# Patient Record
Sex: FEMALE | Race: BLACK OR AFRICAN AMERICAN | NOT HISPANIC OR LATINO | Employment: FULL TIME | URBAN - METROPOLITAN AREA
[De-identification: names, ages, dates, MRNs, and addresses within clinical notes are randomized per-mention and may not be internally consistent; named-entity substitution may affect disease eponyms.]

---

## 2017-01-09 ENCOUNTER — PATIENT MESSAGE (OUTPATIENT)
Dept: SPINE | Facility: CLINIC | Age: 57
End: 2017-01-09

## 2017-01-12 ENCOUNTER — OFFICE VISIT (OUTPATIENT)
Dept: FAMILY MEDICINE | Facility: CLINIC | Age: 57
End: 2017-01-12
Payer: COMMERCIAL

## 2017-01-12 VITALS
HEART RATE: 80 BPM | TEMPERATURE: 98 F | OXYGEN SATURATION: 98 % | WEIGHT: 207.88 LBS | RESPIRATION RATE: 16 BRPM | SYSTOLIC BLOOD PRESSURE: 120 MMHG | BODY MASS INDEX: 35.49 KG/M2 | HEIGHT: 64 IN | DIASTOLIC BLOOD PRESSURE: 82 MMHG

## 2017-01-12 DIAGNOSIS — N39.0 URINARY TRACT INFECTION WITH HEMATURIA, SITE UNSPECIFIED: Primary | ICD-10-CM

## 2017-01-12 DIAGNOSIS — E66.9 OBESITY (BMI 35.0-39.9 WITHOUT COMORBIDITY): ICD-10-CM

## 2017-01-12 DIAGNOSIS — R31.9 URINARY TRACT INFECTION WITH HEMATURIA, SITE UNSPECIFIED: Primary | ICD-10-CM

## 2017-01-12 DIAGNOSIS — Z12.31 ENCOUNTER FOR SCREENING MAMMOGRAM FOR BREAST CANCER: ICD-10-CM

## 2017-01-12 DIAGNOSIS — I48.0 PAROXYSMAL ATRIAL FIBRILLATION: Primary | ICD-10-CM

## 2017-01-12 LAB
BILIRUB SERPL-MCNC: ABNORMAL MG/DL
BLOOD URINE, POC: ABNORMAL
COLOR, POC UA: ABNORMAL
GLUCOSE UR QL STRIP: NORMAL
KETONES UR QL STRIP: ABNORMAL
LEUKOCYTE ESTERASE URINE, POC: ABNORMAL
NITRITE, POC UA: ABNORMAL
PH, POC UA: 5
PROTEIN, POC: ABNORMAL
SPECIFIC GRAVITY, POC UA: 1.02
UROBILINOGEN, POC UA: NORMAL

## 2017-01-12 PROCEDURE — 87077 CULTURE AEROBIC IDENTIFY: CPT | Mod: 59

## 2017-01-12 PROCEDURE — 87186 SC STD MICRODIL/AGAR DIL: CPT

## 2017-01-12 PROCEDURE — 1159F MED LIST DOCD IN RCRD: CPT | Mod: S$GLB,,, | Performed by: FAMILY MEDICINE

## 2017-01-12 PROCEDURE — 87088 URINE BACTERIA CULTURE: CPT

## 2017-01-12 PROCEDURE — 99213 OFFICE O/P EST LOW 20 MIN: CPT | Mod: 25,S$GLB,, | Performed by: FAMILY MEDICINE

## 2017-01-12 PROCEDURE — 87086 URINE CULTURE/COLONY COUNT: CPT

## 2017-01-12 PROCEDURE — 81001 URINALYSIS AUTO W/SCOPE: CPT | Mod: S$GLB,,, | Performed by: FAMILY MEDICINE

## 2017-01-12 PROCEDURE — 99999 PR PBB SHADOW E&M-EST. PATIENT-LVL IV: CPT | Mod: PBBFAC,,, | Performed by: FAMILY MEDICINE

## 2017-01-12 RX ORDER — CLINDAMYCIN HYDROCHLORIDE 300 MG/1
CAPSULE ORAL
Refills: 0 | COMMUNITY
Start: 2016-12-07 | End: 2017-01-12

## 2017-01-12 RX ORDER — IBUPROFEN 800 MG/1
TABLET ORAL
Refills: 0 | COMMUNITY
Start: 2016-12-07 | End: 2017-06-06 | Stop reason: SDUPTHER

## 2017-01-12 RX ORDER — PROPAFENONE HYDROCHLORIDE 225 MG/1
225 CAPSULE, EXTENDED RELEASE ORAL EVERY 12 HOURS
Qty: 60 CAPSULE | Refills: 11 | Status: SHIPPED | OUTPATIENT
Start: 2017-01-12 | End: 2017-02-14 | Stop reason: SDUPTHER

## 2017-01-12 RX ORDER — HYDROCODONE BITARTRATE AND ACETAMINOPHEN 7.5; 325 MG/1; MG/1
TABLET ORAL
Refills: 0 | COMMUNITY
Start: 2016-12-07 | End: 2017-01-12

## 2017-01-12 RX ORDER — NITROFURANTOIN 25; 75 MG/1; MG/1
100 CAPSULE ORAL 2 TIMES DAILY
Qty: 10 CAPSULE | Refills: 0 | Status: SHIPPED | OUTPATIENT
Start: 2017-01-12 | End: 2017-01-17

## 2017-01-12 NOTE — PROGRESS NOTES
Subjective:       Patient ID: Francoise Dykes is a 56 y.o. female.    Chief Complaint: Urinary Tract Infection (urgency and irritation for past 3 days ; otc Azo cranberry )    HPI    Onset 3 days ago with pelvic pressure a/w urgency, hesitancy, dysuria that did not improve with azo or cranberry.     Last uti was several years ago  Current Outpatient Prescriptions on File Prior to Visit   Medication Sig Dispense Refill    apixaban 5 mg Tab Take 1 tablet (5 mg total) by mouth 2 (two) times daily. 60 tablet 3    azelastine-fluticasone 137-50 mcg/spray Spry nassal spray 1 spray by Each Nare route 2 (two) times daily. 23 g 0    cetirizine (ZYRTEC) 10 MG tablet Take 10 mg by mouth once daily.      diltiaZEM (DILACOR XR) 120 MG CDCR Take 1 capsule (120 mg total) by mouth once daily. 30 capsule 11    hydrocortisone 0.5 % cream Apply topically daily as needed.      pantoprazole (PROTONIX) 40 MG tablet Take 1 tablet (40 mg total) by mouth once daily. 30 tablet 0    propafenone (RYTHMOL SR) 225 MG Cp12 Take 1 capsule (225 mg total) by mouth every 12 (twelve) hours. 60 capsule 1     No current facility-administered medications on file prior to visit.        Review of Systems   Constitutional: Negative for chills and fever.   Gastrointestinal: Negative for abdominal pain, nausea and vomiting.       Objective:      Physical Exam   Constitutional: She appears well-developed. No distress.   HENT:   Head: Normocephalic and atraumatic.   Eyes: Conjunctivae are normal. No scleral icterus.   Pulmonary/Chest: Effort normal.   Neurological: She is alert.   Skin: She is not diaphoretic.   Psychiatric: She has a normal mood and affect. Her behavior is normal.   Vitals reviewed.      Assessment:       1. Urinary tract infection with hematuria, site unspecified    2. Encounter for screening mammogram for breast cancer    3. Obesity (BMI 35.0-39.9 without comorbidity)        Plan:       Franocise was seen today for urinary tract  infection.    Diagnoses and all orders for this visit:    Urinary tract infection with hematuria, site unspecified  -     nitrofurantoin, macrocrystal-monohydrate, (MACROBID) 100 MG capsule; Take 1 capsule (100 mg total) by mouth 2 (two) times daily.  -     Urine culture  -     POCT urinalysis, dipstick or tablet reag  Will treat and cx as above. Classic uti sxs and findings on dip.     Encounter for screening mammogram for breast cancer  -     Mammo Digital Screening Bilat with CAD; Future    Obesity (BMI 35.0-39.9 without comorbidity)  The following items were discussed during today's visit:     - The overall concept of calorie intake and energy expenditure.     - Recommended to reduce total daily calorie consumption in order to support a healthier weight. This is best achieved by making better choices rather than calorie counting    - Common pitfalls such as consuming too many sugar added drinks as well as having a   diet high in carbohydrates.     - Support groups such as Weight Watchers, and smartphone applications such as My Fitness Pal as tools that may help them maintain a healthy lifestyle.     The opportunity to ask questions was given and all questions were answered.          Return if symptoms worsen or fail to improve.        Pt verbalized understanding and agreed with our plan.

## 2017-01-12 NOTE — MR AVS SNAPSHOT
[facility] [address]  [place] [address]  Phone: [phone]  Fax: [phone]                  Francoise Dykes   2017 10:20 AM   Office Visit    Description:  Female : 1960   Provider:  Bakari Winchester MD   Department:  [facility]           Reason for Visit     Urinary Tract Infection           Diagnoses this Visit        Comments    Urinary tract infection with hematuria, site unspecified    -  Primary     Encounter for screening mammogram for breast cancer         Flu vaccine need                To Do List           Future Appointments        Provider Department Dept Phone    2017 8:00 AM EKG, APPT [facility] - [phone]    2017 8:40 AM Lee Meyers MD [facility] Arrhythmia [phone]    [facility] [phone]      Goals (5 Years of Data)              Today    12/15/16    11/15/16    Weight < 200 lb (90.719 kg)   94.3 kg (207 lb 14.3 oz)  95.7 kg (211 lb)  95.1 kg (209 lb 10.5 oz)      Follow-Up and Disposition     Return if symptoms worsen or fail to improve.       These Medications        Disp Refills Start End    nitrofurantoin, macrocrystal-monohydrate, (MACROBID) 100 MG capsule 10 capsule 0 2017    Take 1 capsule (100 mg total) by mouth 2 (two) times daily. - Oral    Pharmacy: [id number]  [address]  #: [phone]         [facility]     [facility] -  Assistance  Registered nurses in the [facility] provide clinical advisement, health education, appointment booking, and other advisory services.  Call for this free service at [phone].             Medications           Message regarding Medications     Verify the changes and/or additions to your medication regime listed below are the same as discussed with your clinician today.  If any of these changes or  "additions are incorrect, please notify your healthcare provider.        START taking these NEW medications        Refills    nitrofurantoin, macrocrystal-monohydrate, (MACROBID) 100 MG capsule 0    Sig: Take 1 capsule (100 mg total) by mouth 2 (two) times daily.    Class: Normal    Route: Oral      STOP taking these medications     clindamycin (CLEOCIN) 300 MG capsule TK ONE C PO Q 8 H TAT    hydrocodone-acetaminophen 7.5-325mg (NORCO) 7.5-325 mg per tablet TK 1 T PO Q 4-6 H PRF PAIN           Verify that the below list of medications is an accurate representation of the medications you are currently taking.  If none reported, the list may be blank. If incorrect, please contact your healthcare provider. Carry this list with you in case of emergency.           Current Medications     apixaban 5 mg Tab Take 1 tablet (5 mg total) by mouth 2 (two) times daily.    azelastine-fluticasone 137-50 mcg/spray Spry nassal spray 1 spray by Each Nare route 2 (two) times daily.    cetirizine (ZYRTEC) 10 MG tablet Take 10 mg by mouth once daily.    diltiaZEM (DILACOR XR) 120 MG CDCR Take 1 capsule (120 mg total) by mouth once daily.    hydrocortisone 0.5 % cream Apply topically daily as needed.    ibuprofen (ADVIL,MOTRIN) 800 MG tablet TK 1 T PO Q 8 H PRF PAIN    nitrofurantoin, macrocrystal-monohydrate, (MACROBID) 100 MG capsule Take 1 capsule (100 mg total) by mouth 2 (two) times daily.    pantoprazole (PROTONIX) 40 MG tablet Take 1 tablet (40 mg total) by mouth once daily.    propafenone (RYTHMOL SR) 225 MG Cp12 Take 1 capsule (225 mg total) by mouth every 12 (twelve) hours.           Clinical Reference Information           Vital Signs - Last Recorded  Most recent update: 1/12/2017 10:43 AM by Caty Cheung MA    BP Pulse Temp Resp Ht Wt    120/82 (BP Location: Left arm, Patient Position: Sitting, BP Method: Manual) 80 97.9 °F (36.6 °C) (Oral) 16 5' 4" (1.626 m) 94.3 kg (207 lb 14.3 oz)    SpO2 BMI             98% " 35.68 kg/m2         Blood Pressure          Most Recent Value    BP  120/82      Allergies as of 1/12/2017     Adhesive Tape-silicones    Codeine      Immunizations Administered on Date of Encounter - 1/12/2017     Name Date Dose VIS Date Route    influenza - Quadrivalent - PF (ADULT)  Incomplete 0.5 mL 8/7/2015 Intramuscular      Orders Placed During Today's Visit      Normal Orders This Visit    Influenza - Quadrivalent (3 years & older) (PF)     Urine culture     Future Labs/Procedures Expected by Expires    Mammo Digital Screening Bilat with CAD  1/12/2017 3/14/2018

## 2017-01-14 LAB
BACTERIA UR CULT: NORMAL
BACTERIA UR CULT: NORMAL

## 2017-01-25 DIAGNOSIS — I48.0 PAF (PAROXYSMAL ATRIAL FIBRILLATION): Primary | ICD-10-CM

## 2017-01-27 ENCOUNTER — TELEPHONE (OUTPATIENT)
Dept: INTERNAL MEDICINE | Facility: CLINIC | Age: 57
End: 2017-01-27

## 2017-01-27 ENCOUNTER — TELEPHONE (OUTPATIENT)
Dept: ELECTROPHYSIOLOGY | Facility: CLINIC | Age: 57
End: 2017-01-27

## 2017-01-27 NOTE — TELEPHONE ENCOUNTER
Received letter from [employer] documents that has been faxed to legal department at [phone] and sent to HIM to scan in pt's chart    Please refer to media tab to review document

## 2017-01-30 ENCOUNTER — HOSPITAL ENCOUNTER (OUTPATIENT)
Dept: CARDIOLOGY | Facility: CLINIC | Age: 57
Discharge: HOME | End: 2017-01-30
Payer: COMMERCIAL

## 2017-01-30 ENCOUNTER — OFFICE VISIT (OUTPATIENT)
Dept: ELECTROPHYSIOLOGY | Facility: CLINIC | Age: 57
End: 2017-01-30
Payer: COMMERCIAL

## 2017-01-30 VITALS
BODY MASS INDEX: 35.31 KG/M2 | HEART RATE: 74 BPM | WEIGHT: 206.81 LBS | HEIGHT: 64 IN | DIASTOLIC BLOOD PRESSURE: 84 MMHG | SYSTOLIC BLOOD PRESSURE: 126 MMHG

## 2017-01-30 DIAGNOSIS — I48.0 PAF (PAROXYSMAL ATRIAL FIBRILLATION): ICD-10-CM

## 2017-01-30 DIAGNOSIS — I48.4 ATYPICAL ATRIAL FLUTTER: ICD-10-CM

## 2017-01-30 DIAGNOSIS — I48.0 PAROXYSMAL ATRIAL FIBRILLATION: Primary | ICD-10-CM

## 2017-01-30 DIAGNOSIS — Z98.890 S/P ABLATION OF ATRIAL FIBRILLATION: ICD-10-CM

## 2017-01-30 DIAGNOSIS — Z86.79 S/P ABLATION OF ATRIAL FIBRILLATION: ICD-10-CM

## 2017-01-30 DIAGNOSIS — I47.19 ATRIAL TACHYCARDIA: ICD-10-CM

## 2017-01-30 DIAGNOSIS — Z79.899 LONG TERM CURRENT USE OF ANTIARRHYTHMIC DRUG: ICD-10-CM

## 2017-01-30 PROCEDURE — 1159F MED LIST DOCD IN RCRD: CPT | Mod: S$GLB,,, | Performed by: INTERNAL MEDICINE

## 2017-01-30 PROCEDURE — 99999 PR PBB SHADOW E&M-EST. PATIENT-LVL III: CPT | Mod: PBBFAC,,, | Performed by: INTERNAL MEDICINE

## 2017-01-30 PROCEDURE — 93000 ELECTROCARDIOGRAM COMPLETE: CPT | Mod: S$GLB,,, | Performed by: INTERNAL MEDICINE

## 2017-01-30 PROCEDURE — 99213 OFFICE O/P EST LOW 20 MIN: CPT | Mod: S$GLB,,, | Performed by: INTERNAL MEDICINE

## 2017-01-30 NOTE — MR AVS SNAPSHOT
[facility]  [address]  [facility] [address]  Phone: [phone]  Fax: [phone]                  Francoise Dykes   2017 8:40 AM   Office Visit    Description:  Female : 1960   Provider:  Lee Meyers MD   Department:  [facility]           Reason for Visit     F/U RFA           Diagnoses this Visit        Comments    Paroxysmal atrial fibrillation    -  Primary     Atypical atrial flutter         Atrial tachycardia         Long term current use of antiarrhythmic drug         S/P ablation of atrial fibrillation                To Do List           Future Appointments        Provider Department Dept Phone    2017 8:40 AM MD [facility] - Arrhythmia [phone]    2017 9:30 AM [facility] [phone]    2017 7:30 AM EKG, APPT [facility] - [phone]    2017 8:00 AM Lee Meyers MD [facility] - Arrhythmia [phone]      Goals (5 Years of Data)              Today    1/12/17    12/15/16    Weight < 200 lb (90.719 kg)   93.8 kg (206 lb 12.7 oz)  94.3 kg (207 lb 14.3 oz)  95.7 kg (211 lb)      Follow-Up and Disposition     Return in about 3 months (around 2017).    Follow-up and Disposition History      [facility]     [facility] -  Assistance  Registered nurses in the [facility] provide clinical advisement, health education, appointment booking, and other advisory services.  Call for this free service at [phone].             Medications           Message regarding Medications     Verify the changes and/or additions to your medication regime listed below are the same as discussed with your clinician today.  If any of these changes or additions are incorrect, please notify your healthcare provider.             Verify that the below list of medications is an accurate representation of the medications you are currently taking.  If none  "reported, the list may be blank. If incorrect, please contact your healthcare provider. Carry this list with you in case of emergency.           Current Medications     apixaban 5 mg Tab Take 1 tablet (5 mg total) by mouth 2 (two) times daily.    azelastine-fluticasone 137-50 mcg/spray Spry nassal spray 1 spray by Each Nare route 2 (two) times daily.    cetirizine (ZYRTEC) 10 MG tablet Take 10 mg by mouth once daily.    diltiaZEM (DILACOR XR) 120 MG CDCR Take 1 capsule (120 mg total) by mouth once daily.    hydrocortisone 0.5 % cream Apply topically daily as needed.    ibuprofen (ADVIL,MOTRIN) 800 MG tablet TK 1 T PO Q 8 H PRF PAIN    pantoprazole (PROTONIX) 40 MG tablet Take 1 tablet (40 mg total) by mouth once daily.    propafenone (RYTHMOL SR) 225 MG Cp12 Take 1 capsule (225 mg total) by mouth every 12 (twelve) hours.           Clinical Reference Information           Vital Signs - Last Recorded  Most recent update: 1/30/2017  7:59 AM by Grecia Saravia MA    BP Pulse Ht Wt BMI    126/84 74 5' 4" (1.626 m) 93.8 kg (206 lb 12.7 oz) 35.5 kg/m2      Blood Pressure          Most Recent Value    BP  126/84      Allergies as of 1/30/2017     Adhesive Tape-silicones      Immunizations Administered on Date of Encounter - 1/30/2017     None      "
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
## 2017-02-14 DIAGNOSIS — I48.0 PAROXYSMAL ATRIAL FIBRILLATION: ICD-10-CM

## 2017-02-14 RX ORDER — PROPAFENONE HYDROCHLORIDE 225 MG/1
225 CAPSULE, EXTENDED RELEASE ORAL EVERY 12 HOURS
Qty: 60 CAPSULE | Refills: 11 | Status: SHIPPED | OUTPATIENT
Start: 2017-02-14 | End: 2017-06-19 | Stop reason: SDUPTHER

## 2017-02-23 ENCOUNTER — HOSPITAL ENCOUNTER (OUTPATIENT)
Dept: RADIOLOGY | Facility: HOSPITAL | Age: 57
Discharge: HOME | End: 2017-02-23
Attending: FAMILY MEDICINE
Payer: COMMERCIAL

## 2017-02-23 DIAGNOSIS — Z12.31 ENCOUNTER FOR SCREENING MAMMOGRAM FOR BREAST CANCER: ICD-10-CM

## 2017-02-23 PROCEDURE — 77067 SCR MAMMO BI INCL CAD: CPT | Mod: TC

## 2017-02-23 PROCEDURE — 77067 SCR MAMMO BI INCL CAD: CPT | Mod: 26,,, | Performed by: RADIOLOGY

## 2017-03-10 ENCOUNTER — OFFICE VISIT (OUTPATIENT)
Dept: SPINE | Facility: CLINIC | Age: 57
End: 2017-03-10
Payer: COMMERCIAL

## 2017-03-10 VITALS
HEIGHT: 64 IN | SYSTOLIC BLOOD PRESSURE: 134 MMHG | WEIGHT: 205 LBS | DIASTOLIC BLOOD PRESSURE: 60 MMHG | BODY MASS INDEX: 35 KG/M2 | HEART RATE: 76 BPM

## 2017-03-10 DIAGNOSIS — M48.061 SPINAL STENOSIS OF LUMBAR REGION WITHOUT NEUROGENIC CLAUDICATION: ICD-10-CM

## 2017-03-10 DIAGNOSIS — M47.819 SPONDYLOSIS WITHOUT MYELOPATHY: Primary | ICD-10-CM

## 2017-03-10 DIAGNOSIS — M54.42 BILATERAL LOW BACK PAIN WITH LEFT-SIDED SCIATICA, UNSPECIFIED CHRONICITY: ICD-10-CM

## 2017-03-10 DIAGNOSIS — M54.17 THORACIC AND LUMBOSACRAL NEURITIS: ICD-10-CM

## 2017-03-10 DIAGNOSIS — M43.10 ACQUIRED SPONDYLOLISTHESIS: ICD-10-CM

## 2017-03-10 DIAGNOSIS — M51.37 DDD (DEGENERATIVE DISC DISEASE), LUMBOSACRAL: ICD-10-CM

## 2017-03-10 DIAGNOSIS — M53.3 SACROILIAC JOINT PAIN: ICD-10-CM

## 2017-03-10 DIAGNOSIS — M51.37 DEGENERATION OF LUMBAR OR LUMBOSACRAL INTERVERTEBRAL DISC: ICD-10-CM

## 2017-03-10 DIAGNOSIS — M54.14 THORACIC AND LUMBOSACRAL NEURITIS: ICD-10-CM

## 2017-03-10 PROCEDURE — 99214 OFFICE O/P EST MOD 30 MIN: CPT | Mod: S$GLB,,, | Performed by: PHYSICIAN ASSISTANT

## 2017-03-10 PROCEDURE — 99999 PR PBB SHADOW E&M-EST. PATIENT-LVL III: CPT | Mod: PBBFAC,,, | Performed by: PHYSICIAN ASSISTANT

## 2017-03-10 RX ORDER — METHYLPREDNISOLONE 4 MG/1
TABLET ORAL
Qty: 1 PACKAGE | Refills: 0 | Status: ON HOLD | OUTPATIENT
Start: 2017-03-10 | End: 2017-03-20 | Stop reason: HOSPADM

## 2017-03-10 NOTE — MR AVS SNAPSHOT
[facility]  [address]  [address]  [facility] [address]  Phone: [phone]  Fax: [phone]                  Francoise Dykes   3/10/2017 7:00 AM   Appointment    Description:  Female : 1960   Provider:  Delfina Morales PA-C   Department:  [facility]                To Do List           Future Appointments        Provider Department Dept Phone    2017 7:30 AM EKG, APPT [facility] - [phone]    2017 8:00 AM MD [facility] [address] - Arrhythmia [phone]    2017 7:30 AM Delfina Morales PA-C [facility] [phone]      Goals (5 Years of Data)              1/30/17    1/12/17    12/15/16    Weight < 200 lb (90.719 kg)   93.8 kg (206 lb 12.7 oz)  94.3 kg (207 lb 14.3 oz)  95.7 kg (211 lb)      [facility]     [facility] On Call Nurse [facility] -  Assistance  Registered nurses in the [facility] On Call Center provide clinical advisement, health education, appointment booking, and other advisory services.  Call for this free service at [phone].             Medications           Message regarding Medications     Verify the changes and/or additions to your medication regime listed below are the same as discussed with your clinician today.  If any of these changes or additions are incorrect, please notify your healthcare provider.             Verify that the below list of medications is an accurate representation of the medications you are currently taking.  If none reported, the list may be blank. If incorrect, please contact your healthcare provider. Carry this list with you in case of emergency.           Current Medications     apixaban 5 mg Tab Take 1 tablet (5 mg total) by mouth 2 (two) times daily.    azelastine-fluticasone 137-50 mcg/spray Spry nassal spray 1 spray by Each Nare route 2 (two) times daily.    cetirizine (ZYRTEC) 10 MG tablet Take 10 mg by mouth once daily.    diltiaZEM (DILACOR XR) 120 MG CDCR Take 1  capsule (120 mg total) by mouth once daily.    hydrocortisone 0.5 % cream Apply topically daily as needed.    ibuprofen (ADVIL,MOTRIN) 800 MG tablet TK 1 T PO Q 8 H PRF PAIN    methylPREDNISolone (MEDROL DOSEPACK) 4 mg tablet use as directed x 6 days    pantoprazole (PROTONIX) 40 MG tablet Take 1 tablet (40 mg total) by mouth once daily.    propafenone (RYTHMOL SR) 225 MG Cp12 Take 1 capsule (225 mg total) by mouth every 12 (twelve) hours.           Clinical Reference Information           Allergies as of 3/10/2017     Adhesive Tape-silicones      Immunizations Administered on Date of Encounter - 3/10/2017     None      Language Assistance Services     ATTENTION: Language assistance services are available, free of charge. Please call [phone].      ATENCIÓN: Si akua feliz, tiene a pearson disposición servicios gratuitos de asistencia lingüística. Llame al [phone].     CONCEPCION Ý: N?u b?n nói Ti?ng Vi?t, có các d?ch v? h? tr? ngôn ng? mi?n phí dành cho b?n. G?i s? [phone].         [facility] complies with applicable Federal civil rights laws and does not discriminate on the basis of race, color, national origin, age, disability, or sex.
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
## 2017-03-17 ENCOUNTER — HOSPITAL ENCOUNTER (INPATIENT)
Facility: HOSPITAL | Age: 57
LOS: 1 days | Discharge: HOME | DRG: 310 | End: 2017-03-20
Attending: EMERGENCY MEDICINE | Admitting: HOSPITALIST
Payer: COMMERCIAL

## 2017-03-17 DIAGNOSIS — I48.3 TYPICAL ATRIAL FLUTTER: ICD-10-CM

## 2017-03-17 DIAGNOSIS — I48.91 ATRIAL FIBRILLATION: ICD-10-CM

## 2017-03-17 DIAGNOSIS — I48.0 PAROXYSMAL ATRIAL FIBRILLATION: ICD-10-CM

## 2017-03-17 DIAGNOSIS — I48.92 ATRIAL FLUTTER, UNSPECIFIED TYPE: Primary | ICD-10-CM

## 2017-03-17 DIAGNOSIS — I48.92 ATRIAL FLUTTER, PAROXYSMAL: ICD-10-CM

## 2017-03-17 DIAGNOSIS — I48.92 ATRIAL FLUTTER: ICD-10-CM

## 2017-03-17 LAB
ALBUMIN SERPL BCP-MCNC: 4 G/DL
ALP SERPL-CCNC: 96 U/L
ALT SERPL W/O P-5'-P-CCNC: 14 U/L
ANION GAP SERPL CALC-SCNC: 9 MMOL/L
AST SERPL-CCNC: 12 U/L
BASOPHILS # BLD AUTO: 0.01 K/UL
BASOPHILS NFR BLD: 0.1 %
BILIRUB SERPL-MCNC: 0.4 MG/DL
BNP SERPL-MCNC: 173 PG/ML
BUN SERPL-MCNC: 18 MG/DL
CALCIUM SERPL-MCNC: 9.8 MG/DL
CHLORIDE SERPL-SCNC: 106 MMOL/L
CO2 SERPL-SCNC: 25 MMOL/L
CREAT SERPL-MCNC: 0.8 MG/DL
DIFFERENTIAL METHOD: ABNORMAL
EOSINOPHIL # BLD AUTO: 0 K/UL
EOSINOPHIL NFR BLD: 0.1 %
ERYTHROCYTE [DISTWIDTH] IN BLOOD BY AUTOMATED COUNT: 13.2 %
EST. GFR  (AFRICAN AMERICAN): >60 ML/MIN/1.73 M^2
EST. GFR  (NON AFRICAN AMERICAN): >60 ML/MIN/1.73 M^2
GLUCOSE SERPL-MCNC: 112 MG/DL
HCT VFR BLD AUTO: 39.4 %
HGB BLD-MCNC: 14.3 G/DL
INR PPP: 1
LYMPHOCYTES # BLD AUTO: 1.7 K/UL
LYMPHOCYTES NFR BLD: 17.9 %
MCH RBC QN AUTO: 28.2 PG
MCHC RBC AUTO-ENTMCNC: 36.3 %
MCV RBC AUTO: 78 FL
MONOCYTES # BLD AUTO: 0.8 K/UL
MONOCYTES NFR BLD: 8.4 %
NEUTROPHILS # BLD AUTO: 6.8 K/UL
NEUTROPHILS NFR BLD: 72.6 %
PLATELET # BLD AUTO: 250 K/UL
PMV BLD AUTO: 10.3 FL
POTASSIUM SERPL-SCNC: 3.7 MMOL/L
PROT SERPL-MCNC: 7.9 G/DL
PROTHROMBIN TIME: 10.9 SEC
RBC # BLD AUTO: 5.07 M/UL
SODIUM SERPL-SCNC: 140 MMOL/L
TROPONIN I SERPL DL<=0.01 NG/ML-MCNC: <0.006 NG/ML
WBC # BLD AUTO: 9.38 K/UL

## 2017-03-17 PROCEDURE — 96375 TX/PRO/DX INJ NEW DRUG ADDON: CPT

## 2017-03-17 PROCEDURE — 93010 ELECTROCARDIOGRAM REPORT: CPT | Mod: ,,, | Performed by: INTERNAL MEDICINE

## 2017-03-17 PROCEDURE — 99291 CRITICAL CARE FIRST HOUR: CPT | Mod: 25

## 2017-03-17 PROCEDURE — 85610 PROTHROMBIN TIME: CPT

## 2017-03-17 PROCEDURE — 96368 THER/DIAG CONCURRENT INF: CPT

## 2017-03-17 PROCEDURE — 25000003 PHARM REV CODE 250: Performed by: STUDENT IN AN ORGANIZED HEALTH CARE EDUCATION/TRAINING PROGRAM

## 2017-03-17 PROCEDURE — 83880 ASSAY OF NATRIURETIC PEPTIDE: CPT

## 2017-03-17 PROCEDURE — 93005 ELECTROCARDIOGRAM TRACING: CPT

## 2017-03-17 PROCEDURE — 80053 COMPREHEN METABOLIC PANEL: CPT

## 2017-03-17 PROCEDURE — 85025 COMPLETE CBC W/AUTO DIFF WBC: CPT

## 2017-03-17 PROCEDURE — 99291 CRITICAL CARE FIRST HOUR: CPT | Mod: ,,, | Performed by: EMERGENCY MEDICINE

## 2017-03-17 PROCEDURE — 96366 THER/PROPH/DIAG IV INF ADDON: CPT

## 2017-03-17 PROCEDURE — 85730 THROMBOPLASTIN TIME PARTIAL: CPT

## 2017-03-17 PROCEDURE — 96365 THER/PROPH/DIAG IV INF INIT: CPT

## 2017-03-17 PROCEDURE — 84484 ASSAY OF TROPONIN QUANT: CPT

## 2017-03-17 PROCEDURE — 96361 HYDRATE IV INFUSION ADD-ON: CPT

## 2017-03-17 RX ORDER — ASPIRIN 325 MG
325 TABLET ORAL
Status: COMPLETED | OUTPATIENT
Start: 2017-03-17 | End: 2017-03-17

## 2017-03-17 RX ORDER — DILTIAZEM HYDROCHLORIDE 5 MG/ML
10 INJECTION INTRAVENOUS
Status: COMPLETED | OUTPATIENT
Start: 2017-03-17 | End: 2017-03-17

## 2017-03-17 RX ADMIN — SODIUM CHLORIDE 1000 ML: 0.9 INJECTION, SOLUTION INTRAVENOUS at 11:03

## 2017-03-17 RX ADMIN — ASPIRIN 325 MG ORAL TABLET 325 MG: 325 PILL ORAL at 11:03

## 2017-03-17 RX ADMIN — DILTIAZEM HYDROCHLORIDE 10 MG: 5 INJECTION INTRAVENOUS at 11:03

## 2017-03-17 NOTE — IP AVS SNAPSHOT
[facility]  [address]  [facility] [address]  Phone: [phone]           Patient Discharge Instructions     Our goal is to set you up for success. This packet includes information on your condition, medications, and your home care. It will help you to care for yourself so you don't get sicker and need to go back to the hospital.     Please ask your nurse if you have any questions.        There are many details to remember when preparing to leave the hospital. Here is what you will need to do:    1. Take your medicine. If you are prescribed medications, review your Medication List in the following pages. You may have new medications to  at the pharmacy and others that you'll need to stop taking. Review the instructions for how and when to take your medications. Talk with your doctor or nurses if you are unsure of what to do.     2. Go to your follow-up appointments. Specific follow-up information is listed in the following pages. Your may be contacted by a transition nurse or clinical provider about future appointments. Be sure we have all of the phone numbers to reach you, if needed. Please contact your provider's office if you are unable to make an appointment.     3. Watch for warning signs. Your doctor or nurse will give you detailed warning signs to watch for and when to call for assistance. These instructions may also include educational information about your condition. If you experience any of warning signs to your health, call your doctor.               [facility]  Unless otherwise directed by your provider, please contact [facility], our nurse care line that is available for 24/7 assistance.     [phone] (toll-free)    Registered nurses in the [facility] provide clinical advisement, health education, appointment booking, and other advisory services.                    ** Verify the list of medication(s) below is accurate and up  to date. Carry this with you in case of emergency. If your medications have changed, please notify your healthcare provider.             Medication List      CHANGE how you take these medications        Additional Info    Begin Date AM Noon PM Bedtime    diltiaZEM 120 MG Cdcr   Commonly known as:  DILACOR XR   Quantity:  30 capsule   Refills:  2   Dose:  240 mg   What changed:  how much to take    Instructions:  Take 2 capsules (240 mg total) by mouth once daily.     Start 3/21                            CONTINUE taking these medications        Additional Info    Begin Date AM Noon PM Bedtime    apixaban 5 mg Tab   Quantity:  60 tablet   Refills:  3   Dose:  5 mg    Last time this was given:  5 mg on 3/20/2017  8:19 AM   Instructions:  Take 1 tablet (5 mg total) by mouth 2 (two) times daily.     Start 3/20                             azelastine-fluticasone 137-50 mcg/spray DeRidder nassal spray   Quantity:  23 g   Refills:  0   Dose:  1 spray    Instructions:  1 spray by Each Nare route 2 (two) times daily.     Start 3/20                             cetirizine 10 MG tablet   Commonly known as:  ZYRTEC   Refills:  0   Dose:  10 mg    Instructions:  Take 10 mg by mouth once daily.     Start 3/21                          hydrocortisone 0.5 % cream   Refills:  0    Instructions:  Apply topically daily as needed.                            ibuprofen 800 MG tablet   Commonly known as:  ADVIL,MOTRIN   Refills:  0    Instructions:  TK 1 T PO Q 8 H PRF PAIN                            propafenone 225 MG Cp12   Commonly known as:  RYTHMOL SR   Quantity:  60 capsule   Refills:  11   Dose:  225 mg    Instructions:  Take 1 capsule (225 mg total) by mouth every 12 (twelve) hours.     Start 3/20 (0900)                               STOP taking these medications     methylPREDNISolone 4 mg tablet   Commonly known as:  MEDROL DOSEPACK       pantoprazole 40 MG tablet   Commonly known as:  PROTONIX            Where to Get Your Medications       [id number] [address] [address] [address]  [address] [address]     Phone:  [phone]     diltiaZEM 120 MG Cdcr                  Please bring to all follow up appointments:    1. A copy of your discharge instructions.  2. All medicines you are currently taking in their original bottles.  3. Identification and insurance card.    Please arrive 15 minutes ahead of scheduled appointment time.    Please call 24 hours in advance if you must reschedule your appointment and/or time.        Your Scheduled Appointments     Apr 17, 2017  7:30 AM CDT   EKG with EKG, APPT   [facility] - EKG ([facility] )    [address]  [place] [address] [phone]            Apr 17, 2017  8:00 AM CDT   Established Patient Visit with MD Benton Ortiz - Arrhythmia ([facility] )    [address]  [place] [address] [phone]            May 12, 2017  7:30 AM CDT   Back & Spine Established Patient with Delfina Morales PA-C   [facility]    [address]  [facility] [id number] [phone]                Discharge Instructions     Future Orders    Activity as tolerated     Call MD for:  persistent dizziness, light-headedness, or visual disturbances     Diet general     Questions:    Total calories:      Fat restriction, if any:      Protein restriction, if any:      Na restriction, if any:      Fluid restriction:      Additional restrictions:          Primary Diagnosis     Your primary diagnosis was:  Atrial Flutter      Admission Information     Date & Time Provider Department CSN    3/17/2017 10:44 PM Tari Calix MD [facility] [id number]      Care Providers     Provider Role Specialty Primary office phone    Tari Calix MD Attending Provider Hospitalist [phone]    Todd Nance MD Team Attending  Hospitalist [phone]    Tari Calix MD  "Team Attending  Hospitalist [phone]    Ambrocio Carey MD Surgeon  Electrophysiology [phone]      Your Vitals Were     BP Pulse Temp Resp Height Weight    116/71 (BP Location: Right arm, Patient Position: Lying, BP Method: Automatic) 81 98.2 °F (36.8 °C) (Oral) 16 5' 4" (1.626 m) 95.7 kg (211 lb)    Last Period SpO2 BMI          (LMP Unknown) 98% 36.22 kg/m2        Recent Lab Values     No lab values to display.      Allergies as of 3/20/2017        Reactions    Adhesive Tape-silicones Itching    Manifested in 12/2015 following AF ablation.      Advance Directives     An advance directive is a document which, in the event you are no longer able to make decisions for yourself, tells your healthcare team what kind of treatment you do or do not want to receive, or who you would like to make those decisions for you.  If you do not currently have an advance directive, [facility] encourages you to create one.  For more information call:  [phone]), [phone] ([phone]),  or log on to [email or web address].        Language Assistance Services     ATTENTION: Language assistance services are available, free of charge. Please call [phone].      ATENCIÓN: Si habla español, tiene a pearson disposición servicios gratuitos de asistencia lingüística. Llame al [phone].     CHÚ Ý: N?u b?n nói Ti?ng Vi?t, có các d?ch v? h? tr? ngôn ng? mi?n phí dành cho b?n. G?i s? [phone].        [employer] [facility] complies with applicable Federal civil rights laws and does not discriminate on the basis of race, color, national origin, age, disability, or sex.        "

## 2017-03-18 PROBLEM — I48.92 ATRIAL FLUTTER: Status: ACTIVE | Noted: 2017-03-18

## 2017-03-18 LAB
ALBUMIN SERPL BCP-MCNC: 3.5 G/DL
ALP SERPL-CCNC: 80 U/L
ALT SERPL W/O P-5'-P-CCNC: 13 U/L
ANION GAP SERPL CALC-SCNC: 7 MMOL/L
APTT BLDCRRT: 26.5 SEC
AST SERPL-CCNC: 10 U/L
BASOPHILS # BLD AUTO: 0.01 K/UL
BASOPHILS NFR BLD: 0.1 %
BILIRUB SERPL-MCNC: 0.5 MG/DL
BUN SERPL-MCNC: 14 MG/DL
CALCIUM SERPL-MCNC: 8.9 MG/DL
CHLORIDE SERPL-SCNC: 108 MMOL/L
CO2 SERPL-SCNC: 26 MMOL/L
CREAT SERPL-MCNC: 0.7 MG/DL
DIFFERENTIAL METHOD: ABNORMAL
EOSINOPHIL # BLD AUTO: 0 K/UL
EOSINOPHIL NFR BLD: 0.6 %
ERYTHROCYTE [DISTWIDTH] IN BLOOD BY AUTOMATED COUNT: 13.5 %
EST. GFR  (AFRICAN AMERICAN): >60 ML/MIN/1.73 M^2
EST. GFR  (NON AFRICAN AMERICAN): >60 ML/MIN/1.73 M^2
GLUCOSE SERPL-MCNC: 98 MG/DL
HCT VFR BLD AUTO: 37.2 %
HGB BLD-MCNC: 12.8 G/DL
LYMPHOCYTES # BLD AUTO: 2 K/UL
LYMPHOCYTES NFR BLD: 29.9 %
MAGNESIUM SERPL-MCNC: 1.9 MG/DL
MCH RBC QN AUTO: 27.8 PG
MCHC RBC AUTO-ENTMCNC: 34.4 %
MCV RBC AUTO: 81 FL
MONOCYTES # BLD AUTO: 0.5 K/UL
MONOCYTES NFR BLD: 6.9 %
NEUTROPHILS # BLD AUTO: 4.2 K/UL
NEUTROPHILS NFR BLD: 61.2 %
PLATELET # BLD AUTO: 200 K/UL
PMV BLD AUTO: 10.6 FL
POTASSIUM SERPL-SCNC: 3.4 MMOL/L
PROT SERPL-MCNC: 6.8 G/DL
RBC # BLD AUTO: 4.6 M/UL
SODIUM SERPL-SCNC: 141 MMOL/L
WBC # BLD AUTO: 6.79 K/UL

## 2017-03-18 PROCEDURE — G0378 HOSPITAL OBSERVATION PER HR: HCPCS

## 2017-03-18 PROCEDURE — 25000003 PHARM REV CODE 250: Performed by: HOSPITALIST

## 2017-03-18 PROCEDURE — 85025 COMPLETE CBC W/AUTO DIFF WBC: CPT

## 2017-03-18 PROCEDURE — 80053 COMPREHEN METABOLIC PANEL: CPT

## 2017-03-18 PROCEDURE — 25000003 PHARM REV CODE 250: Performed by: STUDENT IN AN ORGANIZED HEALTH CARE EDUCATION/TRAINING PROGRAM

## 2017-03-18 PROCEDURE — 25000003 PHARM REV CODE 250: Performed by: EMERGENCY MEDICINE

## 2017-03-18 PROCEDURE — 99220 PR INITIAL OBSERVATION CARE,LEVL III: CPT | Mod: ,,, | Performed by: INTERNAL MEDICINE

## 2017-03-18 PROCEDURE — 83735 ASSAY OF MAGNESIUM: CPT

## 2017-03-18 PROCEDURE — 25000003 PHARM REV CODE 250: Performed by: INTERNAL MEDICINE

## 2017-03-18 PROCEDURE — 99214 OFFICE O/P EST MOD 30 MIN: CPT | Mod: ,,, | Performed by: INTERNAL MEDICINE

## 2017-03-18 RX ORDER — SIMETHICONE 80 MG
1 TABLET,CHEWABLE ORAL 3 TIMES DAILY PRN
Status: DISCONTINUED | OUTPATIENT
Start: 2017-03-18 | End: 2017-03-20 | Stop reason: HOSPADM

## 2017-03-18 RX ORDER — ONDANSETRON 8 MG/1
8 TABLET, ORALLY DISINTEGRATING ORAL EVERY 8 HOURS PRN
Status: DISCONTINUED | OUTPATIENT
Start: 2017-03-18 | End: 2017-03-20 | Stop reason: HOSPADM

## 2017-03-18 RX ORDER — DILTIAZEM HCL 1 MG/ML
5 INJECTION, SOLUTION INTRAVENOUS CONTINUOUS
Status: DISCONTINUED | OUTPATIENT
Start: 2017-03-18 | End: 2017-03-18

## 2017-03-18 RX ORDER — RAMELTEON 8 MG/1
8 TABLET ORAL NIGHTLY PRN
Status: DISCONTINUED | OUTPATIENT
Start: 2017-03-18 | End: 2017-03-20 | Stop reason: HOSPADM

## 2017-03-18 RX ORDER — ACETAMINOPHEN 325 MG/1
650 TABLET ORAL EVERY 4 HOURS PRN
Status: DISCONTINUED | OUTPATIENT
Start: 2017-03-18 | End: 2017-03-20 | Stop reason: HOSPADM

## 2017-03-18 RX ORDER — DILTIAZEM HYDROCHLORIDE 180 MG/1
180 CAPSULE, COATED, EXTENDED RELEASE ORAL DAILY
Status: DISCONTINUED | OUTPATIENT
Start: 2017-03-18 | End: 2017-03-19

## 2017-03-18 RX ORDER — DILTIAZEM HYDROCHLORIDE 5 MG/ML
10 INJECTION INTRAVENOUS
Status: COMPLETED | OUTPATIENT
Start: 2017-03-18 | End: 2017-03-18

## 2017-03-18 RX ORDER — ASPIRIN 325 MG
325 TABLET ORAL DAILY
Status: DISCONTINUED | OUTPATIENT
Start: 2017-03-18 | End: 2017-03-18

## 2017-03-18 RX ORDER — IBUPROFEN 400 MG/1
800 TABLET ORAL 3 TIMES DAILY PRN
Status: DISCONTINUED | OUTPATIENT
Start: 2017-03-18 | End: 2017-03-20 | Stop reason: HOSPADM

## 2017-03-18 RX ORDER — POTASSIUM CHLORIDE 20 MEQ/1
60 TABLET, EXTENDED RELEASE ORAL ONCE
Status: COMPLETED | OUTPATIENT
Start: 2017-03-18 | End: 2017-03-18

## 2017-03-18 RX ORDER — DILTIAZEM HCL 1 MG/ML
5 INJECTION, SOLUTION INTRAVENOUS CONTINUOUS
Status: ACTIVE | OUTPATIENT
Start: 2017-03-18 | End: 2017-03-18

## 2017-03-18 RX ORDER — SODIUM CHLORIDE 0.9 % (FLUSH) 0.9 %
3 SYRINGE (ML) INJECTION EVERY 8 HOURS
Status: DISCONTINUED | OUTPATIENT
Start: 2017-03-18 | End: 2017-03-20 | Stop reason: HOSPADM

## 2017-03-18 RX ORDER — DILTIAZEM HYDROCHLORIDE 120 MG/1
120 CAPSULE, COATED, EXTENDED RELEASE ORAL DAILY
Status: DISCONTINUED | OUTPATIENT
Start: 2017-03-18 | End: 2017-03-18

## 2017-03-18 RX ADMIN — Medication 5 MG/HR: at 05:03

## 2017-03-18 RX ADMIN — PROPAFENONE HYDROCHLORIDE 225 MG: 150 TABLET, FILM COATED ORAL at 10:03

## 2017-03-18 RX ADMIN — PROPAFENONE HYDROCHLORIDE 225 MG: 150 TABLET, FILM COATED ORAL at 09:03

## 2017-03-18 RX ADMIN — APIXABAN 5 MG: 5 TABLET, FILM COATED ORAL at 08:03

## 2017-03-18 RX ADMIN — Medication 3 ML: at 06:03

## 2017-03-18 RX ADMIN — SIMETHICONE CHEW TAB 80 MG 80 MG: 80 TABLET ORAL at 05:03

## 2017-03-18 RX ADMIN — APIXABAN 5 MG: 5 TABLET, FILM COATED ORAL at 10:03

## 2017-03-18 RX ADMIN — Medication 3 ML: at 02:03

## 2017-03-18 RX ADMIN — POTASSIUM CHLORIDE 60 MEQ: 1500 TABLET, EXTENDED RELEASE ORAL at 10:03

## 2017-03-18 RX ADMIN — DILTIAZEM HYDROCHLORIDE 10 MG: 5 INJECTION INTRAVENOUS at 12:03

## 2017-03-18 RX ADMIN — DILTIAZEM HYDROCHLORIDE 180 MG: 180 CAPSULE, COATED, EXTENDED RELEASE ORAL at 12:03

## 2017-03-18 RX ADMIN — Medication 5 MG/HR: at 02:03

## 2017-03-18 NOTE — ED NOTES
Dr Heath was called to clarify Cardizem drip order and Observation Admit Status. Order clarication received for Cardizem drip to be non-titrating at 5mg/hr and for pt to be an OBs admit

## 2017-03-18 NOTE — CONSULTS
Electrophysiology Consult Note    3/18/2017    SUBJECTIVE  Francoise Dykes is a 56 y.o. female. EP is consulted for Atrial flutter    The patient has a history significant for Atrial fibrillation since , S/p PVI in 2015, SAMMIE/DCCV in  and . On 12/15/2016, Ms. Dykes underwent repeat PVI. The right pulmonary veins were re-isolated, with isolated firing noted from the veins post-isolation. Additionally, a mid-sal AT was targeted and successfully ablated. She was discharged on Rythmol  mg bid/ Elliquis. Plan was to wean Rythmol to once a day and eventually stop it in next few months. She decreased it to once a day 2 weeks ago. Patient reports short bursts of palpitations for 2 weeks intermittently and not feeling well.    OBJECTIVE  Current Facility-Administered Medications   Medication Dose Route Frequency Provider Last Rate Last Dose    acetaminophen tablet 650 mg  650 mg Oral Q4H PRN Carlos Heath MD        apixaban tablet 5 mg  5 mg Oral BID Carlos Heath MD        diltiaZEM 125 mg in D5W 125 mL infusion  5 mg/hr Intravenous Continuous Paul Keita III, MD 5 mL/hr at 17 0515 5 mg/hr at 17 0515    ibuprofen tablet 800 mg  800 mg Oral TID PRN Carlos Heath MD        ondansetron disintegrating tablet 8 mg  8 mg Oral Q8H PRN Carlos Heath MD        potassium chloride SA CR tablet 60 mEq  60 mEq Oral Once Tari Calix MD        propafenone tablet 225 mg  225 mg Oral BID Carlos Heath MD        ramelteon tablet 8 mg  8 mg Oral Nightly PRN Carlos Heath MD        sodium chloride 0.9% flush 3 mL  3 mL Intravenous Q8H Carlos Heath MD           Past Medical History:   Diagnosis Date    Allergy     seasonal    Atrial fibrillation        Past Surgical History:   Procedure Laterality Date     SECTION      heart ablation      HYSTERECTOMY      [facility]     Review of Systems   Constitution: Negative for decreased appetite,  malaise/fatigue, weight gain and weight loss.   HENT: Negative for sore throat.   Eyes: Negative for blurred vision.   Cardiovascular: positive for palpitations intermittently  Negative for dyspnea on exertion, leg swelling, near-syncope, orthopnea and paroxysmal nocturnal dyspnea.   Skin: Negative for rash.   Musculoskeletal: Negative for arthritis.   Gastrointestinal: Negative for abdominal pain.   Neurological: Negative for focal weakness.   Psychiatric/Behavioral: Negative for altered mental status.     Review of patient's allergies indicates:   Allergen Reactions    Adhesive tape-silicones Itching     Manifested in 12/2015 following AF ablation.       Family History   Problem Relation Age of Onset    Hypertension Mother     Diabetes Mother     Glaucoma Mother     Asbestos Father     Obesity Father     Eczema Son     Hypertension Son     Heart disease Maternal Grandmother      chf    Diabetes Maternal Grandfather     Cancer Paternal Grandmother      lung, 2/2 second hand smoke    Glaucoma Paternal Grandfather     Blindness Paternal Grandfather     Melanoma Neg Hx     Psoriasis Neg Hx     Lupus Neg Hx     Acne Neg Hx     Breast cancer Neg Hx     Colon cancer Neg Hx     Ovarian cancer Neg Hx        Social History     Social History    Marital status:      Spouse name: N/A    Number of children: N/A    Years of education: N/A     Occupational History    Teacher Pablo Dominique      Social History Main Topics    Smoking status: Never Smoker    Smokeless tobacco: Never Used    Alcohol use Yes      Comment: rarely, 1 drink/week    Drug use: No    Sexual activity: Not Currently     Partners: Male     Other Topics Concern    Are You Pregnant Or Think You May Be? No    Breast-Feeding No     Social History Narrative    Recently moved back to [facility] to help take care of mom.       Vitals: /87 (BP Location: Right leg, BP Method: Automatic)  Pulse (!) 119  Temp 98.2 °F (36.8 °C)   "Resp 19  Ht 5' 4" (1.626 m)  Wt 95.7 kg (211 lb)  SpO2 98%  Breastfeeding? No  BMI 36.22 kg/m2    Physical exam:   General: resting comfortably  HEENT: perrl, eomi  Neck: no jvd  Heart: nl s1/2, no m/h/t, tachy, reg rhythm  Lungs: clear to auscultation bilaterally  Abdomen: s/nt/nd, no organomegaly, no fluid wave  Ext: no edema Pulses: 2+ pulses bilateral  Skin: no tears, wounds, bleeding, color changes  Neuro: sensations/motor intact b/l     Labs  CMP:  Recent Labs  Lab 03/17/17 2307 03/18/17  0427    141   K 3.7 3.4*   CO2 25 26    108   BUN 18 14   CREATININE 0.8 0.7   * 98   CALCIUM 9.8 8.9   ALT 14 13   AST 12 10   ALKPHOS 96 80   BILITOT 0.4 0.5   PROT 7.9 6.8   ALBUMIN 4.0 3.5       CBC:  Recent Labs  Lab 03/17/17 2307 03/18/17 0427   WBC 9.38 6.79   HGB 14.3 12.8   HCT 39.4 37.2    200   MCV 78* 81*     Coags:  Recent Labs  Lab 03/17/17 2307   APTT 26.5   INR 1.0     Cardiac markers:  Recent Labs  Lab 03/17/17 2307   TROPONINI <0.006        Pertinent Tests    SAMMIE 11/7/16   CONCLUSIONS     1 - Normal biventricular systolic function.     2 - Dynamic left atrial appendage with no evidence of intracardiac thrombus.   TTE 8/7/16  CONCLUSIONS     1 - Normal left ventricular systolic function (EF 60-65%).     2 - Normal left ventricular diastolic function.     3 - Normal right ventricular systolic function .     EKG  Atrial flutter with variable block.   ?  ASSESSMENT AND PLAN  56 y.o. female with  History of Afib and Flutter with redo PVI in the past presents with palpitations and is in flutter. She has been weaning off rythmol to once a day. She is not sure about taking elliquis every day in the morning but certainly takes it in the night every night. Will plan for SAMMIE/DCCV on Monday morning. NPO past Sunday MN. She will follow up with Dr INES Meyers as outpatient to discuss about further ablation procedures. In the mean time transition IV cardizem to oral and add beta blockers if " needed as tolerated from a blood pressure perspective.     Discussed with Cardiology Attending: Dr Carey.    Cameron Bolaños MD  [phone]

## 2017-03-18 NOTE — PLAN OF CARE
Problem: Patient Care Overview  Goal: Plan of Care Review  Outcome: Ongoing (interventions implemented as appropriate)  No fall related injury, denies c/o pain.m cardizem maintained at 5mg/h via pump,cardizem 24h capsule added. Tolerating well. Tele a-flutter, he 119-98, bp 110/125/70's. NPO after MN 3/20/17 FOR SAMMIE. Plan of care discussed with patient.

## 2017-03-18 NOTE — ED NOTES
Pt is resting comfortably in stretcher with eyes closed. Respirations are full, even, and non labored. NAD noted.   Call bell is in reach, side rails are up x 2, and bed is in lowest, locked, position. Will continue to monitor.

## 2017-03-18 NOTE — ED NOTES
Pt is resting comfortably in stretcher with eyes open. Pt is AAOx3.  Respirations are full, even, and non labored. NAD noted. Pt denies pain at this time. No needs verbalized at this time. Patient updated on plan of care and verbalizes understanding. Call bell is in reach, side rails are up x 2, and bed is in lowest, locked, position. Will continue to monitor.

## 2017-03-18 NOTE — H&P
History & Physical  Cardiology      SUBJECTIVE:     History of Present Illness:  Patient is a 56 y.o. female presents with 1 day of palpitations and 'not feeling well.'  She notes that, since her most recent ablation, she has had short bursts of palpitations lasting < 30seconds 1-2 times/week.  The plan was for her to wean off of her rhytmol this month and so she went from BID dosing to once daily a couple of weeks ago.  Last week she noted an episode of palpitations that lasted a bit longer than normal and took an extra dose.  She also took two doses yesterday given her symptoms.  She did not have success in resolving her symptoms and so presented to the ED found to be in rapid a flutter.  Has been on medrol dosepack for back pain.    PMH sig for pAF s/p PVI in 12/15, s/p rachel dccv for atypical aflutter as well (, ), recent pulmonary vein isolation, atrial tachycardia ablation, ablation of two mechanistically distinct tachycardia 2016.      Review of patient's allergies indicates:   Allergen Reactions    Adhesive tape-silicones Itching     Manifested in 2015 following AF ablation.       Past Medical History:   Diagnosis Date    Allergy     seasonal    Atrial fibrillation      Past Surgical History:   Procedure Laterality Date     SECTION      heart ablation      HYSTERECTOMY      [facility]     Family History   Problem Relation Age of Onset    Hypertension Mother     Diabetes Mother     Glaucoma Mother     Asbestos Father     Obesity Father     Eczema Son     Hypertension Son     Heart disease Maternal Grandmother      chf    Diabetes Maternal Grandfather     Cancer Paternal Grandmother      lung, 2/2 second hand smoke    Glaucoma Paternal Grandfather     Blindness Paternal Grandfather     Melanoma Neg Hx     Psoriasis Neg Hx     Lupus Neg Hx     Acne Neg Hx     Breast cancer Neg Hx     Colon cancer Neg Hx     Ovarian cancer Neg Hx      Social History   Substance Use  Topics    Smoking status: Never Smoker    Smokeless tobacco: Never Used    Alcohol use Yes      Comment: rarely, 1 drink/week        Review of Systems:  GENERAL: WNL  HENT: WNL  NEURO: WNL  CARDIAC: per hpi above  PULMONARY: WNL  GI: WNL  : WNL  MSK: WNL  INTEGUMENTARY: WNL  HEMATOLOGIC: WNL    OBJECTIVE:     Vital Signs (Most Recent)  Temp: 98.5 °F (36.9 °C) (03/17/17 2233)  Pulse: (!) 114 (03/18/17 0031)  Resp: 19 (03/18/17 0031)  BP: (!) 147/91 (03/18/17 0031)  SpO2: 96 % (03/18/17 0031)    Vital Signs Range (Last 24H):  Temp:  [98.5 °F (36.9 °C)]   Pulse:  [114-122]   Resp:  [15-38]   BP: (133-173)/(91-94)   SpO2:  [96 %-98 %]     Physical Exam:  General: resting comfortably  HEENT: perrl, eomi  Neck: no jvd  Heart: nl s1/2, no m/h/t, tachy, reg rhythm  Lungs: clear to auscultation bilaterally  Abdomen: s/nt/nd, no organomegaly, no fluid wave  Ext: no edema, good cap refill, moves all  Pulses: 2+ pulses b/l ue  Skin: no tears, wounds, bleeding, color changes  Neuro: sensations/motor intact b/l    Laboratory:    Recent Labs  Lab 03/17/17  2307   WBC 9.38   RBC 5.07   HGB 14.3   HCT 39.4      MCV 78*   MCH 28.2   MCHC 36.3*           Ref Range & Units 1d ago   3mo ago      Sodium 136 - 145 mmol/L 140 140    Potassium 3.5 - 5.1 mmol/L 3.7 3.8    Chloride 95 - 110 mmol/L 106 109    CO2 23 - 29 mmol/L 25 25    Glucose 70 - 110 mg/dL 112 (H) 88    BUN, Bld 6 - 20 mg/dL 18 13    Creatinine 0.5 - 1.4 mg/dL 0.8 0.8    Calcium 8.7 - 10.5 mg/dL 9.8 9.2    Total Protein 6.0 - 8.4 g/dL 7.9     Albumin 3.5 - 5.2 g/dL 4.0     Total Bilirubin 0.1 - 1.0 mg/dL 0.4    Comments: For infants and newborns, interpretation of results should be based   on gestational age, weight and in agreement with clinical   observations.   Premature Infant recommended reference ranges:   Up to 24 hours.............<8.0 mg/dL   Up to 48 hours............<12.0 mg/dL   3-5 days..................<15.0 mg/dL   6-29 days.................<15.0  mg/dL       Alkaline Phosphatase 55 - 135 U/L 96     AST 10 - 40 U/L 12     ALT 10 - 44 U/L 14     Anion Gap 8 - 16 mmol/L 9 6 (L)    eGFR if African American >60 mL/min/1.73 m^2 >60.0 >60    eGFR if non African American >60 mL/min/1.73 m^2 >60.0 >60CM         Recent Labs  Lab 03/17/17  2307   TROPONINI <0.006       Diagnostic Results:  Ecg: typical atrial flutter  cxr NAPD    ASSESSMENT/PLAN:   56 female with PMH sig for afib/flutter presents with recurrent typical atrial flutter.     Patient Active Problem List    Diagnosis Date Noted    Atrial flutter 03/18/2017    S/P ablation of atrial fibrillation 01/30/2017    Obesity (BMI 35.0-39.9 without comorbidity) 01/12/2017    Atrial tachycardia 12/17/2016    Atrial flutter, paroxysmal 11/07/2016    Atypical atrial flutter 08/19/2016    Palpitations 07/14/2015    Long term current use of antiarrhythmic drug 07/14/2015    Lumbago 01/09/2015    Acquired spondylolisthesis 01/09/2015    Degeneration of lumbar or lumbosacral intervertebral disc 01/09/2015    Sacroiliac joint pain 01/09/2015    Paroxysmal atrial fibrillation 01/09/2014         Plan:   1) Recurrent typical atrial flutter  dilt ivp in ed and then dilt drip at 5  Continue eliquis bid  Continue rhythmol for now  Consult EP, possible ablation again in AM if feasible     2) franklin Heath MD  [phone]

## 2017-03-18 NOTE — NURSING
Received to unit via stretcher accompanied by RN. Demario. Tele a/flutter, hr 119, Cardizem to lac at 5mg/h via pump. Oriented to environment, verbalize understqnding. Bed low and locked call steiner in reach. Dr. Carey at bedside

## 2017-03-19 ENCOUNTER — ANESTHESIA EVENT (OUTPATIENT)
Dept: MEDSURG UNIT | Facility: HOSPITAL | Age: 57
DRG: 310 | End: 2017-03-19
Payer: COMMERCIAL

## 2017-03-19 PROBLEM — E87.6 HYPOKALEMIA: Status: ACTIVE | Noted: 2017-03-19

## 2017-03-19 LAB
ALBUMIN SERPL BCP-MCNC: 3.1 G/DL
ALP SERPL-CCNC: 79 U/L
ALT SERPL W/O P-5'-P-CCNC: 11 U/L
ANION GAP SERPL CALC-SCNC: 9 MMOL/L
AST SERPL-CCNC: 10 U/L
BASOPHILS # BLD AUTO: 0.04 K/UL
BASOPHILS NFR BLD: 0.7 %
BILIRUB SERPL-MCNC: 0.4 MG/DL
BUN SERPL-MCNC: 17 MG/DL
CALCIUM SERPL-MCNC: 8.7 MG/DL
CHLORIDE SERPL-SCNC: 109 MMOL/L
CO2 SERPL-SCNC: 23 MMOL/L
CREAT SERPL-MCNC: 0.8 MG/DL
DIFFERENTIAL METHOD: ABNORMAL
EOSINOPHIL # BLD AUTO: 0.2 K/UL
EOSINOPHIL NFR BLD: 3 %
ERYTHROCYTE [DISTWIDTH] IN BLOOD BY AUTOMATED COUNT: 13.4 %
EST. GFR  (AFRICAN AMERICAN): >60 ML/MIN/1.73 M^2
EST. GFR  (NON AFRICAN AMERICAN): >60 ML/MIN/1.73 M^2
GLUCOSE SERPL-MCNC: 85 MG/DL
HCT VFR BLD AUTO: 38.5 %
HGB BLD-MCNC: 13.2 G/DL
LYMPHOCYTES # BLD AUTO: 2 K/UL
LYMPHOCYTES NFR BLD: 35.8 %
MCH RBC QN AUTO: 27.9 PG
MCHC RBC AUTO-ENTMCNC: 34.3 %
MCV RBC AUTO: 81 FL
MONOCYTES # BLD AUTO: 0.4 K/UL
MONOCYTES NFR BLD: 6.7 %
NEUTROPHILS # BLD AUTO: 3 K/UL
NEUTROPHILS NFR BLD: 51.7 %
PLATELET # BLD AUTO: 218 K/UL
PMV BLD AUTO: 10.8 FL
POTASSIUM SERPL-SCNC: 3.8 MMOL/L
PROT SERPL-MCNC: 6.4 G/DL
RBC # BLD AUTO: 4.73 M/UL
SODIUM SERPL-SCNC: 141 MMOL/L
WBC # BLD AUTO: 5.7 K/UL

## 2017-03-19 PROCEDURE — 25000003 PHARM REV CODE 250: Performed by: INTERNAL MEDICINE

## 2017-03-19 PROCEDURE — 99232 SBSQ HOSP IP/OBS MODERATE 35: CPT | Mod: ,,, | Performed by: HOSPITALIST

## 2017-03-19 PROCEDURE — 80053 COMPREHEN METABOLIC PANEL: CPT

## 2017-03-19 PROCEDURE — 25000003 PHARM REV CODE 250: Performed by: HOSPITALIST

## 2017-03-19 PROCEDURE — G0378 HOSPITAL OBSERVATION PER HR: HCPCS

## 2017-03-19 PROCEDURE — 93005 ELECTROCARDIOGRAM TRACING: CPT

## 2017-03-19 PROCEDURE — 36415 COLL VENOUS BLD VENIPUNCTURE: CPT

## 2017-03-19 PROCEDURE — 85025 COMPLETE CBC W/AUTO DIFF WBC: CPT

## 2017-03-19 PROCEDURE — 93010 ELECTROCARDIOGRAM REPORT: CPT | Mod: ,,, | Performed by: INTERNAL MEDICINE

## 2017-03-19 RX ORDER — POLYETHYLENE GLYCOL 3350 17 G/17G
17 POWDER, FOR SOLUTION ORAL DAILY PRN
Status: DISCONTINUED | OUTPATIENT
Start: 2017-03-19 | End: 2017-03-20 | Stop reason: HOSPADM

## 2017-03-19 RX ORDER — POTASSIUM CHLORIDE 20 MEQ/1
20 TABLET, EXTENDED RELEASE ORAL ONCE
Status: COMPLETED | OUTPATIENT
Start: 2017-03-19 | End: 2017-03-19

## 2017-03-19 RX ORDER — DILTIAZEM HYDROCHLORIDE 120 MG/1
240 CAPSULE, COATED, EXTENDED RELEASE ORAL DAILY
Status: DISCONTINUED | OUTPATIENT
Start: 2017-03-19 | End: 2017-03-20 | Stop reason: HOSPADM

## 2017-03-19 RX ADMIN — Medication 3 ML: at 02:03

## 2017-03-19 RX ADMIN — APIXABAN 5 MG: 5 TABLET, FILM COATED ORAL at 08:03

## 2017-03-19 RX ADMIN — PROPAFENONE HYDROCHLORIDE 225 MG: 150 TABLET, FILM COATED ORAL at 09:03

## 2017-03-19 RX ADMIN — PROPAFENONE HYDROCHLORIDE 225 MG: 150 TABLET, FILM COATED ORAL at 08:03

## 2017-03-19 RX ADMIN — APIXABAN 5 MG: 5 TABLET, FILM COATED ORAL at 09:03

## 2017-03-19 RX ADMIN — POLYETHYLENE GLYCOL 3350 17 G: 17 POWDER, FOR SOLUTION ORAL at 12:03

## 2017-03-19 RX ADMIN — POTASSIUM CHLORIDE 20 MEQ: 1500 TABLET, EXTENDED RELEASE ORAL at 08:03

## 2017-03-19 RX ADMIN — Medication 3 ML: at 09:03

## 2017-03-19 RX ADMIN — NIFEDIPINE 240 MG: 10 CAPSULE ORAL at 08:03

## 2017-03-19 NOTE — PLAN OF CARE
Problem: Patient Care Overview  Goal: Plan of Care Review  Outcome: Ongoing (interventions implemented as appropriate)  Patient remains free of falls or injury. Patient denies pain. IV cardizem transitioned to PO. EP consulted for a-flutter. Plan for SAMMIE/DCCV on Monday. Patient to be NPO after midnight on 3/19/17 for procedure. Plan of care reviewed with patient.

## 2017-03-19 NOTE — PROGRESS NOTES
[facility]  Cardiology  Progress Note    Patient Name: Francoise Dykes  MRN: [id number]  Admission Date: 3/17/2017  Hospital Length of Stay: 0 days  Code Status: Full Code   Attending Physician: Tari Calix MD   Primary Care Physician: Sabine Kay MD  Expected Discharge Date: 3/21/2017  Principal Problem:Atrial flutter    Subjective:     HPI: The patient has a history significant for Atrial fibrillation since 2012, S/p PVI in 12/2015, SAMMIE/DCCV in 8/16 and 11/16. On 12/15/2016, Ms. Dykes underwent repeat PVI. The right pulmonary veins were re-isolated, with isolated firing noted from the veins post-isolation. Additionally, a mid-sal AT was targeted and successfully ablated. She was discharged on Rythmol  mg bid/ Elliquis. Plan was to wean Rythmol to once a day and eventually stop it in next few months. She decreased it to once a day 2 weeks ago. Patient reports short bursts of palpitations for 2 weeks intermittently and not feeling well    Interval History: Patient resumed on Propafenone and continue don cardizem. Rate is between 110-120/mt AFL. She is scheduled for SAMMIE/DCCV in AM    ROS   Constitution: Negative for decreased appetite, malaise/fatigue, weight gain and weight loss.   HENT: Negative for sore throat.   Eyes: Negative for blurred vision.   Cardiovascular: positive for palpitations intermittently  Negative for dyspnea on exertion, leg swelling, near-syncope, orthopnea and paroxysmal nocturnal dyspnea.   Skin: Negative for rash.   Musculoskeletal: Negative for arthritis.   Gastrointestinal: Negative for abdominal pain.   Neurological: Negative for focal weakness.   Psychiatric/Behavioral: Negative for altered mental status.  Objective:     Vital Signs (Most Recent):  Temp: 98.1 °F (36.7 °C) (03/19/17 0815)  Pulse: (!) 115 (03/19/17 0815)  Resp: 18 (03/19/17 0815)  BP: 112/74 (03/19/17 0815)  SpO2: 98 % (03/19/17 0815) Vital Signs (24h Range):  Temp:  [97.6 °F (36.4  °C)-98.7 °F (37.1 °C)] 98.1 °F (36.7 °C)  Pulse:  [] 115  Resp:  [16-19] 18  SpO2:  [97 %-99 %] 98 %  BP: (104-131)/(56-87) 112/74     Weight: 95.7 kg (211 lb)  Body mass index is 36.22 kg/(m^2).    SpO2: 98 %  O2 Device (Oxygen Therapy): room air      Intake/Output Summary (Last 24 hours) at 03/19/17 0939  Last data filed at 03/19/17 0600   Gross per 24 hour   Intake             1445 ml   Output              680 ml   Net              765 ml       Lines/Drains/Airways     Peripheral Intravenous Line                 Peripheral IV - Single Lumen 11/07/16 0143 Left Antecubital 132 days         Peripheral IV - Single Lumen 11/07/16 1210 Left Forearm 131 days         Peripheral IV - Single Lumen 03/17/17 2307 Left Antecubital 1 day                Physical Exam  General: resting comfortably  HEENT: perrl, eomi  Neck: no jvd  Heart: nl s1/2, no m/h/t, tachy, reg rhythm  Lungs: clear to auscultation bilaterally  Abdomen: s/nt/nd, no organomegaly, no fluid wave  Ext: no edema Pulses: 2+ pulses bilateral  Skin: no tears, wounds, bleeding, color changes      Significant Labs:   CMP   Recent Labs  Lab 03/17/17 2307 03/18/17 0427 03/19/17  0529    141 141   K 3.7 3.4* 3.8    108 109   CO2 25 26 23   * 98 85   BUN 18 14 17   CREATININE 0.8 0.7 0.8   CALCIUM 9.8 8.9 8.7   PROT 7.9 6.8 6.4   ALBUMIN 4.0 3.5 3.1*   BILITOT 0.4 0.5 0.4   ALKPHOS 96 80 79   AST 12 10 10   ALT 14 13 11   ANIONGAP 9 7* 9   ESTGFRAFRICA >60.0 >60.0 >60.0   EGFRNONAA >60.0 >60.0 >60.0    and CBC   Recent Labs  Lab 03/17/17 2307 03/18/17 0427 03/19/17  0529   WBC 9.38 6.79 5.70   HGB 14.3 12.8 13.2   HCT 39.4 37.2 38.5    200 218         Assessment and Plan:       Active Diagnoses:    Diagnosis Date Noted POA    PRINCIPAL PROBLEM:  Atrial flutter [I48.92] 03/18/2017 Yes      Problems Resolved During this Admission:    Diagnosis Date Noted Date Resolved POA       56 y.o. female with History of Afib and Flutter with redo  PVI in the past presents with palpitations and is in atypical flutter. She was off rythmol as advised previously and had recurrence of symptoms.. She is not sure about taking elliquis every day in the morning but certainly takes it in the night every night.   Now feels better , back on Rythmol and continuing  Cardizem/apixaban .Will plan for SAMMIE/DCCV on Monday morning. NPO past Sunday MN.   Attending addendum to follow.    Cameron Bolaños MD  Cardiology  [facility]

## 2017-03-19 NOTE — ANESTHESIA PREPROCEDURE EVALUATION
Pre-operative evaluation for CARDIOVERSION (N/A)    ID:   Francoise Dykes is a 56 y.o. female with A-Fib on Diltiazem Rythmol, and Apixaban s/p Ablation, Obesity      Case Discussion: Patient present with recurrent A-Fib s/p ablation. Plan for procedure above.     LDA:  - 20g PIV L AC  - 20g PIV L Hand    Previous Airway:  Present Prior to Hospital Arrival?: No; Placement Date: 12/15/16; Placement Time: 740; Method of Intubation: Direct laryngoscopy; Inserted by: Anesthesia MD; Airway Device: Endotracheal Tube; Mask Ventilation: Easy; Intubated: Postinduction; Blade: Brian #2; Airway Device Size: 7.0; Style: Cuffed; Cuff Inflation: Minimal occlusive pressure; Inflation Amount: 10; Placement Verified By: Auscultation, Capnometry; Grade: Grade I; Complicating Factors: None; Intubation Findings: Positive EtCO2, Bilateral breath sounds, Atraumatic/Condition of teeth unchanged;  Depth of Insertion: 22; Securment: Lips; Complications: None;    Past Surgical History:   Procedure Laterality Date     SECTION      heart ablation      HYSTERECTOMY      [facility]         Vital Signs Range (Last 24H):  Temp:  [36.4 °C (97.6 °F)-37.1 °C (98.7 °F)]   Pulse:  []   Resp:  [16-18]   BP: (104-126)/(56-81)   SpO2:  [97 %-99 %]       CBC:     Recent Labs  Lab 17  0529   WBC 9.38 6.79 5.70   RBC 5.07 4.60 4.73   HGB 14.3 12.8 13.2   HCT 39.4 37.2 38.5    200 218   MCV 78* 81* 81*   MCH 28.2 27.8 27.9   MCHC 36.3* 34.4 34.3       CMP:   Recent Labs  Lab 17  0529    141 141   K 3.7 3.4* 3.8    108 109   CO2 25 26 23   BUN 18 14 17   CREATININE 0.8 0.7 0.8   * 98 85   MG  --  1.9  --    CALCIUM 9.8 8.9 8.7   ALBUMIN 4.0 3.5 3.1*   PROT 7.9 6.8 6.4   ALKPHOS 96 80 79   ALT 14 13 11   AST 12 10 10   BILITOT 0.4 0.5 0.4       INR:    Recent Labs  Lab 17  2307   INR 1.0   APTT 26.5         Diagnostic Studies:      EKG  3/19/17:  Sinus tachycardia with 1st degree A-V block  Nonspecific ST and T wave abnormality  Abnormal ECG    2D Echo 11/2016:  CONCLUSIONS     1 - Normal biventricular systolic function.     2 - Dynamic left atrial appendage with no evidence of intracardiac thrombus.     [facility] Anesthesia Evaluation    I have reviewed the Patient Summary Reports.     I have reviewed the Medications.     Review of Systems  Anesthesia Hx:  No problems with previous Anesthesia  History of prior surgery of interest to airway management or planning:  Denies Personal Hx of Anesthesia complications.   Cardiovascular:   Denies Hypertension.  Denies MI.  Denies CAD.   Dysrhythmias     Pulmonary:  Pulmonary Normal    Renal/:  Renal/ Normal     Hepatic/GI:  Hepatic/GI Normal    Musculoskeletal:   Arthritis         Physical Exam  General:  Obesity    Airway/Jaw/Neck:  Airway Findings: Mouth Opening: Normal Tongue: Normal  General Airway Assessment: Adult  Mallampati: II  TM Distance: Normal, at least 6 cm  Jaw/Neck Findings:  Neck ROM: Normal ROM      Dental:  Dental Findings: In tact   Chest/Lungs:  Chest/Lungs Findings: Clear to auscultation     Heart/Vascular:  Heart Findings: Rate: Normal  Heart murmur: negative       Mental Status:  Mental Status Findings:  Cooperative, Alert and Oriented         Anesthesia Plan  Type of Anesthesia, risks & benefits discussed:  Anesthesia Type:  MAC, general  Patient's Preference:   Intra-op Monitoring Plan: standard ASA monitors  Intra-op Monitoring Plan Comments:   Post Op Pain Control Plan:   Post Op Pain Control Plan Comments:   Induction:   IV  Beta Blocker:  Patient is not currently on a Beta-Blocker (No further documentation required).       Informed Consent: Patient understands risks and agrees with Anesthesia plan.  Questions answered. Anesthesia consent signed with patient.  ASA Score: 2     Day of Surgery Review of History & Physical:    H&P update referred to the provider.         Ready For  Surgery From Anesthesia Perspective.

## 2017-03-19 NOTE — PLAN OF CARE
Problem: Patient Care Overview  Goal: Plan of Care Review  Outcome: Ongoing (interventions implemented as appropriate)  Tele aflutter, [id number], room air pulse ox 98-99%, vo fall related injury,denies c/o pain. NPO after MN for DCCV, then discharge later. Plan of care discussed with pt.

## 2017-03-19 NOTE — PROGRESS NOTES
Progress Note  [facility] Medicine    Primary Team: [facility]  Admit Date: 3/17/2017   Length of Stay:  LOS: 0 days   SUBJECTIVE:   Reason for Admission:  Atrial flutter    HPI:  Patient is a 56 y.o. female presents with 1 day of palpitations and 'not feeling well.' She notes that, since her most recent ablation, she has had short bursts of palpitations lasting < 30seconds 1-2 times/week. The plan was for her to wean off of her rhytmol this month and so she went from BID dosing to once daily a couple of weeks ago. Last week she noted an episode of palpitations that lasted a bit longer than normal and took an extra dose. She also took two doses yesterday given her symptoms. She did not have success in resolving her symptoms and so presented to the ED found to be in rapid a flutter. Has been on medrol dosepack for back pain.     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    Pt was started on Diltiazem gtt and admitted to [facility] for further evaluation.    Interval history:    No acute events overnight.  Pt feels much better today, less stressed.  She denies chest pain, SOB, palpitations.    Review of Systems:  Constitutional: no fever or chills  Respiratory: no cough or shortness of breath  Cardiovascular: no chest pain or palpitations  Gastrointestinal: no nausea or vomiting, no abdominal pain or change in bowel habits  Musculoskeletal: no arthralgias or myalgias     OBJECTIVE:     Temp:  [97.6 °F (36.4 °C)-98.7 °F (37.1 °C)]   Pulse:  []   Resp:  [16-18]   BP: (104-126)/(56-81)   SpO2:  [98 %-99 %]  Body mass index is 36.22 kg/(m^2).  Intake/Outake:  This Shift:       Net I/O past 24h:     Intake/Output Summary (Last 24 hours) at 03/19/17 1358  Last data filed at 03/19/17 0600   Gross per 24 hour   Intake              515 ml   Output               200 ml   Net              315 ml             Physical Exam:  Gen- well-developed, well-nourished, NAD  CVS- S1 and S2 present, regular rhythm, tachycardic  Resp- CTA b/l, no work of breathing  Abd- BS+, soft, NT, ND  Ext- no clubbing, cyanosis, or edema    Laboratory:  CBC/Anemia Labs: Coags:      Recent Labs  Lab 03/17/17 2307 03/18/17 0427 03/19/17  0529   WBC 9.38 6.79 5.70   HGB 14.3 12.8 13.2   HCT 39.4 37.2 38.5    200 218   MCV 78* 81* 81*   RDW 13.2 13.5 13.4      Recent Labs  Lab 03/17/17 2307   INR 1.0   APTT 26.5        Chemistries:     Recent Labs  Lab 03/17/17 2307 03/18/17 0427 03/19/17  0529    141 141   K 3.7 3.4* 3.8    108 109   CO2 25 26 23   BUN 18 14 17   CREATININE 0.8 0.7 0.8   CALCIUM 9.8 8.9 8.7   PROT 7.9 6.8 6.4   BILITOT 0.4 0.5 0.4   ALKPHOS 96 80 79   ALT 14 13 11   AST 12 10 10   MG  --  1.9  --           Cardiac Enzymes: Ejection Fractions:    Recent Labs      03/17/17 2307   TROPONINI  <0.006    EF   Date Value Ref Range Status   08/08/2016 60 55 - 65    08/07/2016 65 55 - 65         Medications:  Scheduled Meds:   apixaban  5 mg Oral BID    diltiaZEM  240 mg Oral Daily    propafenone  225 mg Oral BID    sodium chloride 0.9%  3 mL Intravenous Q8H                             Continuous Infusions:   PRN Meds:.acetaminophen, ibuprofen, ondansetron, polyethylene glycol, ramelteon, simethicone     ASSESSMENT/PLAN:     Typical A.flutter with RVR- recurrent  -Transitioned from Diltiazem gtt to PO yesterday; increase dose to 240mg today for better HR control  -Continue Propafenone  -Continue Apixaban  -NPO past MN for DCCV in AM; appreciate EP recs    Hypokalemia  -Repleted    DVT ppx- Apixaban  CODE Status- FULL    Dispo- home tomorrow after DCCV if medically stable    Tari Calix MD  Hospital Medicine Staff

## 2017-03-20 ENCOUNTER — ANESTHESIA (OUTPATIENT)
Dept: MEDSURG UNIT | Facility: HOSPITAL | Age: 57
DRG: 310 | End: 2017-03-20
Payer: COMMERCIAL

## 2017-03-20 ENCOUNTER — SURGERY (OUTPATIENT)
Age: 57
End: 2017-03-20

## 2017-03-20 VITALS
TEMPERATURE: 98 F | SYSTOLIC BLOOD PRESSURE: 116 MMHG | OXYGEN SATURATION: 98 % | HEART RATE: 81 BPM | RESPIRATION RATE: 16 BRPM | DIASTOLIC BLOOD PRESSURE: 71 MMHG | HEIGHT: 64 IN | BODY MASS INDEX: 36.02 KG/M2 | WEIGHT: 211 LBS

## 2017-03-20 LAB
ALBUMIN SERPL BCP-MCNC: 2.9 G/DL
ALP SERPL-CCNC: 78 U/L
ALT SERPL W/O P-5'-P-CCNC: 9 U/L
ANION GAP SERPL CALC-SCNC: 8 MMOL/L
ANISOCYTOSIS BLD QL SMEAR: SLIGHT
AORTIC ATHEROMA: YES
AST SERPL-CCNC: 10 U/L
BASOPHILS # BLD AUTO: 0.07 K/UL
BASOPHILS # BLD AUTO: 0.07 K/UL
BASOPHILS NFR BLD: 0 %
BASOPHILS NFR BLD: 1.1 %
BASOPHILS NFR BLD: 1.1 %
BILIRUB SERPL-MCNC: 0.4 MG/DL
BUN SERPL-MCNC: 14 MG/DL
CALCIUM SERPL-MCNC: 8.4 MG/DL
CHLORIDE SERPL-SCNC: 108 MMOL/L
CO2 SERPL-SCNC: 24 MMOL/L
CREAT SERPL-MCNC: 0.7 MG/DL
DIFFERENTIAL METHOD: ABNORMAL
EOSINOPHIL # BLD AUTO: 0.3 K/UL
EOSINOPHIL # BLD AUTO: 0.3 K/UL
EOSINOPHIL NFR BLD: 2 %
EOSINOPHIL NFR BLD: 4.5 %
EOSINOPHIL NFR BLD: 4.5 %
ERYTHROCYTE [DISTWIDTH] IN BLOOD BY AUTOMATED COUNT: 13.1 %
ERYTHROCYTE [DISTWIDTH] IN BLOOD BY AUTOMATED COUNT: 13.2 %
ERYTHROCYTE [DISTWIDTH] IN BLOOD BY AUTOMATED COUNT: 13.2 %
EST. GFR  (AFRICAN AMERICAN): >60 ML/MIN/1.73 M^2
EST. GFR  (NON AFRICAN AMERICAN): >60 ML/MIN/1.73 M^2
GLUCOSE SERPL-MCNC: 95 MG/DL
HCT VFR BLD AUTO: 35.1 %
HCT VFR BLD AUTO: 36.1 %
HCT VFR BLD AUTO: 36.1 %
HGB BLD-MCNC: 12.2 G/DL
HGB BLD-MCNC: 12.5 G/DL
HGB BLD-MCNC: 12.5 G/DL
HYPOCHROMIA BLD QL SMEAR: ABNORMAL
LYMPHOCYTES # BLD AUTO: 1.3 K/UL
LYMPHOCYTES # BLD AUTO: 1.3 K/UL
LYMPHOCYTES NFR BLD: 20.9 %
LYMPHOCYTES NFR BLD: 20.9 %
LYMPHOCYTES NFR BLD: 23 %
MCH RBC QN AUTO: 27.5 PG
MCH RBC QN AUTO: 27.5 PG
MCH RBC QN AUTO: 27.7 PG
MCHC RBC AUTO-ENTMCNC: 34.6 %
MCHC RBC AUTO-ENTMCNC: 34.6 %
MCHC RBC AUTO-ENTMCNC: 34.8 %
MCV RBC AUTO: 79 FL
MCV RBC AUTO: 79 FL
MCV RBC AUTO: 80 FL
MITRAL VALVE MOBILITY: NORMAL
MITRAL VALVE REGURGITATION: ABNORMAL
MONOCYTES # BLD AUTO: 0.6 K/UL
MONOCYTES # BLD AUTO: 0.6 K/UL
MONOCYTES NFR BLD: 6 %
MONOCYTES NFR BLD: 8.7 %
MONOCYTES NFR BLD: 8.7 %
MYELOCYTES NFR BLD MANUAL: 1 %
NEUTROPHILS # BLD AUTO: 4.1 K/UL
NEUTROPHILS # BLD AUTO: 4.1 K/UL
NEUTROPHILS NFR BLD: 64.8 %
NEUTROPHILS NFR BLD: 64.8 %
NEUTROPHILS NFR BLD: 68 %
OVALOCYTES BLD QL SMEAR: ABNORMAL
PLATELET # BLD AUTO: 196 K/UL
PLATELET # BLD AUTO: 212 K/UL
PLATELET # BLD AUTO: 212 K/UL
PMV BLD AUTO: 10.3 FL
PMV BLD AUTO: 10.5 FL
PMV BLD AUTO: 10.5 FL
POIKILOCYTOSIS BLD QL SMEAR: SLIGHT
POLYCHROMASIA BLD QL SMEAR: ABNORMAL
POTASSIUM SERPL-SCNC: 3.9 MMOL/L
PROT SERPL-MCNC: 5.9 G/DL
RBC # BLD AUTO: 4.41 M/UL
RBC # BLD AUTO: 4.55 M/UL
RBC # BLD AUTO: 4.55 M/UL
RETIRED EF AND QEF - SEE NOTES: 48 (ref 55–65)
SODIUM SERPL-SCNC: 140 MMOL/L
TRICUSPID VALVE REGURGITATION: ABNORMAL
WBC # BLD AUTO: 5.93 K/UL
WBC # BLD AUTO: 6.42 K/UL
WBC # BLD AUTO: 6.42 K/UL

## 2017-03-20 PROCEDURE — 85025 COMPLETE CBC W/AUTO DIFF WBC: CPT

## 2017-03-20 PROCEDURE — 25000003 PHARM REV CODE 250: Performed by: NURSE ANESTHETIST, CERTIFIED REGISTERED

## 2017-03-20 PROCEDURE — D9220A PRA ANESTHESIA: Mod: ANES,,, | Performed by: ANESTHESIOLOGY

## 2017-03-20 PROCEDURE — B246ZZ4 ULTRASONOGRAPHY OF RIGHT AND LEFT HEART, TRANSESOPHAGEAL: ICD-10-PCS | Performed by: INTERNAL MEDICINE

## 2017-03-20 PROCEDURE — 93005 ELECTROCARDIOGRAM TRACING: CPT

## 2017-03-20 PROCEDURE — 93325 DOPPLER ECHO COLOR FLOW MAPG: CPT | Mod: 26,,, | Performed by: INTERNAL MEDICINE

## 2017-03-20 PROCEDURE — 25000003 PHARM REV CODE 250: Performed by: HOSPITALIST

## 2017-03-20 PROCEDURE — 93325 DOPPLER ECHO COLOR FLOW MAPG: CPT

## 2017-03-20 PROCEDURE — 80053 COMPREHEN METABOLIC PANEL: CPT

## 2017-03-20 PROCEDURE — 93010 ELECTROCARDIOGRAM REPORT: CPT | Mod: 76,,, | Performed by: INTERNAL MEDICINE

## 2017-03-20 PROCEDURE — 20600001 HC STEP DOWN PRIVATE ROOM

## 2017-03-20 PROCEDURE — 93312 ECHO TRANSESOPHAGEAL: CPT | Mod: 26,,, | Performed by: INTERNAL MEDICINE

## 2017-03-20 PROCEDURE — 93320 DOPPLER ECHO COMPLETE: CPT | Mod: 26,,, | Performed by: INTERNAL MEDICINE

## 2017-03-20 PROCEDURE — 36415 COLL VENOUS BLD VENIPUNCTURE: CPT

## 2017-03-20 PROCEDURE — 85007 BL SMEAR W/DIFF WBC COUNT: CPT

## 2017-03-20 PROCEDURE — 25000003 PHARM REV CODE 250: Performed by: INTERNAL MEDICINE

## 2017-03-20 PROCEDURE — 37000009 HC ANESTHESIA EA ADD 15 MINS: Performed by: INTERNAL MEDICINE

## 2017-03-20 PROCEDURE — 99238 HOSP IP/OBS DSCHRG MGMT 30/<: CPT | Mod: ,,, | Performed by: HOSPITALIST

## 2017-03-20 PROCEDURE — 93010 ELECTROCARDIOGRAM REPORT: CPT | Mod: 77,,, | Performed by: INTERNAL MEDICINE

## 2017-03-20 PROCEDURE — 37000008 HC ANESTHESIA 1ST 15 MINUTES: Performed by: INTERNAL MEDICINE

## 2017-03-20 PROCEDURE — 5A2204Z RESTORATION OF CARDIAC RHYTHM, SINGLE: ICD-10-PCS | Performed by: INTERNAL MEDICINE

## 2017-03-20 PROCEDURE — 63600175 PHARM REV CODE 636 W HCPCS: Performed by: NURSE ANESTHETIST, CERTIFIED REGISTERED

## 2017-03-20 PROCEDURE — D9220A PRA ANESTHESIA: Mod: CRNA,,, | Performed by: NURSE ANESTHETIST, CERTIFIED REGISTERED

## 2017-03-20 PROCEDURE — 85027 COMPLETE CBC AUTOMATED: CPT

## 2017-03-20 PROCEDURE — 93010 ELECTROCARDIOGRAM REPORT: CPT | Mod: ,,, | Performed by: INTERNAL MEDICINE

## 2017-03-20 PROCEDURE — 25000003 PHARM REV CODE 250

## 2017-03-20 RX ORDER — SODIUM CHLORIDE 9 MG/ML
INJECTION, SOLUTION INTRAVENOUS CONTINUOUS PRN
Status: DISCONTINUED | OUTPATIENT
Start: 2017-03-20 | End: 2017-03-20

## 2017-03-20 RX ORDER — LIDOCAINE HCL/PF 100 MG/5ML
SYRINGE (ML) INTRAVENOUS
Status: DISCONTINUED | OUTPATIENT
Start: 2017-03-20 | End: 2017-03-20

## 2017-03-20 RX ORDER — PROPOFOL 10 MG/ML
VIAL (ML) INTRAVENOUS CONTINUOUS PRN
Status: DISCONTINUED | OUTPATIENT
Start: 2017-03-20 | End: 2017-03-20

## 2017-03-20 RX ORDER — DILTIAZEM HYDROCHLORIDE 120 MG/1
240 CAPSULE, EXTENDED RELEASE ORAL DAILY
Qty: 30 CAPSULE | Refills: 2 | Status: SHIPPED | OUTPATIENT
Start: 2017-03-20 | End: 2017-06-19 | Stop reason: SDUPTHER

## 2017-03-20 RX ORDER — POTASSIUM CHLORIDE 20 MEQ/1
20 TABLET, EXTENDED RELEASE ORAL ONCE
Status: COMPLETED | OUTPATIENT
Start: 2017-03-20 | End: 2017-03-20

## 2017-03-20 RX ORDER — PROPOFOL 10 MG/ML
VIAL (ML) INTRAVENOUS
Status: DISCONTINUED | OUTPATIENT
Start: 2017-03-20 | End: 2017-03-20

## 2017-03-20 RX ADMIN — POTASSIUM CHLORIDE 20 MEQ: 1500 TABLET, EXTENDED RELEASE ORAL at 08:03

## 2017-03-20 RX ADMIN — LIDOCAINE HYDROCHLORIDE 100 MG: 20 INJECTION, SOLUTION INTRAVENOUS at 09:03

## 2017-03-20 RX ADMIN — SODIUM CHLORIDE: 0.9 INJECTION, SOLUTION INTRAVENOUS at 09:03

## 2017-03-20 RX ADMIN — NIFEDIPINE 240 MG: 10 CAPSULE ORAL at 08:03

## 2017-03-20 RX ADMIN — PROPAFENONE HYDROCHLORIDE 225 MG: 150 TABLET, FILM COATED ORAL at 08:03

## 2017-03-20 RX ADMIN — APIXABAN 5 MG: 5 TABLET, FILM COATED ORAL at 08:03

## 2017-03-20 RX ADMIN — TOPICAL ANESTHETIC 1 EACH: 200 SPRAY DENTAL; PERIODONTAL at 09:03

## 2017-03-20 RX ADMIN — Medication 3 ML: at 06:03

## 2017-03-20 RX ADMIN — PROPOFOL 20 MG: 10 INJECTION, EMULSION INTRAVENOUS at 09:03

## 2017-03-20 RX ADMIN — PROPOFOL 150 MCG/KG/MIN: 10 INJECTION, EMULSION INTRAVENOUS at 09:03

## 2017-03-20 NOTE — PROGRESS NOTES
TRANSESOPHAGEAL ECHOCARDIOGRAPHY   PRE-PROCEDURE NOTE    03/20/2017    HPI:     Francoise Dykes is a 56 y.o.     Ms Dykes is a 55 yo woman with medical history of Atrial fibrillation since 2012, S/p PVI in 12/2015, SAMMIE/DCCV in 8/16 and 11/16 and repeat PVI on 12/15/2016 who is admitted with a-fib with RVR. Team plans to do SAMMIE/DCCV.    Dysphagia or odynophagia:  No  Liver Disease, esophageal disease, or known varices:  No  Upper GI Bleeding: No  Snoring:    Sleep Apnea:    Prior neck surgery or radiation:  No  Able to move neck in all directions:  Yes  History of anesthetic difficulties:  No  Family history of anesthetic difficulties:  No  Last oral intake:  > 8 hours    Mallampati Class:  3  ASA Score:  3      Meds:     Scheduled Meds:   apixaban  5 mg Oral BID    diltiaZEM  240 mg Oral Daily    potassium chloride  20 mEq Oral Once    propafenone  225 mg Oral BID    sodium chloride 0.9%  3 mL Intravenous Q8H     PRN Meds:acetaminophen, ibuprofen, ondansetron, polyethylene glycol, ramelteon, simethicone  Continuous Infusions:     Physical Exam:     Vitals:  Temp:  [97.7 °F (36.5 °C)-98.1 °F (36.7 °C)]   Pulse:  []   Resp:  [16-18]   BP: (109-118)/(65-75)   SpO2:  [96 %-98 %]        Constitutional:  NAD, conversant  HEENT:   Sclera anicteric, Uvula midline, EOMI, OP clear  Neck:   No JVD, moves to all direction without any limitations  CV:   Irregular, no murmurs / rubs / gallops, normal S1/S2  Pulm:   CTAB, no wheezes, rales, or ronchi  GI:   Abdomen soft, NTND, +BS  Extremities:  No LE edema, warm with palpable pulses  Neuro:   AAOX3, no focal motor deficits      Labs:     Recent Results (from the past 336 hour(s))   CBC auto differential    Collection Time: 03/20/17  4:52 AM   Result Value Ref Range    WBC 5.93 3.90 - 12.70 K/uL    Hemoglobin 12.2 12.0 - 16.0 g/dL    Hematocrit 35.1 (L) 37.0 - 48.5 %    Platelets 196 150 - 350 K/uL   CBC auto differential    Collection Time: 03/19/17  5:29 AM    Result Value Ref Range    WBC 5.70 3.90 - 12.70 K/uL    Hemoglobin 13.2 12.0 - 16.0 g/dL    Hematocrit 38.5 37.0 - 48.5 %    Platelets 218 150 - 350 K/uL   CBC auto differential    Collection Time: 03/18/17  4:27 AM   Result Value Ref Range    WBC 6.79 3.90 - 12.70 K/uL    Hemoglobin 12.8 12.0 - 16.0 g/dL    Hematocrit 37.2 37.0 - 48.5 %    Platelets 200 150 - 350 K/uL       No results found for this or any previous visit (from the past 336 hour(s)).    Estimated Creatinine Clearance: 103.1 mL/min (based on Cr of 0.7).    Imaging:  SAMMIE 11/2016  CONCLUSIONS     1 - Normal biventricular systolic function.     2 - Dynamic left atrial appendage with no evidence of intracardiac thrombus.     EKG:   Date: 03/19/2017  Atrial fibrillation      Assessment & Plan:     Ms Dykes is a 55 yo woman with medical history of Atrial fibrillation since 2012, S/p PVI in 12/2015, SAMMIE/DCCV in 8/16 and 11/16 and repeat PVI on 12/15/2016 who is admitted with a-fib with RVR. Team plans to do SAMMIE/DCCV.     PLAN:  1. SAMMIE for evaluation of LA/NAPOLEON.    -The risks, benefits & alternatives of the procedure were explained to the patient.    -The risks of transesophageal echo include but are not limited to:  Dental trauma, esophageal trauma/perforation, bleeding, laryngospasm/brochospasm, aspiration, sore throat/hoarseness, & dislodgement of the endotracheal tube/nasogastric tube (where applicable).    -The risks of moderate sedation include hypotension, respiratory depression, arrhythmias, bronchospasm, & death.    -Informed consent was obtained & the patient is agreeable to proceed with the procedure.    I will discuss with the attending physician. Attending addendum is to follow.    Signed:  Marcial Pastor MD  Cardiology Fellow, PGY-V  Pager: [phone]  3/20/2017 7:57 AM    Attending Addendum:

## 2017-03-20 NOTE — NURSING TRANSFER
Nursing Transfer Note      3/20/2017     Transfer To: [id number]    Transfer via stretcher    Transfer with cardiac monitoring    Transported by Transport    Medicines sent: none    Chart send with patient: Yes    Notified: cousin    Patient reassessed at: 03/20/2017, 1155 (date, time)    Upon arrival to floor: cardiac monitor applied, patient oriented to room, call bell in reach and bed in lowest position

## 2017-03-20 NOTE — PROGRESS NOTES
Post-SAMMIE procedural note.     Patient tolerated well the procedure. There were no complications. There was no NAPOLEON clot visualized. She remained on atrial flutter during the entire procedure until withdraw of the SAMMIE probe when she self-converted to normal sinus rhythm.     Marcial Pastor MD  Cardiology Fellow, PGY V  Pager: [phone]  3/20/2017 11:48 AM

## 2017-03-20 NOTE — PLAN OF CARE
Problem: Patient Care Overview  Goal: Plan of Care Review  Outcome: Ongoing (interventions implemented as appropriate)  Pt free if falls/injuries/trauma. Pt is Aflutter on Telemetry. Other VSS. Denies pain. No discomforts or complaints noted. NPO after midnight. Plan for Ablation on Monday, then discharged later.. Reviewed plan of care with pt. Pt verbalizes understanding.

## 2017-03-20 NOTE — PLAN OF CARE
03/20/17 1349   Discharge Assessment   Assessment Type Discharge Planning Assessment   Confirmed/corrected address and phone number on facesheet? Yes   Assessment information obtained from? Caregiver   Expected Length of Stay (days) 4   Communicated expected length of stay with patient/caregiver yes   Prior to hospitilization cognitive status: Alert/Oriented   Prior to hospitalization functional status: Independent   Current cognitive status: Alert/Oriented   Current Functional Status: Independent   Arrived From home or self-care   Lives With alone   Able to Return to Prior Arrangements yes   Is patient able to care for self after discharge? Yes   How many people do you have in your home that can help with your care after discharge? 0   Patient's perception of discharge disposition home or selfcare   Readmission Within The Last 30 Days no previous admission in last 30 days   Patient currently being followed by outpatient case management? No   Patient currently receives home health services? No   Does the patient currently use HME? No   Patient currently receives private duty nursing? No   Patient currently receives any other outside agency services? No   Equipment Currently Used at Home none   Do you have any problems affording any of your prescribed medications? No   Is the patient taking medications as prescribed? yes   Do you have any financial concerns preventing you from receiving the healthcare you need? No   Does the patient have transportation to healthcare appointments? Yes   Transportation Available car;family or friend will provide   On Dialysis? No   Does the patient receive services at the Coumadin Clinic? No   Are there any open cases? No   Discharge Plan A Home   Patient/Family In Agreement With Plan yes   Liza. Lives alone and is independent in her ADLs. Plan is to DC home. No DC needs identified.

## 2017-03-20 NOTE — PROGRESS NOTES
Progress Note  [facility] Medicine    Primary Team: [facility]  Admit Date: 3/17/2017   Length of Stay:  LOS: 0 days   SUBJECTIVE:   Reason for Admission:  Atrial flutter    HPI:  Patient is a 56 y.o. female presents with 1 day of palpitations and 'not feeling well.' She notes that, since her most recent ablation, she has had short bursts of palpitations lasting < 30seconds 1-2 times/week. The plan was for her to wean off of her rhytmol this month and so she went from BID dosing to once daily a couple of weeks ago. Last week she noted an episode of palpitations that lasted a bit longer than normal and took an extra dose. She also took two doses yesterday given her symptoms. She did not have success in resolving her symptoms and so presented to the ED found to be in rapid a flutter. Has been on medrol dosepack for back pain.     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    Pt was started on Diltiazem gtt and [place] [facility] [place].    Interval history:    No acute events overnight.  Pt feels well today, understands plan for DCCV.  She denies chest pain, SOB, palpitations.  HR 90-110s.    Review of Systems:  Constitutional: no fever or chills  Respiratory: no cough or shortness of breath  Cardiovascular: no chest pain or palpitations  Gastrointestinal: no nausea or vomiting, no abdominal pain or change in bowel habits  Musculoskeletal: no arthralgias or myalgias     OBJECTIVE:     Temp:  [97.2 °F (36.2 °C)-98.5 °F (36.9 °C)]   Pulse:  []   Resp:  [15-18]   BP: ()/(54-75)   SpO2:  [95 %-98 %]  Body mass index is 36.22 kg/(m^2).  Intake/Outake:  This Shift:  I/O this shift:  In: 200 [I.V.:200]  Out: 575 [Urine:575]    Net I/O past 24h:     Intake/Output Summary (Last 24 hours) at 03/20/17 1048  Last data filed at  03/20/17 1013   Gross per 24 hour   Intake              605 ml   Output             1500 ml   Net             -895 ml             Physical Exam:  Gen- well-developed, well-nourished, NAD  CVS- S1 and S2 present, regular rhythm, tachycardic  Resp- CTA b/l, no work of breathing  Abd- BS+, soft, NT, ND  Ext- no clubbing, cyanosis, or edema    Laboratory:  CBC/Anemia Labs: Coags:      Recent Labs  Lab 03/19/17  0529 03/20/17  0452 03/20/17  0811   WBC 5.70 5.93 6.42  6.42   HGB 13.2 12.2 12.5  12.5   HCT 38.5 35.1* 36.1*  36.1*    196 212  212   MCV 81* 80* 79*  79*   RDW 13.4 13.1 13.2  13.2      Recent Labs  Lab 03/17/17  2307   INR 1.0   APTT 26.5        Chemistries:     Recent Labs  Lab 03/18/17  0427 03/19/17  0529 03/20/17  0452    141 140   K 3.4* 3.8 3.9    109 108   CO2 26 23 24   BUN 14 17 14   CREATININE 0.7 0.8 0.7   CALCIUM 8.9 8.7 8.4*   PROT 6.8 6.4 5.9*   BILITOT 0.5 0.4 0.4   ALKPHOS 80 79 78   ALT 13 11 9*   AST 10 10 10   MG 1.9  --   --           Cardiac Enzymes: Ejection Fractions:    Recent Labs      03/17/17   2307   TROPONINI  <0.006    EF   Date Value Ref Range Status   03/20/2017 48 55 - 65    08/08/2016 60 55 - 65         Medications:  Scheduled Meds:   apixaban  5 mg Oral BID    diltiaZEM  240 mg Oral Daily    propafenone  225 mg Oral BID    sodium chloride 0.9%  3 mL Intravenous Q8H                             Continuous Infusions:   PRN Meds:.acetaminophen, ibuprofen, ondansetron, polyethylene glycol, ramelteon, simethicone     ASSESSMENT/PLAN:     Typical A.flutter with RVR- recurrent  -Transitioned from Diltiazem gtt to PO yesterday; currently at 240mg   -Continue Propafenone  -Continue Apixaban  -NPO past MN for DCCV today; appreciate EP recs    Hypokalemia  -Repleted    DVT ppx- Apixaban  CODE Status- FULL    Dispo- likely d/c home today after DCCV; f/u with Dr. Meyers in 6 weeks to consider RFA    Tari Calix MD  Hospital Medicine Staff

## 2017-03-20 NOTE — ANESTHESIA RELEASE NOTE
"Anesthesia Release from PACU Note    Patient: Francoise Dykes    Procedure(s) Performed: Procedure(s) (LRB):  CARDIOVERSION (N/A)    Anesthesia type: general    Post pain: Adequate analgesia    Post assessment: no apparent anesthetic complications and tolerated procedure well    Last Vitals:   Visit Vitals    BP (!) 94/55    Pulse 74    Temp 36.4 °C (97.6 °F) (Oral)    Resp 15    Ht 5' 4" (1.626 m)    Wt 95.7 kg (211 lb)    LMP  (LMP Unknown)    SpO2 98%    Breastfeeding No    BMI 36.22 kg/m2       Post vital signs: stable    Level of consciousness: awake and alert     Nausea/Vomiting: no nausea/no vomiting    Complications: none    Airway Patency: patent    Respiratory: unassisted, spontaneous ventilation    Cardiovascular: stable and blood pressure at baseline    Hydration: euvolemic  "

## 2017-03-20 NOTE — ANESTHESIA POSTPROCEDURE EVALUATION
"Anesthesia Post Evaluation    Patient: Francoise Dykes    Procedure(s) Performed: Procedure(s) (LRB):  CARDIOVERSION (N/A)    Final Anesthesia Type: general  Patient location during evaluation: PACU  Patient participation: Yes- Able to Participate  Level of consciousness: awake and alert and oriented  Post-procedure vital signs: reviewed and stable  Pain management: adequate  Airway patency: patent  PONV status at discharge: No PONV  Anesthetic complications: no      Cardiovascular status: blood pressure returned to baseline and hemodynamically stable  Respiratory status: unassisted and spontaneous ventilation  Hydration status: euvolemic  Follow-up not needed.        Visit Vitals    BP (!) 94/55    Pulse 74    Temp 36.4 °C (97.6 °F) (Oral)    Resp 15    Ht 5' 4" (1.626 m)    Wt 95.7 kg (211 lb)    LMP  (LMP Unknown)    SpO2 98%    Breastfeeding No    BMI 36.22 kg/m2       Pain/Theo Score: Pain Assessment Performed: Yes (3/20/2017 10:37 AM)  Presence of Pain: denies (3/20/2017 10:37 AM)  Theo Score: 10 (3/20/2017 10:37 AM)      "

## 2017-03-20 NOTE — NURSING TRANSFER
Nursing Transfer Note      3/20/2017     Transfer To: Echo    Transfer via stretcher    Transfer with cardiac monitoring    Transported by [facility]    [facility] sent: None    Chart send with patient: Yes

## 2017-03-20 NOTE — PLAN OF CARE
Problem: Patient Care Overview  Goal: Individualization & Mutuality  Outcome: Ongoing (interventions implemented as appropriate)  See epic    Comments:   See epic

## 2017-03-20 NOTE — TRANSFER OF CARE
"Anesthesia Transfer of Care Note    Patient: Francoise Dykes    Procedure(s) Performed: Procedure(s) (LRB):  CARDIOVERSION (N/A)    Patient location: Cath Lab    Anesthesia Type: general    Transport from OR: Transported from OR on 6-10 L/min O2 by face mask with adequate spontaneous ventilation    Post pain: adequate analgesia    Post assessment: no apparent anesthetic complications and tolerated procedure well    Post vital signs: stable    Level of consciousness: sedated and awake    Nausea/Vomiting: no nausea/vomiting    Complications: none          Last vitals:   Visit Vitals    /67 (BP Location: Right arm, Patient Position: Lying, BP Method: Automatic)    Pulse 104    Temp 36.9 °C (98.5 °F) (Oral)    Resp 16    Ht 5' 4" (1.626 m)    Wt 95.7 kg (211 lb)    LMP  (LMP Unknown)    SpO2 97%    Breastfeeding No    BMI 36.22 kg/m2     "

## 2017-03-20 NOTE — NURSING TRANSFER
Nursing Transfer Note      3/20/2017     Transfer To: 349a    Transfer via stretcher    Transfer with cardiac monitoring per tele tech    Transported by pct    Medicines sent: no    Chart send with patient: Yes    Notified: cousin in room    Patient reassessed at: 3/20/17@1037hrs

## 2017-03-20 NOTE — PROGRESS NOTES
[facility]  Cardiology  Progress Note    Patient Name: Francoise Dykes  MRN: [id number]  Admission Date: 3/17/2017  Hospital Length of Stay: 0 days  Code Status: Full Code   Attending Physician: Tari Calix MD   Primary Care Physician: Sabine Kay MD  Expected Discharge Date: 3/20/2017  Principal Problem:Atrial flutter    Subjective:     HPI: The patient has a history significant for Atrial fibrillation since 2012, S/p PVI in 12/2015, SAMMIE/DCCV in 8/16 and 11/16. On 12/15/2016, Ms. Dykes underwent repeat PVI. The right pulmonary veins were re-isolated, with isolated firing noted from the veins post-isolation. Additionally, a mid-sal AT was targeted and successfully ablated. She was discharged on Rythmol  mg bid/ Elliquis. Plan was to wean Rythmol to once a day and eventually stop it in next few months. She decreased it to once a day 2 weeks ago. Patient reports short bursts of palpitations for 2 weeks intermittently and not feeling well    Interval History: Patient resumed on Propafenone and continued on cardizem. Rate is between 110-120/mt AFL. She is scheduled for SAMMIE/DCCV this morning.    ROS   Constitution: Negative for decreased appetite, malaise/fatigue, weight gain and weight loss.   HENT: Negative for sore throat.   Eyes: Negative for blurred vision.   Cardiovascular: positive for palpitations intermittently  Negative for dyspnea on exertion, leg swelling, near-syncope, orthopnea and paroxysmal nocturnal dyspnea.   Skin: Negative for rash.   Musculoskeletal: Negative for arthritis.   Gastrointestinal: Negative for abdominal pain.   Neurological: Negative for focal weakness.   Psychiatric/Behavioral: Negative for altered mental status.  Objective:     Vital Signs (Most Recent):  Temp: 97.7 °F (36.5 °C) (03/20/17 0422)  Pulse: 106 (03/20/17 0659)  Resp: 18 (03/20/17 0422)  BP: 114/67 (03/20/17 0422)  SpO2: 96 % (03/20/17 0422) Vital Signs (24h Range):  Temp:  [97.7 °F  (36.5 °C)-98.1 °F (36.7 °C)] 97.7 °F (36.5 °C)  Pulse:  [] 106  Resp:  [16-18] 18  SpO2:  [96 %-98 %] 96 %  BP: (109-118)/(65-75) 114/67     Weight: 95.7 kg (211 lb)  Body mass index is 36.22 kg/(m^2).    SpO2: 96 %  O2 Device (Oxygen Therapy): room air      Intake/Output Summary (Last 24 hours) at 03/20/17 0814  Last data filed at 03/20/17 0600   Gross per 24 hour   Intake              645 ml   Output             1125 ml   Net             -480 ml       Lines/Drains/Airways     Peripheral Intravenous Line                 Peripheral IV - Single Lumen 11/07/16 0143 Left Antecubital 133 days         Peripheral IV - Single Lumen 11/07/16 1210 Left Forearm 132 days         Peripheral IV - Single Lumen 03/17/17 2307 Left Antecubital 2 days                Physical Exam  General: resting comfortably  HEENT: perrl, eomi  Neck: no jvd  Heart: nl s1/2, no m/h/t, tachy, reg rhythm  Lungs: clear to auscultation bilaterally  Abdomen: s/nt/nd, no organomegaly, no fluid wave  Ext: no edema Pulses: 2+ pulses bilateral  Skin: no tears, wounds, bleeding, color changes      Significant Labs:   CMP     Recent Labs  Lab 03/19/17  0529 03/20/17  0452    140   K 3.8 3.9    108   CO2 23 24   GLU 85 95   BUN 17 14   CREATININE 0.8 0.7   CALCIUM 8.7 8.4*   PROT 6.4 5.9*   ALBUMIN 3.1* 2.9*   BILITOT 0.4 0.4   ALKPHOS 79 78   AST 10 10   ALT 11 9*   ANIONGAP 9 8   ESTGFRAFRICA >60.0 >60.0   EGFRNONAA >60.0 >60.0    and CBC     Recent Labs  Lab 03/19/17  0529 03/20/17  0452   WBC 5.70 5.93   HGB 13.2 12.2   HCT 38.5 35.1*    196         Assessment and Plan:       Active Diagnoses:    Diagnosis Date Noted POA    PRINCIPAL PROBLEM:  Atrial flutter [I48.92] 03/18/2017 Yes    Hypokalemia [E87.6] 03/19/2017 Yes      Problems Resolved During this Admission:    Diagnosis Date Noted Date Resolved POA       56 y.o. female with History of Afib and Flutter with redo PVI in the past presents with palpitations and is in atypical  flutter. She was off rythmol as advised previously and had recurrence of symptoms.. She is not sure about taking elliquis every day in the morning but certainly takes it in the night every night.   Now feels better , back on Rythmol and continuing  Cardizem/apixaban . Scheduled for SAMMIE/DCCV this morning.   Follow up in 6 weeks with Dr Meyers- consider RFA     Attending addendum to follow.    Cameron Bolaños MD  Cardiology  [facility]

## 2017-03-21 ENCOUNTER — PATIENT OUTREACH (OUTPATIENT)
Dept: ADMINISTRATIVE | Facility: CLINIC | Age: 57
End: 2017-03-21
Payer: COMMERCIAL

## 2017-03-21 NOTE — PATIENT INSTRUCTIONS
Atrial Flutter    Atrial flutter means that your heart is beating very fast. It is caused by a problem in the electrical pathways of the heart. It can be a sign of heart disease or other health problems that affect your heart.  Palpitations are the most common symptom of atrial flutter. This is the feeling that your heart is fluttering or beating fast or hard. When your heart beats too fast, it doesnt pump blood very well. This can cause other symptoms, including:  · Anxiety  · Fatigue  · Shortness of breath  · Chest pain  · Dizziness  · Fainting  If this is the first time youve had atrial flutter, and you dont have heart or lung disease, it may never happen again. But in most cases, atrial flutter comes and goes. It can last from a few hours to a couple of days. Sometimes the atrial flutter doesnt ever go away. This is chronic atrial flutter.  Atrial flutter may be caused by heart disease. It may also be caused by other conditions that affect the heart:  · Coronary artery disease (arteriosclerosis)  · High blood pressure  · Disease of the heart valves  · Enlarged heart  Atrial flutter can occur without heart disease. This may be because of:  · Overactive thyroid (hyperthyroid)  · Chronic lung disease (COPD, emphysema, or bronchitis)  · Alcohol use  · Drugs or medicines that stimulate the heart. These include cocaine, amphetamines, diet pills, some decongestant cold medicines, caffeine, and nicotine.  · Infection  Treating or removing these causes will make it more likely that treatment for atrial flutter will work. It will also make it less likely that atrial flutter will come back.  Atrial flutter can happen along with another abnormal rhythm called atrial fibrillation. The risk for stroke is higher with these conditions. Getting treatment can reduce your risk.  Home care  Follow these guidelines when caring for yourself at home:  · Go back to your usual activities as soon as you feel back to normal.  · If  you smoke, stop smoking. Talk with your healthcare provider or call a local stop-smoking program for help.  · Dont take drugs or medicines that stimulate your heart. These include cocaine, amphetamines, diet pills, some decongestant cold medicines, caffeine, and nicotine.  · Your provider may have prescribed medicine to stop atrial fibrillation from coming back. Take this medicine exactly as directed. Some medicines must be taken every day to work as they should.  · Your provider may also have prescribed warfarin to lower your risk for stroke. Have your blood tested regularly as advised by your healthcare provider. This will help make sure the dose is right for you.  Follow-up care  Follow up with your healthcare provider, or as advised.  When should I call my healthcare provider?  Call your healthcare provider right away if any of these occur:  · Shortness of breath gets worse  · Swelling in either leg  · Unexpected weight gain  · Chest pain or pressure  · Near fainting or fainting  · You feel like your heart is fluttering, or beating fast or hard  · Fever of 100.4°F (38°C) or higher, or as directed by your healthcare provider  · Cough with dark-colored or bloody mucus  Also call your provider right away if you have signs of stroke:  · Weakness or numbness of an arm or leg or one side of the face  · Difficulty speaking or seeing  · Extreme drowsiness, confusion, dizziness, or fainting   Date Last Reviewed: 5/1/2016  © 7059-8489 [employer]. [address] [place] [address]. All rights reserved. This information is not intended as a substitute for professional medical care. Always follow your healthcare professional's instructions.

## 2017-03-21 NOTE — DISCHARGE SUMMARY
DISCHARGE SUMMARY  Hospital Medicine    Team: [facility]    Patient Name: Francoise Dykes  YOB: 1960    Admit Date: 3/17/2017    Discharge Date: 3/20/2017    Discharge Attending Physician: Tari Calix MD     Principal Diagnoses:  Active Hospital Problems    Diagnosis  POA    *Atrial flutter [I48.92]  Yes    Hypokalemia [E87.6]  Yes      Resolved Hospital Problems    Diagnosis Date Resolved POA   No resolved problems to display.       Discharged Condition: stable    HOSPITAL COURSE:      Initial Presentation:    Patient is a 56 y.o. female presents with 1 day of palpitations and 'not feeling well.' She notes that, since her most recent ablation, she has had short bursts of palpitations lasting < 30seconds 1-2 times/week. The plan was for her to wean off of her rhytmol this month and so she went from BID dosing to once daily a couple of weeks ago. Last week she noted an episode of palpitations that lasted a bit longer than normal and took an extra dose. She also took two doses yesterday given her symptoms. She did not have success in resolving her symptoms and so presented to the ED found to be in rapid a flutter. Has been on medrol dosepack for back pain.      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     Pt was started on Diltiazem gtt and admitted to [facility] for further evaluation.    Course of Principle Problem for Admission:    EP was consulted on admission, and recommended transition of Diltiazem gtt to Diltiazem PO, while awaiting RACHEL/DCCV.  Pt underwent RACHEL 3/20 which showed no NAPOLEON clot; she remained in atrial flutter during the entire procedure until withdraw of the RACHEL probe when pt self-converted to NSR.  She was discharged in stable condition of increased dose of Diltiazem, Propafenone, and  Apixaban.      Consults: Cardiology    Last CBC/BMP:    CBC/Anemia Labs: Coags:      Recent Labs  Lab 03/19/17  0529 03/20/17  0452 03/20/17  0811   WBC 5.70 5.93 6.42  6.42   HGB 13.2 12.2 12.5  12.5   HCT 38.5 35.1* 36.1*  36.1*    196 212  212   MCV 81* 80* 79*  79*   RDW 13.4 13.1 13.2  13.2      Recent Labs  Lab 03/17/17  2307   INR 1.0   APTT 26.5        Chemistries:     Recent Labs  Lab 03/18/17  0427 03/19/17  0529 03/20/17  0452    141 140   K 3.4* 3.8 3.9    109 108   CO2 26 23 24   BUN 14 17 14   CREATININE 0.7 0.8 0.7   CALCIUM 8.9 8.7 8.4*   PROT 6.8 6.4 5.9*   BILITOT 0.5 0.4 0.4   ALKPHOS 80 79 78   ALT 13 11 9*   AST 10 10 10   MG 1.9  --   --         Significant Diagnostic Studies: as above    Special Treatments/Procedures:   Procedure(s) (LRB):  CARDIOVERSION (N/A)     Disposition: Home or Self Care      Future Scheduled Appointments:  Future Appointments  Date Time Provider Department Center   4/17/2017 7:30 AM EKG, APPT [facility] EKG [facility]   4/17/2017 8:00 AM Lee Meyers MD [facility] ARRHYTH [facility]   5/12/2017 7:30 AM Delfina Morales PA-C [facility]     Discharge Medication List:       Francoise Dykes   Home Medication Instructions KARRI:[id number]    Printed on:03/21/17 3189   Medication Information                      apixaban 5 mg Tab  Take 1 tablet (5 mg total) by mouth 2 (two) times daily.             azelastine-fluticasone 137-50 mcg/spray Spry nassal spray  1 spray by Each Nare route 2 (two) times daily.             cetirizine (ZYRTEC) 10 MG tablet  Take 10 mg by mouth once daily.             diltiaZEM (DILACOR XR) 120 MG CDCR  Take 2 capsules (240 mg total) by mouth once daily.             hydrocortisone 0.5 % cream  Apply topically daily as needed.             ibuprofen (ADVIL,MOTRIN) 800 MG tablet  TK 1 T PO Q 8 H PRF PAIN             propafenone (RYTHMOL SR) 225 MG Cp12  Take 1 capsule (225 mg total) by mouth every 12 (twelve) hours.                  Patient Instructions:    Discharge Procedure Orders  Diet general     Activity as tolerated     Call MD for:  persistent dizziness, light-headedness, or visual disturbances         At the time of discharge patient was told to take all medications as prescribed, to keep all followup appointments, and to call their primary care physician or return to the emergency room if they have any worsening or concerning symptoms.    Signing Physician:  Tari Calix MD

## 2017-04-12 ENCOUNTER — HOSPITAL ENCOUNTER (OUTPATIENT)
Dept: CARDIOLOGY | Facility: CLINIC | Age: 57
Discharge: HOME | End: 2017-04-12
Payer: COMMERCIAL

## 2017-04-12 ENCOUNTER — CLINICAL SUPPORT (OUTPATIENT)
Dept: ELECTROPHYSIOLOGY | Facility: CLINIC | Age: 57
End: 2017-04-12
Payer: COMMERCIAL

## 2017-04-12 ENCOUNTER — OFFICE VISIT (OUTPATIENT)
Dept: ELECTROPHYSIOLOGY | Facility: CLINIC | Age: 57
End: 2017-04-12
Payer: COMMERCIAL

## 2017-04-12 VITALS
SYSTOLIC BLOOD PRESSURE: 124 MMHG | BODY MASS INDEX: 34.89 KG/M2 | DIASTOLIC BLOOD PRESSURE: 74 MMHG | WEIGHT: 204.38 LBS | HEIGHT: 64 IN | HEART RATE: 84 BPM

## 2017-04-12 DIAGNOSIS — I47.19 ATRIAL TACHYCARDIA: ICD-10-CM

## 2017-04-12 DIAGNOSIS — I48.0 PAROXYSMAL ATRIAL FIBRILLATION: ICD-10-CM

## 2017-04-12 DIAGNOSIS — Z79.899 LONG TERM CURRENT USE OF ANTIARRHYTHMIC DRUG: ICD-10-CM

## 2017-04-12 DIAGNOSIS — I48.0 PAF (PAROXYSMAL ATRIAL FIBRILLATION): ICD-10-CM

## 2017-04-12 DIAGNOSIS — I48.4 ATYPICAL ATRIAL FLUTTER: ICD-10-CM

## 2017-04-12 DIAGNOSIS — I48.0 PAROXYSMAL ATRIAL FIBRILLATION: Primary | ICD-10-CM

## 2017-04-12 DIAGNOSIS — E66.01 SEVERE OBESITY (BMI 35.0-35.9 WITH COMORBIDITY): ICD-10-CM

## 2017-04-12 DIAGNOSIS — Z86.79 S/P ABLATION OF ATRIAL FIBRILLATION: ICD-10-CM

## 2017-04-12 DIAGNOSIS — R00.2 PALPITATIONS: ICD-10-CM

## 2017-04-12 DIAGNOSIS — Z98.890 S/P ABLATION OF ATRIAL FIBRILLATION: ICD-10-CM

## 2017-04-12 DIAGNOSIS — Z79.01 CURRENT USE OF LONG TERM ANTICOAGULATION: ICD-10-CM

## 2017-04-12 PROCEDURE — 1160F RVW MEDS BY RX/DR IN RCRD: CPT | Mod: S$GLB,,, | Performed by: NURSE PRACTITIONER

## 2017-04-12 PROCEDURE — 99214 OFFICE O/P EST MOD 30 MIN: CPT | Mod: S$GLB,,, | Performed by: NURSE PRACTITIONER

## 2017-04-12 PROCEDURE — 99999 PR PBB SHADOW E&M-EST. PATIENT-LVL III: CPT | Mod: PBBFAC,,, | Performed by: NURSE PRACTITIONER

## 2017-04-12 PROCEDURE — 93268 ECG RECORD/REVIEW: CPT | Mod: S$GLB,,, | Performed by: INTERNAL MEDICINE

## 2017-04-12 PROCEDURE — 93000 ELECTROCARDIOGRAM COMPLETE: CPT | Mod: S$GLB,,, | Performed by: INTERNAL MEDICINE

## 2017-04-12 NOTE — MR AVS SNAPSHOT
[facility]  [address]  [facility] [address]  Phone: [phone]  Fax: [phone]                  Francoise Dykes   2017 2:30 PM   Office Visit    Description:  Female : 1960   Provider:  TABITHA Alvarez   Department:  [facility]           Reason for Visit     Atrial Flutter     Dizziness     Fatigue           Diagnoses this Visit        Comments    Paroxysmal atrial fibrillation    -  Primary     Atypical atrial flutter         Atrial tachycardia                To Do List           Future Appointments        Provider Department Dept Phone    2017 7:30 AM Delfina Morales PA-C [facility] [phone]      Goals (5 Years of Data)              Today    3/17/17    3/10/17    Weight < 200 lb (90.719 kg)   92.7 kg (204 lb 5.9 oz)  95.7 kg (211 lb)  93 kg (205 lb)      Follow-Up and Disposition     Return in about 6 weeks (around 2017).      [facility]     [facility] Nurse Care Line -  Assistance  Unless otherwise directed by your provider, please contact [facility], our nurse care line that is available for  assistance.     Registered nurses in the [facility] On Call Center provide: appointment scheduling, clinical advisement, health education, and other advisory services.  Call: [phone] (toll free)               Medications           Message regarding Medications     Verify the changes and/or additions to your medication regime listed below are the same as discussed with your clinician today.  If any of these changes or additions are incorrect, please notify your healthcare provider.             Verify that the below list of medications is an accurate representation of the medications you are currently taking.  If none reported, the list may be blank. If incorrect, please contact your healthcare provider. Carry this list with you in case of emergency.           Current Medications     apixaban 5 mg Tab  "Take 1 tablet (5 mg total) by mouth 2 (two) times daily.    azelastine-fluticasone 137-50 mcg/spray Spry nassal spray 1 spray by Each Nare route 2 (two) times daily.    cetirizine (ZYRTEC) 10 MG tablet Take 10 mg by mouth once daily.    diltiaZEM (DILACOR XR) 120 MG CDCR Take 2 capsules (240 mg total) by mouth once daily.    hydrocortisone 0.5 % cream Apply topically daily as needed.    ibuprofen (ADVIL,MOTRIN) 800 MG tablet TK 1 T PO Q 8 H PRF PAIN    propafenone (RYTHMOL SR) 225 MG Cp12 Take 1 capsule (225 mg total) by mouth every 12 (twelve) hours.           Clinical Reference Information           Your Vitals Were     BP Pulse Height Weight Last Period BMI    124/74 84 5' 4" (1.626 m) 92.7 kg (204 lb 5.9 oz) (LMP Unknown) 35.08 kg/m2      Blood Pressure          Most Recent Value    BP  124/74      Allergies as of 4/12/2017     Adhesive Tape-silicones      Immunizations Administered on Date of Encounter - 4/12/2017     None      Orders Placed During Today's Visit     Future Labs/Procedures Expected by Expires    Cardiac event monitor (30 day)  As directed 4/12/2018      Language Assistance Services     ATTENTION: Language assistance services are available, free of charge. Please call [phone].      ATENCIÓN: Si akua feliz, tiene a pearson disposición servicios gratuitos de asistencia lingüística. Llame al [phone].     CHÚ Ý: N?u b?n nói Ti?ng Vi?t, có các d?ch v? h? tr? ngôn ng? mi?n phí dành cho b?n. G?i s? [phone].         [facility] complies with applicable Federal civil rights laws and does not discriminate on the basis of race, color, national origin, age, disability, or sex.        "
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
## 2017-05-05 ENCOUNTER — HOSPITAL ENCOUNTER (OUTPATIENT)
Dept: RADIOLOGY | Facility: HOSPITAL | Age: 57
Discharge: HOME | End: 2017-05-05
Attending: FAMILY MEDICINE
Payer: COMMERCIAL

## 2017-05-05 ENCOUNTER — OFFICE VISIT (OUTPATIENT)
Dept: FAMILY MEDICINE | Facility: CLINIC | Age: 57
End: 2017-05-05
Payer: COMMERCIAL

## 2017-05-05 VITALS
RESPIRATION RATE: 16 BRPM | BODY MASS INDEX: 35.71 KG/M2 | HEART RATE: 86 BPM | DIASTOLIC BLOOD PRESSURE: 80 MMHG | WEIGHT: 209.19 LBS | TEMPERATURE: 98 F | SYSTOLIC BLOOD PRESSURE: 126 MMHG | HEIGHT: 64 IN | OXYGEN SATURATION: 97 %

## 2017-05-05 DIAGNOSIS — R60.0 PEDAL EDEMA: Primary | ICD-10-CM

## 2017-05-05 DIAGNOSIS — L20.84 INTRINSIC ECZEMA: ICD-10-CM

## 2017-05-05 DIAGNOSIS — R60.0 PEDAL EDEMA: ICD-10-CM

## 2017-05-05 PROCEDURE — 99999 PR PBB SHADOW E&M-EST. PATIENT-LVL III: CPT | Mod: 25,PBBFAC,, | Performed by: FAMILY MEDICINE

## 2017-05-05 PROCEDURE — 93971 EXTREMITY STUDY: CPT | Mod: TC

## 2017-05-05 PROCEDURE — 93971 EXTREMITY STUDY: CPT | Mod: 26,,, | Performed by: RADIOLOGY

## 2017-05-05 PROCEDURE — 99214 OFFICE O/P EST MOD 30 MIN: CPT | Mod: S$GLB,,, | Performed by: FAMILY MEDICINE

## 2017-05-05 PROCEDURE — 1160F RVW MEDS BY RX/DR IN RCRD: CPT | Mod: S$GLB,,, | Performed by: FAMILY MEDICINE

## 2017-05-05 RX ORDER — TRIAMCINOLONE ACETONIDE 1 MG/G
CREAM TOPICAL 2 TIMES DAILY
Qty: 45 G | Refills: 1 | Status: ON HOLD | OUTPATIENT
Start: 2017-05-05 | End: 2017-12-27 | Stop reason: HOSPADM

## 2017-05-05 NOTE — PROGRESS NOTES
Subjective:       Patient ID: Francoise Dykes is a 56 y.o. female.    Chief Complaint: Eczema (lower right leg - flare up 3 weeks )    HPI    RLE edema x 1 week that occurred soon after she finished a long car trip back from [place]. Pt has not had swelling like this before. She has developed an itchy red rash sduha her RLE as well that is not relieved with emollients. No Cp or SOB. No LLE swelling.       Current Outpatient Prescriptions on File Prior to Visit   Medication Sig Dispense Refill    apixaban 5 mg Tab Take 1 tablet (5 mg total) by mouth 2 (two) times daily. 60 tablet 3    azelastine-fluticasone 137-50 mcg/spray Spry nassal spray 1 spray by Each Nare route 2 (two) times daily. 23 g 0    cetirizine (ZYRTEC) 10 MG tablet Take 10 mg by mouth once daily.      diltiaZEM (DILACOR XR) 120 MG CDCR Take 2 capsules (240 mg total) by mouth once daily. 30 capsule 2    hydrocortisone 0.5 % cream Apply topically daily as needed.      ibuprofen (ADVIL,MOTRIN) 800 MG tablet TK 1 T PO Q 8 H PRF PAIN  0    propafenone (RYTHMOL SR) 225 MG Cp12 Take 1 capsule (225 mg total) by mouth every 12 (twelve) hours. 60 capsule 11     No current facility-administered medications on file prior to visit.        Past Medical History:   Diagnosis Date    Allergy     seasonal    Atrial fibrillation        Family History   Problem Relation Age of Onset    Hypertension Mother     Diabetes Mother     Glaucoma Mother     Asbestos Father     Obesity Father     Eczema Son     Hypertension Son     Heart disease Maternal Grandmother      chf    Diabetes Maternal Grandfather     Cancer Paternal Grandmother      lung, 2/2 second hand smoke    Glaucoma Paternal Grandfather     Blindness Paternal Grandfather     Melanoma Neg Hx     Psoriasis Neg Hx     Lupus Neg Hx     Acne Neg Hx     Breast cancer Neg Hx     Colon cancer Neg Hx     Ovarian cancer Neg Hx         reports that she has never smoked. She has never used  "smokeless tobacco. She reports that she drinks alcohol. She reports that she does not use illicit drugs.    Review of Systems   Respiratory: Negative for cough and shortness of breath.    Cardiovascular: Negative for chest pain and palpitations.   Gastrointestinal: Negative for vomiting.       Objective:     Vitals:    05/05/17 1614   BP: 126/80   Pulse: 86   Resp: 16   Temp: 97.8 °F (36.6 °C)        Physical Exam   Constitutional: She appears well-developed. No distress.   HENT:   Head: Normocephalic and atraumatic.   Eyes: Conjunctivae are normal. No scleral icterus.   Pulmonary/Chest: Effort normal.   Musculoskeletal: She exhibits edema (+2 pitting edema on R and trace on left. ). She exhibits no tenderness or deformity.   Neg homans.     R and L extremity circ at the calf 16"   Neurological: She is alert.   Skin: She is not diaphoretic.   RLE with non confluent red flat rash with areas of darker brown hyperpigmentation over leg only. Worse anteriorly.    Psychiatric: She has a normal mood and affect. Her behavior is normal.   Vitals reviewed.      Assessment:       1. Pedal edema    2. Intrinsic eczema        Plan:       Francoise was seen today for eczema.    Diagnoses and all orders for this visit:    Pedal edema  -     US Lower Extremity Veins Right; Future  Pt is on anticoagulation, but with a recent, long road trip and unilateral swelling, will obtain US for possible DVT today. If normal, liekly due to CVI, and pt needs to wear:    Knee high moderate strength compression stockings recommended.     Intrinsic eczema - venous stasis  -     triamcinolone acetonide 0.1% (KENALOG) 0.1 % cream; Apply topically 2 (two) times daily.  For pruritis mainly as this negative impacting her. Chief treatment is compression stockings as above.           Return if symptoms worsen or fail to improve.      Pt verbalized understanding and agreed with our plan.   "

## 2017-05-05 NOTE — MR AVS SNAPSHOT
[facility]  [address]  [place] [address]  Phone: [phone]  Fax: [phone]                  Francoise Dykes   2017 4:00 PM   Office Visit    Description:  Female : 1960   Provider:  Bakari Winchester MD   Department:  [facility]           Reason for Visit     Eczema           Diagnoses this Visit        Comments    Pedal edema    -  Primary     Intrinsic eczema                To Do List           Future Appointments        Provider Department Dept Phone    2017 5:30 PM [facility] USVAS1 [facility] [phone]    2017 7:30 AM Delfina Morales PA-C [facility] [phone]      Goals (5 Years of Data)              Today    4/12/17    3/17/17    Weight < 200 lb (90.719 kg)   94.9 kg (209 lb 3.5 oz)  92.7 kg (204 lb 5.9 oz)  95.7 kg (211 lb)       These Medications        Disp Refills Start End    triamcinolone acetonide 0.1% (KENALOG) 0.1 % cream 45 g 1 2017 5/15/2017    Apply topically 2 (two) times daily. - Topical (Top)    Pharmacy: [phone] - [place] [address] [address] Ph #: [phone]         [facility]     [facility] Nurse Care Line -  Assistance  Unless otherwise directed by your provider, please contact [facility], our nurse care line that is available for  assistance.     Registered nurses in the [facility] provide: appointment scheduling, clinical advisement, health education, and other advisory services.  Call: [phone] (toll free)               Medications           Message regarding Medications     Verify the changes and/or additions to your medication regime listed below are the same as discussed with your clinician today.  If any of these changes or additions are incorrect, please notify your healthcare provider.        START taking these NEW medications        Refills    triamcinolone acetonide 0.1% (KENALOG) 0.1 % cream 1     "Sig: Apply topically 2 (two) times daily.    Class: Normal    Route: Topical (Top)           Verify that the below list of medications is an accurate representation of the medications you are currently taking.  If none reported, the list may be blank. If incorrect, please contact your healthcare provider. Carry this list with you in case of emergency.           Current Medications     apixaban 5 mg Tab Take 1 tablet (5 mg total) by mouth 2 (two) times daily.    azelastine-fluticasone 137-50 mcg/spray Spry nassal spray 1 spray by Each Nare route 2 (two) times daily.    cetirizine (ZYRTEC) 10 MG tablet Take 10 mg by mouth once daily.    diltiaZEM (DILACOR XR) 120 MG CDCR Take 2 capsules (240 mg total) by mouth once daily.    hydrocortisone 0.5 % cream Apply topically daily as needed.    ibuprofen (ADVIL,MOTRIN) 800 MG tablet TK 1 T PO Q 8 H PRF PAIN    propafenone (RYTHMOL SR) 225 MG Cp12 Take 1 capsule (225 mg total) by mouth every 12 (twelve) hours.    triamcinolone acetonide 0.1% (KENALOG) 0.1 % cream Apply topically 2 (two) times daily.           Clinical Reference Information           Your Vitals Were     BP Pulse Temp Resp Height Weight    126/80 (BP Location: Right arm, Patient Position: Sitting, BP Method: Manual) 86 97.8 °F (36.6 °C) (Oral) 16 5' 4" (1.626 m) 94.9 kg (209 lb 3.5 oz)    Last Period SpO2 BMI          (LMP Unknown) 97% 35.91 kg/m2        Blood Pressure          Most Recent Value    BP  126/80      Allergies as of 5/5/2017     Adhesive Tape-silicones      Immunizations Administered on Date of Encounter - 5/5/2017     None      Orders Placed During Today's Visit     Future Labs/Procedures Expected by Expires    US Lower Extremity Veins Right  5/5/2017 5/5/2018      Language Assistance Services     ATTENTION: Language assistance services are available, free of charge. Please call [phone].      ATENCIÓN: Si habla jonathanañol, tiene a pearson disposición servicios gratuitos de asistencia lingüística. " Savita powers [phone].     CONCEPCION Ý: N?u b?n nói Ti?ng Vi?t, có các d?ch v? h? tr? ngôn ng? mi?n phí dành cho b?n. G?i s? [phone].         [facility] complies with applicable Federal civil rights laws and does not discriminate on the basis of race, color, national origin, age, disability, or sex.
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
## 2017-06-06 ENCOUNTER — OFFICE VISIT (OUTPATIENT)
Dept: SPINE | Facility: CLINIC | Age: 57
End: 2017-06-06
Payer: COMMERCIAL

## 2017-06-06 VITALS
BODY MASS INDEX: 35.68 KG/M2 | SYSTOLIC BLOOD PRESSURE: 143 MMHG | HEART RATE: 84 BPM | HEIGHT: 64 IN | DIASTOLIC BLOOD PRESSURE: 67 MMHG | WEIGHT: 209 LBS

## 2017-06-06 DIAGNOSIS — M54.42 BILATERAL LOW BACK PAIN WITH LEFT-SIDED SCIATICA, UNSPECIFIED CHRONICITY: ICD-10-CM

## 2017-06-06 DIAGNOSIS — M47.819 SPONDYLOSIS WITHOUT MYELOPATHY: Primary | ICD-10-CM

## 2017-06-06 DIAGNOSIS — M43.10 ACQUIRED SPONDYLOLISTHESIS: ICD-10-CM

## 2017-06-06 DIAGNOSIS — M53.3 SACROILIAC JOINT PAIN: ICD-10-CM

## 2017-06-06 DIAGNOSIS — M54.17 THORACIC AND LUMBOSACRAL NEURITIS: ICD-10-CM

## 2017-06-06 DIAGNOSIS — M51.37 DEGENERATION OF LUMBAR OR LUMBOSACRAL INTERVERTEBRAL DISC: ICD-10-CM
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
## 2017-06-06 DIAGNOSIS — M48.061 SPINAL STENOSIS OF LUMBAR REGION WITHOUT NEUROGENIC CLAUDICATION: ICD-10-CM

## 2017-06-06 DIAGNOSIS — M54.14 THORACIC AND LUMBOSACRAL NEURITIS: ICD-10-CM

## 2017-06-06 DIAGNOSIS — M51.37 DDD (DEGENERATIVE DISC DISEASE), LUMBOSACRAL: ICD-10-CM

## 2017-06-06 PROCEDURE — 99214 OFFICE O/P EST MOD 30 MIN: CPT | Mod: S$GLB,,, | Performed by: PHYSICIAN ASSISTANT

## 2017-06-06 PROCEDURE — 3008F BODY MASS INDEX DOCD: CPT | Mod: S$GLB,,, | Performed by: PHYSICIAN ASSISTANT

## 2017-06-06 PROCEDURE — 99999 PR PBB SHADOW E&M-EST. PATIENT-LVL III: CPT | Mod: PBBFAC,,, | Performed by: PHYSICIAN ASSISTANT

## 2017-06-06 RX ORDER — IBUPROFEN 800 MG/1
800 TABLET ORAL
Qty: 270 TABLET | Refills: 0 | Status: SHIPPED | OUTPATIENT
Start: 2017-06-06 | End: 2019-03-25

## 2017-06-16 ENCOUNTER — TELEPHONE (OUTPATIENT)
Dept: SPINE | Facility: CLINIC | Age: 57
End: 2017-06-16

## 2017-06-16 NOTE — TELEPHONE ENCOUNTER
----- Message from Delfina Morales PA-C sent at 6/16/2017  7:32 AM CDT -----  Please let her know I reviewed everything with the surgeon (Dr. Jones). He said that if she fails conservative treatment that she likely is a surgical candidate for a possible fusion at L5-S1. If she wants to discuss this further, then please make her a f/u with Karen.     Thanks.

## 2017-06-19 ENCOUNTER — HOSPITAL ENCOUNTER (OUTPATIENT)
Dept: CARDIOLOGY | Facility: CLINIC | Age: 57
Discharge: HOME | End: 2017-06-19
Payer: COMMERCIAL

## 2017-06-19 ENCOUNTER — OFFICE VISIT (OUTPATIENT)
Dept: ELECTROPHYSIOLOGY | Facility: CLINIC | Age: 57
End: 2017-06-19
Payer: COMMERCIAL

## 2017-06-19 VITALS
BODY MASS INDEX: 35.53 KG/M2 | HEART RATE: 68 BPM | WEIGHT: 208.13 LBS | DIASTOLIC BLOOD PRESSURE: 84 MMHG | HEIGHT: 64 IN | SYSTOLIC BLOOD PRESSURE: 116 MMHG

## 2017-06-19 DIAGNOSIS — Z98.890 S/P ABLATION OF ATRIAL FIBRILLATION: Primary | ICD-10-CM

## 2017-06-19 DIAGNOSIS — I47.19 ATRIAL TACHYCARDIA: ICD-10-CM

## 2017-06-19 DIAGNOSIS — I48.0 PAF (PAROXYSMAL ATRIAL FIBRILLATION): ICD-10-CM

## 2017-06-19 DIAGNOSIS — Z79.899 LONG TERM CURRENT USE OF ANTIARRHYTHMIC DRUG: ICD-10-CM

## 2017-06-19 DIAGNOSIS — I48.0 PAROXYSMAL ATRIAL FIBRILLATION: ICD-10-CM

## 2017-06-19 DIAGNOSIS — Z86.79 S/P ABLATION OF ATRIAL FIBRILLATION: Primary | ICD-10-CM

## 2017-06-19 DIAGNOSIS — R00.2 PALPITATIONS: ICD-10-CM

## 2017-06-19 DIAGNOSIS — Z79.01 CURRENT USE OF LONG TERM ANTICOAGULATION: ICD-10-CM

## 2017-06-19 PROCEDURE — 93000 ELECTROCARDIOGRAM COMPLETE: CPT | Mod: S$GLB,,, | Performed by: INTERNAL MEDICINE

## 2017-06-19 PROCEDURE — 99214 OFFICE O/P EST MOD 30 MIN: CPT | Mod: S$GLB,,, | Performed by: INTERNAL MEDICINE

## 2017-06-19 PROCEDURE — 99999 PR PBB SHADOW E&M-EST. PATIENT-LVL III: CPT | Mod: PBBFAC,,, | Performed by: INTERNAL MEDICINE

## 2017-06-19 RX ORDER — DILTIAZEM HYDROCHLORIDE 120 MG/1
120 CAPSULE, EXTENDED RELEASE ORAL DAILY
Qty: 30 CAPSULE | Refills: 2 | Status: SHIPPED | OUTPATIENT
Start: 2017-06-19 | End: 2017-06-19 | Stop reason: SDUPTHER

## 2017-06-19 RX ORDER — PROPAFENONE HYDROCHLORIDE 225 MG/1
225 CAPSULE, EXTENDED RELEASE ORAL EVERY 12 HOURS
Qty: 60 CAPSULE | Refills: 11 | Status: ON HOLD | OUTPATIENT
Start: 2017-06-19 | End: 2018-01-26

## 2017-06-19 RX ORDER — DILTIAZEM HYDROCHLORIDE 120 MG/1
120 CAPSULE, EXTENDED RELEASE ORAL DAILY
Qty: 30 CAPSULE | Refills: 2 | Status: SHIPPED | OUTPATIENT
Start: 2017-06-19 | End: 2017-10-02 | Stop reason: SDUPTHER
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
## 2017-06-26 DIAGNOSIS — J32.9 BACTERIAL SINUSITIS: ICD-10-CM

## 2017-06-26 DIAGNOSIS — B96.89 BACTERIAL SINUSITIS: ICD-10-CM

## 2017-06-26 RX ORDER — AZELASTINE HYDROCHLORIDE AND FLUTICASONE PROPIONATE 137; 50 UG/1; UG/1
SPRAY, METERED NASAL
Qty: 23 G | Refills: 0 | Status: SHIPPED | OUTPATIENT
Start: 2017-06-26 | End: 2017-10-30 | Stop reason: SDUPTHER

## 2017-06-27 ENCOUNTER — TELEPHONE (OUTPATIENT)
Dept: ELECTROPHYSIOLOGY | Facility: CLINIC | Age: 57
End: 2017-06-27

## 2017-06-27 NOTE — TELEPHONE ENCOUNTER
Called Pt to inform that she needs to stop Eliquis 48 hours prior to procedure. Pt voiced understanding.

## 2017-06-27 NOTE — TELEPHONE ENCOUNTER
----- Message from Lee Meyers MD sent at 6/27/2017  3:29 PM CDT -----  48 hours  ----- Message -----  From: Nanette Ann RN  Sent: 6/27/2017   2:38 PM  To: Lee Meyers MD    How long does Pt have to be off Eliquis prior to injection??  ----- Message -----  From: Tracy Pace MA  Sent: 6/27/2017  12:53 PM  To: Nanette Ann RN    Please see below  Thanks!  ----- Message -----  From: Patricia Kilpatrick  Sent: 6/27/2017  12:52 PM  To: Mira Howard Staff    Pt. Need clearance  To hold Eliquis to schedule Left SI joint injection w/ Dr. Jang, please advise.

## 2017-06-28 ENCOUNTER — OFFICE VISIT (OUTPATIENT)
Dept: OBSTETRICS AND GYNECOLOGY | Facility: CLINIC | Age: 57
End: 2017-06-28
Payer: COMMERCIAL

## 2017-06-28 VITALS
WEIGHT: 205 LBS | BODY MASS INDEX: 35 KG/M2 | SYSTOLIC BLOOD PRESSURE: 120 MMHG | HEIGHT: 64 IN | DIASTOLIC BLOOD PRESSURE: 72 MMHG

## 2017-06-28 DIAGNOSIS — Z01.419 ROUTINE GYNECOLOGICAL EXAMINATION: Primary | ICD-10-CM

## 2017-06-28 PROCEDURE — 99999 PR PBB SHADOW E&M-EST. PATIENT-LVL II: CPT | Mod: PBBFAC,,, | Performed by: OBSTETRICS & GYNECOLOGY

## 2017-06-28 PROCEDURE — 99396 PREV VISIT EST AGE 40-64: CPT | Mod: S$GLB,,, | Performed by: OBSTETRICS & GYNECOLOGY

## 2017-06-28 NOTE — PROGRESS NOTES
PT HERE FOR ANNUAL.  NO PROBLEMS.    ROS:  GENERAL: No fever, chills, fatigability or weight loss.  VULVAR: No pain, no lesions and no itching.  VAGINAL: No relaxation, no itching, no discharge, no abnormal bleeding and no lesions.  ABDOMEN: No abdominal pain. Denies nausea. Denies vomiting. No diarrhea. No constipation  BREAST: Denies pain. No lumps. No discharge.  URINARY: No incontinence, no nocturia, no frequency and no dysuria.  CARDIOVASCULAR: No chest pain. No shortness of breath. No leg cramps.  NEUROLOGICAL: No headaches. No vision changes.  The remainder of the review of systems was negative.    PE:   General Appearance: obese Well developed. Well nourished. In no acute distress.  Urethral Meatus: Normal size. Normal location. No lesions. No prolapse.  Vulva: Atrophic. Lesions: No.  Urethra: No masses. No tenderness. No prolapse. No scarring.  Bladder: No masses. No tenderness.  Vagina: Mucosa NI: yes Discharge: no Atrophic: yes Rectocele: no Cystocele: no Vaginal cuff intact: yes  Cervix: Absent.  Uterus: Absent.  Adnexa: Masses: No Tenderness: No       CDS Nodularity: No   Abdomen: obese  No masses. No tenderness.  Breasts: No bilateral masses. No bilateral discharge. No bilateral tenderness. No bilateral fibrocystic changes.  Neck: No thyroid enlargement. No thyroid masses.  Skin: Rashes: No    PROCEDURES:    DIAGNOSIS:  1. Routine gynecological examination        PLAN:     MEDICATIONS & ORDERS:       Patient was counseled today on the new ACS guidelines for cervical cytology screening as well as the current recommendations for breast cancer screening. She was counseled to follow up with her PCP for other routine health maintenance. Counseling session lasted approximately 10 minutes, and all her questions were answered.         FOLLOW-UP: With me in 12 month

## 2017-06-29 ENCOUNTER — LAB VISIT (OUTPATIENT)
Dept: LAB | Facility: HOSPITAL | Age: 57
End: 2017-06-29
Attending: FAMILY MEDICINE
Payer: COMMERCIAL

## 2017-06-29 ENCOUNTER — OFFICE VISIT (OUTPATIENT)
Dept: FAMILY MEDICINE | Facility: CLINIC | Age: 57
End: 2017-06-29
Payer: COMMERCIAL

## 2017-06-29 VITALS
DIASTOLIC BLOOD PRESSURE: 78 MMHG | RESPIRATION RATE: 16 BRPM | HEIGHT: 64 IN | HEART RATE: 70 BPM | WEIGHT: 204 LBS | TEMPERATURE: 98 F | BODY MASS INDEX: 34.83 KG/M2 | SYSTOLIC BLOOD PRESSURE: 116 MMHG | OXYGEN SATURATION: 97 %

## 2017-06-29 DIAGNOSIS — E04.1 THYROID NODULE: ICD-10-CM

## 2017-06-29 DIAGNOSIS — Z00.00 ANNUAL PHYSICAL EXAM: ICD-10-CM

## 2017-06-29 DIAGNOSIS — E66.01 SEVERE OBESITY (BMI 35.0-35.9 WITH COMORBIDITY): ICD-10-CM

## 2017-06-29 DIAGNOSIS — Z00.00 ANNUAL PHYSICAL EXAM: Primary | ICD-10-CM

## 2017-06-29 PROBLEM — I48.92 ATRIAL FLUTTER: Status: RESOLVED | Noted: 2017-03-18 | Resolved: 2017-06-29

## 2017-06-29 LAB
ESTIMATED AVG GLUCOSE: 82 MG/DL
HBA1C MFR BLD HPLC: 4.5 %

## 2017-06-29 PROCEDURE — 36415 COLL VENOUS BLD VENIPUNCTURE: CPT | Mod: PO

## 2017-06-29 PROCEDURE — 99999 PR PBB SHADOW E&M-EST. PATIENT-LVL III: CPT | Mod: PBBFAC,,, | Performed by: FAMILY MEDICINE

## 2017-06-29 PROCEDURE — 99396 PREV VISIT EST AGE 40-64: CPT | Mod: S$GLB,,, | Performed by: FAMILY MEDICINE

## 2017-06-29 PROCEDURE — 83036 HEMOGLOBIN GLYCOSYLATED A1C: CPT

## 2017-06-29 NOTE — PROGRESS NOTES
Subjective:       Patient ID: Francoise Dykes is a 56 y.o. female.    Chief Complaint: Annual Exam (wellness exam with labs) and Immunizations (Tdap)    HPI    Annual Physical    Diet: pt has to increase her fruit and veggie intake    Exercise: she has also been exercising with her friends.     Immunizations required: tetanus    Cancer screenings required: none    Other screenings required:none    Acute complaints today:    There was a thyroid nodule noted on an MRI one year ago. She was told that this needs to be watched.     Current Outpatient Prescriptions on File Prior to Visit   Medication Sig Dispense Refill    apixaban 5 mg Tab Take 1 tablet (5 mg total) by mouth 2 (two) times daily. 60 tablet 11    diltiaZEM (DILACOR XR) 120 MG CDCR Take 1 capsule (120 mg total) by mouth once daily. 30 capsule 2    ibuprofen (ADVIL,MOTRIN) 800 MG tablet Take 1 tablet (800 mg total) by mouth 3 (three) times daily with meals. 270 tablet 0    cetirizine (ZYRTEC) 10 MG tablet Take 10 mg by mouth once daily.      DYMISTA 137-50 mcg/spray [place] nassal spray USE ONE SPRAY(S) IN EACH NOSTRIL TWICE DAILY 23 g 0    hydrocortisone 0.5 % cream Apply topically daily as needed.      propafenone (RYTHMOL SR) 225 MG Cp12 Take 1 capsule (225 mg total) by mouth every 12 (twelve) hours. 60 capsule 11    triamcinolone acetonide 0.1% (KENALOG) 0.1 % cream Apply topically 2 (two) times daily. 45 g 1     No current facility-administered medications on file prior to visit.        Past Medical History:   Diagnosis Date    Allergy     seasonal    Atrial fibrillation        Family History   Problem Relation Age of Onset    Hypertension Mother     Diabetes Mother     Glaucoma Mother     Asbestos Father     Obesity Father     Eczema Son     Hypertension Son     Heart disease Maternal Grandmother      chf    Diabetes Maternal Grandfather     Cancer Paternal Grandmother      lung, 2/2 second hand smoke    Glaucoma Paternal  Grandfather     Blindness Paternal Grandfather     Melanoma Neg Hx     Psoriasis Neg Hx     Lupus Neg Hx     Acne Neg Hx     Breast cancer Neg Hx     Colon cancer Neg Hx     Ovarian cancer Neg Hx         reports that she has never smoked. She has never used smokeless tobacco. She reports that she drinks alcohol. She reports that she does not use drugs.    Review of Systems   Constitutional: Negative for chills and fever.   HENT: Negative for congestion, ear pain, hearing loss, rhinorrhea and sore throat.    Eyes: Negative for pain and discharge.   Respiratory: Negative for cough and shortness of breath.    Cardiovascular: Negative for chest pain and palpitations.   Gastrointestinal: Negative for diarrhea, nausea and vomiting.   Genitourinary: Negative for difficulty urinating, dysuria and frequency.   Allergic/Immunologic: Negative for environmental allergies and food allergies.   Neurological: Negative for seizures and weakness.   Psychiatric/Behavioral: Negative for dysphoric mood. The patient is not nervous/anxious.        Objective:     Vitals:    06/29/17 0750   BP: 116/78   Pulse: 70   Resp: 16   Temp: 97.7 °F (36.5 °C)        Physical Exam   Constitutional: She is oriented to person, place, and time. She appears well-developed.   obesity   HENT:   Head: Normocephalic and atraumatic.   Right Ear: External ear normal. No foreign bodies. Tympanic membrane is not injected. No middle ear effusion.   Left Ear: External ear normal. No foreign bodies.  No middle ear effusion.   Mouth/Throat: Oropharynx is clear and moist. No oral lesions. No dental abscesses.   Eyes: Conjunctivae and EOM are normal. Pupils are equal, round, and reactive to light. Right eye exhibits no discharge. Left eye exhibits no discharge.   Neck: No tracheal deviation present. No thyromegaly present.   Cardiovascular: Normal rate, regular rhythm and normal heart sounds.  Exam reveals no gallop and no friction rub.    No murmur  heard.  Pulmonary/Chest: Effort normal and breath sounds normal. No respiratory distress. She has no wheezes. She has no rales.   Abdominal: Soft. She exhibits no distension and no mass. There is no tenderness. There is no rebound and no guarding.   Lymphadenopathy:     She has no cervical adenopathy.   Neurological: She is alert and oriented to person, place, and time.   Skin: Skin is warm and dry.   Psychiatric: She has a normal mood and affect.   Vitals reviewed.      Assessment:       1. Annual physical exam    2. Thyroid nodule    3. Severe obesity (BMI 35.0-35.9 with comorbidity)        Plan:       Francoise was seen today for annual exam and immunizations.    Diagnoses and all orders for this visit:    Annual physical exam  -     Hemoglobin A1c; Future  Counseled on age appropriate medical preventative services, including age appropriate cancer screenings, over all nutritional health, need for a consistent exercise regimen and an over all push towards maintaining a vigorous and active lifestyle.      Counseled on age appropriate vaccines and discussed upcoming health care needs based on age/gender.  Spent time with patient counseling on need for a good patient/doctor relationship moving forward.      Thyroid nodule  -     US Soft Tissue Head Neck Thyroid; Future    Severe obesity (BMI 35.0-35.9 with comorbidity)  -     Hemoglobin A1c; Future                Return in about 6 months (around 12/29/2017).        Pt verbalized understanding and agreed with our plan.

## 2017-07-05 ENCOUNTER — HOSPITAL ENCOUNTER (OUTPATIENT)
Dept: RADIOLOGY | Facility: OTHER | Age: 57
Discharge: HOME | End: 2017-07-05
Attending: FAMILY MEDICINE
Payer: COMMERCIAL

## 2017-07-05 ENCOUNTER — OFFICE VISIT (OUTPATIENT)
Dept: SPINE | Facility: CLINIC | Age: 57
End: 2017-07-05
Attending: NEUROLOGICAL SURGERY
Payer: COMMERCIAL

## 2017-07-05 VITALS — SYSTOLIC BLOOD PRESSURE: 122 MMHG | HEART RATE: 88 BPM | DIASTOLIC BLOOD PRESSURE: 77 MMHG | HEIGHT: 65 IN

## 2017-07-05 DIAGNOSIS — E04.1 THYROID NODULE: ICD-10-CM

## 2017-07-05 DIAGNOSIS — M51.37 DEGENERATION OF LUMBAR OR LUMBOSACRAL INTERVERTEBRAL DISC: ICD-10-CM

## 2017-07-05 DIAGNOSIS — M25.552 LEFT HIP PAIN: ICD-10-CM

## 2017-07-05 DIAGNOSIS — M43.10 ACQUIRED SPONDYLOLISTHESIS: Primary | ICD-10-CM

## 2017-07-05 DIAGNOSIS — M53.3 SACROILIAC JOINT PAIN: ICD-10-CM

## 2017-07-05 PROCEDURE — 76536 US EXAM OF HEAD AND NECK: CPT | Mod: 26,,, | Performed by: INTERNAL MEDICINE

## 2017-07-05 PROCEDURE — 99999 PR PBB SHADOW E&M-EST. PATIENT-LVL III: CPT | Mod: PBBFAC,,, | Performed by: NEUROLOGICAL SURGERY

## 2017-07-05 PROCEDURE — 76536 US EXAM OF HEAD AND NECK: CPT | Mod: TC

## 2017-07-05 PROCEDURE — 99214 OFFICE O/P EST MOD 30 MIN: CPT | Mod: S$GLB,,, | Performed by: NEUROLOGICAL SURGERY
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
## 2017-07-21 ENCOUNTER — HOSPITAL ENCOUNTER (OUTPATIENT)
Facility: OTHER | Age: 57
Discharge: HOME | End: 2017-07-21
Attending: ANESTHESIOLOGY | Admitting: ANESTHESIOLOGY
Payer: COMMERCIAL

## 2017-07-21 ENCOUNTER — SURGERY (OUTPATIENT)
Age: 57
End: 2017-07-21

## 2017-07-21 VITALS
HEART RATE: 71 BPM | RESPIRATION RATE: 18 BRPM | BODY MASS INDEX: 34.83 KG/M2 | TEMPERATURE: 98 F | WEIGHT: 204 LBS | OXYGEN SATURATION: 96 % | SYSTOLIC BLOOD PRESSURE: 123 MMHG | HEIGHT: 64 IN | DIASTOLIC BLOOD PRESSURE: 74 MMHG

## 2017-07-21 DIAGNOSIS — M43.10 ACQUIRED SPONDYLOLISTHESIS: Primary | ICD-10-CM

## 2017-07-21 DIAGNOSIS — M53.3 SACROILIAC JOINT PAIN: ICD-10-CM

## 2017-07-21 DIAGNOSIS — G89.29 CHRONIC PAIN: ICD-10-CM

## 2017-07-21 PROCEDURE — 25500020 PHARM REV CODE 255: Performed by: ANESTHESIOLOGY

## 2017-07-21 PROCEDURE — 27096 INJECT SACROILIAC JOINT: CPT | Performed by: ANESTHESIOLOGY

## 2017-07-21 PROCEDURE — 27096 INJECT SACROILIAC JOINT: CPT | Mod: LT,,, | Performed by: ANESTHESIOLOGY

## 2017-07-21 PROCEDURE — 25000003 PHARM REV CODE 250: Performed by: PAIN MEDICINE

## 2017-07-21 PROCEDURE — 25000003 PHARM REV CODE 250: Performed by: ANESTHESIOLOGY

## 2017-07-21 PROCEDURE — 63600175 PHARM REV CODE 636 W HCPCS: Performed by: ANESTHESIOLOGY

## 2017-07-21 RX ORDER — BUPIVACAINE HYDROCHLORIDE 2.5 MG/ML
INJECTION, SOLUTION EPIDURAL; INFILTRATION; INTRACAUDAL
Status: DISCONTINUED | OUTPATIENT
Start: 2017-07-21 | End: 2017-07-21 | Stop reason: HOSPADM

## 2017-07-21 RX ORDER — TRIAMCINOLONE ACETONIDE 40 MG/ML
INJECTION, SUSPENSION INTRA-ARTICULAR; INTRAMUSCULAR
Status: DISCONTINUED | OUTPATIENT
Start: 2017-07-21 | End: 2017-07-21 | Stop reason: HOSPADM

## 2017-07-21 RX ORDER — ALPRAZOLAM 0.5 MG/1
0.5 TABLET, ORALLY DISINTEGRATING ORAL
Status: DISCONTINUED | OUTPATIENT
Start: 2017-07-21 | End: 2017-07-21 | Stop reason: HOSPADM

## 2017-07-21 RX ORDER — LIDOCAINE HYDROCHLORIDE 10 MG/ML
INJECTION INFILTRATION; PERINEURAL
Status: DISCONTINUED | OUTPATIENT
Start: 2017-07-21 | End: 2017-07-21 | Stop reason: HOSPADM

## 2017-07-21 RX ADMIN — ALPRAZOLAM 1 MG: 0.5 TABLET, ORALLY DISINTEGRATING ORAL at 11:07

## 2017-07-21 RX ADMIN — IOHEXOL 3 ML: 300 INJECTION, SOLUTION INTRAVENOUS at 11:07

## 2017-07-21 RX ADMIN — LIDOCAINE HYDROCHLORIDE 10 ML: 10 INJECTION, SOLUTION INFILTRATION; PERINEURAL at 11:07

## 2017-07-21 RX ADMIN — BUPIVACAINE HYDROCHLORIDE 10 ML: 2.5 INJECTION, SOLUTION EPIDURAL; INFILTRATION; INTRACAUDAL; PERINEURAL at 11:07

## 2017-07-21 RX ADMIN — TRIAMCINOLONE ACETONIDE 40 MG: 40 INJECTION, SUSPENSION INTRA-ARTICULAR; INTRAMUSCULAR at 11:07

## 2017-07-21 NOTE — PLAN OF CARE
Pt tolerated procedure well. Reports 3 /10 pain after procedure. Pt assisted up for first time, steady on feet. D/c instructions given.

## 2017-07-21 NOTE — OP NOTE
Patient Name: Francoise Dykes  MRN: [id number]    INFORMED CONSENT: The procedure, risks, benefits and options were discussed with patient. There are no contraindications to the procedure. The patient expressed understanding and agreed to proceed. The personnel performing the procedure was discussed. I verify that I personally obtained Francoise's consent prior to the start of the procedure and the signed consent can be found on the patient's chart.    Procedure Date: 07/21/2017    Anesthesia: Topical    Pre Procedure diagnosis:   1. Acquired spondylolisthesis    2. Sacroiliac joint pain    3. Chronic pain        Post-Procedure diagnosis: same      Sedation: None    PROCEDURE: left SACROILIAC JOINT INJECTION  DESCRIPTION OF PROCEDURE: The patient was brought to the fluoroscopy suite. After performing time out  IV access was obtained prior to the procedure. The patient was positioned prone on the fluoroscopy table. Continuous hemodynamic monitoring was initiated including blood pressure, EKG, and pulse oximetry.  The lumbosacral area was prepped with chlorhexidine three times and draped into a sterile field. The skin overlying the left side sacroiliac joint was anesthetized using 3cc of lidocaine 1%. The inferior pole of the sacroiliac joint was identified by cephalo-oblique fluoroscopy. A 22 gauge, 3 1/2 inch spinal needle was slowly advanced through the sacroiliac joint capsule under fluoroscopic guidance. The needle position was confirmed using oblique, AP and lateral fluoroscopic imaging.  Negative aspiration was confirmed. 2 cc of Omnipaque 300 was injected confirming intra-articular contrast spread. A combination of 5 cc of Bupivacaine 0.25% and 40 mg kenalog was easily injected. The needle was removed and bleeding was nil.  A sterile dressing was applied. PATIENT was taken to the Post-block Recovery Area for further observation.      Blood Loss: Nill  Specimen: None    Mariusz Jang MD

## 2017-07-21 NOTE — DISCHARGE SUMMARY
Discharge Note  Short Stay      SUMMARY     Admit Date: 7/21/2017    Attending Physician: Mariusz Jang      Discharge Physician: Mariusz Jang      Discharge Date: 7/21/2017 11:45 AM    Final Diagnosis:   1. Acquired spondylolisthesis    2. Sacroiliac joint pain    3. Chronic pain        Disposition: Home or self care    Patient Instructions:   Current Discharge Medication List      CONTINUE these medications which have NOT CHANGED    Details   diltiaZEM (DILACOR XR) 120 MG CDCR Take 1 capsule (120 mg total) by mouth once daily.  Qty: 30 capsule, Refills: 2    Associated Diagnoses: Paroxysmal atrial fibrillation      propafenone (RYTHMOL SR) 225 MG Cp12 Take 1 capsule (225 mg total) by mouth every 12 (twelve) hours.  Qty: 60 capsule, Refills: 11    Associated Diagnoses: Paroxysmal atrial fibrillation      apixaban 5 mg Tab Take 1 tablet (5 mg total) by mouth 2 (two) times daily.  Qty: 60 tablet, Refills: 11      cetirizine (ZYRTEC) 10 MG tablet Take 10 mg by mouth once daily.      DYMISTA 137-50 mcg/spray [place] nassal spray USE ONE SPRAY(S) IN EACH NOSTRIL TWICE DAILY  Qty: 23 g, Refills: 0    Associated Diagnoses: Bacterial sinusitis      hydrocortisone 0.5 % cream Apply topically daily as needed.      ibuprofen (ADVIL,MOTRIN) 800 MG tablet Take 1 tablet (800 mg total) by mouth 3 (three) times daily with meals.  Qty: 270 tablet, Refills: 0    Associated Diagnoses: Spondylosis without myelopathy; DDD (degenerative disc disease), lumbosacral; Spinal stenosis of lumbar region without neurogenic claudication; Thoracic and lumbosacral neuritis; Acquired spondylolisthesis; Degeneration of lumbar or lumbosacral intervertebral disc; Sacroiliac joint pain; Bilateral low back pain with left-sided sciatica, unspecified chronicity      triamcinolone acetonide 0.1% (KENALOG) 0.1 % cream Apply topically 2 (two) times daily.  Qty: 45 g, Refills: 1    Associated Diagnoses: Intrinsic eczema                 Discharge  Diagnosis: Same as Pre and Post Procedure  Condition on Discharge: Stable.  Diet on Discharge: Same as before.  Activity: as per instruction sheet.  Discharge to: Home with a responsible adult.  Follow up: as per Discharge instructions.

## 2017-07-21 NOTE — DISCHARGE INSTRUCTIONS

## 2017-08-02 ENCOUNTER — TELEPHONE (OUTPATIENT)
Dept: SPINE | Facility: CLINIC | Age: 57
End: 2017-08-02

## 2017-08-02 NOTE — TELEPHONE ENCOUNTER
----- Message from Sheri Raya sent at 8/2/2017 12:27 PM CDT -----  Contact: pt  _  1st Request  _  2nd Request  _  3rd Request        Who: pt    Why: pt needs to have a follow up appt but needs to come in at 3:30 p.m. Nothing available. Please call the pt    What Number to Call Back:[phone]    When to Expect a call back: (With in 24 hours)

## 2017-08-21 ENCOUNTER — HOSPITAL ENCOUNTER (OUTPATIENT)
Facility: HOSPITAL | Age: 57
Discharge: HOME | End: 2017-08-22
Attending: EMERGENCY MEDICINE | Admitting: INTERNAL MEDICINE
Payer: COMMERCIAL

## 2017-08-21 DIAGNOSIS — Z86.79 S/P ABLATION OF ATRIAL FIBRILLATION: ICD-10-CM

## 2017-08-21 DIAGNOSIS — I48.92 ATRIAL FLUTTER: ICD-10-CM

## 2017-08-21 DIAGNOSIS — Z98.890 S/P ABLATION OF ATRIAL FIBRILLATION: ICD-10-CM

## 2017-08-21 DIAGNOSIS — I48.4 ATYPICAL ATRIAL FLUTTER: ICD-10-CM

## 2017-08-21 DIAGNOSIS — I48.92 ATRIAL FLUTTER, UNSPECIFIED TYPE: Primary | ICD-10-CM

## 2017-08-21 LAB
ALBUMIN SERPL BCP-MCNC: 4 G/DL
ALP SERPL-CCNC: 99 U/L
ALT SERPL W/O P-5'-P-CCNC: 23 U/L
ANION GAP SERPL CALC-SCNC: 10 MMOL/L
AST SERPL-CCNC: 20 U/L
BASOPHILS # BLD AUTO: 0.04 K/UL
BASOPHILS NFR BLD: 0.7 %
BILIRUB SERPL-MCNC: 0.7 MG/DL
BUN SERPL-MCNC: 16 MG/DL
CALCIUM SERPL-MCNC: 9.7 MG/DL
CHLORIDE SERPL-SCNC: 110 MMOL/L
CO2 SERPL-SCNC: 24 MMOL/L
CREAT SERPL-MCNC: 0.8 MG/DL
DIFFERENTIAL METHOD: ABNORMAL
EOSINOPHIL # BLD AUTO: 0.2 K/UL
EOSINOPHIL NFR BLD: 3.4 %
ERYTHROCYTE [DISTWIDTH] IN BLOOD BY AUTOMATED COUNT: 13.4 %
EST. GFR  (AFRICAN AMERICAN): >60 ML/MIN/1.73 M^2
EST. GFR  (NON AFRICAN AMERICAN): >60 ML/MIN/1.73 M^2
GLUCOSE SERPL-MCNC: 84 MG/DL
HCT VFR BLD AUTO: 39.8 %
HGB BLD-MCNC: 14.2 G/DL
LYMPHOCYTES # BLD AUTO: 1.5 K/UL
LYMPHOCYTES NFR BLD: 27.9 %
MAGNESIUM SERPL-MCNC: 2 MG/DL
MCH RBC QN AUTO: 27.8 PG
MCHC RBC AUTO-ENTMCNC: 35.7 G/DL
MCV RBC AUTO: 78 FL
MONOCYTES # BLD AUTO: 0.3 K/UL
MONOCYTES NFR BLD: 5.8 %
NEUTROPHILS # BLD AUTO: 3.3 K/UL
NEUTROPHILS NFR BLD: 61.6 %
PHOSPHATE SERPL-MCNC: 2.7 MG/DL
PLATELET # BLD AUTO: 215 K/UL
PMV BLD AUTO: 10.4 FL
POTASSIUM SERPL-SCNC: 3.5 MMOL/L
PROT SERPL-MCNC: 8.3 G/DL
RBC # BLD AUTO: 5.11 M/UL
SODIUM SERPL-SCNC: 144 MMOL/L
TROPONIN I SERPL DL<=0.01 NG/ML-MCNC: <0.006 NG/ML
WBC # BLD AUTO: 5.35 K/UL

## 2017-08-21 PROCEDURE — 93010 ELECTROCARDIOGRAM REPORT: CPT | Mod: 76,,, | Performed by: INTERNAL MEDICINE

## 2017-08-21 PROCEDURE — 84484 ASSAY OF TROPONIN QUANT: CPT

## 2017-08-21 PROCEDURE — 25000003 PHARM REV CODE 250: Performed by: EMERGENCY MEDICINE

## 2017-08-21 PROCEDURE — 99284 EMERGENCY DEPT VISIT MOD MDM: CPT | Mod: ,,, | Performed by: EMERGENCY MEDICINE

## 2017-08-21 PROCEDURE — 99244 OFF/OP CNSLTJ NEW/EST MOD 40: CPT | Mod: ,,, | Performed by: INTERNAL MEDICINE

## 2017-08-21 PROCEDURE — 83735 ASSAY OF MAGNESIUM: CPT

## 2017-08-21 PROCEDURE — 99220 PR INITIAL OBSERVATION CARE,LEVL III: CPT | Mod: ,,, | Performed by: PHYSICIAN ASSISTANT

## 2017-08-21 PROCEDURE — 96360 HYDRATION IV INFUSION INIT: CPT

## 2017-08-21 PROCEDURE — 80053 COMPREHEN METABOLIC PANEL: CPT

## 2017-08-21 PROCEDURE — 36415 COLL VENOUS BLD VENIPUNCTURE: CPT

## 2017-08-21 PROCEDURE — 84100 ASSAY OF PHOSPHORUS: CPT

## 2017-08-21 PROCEDURE — 96361 HYDRATE IV INFUSION ADD-ON: CPT

## 2017-08-21 PROCEDURE — 93010 ELECTROCARDIOGRAM REPORT: CPT | Mod: ,,, | Performed by: INTERNAL MEDICINE

## 2017-08-21 PROCEDURE — G0378 HOSPITAL OBSERVATION PER HR: HCPCS

## 2017-08-21 PROCEDURE — 93005 ELECTROCARDIOGRAM TRACING: CPT

## 2017-08-21 PROCEDURE — 99285 EMERGENCY DEPT VISIT HI MDM: CPT | Mod: 25

## 2017-08-21 PROCEDURE — 85025 COMPLETE CBC W/AUTO DIFF WBC: CPT

## 2017-08-21 RX ORDER — ADENOSINE 3 MG/ML
INJECTION, SOLUTION INTRAVENOUS
Status: DISCONTINUED
Start: 2017-08-21 | End: 2017-08-21 | Stop reason: WASHOUT

## 2017-08-21 RX ORDER — DILTIAZEM HYDROCHLORIDE 60 MG/1
120 TABLET, FILM COATED ORAL
Status: COMPLETED | OUTPATIENT
Start: 2017-08-21 | End: 2017-08-21

## 2017-08-21 RX ORDER — PROPAFENONE HYDROCHLORIDE 225 MG/1
225 CAPSULE, EXTENDED RELEASE ORAL EVERY 12 HOURS
Status: DISCONTINUED | OUTPATIENT
Start: 2017-08-21 | End: 2017-08-22

## 2017-08-21 RX ORDER — DILTIAZEM HYDROCHLORIDE 120 MG/1
120 CAPSULE, COATED, EXTENDED RELEASE ORAL DAILY
Status: DISCONTINUED | OUTPATIENT
Start: 2017-08-22 | End: 2017-08-22 | Stop reason: HOSPADM

## 2017-08-21 RX ORDER — SODIUM CHLORIDE 0.9 % (FLUSH) 0.9 %
3 SYRINGE (ML) INJECTION EVERY 8 HOURS
Status: DISCONTINUED | OUTPATIENT
Start: 2017-08-22 | End: 2017-08-22 | Stop reason: HOSPADM

## 2017-08-21 RX ORDER — ACETAMINOPHEN 325 MG/1
650 TABLET ORAL EVERY 6 HOURS PRN
Status: DISCONTINUED | OUTPATIENT
Start: 2017-08-21 | End: 2017-08-22 | Stop reason: HOSPADM

## 2017-08-21 RX ORDER — ONDANSETRON 2 MG/ML
4 INJECTION INTRAMUSCULAR; INTRAVENOUS EVERY 12 HOURS PRN
Status: DISCONTINUED | OUTPATIENT
Start: 2017-08-21 | End: 2017-08-22 | Stop reason: HOSPADM

## 2017-08-21 RX ORDER — ONDANSETRON 4 MG/1
8 TABLET, ORALLY DISINTEGRATING ORAL EVERY 8 HOURS PRN
Status: DISCONTINUED | OUTPATIENT
Start: 2017-08-21 | End: 2017-08-22 | Stop reason: HOSPADM

## 2017-08-21 RX ADMIN — SODIUM CHLORIDE 1000 ML: 0.9 INJECTION, SOLUTION INTRAVENOUS at 05:08

## 2017-08-21 RX ADMIN — DILTIAZEM HYDROCHLORIDE 120 MG: 60 TABLET, FILM COATED ORAL at 06:08

## 2017-08-21 NOTE — CONSULTS
ED Consult  Cardiology/ Electrophysiology    Reason for consult: Atrial Tachycardia  Requesting physician: Dr Cline    History of Present Illness:   57 Y/O F with history of paroxsysmal atrial tachyarrhythmias follows with Dr Lee Meyers. He has History of symptomatic Afib with palpitaitons and SOB started in 2012 and coverted to sinus bradycardia after she was placed on diltiazem ggt, initially placed on Flecainide and Toprol but she developed SOB on flecainide, she was placed on Rhythmol 600 mgs PRN. She developed recurrent AF and underwent PVI ablation 12/7/2015 and was placed on amidarone between 1/2016 and 8/2016 after he amidarone was stopped she developed atypical atrial flutter placed on Rhythmol and underwent DCCV. she underwent redo PVI and SVC ablation on 12/15/2016 as well as mid-sal AT ablation and discharged on Rhythmol 225 mg BID. Her rhythmol was stopped in 3/2016 and she developed recurrent atypical flutter 2:1, her rhythmol was restarted. She was admitted in 3/2017 with Atrial Flutter and converted spontanously after SAMMIE but before DCCV. Last time patient was seen in EP clinic she was asymptomatic and her Diltiazem was decreased to 120 mgs daily. Yesterday she was in [place] in a wedding, didn't feel well and took diltiazem last night. In the morning while in the school she developed palpitations and lightheadedness that didn't resolve and decided to come to the ED. She is on Eliquis for anticoagulation, reported she was out of town and missed one dose last week.    EKG: Atrial flutter with 2:1     Cardiac Marker:   No results for input(s): CPK, TROPONINI, MB, BNP in the last 168 hours.    Prior Cardiology Work up:   TTE    Review of patient's allergies indicates:   Allergen Reactions    Adhesive tape-silicones Itching     Manifested in 12/2015 following AF ablation.     Past Medical History:   Diagnosis Date    Allergy     seasonal    Atrial fibrillation    .  Past Surgical History:    Procedure Laterality Date     SECTION      heart ablation      [facility]OMY      [facility]     Family History   Problem Relation Age of Onset    Hypertension Mother     Diabetes Mother     Glaucoma Mother     Asbestos Father     Obesity Father     Eczema Son     Hypertension Son     Heart disease Maternal Grandmother      chf    Diabetes Maternal Grandfather     Cancer Paternal Grandmother      lung, 2/2 second hand smoke    Glaucoma Paternal Grandfather     Blindness Paternal Grandfather     Melanoma Neg Hx     Psoriasis Neg Hx     Lupus Neg Hx     Acne Neg Hx     Breast cancer Neg Hx     Colon cancer Neg Hx     Ovarian cancer Neg Hx      Social History   Substance Use Topics    Smoking status: Never Smoker    Smokeless tobacco: Never Used    Alcohol use Yes      Comment: rarely, 1 drink/week        (Not in a hospital admission)      Review of Systems:  Constitutional: negative for chills, fevers and night sweats  Ears, nose, mouth, throat, and face: negative for nasal congestion, sore throat and tinnitus  Respiratory: negative for cough, dyspnea on exertion, pleurisy/chest pain, sputum and wheezing  Cardiovascular: See HPI  Gastrointestinal: negative  Genitourinary:negative for dysuria, frequency, hematuria, hesitancy and nocturia  Hematologic/lymphatic: negative for bleeding and easy bruising  Musculoskeletal:negative for muscle weakness and myalgias  Neurological: negative for dizziness, headaches, paresthesia and weakness  Behavioral/Psych: negative for anxiety and bad mood      Vital Signs (Most Recent)  Temp: 98.1 °F (36.7 °C) (17 1635)  Pulse: (!) 115 (17 1635)  Resp: 18 (17 1635)  BP: 136/84 (17 1635)  SpO2: 98 % (17 1635)    Vital Signs Range (Last 24H):  Temp:  [97.9 °F (36.6 °C)-98.1 °F (36.7 °C)]   Pulse:  [115-121]   Resp:  [16-18]   BP: (136-173)/(82-84)   SpO2:  [98 %]     I&O: No intake or output data in the 24 hours ending 17  1810    Physical Exam:  General: Patient in no acute distress or discomfort  HEENT: No JVD, moist mucous membranes  Cardiac: S1S2 RRR no GMR  Chest: CTABL, no wheezing or rales  Abd:Soft NTND  Ext: No Edema No swelling  Neuro: A and O X 3, non focal      Laboratory:  Cardiac Biomarker:   No results for input(s): CPK, TROPONINI, MB, BNP in the last 168 hours.    CBC with Diff:     Recent Labs  Lab 08/21/17  1709   WBC 5.35   HGB 14.2   HCT 39.8      LYMPH 27.9  1.5   MONO 5.8  0.3   EOSINOPHIL 3.4       COAG:  No results for input(s): APTT, INR, PTT in the last 168 hours.    CMP: No results for input(s): GLU, CALCIUM, ALBUMIN, PROT, NA, K, CO2, CL, BUN, CREATININE, ALKPHOS, ALT, AST, BILITOT, MG, PHOS in the last 168 hours.  CrCl cannot be calculated (Patient's most recent sCr result is older than the maximum 7 days allowed.).      Active Problems:   Present on Admission:   Atrial flutter      ASSESSMENT/PLAN:   57 Y/O F with history of paroxsysmal atrial tachyarrhythmias. Who presents with AF with 2:1 rate 114 bpm. Feeling palpations and     Plan:  -Will place in observation Team C/J.  -Increase Propafenone to 225 BID she reported she takes it once daily.   -Resume Eliquis for anticoagulation.   -Keep NPO MN for SAMMIE/ DCCV in the morning.  -Resume Diltiazem 120 mgs daily.    Plan discussed with Dr Carey, thanks for allowing us to participate in the care of ms Dykes.    Edward Mccann MD  Cardiology Fellow

## 2017-08-21 NOTE — ED NOTES
"Blood drawn from right hand using a 21 ga 1" butterfly needle.  Tolerated well with no bleeding or bruising at puncture site.  "

## 2017-08-21 NOTE — PROVIDER PROGRESS NOTES - EMERGENCY DEPT.
Encounter Date: 8/21/2017    ED Physician Progress Notes       SCRIBE NOTE: I, Cayla Cevallos, am scribing for, and in the presence of,  Dr. Cline.  Physician Statement: I, Dr. Cline, personally performed the services described in this documentation as scribed by Cayla Cevallos in my presence, and it is both accurate and complete.      EKG - STEMI Decision  Initial Reading: No STEMI present.

## 2017-08-21 NOTE — ED NOTES
LOC: The patient is awake, alert and aware of environment with an appropriate affect, the patient is oriented x 3 and speaking appropriately.  APPEARANCE: Patient resting comfortably and in no acute distress, patient is clean and well groomed  SKIN: The skin is warm and dry, color consistent with ethnicity, patient has normal skin turgor and moist mucus membranes, skin intact, no breakdown or brusing noted.  MUSCULOSKELETAL: Patient moving all extremities well, no obvious swelling or deformities noted.   RESPIRATORY: Airway is open and patent, breath sounds clear throughout all lung fields; respirations are spontaneous, patient has a normal effort and rate, no accessory muscle use noted.   CARDIAC: Patient has no peripheral edema noted, capillary refill < 3 seconds. No complaints of chest pain. Pt is tachycardiac on the monitor, pt states her pulse has been high since yesterday.  ABDOMEN: Soft and non tender to palpation, no distention noted. Bowel sounds present x 4  NEUROLOGIC: PERRL, 3mm bilaterally, eyes open spontaneously, behavior appropriate to situation, follows commands, facial expression symmetrical, bilateral hand grasp equal and even, purposeful motor response noted, normal sensation in all extremities when touched with a finger.

## 2017-08-21 NOTE — ED TRIAGE NOTES
Pt states her heart rate has been elevated since yesterday, Pt states her doctor recently changed her medications( April 2017) and since that her heart rate has not been below 110. Pt denies chest pain. Pt denies pain

## 2017-08-21 NOTE — ED PROVIDER NOTES
"Encounter Date: 2017       History     Chief Complaint   Patient presents with    Tachycardia     Pt states "my heart is racing since yesterday, I think I am in a-fib"  Pt had ablation in December.     Francoise Dykes is a 56 y.o. female who  has a past medical history of Allergy and Atrial fibrillation, 2 ablations, history of A fib and A flutter    Patient presents to ER for weakness, SOB, light headedness and tachy cardia in the last 24 hours.  She denies F/C/N/V.  Says she didn't eat yesterday till 9PM, and didn't eat breakfast today.              Review of patient's allergies indicates:   Allergen Reactions    Adhesive tape-silicones Itching     Manifested in 2015 following AF ablation.     Past Medical History:   Diagnosis Date    Allergy     seasonal    Atrial fibrillation      Past Surgical History:   Procedure Laterality Date     SECTION      heart ablation      HYSTERECTOMY      [facility]     Family History   Problem Relation Age of Onset    Hypertension Mother     Diabetes Mother     Glaucoma Mother     Asbestos Father     Obesity Father     Eczema Son     Hypertension Son     Heart disease Maternal Grandmother      chf    Diabetes Maternal Grandfather     Cancer Paternal Grandmother      lung, 2/2 second hand smoke    Glaucoma Paternal Grandfather     Blindness Paternal Grandfather     Melanoma Neg Hx     Psoriasis Neg Hx     Lupus Neg Hx     Acne Neg Hx     Breast cancer Neg Hx     Colon cancer Neg Hx     Ovarian cancer Neg Hx      Social History   Substance Use Topics    Smoking status: Never Smoker    Smokeless tobacco: Never Used    Alcohol use Yes      Comment: rarely, 1 drink/week     Review of Systems  Review of Systems   Constitutional: Negative for chills and fever. Positive for fatigue,light headedness  HENT: Negative for congestion and sore throat.    Eyes: Negative for vision changes and eye pain.   Respiratory:  Negative for chest tightness.  " Positive for SOB  Cardiovascular: Negative for chest pain, palpitations and leg swelling.   Gastrointestinal: Negative for blood in stool, constipation, diarrhea, nausea and vomiting.   Genitourinary: Negative for dysuria and hematuria.   Musculoskeletal: Negative for arthralgias and joint swelling.   Skin: Negative for rash and wound.   Neurological: Negative for dizzyness and headaches.   Psychiatric/Behavioral: Negative for confusion and self-injury.     Physical Exam     Initial Vitals [08/21/17 1436]   BP Pulse Resp Temp SpO2   (!) 173/82 (!) 121 16 97.9 °F (36.6 °C) 98 %      MAP       112.33         Physical Exam    Constitutional: Patient appears well-developed and well-nourished. No distress.   HENT:   Head: Normocephalic and atraumatic.   Neck: No tracheal deviation present.   Cardiovascular: incrreased rate, regular rhythm, normal heart sounds and intact distal pulses. Exam reveals no gallop and no friction rub.    No murmur heard.  Pulmonary/Chest: Breath sounds normal. No stridor. No respiratory distress. Patient has no wheezes. Patient has no rhonchi. Patient has no rales. Patient exhibits no tenderness.   Abdominal: Soft. Bowel sounds are normal. Patient exhibits no distension. There is no tenderness.   Musculoskeletal: Normal range of motion. Patient exhibits no edema or tenderness.   Neurological: Patient is alert and oriented to person, place, and time. Patient has normal strength.  Cranial nerves intact  Psychiatric: Patient has a normal mood and affect. Thought content normal.       ED Course   Procedures  Labs Reviewed   CBC W/ AUTO DIFFERENTIAL - Abnormal; Notable for the following:        Result Value    MCV 78 (*)     All other components within normal limits   COMPREHENSIVE METABOLIC PANEL   TROPONIN I     EKG Readings: (Independently Interpreted)   Initial Reading: No STEMI. Rhythm: Atrial Flutter. ST Segments: Normal ST Segments. T Waves: Normal.                APC / Resident Notes:    -Ordered Trop, CBC, CMP, EKG  -Cardiology consulted  -EKG shows A-flutter, plan to admit to observation for treatment and observation.    Jerson Rose, PGY1          Attending Attestation:   Physician Attestation Statement for Resident:  As the supervising MD   Physician Attestation Statement: I have personally seen and examined this patient.   I agree with the above history. -: 57 yo f, h/o a flutter w ablations, anticoagulated, here with recurrent palpitations.  EKG c/w a flutter, .  Cardiology consulted given extensive hx.  Would like pt to receive extra dose cardizem PO this evening, admission to [facility], formal cards eval in am for possible cardioversion   As the supervising MD I agree with the above PE.    As the supervising MD I agree with the above treatment, course, plan, and disposition.  I have reviewed and agree with the residents interpretation of the following: lab data, x-rays and EKG.  I have reviewed the following: old records at this facility.                    ED Course     Clinical Impression:   The primary encounter diagnosis was Atrial flutter, unspecified type. Diagnoses of S/P ablation of atrial fibrillation and Atypical atrial flutter were also pertinent to this visit.                           Jasmin Cline MD  08/22/17 5034

## 2017-08-22 ENCOUNTER — TELEPHONE (OUTPATIENT)
Dept: ELECTROPHYSIOLOGY | Facility: CLINIC | Age: 57
End: 2017-08-22

## 2017-08-22 ENCOUNTER — ANESTHESIA EVENT (OUTPATIENT)
Dept: MEDSURG UNIT | Facility: HOSPITAL | Age: 57
End: 2017-08-22
Payer: COMMERCIAL

## 2017-08-22 ENCOUNTER — ANESTHESIA (OUTPATIENT)
Dept: MEDSURG UNIT | Facility: HOSPITAL | Age: 57
End: 2017-08-22
Payer: COMMERCIAL

## 2017-08-22 VITALS
WEIGHT: 209 LBS | RESPIRATION RATE: 16 BRPM | OXYGEN SATURATION: 96 % | HEIGHT: 64 IN | DIASTOLIC BLOOD PRESSURE: 70 MMHG | HEART RATE: 82 BPM | BODY MASS INDEX: 35.68 KG/M2 | SYSTOLIC BLOOD PRESSURE: 118 MMHG | TEMPERATURE: 98 F

## 2017-08-22 PROBLEM — I48.92 ATRIAL FLUTTER: Status: RESOLVED | Noted: 2017-08-21 | Resolved: 2017-08-22

## 2017-08-22 LAB
DIASTOLIC DYSFUNCTION: NO
MITRAL VALVE MOBILITY: NORMAL
MITRAL VALVE REGURGITATION: NORMAL
RETIRED EF AND QEF - SEE NOTES: 60 (ref 55–65)

## 2017-08-22 PROCEDURE — 92960 CARDIOVERSION ELECTRIC EXT: CPT | Mod: ,,, | Performed by: INTERNAL MEDICINE

## 2017-08-22 PROCEDURE — 27000221 HC OXYGEN, UP TO 24 HOURS

## 2017-08-22 PROCEDURE — 25000003 PHARM REV CODE 250

## 2017-08-22 PROCEDURE — 63600175 PHARM REV CODE 636 W HCPCS: Performed by: NURSE ANESTHETIST, CERTIFIED REGISTERED

## 2017-08-22 PROCEDURE — G0378 HOSPITAL OBSERVATION PER HR: HCPCS

## 2017-08-22 PROCEDURE — D9220A PRA ANESTHESIA: Mod: CRNA,,, | Performed by: NURSE ANESTHETIST, CERTIFIED REGISTERED

## 2017-08-22 PROCEDURE — 27100006 CARDIOVERSION (DCCV)

## 2017-08-22 PROCEDURE — 93325 DOPPLER ECHO COLOR FLOW MAPG: CPT | Mod: 26,,, | Performed by: INTERNAL MEDICINE

## 2017-08-22 PROCEDURE — A4216 STERILE WATER/SALINE, 10 ML: HCPCS | Performed by: PHYSICIAN ASSISTANT

## 2017-08-22 PROCEDURE — 25000003 PHARM REV CODE 250: Performed by: PHYSICIAN ASSISTANT

## 2017-08-22 PROCEDURE — 37000008 HC ANESTHESIA 1ST 15 MINUTES: Performed by: INTERNAL MEDICINE

## 2017-08-22 PROCEDURE — 99217 PR OBSERVATION CARE DISCHARGE: CPT | Mod: ,,, | Performed by: PHYSICIAN ASSISTANT

## 2017-08-22 PROCEDURE — 93325 DOPPLER ECHO COLOR FLOW MAPG: CPT

## 2017-08-22 PROCEDURE — 37000009 HC ANESTHESIA EA ADD 15 MINS: Performed by: INTERNAL MEDICINE

## 2017-08-22 PROCEDURE — D9220A PRA ANESTHESIA: Mod: ANES,,, | Performed by: ANESTHESIOLOGY

## 2017-08-22 PROCEDURE — 93320 DOPPLER ECHO COMPLETE: CPT | Mod: 26,,, | Performed by: INTERNAL MEDICINE

## 2017-08-22 PROCEDURE — 93320 DOPPLER ECHO COMPLETE: CPT

## 2017-08-22 PROCEDURE — 93312 ECHO TRANSESOPHAGEAL: CPT | Mod: 26,,, | Performed by: INTERNAL MEDICINE

## 2017-08-22 PROCEDURE — 93005 ELECTROCARDIOGRAM TRACING: CPT

## 2017-08-22 PROCEDURE — 25000003 PHARM REV CODE 250: Performed by: NURSE ANESTHETIST, CERTIFIED REGISTERED

## 2017-08-22 PROCEDURE — 93010 ELECTROCARDIOGRAM REPORT: CPT | Mod: ,,, | Performed by: INTERNAL MEDICINE

## 2017-08-22 RX ORDER — MIDAZOLAM HYDROCHLORIDE 1 MG/ML
INJECTION, SOLUTION INTRAMUSCULAR; INTRAVENOUS
Status: DISCONTINUED | OUTPATIENT
Start: 2017-08-22 | End: 2017-08-22

## 2017-08-22 RX ORDER — FENTANYL CITRATE 50 UG/ML
INJECTION, SOLUTION INTRAMUSCULAR; INTRAVENOUS
Status: DISCONTINUED | OUTPATIENT
Start: 2017-08-22 | End: 2017-08-22

## 2017-08-22 RX ORDER — PROPAFENONE HYDROCHLORIDE 225 MG/1
225 CAPSULE, EXTENDED RELEASE ORAL EVERY 12 HOURS
Status: DISCONTINUED | OUTPATIENT
Start: 2017-08-22 | End: 2017-08-22

## 2017-08-22 RX ORDER — HYDROMORPHONE HYDROCHLORIDE 1 MG/ML
0.2 INJECTION, SOLUTION INTRAMUSCULAR; INTRAVENOUS; SUBCUTANEOUS EVERY 5 MIN PRN
Status: DISCONTINUED | OUTPATIENT
Start: 2017-08-22 | End: 2017-08-22 | Stop reason: HOSPADM

## 2017-08-22 RX ORDER — LIDOCAINE HCL/PF 100 MG/5ML
SYRINGE (ML) INTRAVENOUS
Status: DISCONTINUED | OUTPATIENT
Start: 2017-08-22 | End: 2017-08-22

## 2017-08-22 RX ORDER — SODIUM CHLORIDE 0.9 % (FLUSH) 0.9 %
3 SYRINGE (ML) INJECTION
Status: DISCONTINUED | OUTPATIENT
Start: 2017-08-22 | End: 2017-08-22 | Stop reason: HOSPADM

## 2017-08-22 RX ORDER — PROPOFOL 10 MG/ML
INJECTION, EMULSION INTRAVENOUS CONTINUOUS PRN
Status: DISCONTINUED | OUTPATIENT
Start: 2017-08-22 | End: 2017-08-22

## 2017-08-22 RX ORDER — PROPOFOL 10 MG/ML
INJECTION, EMULSION INTRAVENOUS
Status: DISCONTINUED | OUTPATIENT
Start: 2017-08-22 | End: 2017-08-22

## 2017-08-22 RX ORDER — SODIUM CHLORIDE 9 MG/ML
INJECTION, SOLUTION INTRAVENOUS CONTINUOUS PRN
Status: DISCONTINUED | OUTPATIENT
Start: 2017-08-22 | End: 2017-08-22

## 2017-08-22 RX ADMIN — MIDAZOLAM 2 MG: 1 INJECTION INTRAMUSCULAR; INTRAVENOUS at 10:08

## 2017-08-22 RX ADMIN — FENTANYL CITRATE 25 MCG: 50 INJECTION, SOLUTION INTRAMUSCULAR; INTRAVENOUS at 10:08

## 2017-08-22 RX ADMIN — DILTIAZEM HYDROCHLORIDE 120 MG: 120 CAPSULE, COATED, EXTENDED RELEASE ORAL at 08:08

## 2017-08-22 RX ADMIN — APIXABAN 5 MG: 2.5 TABLET, FILM COATED ORAL at 12:08

## 2017-08-22 RX ADMIN — SODIUM CHLORIDE: 0.9 INJECTION, SOLUTION INTRAVENOUS at 10:08

## 2017-08-22 RX ADMIN — APIXABAN 5 MG: 2.5 TABLET, FILM COATED ORAL at 08:08

## 2017-08-22 RX ADMIN — SODIUM CHLORIDE, PRESERVATIVE FREE 3 ML: 5 INJECTION INTRAVENOUS at 05:08

## 2017-08-22 RX ADMIN — PROPOFOL 100 MG: 10 INJECTION, EMULSION INTRAVENOUS at 10:08

## 2017-08-22 RX ADMIN — LIDOCAINE HYDROCHLORIDE 100 MG: 20 INJECTION, SOLUTION INTRAVENOUS at 10:08

## 2017-08-22 RX ADMIN — PROPOFOL 150 MCG/KG/MIN: 10 INJECTION, EMULSION INTRAVENOUS at 10:08

## 2017-08-22 NOTE — HOSPITAL COURSE
Patient admitted to observation for further evaluation of atrial flutter. Cardiology was consulted, recommended SAMMIE with DCCV and titrating rhythm control medications as appropriate.  SAMMIE completed 8/21 and cardiology recommended to resume Eliquis (Apixaban); return to clinic in 6 weeks, Follow up with Lee Meyers; resume Propafenone at 225 mg BID.

## 2017-08-22 NOTE — TELEPHONE ENCOUNTER
----- Message from Keila Henry sent at 8/21/2017 10:59 AM CDT -----  Contact: pt   Pt called because she had an ablation in December and feels that she may be in A-flutter.  Her heart rate keeps jumping 114 and the highest is 120 and the lowest is 102.  She states she has been taking the medicine for it like Dr. Meyers told her to do.  She can be reached @ [phone].  Thanks!!

## 2017-08-22 NOTE — TELEPHONE ENCOUNTER
Messaged sent to DR Meyers yesterday. Pt in the ER today for fast HR. Pt sent up for SAMMIE/DCCV today.

## 2017-08-22 NOTE — HPI
"Patient is a 57yo F with a PMHx of paroxysmal atrial flutter and afib being admitted to observation for evaluation of palpitations. Patient states that yesterday she was in [place] at a wedding and didn't feel "right". She says her heart began to race. Her Apple Watch was recording HRs in the 120s for the last 24 hours. She took diltiazem last night which brought it down to the 110s. Patient reports associated SOB, increased urination, weakness, and decreased PO intake. This morning while in the school she developed palpitations and lightheadedness that didn't resolve and decided to come to the ED. Patient denies CP, N/V, numbness, syncope.    Of note, patient follows with Dr. Lee Meyers. Patient symptomatic Afib with palpitaitons and SOB started in 2012 and coverted to sinus bradycardia after she was placed on diltiazem ggt, initially placed on Flecainide and Toprol but she developed SOB on flecainide, she was placed on Rhythmol 600 mgs PRN. She developed recurrent AF and underwent PVI ablation 12/7/2015 and was placed on amidarone between 1/2016 and 8/2016.  After her amidarone was stopped, she developed atypical atrial flutter and was placed on Rhythmol and underwent DCCV. She underwent redo PVI and SVC ablation on 12/15/2016 as well as mid-sal AT ablation and discharged on Rhythmol 225 mg BID. Her rhythmol was stopped in 3/2016 and she developed recurrent atypical flutter 2:1 so her rhythmol was restarted. She was admitted in 3/2017 with Atrial Flutter and converted spontanously after SAMMIE but before DCCV. Last time patient was seen in EP clinic she was asymptomatic and her Diltiazem was decreased to 120 mgs daily.  She is on Eliquis for anticoagulation.  "

## 2017-08-22 NOTE — ASSESSMENT & PLAN NOTE
Managed outpatient with steroid injections, patient currently without pain.  - Tylenol PRN for pain.

## 2017-08-22 NOTE — TRANSFER OF CARE
"Anesthesia Transfer of Care Note    Patient: Francoise Dykes    Procedure(s) Performed: Procedure(s) (LRB):  TRANSESOPHAGEAL ECHOCARDIOGRAM (SAMMIE) (N/A)    Patient location: PACU    Anesthesia Type: general    Transport from OR: Transported from OR on 2-3 L/min O2 by NC with adequate spontaneous ventilation    Post pain: adequate analgesia    Post assessment: no apparent anesthetic complications and tolerated procedure well    Post vital signs: stable    Level of consciousness: awake, alert and oriented    Nausea/Vomiting: no nausea/vomiting    Complications: none    Transfer of care protocol was followed      Last vitals:   Visit Vitals  /64   Pulse 93   Temp 35.9 °C (96.7 °F) (Axillary)   Resp 15   Ht 5' 4" (1.626 m)   Wt 94.8 kg (209 lb)   LMP  (LMP Unknown)   SpO2 98%   Breastfeeding? No   BMI 35.87 kg/m²     "

## 2017-08-22 NOTE — SUBJECTIVE & OBJECTIVE
Past Medical History:   Diagnosis Date    Allergy     seasonal    Atrial fibrillation        Past Surgical History:   Procedure Laterality Date     SECTION      heart ablation      [facility]CTOMY      [facility]       Review of patient's allergies indicates:   Allergen Reactions    Adhesive tape-silicones Itching     Manifested in 2015 following AF ablation.       No current facility-administered medications on file prior to encounter.      Current Outpatient Prescriptions on File Prior to Encounter   Medication Sig    apixaban 5 mg Tab Take 1 tablet (5 mg total) by mouth 2 (two) times daily.    diltiaZEM (DILACOR XR) 120 MG CDCR Take 1 capsule (120 mg total) by mouth once daily.    propafenone (RYTHMOL SR) 225 MG Cp12 Take 1 capsule (225 mg total) by mouth every 12 (twelve) hours.    cetirizine (ZYRTEC) 10 MG tablet Take 10 mg by mouth once daily.    DYMISTA 137-50 mcg/spray [place] nassal spray USE ONE SPRAY(S) IN EACH NOSTRIL TWICE DAILY    hydrocortisone 0.5 % cream Apply topically daily as needed.    ibuprofen (ADVIL,MOTRIN) 800 MG tablet Take 1 tablet (800 mg total) by mouth 3 (three) times daily with meals.    triamcinolone acetonide 0.1% (KENALOG) 0.1 % cream Apply topically 2 (two) times daily.     Family History     Problem Relation (Age of Onset)    Asbestos Father    Blindness Paternal Grandfather    Cancer Paternal Grandmother    Diabetes Mother, Maternal Grandfather    Eczema Son    Glaucoma Mother, Paternal Grandfather    Heart disease Maternal Grandmother    Hypertension Mother, Son    Obesity Father        Social History Main Topics    Smoking status: Never Smoker    Smokeless tobacco: Never Used    Alcohol use Yes      Comment: rarely, 1 drink/week    Drug use: No    Sexual activity: Yes     Partners: Male     Review of Systems   Constitutional: Negative for activity change, chills, diaphoresis and fever.   HENT: Negative for facial swelling, sneezing and trouble swallowing.     Eyes: Negative for photophobia and visual disturbance.   Respiratory: Positive for shortness of breath. Negative for cough, chest tightness and wheezing.    Cardiovascular: Positive for palpitations. Negative for chest pain and leg swelling.   Gastrointestinal: Negative for abdominal distention, abdominal pain, constipation, diarrhea, nausea and vomiting.   Genitourinary: Positive for frequency. Negative for dysuria and hematuria.   Musculoskeletal: Negative for back pain (chronic), joint swelling and neck pain.   Skin: Negative for rash.   Neurological: Positive for dizziness, weakness and light-headedness. Negative for syncope, facial asymmetry, speech difficulty and numbness.   Psychiatric/Behavioral: Negative for confusion. The patient is not nervous/anxious.      Objective:     Vital Signs (Most Recent):  Temp: 98.3 °F (36.8 °C) (08/21/17 2045)  Pulse: 86 (08/21/17 2045)  Resp: 18 (08/21/17 2045)  BP: 123/76 (08/21/17 2045)  SpO2: 98 % (08/21/17 2045) Vital Signs (24h Range):  Temp:  [97.9 °F (36.6 °C)-98.3 °F (36.8 °C)] 98.3 °F (36.8 °C)  Pulse:  [] 86  Resp:  [16-18] 18  SpO2:  [98 %-99 %] 98 %  BP: (123-173)/(76-84) 123/76     Weight: 94.8 kg (209 lb)  Body mass index is 35.87 kg/m².    Physical Exam   Constitutional: She is oriented to person, place, and time. She appears well-developed and well-nourished. No distress.   HENT:   Head: Normocephalic and atraumatic.   Eyes: Conjunctivae and EOM are normal. Pupils are equal, round, and reactive to light.   Neck: Normal range of motion. Neck supple.   Cardiovascular: Normal rate, regular rhythm, normal heart sounds and intact distal pulses.    No murmur heard.  Pulmonary/Chest: Effort normal and breath sounds normal. No respiratory distress. She has no wheezes.   Abdominal: Soft. Bowel sounds are normal. She exhibits no distension. There is no tenderness.   Musculoskeletal: Normal range of motion. She exhibits no edema or tenderness.   Lymphadenopathy:      She has no cervical adenopathy.   Neurological: She is alert and oriented to person, place, and time. No cranial nerve deficit.   Skin: Skin is warm and dry.   Psychiatric: She has a normal mood and affect. Her behavior is normal.        Significant Labs:   CBC:   Recent Labs  Lab 08/21/17  1709   WBC 5.35   HGB 14.2   HCT 39.8        CMP:   Recent Labs  Lab 08/21/17  1709      K 3.5      CO2 24   GLU 84   BUN 16   CREATININE 0.8   CALCIUM 9.7   PROT 8.3   ALBUMIN 4.0   BILITOT 0.7   ALKPHOS 99   AST 20   ALT 23   ANIONGAP 10   EGFRNONAA >60.0     Troponin:   Recent Labs  Lab 08/21/17  1709   TROPONINI <0.006     All pertinent labs within the past 24 hours have been reviewed.    Significant Imaging: I have reviewed all pertinent imaging results/findings within the past 24 hours.

## 2017-08-22 NOTE — ANESTHESIA POSTPROCEDURE EVALUATION
"Anesthesia Post Evaluation    Patient: Francoise Dykes    Procedure(s) Performed: Procedure(s) (LRB):  TRANSESOPHAGEAL ECHOCARDIOGRAM (SAMMIE) (N/A)    Final Anesthesia Type: general  Patient location during evaluation: PACU  Patient participation: Yes- Able to Participate  Level of consciousness: awake and alert and oriented  Post-procedure vital signs: reviewed and stable  Pain management: adequate  Airway patency: patent  PONV status at discharge: No PONV  Anesthetic complications: no      Cardiovascular status: blood pressure returned to baseline and hemodynamically stable  Respiratory status: unassisted and spontaneous ventilation  Hydration status: euvolemic  Follow-up not needed.        Visit Vitals  /70 (BP Location: Left arm, Patient Position: Sitting)   Pulse 82   Temp 36.7 °C (98 °F) (Oral)   Resp 16   Ht 5' 4" (1.626 m)   Wt 94.8 kg (209 lb)   LMP  (LMP Unknown)   SpO2 96%   Breastfeeding? No   BMI 35.87 kg/m²       Pain/Theo Score: Pain Assessment Performed: Yes (8/22/2017 12:39 PM)  Presence of Pain: denies (8/22/2017 12:39 PM)  Theo Score: 10 (8/22/2017 11:53 AM)      "

## 2017-08-22 NOTE — H&P
"[facility] Hospital Medicine  History & Physical    Patient Name: Francoise Dykes  MRN: [id number]  Admission Date: 8/21/2017  Attending Physician: Kurt Baez MD   Primary Care Provider: Bakari Winchester MD    [facility] Medicine Team: Networked reference to record PCT  Mara Samuels PA-C     Patient information was obtained from patient and ER records.     Subjective:     Principal Problem:Atrial flutter    Chief Complaint:   Chief Complaint   Patient presents with    Tachycardia     Pt states "my heart is racing since yesterday, I think I am in a-fib"  Pt had ablation in December.        HPI: Patient is a 55yo F with a PMHx of paroxysmal atrial flutter and afib being admitted to observation for evaluation of palpitations. Patient states that yesterday she was in [place] at a wedding and didn't feel "right". She says her heart began to race. Her Apple Watch was recording HRs in the 120s for the last 24 hours. She took diltiazem last night which brought it down to the 110s. Patient reports associated SOB, increased urination, weakness, and decreased PO intake. This morning while in the school she developed palpitations and lightheadedness that didn't resolve and decided to come to the ED. Patient denies CP, N/V, numbness, syncope.    Of note, patient follows with Dr. Lee Meyers. Patient symptomatic Afib with palpitaitons and SOB started in 2012 and coverted to sinus bradycardia after she was placed on diltiazem ggt, initially placed on Flecainide and Toprol but she developed SOB on flecainide, she was placed on Rhythmol 600 mgs PRN. She developed recurrent AF and underwent PVI ablation 12/7/2015 and was placed on amidarone between 1/2016 and 8/2016.  After her amidarone was stopped, she developed atypical atrial flutter and was placed on Rhythmol and underwent DCCV. She underwent redo PVI and SVC ablation on 12/15/2016 as well as mid-sal AT ablation and discharged on Rhythmol 225 " mg BID. Her rhythmol was stopped in 3/2016 and she developed recurrent atypical flutter 2:1 so her rhythmol was restarted. She was admitted in 3/2017 with Atrial Flutter and converted spontanously after SAMMIE but before DCCV. Last time patient was seen in EP clinic she was asymptomatic and her Diltiazem was decreased to 120 mgs daily.  She is on Eliquis for anticoagulation.    Past Medical History:   Diagnosis Date    Allergy     seasonal    Atrial fibrillation        Past Surgical History:   Procedure Laterality Date     SECTION      heart ablation      HYSTERECTOMY      [facility]       Review of patient's allergies indicates:   Allergen Reactions    Adhesive tape-silicones Itching     Manifested in 2015 following AF ablation.       No current facility-administered medications on file prior to encounter.      Current Outpatient Prescriptions on File Prior to Encounter   Medication Sig    apixaban 5 mg Tab Take 1 tablet (5 mg total) by mouth 2 (two) times daily.    diltiaZEM (DILACOR XR) 120 MG CDCR Take 1 capsule (120 mg total) by mouth once daily.    propafenone (RYTHMOL SR) 225 MG Cp12 Take 1 capsule (225 mg total) by mouth every 12 (twelve) hours.    cetirizine (ZYRTEC) 10 MG tablet Take 10 mg by mouth once daily.    DYMISTA 137-50 mcg/spray [place] nassal spray USE ONE SPRAY(S) IN EACH NOSTRIL TWICE DAILY    hydrocortisone 0.5 % cream Apply topically daily as needed.    ibuprofen (ADVIL,MOTRIN) 800 MG tablet Take 1 tablet (800 mg total) by mouth 3 (three) times daily with meals.    triamcinolone acetonide 0.1% (KENALOG) 0.1 % cream Apply topically 2 (two) times daily.     Family History     Problem Relation (Age of Onset)    Asbestos Father    Blindness Paternal Grandfather    Cancer Paternal Grandmother    Diabetes Mother, Maternal Grandfather    Eczema Son    Glaucoma Mother, Paternal Grandfather    Heart disease Maternal Grandmother    Hypertension Mother, Son    Obesity Father         Social History Main Topics    Smoking status: Never Smoker    Smokeless tobacco: Never Used    Alcohol use Yes      Comment: rarely, 1 drink/week    Drug use: No    Sexual activity: Yes     Partners: Male     Review of Systems   Constitutional: Negative for activity change, chills, diaphoresis and fever.   HENT: Negative for facial swelling, sneezing and trouble swallowing.    Eyes: Negative for photophobia and visual disturbance.   Respiratory: Positive for shortness of breath. Negative for cough, chest tightness and wheezing.    Cardiovascular: Positive for palpitations. Negative for chest pain and leg swelling.   Gastrointestinal: Negative for abdominal distention, abdominal pain, constipation, diarrhea, nausea and vomiting.   Genitourinary: Positive for frequency. Negative for dysuria and hematuria.   Musculoskeletal: Negative for back pain (chronic), joint swelling and neck pain.   Skin: Negative for rash.   Neurological: Positive for dizziness, weakness and light-headedness. Negative for syncope, facial asymmetry, speech difficulty and numbness.   Psychiatric/Behavioral: Negative for confusion. The patient is not nervous/anxious.      Objective:     Vital Signs (Most Recent):  Temp: 98.3 °F (36.8 °C) (08/21/17 2045)  Pulse: 86 (08/21/17 2045)  Resp: 18 (08/21/17 2045)  BP: 123/76 (08/21/17 2045)  SpO2: 98 % (08/21/17 2045) Vital Signs (24h Range):  Temp:  [97.9 °F (36.6 °C)-98.3 °F (36.8 °C)] 98.3 °F (36.8 °C)  Pulse:  [] 86  Resp:  [16-18] 18  SpO2:  [98 %-99 %] 98 %  BP: (123-173)/(76-84) 123/76     Weight: 94.8 kg (209 lb)  Body mass index is 35.87 kg/m².    Physical Exam   Constitutional: She is oriented to person, place, and time. She appears well-developed and well-nourished. No distress.   HENT:   Head: Normocephalic and atraumatic.   Eyes: Conjunctivae and EOM are normal. Pupils are equal, round, and reactive to light.   Neck: Normal range of motion. Neck supple.   Cardiovascular: Normal  rate, regular rhythm, normal heart sounds and intact distal pulses.    No murmur heard.  Pulmonary/Chest: Effort normal and breath sounds normal. No respiratory distress. She has no wheezes.   Abdominal: Soft. Bowel sounds are normal. She exhibits no distension. There is no tenderness.   Musculoskeletal: Normal range of motion. She exhibits no edema or tenderness.   Lymphadenopathy:     She has no cervical adenopathy.   Neurological: She is alert and oriented to person, place, and time. No cranial nerve deficit.   Skin: Skin is warm and dry.   Psychiatric: She has a normal mood and affect. Her behavior is normal.        Significant Labs:   CBC:   Recent Labs  Lab 08/21/17  1709   WBC 5.35   HGB 14.2   HCT 39.8        CMP:   Recent Labs  Lab 08/21/17  1709      K 3.5      CO2 24   GLU 84   BUN 16   CREATININE 0.8   CALCIUM 9.7   PROT 8.3   ALBUMIN 4.0   BILITOT 0.7   ALKPHOS 99   AST 20   ALT 23   ANIONGAP 10   EGFRNONAA >60.0     Troponin:   Recent Labs  Lab 08/21/17  1709   TROPONINI <0.006     All pertinent labs within the past 24 hours have been reviewed.    Significant Imaging: I have reviewed all pertinent imaging results/findings within the past 24 hours.    Assessment/Plan:     * Atrial flutter    S/P ablation of atrial fibrillation  Current use of long term anticoagulation    Patient has a complicated history of recurrence of symptoms after ablations in 2015 and 2016.   - Per cardiology, increase Propafenone to 225 mg BID  - NPO at midnight for SAMMIE/DCCV in the morning  - Resume Dilitazem 120mg daily, Eliquis 5mg bid for anticoagulation  - Telemetry  - Mag & Phos pending.        Acquired spondylolisthesis    Managed outpatient with steroid injections, patient currently without pain.  - Tylenol PRN for pain.          VTE Risk Mitigation         Ordered     apixaban tablet 5 mg  2 times daily     Route:  Oral        08/21/17 2211     Place sequential compression device  Until discontinued       08/21/17 2213     Place CASIE hose  Until discontinued      08/21/17 2213     Reason for No Pharmacological VTE Prophylaxis  Once      08/21/17 2213     Medium Risk of VTE  Once      08/21/17 2213             Mara Samuels PA-C  Department of Hospital Medicine   [facility]

## 2017-08-22 NOTE — NURSING
Arrived to unit in wheelchair, alert and oriented x 4. Denies pain or discomfort. Fall/safety precautions reviewed. Son at bedside.

## 2017-08-22 NOTE — ANESTHESIA PREPROCEDURE EVALUATION
Pre-operative evaluation for TRANSESOPHAGEAL ECHOCARDIOGRAM (SAMMIE) (N/A)    ID:   Francoise Dykes is a 56 y.o. female with A-Fib here for SAMMIE CV    Previous Airway:  Present Prior to Hospital Arrival?: No; Placement Date: 12/15/16; Placement Time: 0740; Method of Intubation: Direct laryngoscopy; Inserted by: Anesthesia MD; Airway Device: Endotracheal Tube; Mask Ventilation: Easy; Intubated: Postinduction; Blade: Brian #2; Airway Device Size: 7.0; Style: Cuffed; Cuff Inflation: Minimal occlusive pressure; Inflation Amount: 10; Placement Verified By: Auscultation, Capnometry; Grade: Grade I; Complicating Factors: None; Intubation Findings: Positive EtCO2, Bilateral breath sounds, Atraumatic/Condition of teeth unchanged;  Depth of Insertion: 22; Securment: Lips; Complications: None;    Past Surgical History:   Procedure Laterality Date     SECTION      heart ablation      HYSTERECTOMY      [facility]         Vital Signs Range (Last 24H):  Temp:  [36.3 °C (97.4 °F)-36.8 °C (98.3 °F)]   Pulse:  []   Resp:  [16-18]   BP: (102-173)/(55-84)   SpO2:  [94 %-99 %]       CBC:       Recent Labs  Lab 17  1709   WBC 5.35   RBC 5.11   HGB 14.2   HCT 39.8      MCV 78*   MCH 27.8   MCHC 35.7       CMP:     Recent Labs  Lab 17  1709 17  2228     --    K 3.5  --      --    CO2 24  --    BUN 16  --    CREATININE 0.8  --    GLU 84  --    MG  --  2.0   PHOS  --  2.7   CALCIUM 9.7  --    ALBUMIN 4.0  --    PROT 8.3  --    ALKPHOS 99  --    ALT 23  --    AST 20  --    BILITOT 0.7  --        INR:  No results for input(s): INR, PROTIME, APTT in the last 720 hours.    Invalid input(s): PT      Diagnostic Studies:      EKG 3/19/17:  Sinus tachycardia with 1st degree A-V block  Nonspecific ST and T wave abnormality  Abnormal ECG    2D Echo 2016:  CONCLUSIONS     1 - Normal biventricular systolic function.     2 - Dynamic left atrial appendage with no evidence of intracardiac thrombus.      Pre-op Assessment    I have reviewed the Patient Summary Reports.      I have reviewed the Medications.     Review of Systems  Anesthesia Hx:  No problems with previous Anesthesia  History of prior surgery of interest to airway management or planning:  Denies Personal Hx of Anesthesia complications.   Cardiovascular:   Denies Hypertension.  Denies MI.  Denies CAD.   Dysrhythmias     Pulmonary:  Pulmonary Normal    Renal/:  Renal/ Normal     Hepatic/GI:  Hepatic/GI Normal    Musculoskeletal:   Arthritis         Physical Exam  General:  Obesity    Airway/Jaw/Neck:  Airway Findings: Mouth Opening: Normal Tongue: Normal  General Airway Assessment: Adult  Mallampati: II  TM Distance: Normal, at least 6 cm  Jaw/Neck Findings:  Neck ROM: Normal ROM      Dental:  Dental Findings: In tact   Chest/Lungs:  Chest/Lungs Findings: Clear to auscultation     Heart/Vascular:  Heart Findings: Rate: Normal  Heart murmur: negative       Mental Status:  Mental Status Findings:  Cooperative, Alert and Oriented         Anesthesia Plan  Type of Anesthesia, risks & benefits discussed:  Anesthesia Type:  MAC, general  Patient's Preference:   Intra-op Monitoring Plan: standard ASA monitors  Intra-op Monitoring Plan Comments:   Post Op Pain Control Plan:   Post Op Pain Control Plan Comments:   Induction:   IV  Beta Blocker:  Patient is not currently on a Beta-Blocker (No further documentation required).       Informed Consent: Patient understands risks and agrees with Anesthesia plan.  Questions answered. Anesthesia consent signed with patient.  ASA Score: 2     Day of Surgery Review of History & Physical:    H&P update referred to the provider.         Ready For Surgery From Anesthesia Perspective.

## 2017-08-22 NOTE — PLAN OF CARE
Bakari Winchester MD  [address] [place] [facility] / [place]*    Future Appointments  Date Time Provider Department Center   8/22/2017 3:00 PM INPATIENT, SAMMIE [facility] [place]       Payor: HU[employer] / Plan: [employer] EPO OPEN ACCESS / Product Type: PPO /        08/22/17 1218   Discharge Assessment   Assessment Type Discharge Planning Assessment   Confirmed/corrected address and phone number on facesheet? Yes   Assessment information obtained from? Other  (family at the bedside)   Expected Length of Stay (days) 2   Communicated expected length of stay with patient/caregiver yes   Prior to hospitilization cognitive status: Alert/Oriented   Prior to hospitalization functional status: Independent   Current cognitive status: Alert/Oriented   Current Functional Status: Independent   Arrived From home or self-care   Lives With alone   Able to Return to Prior Arrangements yes   Is patient able to care for self after discharge? Yes   Who are your caregiver(s) and their phone number(s)? (Roberto oliver [phone])   Patient currently receives home health services? No   Patient currently receives any other outside agency services? No   Equipment Currently Used at Home none   Does the patient have transportation to healthcare appointments? Yes   Transportation Available family or friend will provide;car   On Dialysis? No   Discharge Plan A Home   Discharge Plan B Home

## 2017-08-22 NOTE — PLAN OF CARE
Problem: Patient Care Overview  Goal: Plan of Care Review  Outcome: Ongoing (interventions implemented as appropriate)  Uneventful night. Denies dizziness, weakness, or feeling of rapid heart beat. No complaints or concerns voiced.  Rene stack and SCD in place. NAD noted.

## 2017-08-22 NOTE — H&P
TRANSESOPHAGEAL ECHOCARDIOGRAPHY   PRE-PROCEDURE NOTE    08/22/2017    HPI:     Francoise Dykes is a 56 y.o. Aflutter s/p PVI and SVC ablation 12/2015, and repeat PVI 12/2016 who presents with recurrent flutter and EP has requested SAMMIE/DCCV. She did miss a dose of eliquis last week and thus she SAMMIE was requested.    Dysphagia or odynophagia:  NO  Liver Disease, esophageal disease, or known varices:  NO  Upper GI Bleeding: NO  Snoring:  YES  +1  Sleep Apnea:  NO  Prior neck surgery or radiation:  NO  Able to move neck in all directions:  YES  +1  History of anesthetic difficulties:  NO  Family history of anesthetic difficulties:  NO  Last oral intake:  08/21/2017 at 8:00PM    Mallampati Class:  2  ASA Score:  2      Meds:     Scheduled Meds:   apixaban  5 mg Oral BID    diltiaZEM  120 mg Oral Daily    sodium chloride 0.9%  3 mL Intravenous Q8H     PRN Meds:acetaminophen, ondansetron, ondansetron  Continuous Infusions:     Physical Exam:     Vitals:  Temp:  [97.4 °F (36.3 °C)-98.3 °F (36.8 °C)]   Pulse:  []   Resp:  [16-18]   BP: (102-173)/(55-84)   SpO2:  [94 %-99 %]        Constitutional:  NAD, conversant  HEENT:   Sclera anicteric, Uvula midline, EOMI, OP clear  Neck:   No JVD, moves to all direction without any limitations  CV:   Regular rate, tachycardic, no murmurs / rubs / gallops, normal S1/S2  Pulm:   CTAB, no wheezes, rales, or ronchi  GI:   Abdomen soft, NTND, +BS  Extremities:  No LE edema, warm with palpable pulses  Neuro:   AAOX3, no focal motor deficits      Labs:     Recent Results (from the past 336 hour(s))   CBC auto differential    Collection Time: 08/21/17  5:09 PM   Result Value Ref Range    WBC 5.35 3.90 - 12.70 K/uL    Hemoglobin 14.2 12.0 - 16.0 g/dL    Hematocrit 39.8 37.0 - 48.5 %    Platelets 215 150 - 350 K/uL       No results found for this or any previous visit (from the past 336 hour(s)).    Estimated Creatinine Clearance: 89.7 mL/min (based on Cr of 0.8).    INR:  2.0    Imaging:    SAMMIE (03/20/2017):    1 - Mildly depressed left ventricular systolic function (EF 45-50%).     2 - Low normal right ventricular function .     3 - Mild mitral regurgitation.     4 - Trivial tricuspid regurgitation.     5 - Left atrial appendage is bilobed with no visualized thrombus.     6 - No visualized thrombus in the left atrium.     7 - Grade 4 atheroma disease of aorta.     EKG:   Date: 08/21/2017 - Atrial flutter with variable AV block      Telemetry:  Telemetry currently shows: Atrial Flutter      Assessment & Plan:     PLAN:  1. SAMMIE for evaluation of [place] for SAMMIE/DCCV    -The risks, benefits & alternatives of the procedure were explained to the patient.    -The risks of transesophageal echo include but are not limited to:  Dental trauma, esophageal trauma/perforation, bleeding, laryngospasm/brochospasm, aspiration, sore throat/hoarseness, & dislodgement of the endotracheal tube/nasogastric tube (where applicable).    -The risks of moderate sedation include hypotension, respiratory depression, arrhythmias, bronchospasm, & death.    -Informed consent was obtained & the patient is agreeable to proceed with the procedure.    - patient states she's allergic to DCCV pads. Remove immediately after DCCV and provide hydrocortisone cream afterwards.    I will discuss with the attending physician. Attending addendum is to follow.    Signed:  Qiana Downs MD  Cardiovascular Disease Fellow, PGY-V  Pager: [phone]  8/22/2017 8:12 AM    Attending Addendum:

## 2017-08-22 NOTE — NURSING
Discharged instructions given to the patient and verbalized understanding. PIV discontinued with tip intact. Pt discharged home with all her belongings accompanied by a family member.

## 2017-08-22 NOTE — DISCHARGE SUMMARY
"[facility] Hospital Medicine  Discharge Summary      Patient Name: Francoise Dykes  MRN: [id number]  Admission Date: 8/21/2017  Hospital Length of Stay: 0 days  Discharge Date and Time:  08/22/2017 1:12 PM  Attending Physician: Kurt Baez MD   Discharging Provider: Vanessa Castellon PA-C  Primary Care Provider: Bakari Winchester MD  [facility] Medicine Team: [facility] Vanessa Castellon PA-C    HPI:   Patient is a 57yo F with a PMHx of paroxysmal atrial flutter and afib being admitted to observation for evaluation of palpitations. Patient states that yesterday she was in [place] at a wedding and didn't feel "right". She says her heart began to race. Her Apple Watch was recording HRs in the 120s for the last 24 hours. She took diltiazem last night which brought it down to the 110s. Patient reports associated SOB, increased urination, weakness, and decreased PO intake. This morning while in the school she developed palpitations and lightheadedness that didn't resolve and decided to come to the ED. Patient denies CP, N/V, numbness, syncope.    Of note, patient follows with Dr. Lee Meyers. Patient symptomatic Afib with palpitaitons and SOB started in 2012 and coverted to sinus bradycardia after she was placed on diltiazem ggt, initially placed on Flecainide and Toprol but she developed SOB on flecainide, she was placed on Rhythmol 600 mgs PRN. She developed recurrent AF and underwent PVI ablation 12/7/2015 and was placed on amidarone between 1/2016 and 8/2016.  After her amidarone was stopped, she developed atypical atrial flutter and was placed on Rhythmol and underwent DCCV. She underwent redo PVI and SVC ablation on 12/15/2016 as well as mid-sal AT ablation and discharged on Rhythmol 225 mg BID. Her rhythmol was stopped in 3/2016 and she developed recurrent atypical flutter 2:1 so her rhythmol was restarted. She was admitted in 3/2017 with Atrial Flutter and converted spontanously after " SAMMIE but before DCCV. Last time patient was seen in EP clinic she was asymptomatic and her Diltiazem was decreased to 120 mgs daily.  She is on Eliquis for anticoagulation.    Procedure(s) (LRB):  TRANSESOPHAGEAL ECHOCARDIOGRAM (SAMMIE) (N/A)      Indwelling Lines/Drains at time of discharge:   Lines/Drains/Airways          No matching active lines, drains, or airways        Hospital Course:   Patient admitted to observation for further evaluation of atrial flutter. Cardiology was consulted, recommended SAMMIE with DCCV and titrating rhythm control medications as appropriate.  SAMMIE completed 8/21 and cardiology recommended to resume Eliquis (Apixaban); return to clinic in 6 weeks, Follow up with Lee Meyers; resume Propafenone at 225 mg BID.     Consults:   Consults         Status Ordering Provider     Anesthesiology  Once     Provider:  (Not yet assigned)    Acknowledged ANGÉLICA CONROY     Inpatient consult to Cardiology  Once     Provider:  (Not yet assigned)    Completed ARIS MCADAMS     IP consult to case management  Once     Provider:  (Not yet assigned)    Acknowledged YOUNG HORN          Significant Diagnostic Studies: Labs: All labs within the past 24 hours have been reviewed    Pending Diagnostic Studies:     None        Final Active Diagnoses:    Diagnosis Date Noted POA    Acquired spondylolisthesis [M43.10] 01/09/2015 Yes     Chronic      Problems Resolved During this Admission:    Diagnosis Date Noted Date Resolved POA    PRINCIPAL PROBLEM:  Atrial flutter [I48.92] 08/21/2017 08/22/2017 Yes      No new Assessment & Plan notes have been filed under this hospital service since the last note was generated.  Service: Hospital Medicine      Discharged Condition: good    Disposition: Home or Self Care    Follow Up:  Follow-up Information     Lee Meyers MD In 6 weeks.    Specialties:  Electrophysiology, Cardiovascular Disease  Contact information:  [address]  [facility]  [id number]  [phone]                 Patient Instructions:     Diet general     Activity as tolerated       Medications:  Reconciled Home Medications:   Current Discharge Medication List      CONTINUE these medications which have NOT CHANGED    Details   apixaban 5 mg Tab Take 1 tablet (5 mg total) by mouth 2 (two) times daily.  Qty: 60 tablet, Refills: 11      diltiaZEM (DILACOR XR) 120 MG CDCR Take 1 capsule (120 mg total) by mouth once daily.  Qty: 30 capsule, Refills: 2    Associated Diagnoses: Paroxysmal atrial fibrillation      propafenone (RYTHMOL SR) 225 MG Cp12 Take 1 capsule (225 mg total) by mouth every 12 (twelve) hours.  Qty: 60 capsule, Refills: 11    Associated Diagnoses: Paroxysmal atrial fibrillation      cetirizine (ZYRTEC) 10 MG tablet Take 10 mg by mouth once daily.      DYMISTA 137-50 mcg/spray [place] nassal spray USE ONE SPRAY(S) IN EACH NOSTRIL TWICE DAILY  Qty: 23 g, Refills: 0    Associated Diagnoses: Bacterial sinusitis      hydrocortisone 0.5 % cream Apply topically daily as needed.      ibuprofen (ADVIL,MOTRIN) 800 MG tablet Take 1 tablet (800 mg total) by mouth 3 (three) times daily with meals.  Qty: 270 tablet, Refills: 0    Associated Diagnoses: Spondylosis without myelopathy; DDD (degenerative disc disease), lumbosacral; Spinal stenosis of lumbar region without neurogenic claudication; Thoracic and lumbosacral neuritis; Acquired spondylolisthesis; Degeneration of lumbar or lumbosacral intervertebral disc; Sacroiliac joint pain; Bilateral low back pain with left-sided sciatica, unspecified chronicity      triamcinolone acetonide 0.1% (KENALOG) 0.1 % cream Apply topically 2 (two) times daily.  Qty: 45 g, Refills: 1    Associated Diagnoses: Intrinsic eczema           Time spent on the discharge of patient: >30 minutes    HOS POC IP DISCHARGE SUMMARY    Vanessa Castellon PA-C  Department of Hospital Medicine  [facility]

## 2017-08-29 ENCOUNTER — HOSPITAL ENCOUNTER (OUTPATIENT)
Dept: CARDIOLOGY | Facility: HOSPITAL | Age: 57
Discharge: HOME | End: 2017-08-29
Attending: INTERNAL MEDICINE
Payer: COMMERCIAL

## 2017-08-29 DIAGNOSIS — I48.0 PAF (PAROXYSMAL ATRIAL FIBRILLATION): ICD-10-CM

## 2017-08-29 PROCEDURE — 93010 ELECTROCARDIOGRAM REPORT: CPT | Mod: ,,, | Performed by: INTERNAL MEDICINE

## 2017-10-02 DIAGNOSIS — I48.0 PAROXYSMAL ATRIAL FIBRILLATION: ICD-10-CM

## 2017-10-03 RX ORDER — DILTIAZEM HYDROCHLORIDE 120 MG/1
120 CAPSULE, EXTENDED RELEASE ORAL DAILY
Qty: 90 CAPSULE | Refills: 3 | Status: SHIPPED | OUTPATIENT
Start: 2017-10-03 | End: 2018-08-26 | Stop reason: SDUPTHER
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
## 2017-10-09 ENCOUNTER — OFFICE VISIT (OUTPATIENT)
Dept: SPINE | Facility: CLINIC | Age: 57
End: 2017-10-09
Payer: COMMERCIAL

## 2017-10-09 ENCOUNTER — TELEPHONE (OUTPATIENT)
Dept: SPINE | Facility: CLINIC | Age: 57
End: 2017-10-09

## 2017-10-09 VITALS
DIASTOLIC BLOOD PRESSURE: 67 MMHG | SYSTOLIC BLOOD PRESSURE: 135 MMHG | HEIGHT: 64 IN | HEART RATE: 71 BPM | BODY MASS INDEX: 35.68 KG/M2 | WEIGHT: 209 LBS

## 2017-10-09 DIAGNOSIS — M53.3 SACROILIAC JOINT PAIN: ICD-10-CM

## 2017-10-09 DIAGNOSIS — M54.41 BILATERAL LOW BACK PAIN WITH BILATERAL SCIATICA, UNSPECIFIED CHRONICITY: ICD-10-CM

## 2017-10-09 DIAGNOSIS — M54.42 BILATERAL LOW BACK PAIN WITH BILATERAL SCIATICA, UNSPECIFIED CHRONICITY: ICD-10-CM

## 2017-10-09 DIAGNOSIS — M48.061 SPINAL STENOSIS OF LUMBAR REGION WITHOUT NEUROGENIC CLAUDICATION: ICD-10-CM

## 2017-10-09 DIAGNOSIS — M54.14 THORACIC AND LUMBOSACRAL NEURITIS: ICD-10-CM

## 2017-10-09 DIAGNOSIS — M54.17 THORACIC AND LUMBOSACRAL NEURITIS: ICD-10-CM

## 2017-10-09 DIAGNOSIS — M47.819 SPONDYLOSIS WITHOUT MYELOPATHY: Primary | ICD-10-CM

## 2017-10-09 DIAGNOSIS — M43.10 ACQUIRED SPONDYLOLISTHESIS: ICD-10-CM

## 2017-10-09 DIAGNOSIS — M51.37 DDD (DEGENERATIVE DISC DISEASE), LUMBOSACRAL: ICD-10-CM

## 2017-10-09 PROCEDURE — 99213 OFFICE O/P EST LOW 20 MIN: CPT | Mod: S$GLB,,, | Performed by: PHYSICIAN ASSISTANT

## 2017-10-09 PROCEDURE — 99999 PR PBB SHADOW E&M-EST. PATIENT-LVL IV: CPT | Mod: PBBFAC,,, | Performed by: PHYSICIAN ASSISTANT

## 2017-10-09 NOTE — TELEPHONE ENCOUNTER
----- Message from Margaret Uriostegui sent at 10/9/2017  2:33 PM CDT -----  Clarification needed on order received for patient, please advise if patient should be scheduled for facet joint injections, si joint injections or mbb?

## 2017-10-30 DIAGNOSIS — B96.89 BACTERIAL SINUSITIS: ICD-10-CM

## 2017-10-30 DIAGNOSIS — J32.9 BACTERIAL SINUSITIS: ICD-10-CM

## 2017-10-30 RX ORDER — AZELASTINE HYDROCHLORIDE AND FLUTICASONE PROPIONATE 137; 50 UG/1; UG/1
SPRAY, METERED NASAL
Qty: 23 G | Refills: 0 | Status: SHIPPED | OUTPATIENT
Start: 2017-10-30 | End: 2017-11-13

## 2017-11-03 ENCOUNTER — HOSPITAL ENCOUNTER (OUTPATIENT)
Facility: OTHER | Age: 57
Discharge: HOME | End: 2017-11-03
Attending: ANESTHESIOLOGY | Admitting: ANESTHESIOLOGY
Payer: COMMERCIAL

## 2017-11-03 ENCOUNTER — SURGERY (OUTPATIENT)
Age: 57
End: 2017-11-03

## 2017-11-03 VITALS
TEMPERATURE: 98 F | DIASTOLIC BLOOD PRESSURE: 80 MMHG | SYSTOLIC BLOOD PRESSURE: 165 MMHG | OXYGEN SATURATION: 99 % | HEART RATE: 68 BPM | RESPIRATION RATE: 18 BRPM

## 2017-11-03 DIAGNOSIS — M53.3 SACROILIAC JOINT PAIN: ICD-10-CM

## 2017-11-03 DIAGNOSIS — M46.1 SACROILIITIS: Primary | ICD-10-CM

## 2017-11-03 DIAGNOSIS — G89.29 CHRONIC PAIN: ICD-10-CM

## 2017-11-03 PROCEDURE — 63600175 PHARM REV CODE 636 W HCPCS: Performed by: ANESTHESIOLOGY

## 2017-11-03 PROCEDURE — 27096 INJECT SACROILIAC JOINT: CPT | Mod: LT,,, | Performed by: ANESTHESIOLOGY

## 2017-11-03 PROCEDURE — G0260 INJ FOR SACROILIAC JT ANESTH: HCPCS | Performed by: ANESTHESIOLOGY

## 2017-11-03 PROCEDURE — 25000003 PHARM REV CODE 250: Performed by: ANESTHESIOLOGY

## 2017-11-03 PROCEDURE — 27096 INJECT SACROILIAC JOINT: CPT | Performed by: ANESTHESIOLOGY

## 2017-11-03 PROCEDURE — 25500020 PHARM REV CODE 255: Performed by: ANESTHESIOLOGY

## 2017-11-03 RX ORDER — BUPIVACAINE HYDROCHLORIDE 2.5 MG/ML
INJECTION, SOLUTION EPIDURAL; INFILTRATION; INTRACAUDAL
Status: DISCONTINUED | OUTPATIENT
Start: 2017-11-03 | End: 2017-11-03 | Stop reason: HOSPADM

## 2017-11-03 RX ORDER — TRIAMCINOLONE ACETONIDE 40 MG/ML
INJECTION, SUSPENSION INTRA-ARTICULAR; INTRAMUSCULAR
Status: DISCONTINUED | OUTPATIENT
Start: 2017-11-03 | End: 2017-11-03 | Stop reason: HOSPADM

## 2017-11-03 RX ORDER — ALPRAZOLAM 0.5 MG/1
0.5 TABLET, ORALLY DISINTEGRATING ORAL
Status: DISCONTINUED | OUTPATIENT
Start: 2017-11-03 | End: 2017-11-03 | Stop reason: HOSPADM

## 2017-11-03 RX ORDER — LIDOCAINE HYDROCHLORIDE 10 MG/ML
INJECTION INFILTRATION; PERINEURAL
Status: DISCONTINUED | OUTPATIENT
Start: 2017-11-03 | End: 2017-11-03 | Stop reason: HOSPADM

## 2017-11-03 RX ADMIN — BUPIVACAINE HYDROCHLORIDE 10 ML: 2.5 INJECTION, SOLUTION EPIDURAL; INFILTRATION; INTRACAUDAL; PERINEURAL at 04:11

## 2017-11-03 RX ADMIN — LIDOCAINE HYDROCHLORIDE 10 ML: 10 INJECTION, SOLUTION INFILTRATION; PERINEURAL at 04:11

## 2017-11-03 RX ADMIN — IOHEXOL 5 ML: 300 INJECTION, SOLUTION INTRAVENOUS at 04:11

## 2017-11-03 RX ADMIN — TRIAMCINOLONE ACETONIDE 40 MG: 40 INJECTION, SUSPENSION INTRA-ARTICULAR; INTRAMUSCULAR at 04:11

## 2017-11-03 NOTE — DISCHARGE INSTRUCTIONS

## 2017-11-03 NOTE — OP NOTE
Patient Name: Francoise Dykes  MRN: [id number]    INFORMED CONSENT: The procedure, risks, benefits and options were discussed with patient. There are no contraindications to the procedure. The patient expressed understanding and agreed to proceed. The personnel performing the procedure was discussed. I verify that I personally obtained Francoise's consent prior to the start of the procedure and the signed consent can be found on the patient's chart.    Procedure Date: 11/03/2017    Anesthesia: Topical    Pre Procedure diagnosis: Pain of left sacroiliac joint  1. Sacroiliitis    2. Sacroiliac joint pain    3. Chronic pain      Post-Procedure diagnosis: SAME      Sedation: None    PROCEDURE: left SACROILIAC JOINT INJECTION  DESCRIPTION OF PROCEDURE: The patient was brought to the fluoroscopy suite. After performing time out  IV access was obtained prior to the procedure. The patient was positioned prone on the fluoroscopy table. Continuous hemodynamic monitoring was initiated including blood pressure, EKG, and pulse oximetry.  The lumbosacral area was prepped with chlorhexidine three times and draped into a sterile field. The skin overlying the left side sacroiliac joint was anesthetized using 3cc of lidocaine 1%. The inferior pole of the sacroiliac joint was identified by cephalo-oblique fluoroscopy. A 22 gauge, 3 1/2 inch spinal needle was slowly advanced through the sacroiliac joint capsule under fluoroscopic guidance. The needle position was confirmed using oblique, AP and lateral fluoroscopic imaging.  Negative aspiration was confirmed. 0.5 cc of Omnipaque 300 was injected confirming intra-articular contrast spread. A combination of 2 cc of Bupivacaine 0.25% and 40 mg kenalog was easily injected. The needle was removed and bleeding was nil.  A sterile dressing was applied. PATIENT was taken to the Post-block Recovery Area for further observation.      Blood Loss: Nill  Specimen: None    Mariusz Jang MD

## 2017-11-03 NOTE — DISCHARGE SUMMARY
Discharge Note  Short Stay      SUMMARY     Admit Date: 11/3/2017    Attending Physician: Mariusz Jang      Discharge Physician: Mariusz Jang      Discharge Date: 11/3/2017 4:11 PM    Final Diagnosis: Pain of left sacroiliac joint    Disposition: Home or self care    Patient Instructions:   Current Discharge Medication List      CONTINUE these medications which have NOT CHANGED    Details   apixaban 5 mg Tab Take 1 tablet (5 mg total) by mouth 2 (two) times daily.  Qty: 60 tablet, Refills: 11      cetirizine (ZYRTEC) 10 MG tablet Take 10 mg by mouth once daily.      diltiaZEM (DILACOR XR) 120 MG CDCR Take 1 capsule (120 mg total) by mouth once daily.  Qty: 90 capsule, Refills: 3    Associated Diagnoses: Paroxysmal atrial fibrillation      DYMISTA 137-50 mcg/spray [place] nassal spray USE ONE SPRAY(S) IN EACH NOSTRIL TWICE DAILY  Qty: 23 g, Refills: 0    Comments: Please consider 90 day supplies to promote better adherence  Associated Diagnoses: Bacterial sinusitis      hydrocortisone 0.5 % cream Apply topically daily as needed.      ibuprofen (ADVIL,MOTRIN) 800 MG tablet Take 1 tablet (800 mg total) by mouth 3 (three) times daily with meals.  Qty: 270 tablet, Refills: 0    Associated Diagnoses: Spondylosis without myelopathy; DDD (degenerative disc disease), lumbosacral; Spinal stenosis of lumbar region without neurogenic claudication; Thoracic and lumbosacral neuritis; Acquired spondylolisthesis; Degeneration of lumbar or lumbosacral intervertebral disc; Sacroiliac joint pain; Bilateral low back pain with left-sided sciatica, unspecified chronicity      propafenone (RYTHMOL SR) 225 MG Cp12 Take 1 capsule (225 mg total) by mouth every 12 (twelve) hours.  Qty: 60 capsule, Refills: 11    Associated Diagnoses: Paroxysmal atrial fibrillation      triamcinolone acetonide 0.1% (KENALOG) 0.1 % cream Apply topically 2 (two) times daily.  Qty: 45 g, Refills: 1    Associated Diagnoses: Intrinsic eczema                  Discharge Diagnosis: Pain of left sacroiliac joint  Condition on Discharge: Stable.  Diet on Discharge: Same as before.  Activity: as per instruction sheet.  Discharge to: Home with a responsible adult.  Follow up: as per Discharge instructions.

## 2017-11-09 ENCOUNTER — TELEPHONE (OUTPATIENT)
Dept: FAMILY MEDICINE | Facility: CLINIC | Age: 57
End: 2017-11-09

## 2017-11-09 DIAGNOSIS — J30.2 CHRONIC SEASONAL ALLERGIC RHINITIS, UNSPECIFIED TRIGGER: Primary | ICD-10-CM

## 2017-11-13 RX ORDER — FLUTICASONE PROPIONATE 50 MCG
1 SPRAY, SUSPENSION (ML) NASAL 2 TIMES DAILY
Qty: 1 BOTTLE | Refills: 3 | Status: SHIPPED | OUTPATIENT
Start: 2017-11-13 | End: 2018-05-16

## 2017-11-13 RX ORDER — AZELASTINE 1 MG/ML
1 SPRAY, METERED NASAL 2 TIMES DAILY
Qty: 30 ML | Refills: 3 | Status: SHIPPED | OUTPATIENT
Start: 2017-11-13 | End: 2020-02-18

## 2017-11-20 ENCOUNTER — HOSPITAL ENCOUNTER (OUTPATIENT)
Dept: CARDIOLOGY | Facility: CLINIC | Age: 57
Discharge: HOME | End: 2017-11-20
Payer: COMMERCIAL

## 2017-11-20 ENCOUNTER — OFFICE VISIT (OUTPATIENT)
Dept: ELECTROPHYSIOLOGY | Facility: CLINIC | Age: 57
End: 2017-11-20
Payer: COMMERCIAL

## 2017-11-20 VITALS
DIASTOLIC BLOOD PRESSURE: 80 MMHG | BODY MASS INDEX: 34.92 KG/M2 | WEIGHT: 204.56 LBS | SYSTOLIC BLOOD PRESSURE: 138 MMHG | HEART RATE: 67 BPM | HEIGHT: 64 IN

## 2017-11-20 DIAGNOSIS — I48.4 ATYPICAL ATRIAL FLUTTER: ICD-10-CM

## 2017-11-20 DIAGNOSIS — I48.0 PAROXYSMAL ATRIAL FIBRILLATION: Primary | ICD-10-CM

## 2017-11-20 DIAGNOSIS — Z79.899 LONG TERM CURRENT USE OF ANTIARRHYTHMIC DRUG: ICD-10-CM

## 2017-11-20 DIAGNOSIS — I10 ESSENTIAL HYPERTENSION: ICD-10-CM

## 2017-11-20 DIAGNOSIS — Z79.01 CURRENT USE OF LONG TERM ANTICOAGULATION: ICD-10-CM

## 2017-11-20 DIAGNOSIS — Z98.890 S/P ABLATION OF ATRIAL FIBRILLATION: ICD-10-CM

## 2017-11-20 DIAGNOSIS — Z86.79 S/P ABLATION OF ATRIAL FIBRILLATION: ICD-10-CM

## 2017-11-20 DIAGNOSIS — I48.0 PAF (PAROXYSMAL ATRIAL FIBRILLATION): ICD-10-CM

## 2017-11-20 PROCEDURE — 99214 OFFICE O/P EST MOD 30 MIN: CPT | Mod: S$GLB,,, | Performed by: INTERNAL MEDICINE

## 2017-11-20 PROCEDURE — 93000 ELECTROCARDIOGRAM COMPLETE: CPT | Mod: S$GLB,,, | Performed by: INTERNAL MEDICINE

## 2017-11-20 PROCEDURE — 99999 PR PBB SHADOW E&M-EST. PATIENT-LVL III: CPT | Mod: PBBFAC,,, | Performed by: INTERNAL MEDICINE

## 2017-11-20 NOTE — PROGRESS NOTES
Subjective:   HPI     I had the pleasure of seeing Francoise Dykes in follow-up today for her history of atrial fibrillation and PVI. 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 who was in her usual state of health until 2/2012 when she developed sudden onset palpitations, shortness of breath, and dizziness while laying in bed. She presented to [facility] in [place], [place] where an ECG showed atrial fibrillation at a rate of 169 bpm (ECG in EPIC). In 11/2012, Mrs. Dykes had another episode of atrial fibrillation after an argument with her son. She presented to [facility], and once again reverted to sinus rhythm within 30 minutes of starting the Diltiazem drip. She was discharged on Flecainide 50 mg BID, Toprol XL 50 mg QD, and Aspirin.     When I initially saw Ms. Dykes in 1/2014, she admitted to monthly palpitations, which would last seconds and resolve with coughing. She believed that Flecainide caused her some shortness of breath. At that office visit, I stopped Flecainide and started Rythmol 600 mg PRN palpitations.     At her office visit with me in 7/2015 Ms. Dykes reported episodes of AF every 2-3 months, which would last minutes and resolve with vagal maneuvers. She continued to take Flecainide every night before she went to bed. At that time Flecainide was stopped. Unfortunately in 9/2015, [place]. [place] [facility] [place]. She took Rythmol, and reverted to sinus rhythm within 3 hours. Notably, prior to conversion, her AF organized into a regular rhythm (AFL vs SVT--no 12-lead available to review).    In 12/2015, Ms. Dykes underwent a PVI and SVC ablation. She had frequent episodes of sustained AF during the case, which appeared to be arising from a RA source. She was started on Amiodarone that that time. At her follow-up visit in 1/2016, she was doing well, with no episodes of sustained palpitations. Amiodarone was stopped in early 3/2016. In  8/2016, Ms. Dykes was admitted with atypical atrial flutter with RVR. She was cardioverted to sinus rhythm, and started on Rythmol  mg BID.    In 12/2016, Ms. Dykes underwent repeat PVI. The right pulmonary veins were re-isolated, with isolated firing noted from the veins post-isolation. Additionally, a mid-sal AT was targeted and successfully ablated. She was discharged on Rythmol  mg bid.    In 3/2017 Rythmol was stopped. [place] weeks later [place] [facility] [place] with 2:1 AVB at a rate of 120 bpm. Rythmol was restarted at that time. A 4 week event monitor was ordered, but she could only wear it for 1 week due to skin sensitivity. She had one additional episode of palpitations which lasted for 1 hour, which resolved on its own. When I last saw Ms. Dykes in 6/2017, Rythmol and Diltiazem were continued.     In 8/2017, Ms. Dykes presented with atypical atrial flutter with RVR, and underwent SAMMIE/DCCV. Notably, Ms. Dykes missed her AM dose of Rythmol. She was continued on Rythmol. She has had no recurrent events.    I reviewed today's ECG tracing, which shows sinus rhythm.    Review of Systems   Constitution: Negative for decreased appetite, malaise/fatigue, weight gain and weight loss.   HENT: Negative for sore throat.    Eyes: Negative for blurred vision.   Cardiovascular: Negative for dyspnea on exertion, irregular heartbeat, leg swelling, near-syncope, orthopnea, palpitations and paroxysmal nocturnal dyspnea.   Skin: Negative for rash.   Musculoskeletal: Negative for arthritis.   Gastrointestinal: Negative for abdominal pain.   Neurological: Negative for focal weakness.   Psychiatric/Behavioral: Negative for altered mental status.        Objective:    Physical Exam   Constitutional: She is oriented to person, place, and time. She appears well-developed and well-nourished. No distress.   HENT:   Head: Normocephalic and atraumatic.   Mouth/Throat: Oropharynx is clear and  moist.   Eyes: Conjunctivae are normal. Pupils are equal, round, and reactive to light. No scleral icterus.   Neck: Normal range of motion. Neck supple. No JVD present. No thyromegaly present.   Cardiovascular: Normal rate, regular rhythm, normal heart sounds and intact distal pulses.  Exam reveals no gallop and no friction rub.    No murmur heard.  Pulmonary/Chest: Effort normal and breath sounds normal. No respiratory distress. She has no rales.   Abdominal: Soft. Bowel sounds are normal. She exhibits no distension.   Musculoskeletal: She exhibits no edema.   Neurological: She is alert and oriented to person, place, and time.   Skin: Skin is warm and dry.   Psychiatric: She has a normal mood and affect. Her behavior is normal.   Vitals reviewed.        Assessment:   1) Paroxysmal atrial fibrillation  2) History of PVI  3) Atrial tach status-post ablation  4) HTN    Plan:   In summary, Francoise Dykes is a 57-year old female with a history of symptomatic paroxysmal atrial fibrillation who underwent PVI in 12/2015 and re-do PVI in 12/2016. She has since had several episodes of atypical atrial flutter, but none since 8/2017. She is happy for now to hold the course, and continue Rythmol and Diltiazem. Should her flutter recur, the plan will be to rate control her then bring her to the EP lab (either as inpatient or outpatient) so the mechanism of her flutter can be defined and successfully ablated.     She will see me in 6 months or sooner if needed.    Thank you for allowing me to participate in the care of this patient. Please do not hesitate to call me with any questions or concerns.

## 2017-12-06 NOTE — PROGRESS NOTES
Chronic patient Established Note (Follow up visit)      SUBJECTIVE:    Francoise Dykes presents to the clinic for a follow-up appointment for back pain. Since the last visit, Francoise Dykes states the pain has been {PAIN - I/W/P:19929}. Current pain intensity is {GEN PAIN SCALE HI:46394}.    Pain Disability Index Review:  No flowsheet data found.    Pain Medications:    {PMC - MEDICATIONS:08646}    Opioid Contract: {YES/NO:63}     report:  {:01661}    Pain Procedures: 11/03/17 left SACROILIAC JOINT INJECTION    Physical Therapy/Home Exercise: {YES/NO:63}    Imaging: MRI Lumbar Spine Without Contrast   Narrative     MRI lumbar spine without contrast.    Comparison: None.    Technique: Sagittal T1, T2, stir and axial T2 and T1-weighted images were obtained.  No IV contrast was utilized.    Results: The alignment of the lumbar spine is normal.  The vertebral body heights are well-maintained.  Mild disc desiccation noted at L3-L4 and L4-L5, severe disc space narrowing at L5-S1.  Small benign hemangioma is noted within the L3 and L4 vertebrae.  No evidence of malignant bone marrow replacement process or infection.  The conus medullaris terminates in good position.    T12-L1 and L1-L2 demonstrate a mild diffuse disc bulge, there is no central canal or foraminal stenosis.    L2-L3 demonstrates no disc abnormality, no central canal foraminal stenosis.    L3-L4 demonstrates a mild diffuse disc bulge, mild ligamentum flavum hypertrophy.  Mild narrowing of the central canal, no significant foraminal narrowing.    L4-L5 demonstrates mild diffuse disc bulge, mild ligamentum flavum hypertrophy and mild facet joint degenerative change, no significant central canal stenosis or significant foramina narrowing.    L5-S1 demonstrates a mild diffuse disc bulge, there is no central canal stenosis, there is mild left foraminal narrowing.    The paraspinal soft tissues appear normal.   Impression         Mild to moderate  spondylosis of the lumbar spine without severe central or severe foraminal narrowing.  ______________________________________     Electronically signed by: KALYAN MOMIN MD  Date: 09/12/16  Time: 17:53    X-Ray Lumbar Spine Ap Lateral w/Flex Ext    Narrative     Low back pain.    Comparison: 1/9/15.    5 views of the lumbar spine were obtained including flexion-extension images.    There are 5 lumbar vertebral bodies. There is no evidence for acute fracture, dislocation or destructive process. Vertebral body heights are maintained. There is disc space narrowing at L5-S1. Intervertebral disc space desiccation at L2-L3 is also noted. Facet joints unremarkable. Spinous processes demonstrate normal mildly. The visualized SI joints and sacrum appear unremarkable. There is no translational abnormality. Surrounding soft tissues appear unremarkable.   Impression      Degenerative changes of the spine as above. Overall appearance is similar to prior.  ______________________________________     Electronically signed by: LYUDMILA VEGA MD  Date: 09/12/16  Time: 17:22          Allergies:   Review of patient's allergies indicates:   Allergen Reactions    Adhesive tape-silicones Itching     Manifested in 12/2015 following AF ablation.       Current Medications:   Current Outpatient Prescriptions   Medication Sig Dispense Refill    apixaban 5 mg Tab Take 1 tablet (5 mg total) by mouth 2 (two) times daily. 60 tablet 11    azelastine (ASTELIN) 137 mcg (0.1 %) nasal spray 1 spray (137 mcg total) by Nasal route 2 (two) times daily. 30 mL 3    cetirizine (ZYRTEC) 10 MG tablet Take 10 mg by mouth once daily.      diltiaZEM (DILACOR XR) 120 MG CDCR Take 1 capsule (120 mg total) by mouth once daily. 90 capsule 3    fluticasone (FLONASE) 50 mcg/actuation nasal spray 1 spray by Each Nare route 2 (two) times daily. 1 Bottle 3    hydrocortisone 0.5 % cream Apply topically daily as needed.      ibuprofen (ADVIL,MOTRIN) 800 MG  tablet Take 1 tablet (800 mg total) by mouth 3 (three) times daily with meals. 270 tablet 0    propafenone (RYTHMOL SR) 225 MG Cp12 Take 1 capsule (225 mg total) by mouth every 12 (twelve) hours. 60 capsule 11    triamcinolone acetonide 0.1% (KENALOG) 0.1 % cream Apply topically 2 (two) times daily. 45 g 1     No current facility-administered medications for this visit.        REVIEW OF SYSTEMS:    GENERAL:  No weight loss, malaise or fevers.  HEENT:  Negative for frequent or significant headaches.  NECK:  Negative for lumps, goiter, pain and significant neck swelling.  RESPIRATORY:  Negative for cough, wheezing or shortness of breath.  CARDIOVASCULAR:  Negative for chest pain, leg swelling or palpitations.  GI:  Negative for abdominal discomfort, blood in stools or black stools or change in bowel habits.  MUSCULOSKELETAL:  See HPI.  SKIN:  Negative for lesions, rash, and itching.  PSYCH:  Negative for sleep disturbance, mood disorder and recent psychosocial stressors.  HEMATOLOGY/LYMPHOLOGY:  Negative for prolonged bleeding, bruising easily or swollen nodes.  NEURO:   No history of headaches, syncope, paralysis, seizures or tremors.  All other reviewed and negative other than HPI.    Past Medical History:  Past Medical History:   Diagnosis Date    Allergy     seasonal    Atrial fibrillation     Essential hypertension 2017       Past Surgical History:  Past Surgical History:   Procedure Laterality Date     SECTION      heart ablation      [facility]       Family History:  Family History   Problem Relation Age of Onset    Hypertension Mother     Diabetes Mother     Glaucoma Mother     Asbestos Father     Obesity Father     Eczema Son     Hypertension Son     Heart disease Maternal Grandmother      chf    Diabetes Maternal Grandfather     Cancer Paternal Grandmother      lung, 2/2 second hand smoke    Glaucoma Paternal Grandfather     Blindness Paternal Grandfather      Melanoma Neg Hx     Psoriasis Neg Hx     Lupus Neg Hx     Acne Neg Hx     Breast cancer Neg Hx     Colon cancer Neg Hx     Ovarian cancer Neg Hx        Social History:  Social History     Social History    Marital status:      Spouse name: N/A    Number of children: N/A    Years of education: N/A     Occupational History    Teacher [employer]      Social History Main Topics    Smoking status: Never Smoker    Smokeless tobacco: Never Used    Alcohol use Yes      Comment: rarely, 1 drink/week    Drug use: No    Sexual activity: Yes     Partners: Male     Other Topics Concern    Are You Pregnant Or Think You May Be? No    Breast-Feeding No     Social History Narrative    Recently moved back to [facility] to help take care of mom.       OBJECTIVE:    LMP  (LMP Unknown)     PHYSICAL EXAMINATION:    General appearance: Well appearing, in no acute distress, alert and oriented x3.  Psych:  Mood and affect appropriate.  Skin: Skin color, texture, turgor normal, no rashes or lesions, in both upper and lower body.  Head/face:  Atraumatic, normocephalic. No palpable lymph nodes  Neck: No pain to palpation over the cervical paraspinous muscles. Spurling Negative. No pain with neck flexion, extension, or lateral flexion. .  Cor: RRR  Pulm: CTA  GI: Abdomen soft and non-tender.  Back: Straight leg raising in the sitting and supine positions is negative to radicular pain. No pain to palpation over the spine or costovertebral angles. Normal range of motion without pain reproduction.  Extremities: Peripheral joint ROM is full and pain free without obvious instability or laxity in all four extremities. No deformities, edema, or skin discoloration. Good capillary refill.  Musculoskeletal: Shoulder, hip, sacroiliac and knee provocative maneuvers are negative. Bilateral upper and lower extremity strength is normal and symmetric.  No atrophy or tone abnormalities are noted.  Neuro: Bilateral upper and lower extremity  coordination and muscle stretch reflexes are physiologic and symmetric.  Plantar response are downgoing. No loss of sensation is noted.  Gait: Normal.    ASSESSMENT: 57 y.o. year old female with *** pain, consistent with ***     No diagnosis found.      PLAN:     {PLAN:95344}  - RTC ***  - Counseled patient regarding the importance of {:47879}.    The above plan and management options were discussed at length with patient. Patient is in agreement with the above and verbalized understanding.    Chato Marx  12/06/2017

## 2017-12-07 ENCOUNTER — OFFICE VISIT (OUTPATIENT)
Dept: PAIN MEDICINE | Facility: CLINIC | Age: 57
End: 2017-12-07
Attending: ANESTHESIOLOGY
Payer: COMMERCIAL

## 2017-12-07 VITALS
TEMPERATURE: 98 F | HEIGHT: 64 IN | DIASTOLIC BLOOD PRESSURE: 77 MMHG | SYSTOLIC BLOOD PRESSURE: 125 MMHG | HEART RATE: 74 BPM | WEIGHT: 211.88 LBS | BODY MASS INDEX: 36.17 KG/M2

## 2017-12-07 DIAGNOSIS — M51.36 DDD (DEGENERATIVE DISC DISEASE), LUMBAR: ICD-10-CM

## 2017-12-07 DIAGNOSIS — M46.1 SACROILIITIS: ICD-10-CM

## 2017-12-07 DIAGNOSIS — M47.9 OSTEOARTHRITIS OF SPINE, UNSPECIFIED SPINAL OSTEOARTHRITIS COMPLICATION STATUS, UNSPECIFIED SPINAL REGION: ICD-10-CM

## 2017-12-07 DIAGNOSIS — M47.819 ARTHROPATHY OF FACET JOINTS AT MULTIPLE LEVELS: Primary | ICD-10-CM

## 2017-12-07 PROCEDURE — 99999 PR PBB SHADOW E&M-EST. PATIENT-LVL III: CPT | Mod: PBBFAC,,, | Performed by: ANESTHESIOLOGY

## 2017-12-07 PROCEDURE — 99214 OFFICE O/P EST MOD 30 MIN: CPT | Mod: S$GLB,,, | Performed by: ANESTHESIOLOGY

## 2017-12-07 RX ORDER — MELOXICAM 7.5 MG/1
7.5 TABLET ORAL
Qty: 30 TABLET | Refills: 2 | Status: SHIPPED | OUTPATIENT
Start: 2017-12-07 | End: 2018-01-06

## 2017-12-07 NOTE — LETTER
December 11, 2017      Delfina Morales PA-C  [address]  [facility] [address]           [facility]  [address]  [facility] [address]  Phone: [phone]  Fax: [phone]          Patient: Francoise Dykes   MR Number: [id number]   YOB: 1960   Date of Visit: 12/7/2017       Dear Delfina Morales:    Thank you for referring Francoise Dykes to me for evaluation. Attached you will find relevant portions of my assessment and plan of care.    If you have questions, please do not hesitate to call me. I look forward to following Francoise Dykes along with you.    Sincerely,    Mariusz Jang MD    Enclosure  CC:  No Recipients    If you would like to receive this communication electronically, please contact [email or web address] or [phone] to request more information on [employer] Link access.    For providers and/or their staff who would like to refer a patient to [facility], please contact us through our one-stop-shop provider referral line, [facility], at [phone].    If you feel you have received this communication in error or would no longer like to receive these types of communications, please e-mail [email or web address]
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
## 2017-12-15 ENCOUNTER — TELEPHONE (OUTPATIENT)
Dept: PAIN MEDICINE | Facility: HOSPITAL | Age: 57
End: 2017-12-15

## 2017-12-15 NOTE — DISCHARGE INSTRUCTIONS
Home Care Instructions Pain Management:    1.  DIET:    You may resume your normal diet today.    2.  BATHING:    You may shower with luke warm water.    3.  DRESSING:    You may remove your bandage today.    4.  ACTIVITY LEVEL:      You may resume your normal activities 24 hours after your procedure.    5.  MEDICATIONS:    You may resume your normal medications today.    6.  SPECIAL INSTRUCTIONS:    No heat to the injection site for 24 hours including bath or shower, heating pad, moist heat or hot tubs.    Use an ice pack to the injection site for any pain or discomfort.  Apply ice packs for 20 minute intervals as needed.    If you have received any sedatives by mouth today, you can not drive for 12 hours.    If you have received sedation through an IV, you can not drive for 24 hours.    PLEASE CALL YOUR DOCTOR FOR THE FOLLOWIN.  Redness or swelling around the injection site.  2.  Fever of 101 degrees.  3.  Drainage (pus) from the injection site.  4.  For any continuous bleeding (some dried blood over the incision is normal.)    FOR EMERGENCIES:    If any unusual problems or difficulties occur during clinic hours, call [phone] or dial [phone].    Follow up with with your physician in 2-3 weeks.         Recovery After Procedural Sedation (Adult)  You have been given medicine by vein to make you sleep during your surgery. This may have included both a pain medicine and sleeping medicine. Most of the effects have worn off. But you may still have some drowsiness for the next 6 to 8 hours.  Home care  Follow these guidelines when you get home:  · For the next 8 hours, you should be watched by a responsible adult. This person should make sure your condition is not getting worse.  · Don't drink any alcohol for the next 24 hours.  · Don't drive, operate dangerous machinery, or make important business or personal decisions during the next 24 hours.  Note: Your healthcare provider may tell you not to take any  medicine by mouth for pain or sleep in the next 4 hours. These medicines may react with the medicines you were given in the hospital. This could cause a much stronger response than usual.  Follow-up care  Follow up with your healthcare provider if you are not alert and back to your usual level of activity within 12 hours.  When to seek medical advice  Call your healthcare provider right away if any of these occur:  · Drowsiness gets worse  · Weakness or dizziness gets worse  · Repeated vomiting  · You can't be awakened   Date Last Reviewed: 10/18/2016  © 7768-4263 [employer]. [address] [place] [id number]. All rights reserved. This information is not intended as a substitute for professional medical care. Always follow your healthcare professional's instructions.

## 2017-12-19 ENCOUNTER — SURGERY (OUTPATIENT)
Age: 57
End: 2017-12-19

## 2017-12-19 ENCOUNTER — HOSPITAL ENCOUNTER (OUTPATIENT)
Facility: HOSPITAL | Age: 57
Discharge: HOME | End: 2017-12-19
Attending: ANESTHESIOLOGY | Admitting: ANESTHESIOLOGY
Payer: COMMERCIAL

## 2017-12-19 VITALS
DIASTOLIC BLOOD PRESSURE: 77 MMHG | TEMPERATURE: 99 F | BODY MASS INDEX: 35.68 KG/M2 | SYSTOLIC BLOOD PRESSURE: 142 MMHG | HEART RATE: 71 BPM | OXYGEN SATURATION: 100 % | HEIGHT: 64 IN | WEIGHT: 209 LBS | RESPIRATION RATE: 14 BRPM

## 2017-12-19 DIAGNOSIS — M51.37 DEGENERATION OF LUMBAR OR LUMBOSACRAL INTERVERTEBRAL DISC: ICD-10-CM

## 2017-12-19 DIAGNOSIS — G89.29 CHRONIC PAIN: ICD-10-CM

## 2017-12-19 DIAGNOSIS — M54.17 LUMBOSACRAL RADICULOPATHY: Primary | ICD-10-CM

## 2017-12-19 PROCEDURE — 64493 INJ PARAVERT F JNT L/S 1 LEV: CPT | Mod: 50 | Performed by: ANESTHESIOLOGY

## 2017-12-19 PROCEDURE — 64495 INJ PARAVERT F JNT L/S 3 LEV: CPT | Mod: 50 | Performed by: ANESTHESIOLOGY

## 2017-12-19 PROCEDURE — 64495 INJ PARAVERT F JNT L/S 3 LEV: CPT | Mod: LT,,, | Performed by: ANESTHESIOLOGY

## 2017-12-19 PROCEDURE — 64494 INJ PARAVERT F JNT L/S 2 LEV: CPT | Mod: LT,,, | Performed by: ANESTHESIOLOGY

## 2017-12-19 PROCEDURE — 25000003 PHARM REV CODE 250: Performed by: ANESTHESIOLOGY

## 2017-12-19 PROCEDURE — 99152 MOD SED SAME PHYS/QHP 5/>YRS: CPT | Mod: ,,, | Performed by: ANESTHESIOLOGY

## 2017-12-19 PROCEDURE — 63600175 PHARM REV CODE 636 W HCPCS: Performed by: ANESTHESIOLOGY

## 2017-12-19 PROCEDURE — 64494 INJ PARAVERT F JNT L/S 2 LEV: CPT | Mod: 50 | Performed by: ANESTHESIOLOGY

## 2017-12-19 PROCEDURE — 64493 INJ PARAVERT F JNT L/S 1 LEV: CPT | Mod: RT,,, | Performed by: ANESTHESIOLOGY

## 2017-12-19 RX ORDER — DEXAMETHASONE SODIUM PHOSPHATE 10 MG/ML
INJECTION INTRAMUSCULAR; INTRAVENOUS
Status: DISCONTINUED | OUTPATIENT
Start: 2017-12-19 | End: 2017-12-19 | Stop reason: HOSPADM

## 2017-12-19 RX ORDER — LIDOCAINE HYDROCHLORIDE 10 MG/ML
INJECTION INFILTRATION; PERINEURAL
Status: DISCONTINUED | OUTPATIENT
Start: 2017-12-19 | End: 2017-12-19 | Stop reason: HOSPADM

## 2017-12-19 RX ORDER — FENTANYL CITRATE 50 UG/ML
INJECTION, SOLUTION INTRAMUSCULAR; INTRAVENOUS
Status: DISCONTINUED | OUTPATIENT
Start: 2017-12-19 | End: 2017-12-19 | Stop reason: HOSPADM

## 2017-12-19 RX ORDER — SODIUM CHLORIDE 9 MG/ML
500 INJECTION, SOLUTION INTRAVENOUS CONTINUOUS
Status: DISCONTINUED | OUTPATIENT
Start: 2017-12-19 | End: 2017-12-19 | Stop reason: HOSPADM

## 2017-12-19 RX ORDER — BUPIVACAINE HYDROCHLORIDE 2.5 MG/ML
INJECTION, SOLUTION EPIDURAL; INFILTRATION; INTRACAUDAL
Status: DISCONTINUED | OUTPATIENT
Start: 2017-12-19 | End: 2017-12-19 | Stop reason: HOSPADM

## 2017-12-19 RX ORDER — SODIUM CHLORIDE 9 MG/ML
INJECTION, SOLUTION INTRAVENOUS CONTINUOUS
Status: CANCELLED | OUTPATIENT
Start: 2017-12-19

## 2017-12-19 RX ORDER — MIDAZOLAM HYDROCHLORIDE 1 MG/ML
INJECTION, SOLUTION INTRAMUSCULAR; INTRAVENOUS
Status: DISCONTINUED | OUTPATIENT
Start: 2017-12-19 | End: 2017-12-19 | Stop reason: HOSPADM

## 2017-12-19 RX ADMIN — FENTANYL CITRATE 50 MCG: 50 INJECTION, SOLUTION INTRAMUSCULAR; INTRAVENOUS at 03:12

## 2017-12-19 RX ADMIN — DEXAMETHASONE SODIUM PHOSPHATE 10 MG: 10 INJECTION, SOLUTION INTRAMUSCULAR; INTRAVENOUS at 03:12

## 2017-12-19 RX ADMIN — SODIUM CHLORIDE 500 ML: 0.9 INJECTION, SOLUTION INTRAVENOUS at 02:12

## 2017-12-19 RX ADMIN — LIDOCAINE HYDROCHLORIDE 5 ML: 10 INJECTION, SOLUTION INFILTRATION; PERINEURAL at 03:12

## 2017-12-19 RX ADMIN — MIDAZOLAM 2 MG: 1 INJECTION INTRAMUSCULAR; INTRAVENOUS at 03:12

## 2017-12-19 RX ADMIN — BUPIVACAINE HYDROCHLORIDE 5 ML: 2.5 INJECTION, SOLUTION EPIDURAL; INFILTRATION; INTRACAUDAL; PERINEURAL at 03:12
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
## 2017-12-19 NOTE — OP NOTE
"Patient Name: Francoise Dykes  MRN: [id number]    INFORMED CONSENT: The procedure, risks, benefits and options were discussed with patient. There are no contraindications to the procedure. The patient expressed understanding and agreed to proceed. The personnel performing the procedure was discussed. I verify that I personally obtained Francoise's consent prior to the start of the procedure and the signed consent can be found on the patient's chart.    Procedure Date: 12/19/2017    Anesthesia: Topical    Pre Procedure diagnosis: Lumbar spondylosis [M47.816]  1. Lumbosacral radiculopathy    2. Degeneration of lumbar or lumbosacral intervertebral disc    3. Chronic pain      Post-Procedure diagnosis: SAME      Moderate Sedation: Yes - Fentanyl 100 mcg and Midazolam 2 mg      PROCEDURE: bilateral L2-3-4-5 LUMBAR FACET MEDIAL BRANCH NERVE BLOCK          DESCRIPTION OF PROCEDURE:The patient was brought to the procedure room.  After performing time out IV access was obtained prior to the procedure. The patient was positioned prone on the fluoroscopy table. Continuous hemodynamic monitoring was initiated including blood pressure, EKG, and pulse oximetry. The area of the lumbar spine was prepped chlorhexidine and draped into a sterile field. Fluoroscopy was used to identify the location of the bilateral side L2, L3, L4, and L5 medial branch nerves at the junctions of the superior articular process and the transverse processes of L3, L4, L5, and the sacral ala respectively. Skin anesthesia was achieved using 5 cc of Lidocaine 1% over the injection sites. A 22 gauge, 3 1/2" spinal needle was slowly inserted at each level using AP, lateral and oblique fluoroscopic imaging. Negative aspiration for blood or CSF was confirmed.  8 ml bupivacaine 0.25%was injected at all sites.. The needles were removed and bleeding was nil. A sterile dressing was applied. No specimens collected. Francoise was taken back to the recovery room for further " observation.     Blood Loss: Nill  Specimen: None        Discharge Diagnosis: Same as Pre and Post Procedure  Condition on Discharge: Stable.  Diet on Discharge: Same as before.  Activity: as per instruction sheet.  Discharge to: Home with a responsible adult.  Follow up: as per Discharge instructions        Mariusz Jang MD

## 2017-12-25 ENCOUNTER — PATIENT MESSAGE (OUTPATIENT)
Dept: ELECTROPHYSIOLOGY | Facility: CLINIC | Age: 57
End: 2017-12-25

## 2017-12-26 ENCOUNTER — TELEPHONE (OUTPATIENT)
Dept: ELECTROPHYSIOLOGY | Facility: CLINIC | Age: 57
End: 2017-12-26

## 2017-12-26 ENCOUNTER — HOSPITAL ENCOUNTER (OUTPATIENT)
Facility: HOSPITAL | Age: 57
Discharge: HOME | End: 2017-12-27
Attending: EMERGENCY MEDICINE | Admitting: HOSPITALIST
Payer: COMMERCIAL

## 2017-12-26 DIAGNOSIS — I49.9 CARDIAC RHYTHM DISORDER OR DISTURBANCE OR CHANGE: ICD-10-CM

## 2017-12-26 DIAGNOSIS — I48.92 ATRIAL FLUTTER: ICD-10-CM

## 2017-12-26 DIAGNOSIS — R07.9 CHEST PAIN: ICD-10-CM

## 2017-12-26 DIAGNOSIS — I48.4 ATYPICAL ATRIAL FLUTTER: Primary | ICD-10-CM

## 2017-12-26 DIAGNOSIS — I48.92 ATRIAL FLUTTER, UNSPECIFIED TYPE: ICD-10-CM

## 2017-12-26 LAB
ALBUMIN SERPL BCP-MCNC: 3.9 G/DL
ALP SERPL-CCNC: 91 U/L
ALT SERPL W/O P-5'-P-CCNC: 17 U/L
ANION GAP SERPL CALC-SCNC: 9 MMOL/L
AST SERPL-CCNC: 15 U/L
BACTERIA #/AREA URNS AUTO: ABNORMAL /HPF
BASOPHILS # BLD AUTO: 0.05 K/UL
BASOPHILS NFR BLD: 0.8 %
BILIRUB SERPL-MCNC: 1 MG/DL
BILIRUB UR QL STRIP: NEGATIVE
BNP SERPL-MCNC: 100 PG/ML
BUN SERPL-MCNC: 16 MG/DL
CALCIUM SERPL-MCNC: 9.8 MG/DL
CHLORIDE SERPL-SCNC: 108 MMOL/L
CLARITY UR REFRACT.AUTO: CLEAR
CO2 SERPL-SCNC: 24 MMOL/L
COLOR UR AUTO: YELLOW
CREAT SERPL-MCNC: 0.9 MG/DL
DIFFERENTIAL METHOD: ABNORMAL
EOSINOPHIL # BLD AUTO: 0.2 K/UL
EOSINOPHIL NFR BLD: 2.7 %
ERYTHROCYTE [DISTWIDTH] IN BLOOD BY AUTOMATED COUNT: 12.6 %
EST. GFR  (AFRICAN AMERICAN): >60 ML/MIN/1.73 M^2
EST. GFR  (NON AFRICAN AMERICAN): >60 ML/MIN/1.73 M^2
GLUCOSE SERPL-MCNC: 96 MG/DL
GLUCOSE UR QL STRIP: NEGATIVE
HCT VFR BLD AUTO: 41.9 %
HGB BLD-MCNC: 14.7 G/DL
HGB UR QL STRIP: ABNORMAL
IMM GRANULOCYTES # BLD AUTO: 0.1 K/UL
IMM GRANULOCYTES NFR BLD AUTO: 1.7 %
KETONES UR QL STRIP: NEGATIVE
LEUKOCYTE ESTERASE UR QL STRIP: NEGATIVE
LYMPHOCYTES # BLD AUTO: 1.5 K/UL
LYMPHOCYTES NFR BLD: 24.2 %
MCH RBC QN AUTO: 28.3 PG
MCHC RBC AUTO-ENTMCNC: 35.1 G/DL
MCV RBC AUTO: 81 FL
MICROSCOPIC COMMENT: ABNORMAL
MONOCYTES # BLD AUTO: 0.5 K/UL
MONOCYTES NFR BLD: 7.5 %
NEUTROPHILS # BLD AUTO: 3.8 K/UL
NEUTROPHILS NFR BLD: 63.1 %
NITRITE UR QL STRIP: NEGATIVE
NRBC BLD-RTO: 0 /100 WBC
PH UR STRIP: 6 [PH] (ref 5–8)
PLATELET # BLD AUTO: 231 K/UL
PMV BLD AUTO: 10.5 FL
POTASSIUM SERPL-SCNC: 4 MMOL/L
PROT SERPL-MCNC: 8.1 G/DL
PROT UR QL STRIP: NEGATIVE
RBC # BLD AUTO: 5.19 M/UL
RBC #/AREA URNS AUTO: 2 /HPF (ref 0–4)
SODIUM SERPL-SCNC: 141 MMOL/L
SP GR UR STRIP: 1.01 (ref 1–1.03)
SQUAMOUS #/AREA URNS AUTO: 4 /HPF
TROPONIN I SERPL DL<=0.01 NG/ML-MCNC: <0.006 NG/ML
TSH SERPL DL<=0.005 MIU/L-ACNC: 0.63 UIU/ML
URN SPEC COLLECT METH UR: ABNORMAL
UROBILINOGEN UR STRIP-ACNC: NEGATIVE EU/DL
WBC # BLD AUTO: 5.99 K/UL
WBC #/AREA URNS AUTO: 2 /HPF (ref 0–5)

## 2017-12-26 PROCEDURE — 87088 URINE BACTERIA CULTURE: CPT

## 2017-12-26 PROCEDURE — 99285 EMERGENCY DEPT VISIT HI MDM: CPT | Mod: 25

## 2017-12-26 PROCEDURE — 81001 URINALYSIS AUTO W/SCOPE: CPT

## 2017-12-26 PROCEDURE — 87186 SC STD MICRODIL/AGAR DIL: CPT

## 2017-12-26 PROCEDURE — 99285 EMERGENCY DEPT VISIT HI MDM: CPT | Mod: ,,, | Performed by: PHYSICIAN ASSISTANT

## 2017-12-26 PROCEDURE — 83880 ASSAY OF NATRIURETIC PEPTIDE: CPT

## 2017-12-26 PROCEDURE — 25000003 PHARM REV CODE 250: Performed by: HOSPITALIST

## 2017-12-26 PROCEDURE — 87077 CULTURE AEROBIC IDENTIFY: CPT

## 2017-12-26 PROCEDURE — 96360 HYDRATION IV INFUSION INIT: CPT

## 2017-12-26 PROCEDURE — 93005 ELECTROCARDIOGRAM TRACING: CPT

## 2017-12-26 PROCEDURE — 84443 ASSAY THYROID STIM HORMONE: CPT

## 2017-12-26 PROCEDURE — 85025 COMPLETE CBC W/AUTO DIFF WBC: CPT

## 2017-12-26 PROCEDURE — G0378 HOSPITAL OBSERVATION PER HR: HCPCS

## 2017-12-26 PROCEDURE — 99220 PR INITIAL OBSERVATION CARE,LEVL III: CPT | Mod: ,,, | Performed by: HOSPITALIST

## 2017-12-26 PROCEDURE — 99214 OFFICE O/P EST MOD 30 MIN: CPT | Mod: ,,, | Performed by: INTERNAL MEDICINE

## 2017-12-26 PROCEDURE — 84484 ASSAY OF TROPONIN QUANT: CPT

## 2017-12-26 PROCEDURE — 87086 URINE CULTURE/COLONY COUNT: CPT

## 2017-12-26 PROCEDURE — 25000003 PHARM REV CODE 250: Performed by: PHYSICIAN ASSISTANT

## 2017-12-26 PROCEDURE — 93010 ELECTROCARDIOGRAM REPORT: CPT | Mod: ,,, | Performed by: INTERNAL MEDICINE

## 2017-12-26 PROCEDURE — 80053 COMPREHEN METABOLIC PANEL: CPT

## 2017-12-26 RX ORDER — SODIUM CHLORIDE 0.9 % (FLUSH) 0.9 %
3 SYRINGE (ML) INJECTION
Status: DISCONTINUED | OUTPATIENT
Start: 2017-12-26 | End: 2017-12-27 | Stop reason: HOSPADM

## 2017-12-26 RX ORDER — ACETAMINOPHEN 325 MG/1
650 TABLET ORAL EVERY 6 HOURS PRN
Status: DISCONTINUED | OUTPATIENT
Start: 2017-12-26 | End: 2017-12-27 | Stop reason: HOSPADM

## 2017-12-26 RX ORDER — POLYETHYLENE GLYCOL 3350 17 G/17G
17 POWDER, FOR SOLUTION ORAL DAILY PRN
Status: DISCONTINUED | OUTPATIENT
Start: 2017-12-26 | End: 2017-12-27 | Stop reason: HOSPADM

## 2017-12-26 RX ORDER — DILTIAZEM HYDROCHLORIDE 120 MG/1
120 CAPSULE, COATED, EXTENDED RELEASE ORAL DAILY
Status: DISCONTINUED | OUTPATIENT
Start: 2017-12-26 | End: 2017-12-27 | Stop reason: HOSPADM

## 2017-12-26 RX ORDER — ONDANSETRON 2 MG/ML
4 INJECTION INTRAMUSCULAR; INTRAVENOUS EVERY 8 HOURS PRN
Status: DISCONTINUED | OUTPATIENT
Start: 2017-12-26 | End: 2017-12-27 | Stop reason: HOSPADM

## 2017-12-26 RX ADMIN — APIXABAN 5 MG: 5 TABLET, FILM COATED ORAL at 09:12

## 2017-12-26 RX ADMIN — DILTIAZEM HYDROCHLORIDE 120 MG: 120 CAPSULE, COATED, EXTENDED RELEASE ORAL at 03:12

## 2017-12-26 RX ADMIN — PROPAFENONE HYDROCHLORIDE 225 MG: 150 TABLET, FILM COATED ORAL at 09:12

## 2017-12-26 RX ADMIN — SODIUM CHLORIDE 1000 ML: 0.9 INJECTION, SOLUTION INTRAVENOUS at 10:12

## 2017-12-26 NOTE — ED TRIAGE NOTES
Pt reports that Sunday night she began having symptoms of a rapid heart rate. Pt reports intermittent light headedness as well. Pt was instructed by her cardiologist to double her Diltiazem which did not work.

## 2017-12-26 NOTE — ASSESSMENT & PLAN NOTE
Francoise Dykes is a 56 y.o. Aflutter s/p PVI and SVC ablation 12/2015, and repeat PVI 12/2016 (on eliquis for a CHADS-VASC score of 2) is admitted to the [facility] [place] for reoccurrance of atypical aflutter.    Plan:  SAMMIE and DCCV tomorrow  Continue patient's Eliquis, rythmol and dilt at her current doses.     Discussed with Dr. Purdy

## 2017-12-26 NOTE — ED PROVIDER NOTES
"Encounter Date: 2017    SCRIBE #1 NOTE: I, Chase Collins, am scribing for, and in the presence of,  Dr. Thomas. I have scribed the following portions of the note - the EKG reading and the APC attestation.       History     Chief Complaint   Patient presents with    Irregular Heart Beat     onset 2 days ago "a flutter" feels sob w/ exertion .      Patient is a 57-year-old female with a past medical history of paroxysmal A. fib status post ablations and cardioversions who presents the ED with palpitations.  Patient states  night, 2 days ago, she was walking and looking at [place] lights when she developed onset of palpitations.  She endorses intermittent lightheadedness with movement.  She states that she contacted her cardiologist who told her to take 2 doses of diltiazem.  She states that she has been compliant, but did not take her medications this morning.  She noticed urinary frequency 2 nights during onset of palpitations ago that resolved. She denies any fever, chills, chest pain, cough, SOB, abdominal pain, diarrhea, lower extremity pain, lower extremity swelling.      The history is provided by the patient and medical records.     Review of patient's allergies indicates:   Allergen Reactions    Adhesive tape-silicones Itching     Manifested in 2015 following AF ablation.     Past Medical History:   Diagnosis Date    Allergy     seasonal    Atrial fibrillation     Essential hypertension 2017    Pt states that she does not have HTN.      Past Surgical History:   Procedure Laterality Date     SECTION      heart ablation      HYSTERECTOMY      MIGUEL     Family History   Problem Relation Age of Onset    Hypertension Mother     Diabetes Mother     Glaucoma Mother     Asbestos Father     Obesity Father     Eczema Son     Hypertension Son     Heart disease Maternal Grandmother      chf    Diabetes Maternal Grandfather     Cancer Paternal Grandmother      lung, 2/2 " second hand smoke    Glaucoma Paternal Grandfather     Blindness Paternal Grandfather     Melanoma Neg Hx     Psoriasis Neg Hx     Lupus Neg Hx     Acne Neg Hx     Breast cancer Neg Hx     Colon cancer Neg Hx     Ovarian cancer Neg Hx      Social History   Substance Use Topics    Smoking status: Never Smoker    Smokeless tobacco: Never Used    Alcohol use Yes      Comment: rarely, 1 drink/week     Review of Systems   Constitutional: Negative for fever.   HENT: Negative for nosebleeds and sore throat.    Eyes: Negative for photophobia.   Respiratory: Negative for cough and shortness of breath.    Cardiovascular: Positive for palpitations. Negative for chest pain and leg swelling.   Gastrointestinal: Negative for nausea.   Genitourinary: Positive for frequency. Negative for dysuria, hematuria and urgency.   Musculoskeletal: Negative for back pain.   Skin: Negative for rash.   Neurological: Positive for light-headedness. Negative for weakness and headaches.   Hematological: Does not bruise/bleed easily.       Physical Exam     Initial Vitals [12/26/17 0847]   BP Pulse Resp Temp SpO2   127/85 (!) 113 16 98.9 °F (37.2 °C) 99 %      MAP       99         Physical Exam    Nursing note and vitals reviewed.  Constitutional: She appears well-developed and well-nourished. She is not diaphoretic. No distress.   HENT:   Head: Normocephalic and atraumatic.   Nose: Nose normal.   Eyes: Conjunctivae and EOM are normal.   Neck: Normal range of motion.   Cardiovascular: Normal rate, regular rhythm and normal heart sounds. Exam reveals no friction rub.    No murmur heard.  No peripheral edema or calf tenderness.    Pulmonary/Chest: Breath sounds normal. No respiratory distress. She has no wheezes. She has no rales.   Abdominal: Soft. Bowel sounds are normal. She exhibits no distension. There is no tenderness. There is no rebound.   Musculoskeletal: Normal range of motion.   Neurological: She is alert and oriented to person,  place, and time. She has normal strength. No sensory deficit.   Skin: Skin is warm and dry. No erythema. No pallor.   Psychiatric: She has a normal mood and affect. Her behavior is normal. Judgment and thought content normal.         ED Course   Procedures  Labs Reviewed   CBC W/ AUTO DIFFERENTIAL - Abnormal; Notable for the following:        Result Value    MCV 81 (*)     Immature Granulocytes 1.7 (*)     Immature Grans (Abs) 0.10 (*)     All other components within normal limits   B-TYPE NATRIURETIC PEPTIDE - Abnormal; Notable for the following:      (*)     All other components within normal limits   URINALYSIS, REFLEX TO URINE CULTURE - Abnormal; Notable for the following:     Occult Blood UA 1+ (*)     All other components within normal limits    Narrative:     Preferred Collection Type->Urine, Clean Catch   URINALYSIS MICROSCOPIC - Abnormal; Notable for the following:     Bacteria, UA Many (*)     All other components within normal limits    Narrative:     Preferred Collection Type->Urine, Clean Catch   CULTURE, URINE   COMPREHENSIVE METABOLIC PANEL   TROPONIN I   TSH     EKG Readings: (Independently Interpreted)   Sinus tachycardia at a rate of 114 bpm. Normal axis, normal intervals, inferior T-wave inversions and flattened T-waves in the anterior and low lateral leads, no evidence of fib or flutter.           Medical Decision Making:   History:   Old Medical Records: I decided to obtain old medical records.  Independently Interpreted Test(s):   I have ordered and independently interpreted EKG Reading(s) - see prior notes  Clinical Tests:   Lab Tests: Ordered and Reviewed  Medical Tests: Ordered and Reviewed  Other:   I have discussed this case with another health care provider.       APC / Resident Notes:   Patient is a 57-year-old female that presents the ED with palpitations and lightheadedness.    On exam, vital signs stable.  NAD and nontoxic appearing.  Tachycardic at 109.  Regular rhythm.  Lungs  clear to auscultations bilaterally.  No peripheral edema or calf tenderness.    ECG with atrial flutter at a rate of 114 bpm.    CBC, CMP, TSH, troponin, and BNP benign.    UA with no signs of infection.    1:19 PM  Will place in obs with hospital medicine for further evaluation and management of atrial flutter. Most likely SAMMIE/DCCV.       Scribe Attestation:   Scribe #1: I performed the above scribed service and the documentation accurately describes the services I performed. I attest to the accuracy of the note.    Attending Attestation:     Physician Attestation Statement for NP/PA:   I have conducted a face to face encounter with this patient in addition to the NP/PA, due to Medical Complexity    Other NP/PA Attestation Additions:      Medical Decision Making: Patient appears to have A-flutter. Cardiology consulted and decided to admit the patient to their service. At this time the patient is asymptomatic.                   ED Course      Clinical Impression:   The primary encounter diagnosis was Atypical atrial flutter. Diagnoses of Chest pain, Atrial flutter, unspecified type, Atrial flutter, and Cardiac rhythm disorder or disturbance or change were also pertinent to this visit.    Disposition:   Disposition: Placed in Observation                Sarah Cuellar PA-C  12/28/17 0174       Sudarshan Thomas MD  01/10/18 1292

## 2017-12-26 NOTE — H&P
"History & Physical  [facility] [place] [facility]    SUBJECTIVE:   Chief Complaint/Reason for Admission: palpitations    History of Present Illness:  Patient is a 57 y.o. female with PMH of paroxysmal A.flutter s/p multiple DCCV and PVI ablations presents to ED today for evaluation of palpitations.  Pt reports she has been in normal state of health, compliant with all medications.  She was watching a [employer] program 12/24 evening, and distinctly noticed feeling "fluttering" in her chest at 2230.  Her iwatch noted HR to be .  She called her Cardiology clinic and was asked to take an extra dose of Diltiazem, which she did.  Over the next 1.5 days, she still felt fatigued and with palpitations, so she came to ED today.  She did miss 3 days of Apixaban 1 week ago, as she was instructed to hold this medication to get steroid injection for back pain.  She denies light-headedness, chest pain, or SOB.  Only difference between this episode and prior is that she was awake when symptoms started; other times were noted upon awakening (event occurred overnight).    Of note, patient follows with Dr. Lee Meyers. Patient symptomatic Afib with palpitaitons and SOB started in 2012 and coverted to sinus bradycardia after she was placed on diltiazem ggt, initially placed on Flecainide and Toprol but she developed SOB on flecainide, she was placed on Rhythmol 600 mgs PRN. She developed recurrent AF and underwent PVI ablation 12/7/2015 and was placed on amidarone between 1/2016 and 8/2016.  After her amidarone was stopped, she developed atypical atrial flutter and was placed on Rhythmol and underwent DCCV. She underwent redo PVI and SVC ablation on 12/15/2016 as well as mid-sal AT ablation and discharged on Rhythmol 225 mg BID. Her rhythmol was stopped in 3/2016 and she developed recurrent atypical flutter 2:1 so her rhythmol was restarted. She was admitted in 3/2017 with Atrial Flutter and converted spontanously after SAMMIE but before DCCV. " Last time patient was seen in EP clinic she was asymptomatic and her Diltiazem was decreased to 120 mgs daily and she has done well until now.    Old chart was reviewed.    Past Medical History:   Diagnosis Date    Allergy     seasonal    Atrial fibrillation     Essential hypertension 2017    Pt states that she does not have HTN.        Past Surgical History:   Procedure Laterality Date     SECTION      heart ablation      HYSTERECTOMY      MIGUEL      Family History   Problem Relation Age of Onset    Hypertension Mother     Diabetes Mother     Glaucoma Mother     Asbestos Father     Obesity Father     Eczema Son     Hypertension Son     Heart disease Maternal Grandmother      chf    Diabetes Maternal Grandfather     Cancer Paternal Grandmother      lung, 2/2 second hand smoke    Glaucoma Paternal Grandfather     Blindness Paternal Grandfather     Melanoma Neg Hx     Psoriasis Neg Hx     Lupus Neg Hx     Acne Neg Hx     Breast cancer Neg Hx     Colon cancer Neg Hx     Ovarian cancer Neg Hx        Social History   Substance Use Topics    Smoking status: Never Smoker    Smokeless tobacco: Never Used    Alcohol use Yes      Comment: rarely, 1 drink/week      Review of patient's allergies indicates:   Allergen Reactions    Adhesive tape-silicones Itching     Manifested in 2015 following AF ablation.       No current facility-administered medications on file prior to encounter.      Current Outpatient Prescriptions on File Prior to Encounter   Medication Sig Dispense Refill    apixaban 5 mg Tab Take 1 tablet (5 mg total) by mouth 2 (two) times daily. 60 tablet 11    azelastine (ASTELIN) 137 mcg (0.1 %) nasal spray 1 spray (137 mcg total) by Nasal route 2 (two) times daily. 30 mL 3    diltiaZEM (DILACOR XR) 120 MG CDCR Take 1 capsule (120 mg total) by mouth once daily. 90 capsule 3    fluticasone (FLONASE) 50 mcg/actuation nasal spray 1 spray by Each Nare route 2 (two)  times daily. 1 Bottle 3    hydrocortisone 0.5 % cream Apply topically daily as needed.      ibuprofen (ADVIL,MOTRIN) 800 MG tablet Take 1 tablet (800 mg total) by mouth 3 (three) times daily with meals. 270 tablet 0    propafenone (RYTHMOL SR) 225 MG Cp12 Take 1 capsule (225 mg total) by mouth every 12 (twelve) hours. 60 capsule 11    cetirizine (ZYRTEC) 10 MG tablet Take 10 mg by mouth once daily.      meloxicam (MOBIC) 7.5 MG tablet Take 1 tablet (7.5 mg total) by mouth daily with breakfast. 30 tablet 2    triamcinolone acetonide 0.1% (KENALOG) 0.1 % cream Apply topically 2 (two) times daily. 45 g 1        Review of Systems:  Constitutional: no fever or chills  Eyes: no visual changes  ENT: positive for nasal congestion  Respiratory: no cough or shortness of breath  Cardiovascular: positive for palpitations  Gastrointestinal: no nausea or vomiting, no abdominal pain or change in bowel habits  Genitourinary: no hematuria or dysuria  Allergy/Immunology: itchy/watery eyes  Musculoskeletal: no arthralgias or myalgias  Neurological: no seizures or tremors  Endocrine: no heat or cold intolerance     OBJECTIVE:     Vital Signs (Most Recent)   Temp: 98.9 °F (37.2 °C) (12/26/17 0847)  Pulse: (!) 112 (12/26/17 1532)  Resp: 16 (12/26/17 1203)  BP: 118/74 (12/26/17 1532)  SpO2: 99 % (12/26/17 1532)  Body mass index is 35.87 kg/m².      Physical Exam:  Gen- well-developed, well-nourished, NAD  Head- NC/AT, PERRL, EOMI, no oral lesions  Neck- supple, trachea midline, no cervical LAD  CVS- S1 and S2 present, tachycardic, regular rhythm, no murmurs  Resp- CTA b/l, no work of breathing  Abd- BS+, soft, NT, ND  Ext- no clubbing, cyanosis or edema  Skin- warm, no rashes  Neuro- alert and oriented x 3, full strength in all extremities; exam non-focal    Laboratory:  CBC/Anemia Labs: Coags:      Recent Labs  Lab 12/26/17  1012   WBC 5.99   HGB 14.7   HCT 41.9      MCV 81*   RDW 12.6    No results for input(s): PT, INR,  APTT in the last 168 hours.     Chemistries: ABG:     Recent Labs  Lab 12/26/17  1012      K 4.0      CO2 24   BUN 16   CREATININE 0.9   CALCIUM 9.8   PROT 8.1   BILITOT 1.0   ALKPHOS 91   ALT 17   AST 15    No results for input(s): PH, PCO2, PO2, HCO3, POCSATURATED, BE in the last 168 hours.       Cardiac Enzymes: Ejection Fractions:    Recent Labs      12/26/17   1012   TROPONINI  <0.006    EF   Date Value Ref Range Status   08/22/2017 60 55 - 65    03/20/2017 48 55 - 65           Diagnostic Results:  Labs: Reviewed  ECG: Reviewed      ASSESSMENT/PLAN:     Paroxysmal Atrial Flutter- atypical  -CHADS-Vasc 1  -Unclear etiology for recurrence  -Appreciate EP recs; plan for SAMMIE and DCCV tomorrow; will keep NPO past midnight  -Continue Propafenone 225mg PO BID  -Continue Diltiazem 120mg PO Daily  -Continue Apixaban 5mg PO BID  -electrolytes at goal    Seasonal Allergies  -per pt, controlled and not using any medications    DVT ppx- Apixaban  CODE status- FULL    Dispo- home tomorrow pending clinical condition    Tari Calix MD  Hospital Medicine Staff

## 2017-12-26 NOTE — ED NOTES
The patient is awake, alert and acting age appropriately.    No apparent distress noted. Airway is open and patent.  Respirations with normal effort and rate noted. Explanation of care provided to patient. No needs at this time. Will continue to monitor.

## 2017-12-26 NOTE — ED NOTES
The patient is awake, alert and acting age appropriately. Family at bedside.    No apparent distress noted. Airway is open and patent.  Respirations with normal effort and rate noted. Heart rate is 112 and rhythm is sinus tachycardia. Explanation of care provided to family and patient. No needs at this time. Will continue to monitor.

## 2017-12-26 NOTE — HPI
Francoise Dykes is a 56 y.o. Aflutter s/p PVI and SVC ablation 12/2015, and repeat PVI 12/2016 (on eliquis for a CHADS-VASC score of 1) [place] [facility] [place].      Previous AF/ AFL hx and anti-arrhythmic hx:   Initially, had PAfib in 2012, chemically converted to NSR with Flecainide and then started on toprol 50 mg daily and started on aspirin  In 01/14 switched from Flecainide to Rythmol for concern that Flec was causing SOB  In 07/15 patient reported that she was still taking flec and had another episode of AF with RVR. Took rythmol and converted to (AFL vs SVT as per Dr. Meyers's last note).   In 12/15 patient underwent PVI and SVC ablation. Then started on Amiodarone.   In 03/16 Amiodarone was stopped.  In 08/16 had atypical aflutter and s/p DCCV successfully and started on rythmol 225 mg BID.  In 12/16 underwent repeat PVI, right pulm veins re-isolated and a mid-sal AT was targeted and sucessfully ablated. Continued on Rythmol 225 mg BID  In 03/17 to 06/17 Rythmol dc'd and then restarted. Diltiazem also continued.   In 08/17 Ms. Dykes presented with atypical atrial flutter with RVR, and underwent SAMMIE/DCCV. Notably, Ms. Dykes missed her AM dose of Rythmol. She was continued on Rythmol.    Patient explains that she went into a fast rhythm (felt palpitations and then also check with her iwatch) on Sunday night. She has been persistently high for her which is abnormal. Reports (since she was checked it) from a HR of 99 to 113. She reports that other than heart palpitations she has no other complaints. The patient also states that she had orange juice and peter crackers recently. In addition the patient reports that she held her Eliquis for 5 doses from last Sunday to Tuesday PM (when she resumed it) for a spinal injection. She has been adherent to her Eliquis dosing since then.

## 2017-12-26 NOTE — ED NOTES
The patient is awake, alert and acting age appropriately.    No apparent distress noted. Airway is open and patent.  Respirations with normal effort and rate noted. Pt is in a flutter with a rate of 112.  Explanation of care provided to patient. No needs at this time. Will continue to monitor.

## 2017-12-26 NOTE — ED NOTES
Pt is alert and oriented x 4 and in no acute distress. Respirations are spontaneous with normal rate and effort. Pt has no complaints at this time. Cardiac monitor shows sinus tachycardia.

## 2017-12-26 NOTE — ED NOTES
LOC: The patient is awake, alert and aware of environment with an appropriate affect, the patient is oriented x 3 and speaking appropriately.  APPEARANCE: Patient resting comfortably and in no acute distress, patient is clean and well groomed, patient's clothing is properly fastened.  SKIN: The skin is warm and dry, patient has normal skin turgor and moist mucus membranes, skin intact, no breakdown or brusing noted.  MUSKULOSKELETAL: Patient moving all extremities well, no obvious swelling or deformities noted.  RESPIRATORY: Airway is open and patent, respirations are spontaneous, patient has a normal effort and rate. Breath sounds are clear and equal bilaterally.  CARDIAC: Normal heart sounds. No peripheral edema. Sinus tachycardia noted on the monitor. Radials pulse is rapid and regular.   ABDOMEN: Soft and non tender to palpation, no distention noted.   NEURO: No neuro deficits, hand grasp equal, no drift noted, no facial droop noted. Speech is clear.

## 2017-12-26 NOTE — SUBJECTIVE & OBJECTIVE
Past Medical History:   Diagnosis Date    Allergy     seasonal    Atrial fibrillation     Essential hypertension 2017    Pt states that she does not have HTN.        Past Surgical History:   Procedure Laterality Date     SECTION      heart ablation      HYSTERECTOMY      [facility]       Review of patient's allergies indicates:   Allergen Reactions    Adhesive tape-silicones Itching     Manifested in 2015 following AF ablation.       No current facility-administered medications on file prior to encounter.      Current Outpatient Prescriptions on File Prior to Encounter   Medication Sig    apixaban 5 mg Tab Take 1 tablet (5 mg total) by mouth 2 (two) times daily.    azelastine (ASTELIN) 137 mcg (0.1 %) nasal spray 1 spray (137 mcg total) by Nasal route 2 (two) times daily.    diltiaZEM (DILACOR XR) 120 MG CDCR Take 1 capsule (120 mg total) by mouth once daily.    fluticasone (FLONASE) 50 mcg/actuation nasal spray 1 spray by Each Nare route 2 (two) times daily.    hydrocortisone 0.5 % cream Apply topically daily as needed.    ibuprofen (ADVIL,MOTRIN) 800 MG tablet Take 1 tablet (800 mg total) by mouth 3 (three) times daily with meals.    propafenone (RYTHMOL SR) 225 MG Cp12 Take 1 capsule (225 mg total) by mouth every 12 (twelve) hours.    cetirizine (ZYRTEC) 10 MG tablet Take 10 mg by mouth once daily.    meloxicam (MOBIC) 7.5 MG tablet Take 1 tablet (7.5 mg total) by mouth daily with breakfast.    triamcinolone acetonide 0.1% (KENALOG) 0.1 % cream Apply topically 2 (two) times daily.     Family History     Problem Relation (Age of Onset)    Asbestos Father    Blindness Paternal Grandfather    Cancer Paternal Grandmother    Diabetes Mother, Maternal Grandfather    Eczema Son    Glaucoma Mother, Paternal Grandfather    Heart disease Maternal Grandmother    Hypertension Mother, Son    Obesity Father        Social History Main Topics    Smoking status: Never Smoker    Smokeless tobacco:  Never Used    Alcohol use Yes      Comment: rarely, 1 drink/week    Drug use: No    Sexual activity: Yes     Partners: Male     Review of Systems   Constitution: Negative for chills and fever.   HENT: Negative for congestion.    Cardiovascular: Negative for chest pain, dyspnea on exertion and palpitations.   Respiratory: Negative for hemoptysis and shortness of breath.    Musculoskeletal: Negative for arthritis and back pain.   Gastrointestinal: Negative for abdominal pain, nausea and vomiting.   Neurological: Negative for focal weakness and numbness.     Objective:     Vital Signs (Most Recent):  Temp: 98.9 °F (37.2 °C) (12/26/17 0847)  Pulse: 109 (12/26/17 1203)  Resp: 16 (12/26/17 1203)  BP: 113/76 (12/26/17 1202)  SpO2: 98 % (12/26/17 1202) Vital Signs (24h Range):  Temp:  [98.9 °F (37.2 °C)] 98.9 °F (37.2 °C)  Pulse:  [109-113] 109  Resp:  [11-16] 16  SpO2:  [97 %-99 %] 98 %  BP: (113-127)/(73-85) 113/76     Weight: 94.8 kg (209 lb)  Body mass index is 35.87 kg/m².    SpO2: 98 %       Physical Exam   Constitutional: She is oriented to person, place, and time. She appears well-developed and well-nourished.   HENT:   Head: Normocephalic and atraumatic.   Eyes: EOM are normal. Pupils are equal, round, and reactive to light.   Neck: Normal range of motion. Neck supple.   Cardiovascular: Normal heart sounds and intact distal pulses.    In a normal rhythm at 110's.    Pulmonary/Chest: Effort normal and breath sounds normal.   Abdominal: Soft. Bowel sounds are normal.   Musculoskeletal: Normal range of motion. She exhibits no edema.   Neurological: She is alert and oriented to person, place, and time. She has normal reflexes.   Vitals reviewed.      Significant Labs:   EP:   Recent Labs  Lab 12/26/17  1012      K 4.0      CO2 24   GLU 96   BUN 16   CREATININE 0.9   CALCIUM 9.8   PROT 8.1   ALBUMIN 3.9   BILITOT 1.0   ALKPHOS 91   AST 15   ALT 17   ANIONGAP 9   ESTGFRAFRICA >60.0   EGFRNONAA >60.0   WBC  5.99   HGB 14.7   HCT 41.9          Significant Imaging: Echocardiogram:   2D echo with color flow doppler:   Results for orders placed or performed during the hospital encounter of 08/06/16   2D echo with color flow doppler   Result Value Ref Range    EF 65 55 - 65    Diastolic Dysfunction No

## 2017-12-26 NOTE — CONSULTS
[facility]  Cardiac Electrophysiology  Consult Note    Admission Date: 12/26/2017  Code Status: Full Code   Attending Provider: Sudarshan Thomas MD  Consulting Provider: Elham Ling MD  Principal Problem:<principal problem not specified>    Inpatient consult to Electrophysiology  Consult performed by: ELHAM LING  Consult ordered by: ANA PAULA WALLIS  Reason for consult: atypical aflutter        Subjective:     Chief Complaint:  Atypical Aflutter    HPI:   Francoise Dykes is a 56 y.o. Aflutter s/p PVI and SVC ablation 12/2015, and repeat PVI 12/2016 (on eliquis for a CHADS-VASC score of 1) is [place] [facility] for reoccurrance of typical aflutter.      Previous AF/ AFL hx and anti-arrhythmic hx:   Initially, had PAfib in 2012, chemically converted to NSR with Flecainide and then started on toprol 50 mg daily and started on aspirin  In 01/14 switched from Flecainide to Rythmol for concern that Flec was causing SOB  In 07/15 patient reported that she was still taking flec and had another episode of AF with RVR. Took rythmol and converted to (AFL vs SVT as per Dr. Meyers's last note).   In 12/15 patient underwent PVI and SVC ablation. Then started on Amiodarone.   In 03/16 Amiodarone was stopped.  In 08/16 had atypical aflutter and s/p DCCV successfully and started on rythmol 225 mg BID.  In 12/16 underwent repeat PVI, right pulm veins re-isolated and a mid-sal AT was targeted and sucessfully ablated. Continued on Rythmol 225 mg BID  In 03/17 to 06/17 Rythmol dc'd and then restarted. Diltiazem also continued.   In 08/17 Ms. Dykes presented with atypical atrial flutter with RVR, and underwent SAMMIE/DCCV. Notably, Ms. Dykes missed her AM dose of Rythmol. She was continued on Rythmol.    Patient explains that she went into a fast rhythm (felt palpitations and then also check with her iwatch) on Sunday night. She has been persistently high for her which is abnormal. Reports (since she  was checked it) from a HR of 99 to 113. She reports that other than heart palpitations she has no other complaints. The patient also states that she had orange juice and peter crackers recently. In addition the patient reports that she held her Eliquis for 5 doses from last  to Tuesday PM (when she resumed it) for a spinal injection. She has been adherent to her Eliquis dosing since then.       Past Medical History:   Diagnosis Date    Allergy     seasonal    Atrial fibrillation     Essential hypertension 2017    Pt states that she does not have HTN.        Past Surgical History:   Procedure Laterality Date     SECTION      heart ablation      [facility]STERECTOMY      [facility]       Review of patient's allergies indicates:   Allergen Reactions    Adhesive tape-silicones Itching     Manifested in 2015 following AF ablation.       No current facility-administered medications on file prior to encounter.      Current Outpatient Prescriptions on File Prior to Encounter   Medication Sig    apixaban 5 mg Tab Take 1 tablet (5 mg total) by mouth 2 (two) times daily.    azelastine (ASTELIN) 137 mcg (0.1 %) nasal spray 1 spray (137 mcg total) by Nasal route 2 (two) times daily.    diltiaZEM (DILACOR XR) 120 MG CDCR Take 1 capsule (120 mg total) by mouth once daily.    fluticasone (FLONASE) 50 mcg/actuation nasal spray 1 spray by Each Nare route 2 (two) times daily.    hydrocortisone 0.5 % cream Apply topically daily as needed.    ibuprofen (ADVIL,MOTRIN) 800 MG tablet Take 1 tablet (800 mg total) by mouth 3 (three) times daily with meals.    propafenone (RYTHMOL SR) 225 MG Cp12 Take 1 capsule (225 mg total) by mouth every 12 (twelve) hours.    cetirizine (ZYRTEC) 10 MG tablet Take 10 mg by mouth once daily.    meloxicam (MOBIC) 7.5 MG tablet Take 1 tablet (7.5 mg total) by mouth daily with breakfast.    triamcinolone acetonide 0.1% (KENALOG) 0.1 % cream Apply topically 2 (two) times daily.      Family History     Problem Relation (Age of Onset)    Asbestos Father    Blindness Paternal Grandfather    Cancer Paternal Grandmother    Diabetes Mother, Maternal Grandfather    Eczema Son    Glaucoma Mother, Paternal Grandfather    Heart disease Maternal Grandmother    Hypertension Mother, Son    Obesity Father        Social History Main Topics    Smoking status: Never Smoker    Smokeless tobacco: Never Used    Alcohol use Yes      Comment: rarely, 1 drink/week    Drug use: No    Sexual activity: Yes     Partners: Male     Review of Systems   Constitution: Negative for chills and fever.   HENT: Negative for congestion.    Cardiovascular: Negative for chest pain, dyspnea on exertion and palpitations.   Respiratory: Negative for hemoptysis and shortness of breath.    Musculoskeletal: Negative for arthritis and back pain.   Gastrointestinal: Negative for abdominal pain, nausea and vomiting.   Neurological: Negative for focal weakness and numbness.     Objective:     Vital Signs (Most Recent):  Temp: 98.9 °F (37.2 °C) (12/26/17 0847)  Pulse: 109 (12/26/17 1203)  Resp: 16 (12/26/17 1203)  BP: 113/76 (12/26/17 1202)  SpO2: 98 % (12/26/17 1202) Vital Signs (24h Range):  Temp:  [98.9 °F (37.2 °C)] 98.9 °F (37.2 °C)  Pulse:  [109-113] 109  Resp:  [11-16] 16  SpO2:  [97 %-99 %] 98 %  BP: (113-127)/(73-85) 113/76     Weight: 94.8 kg (209 lb)  Body mass index is 35.87 kg/m².    SpO2: 98 %       Physical Exam   Constitutional: She is oriented to person, place, and time. She appears well-developed and well-nourished.   HENT:   Head: Normocephalic and atraumatic.   Eyes: EOM are normal. Pupils are equal, round, and reactive to light.   Neck: Normal range of motion. Neck supple.   Cardiovascular: Normal heart sounds and intact distal pulses.    In a normal rhythm at 110's.    Pulmonary/Chest: Effort normal and breath sounds normal.   Abdominal: Soft. Bowel sounds are normal.   Musculoskeletal: Normal range of motion. She  exhibits no edema.   Neurological: She is alert and oriented to person, place, and time. She has normal reflexes.   Vitals reviewed.      Significant Labs:   EP:   Recent Labs  Lab 12/26/17  1012      K 4.0      CO2 24   GLU 96   BUN 16   CREATININE 0.9   CALCIUM 9.8   PROT 8.1   ALBUMIN 3.9   BILITOT 1.0   ALKPHOS 91   AST 15   ALT 17   ANIONGAP 9   ESTGFRAFRICA >60.0   EGFRNONAA >60.0   WBC 5.99   HGB 14.7   HCT 41.9          Significant Imaging: Echocardiogram:   2D echo with color flow doppler:   Results for orders placed or performed during the hospital encounter of 08/06/16   2D echo with color flow doppler   Result Value Ref Range    EF 65 55 - 65    Diastolic Dysfunction No      Assessment and Plan:     Atrial flutter    Francoise Dykes is a 56 y.o. Aflutter s/p PVI and SVC ablation 12/2015, and repeat PVI 12/2016 (on eliquis for a CHADS-VASC score of 2) is admitted to the [facility] for reoccurrance of atypical aflutter.    Plan:  SAMMIE and DCCV tomorrow  Continue patient's Eliquis, rythmol and dilt at her current doses.     Discussed with Dr. Purdy            Thank you for your consult. I will follow-up with patient. Please contact us if you have any additional questions.    Elham Finney MD  Cardiac Electrophysiology  [facility]

## 2017-12-27 VITALS
TEMPERATURE: 98 F | HEIGHT: 64 IN | BODY MASS INDEX: 35.41 KG/M2 | OXYGEN SATURATION: 97 % | RESPIRATION RATE: 15 BRPM | WEIGHT: 207.44 LBS | HEART RATE: 73 BPM | DIASTOLIC BLOOD PRESSURE: 73 MMHG | SYSTOLIC BLOOD PRESSURE: 100 MMHG

## 2017-12-27 LAB
ANION GAP SERPL CALC-SCNC: 8 MMOL/L
BUN SERPL-MCNC: 17 MG/DL
CALCIUM SERPL-MCNC: 9 MG/DL
CHLORIDE SERPL-SCNC: 110 MMOL/L
CO2 SERPL-SCNC: 22 MMOL/L
CREAT SERPL-MCNC: 0.8 MG/DL
EST. GFR  (AFRICAN AMERICAN): >60 ML/MIN/1.73 M^2
EST. GFR  (NON AFRICAN AMERICAN): >60 ML/MIN/1.73 M^2
GLUCOSE SERPL-MCNC: 100 MG/DL
MAGNESIUM SERPL-MCNC: 2.1 MG/DL
POTASSIUM SERPL-SCNC: 4 MMOL/L
SODIUM SERPL-SCNC: 140 MMOL/L

## 2017-12-27 PROCEDURE — G0378 HOSPITAL OBSERVATION PER HR: HCPCS

## 2017-12-27 PROCEDURE — 36415 COLL VENOUS BLD VENIPUNCTURE: CPT

## 2017-12-27 PROCEDURE — 99217 PR OBSERVATION CARE DISCHARGE: CPT | Mod: ,,, | Performed by: HOSPITALIST

## 2017-12-27 PROCEDURE — 25000003 PHARM REV CODE 250: Performed by: HOSPITALIST

## 2017-12-27 PROCEDURE — 93005 ELECTROCARDIOGRAM TRACING: CPT

## 2017-12-27 PROCEDURE — 83735 ASSAY OF MAGNESIUM: CPT

## 2017-12-27 PROCEDURE — 93010 ELECTROCARDIOGRAM REPORT: CPT | Mod: ,,, | Performed by: INTERNAL MEDICINE

## 2017-12-27 PROCEDURE — 80048 BASIC METABOLIC PNL TOTAL CA: CPT

## 2017-12-27 RX ADMIN — PROPAFENONE HYDROCHLORIDE 225 MG: 150 TABLET, FILM COATED ORAL at 08:12

## 2017-12-27 RX ADMIN — DILTIAZEM HYDROCHLORIDE 120 MG: 120 CAPSULE, COATED, EXTENDED RELEASE ORAL at 08:12

## 2017-12-27 RX ADMIN — APIXABAN 5 MG: 5 TABLET, FILM COATED ORAL at 08:12

## 2017-12-27 NOTE — DISCHARGE SUMMARY
"DISCHARGE SUMMARY  Hospital Medicine    Team: [facility]    Patient Name: Francoise Dykes  YOB: 1960    Admit Date: 12/26/2017    Discharge Date: 12/27/2017    Discharge Attending Physician: Tari Calix MD     Principal Diagnoses:  Active Hospital Problems    Diagnosis  POA    *Atrial flutter [I48.92]  Yes    Current use of long term anticoagulation [Z79.01]  Not Applicable    Palpitations [R00.2]  Yes    Long term current use of antiarrhythmic drug [Z79.899]  Not Applicable      Resolved Hospital Problems    Diagnosis Date Resolved POA   No resolved problems to display.       Discharged Condition: stable    HOSPITAL COURSE:      Initial Presentation:    Patient is a 57 y.o. female with PMH of paroxysmal A.flutter s/p multiple DCCV and PVI ablations presents to ED today for evaluation of palpitations.  Pt reports she has been in normal state of health, compliant with all medications.  She was watching a 3 day Blind[employer] program 12/24 evening, and distinctly noticed feeling "fluttering" in her chest at 2230.  Her iwatch noted HR to be .  She called her Cardiology clinic and was asked to take an extra dose of Diltiazem, which she did.  Over the next 1.5 days, she still felt fatigued and with palpitations, so she came to ED today.  She did miss 3 days of Apixaban 1 week ago, as she was instructed to hold this medication to get steroid injection for back pain.  She denies light-headedness, chest pain, or SOB.  Only difference between this episode and prior is that she was awake when symptoms started; other times were noted upon awakening (event occurred overnight).     Of note, patient follows with Dr. Lee Meyers. Patient symptomatic Afib with palpitaitons and SOB started in 2012 and coverted to sinus bradycardia after she was placed on diltiazem ggt, initially placed on Flecainide and Toprol but she developed SOB on flecainide, she was placed on Rhythmol 600 mgs PRN. She developed recurrent " AF and underwent PVI ablation 12/7/2015 and was placed on amidarone between 1/2016 and 8/2016.  After her amidarone was stopped, she developed atypical atrial flutter and was placed on Rhythmol and underwent DCCV. She underwent redo PVI and SVC ablation on 12/15/2016 as well as mid-sal AT ablation and discharged on Rhythmol 225 mg BID. Her rhythmol was stopped in 3/2016 and she developed recurrent atypical flutter 2:1 so her rhythmol was restarted. She was admitted in 3/2017 with Atrial Flutter and converted spontanously after SAMMIE but before DCCV. Last time patient was seen in EP clinic she was asymptomatic and her Diltiazem was decreased to 120 mgs daily and she has done well until now.    Course of Principle Problem for Admission:    Pt was admitted to [facility] with EP consultation.  She was continued on home medications of Diltiazem, Rhythmol, and Apixaban.  SAMMIE/DCCV was planned for 12/27, but pt spontaneously converted to NSR overnight.  She felt very well on day of discharge and was sent home without changes in medications.    Consults: Cardiology    Last CBC/BMP:    CBC/Anemia Labs: Coags:      Recent Labs  Lab 12/26/17  1012   WBC 5.99   HGB 14.7   HCT 41.9      MCV 81*   RDW 12.6    No results for input(s): PT, INR, APTT in the last 168 hours.     Chemistries:     Recent Labs  Lab 12/26/17  1012 12/27/17  0618    140   K 4.0 4.0    110   CO2 24 22*   BUN 16 17   CREATININE 0.9 0.8   CALCIUM 9.8 9.0   PROT 8.1  --    BILITOT 1.0  --    ALKPHOS 91  --    ALT 17  --    AST 15  --    MG  --  2.1        Significant Diagnostic Studies: as above    Special Treatments/Procedures:   Procedure(s) (LRB):  TRANSESOPHAGEAL ECHOCARDIOGRAM (SAMMIE) (N/A)     Disposition: Home or Self Care      Future Scheduled Appointments:  Future Appointments  Date Time Provider Department Center   1/11/2018 2:00 PM Becca Canchola NP [facility]   1/29/2018 9:20 AM Lee Meyers MD [facility] [place]        Discharge Medication List:       Francoise Dykes   Home Medication Instructions KARRI:[id number]    Printed on:12/27/17 3418   Medication Information                      apixaban 5 mg Tab  Take 1 tablet (5 mg total) by mouth 2 (two) times daily.             azelastine (ASTELIN) 137 mcg (0.1 %) nasal spray  1 spray (137 mcg total) by Nasal route 2 (two) times daily.             cetirizine (ZYRTEC) 10 MG tablet  Take 10 mg by mouth once daily.             diltiaZEM (DILACOR XR) 120 MG CDCR  Take 1 capsule (120 mg total) by mouth once daily.             fluticasone (FLONASE) 50 mcg/actuation nasal spray  1 spray by Each Nare route 2 (two) times daily.             hydrocortisone 0.5 % cream  Apply topically daily as needed.             ibuprofen (ADVIL,MOTRIN) 800 MG tablet  Take 1 tablet (800 mg total) by mouth 3 (three) times daily with meals.             meloxicam (MOBIC) 7.5 MG tablet  Take 1 tablet (7.5 mg total) by mouth daily with breakfast.             propafenone (RYTHMOL SR) 225 MG Cp12  Take 1 capsule (225 mg total) by mouth every 12 (twelve) hours.                 Patient Instructions:    Discharge Procedure Orders  Activity as tolerated     Notify your health care provider if you experience any of the following:  difficulty breathing or increased cough     Notify your health care provider if you experience any of the following:  persistent dizziness, light-headedness, or visual disturbances     Notify your health care provider if you experience any of the following:  increased confusion or weakness         At the time of discharge patient was told to take all medications as prescribed, to keep all followup appointments, and to call their primary care physician or return to the emergency room if they have any worsening or concerning symptoms.    Signing Physician:  Tari Calix MD

## 2017-12-27 NOTE — PLAN OF CARE
Problem: Patient Care Overview  Goal: Individualization & Mutuality  Reviewed with pt. Scheduled procedure in am... Pt. Allowed to ask questions

## 2017-12-27 NOTE — PLAN OF CARE
Problem: Patient Care Overview  Goal: Plan of Care Review  Reviewed with pt. Her currently plans of care

## 2017-12-27 NOTE — PLAN OF CARE
Patient seen and evaluated this morning.  Patient in NSR  Cancelled DCCV and SAMMIE  Patient can go home and same home regiment  F/u with Dr. Meyers in 4 weeks.    Discussed with Dr. Purdy,    Elham Finney MD, PGY-4

## 2017-12-27 NOTE — PROGRESS NOTES
"Progress Note  Hospital Medicine    Primary Team: [facility]  Admit Date: 12/26/2017   Length of Stay:  LOS: 0 days   SUBJECTIVE:   Reason for Admission:  Atrial flutter    HPI:  Patient is a 57 y.o. female with PMH of paroxysmal A.flutter s/p multiple DCCV and PVI ablations presents to ED today for evaluation of palpitations.  Pt reports she has been in normal state of health, compliant with all medications.  She was watching a [employer] program 12/24 evening, and distinctly noticed feeling "fluttering" in her chest at 2230.  Her iwatch noted HR to be .  She called her Cardiology clinic and was asked to take an extra dose of Diltiazem, which she did.  Over the next 1.5 days, she still felt fatigued and with palpitations, so she came to ED today.  She did miss 3 days of Apixaban 1 week ago, as she was instructed to hold this medication to get steroid injection for back pain.  She denies light-headedness, chest pain, or SOB.  Only difference between this episode and prior is that she was awake when symptoms started; other times were noted upon awakening (event occurred overnight).     Of note, patient follows with Dr. Lee Meyers. Patient symptomatic Afib with palpitaitons and SOB started in 2012 and coverted to sinus bradycardia after she was placed on diltiazem ggt, initially placed on Flecainide and Toprol but she developed SOB on flecainide, she was placed on Rhythmol 600 mgs PRN. She developed recurrent AF and underwent PVI ablation 12/7/2015 and was placed on amidarone between 1/2016 and 8/2016.  After her amidarone was stopped, she developed atypical atrial flutter and was placed on Rhythmol and underwent DCCV. She underwent redo PVI and SVC ablation on 12/15/2016 as well as mid-sal AT ablation and discharged on Rhythmol 225 mg BID. Her rhythmol was stopped in 3/2016 and she developed recurrent atypical flutter 2:1 so her rhythmol was restarted. She was admitted in 3/2017 with Atrial Flutter and " "converted spontanously after SAMMIE but before DCCV. Last time patient was seen in EP clinic she was asymptomatic and her Diltiazem was decreased to 120 mgs daily and she has done well until now.    Interval history:    Pt spontaneously converted to NSR overnight; asymptomatic.  Feels "elated" today without complaints.    Review of Systems:  Constitutional: no fever or chills  Respiratory: no cough or shortness of breath  Cardiovascular: no chest pain or palpitations  Gastrointestinal: no nausea or vomiting, no abdominal pain or change in bowel habits  Musculoskeletal: no arthralgias or myalgias     OBJECTIVE:     Temp:  [97.2 °F (36.2 °C)-98.1 °F (36.7 °C)]   Pulse:  []   Resp:  [12-19]   BP: (100-125)/(73-90)   SpO2:  [94 %-100 %]  Body mass index is 35.61 kg/m².  Intake/Outake:  This Shift:  No intake/output data recorded.    Net I/O past 24h:     Intake/Output Summary (Last 24 hours) at 12/27/17 1057  Last data filed at 12/26/17 1140   Gross per 24 hour   Intake             1000 ml   Output                0 ml   Net             1000 ml             Physical Exam:  Gen- well-developed, well-nourished, NAD  CVS- S1 and S2 present, RRR, no murmurs  Resp- CTA b/l, no work of breathing  Abd- BS+, soft, NT, ND  Ext- no clubbing, cyanosis, or edema    Laboratory:  CBC/Anemia Labs: Coags:      Recent Labs  Lab 12/26/17  1012   WBC 5.99   HGB 14.7   HCT 41.9      MCV 81*   RDW 12.6    No results for input(s): PT, INR, APTT in the last 168 hours.     Chemistries:     Recent Labs  Lab 12/26/17  1012 12/27/17  0618    140   K 4.0 4.0    110   CO2 24 22*   BUN 16 17   CREATININE 0.9 0.8   CALCIUM 9.8 9.0   PROT 8.1  --    BILITOT 1.0  --    ALKPHOS 91  --    ALT 17  --    AST 15  --    MG  --  2.1          Cardiac Enzymes: Ejection Fractions:    Recent Labs      12/26/17   1012   TROPONINI  <0.006    EF   Date Value Ref Range Status   08/22/2017 60 55 - 65    03/20/2017 48 55 - 65         POCT Glucose: " HbA1c:    No results for input(s): POCTGLUCOSE in the last 168 hours. Hemoglobin A1C   Date Value Ref Range Status   06/29/2017 4.5 4.0 - 5.6 % Final     Comment:     According to ADA guidelines, hemoglobin A1c <7.0% represents  optimal control in non-pregnant diabetic patients. Different  metrics may apply to specific patient populations.   Standards of Medical Care in Diabetes-2016.  For the purpose of screening for the presence of diabetes:  <5.7%     Consistent with the absence of diabetes  5.7-6.4%  Consistent with increasing risk for diabetes   (prediabetes)  >or=6.5%  Consistent with diabetes  Currently, no consensus exists for use of hemoglobin A1c  for diagnosis of diabetes for children.  This Hemoglobin A1c assay has significant interference with fetal   hemoglobin   (HbF). The results are invalid for patients with abnormal amounts of   HbF,   including those with known Hereditary Persistence   of Fetal Hemoglobin. Heterozygous hemoglobin variants (HbAS, HbAC,   HbAD, HbAE, HbA2) do not significantly interfere with this assay;   however, presence of multiple variants in a sample may impact the %   interference.           Medications:  Scheduled Meds:   apixaban  5 mg Oral BID    diltiaZEM  120 mg Oral Daily    propafenone  225 mg Oral Q12H                             Continuous Infusions:  PRN Meds:.acetaminophen, ondansetron, polyethylene glycol, sodium chloride 0.9%     ASSESSMENT/PLAN:     Paroxysmal Atrial Flutter- atypical  -CHADS-Vasc 1  -Unclear etiology for recurrence  -Appreciate EP recs; spontaneously converted to NSR so SAMMIE and DCCV cancelled  -no change in medications   -Continue Propafenone 225mg PO BID  -Continue Diltiazem 120mg PO Daily  -Continue Apixaban 5mg PO BID  -f/u with Dr. INES Meyers in 4 weeks     Seasonal Allergies  -per pt, controlled and not using any medications currently     DVT ppx- Apixaban  CODE status- FULL     Dispo- stable for d/c home today     Tari Calix,  MD  [facility] Medicine Staff

## 2017-12-27 NOTE — NURSING
Pt. Arrived on the unit from ED awake alert and oriented x4 nad noted pt. Denies any discomforts, vitals signs stable except or an elevated heart rate 118, pt. Placed on telemetry, oriented to room assignment and to staff, pt. Instructed not to eat or drink anything afer midnight tonight ...

## 2017-12-27 NOTE — PLAN OF CARE
12/27/17 0954   Final Note   Assessment Type Final Discharge Note   Discharge Disposition Home   Hospital Follow Up  Appt(s) scheduled? No  (EP to call pt to schedule follow up)

## 2017-12-27 NOTE — PROGRESS NOTES
Patient given discharge instructions and home medication regimen. Patient informed of follow up appointments and given a copy of discharge instructions with follow up appointments listed. Patient's telemetry monitor discontinued. Peripheral IV discontinued. Patient discharged home per MD orders with all personal belongings.

## 2017-12-27 NOTE — PLAN OF CARE
12/27/17 0935   Discharge Assessment   Assessment Type Discharge Planning Assessment   Confirmed/corrected address and phone number on facesheet? Yes   Assessment information obtained from? Patient;Medical Record   Expected Length of Stay (days) 1   Communicated expected length of stay with patient/caregiver yes   Prior to hospitilization cognitive status: Alert/Oriented   Prior to hospitalization functional status: Independent   Current cognitive status: Alert/Oriented   Current Functional Status: Independent   Lives With alone   Able to Return to Prior Arrangements yes   Is patient able to care for self after discharge? Yes   Patient's perception of discharge disposition home or selfcare   Readmission Within The Last 30 Days no previous admission in last 30 days   Patient currently being followed by outpatient case management? No   Patient currently receives any other outside agency services? No   Equipment Currently Used at Home none   Do you have any problems affording any of your prescribed medications? No   Is the patient taking medications as prescribed? yes   Does the patient have transportation home? Yes   Transportation Available family or friend will provide  (son)   Does the patient receive services at the [facility]? No   Discharge Plan A Home   Patient/Family In Agreement With Plan yes

## 2017-12-28 LAB — BACTERIA UR CULT: NORMAL

## 2018-01-24 ENCOUNTER — OFFICE VISIT (OUTPATIENT)
Dept: PAIN MEDICINE | Facility: CLINIC | Age: 58
End: 2018-01-24
Payer: COMMERCIAL

## 2018-01-24 VITALS
HEIGHT: 64 IN | TEMPERATURE: 98 F | BODY MASS INDEX: 36.55 KG/M2 | WEIGHT: 214.06 LBS | DIASTOLIC BLOOD PRESSURE: 77 MMHG | SYSTOLIC BLOOD PRESSURE: 126 MMHG | HEART RATE: 74 BPM

## 2018-01-24 DIAGNOSIS — M51.36 DDD (DEGENERATIVE DISC DISEASE), LUMBAR: ICD-10-CM

## 2018-01-24 DIAGNOSIS — M47.816 SPONDYLOSIS OF LUMBAR REGION WITHOUT MYELOPATHY OR RADICULOPATHY: Primary | ICD-10-CM

## 2018-01-24 DIAGNOSIS — M47.816 FACET ARTHRITIS OF LUMBAR REGION: ICD-10-CM

## 2018-01-24 PROCEDURE — 99213 OFFICE O/P EST LOW 20 MIN: CPT | Mod: S$GLB,,, | Performed by: NURSE PRACTITIONER

## 2018-01-24 PROCEDURE — 99999 PR PBB SHADOW E&M-EST. PATIENT-LVL III: CPT | Mod: PBBFAC,,, | Performed by: NURSE PRACTITIONER

## 2018-01-24 NOTE — PROGRESS NOTES
Chronic patient Established Note (Follow up visit)      SUBJECTIVE:    Francoise Dykes presents to the clinic for a follow-up appointment for low back pain. She is s/p bilateral L2,3,4,5 MBB on 12/19/2017. She reports 100% relief for one day. She reports that she was able to walk longer without pain. Since then, her pain has returned to baseline. She reports low back pain that is aching and constant. She does report aching pain into her bilateral buttocks. She denies any radiating leg pain. She reports that her pain is worse with prolonged walking and standing. 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 She denies any other health changes. She denies any bowel or bladder incontinence. Since the last visit, Francoise Dykes states the pain has been persistant. Current pain intensity is 10/10.    Pain Disability Index Review:  Last 3 PDI Scores 1/24/2018 12/7/2017   Pain Disability Index (PDI) 51 43       Pain Medications:  Ibuprofen 800 mg     Opioid Contract: no     report:  Reviewed and consistent with medication use as prescribed.    Pain Procedures:   7/21/2017- Left SI joint injection   11/3/2017- Left SI joint injection  12/19/2017- Bilateral L2,3,4,5 MBB    Physical Therapy/Home Exercise: yes    Imaging:   Xray Lumbar Spine 11/12/2016:  There are 5 lumbar vertebral bodies. There is no evidence for acute fracture, dislocation or destructive process. Vertebral body heights are maintained. There is disc space narrowing at L5-S1. Intervertebral disc space desiccation at L2-L3 is also noted. Facet joints unremarkable. Spinous processes demonstrate normal mildly. The visualized SI joints and sacrum appear unremarkable. There is no translational abnormality. Surrounding soft tissues appear unremarkable.   Impression      Degenerative changes of the spine as above. Overall appearance is similar to prior.     MRI Lumbar Spine 9/12/2016:  Results: The alignment of the lumbar  spine is normal.  The vertebral body heights are well-maintained.  Mild disc desiccation noted at L3-L4 and L4-L5, severe disc space narrowing at L5-S1.  Small benign hemangioma is noted within the L3 and L4 vertebrae.  No evidence of malignant bone marrow replacement process or infection.  The conus medullaris terminates in good position.    T12-L1 and L1-L2 demonstrate a mild diffuse disc bulge, there is no central canal or foraminal stenosis.    L2-L3 demonstrates no disc abnormality, no central canal foraminal stenosis.    L3-L4 demonstrates a mild diffuse disc bulge, mild ligamentum flavum hypertrophy.  Mild narrowing of the central canal, no significant foraminal narrowing.    L4-L5 demonstrates mild diffuse disc bulge, mild ligamentum flavum hypertrophy and mild facet joint degenerative change, no significant central canal stenosis or significant foramina narrowing.    L5-S1 demonstrates a mild diffuse disc bulge, there is no central canal stenosis, there is mild left foraminal narrowing.    The paraspinal soft tissues appear normal.   Impression         Mild to moderate spondylosis of the lumbar spine without severe central or severe foraminal narrowing         Allergies:   Review of patient's allergies indicates:   Allergen Reactions    Adhesive tape-silicones Itching     Manifested in 12/2015 following AF ablation.       Current Medications:   Current Outpatient Prescriptions   Medication Sig Dispense Refill    apixaban 5 mg Tab Take 1 tablet (5 mg total) by mouth 2 (two) times daily. 60 tablet 11    diltiaZEM (DILACOR XR) 120 MG CDCR Take 1 capsule (120 mg total) by mouth once daily. 90 capsule 3    ibuprofen (ADVIL,MOTRIN) 800 MG tablet Take 1 tablet (800 mg total) by mouth 3 (three) times daily with meals. 270 tablet 0    propafenone (RYTHMOL SR) 225 MG Cp12 Take 1 capsule (225 mg total) by mouth every 12 (twelve) hours. 60 capsule 11    azelastine (ASTELIN) 137 mcg (0.1 %) nasal spray 1 spray  (137 mcg total) by Nasal route 2 (two) times daily. 30 mL 3    cetirizine (ZYRTEC) 10 MG tablet Take 10 mg by mouth once daily.      fluticasone (FLONASE) 50 mcg/actuation nasal spray 1 spray by Each Nare route 2 (two) times daily. 1 Bottle 3    hydrocortisone 0.5 % cream Apply topically daily as needed.       No current facility-administered medications for this visit.        REVIEW OF SYSTEMS:    GENERAL:  No weight loss, malaise or fevers.  HEENT:  Negative for frequent or significant headaches.  NECK:  Negative for lumps, goiter, pain and significant neck swelling.  RESPIRATORY:  Negative for cough, wheezing or shortness of breath.  CARDIOVASCULAR:  Negative for chest pain, leg swelling or palpitations. Afib, HTN  GI:  Negative for abdominal discomfort, blood in stools or black stools or change in bowel habits.  MUSCULOSKELETAL:  See HPI.  SKIN:  Negative for lesions, rash, and itching.  PSYCH:  Negative for sleep disturbance, mood disorder and recent psychosocial stressors.  HEMATOLOGY/LYMPHOLOGY:  Negative for swollen nodes. Eliquis  NEURO:   No history of headaches, syncope, paralysis, seizures or tremors.  All other reviewed and negative other than HPI.    Past Medical History:  Past Medical History:   Diagnosis Date    Allergy     seasonal    Atrial fibrillation     Essential hypertension 2017    Pt states that she does not have HTN.        Past Surgical History:  Past Surgical History:   Procedure Laterality Date     SECTION      heart ablation      HYST[facility]CTOMY      [facility]       Family History:  Family History   Problem Relation Age of Onset    Hypertension Mother     Diabetes Mother     Glaucoma Mother     Asbestos Father     Obesity Father     Eczema Son     Hypertension Son     Heart disease Maternal Grandmother      chf    Diabetes Maternal Grandfather     Cancer Paternal Grandmother      lung, 2/2 second hand smoke    Glaucoma Paternal Grandfather     Blindness  "Paternal Grandfather     Melanoma Neg Hx     Psoriasis Neg Hx     Lupus Neg Hx     Acne Neg Hx     Breast cancer Neg Hx     Colon cancer Neg Hx     Ovarian cancer Neg Hx        Social History:  Social History     Social History    Marital status:      Spouse name: N/A    Number of children: N/A    Years of education: N/A     Occupational History    Teacher [employer]      Social History Main Topics    Smoking status: Never Smoker    Smokeless tobacco: Never Used    Alcohol use Yes      Comment: rarely, 1 drink/week    Drug use: No    Sexual activity: Yes     Partners: Male     Other Topics Concern    Are You Pregnant Or Think You May Be? No    Breast-Feeding No     Social History Narrative    Recently moved back to [facility] to help take care of mom.       OBJECTIVE:    /77   Pulse 74   Temp 98 °F (36.7 °C)   Ht 5' 4" (1.626 m)   Wt 97.1 kg (214 lb 1.1 oz)   LMP  (LMP Unknown)   BMI 36.74 kg/m²     PHYSICAL EXAMINATION:    General appearance: Well appearing, in no acute distress, alert and oriented x3.  Psych:  Mood and affect appropriate.  Skin: Skin color, texture, turgor normal, no rashes or lesions, in both upper and lower body.  Head/face:  Atraumatic, normocephalic. No palpable lymph nodes  Neck: No pain to palpation over the cervical paraspinous muscles. Spurling Negative. No pain with neck flexion, extension, or lateral flexion. .  GI: Abdomen soft and non-tender.  Back: Straight leg raising in the sitting and supine positions is negative to radicular pain. There is pain with palpation over lumbar spine. Limited ROM with pain on flexion and extension. Positive facet loading bilaterally.   Extremities: Peripheral joint ROM is full and pain free without obvious instability or laxity in all four extremities. No deformities, edema, or skin discoloration. Good capillary refill.  Musculoskeletal: Shoulder, hip, sacroiliac and knee provocative maneuvers are negative. Mild pain with " palpation over bilateral SI joints. FABERs is negative bilaterally. Bilateral upper and lower extremity strength is normal and symmetric.  No atrophy or tone abnormalities are noted.  Neuro: Bilateral upper and lower extremity coordination and muscle stretch reflexes are physiologic and symmetric.  Plantar response are downgoing. No loss of sensation is noted.  Gait: Antalgic- ambulates without assistance.     ASSESSMENT: 57 y.o. year old female with low back pain pain, consistent with the followin. Spondylosis of lumbar region without myelopathy or radiculopathy     2. Facet arthritis of lumbar region     3. DDD (degenerative disc disease), lumbar           PLAN:     - Previous imaging was reviewed and discussed with the patient today.    - Schedule for left then right L2,3,4,5 RFA one side at a time, two weeks apart.   The procedure, risks, benefits and options were discussed with patient. There are no contraindications to the procedure. The patient expressed understanding and agreed to proceed.  Consent obtained today.    - We can repeat SI joint injections in the future if needed.     - I have stressed the importance of physical activity and a home exercise plan to help with pain and improve health.    - RTC 3 weeks after above procedures.     - Counseled patient regarding the importance of activity modification and constant sleeping habits.    - Dr. Jang was consulted on the patient and agrees with this plan.    The above plan and management options were discussed at length with patient. Patient is in agreement with the above and verbalized understanding.    Becca Canchola, JUAN PABLO  2018

## 2018-01-25 ENCOUNTER — HOSPITAL ENCOUNTER (OUTPATIENT)
Facility: HOSPITAL | Age: 58
Discharge: HOME | End: 2018-01-26
Attending: EMERGENCY MEDICINE | Admitting: HOSPITALIST
Payer: COMMERCIAL

## 2018-01-25 DIAGNOSIS — E87.6 HYPOKALEMIA: ICD-10-CM

## 2018-01-25 DIAGNOSIS — I48.92 ATRIAL FLUTTER WITH RAPID VENTRICULAR RESPONSE: Primary | ICD-10-CM

## 2018-01-25 DIAGNOSIS — I48.0 PAROXYSMAL ATRIAL FIBRILLATION: ICD-10-CM

## 2018-01-25 DIAGNOSIS — I47.19 ATRIAL TACHYCARDIA: ICD-10-CM

## 2018-01-25 DIAGNOSIS — R00.2 PALPITATIONS: ICD-10-CM

## 2018-01-25 DIAGNOSIS — Z79.01 CURRENT USE OF LONG TERM ANTICOAGULATION: ICD-10-CM

## 2018-01-25 DIAGNOSIS — I48.92 ATRIAL FLUTTER, UNSPECIFIED TYPE: Primary | ICD-10-CM

## 2018-01-25 LAB
ALBUMIN SERPL BCP-MCNC: 3.8 G/DL
ALP SERPL-CCNC: 101 U/L
ALT SERPL W/O P-5'-P-CCNC: 17 U/L
ANION GAP SERPL CALC-SCNC: 11 MMOL/L
AST SERPL-CCNC: 18 U/L
BASOPHILS # BLD AUTO: 0.08 K/UL
BASOPHILS NFR BLD: 1.1 %
BILIRUB SERPL-MCNC: 0.6 MG/DL
BUN SERPL-MCNC: 13 MG/DL
CALCIUM SERPL-MCNC: 9.6 MG/DL
CHLORIDE SERPL-SCNC: 109 MMOL/L
CO2 SERPL-SCNC: 23 MMOL/L
CREAT SERPL-MCNC: 0.9 MG/DL
DIFFERENTIAL METHOD: ABNORMAL
EOSINOPHIL # BLD AUTO: 0.2 K/UL
EOSINOPHIL NFR BLD: 3.3 %
ERYTHROCYTE [DISTWIDTH] IN BLOOD BY AUTOMATED COUNT: 12.7 %
EST. GFR  (AFRICAN AMERICAN): >60 ML/MIN/1.73 M^2
EST. GFR  (NON AFRICAN AMERICAN): >60 ML/MIN/1.73 M^2
GLUCOSE SERPL-MCNC: 125 MG/DL
HCT VFR BLD AUTO: 35.6 %
HGB BLD-MCNC: 12.6 G/DL
IMM GRANULOCYTES # BLD AUTO: 0.04 K/UL
IMM GRANULOCYTES NFR BLD AUTO: 0.5 %
INR PPP: 1
LYMPHOCYTES # BLD AUTO: 2.2 K/UL
LYMPHOCYTES NFR BLD: 30.6 %
MAGNESIUM SERPL-MCNC: 2.2 MG/DL
MCH RBC QN AUTO: 28.1 PG
MCHC RBC AUTO-ENTMCNC: 35.4 G/DL
MCV RBC AUTO: 80 FL
MONOCYTES # BLD AUTO: 0.6 K/UL
MONOCYTES NFR BLD: 8.2 %
NEUTROPHILS # BLD AUTO: 4.1 K/UL
NEUTROPHILS NFR BLD: 56.3 %
NRBC BLD-RTO: 0 /100 WBC
PLATELET # BLD AUTO: 228 K/UL
PMV BLD AUTO: 10.7 FL
POTASSIUM SERPL-SCNC: 3.3 MMOL/L
PROT SERPL-MCNC: 7.8 G/DL
PROTHROMBIN TIME: 10.4 SEC
RBC # BLD AUTO: 4.48 M/UL
SODIUM SERPL-SCNC: 143 MMOL/L
WBC # BLD AUTO: 7.33 K/UL

## 2018-01-25 PROCEDURE — 99285 EMERGENCY DEPT VISIT HI MDM: CPT | Mod: ,,, | Performed by: EMERGENCY MEDICINE

## 2018-01-25 PROCEDURE — 93010 ELECTROCARDIOGRAM REPORT: CPT | Mod: 76,,, | Performed by: INTERNAL MEDICINE

## 2018-01-25 PROCEDURE — 99284 EMERGENCY DEPT VISIT MOD MDM: CPT | Mod: 25

## 2018-01-25 PROCEDURE — 25000003 PHARM REV CODE 250: Performed by: EMERGENCY MEDICINE

## 2018-01-25 PROCEDURE — 96374 THER/PROPH/DIAG INJ IV PUSH: CPT

## 2018-01-25 PROCEDURE — 80053 COMPREHEN METABOLIC PANEL: CPT

## 2018-01-25 PROCEDURE — 85025 COMPLETE CBC W/AUTO DIFF WBC: CPT

## 2018-01-25 PROCEDURE — 93010 ELECTROCARDIOGRAM REPORT: CPT | Mod: ,,, | Performed by: INTERNAL MEDICINE

## 2018-01-25 PROCEDURE — 83735 ASSAY OF MAGNESIUM: CPT

## 2018-01-25 PROCEDURE — 85610 PROTHROMBIN TIME: CPT

## 2018-01-25 RX ORDER — DILTIAZEM HYDROCHLORIDE 5 MG/ML
20 INJECTION INTRAVENOUS
Status: COMPLETED | OUTPATIENT
Start: 2018-01-25 | End: 2018-01-25

## 2018-01-25 RX ADMIN — DILTIAZEM HYDROCHLORIDE 20 MG: 5 INJECTION INTRAVENOUS at 11:01

## 2018-01-26 VITALS
HEART RATE: 65 BPM | TEMPERATURE: 98 F | HEIGHT: 65 IN | RESPIRATION RATE: 17 BRPM | SYSTOLIC BLOOD PRESSURE: 119 MMHG | OXYGEN SATURATION: 94 % | BODY MASS INDEX: 34.34 KG/M2 | DIASTOLIC BLOOD PRESSURE: 72 MMHG | WEIGHT: 206.13 LBS

## 2018-01-26 PROBLEM — I48.92 ATRIAL FLUTTER WITH RAPID VENTRICULAR RESPONSE: Status: ACTIVE | Noted: 2018-01-26

## 2018-01-26 PROBLEM — E87.6 HYPOKALEMIA: Status: RESOLVED | Noted: 2017-03-19 | Resolved: 2018-01-26

## 2018-01-26 LAB
ANION GAP SERPL CALC-SCNC: 9 MMOL/L
BASOPHILS # BLD AUTO: 0.03 K/UL
BASOPHILS NFR BLD: 0.7 %
BUN SERPL-MCNC: 11 MG/DL
CALCIUM SERPL-MCNC: 9.3 MG/DL
CHLORIDE SERPL-SCNC: 109 MMOL/L
CO2 SERPL-SCNC: 24 MMOL/L
CREAT SERPL-MCNC: 0.8 MG/DL
DIFFERENTIAL METHOD: ABNORMAL
EOSINOPHIL # BLD AUTO: 0.1 K/UL
EOSINOPHIL NFR BLD: 3.3 %
ERYTHROCYTE [DISTWIDTH] IN BLOOD BY AUTOMATED COUNT: 12.8 %
EST. GFR  (AFRICAN AMERICAN): >60 ML/MIN/1.73 M^2
EST. GFR  (NON AFRICAN AMERICAN): >60 ML/MIN/1.73 M^2
GLUCOSE SERPL-MCNC: 130 MG/DL
HCT VFR BLD AUTO: 34.1 %
HGB BLD-MCNC: 11.8 G/DL
IMM GRANULOCYTES # BLD AUTO: 0.03 K/UL
IMM GRANULOCYTES NFR BLD AUTO: 0.7 %
LYMPHOCYTES # BLD AUTO: 1.2 K/UL
LYMPHOCYTES NFR BLD: 27.4 %
MAGNESIUM SERPL-MCNC: 2.1 MG/DL
MCH RBC QN AUTO: 28.3 PG
MCHC RBC AUTO-ENTMCNC: 34.6 G/DL
MCV RBC AUTO: 82 FL
MONOCYTES # BLD AUTO: 0.3 K/UL
MONOCYTES NFR BLD: 6.2 %
NEUTROPHILS # BLD AUTO: 2.6 K/UL
NEUTROPHILS NFR BLD: 61.7 %
NRBC BLD-RTO: 0 /100 WBC
PHOSPHATE SERPL-MCNC: 3.2 MG/DL
PLATELET # BLD AUTO: 192 K/UL
PMV BLD AUTO: 10.6 FL
POTASSIUM SERPL-SCNC: 3.7 MMOL/L
RBC # BLD AUTO: 4.17 M/UL
SODIUM SERPL-SCNC: 142 MMOL/L
WBC # BLD AUTO: 4.2 K/UL

## 2018-01-26 PROCEDURE — 93010 ELECTROCARDIOGRAM REPORT: CPT | Mod: ,,, | Performed by: INTERNAL MEDICINE

## 2018-01-26 PROCEDURE — 25000003 PHARM REV CODE 250: Performed by: EMERGENCY MEDICINE

## 2018-01-26 PROCEDURE — 25000003 PHARM REV CODE 250: Performed by: PHYSICIAN ASSISTANT

## 2018-01-26 PROCEDURE — 36415 COLL VENOUS BLD VENIPUNCTURE: CPT

## 2018-01-26 PROCEDURE — 84100 ASSAY OF PHOSPHORUS: CPT

## 2018-01-26 PROCEDURE — 99217 PR OBSERVATION CARE DISCHARGE: CPT | Mod: ,,, | Performed by: PHYSICIAN ASSISTANT

## 2018-01-26 PROCEDURE — 80048 BASIC METABOLIC PNL TOTAL CA: CPT

## 2018-01-26 PROCEDURE — G0378 HOSPITAL OBSERVATION PER HR: HCPCS

## 2018-01-26 PROCEDURE — 85025 COMPLETE CBC W/AUTO DIFF WBC: CPT

## 2018-01-26 PROCEDURE — 93005 ELECTROCARDIOGRAM TRACING: CPT

## 2018-01-26 PROCEDURE — 99220 PR INITIAL OBSERVATION CARE,LEVL III: CPT | Mod: ,,, | Performed by: PHYSICIAN ASSISTANT

## 2018-01-26 PROCEDURE — 96376 TX/PRO/DX INJ SAME DRUG ADON: CPT

## 2018-01-26 PROCEDURE — 83735 ASSAY OF MAGNESIUM: CPT

## 2018-01-26 RX ORDER — PROPAFENONE HYDROCHLORIDE 150 MG/1
150 TABLET, COATED ORAL EVERY 8 HOURS
Status: DISCONTINUED | OUTPATIENT
Start: 2018-01-26 | End: 2018-01-26 | Stop reason: HOSPADM

## 2018-01-26 RX ORDER — PROPAFENONE HYDROCHLORIDE 325 MG/1
325 CAPSULE, EXTENDED RELEASE ORAL EVERY 12 HOURS
Qty: 60 CAPSULE | Refills: 1 | Status: SHIPPED | OUTPATIENT
Start: 2018-01-26 | End: 2018-04-05 | Stop reason: SDUPTHER

## 2018-01-26 RX ORDER — DILTIAZEM HYDROCHLORIDE 5 MG/ML
20 INJECTION INTRAVENOUS
Status: DISCONTINUED | OUTPATIENT
Start: 2018-01-26 | End: 2018-01-26

## 2018-01-26 RX ORDER — IBUPROFEN 200 MG
24 TABLET ORAL
Status: DISCONTINUED | OUTPATIENT
Start: 2018-01-26 | End: 2018-01-26 | Stop reason: HOSPADM

## 2018-01-26 RX ORDER — DILTIAZEM HYDROCHLORIDE 120 MG/1
120 CAPSULE, COATED, EXTENDED RELEASE ORAL DAILY
Status: DISCONTINUED | OUTPATIENT
Start: 2018-01-26 | End: 2018-01-26 | Stop reason: HOSPADM

## 2018-01-26 RX ORDER — ONDANSETRON 2 MG/ML
4 INJECTION INTRAMUSCULAR; INTRAVENOUS EVERY 8 HOURS PRN
Status: DISCONTINUED | OUTPATIENT
Start: 2018-01-26 | End: 2018-01-26 | Stop reason: HOSPADM

## 2018-01-26 RX ORDER — DILTIAZEM HYDROCHLORIDE 5 MG/ML
5 INJECTION INTRAVENOUS ONCE
Status: COMPLETED | OUTPATIENT
Start: 2018-01-26 | End: 2018-01-26

## 2018-01-26 RX ORDER — GLUCAGON 1 MG
1 KIT INJECTION
Status: DISCONTINUED | OUTPATIENT
Start: 2018-01-26 | End: 2018-01-26 | Stop reason: HOSPADM

## 2018-01-26 RX ORDER — DILTIAZEM HYDROCHLORIDE 30 MG/1
30 TABLET, FILM COATED ORAL EVERY 6 HOURS PRN
Status: DISCONTINUED | OUTPATIENT
Start: 2018-01-26 | End: 2018-01-26 | Stop reason: HOSPADM

## 2018-01-26 RX ORDER — HYDROCODONE BITARTRATE AND ACETAMINOPHEN 5; 325 MG/1; MG/1
1 TABLET ORAL EVERY 6 HOURS PRN
Status: DISCONTINUED | OUTPATIENT
Start: 2018-01-26 | End: 2018-01-26 | Stop reason: HOSPADM

## 2018-01-26 RX ORDER — IBUPROFEN 200 MG
16 TABLET ORAL
Status: DISCONTINUED | OUTPATIENT
Start: 2018-01-26 | End: 2018-01-26 | Stop reason: HOSPADM

## 2018-01-26 RX ORDER — SODIUM CHLORIDE 0.9 % (FLUSH) 0.9 %
5 SYRINGE (ML) INJECTION
Status: DISCONTINUED | OUTPATIENT
Start: 2018-01-26 | End: 2018-01-26 | Stop reason: HOSPADM

## 2018-01-26 RX ORDER — ACETAMINOPHEN 325 MG/1
650 TABLET ORAL EVERY 4 HOURS PRN
Status: DISCONTINUED | OUTPATIENT
Start: 2018-01-26 | End: 2018-01-26 | Stop reason: HOSPADM

## 2018-01-26 RX ORDER — POTASSIUM CHLORIDE 20 MEQ/1
40 TABLET, EXTENDED RELEASE ORAL ONCE
Status: COMPLETED | OUTPATIENT
Start: 2018-01-26 | End: 2018-01-26

## 2018-01-26 RX ORDER — AMOXICILLIN 250 MG
1 CAPSULE ORAL 2 TIMES DAILY
Status: DISCONTINUED | OUTPATIENT
Start: 2018-01-26 | End: 2018-01-26 | Stop reason: HOSPADM

## 2018-01-26 RX ORDER — ONDANSETRON 8 MG/1
8 TABLET, ORALLY DISINTEGRATING ORAL EVERY 8 HOURS PRN
Status: DISCONTINUED | OUTPATIENT
Start: 2018-01-26 | End: 2018-01-26 | Stop reason: HOSPADM

## 2018-01-26 RX ADMIN — PROPAFENONE HYDROCHLORIDE 150 MG: 150 TABLET, FILM COATED ORAL at 06:01

## 2018-01-26 RX ADMIN — PROPAFENONE HYDROCHLORIDE 225 MG: 150 TABLET, FILM COATED ORAL at 12:01

## 2018-01-26 RX ADMIN — POTASSIUM CHLORIDE 40 MEQ: 1500 TABLET, EXTENDED RELEASE ORAL at 12:01

## 2018-01-26 RX ADMIN — DILTIAZEM HYDROCHLORIDE 120 MG: 120 CAPSULE, COATED, EXTENDED RELEASE ORAL at 10:01

## 2018-01-26 RX ADMIN — DILTIAZEM HYDROCHLORIDE 5 MG: 5 INJECTION INTRAVENOUS at 02:01

## 2018-01-26 RX ADMIN — APIXABAN 5 MG: 5 TABLET, FILM COATED ORAL at 10:01

## 2018-01-26 RX ADMIN — STANDARDIZED SENNA CONCENTRATE AND DOCUSATE SODIUM 1 TABLET: 8.6; 5 TABLET, FILM COATED ORAL at 10:01

## 2018-01-26 NOTE — ED TRIAGE NOTES
"Francoise Dykes, a 57 y.o. female presents to the ED with c/o palpitations and feeling like HR is "jumping all over the place". Episode began at 2100. Hx of afib, two ablations and three cardioversions.      Chief Complaint   Patient presents with    Palpitations     Hx of A-fib, began feeling palpitations, states "it's jumping all over the place" since 9pm. -220 in triage. Charge notified.      Review of patient's allergies indicates:   Allergen Reactions    Adhesive tape-silicones Itching     Manifested in 12/2015 following AF ablation.     Past Medical History:   Diagnosis Date    Allergy     seasonal    Atrial fibrillation     Essential hypertension 11/20/2017    Pt states that she does not have HTN.        "

## 2018-01-26 NOTE — ASSESSMENT & PLAN NOTE
- replaced PO, Mg 2.2  - monitor daily, keep K >4, Mg >2  - The patient reports during previous admissons for RVR she has had to have K+ supplementation.

## 2018-01-26 NOTE — PLAN OF CARE
01/26/18 0800   Discharge Assessment   Assessment Type Discharge Planning Assessment   Confirmed/corrected address and phone number on facesheet? Yes   Assessment information obtained from? Patient   Expected Length of Stay (days) 2   Communicated expected length of stay with patient/caregiver yes   Prior to hospitilization cognitive status: Alert/Oriented   Prior to hospitalization functional status: Independent   Current cognitive status: Alert/Oriented   Current Functional Status: Independent   Lives With alone   Able to Return to Prior Arrangements yes   Is patient able to care for self after discharge? Yes   Patient's perception of discharge disposition home or selfcare   Readmission Within The Last 30 Days previous discharge plan unsuccessful   If yes, most recent facility name: [facility]   Patient currently being followed by outpatient case management? No   Patient currently receives any other outside agency services? No   Equipment Currently Used at Home none   Do you have any problems affording any of your prescribed medications? No   Is the patient taking medications as prescribed? yes   Does the patient have transportation home? Yes   Transportation Available family or friend will provide  (son, Roberto Long ([phone]))   Does the patient receive services at the [facility]? No   Discharge Plan A Home   Discharge Plan B Home Health   Patient/Family In Agreement With Plan yes   Readmission Questionnaire   At the time of your discharge, did someone talk to you about what your health problems were? Yes   At the time of discharge, did someone talk to you about what to watch out for regarding worsening of your health problem? Yes   At the time of discharge, did someone talk to you about what to do if you experienced worsening of your health problem? Yes   At the time of discharge, did someone talk to you about which medication to take when you left the hospital and which ones to stop taking?  Yes   At the time of discharge, did someone talk to you about when and where to follow up with a doctor after you left the hospital? Yes   What do you believe caused you to be sick enough to be re-admitted? a-flutter w/rvr; -220   How often do you need to have someone help you when you read instructions, pamphlets, or other written material from your doctor or pharmacy? Rarely   Do you have problems taking your medications as prescribed? No   Do you have any problems affording any of  your prescribed medications? No   Do you have problems obtaining/receiving your medications? No   Does the patient have transportation to healthcare appointments? Yes   Living Arrangements house   Does the patient have family/friends to help with healtcare needs after discharge? yes   Does your caregiver provide all the help you need? Yes   Are you currently feeling confused? No   Are you currently having problems thinking? No   Are you currently having memory problems? No   Have you felt down, depressed, or hopeless? 0   Have you felt little interest or pleasure in doing things? 0   In the last 7 days, my sleep quality was: fair     Patient resting quietly in bed when CM rounded. No family present. Patient was admitted due to a-flutter w/rvr. Patient has a history of a-fib & is on eliquis. Patient has a history of multiple ablations & is followed by Dr. Meyers (cards). Patient lives alone & is independent of all ADLs. Plan to discharge patient home alone or home with home health when medically stable. Patient stated that her son, Roberto Long, will provide transportation at time of discharge. Previously scheduled appointment with Dr. Meyers on 1/29/18 at 0920 noted. Will continue to follow.

## 2018-01-26 NOTE — H&P
"[facility] Hospital Medicine  History & Physical    Patient Name: Francoise Dykes  MRN: [id number]  Admission Date: 2018  Attending Physician: Johnnie Benito MD   Primary Care Provider: Bakari Winchester MD    [facility] Medicine Team: [facility] F Anselmo Hector PA-C     Patient information was obtained from patient, past medical records and ER records.     Subjective:     Principal Problem:Atrial flutter with rapid ventricular response    Chief Complaint:   Chief Complaint   Patient presents with    Palpitations     Hx of A-fib, began feeling palpitations, states "it's jumping all over the place" since 9pm. -220 in triage. Charge notified.         HPI: Francoise Dykes is a 57F with paroxysmal aflutter on eliquis s/p multiple DCCV and PVI ablations presents for evaluation of palpitations. She reports watching TV when she had sudden onset of palpitations, her iWatch notified her of her increasing HR from 70s to 130s. She took an additional Rhythmol as instructed, but when her HR did not correct she presented to the ED. She reprots compliance with medications except for a missed dose of diltiazem on . She denies any fever, chills, or other ssx of infection. She had an episode of nausea in the ED when her HR was in the 200s, but that has since resolved. She denies any CP, syncope, lightheadedness. Besides her HR, she denies any other symptoms. She does endorse some DNUN, especially when her HR is elevated.    Past Medical History:   Diagnosis Date    Allergy     seasonal    Atrial fibrillation     Essential hypertension 2017    Pt states that she does not have HTN.        Past Surgical History:   Procedure Laterality Date     SECTION      heart ablation      [facility]Y      [facility]       Review of patient's allergies indicates:   Allergen Reactions    Adhesive tape-silicones Itching     Manifested in 2015 following AF ablation.       No current " facility-administered medications on file prior to encounter.      Current Outpatient Prescriptions on File Prior to Encounter   Medication Sig    apixaban 5 mg Tab Take 1 tablet (5 mg total) by mouth 2 (two) times daily.    azelastine (ASTELIN) 137 mcg (0.1 %) nasal spray 1 spray (137 mcg total) by Nasal route 2 (two) times daily.    cetirizine (ZYRTEC) 10 MG tablet Take 10 mg by mouth once daily.    diltiaZEM (DILACOR XR) 120 MG CDCR Take 1 capsule (120 mg total) by mouth once daily.    fluticasone (FLONASE) 50 mcg/actuation nasal spray 1 spray by Each Nare route 2 (two) times daily.    hydrocortisone 0.5 % cream Apply topically daily as needed.    ibuprofen (ADVIL,MOTRIN) 800 MG tablet Take 1 tablet (800 mg total) by mouth 3 (three) times daily with meals.    propafenone (RYTHMOL SR) 225 MG Cp12 Take 1 capsule (225 mg total) by mouth every 12 (twelve) hours.     Family History     Problem Relation (Age of Onset)    Asbestos Father    Blindness Paternal Grandfather    Cancer Paternal Grandmother    Diabetes Mother, Maternal Grandfather    Eczema Son    Glaucoma Mother, Paternal Grandfather    Heart disease Maternal Grandmother    Hypertension Mother, Son    Obesity Father        Social History Main Topics    Smoking status: Never Smoker    Smokeless tobacco: Never Used    Alcohol use Yes      Comment: rarely, 1 drink/week    Drug use: No    Sexual activity: Yes     Partners: Male     Review of Systems   Constitutional: Negative for chills, fatigue and fever.   Respiratory: Negative for cough, chest tightness, shortness of breath and wheezing.    Cardiovascular: Positive for palpitations. Negative for chest pain and leg swelling.   Gastrointestinal: Positive for nausea. Negative for abdominal pain and vomiting.   Genitourinary: Negative for difficulty urinating and dysuria.   Musculoskeletal: Positive for back pain. Negative for arthralgias and gait problem.   Skin: Negative for rash and wound.    Neurological: Negative for dizziness, tremors, weakness, light-headedness and headaches.   Psychiatric/Behavioral: Negative for agitation and confusion.     Objective:     Vital Signs (Most Recent):  Temp: 98.4 °F (36.9 °C) (01/25/18 2304)  Pulse: 92 (01/26/18 0032)  Resp: (!) 22 (01/25/18 2304)  BP: (!) 147/81 (01/26/18 0032)  SpO2: 99 % (01/26/18 0032) Vital Signs (24h Range):  Temp:  [98.4 °F (36.9 °C)] 98.4 °F (36.9 °C)  Pulse:  [] 92  Resp:  [22] 22  SpO2:  [95 %-100 %] 99 %  BP: (137-168)/() 147/81     Weight: 93.9 kg (207 lb)  Body mass index is 34.45 kg/m².    Physical Exam   Constitutional: She is oriented to person, place, and time. She appears well-developed and well-nourished. No distress.   HENT:   Head: Normocephalic and atraumatic.   Eyes: EOM are normal. Pupils are equal, round, and reactive to light. Right eye exhibits no discharge. Left eye exhibits no discharge.   Neck: Normal range of motion. Neck supple.   Cardiovascular: An irregularly irregular rhythm present. Tachycardia present.    No murmur heard.  -150s during exam   Pulmonary/Chest: Effort normal and breath sounds normal. No respiratory distress. She has no wheezes. She has no rales.   Abdominal: Soft. Bowel sounds are normal. She exhibits no distension. There is no tenderness. There is no guarding.   Musculoskeletal: Normal range of motion. She exhibits no edema.   Neurological: She is alert and oriented to person, place, and time. No cranial nerve deficit.   Skin: Skin is warm and dry. She is not diaphoretic.   Psychiatric: She has a normal mood and affect. Her behavior is normal. Judgment and thought content normal.   Nursing note and vitals reviewed.        CRANIAL NERVES     CN III, IV, VI   Pupils are equal, round, and reactive to light.  Extraocular motions are normal.        Significant Labs:   CBC:   Recent Labs  Lab 01/25/18  2315   WBC 7.33   HGB 12.6   HCT 35.6*        CMP:   Recent Labs  Lab  01/25/18  2315      K 3.3*      CO2 23   *   BUN 13   CREATININE 0.9   CALCIUM 9.6   PROT 7.8   ALBUMIN 3.8   BILITOT 0.6   ALKPHOS 101   AST 18   ALT 17   ANIONGAP 11   EGFRNONAA >60.0     Cardiac Markers: No results for input(s): CKMB, MYOGLOBIN, BNP, TROPISTAT in the last 48 hours.  Magnesium:   Recent Labs  Lab 01/25/18  2315   MG 2.2     TSH:   Recent Labs  Lab 12/26/17  1012   TSH 0.633     Urine Culture: No results for input(s): LABURIN in the last 48 hours.  Urine Studies: No results for input(s): COLORU, APPEARANCEUA, PHUR, SPECGRAV, PROTEINUA, GLUCUA, KETONESU, BILIRUBINUA, OCCULTUA, NITRITE, UROBILINOGEN, LEUKOCYTESUR, RBCUA, WBCUA, BACTERIA, SQUAMEPITHEL, HYALINECASTS in the last 48 hours.    Invalid input(s): WRIGHTSUR    Significant Imaging: EKG: I have reviewed all pertinent results/findings within the past 24 hours and my personal findings are: aflutter with RVR > NSR    Assessment/Plan:     * Atrial flutter with rapid ventricular response    57F with history of paroxysmal A.flutter s/p multiple DCCV and PVI ablations presents with palpitations, found to be in aflutter with RVR    - EP consulted with appreciation, last admission in 12/2017 where she spontaneously converted to NSR and SAMMIE and DCCV cancelled  - rate controlled achieved in ED with IVP dilt and home medication administration, converted to NSR per EKG. However, on admit, patient back in aflutter with rate 100-150. BP in 130s/90s. Will give additional IV dilt 5 mg x 1 now. May repeat based on response  - Continue Propafenone SR 225mg PO BID (SR formulation unavailable in hospital, spoke to pharmD who recommends conversion to 125 mg TID)  - Continue Diltiazem 120mg PO Daily  - Continue Apixaban 5mg PO BID  - PRN PO dilt ordered for sustained HR >130 once patient moved to the floor        Hypokalemia    - replaced PO, Mg 2.2  - monitor daily, keep K >4, Mg >2  - The patient reports during previous admissons for RVR she has  had to have K+ supplementation.           VTE Risk Mitigation         Ordered     apixaban tablet 5 mg  2 times daily     Route:  Oral        01/26/18 0051     Medium Risk of VTE  Once      01/26/18 0051     Reason for No Pharmacological VTE Prophylaxis  Once      01/26/18 0051             Anselmo Hector PA-C  Department of Hospital Medicine   [facility]

## 2018-01-26 NOTE — ED PROVIDER NOTES
"Encounter Date: 2018    SCRIBE #1 NOTE: I, Kassie Maria, am scribing for, and in the presence of,  Dr. Benito. I have scribed the entire note.       History     Chief Complaint   Patient presents with    Palpitations     Hx of A-fib, began feeling palpitations, states "it's jumping all over the place" since 9pm. -220 in triage. Charge notified.      The patient is a 57 y.o. female with co-morbidities including: Afib and HTN who presents to the ED with a complaint of palpitations of 200-220.  Reports that she started to feel bad around 9 tonight when she was lying down watching TV.  States she has had a history of three cardioversions, and her last one was at the end of 2017 with AF ablation.  She has felt this before, but rate has never been this high before, and was around 140-150 during her previous episodes.  States she has been compliant with her medications, with only one missed dose in the morning a few days ago.  Reports that she has somewhat stressed, but this is not abnormal.  Patient is only SOB with exertion.  She has no further medical complaints at this time.  Cardiologist told patient that if an episode of cardioversion happened again after her previous one, he wanted to keep her in the hospital.      The history is provided by the patient and medical records.     Review of patient's allergies indicates:   Allergen Reactions    Adhesive tape-silicones Itching     Manifested in 2015 following AF ablation.     Past Medical History:   Diagnosis Date    Allergy     seasonal    Atrial fibrillation     Essential hypertension 2017    Pt states that she does not have HTN.      Past Surgical History:   Procedure Laterality Date     SECTION      heart ablation      [facility]      [facility]     Family History   Problem Relation Age of Onset    Hypertension Mother     Diabetes Mother     Glaucoma Mother     Asbestos Father     Obesity Father     Eczema Son     " Hypertension Son     Heart disease Maternal Grandmother      chf    Diabetes Maternal Grandfather     Cancer Paternal Grandmother      lung, 2/2 second hand smoke    Glaucoma Paternal Grandfather     Blindness Paternal Grandfather     Melanoma Neg Hx     Psoriasis Neg Hx     Lupus Neg Hx     Acne Neg Hx     Breast cancer Neg Hx     Colon cancer Neg Hx     Ovarian cancer Neg Hx      Social History   Substance Use Topics    Smoking status: Never Smoker    Smokeless tobacco: Never Used    Alcohol use Yes      Comment: rarely, 1 drink/week     Review of Systems   Constitutional: Negative for chills and fever.   HENT: Negative for ear pain and nosebleeds.    Eyes: Negative for photophobia and pain.   Respiratory: Negative for shortness of breath and wheezing.    Cardiovascular: Positive for palpitations.   Gastrointestinal: Negative for abdominal pain and blood in stool.   Genitourinary: Negative for dysuria and hematuria.   Musculoskeletal: Negative for neck pain and neck stiffness.   Skin: Negative for rash.   Neurological: Negative for speech difficulty and headaches.       Physical Exam     Initial Vitals [01/25/18 2304]   BP Pulse Resp Temp SpO2   (!) 137/98 (!) 210 (!) 22 98.4 °F (36.9 °C) 98 %      MAP       111         Physical Exam    Nursing note and vitals reviewed.  Constitutional: She appears well-developed and well-nourished.   HENT:   Head: Normocephalic and atraumatic.   Eyes: Conjunctivae are normal.   Neck: Normal range of motion.   Cardiovascular:   Regularly tachycardic.   Pulmonary/Chest: Breath sounds normal. No respiratory distress.   Abdominal: Soft. She exhibits no distension. There is no tenderness.   Neurological: She is alert and oriented to person, place, and time. No cranial nerve deficit or sensory deficit.   Skin: Skin is warm. No rash noted.         ED Course   Procedures  Labs Reviewed   COMPREHENSIVE METABOLIC PANEL - Abnormal; Notable for the following:        Result  Value    Potassium 3.3 (*)     Glucose 125 (*)     All other components within normal limits   CBC W/ AUTO DIFFERENTIAL - Abnormal; Notable for the following:     Hematocrit 35.6 (*)     MCV 80 (*)     All other components within normal limits   MAGNESIUM   PROTIME-INR     EKG Readings: (Independently Interpreted)   Initial Reading: No STEMI. Rhythm: Atrial Flutter. Heart Rate: 182. ST Segments: Non-Specific ST Segment Depression.   Other EKG Interpretations: Repeat EKG: sinus 99, normal axis          Medical Decision Making:   History:   Old Medical Records: I decided to obtain old medical records.  Initial Assessment:   Emergent evaluation of palpitations.  Patient seems to be hemodynamically stable, but in A-flutter with RVR with rates almost at 200.  She is on multiple medications at home, but did miss one dose.  Rate control was attempted with IV diltiazem 20 mg and seemed to be fairly successful.  Will check labs and monitor her heart rate.  Given her history, will discuss with cardiology as well.  Independently Interpreted Test(s):   I have ordered and independently interpreted EKG Reading(s) - see prior notes  Clinical Tests:   Lab Tests: Ordered and Reviewed  Medical Tests: Ordered and Reviewed  Other:   I have discussed this case with another health care provider.            Scribe Attestation:   Scribe #1: I performed the above scribed service and the documentation accurately describes the services I performed. I attest to the accuracy of the note.            ED Course      Clinical Impression:   The primary encounter diagnosis was Atrial flutter with rapid ventricular response. A diagnosis of Palpitations was also pertinent to this visit.    Disposition:   Disposition: Admitted                        Johnnie Benito MD  01/26/18 0058

## 2018-01-26 NOTE — SUBJECTIVE & OBJECTIVE
Past Medical History:   Diagnosis Date    Allergy     seasonal    Atrial fibrillation     Essential hypertension 2017    Pt states that she does not have HTN.        Past Surgical History:   Procedure Laterality Date     SECTION      heart ablation      HYSTERECTOMY      [facility]       Review of patient's allergies indicates:   Allergen Reactions    Adhesive tape-silicones Itching     Manifested in 2015 following AF ablation.       No current facility-administered medications on file prior to encounter.      Current Outpatient Prescriptions on File Prior to Encounter   Medication Sig    apixaban 5 mg Tab Take 1 tablet (5 mg total) by mouth 2 (two) times daily.    azelastine (ASTELIN) 137 mcg (0.1 %) nasal spray 1 spray (137 mcg total) by Nasal route 2 (two) times daily.    cetirizine (ZYRTEC) 10 MG tablet Take 10 mg by mouth once daily.    diltiaZEM (DILACOR XR) 120 MG CDCR Take 1 capsule (120 mg total) by mouth once daily.    fluticasone (FLONASE) 50 mcg/actuation nasal spray 1 spray by Each Nare route 2 (two) times daily.    hydrocortisone 0.5 % cream Apply topically daily as needed.    ibuprofen (ADVIL,MOTRIN) 800 MG tablet Take 1 tablet (800 mg total) by mouth 3 (three) times daily with meals.    propafenone (RYTHMOL SR) 225 MG Cp12 Take 1 capsule (225 mg total) by mouth every 12 (twelve) hours.     Family History     Problem Relation (Age of Onset)    Asbestos Father    Blindness Paternal Grandfather    Cancer Paternal Grandmother    Diabetes Mother, Maternal Grandfather    Eczema Son    Glaucoma Mother, Paternal Grandfather    Heart disease Maternal Grandmother    Hypertension Mother, Son    Obesity Father        Social History Main Topics    Smoking status: Never Smoker    Smokeless tobacco: Never Used    Alcohol use Yes      Comment: rarely, 1 drink/week    Drug use: No    Sexual activity: Yes     Partners: Male     Review of Systems   All other systems reviewed and are  negative.    Objective:     Vital Signs (Most Recent):  Temp: 98.5 °F (36.9 °C) (01/26/18 0306)  Pulse: 70 (01/26/18 0700)  Resp: 18 (01/26/18 0306)  BP: 122/67 (01/26/18 0306)  SpO2: 98 % (01/26/18 0306) Vital Signs (24h Range):  Temp:  [98.4 °F (36.9 °C)-98.5 °F (36.9 °C)] 98.5 °F (36.9 °C)  Pulse:  [] 70  Resp:  [18-22] 18  SpO2:  [95 %-100 %] 98 %  BP: (122-168)/() 122/67     Weight: 93.5 kg (206 lb 2.1 oz)  Body mass index is 34.3 kg/m².    SpO2: 98 %  O2 Device (Oxygen Therapy): room air    Physical Exam   General: alert, awake and oriented x 3  Eyes:PERRL.   Neck:no JVD   Lungs:  clear to auscultation bilaterally and normal respiratory effort  Cardiovascular: Heart: regular rate and rhythm, S1, S2 normal, no murmur, click, rub or gallop.   Chest Wall: no tenderness.   Extremities: no cyanosis or edema.   Pulses: 2+ and symmetric.  Abdomen/Rectal: Abdomen: soft, non-tender non-distented; bowel sounds normal  Skin: No rashes or lesions  Neurologic: Normal strength and tone. No focal numbness or weakness        Significant Labs:   CMP:   Recent Labs  Lab 01/25/18  2315      K 3.3*      CO2 23   *   BUN 13   CREATININE 0.9   CALCIUM 9.6   PROT 7.8   ALBUMIN 3.8   BILITOT 0.6   ALKPHOS 101   AST 18   ALT 17   ANIONGAP 11   ESTGFRAFRICA >60.0   EGFRNONAA >60.0    and CBC:   Recent Labs  Lab 01/25/18  2315   WBC 7.33   HGB 12.6   HCT 35.6*          Significant Imaging: Echocardiogram:   2D echo with color flow doppler:   Results for orders placed or performed during the hospital encounter of 08/06/16   2D echo with color flow doppler   Result Value Ref Range    EF 65 55 - 65    Diastolic Dysfunction No

## 2018-01-26 NOTE — ASSESSMENT & PLAN NOTE
Atrial tachycardia now in sinus rhythm  -continue Cardizem 120 mg po QD  -Increase rhythmol to 325 mg po BID  -continue eliquis  -Follow up with Dr Meyers in 2 weeks  -Ok to discharge from EP perspective  -EP will sign , reconsult as needed

## 2018-01-26 NOTE — SUBJECTIVE & OBJECTIVE
Past Medical History:   Diagnosis Date    Allergy     seasonal    Atrial fibrillation     Essential hypertension 2017    Pt states that she does not have HTN.        Past Surgical History:   Procedure Laterality Date     SECTION      heart ablation      [facility]      [facility]       Review of patient's allergies indicates:   Allergen Reactions    Adhesive tape-silicones Itching     Manifested in 2015 following AF ablation.       No current facility-administered medications on file prior to encounter.      Current Outpatient Prescriptions on File Prior to Encounter   Medication Sig    apixaban 5 mg Tab Take 1 tablet (5 mg total) by mouth 2 (two) times daily.    azelastine (ASTELIN) 137 mcg (0.1 %) nasal spray 1 spray (137 mcg total) by Nasal route 2 (two) times daily.    cetirizine (ZYRTEC) 10 MG tablet Take 10 mg by mouth once daily.    diltiaZEM (DILACOR XR) 120 MG CDCR Take 1 capsule (120 mg total) by mouth once daily.    fluticasone (FLONASE) 50 mcg/actuation nasal spray 1 spray by Each Nare route 2 (two) times daily.    hydrocortisone 0.5 % cream Apply topically daily as needed.    ibuprofen (ADVIL,MOTRIN) 800 MG tablet Take 1 tablet (800 mg total) by mouth 3 (three) times daily with meals.    propafenone (RYTHMOL SR) 225 MG Cp12 Take 1 capsule (225 mg total) by mouth every 12 (twelve) hours.     Family History     Problem Relation (Age of Onset)    Asbestos Father    Blindness Paternal Grandfather    Cancer Paternal Grandmother    Diabetes Mother, Maternal Grandfather    Eczema Son    Glaucoma Mother, Paternal Grandfather    Heart disease Maternal Grandmother    Hypertension Mother, Son    Obesity Father        Social History Main Topics    Smoking status: Never Smoker    Smokeless tobacco: Never Used    Alcohol use Yes      Comment: rarely, 1 drink/week    Drug use: No    Sexual activity: Yes     Partners: Male     Review of Systems   Constitutional: Negative for chills,  fatigue and fever.   Respiratory: Negative for cough, chest tightness, shortness of breath and wheezing.    Cardiovascular: Positive for palpitations. Negative for chest pain and leg swelling.   Gastrointestinal: Positive for nausea. Negative for abdominal pain and vomiting.   Genitourinary: Negative for difficulty urinating and dysuria.   Musculoskeletal: Positive for back pain. Negative for arthralgias and gait problem.   Skin: Negative for rash and wound.   Neurological: Negative for dizziness, tremors, weakness, light-headedness and headaches.   Psychiatric/Behavioral: Negative for agitation and confusion.     Objective:     Vital Signs (Most Recent):  Temp: 98.4 °F (36.9 °C) (01/25/18 2304)  Pulse: 92 (01/26/18 0032)  Resp: (!) 22 (01/25/18 2304)  BP: (!) 147/81 (01/26/18 0032)  SpO2: 99 % (01/26/18 0032) Vital Signs (24h Range):  Temp:  [98.4 °F (36.9 °C)] 98.4 °F (36.9 °C)  Pulse:  [] 92  Resp:  [22] 22  SpO2:  [95 %-100 %] 99 %  BP: (137-168)/() 147/81     Weight: 93.9 kg (207 lb)  Body mass index is 34.45 kg/m².    Physical Exam   Constitutional: She is oriented to person, place, and time. She appears well-developed and well-nourished. No distress.   HENT:   Head: Normocephalic and atraumatic.   Eyes: EOM are normal. Pupils are equal, round, and reactive to light. Right eye exhibits no discharge. Left eye exhibits no discharge.   Neck: Normal range of motion. Neck supple.   Cardiovascular: An irregularly irregular rhythm present. Tachycardia present.    No murmur heard.  -150s during exam   Pulmonary/Chest: Effort normal and breath sounds normal. No respiratory distress. She has no wheezes. She has no rales.   Abdominal: Soft. Bowel sounds are normal. She exhibits no distension. There is no tenderness. There is no guarding.   Musculoskeletal: Normal range of motion. She exhibits no edema.   Neurological: She is alert and oriented to person, place, and time. No cranial nerve deficit.    Skin: Skin is warm and dry. She is not diaphoretic.   Psychiatric: She has a normal mood and affect. Her behavior is normal. Judgment and thought content normal.   Nursing note and vitals reviewed.        CRANIAL NERVES     CN III, IV, VI   Pupils are equal, round, and reactive to light.  Extraocular motions are normal.        Significant Labs:   CBC:   Recent Labs  Lab 01/25/18  2315   WBC 7.33   HGB 12.6   HCT 35.6*        CMP:   Recent Labs  Lab 01/25/18  2315      K 3.3*      CO2 23   *   BUN 13   CREATININE 0.9   CALCIUM 9.6   PROT 7.8   ALBUMIN 3.8   BILITOT 0.6   ALKPHOS 101   AST 18   ALT 17   ANIONGAP 11   EGFRNONAA >60.0     Cardiac Markers: No results for input(s): CKMB, MYOGLOBIN, BNP, TROPISTAT in the last 48 hours.  Magnesium:   Recent Labs  Lab 01/25/18  2315   MG 2.2     TSH:   Recent Labs  Lab 12/26/17  1012   TSH 0.633     Urine Culture: No results for input(s): LABURIN in the last 48 hours.  Urine Studies: No results for input(s): COLORU, APPEARANCEUA, PHUR, SPECGRAV, PROTEINUA, GLUCUA, KETONESU, BILIRUBINUA, OCCULTUA, NITRITE, UROBILINOGEN, LEUKOCYTESUR, RBCUA, WBCUA, BACTERIA, SQUAMEPITHEL, HYALINECASTS in the last 48 hours.    Invalid input(s): WRIGHTSUR    Significant Imaging: EKG: I have reviewed all pertinent results/findings within the past 24 hours and my personal findings are: aflutter with RVR > NSR

## 2018-01-26 NOTE — ASSESSMENT & PLAN NOTE
history of paroxysmal A.flutter s/p multiple DCCV and PVI ablations presents with palpitations, found to be in aflutter with RVR    - EP consulted with appreciation, last admission in 12/2017 where she spontaneously converted to NSR and SAMMIE and DCCV cancelled  - rate controlled achieved in ED with IVP dilt and home medication administration, converted to NSR per EKG. However, on admit, patient back in aflutter with rate 100-150. BP in 130s/90s. Will give additional IV dilt 5 mg x 1 now. May repeat based on response  - Continue Propafenone SR 225mg PO BID (SR formulation unavailable in hospital, spoke to pharmD who recommends conversion to 125 mg TID)  - Continue Diltiazem 120mg PO Daily and Apixaban 5mg PO BID  - PRN PO dilt ordered for sustained HR >130 once patient moved to the floor  1/26: EP consulted and rec to continue Cardizem 120 mg po QD, increase rhythmol to 325 mg po BID, continue eliquis. Pt back in NSR with controlled HR. Pt discharged home to follow up with Dr. Meyers as outpatient.

## 2018-01-26 NOTE — PROGRESS NOTES
AVS d/c instructions given to patient, voices understanding. PIV d/samantha with cath intact, gauze dressing applied. Telemetry d/samantha. Waiting on family ride.

## 2018-01-26 NOTE — HPI
56 y.o. Aflutter s/p PVI and SVC ablation 12/2015, and repeat PVI 12/2016 (on eliquis for a CHADS-VASC score of 1) [place] [facility] [place] typical [place]flutter.       Previous AF/ AFL hx and anti-arrhythmic hx:   Initially, had PAfib in 2012, chemically converted to NSR with Flecainide and then started on toprol 50 mg daily and started on aspirin  In 01/14 switched from Flecainide to Rythmol for concern that Flec was causing SOB  In 07/15 patient reported that she was still taking flec and had another episode of AF with RVR. Took rythmol and converted to (AFL vs SVT as per Dr. Meyers's last note).   In 12/15 patient underwent PVI and SVC ablation. Then started on Amiodarone.   In 03/16 Amiodarone was stopped.  In 08/16 had atypical aflutter and s/p DCCV successfully and started on rythmol 225 mg BID.  In 12/16 underwent repeat PVI, right pulm veins re-isolated and a mid-sal AT was targeted and sucessfully ablated. Continued on Rythmol 225 mg BID  In 03/17 to 06/17 Rythmol dc'd and then restarted. Diltiazem also continued.   In 08/17 Ms. Dykes presented with atypical atrial flutter with RVR, and underwent SAMMIE/DCCV. Notably, Ms. Dykes missed her AM dose of Rythmol. She was continued on Rythmol.    She had an episode of atrial flutter 12/2017-she was admitted and plan for SAMMIE/CV but she converted on her own.    Last night she was home and and watching TV when she felt palpitations and noted that her HR on iWATCH was upto 130.She took an extra dose of rhythmol as advised -but did not change anything and she decided to come to ER -Her HR was in 200s.EKG shows Atrial tahcycardia with Rapid ventricular rate. She was given IV cardizem 20 mg and 5 mg-Her HR slowed down showing atrial tachycardia with 2:1 conduction.She converted to sinus rhythm overnight and she has maintained SR since.She has ambulated 2 times to restroom without any issues.

## 2018-01-26 NOTE — HPI
Francoise Dykes is a 57F with paroxysmal aflutter on eliquis s/p multiple DCCV and PVI ablations presents for evaluation of palpitations. She reports watching TV when she had sudden onset of palpitations, her iWatch notified her of her increasing HR from 70s to 130s. She took an additional Rhythmol as instructed, but when her HR did not correct she presented to the ED. She reprots compliance with medications except for a missed dose of diltiazem on 01/23. She denies any fever, chills, or other ssx of infection. She had an episode of nausea in the ED when her HR was in the 200s, but that has since resolved. She denies any CP, syncope, lightheadedness. Besides her HR, she denies any other symptoms. She does endorse some DUNN, especially when her HR is elevated.

## 2018-01-26 NOTE — CONSULTS
[facility]  Cardiac Electrophysiology  Consult Note    Admission Date: 1/25/2018  Code Status: Full Code   Attending Provider: Carlota Bales MD  Consulting Provider: Tito Gonzalez MD  Principal Problem:Atrial flutter with rapid ventricular response    Consults  Subjective:     Chief Complaint:  Tachycardia     HPI:   56 y.o. Aflutter s/p PVI and SVC ablation 12/2015, and repeat PVI 12/2016 (on eliquis for a CHADS-VASC score of 1) is admitted to the [facility] for reoccurrance of typical aflutter.       Previous AF/ AFL hx and anti-arrhythmic hx:   Initially, had PAfib in 2012, chemically converted to NSR with Flecainide and then started on toprol 50 mg daily and started on aspirin  In 01/14 switched from Flecainide to Rythmol for concern that Flec was causing SOB  In 07/15 patient reported that she was still taking flec and had another episode of AF with RVR. Took rythmol and converted to (AFL vs SVT as per Dr. Meyers's last note).   In 12/15 patient underwent PVI and SVC ablation. Then started on Amiodarone.   In 03/16 Amiodarone was stopped.  In 08/16 had atypical aflutter and s/p DCCV successfully and started on rythmol 225 mg BID.  In 12/16 underwent repeat PVI, right pulm veins re-isolated and a mid-sal AT was targeted and sucessfully ablated. Continued on Rythmol 225 mg BID  In 03/17 to 06/17 Rythmol dc'd and then restarted. Diltiazem also continued.   In 08/17 Ms. Dykes presented with atypical atrial flutter with RVR, and underwent SAMMIE/DCCV. Notably, Ms. Dykes missed her AM dose of Rythmol. She was continued on Rythmol.    She had an episode of atrial flutter 12/2017-she was admitted and plan for SAMMIE/CV but she converted on her own.    Last night she was home and and watching TV when she felt palpitations and noted that her HR on iWATCH was upto 130.She took an extra dose of rhythmol as advised -but did not change anything and she decided to come to ER -Her HR was in 200s.EKG shows  Atrial tahcycardia with Rapid ventricular rate. She was given IV cardizem 20 mg and 5 mg-Her HR slowed down showing atrial tachycardia with 2:1 conduction.She converted to sinus rhythm overnight and she has maintained SR since.She has ambulated 2 times to restroom without any issues.    Past Medical History:   Diagnosis Date    Allergy     seasonal    Atrial fibrillation     Essential hypertension 2017    Pt states that she does not have HTN.        Past Surgical History:   Procedure Laterality Date     SECTION      heart ablation      HYSTERECTOMY      [facility]       Review of patient's allergies indicates:   Allergen Reactions    Adhesive tape-silicones Itching     Manifested in 2015 following AF ablation.       No current facility-administered medications on file prior to encounter.      Current Outpatient Prescriptions on File Prior to Encounter   Medication Sig    apixaban 5 mg Tab Take 1 tablet (5 mg total) by mouth 2 (two) times daily.    azelastine (ASTELIN) 137 mcg (0.1 %) nasal spray 1 spray (137 mcg total) by Nasal route 2 (two) times daily.    cetirizine (ZYRTEC) 10 MG tablet Take 10 mg by mouth once daily.    diltiaZEM (DILACOR XR) 120 MG CDCR Take 1 capsule (120 mg total) by mouth once daily.    fluticasone (FLONASE) 50 mcg/actuation nasal spray 1 spray by Each Nare route 2 (two) times daily.    hydrocortisone 0.5 % cream Apply topically daily as needed.    ibuprofen (ADVIL,MOTRIN) 800 MG tablet Take 1 tablet (800 mg total) by mouth 3 (three) times daily with meals.    propafenone (RYTHMOL SR) 225 MG Cp12 Take 1 capsule (225 mg total) by mouth every 12 (twelve) hours.     Family History     Problem Relation (Age of Onset)    Asbestos Father    Blindness Paternal Grandfather    Cancer Paternal Grandmother    Diabetes Mother, Maternal Grandfather    Eczema Son    Glaucoma Mother, Paternal Grandfather    Heart disease Maternal Grandmother    Hypertension Mother, Son     Obesity Father        Social History Main Topics    Smoking status: Never Smoker    Smokeless tobacco: Never Used    Alcohol use Yes      Comment: rarely, 1 drink/week    Drug use: No    Sexual activity: Yes     Partners: Male     Review of Systems   All other systems reviewed and are negative.    Objective:     Vital Signs (Most Recent):  Temp: 98.5 °F (36.9 °C) (01/26/18 0306)  Pulse: 70 (01/26/18 0700)  Resp: 18 (01/26/18 0306)  BP: 122/67 (01/26/18 0306)  SpO2: 98 % (01/26/18 0306) Vital Signs (24h Range):  Temp:  [98.4 °F (36.9 °C)-98.5 °F (36.9 °C)] 98.5 °F (36.9 °C)  Pulse:  [] 70  Resp:  [18-22] 18  SpO2:  [95 %-100 %] 98 %  BP: (122-168)/() 122/67     Weight: 93.5 kg (206 lb 2.1 oz)  Body mass index is 34.3 kg/m².    SpO2: 98 %  O2 Device (Oxygen Therapy): room air    Physical Exam   General: alert, awake and oriented x 3  Eyes:PERRL.   Neck:no JVD   Lungs:  clear to auscultation bilaterally and normal respiratory effort  Cardiovascular: Heart: regular rate and rhythm, S1, S2 normal, no murmur, click, rub or gallop.   Chest Wall: no tenderness.   Extremities: no cyanosis or edema.   Pulses: 2+ and symmetric.  Abdomen/Rectal: Abdomen: soft, non-tender non-distented; bowel sounds normal  Skin: No rashes or lesions  Neurologic: Normal strength and tone. No focal numbness or weakness        Significant Labs:   CMP:   Recent Labs  Lab 01/25/18  2315      K 3.3*      CO2 23   *   BUN 13   CREATININE 0.9   CALCIUM 9.6   PROT 7.8   ALBUMIN 3.8   BILITOT 0.6   ALKPHOS 101   AST 18   ALT 17   ANIONGAP 11   ESTGFRAFRICA >60.0   EGFRNONAA >60.0    and CBC:   Recent Labs  Lab 01/25/18  2315   WBC 7.33   HGB 12.6   HCT 35.6*          Significant Imaging: Echocardiogram:   2D echo with color flow doppler:   Results for orders placed or performed during the hospital encounter of 08/06/16   2D echo with color flow doppler   Result Value Ref Range    EF 65 55 - 65    Diastolic  Dysfunction No      Assessment and Plan:     Atrial tachycardia    Atrial tachycardia now in sinus rhythm  -continue Cardizem 120 mg po QD  -Increase rhythmol to 325 mg po BID  -continue eliquis  -Follow up with Dr Meyers in 2 weeks  -Ok to discharge from EP perspective  -EP will sign off, reconsult as needed            Thank you for your consult. I will sign off. Please contact us if you have any additional questions.    Tito Gonzalez MD  Cardiac Electrophysiology  [facility]

## 2018-01-26 NOTE — ASSESSMENT & PLAN NOTE
57F with history of paroxysmal A.flutter s/p multiple DCCV and PVI ablations presents with palpitations, found to be in aflutter with RVR    - EP consulted with appreciation, last admission in 12/2017 where she spontaneously converted to NSR and SAMMIE and DCCV cancelled  - rate controlled achieved in ED with IVP dilt and home medication administration, converted to NSR per EKG. However, on admit, patient back in aflutter with rate 100-150. BP in 130s/90s. Will give additional IV dilt 5 mg x 1 now. May repeat based on response  - Continue Propafenone SR 225mg PO BID (SR formulation unavailable in hospital, spoke to pharmD who recommends conversion to 125 mg TID)  - Continue Diltiazem 120mg PO Daily  - Continue Apixaban 5mg PO BID  - PRN PO dilt ordered for sustained HR >130 once patient moved to the floor

## 2018-01-26 NOTE — ASSESSMENT & PLAN NOTE
Atrial tachycardia now in sinus rhythm  -continue Cardizem 120 mg po QD  -Increase rhythmol to 325 mg po BID  -continue eliquis  -Follow up with Dr Meyers in 2 weeks  -EP will sign , reconsult as needed

## 2018-01-26 NOTE — DISCHARGE SUMMARY
[facility] Hospital Medicine  Discharge Summary      Patient Name: Francoise Dykes  MRN: [id number]  Admission Date: 1/25/2018  Hospital Length of Stay: 0 days  Discharge Date and Time: 1/26/2018  3:19 PM  Attending Physician: No att. providers found   Discharging Provider: Jasmin Rivas PA-C  Primary Care Provider: Bakari Winchester MD  [facility] Medicine Team: [facility]OSP MED F Jasmin Rivas PA-C    HPI:   Francoise Dykes is a 57F with paroxysmal aflutter on eliquis s/p multiple DCCV and PVI ablations presents for evaluation of palpitations. She reports watching TV when she had sudden onset of palpitations, her iWatch notified her of her increasing HR from 70s to 130s. She took an additional Rhythmol as instructed, but when her HR did not correct she presented to the ED. She reprots compliance with medications except for a missed dose of diltiazem on 01/23. She denies any fever, chills, or other ssx of infection. She had an episode of nausea in the ED when her HR was in the 200s, but that has since resolved. She denies any CP, syncope, lightheadedness. Besides her HR, she denies any other symptoms. She does endorse some DUNN, especially when her HR is elevated.    * No surgery found *      Hospital Course:   Pt admitted to observation for aflutter with RVR (hx of paroxysmal A.flutter s/p multiple DCCV and PVI ablations presents with palpitations). Rate controlled achieved in ED with IVP dilt and home medication, converted to NSR per EKG. Gave additional IV dilt 5 mg x1 on admit 2/2 -150. EP consulted and rec to continue Cardizem 120 mg po QD, increase rhythmol to 325 mg po BID, continue eliquis. Pt back in NSR with controlled HR. Pt discharged home to follow up with Dr. Meyers as outpatient.     Consults:   Consults         Status Ordering Provider     Inpatient consult to Electrophysiology  Once     Provider:  (Not yet assigned)    Completed SIRI VÁZQUEZ     Inpatient consult to  Social Work/Case Management  Once     Provider:  (Not yet assigned)    Completed JOHANN OCAMPO.          * Atrial flutter with rapid ventricular response    history of paroxysmal A.flutter s/p multiple DCCV and PVI ablations presents with palpitations, found to be in aflutter with RVR    - EP consulted with appreciation, last admission in 12/2017 where she spontaneously converted to NSR and SAMMIE and DCCV cancelled  - rate controlled achieved in ED with IVP dilt and home medication administration, converted to NSR per EKG. However, on admit, patient back in aflutter with rate 100-150. BP in 130s/90s. Will give additional IV dilt 5 mg x 1 now. May repeat based on response  - Continue Propafenone SR 225mg PO BID (SR formulation unavailable in hospital, spoke to pharmD who recommends conversion to 125 mg TID)  - Continue Diltiazem 120mg PO Daily and Apixaban 5mg PO BID  - PRN PO dilt ordered for sustained HR >130 once patient moved to the floor  1/26: EP consulted and rec to continue Cardizem 120 mg po QD, increase rhythmol to 325 mg po BID, continue eliquis. Pt back in NSR with controlled HR. Pt discharged home to follow up with Dr. Meyers as outpatient.        Hypokalemia-resolved as of 1/26/2018    - K 3.3 -> 3.7 s/p PO replacement   - Mg >2          Final Active Diagnoses:    Diagnosis Date Noted POA    PRINCIPAL PROBLEM:  Atrial flutter with rapid ventricular response [I48.92] 01/26/2018 Yes    Current use of long term anticoagulation [Z79.01] 04/12/2017 Not Applicable    Atrial tachycardia [I47.1] 12/17/2016 Yes      Problems Resolved During this Admission:    Diagnosis Date Noted Date Resolved POA    Hypokalemia [E87.6] 03/19/2017 01/26/2018 Yes       Discharged Condition: stable    Disposition: Home or Self Care    Follow Up:  Follow-up Information     Lee Meyers MD On 1/29/2018.    Specialties:  Electrophysiology, Cardiovascular Disease  Why:  at 9:20 AM  Contact information:  [address]  [facility] [address]  [phone]                 Patient Instructions:     Activity as tolerated     Notify your health care provider if you experience any of the following:  temperature >100.4     Notify your health care provider if you experience any of the following:  severe uncontrolled pain     Notify your health care provider if you experience any of the following:  redness, tenderness, or signs of infection (pain, swelling, redness, odor or green/yellow discharge around incision site)     Notify your health care provider if you experience any of the following:  difficulty breathing or increased cough         Significant Diagnostic Studies: Cardiac Graphics: ECG    Pending Diagnostic Studies:     None         Medications:  Reconciled Home Medications:   Discharge Medication List as of 1/26/2018 12:48 PM      CONTINUE these medications which have CHANGED    Details   propafenone (RYTHMOL SR) 325 MG Cp12 Take 1 capsule (325 mg total) by mouth every 12 (twelve) hours., Starting Fri 1/26/2018, Until Sat 1/26/2019, Normal         CONTINUE these medications which have NOT CHANGED    Details   apixaban 5 mg Tab Take 1 tablet (5 mg total) by mouth 2 (two) times daily., Starting Mon 6/19/2017, Normal      azelastine (ASTELIN) 137 mcg (0.1 %) nasal spray 1 spray (137 mcg total) by Nasal route 2 (two) times daily., Starting Mon 11/13/2017, Until Tue 11/13/2018, Normal      cetirizine (ZYRTEC) 10 MG tablet Take 10 mg by mouth once daily., Until Discontinued, Historical Med      diltiaZEM (DILACOR XR) 120 MG CDCR Take 1 capsule (120 mg total) by mouth once daily., Starting Tue 10/3/2017, Until Wed 10/3/2018, Normal      fluticasone (FLONASE) 50 mcg/actuation nasal spray 1 spray by Each Nare route 2 (two) times daily., Starting Mon 11/13/2017, Normal      hydrocortisone 0.5 % cream Apply topically daily as needed., Until Discontinued, Historical Med      ibuprofen (ADVIL,MOTRIN) 800 MG tablet Take 1 tablet (800 mg total) by  mouth 3 (three) times daily with meals., Starting Tue 6/6/2017, Normal             Indwelling Lines/Drains at time of discharge:   Lines/Drains/Airways          No matching active lines, drains, or airways          Time spent on the discharge of patient: 30 minutes  Patient was seen and examined on the date of discharge and determined to be suitable for discharge.         Jasmin Rivas PA-C  Department of Hospital Medicine  [facility]  Discussed with staff: Dr. Bales

## 2018-01-26 NOTE — HOSPITAL COURSE
Pt admitted to observation for aflutter with RVR (hx of paroxysmal A.flutter s/p multiple DCCV and PVI ablations presents with palpitations). Rate controlled achieved in ED with IVP dilt and home medication, converted to NSR per EKG. Gave additional IV dilt 5 mg x1 on admit 2/2 -150. EP consulted and rec to continue Cardizem 120 mg po QD, increase rhythmol to 325 mg po BID, continue eliquis. Pt back in NSR with controlled HR. Pt discharged home to follow up with Dr. Meyers as outpatient.

## 2018-01-29 ENCOUNTER — OFFICE VISIT (OUTPATIENT)
Dept: ELECTROPHYSIOLOGY | Facility: CLINIC | Age: 58
End: 2018-01-29
Payer: COMMERCIAL

## 2018-01-29 VITALS
BODY MASS INDEX: 35.52 KG/M2 | HEIGHT: 65 IN | DIASTOLIC BLOOD PRESSURE: 74 MMHG | HEART RATE: 72 BPM | WEIGHT: 213.19 LBS | SYSTOLIC BLOOD PRESSURE: 116 MMHG

## 2018-01-29 DIAGNOSIS — I48.4 ATYPICAL ATRIAL FLUTTER: ICD-10-CM

## 2018-01-29 DIAGNOSIS — Z79.899 LONG TERM CURRENT USE OF ANTIARRHYTHMIC DRUG: ICD-10-CM

## 2018-01-29 DIAGNOSIS — Z79.01 CURRENT USE OF LONG TERM ANTICOAGULATION: ICD-10-CM

## 2018-01-29 DIAGNOSIS — I48.92 ATRIAL FLUTTER, UNSPECIFIED TYPE: ICD-10-CM

## 2018-01-29 DIAGNOSIS — Z86.79 S/P ABLATION OF ATRIAL FIBRILLATION: ICD-10-CM

## 2018-01-29 DIAGNOSIS — I48.0 PAROXYSMAL ATRIAL FIBRILLATION: Primary | ICD-10-CM

## 2018-01-29 DIAGNOSIS — Z98.890 S/P ABLATION OF ATRIAL FIBRILLATION: ICD-10-CM

## 2018-01-29 DIAGNOSIS — I10 ESSENTIAL HYPERTENSION: ICD-10-CM

## 2018-01-29 DIAGNOSIS — I47.19 ATRIAL TACHYCARDIA: ICD-10-CM

## 2018-01-29 DIAGNOSIS — I48.92 ATRIAL FLUTTER WITH RAPID VENTRICULAR RESPONSE: ICD-10-CM

## 2018-01-29 PROCEDURE — 99214 OFFICE O/P EST MOD 30 MIN: CPT | Mod: S$GLB,,, | Performed by: INTERNAL MEDICINE

## 2018-01-29 PROCEDURE — 93000 ELECTROCARDIOGRAM COMPLETE: CPT | Mod: S$GLB,,, | Performed by: INTERNAL MEDICINE

## 2018-01-29 PROCEDURE — 99999 PR PBB SHADOW E&M-EST. PATIENT-LVL III: CPT | Mod: PBBFAC,,, | Performed by: INTERNAL MEDICINE

## 2018-01-29 NOTE — PROGRESS NOTES
Subjective:     HPI     I had the pleasure of seeing Francoise Dykes in follow-up today for her history of atrial fibrillation and PVI. 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 She presented to [facility] in [place], [place] where an ECG showed atrial fibrillation at a rate of 169 bpm (ECG in EPIC). In 11/2012, Mrs. Dykes had another episode of atrial fibrillation after an argument with her son. She presented to [facility], and once again reverted to sinus rhythm within 30 minutes of starting the Diltiazem drip. She was discharged on Flecainide 50 mg BID, Toprol XL 50 mg QD, and Aspirin.     When I initially saw Ms. Dykes in 1/2014, she admitted to monthly palpitations, which would last seconds and resolve with coughing. She believed that Flecainide caused her some shortness of breath. At that office visit, I stopped Flecainide and started Rythmol 600 mg PRN palpitations.     At her office visit with me in 7/2015 Ms. Dykes reported episodes of AF every 2-3 months, which would last minutes and resolve with vagal maneuvers. She continued to take Flecainide every night before she went to bed. At that time Flecainide was stopped. Unfortunately in 9/2015, Ms. Dykes presented to [facility] with AF with RVR. She took Rythmol, and reverted to sinus rhythm within 3 hours. Notably, prior to conversion, her AF organized into a regular rhythm (AFL vs SVT--no 12-lead available to review).    In 12/2015, Ms. Dykes underwent a PVI and SVC ablation. She had frequent episodes of sustained AF during the case, which appeared to be arising from a RA source. She was started on Amiodarone that that time. At her follow-up visit in 1/2016, she was doing well, with no episodes of sustained palpitations. Amiodarone was stopped in early 3/2016. In  8/2016, Ms. Dykes was admitted with atypical atrial flutter with RVR. She was cardioverted to sinus rhythm, and started on Rythmol  mg BID.    In 12/2016, Ms. Dykes underwent repeat PVI. The right pulmonary veins were re-isolated, with isolated firing noted from the veins post-isolation. Additionally, a mid-sal AT was targeted and successfully ablated. She was discharged on Rythmol  mg bid.    In 3/2017 Rythmol was stopped. Several weeks later she presented to [facility] with atypical atrial flutter with 2:1 AVB at a rate of 120 bpm. Rythmol was restarted at that time. A 4 week event monitor was ordered, but she could only wear it for 1 week due to skin sensitivity. She had one additional episode of palpitations which lasted for 1 hour, which resolved on its own. When I last saw Ms. Dykes in 6/2017, Rythmol and Diltiazem were continued.     In 8/2017, Ms. Dykes presented with atypical atrial flutter with RVR, and underwent SAMMIE/DCCV. Notably, Ms. Dykes missed her AM dose of Rythmol. She was continued on Rythmol. In 12/2017 and 1/2018, Ms. Dykes had recurrent atypical atrial flutter, spontaneously converting to sinus rhythm. Her Rythmol was recently increased to 325 mg bid.    I reviewed today's ECG tracing, which shows sinus rhythm.    Review of Systems   Constitution: Negative for decreased appetite, malaise/fatigue, weight gain and weight loss.   HENT: Negative for sore throat.    Eyes: Negative for blurred vision.   Cardiovascular: Positive for palpitations. Negative for chest pain, dyspnea on exertion, irregular heartbeat, leg swelling, near-syncope, orthopnea, paroxysmal nocturnal dyspnea and syncope.   Respiratory: Negative for shortness of breath.    Skin: Negative for rash.   Musculoskeletal: Negative for arthritis.   Gastrointestinal: Negative for abdominal pain.   Neurological: Negative for focal weakness.   Psychiatric/Behavioral: Negative for altered mental status.        Objective:     Physical Exam   Constitutional: She is oriented to person, place, and time. She appears well-developed and well-nourished. No distress.   HENT:   Head: Normocephalic and atraumatic.   Mouth/Throat: Oropharynx is clear and moist.   Eyes: Conjunctivae are normal. Pupils are equal, round, and reactive to light. No scleral icterus.   Neck: Normal range of motion. Neck supple. No JVD present. No thyromegaly present.   Cardiovascular: Normal rate, regular rhythm, normal heart sounds and intact distal pulses.  Exam reveals no gallop and no friction rub.    No murmur heard.  Pulmonary/Chest: Effort normal and breath sounds normal. No respiratory distress. She has no rales.   Abdominal: Soft. Bowel sounds are normal. She exhibits no distension.   Musculoskeletal: She exhibits no edema.   Neurological: She is alert and oriented to person, place, and time.   Skin: Skin is warm and dry.   Psychiatric: She has a normal mood and affect. Her behavior is normal.   Vitals reviewed.        Assessment:       1. Paroxysmal atrial fibrillation    2. S/P ablation of atrial fibrillation    3. Essential hypertension    4. Atypical atrial flutter    5. Atrial tachycardia    6. Atrial flutter with rapid ventricular response    7. Current use of long term anticoagulation    8. Long term current use of antiarrhythmic drug         Plan:       In summary, Francoise Dykes is a 57-year old female with a history of symptomatic paroxysmal atrial fibrillation who underwent PVI in 12/2015 and re-do PVI in 12/2016. She has since had several episodes of atypical atrial flutter, including in 8/2017, 12/2017, and 1/2018. She is currently on Rythmol 325 mg bid. We discussed the option of atypical atrial flutter ablation, including the risks and benefits, For now she would like to hold the course. She will see me again in 3 months. Should she have recurrent arrhythmias in the interim the plan will be for invasive EPS and ablation.     She will see me in 3  months or sooner if needed.     Thank you for allowing me to participate in the care of this patient. Please do not hesitate to call me with any questions or concerns.

## 2018-02-01 NOTE — PLAN OF CARE
02/01/18 0915   Final Note   Assessment Type Final Discharge Note     Patient discharged home with no needs 1/26/18.

## 2018-02-09 ENCOUNTER — HOSPITAL ENCOUNTER (OUTPATIENT)
Facility: OTHER | Age: 58
Discharge: HOME | End: 2018-02-09
Attending: ANESTHESIOLOGY | Admitting: ANESTHESIOLOGY
Payer: COMMERCIAL

## 2018-02-09 VITALS
OXYGEN SATURATION: 97 % | RESPIRATION RATE: 16 BRPM | WEIGHT: 207 LBS | HEART RATE: 69 BPM | TEMPERATURE: 99 F | BODY MASS INDEX: 35.34 KG/M2 | HEIGHT: 64 IN | SYSTOLIC BLOOD PRESSURE: 159 MMHG | DIASTOLIC BLOOD PRESSURE: 71 MMHG

## 2018-02-09 DIAGNOSIS — G89.29 CHRONIC PAIN: ICD-10-CM

## 2018-02-09 DIAGNOSIS — M47.9 OSTEOARTHRITIS OF SPINE, UNSPECIFIED SPINAL OSTEOARTHRITIS COMPLICATION STATUS, UNSPECIFIED SPINAL REGION: Primary | ICD-10-CM

## 2018-02-09 PROCEDURE — 64636 DESTROY L/S FACET JNT ADDL: CPT | Performed by: ANESTHESIOLOGY

## 2018-02-09 PROCEDURE — 64635 DESTROY LUMB/SAC FACET JNT: CPT | Mod: LT,,, | Performed by: ANESTHESIOLOGY

## 2018-02-09 PROCEDURE — 99152 MOD SED SAME PHYS/QHP 5/>YRS: CPT | Mod: ,,, | Performed by: ANESTHESIOLOGY

## 2018-02-09 PROCEDURE — 25000003 PHARM REV CODE 250: Performed by: ANESTHESIOLOGY

## 2018-02-09 PROCEDURE — 64636 DESTROY L/S FACET JNT ADDL: CPT | Mod: LT,,, | Performed by: ANESTHESIOLOGY

## 2018-02-09 PROCEDURE — 64635 DESTROY LUMB/SAC FACET JNT: CPT | Performed by: ANESTHESIOLOGY

## 2018-02-09 PROCEDURE — 63600175 PHARM REV CODE 636 W HCPCS: Performed by: ANESTHESIOLOGY

## 2018-02-09 RX ORDER — MIDAZOLAM HYDROCHLORIDE 1 MG/ML
INJECTION INTRAMUSCULAR; INTRAVENOUS
Status: DISCONTINUED | OUTPATIENT
Start: 2018-02-09 | End: 2018-02-09 | Stop reason: HOSPADM

## 2018-02-09 RX ORDER — SODIUM CHLORIDE 9 MG/ML
500 INJECTION, SOLUTION INTRAVENOUS CONTINUOUS
Status: DISCONTINUED | OUTPATIENT
Start: 2018-02-09 | End: 2018-02-09 | Stop reason: HOSPADM

## 2018-02-09 RX ORDER — LIDOCAINE HYDROCHLORIDE 10 MG/ML
INJECTION INFILTRATION; PERINEURAL
Status: DISCONTINUED | OUTPATIENT
Start: 2018-02-09 | End: 2018-02-09 | Stop reason: HOSPADM

## 2018-02-09 RX ORDER — BUPIVACAINE HYDROCHLORIDE 2.5 MG/ML
INJECTION, SOLUTION EPIDURAL; INFILTRATION; INTRACAUDAL
Status: DISCONTINUED | OUTPATIENT
Start: 2018-02-09 | End: 2018-02-09 | Stop reason: HOSPADM

## 2018-02-09 RX ORDER — FENTANYL CITRATE 50 UG/ML
INJECTION, SOLUTION INTRAMUSCULAR; INTRAVENOUS
Status: DISCONTINUED | OUTPATIENT
Start: 2018-02-09 | End: 2018-02-09 | Stop reason: HOSPADM

## 2018-02-09 RX ORDER — DEXAMETHASONE SODIUM PHOSPHATE 4 MG/ML
INJECTION, SOLUTION INTRA-ARTICULAR; INTRALESIONAL; INTRAMUSCULAR; INTRAVENOUS; SOFT TISSUE
Status: DISCONTINUED | OUTPATIENT
Start: 2018-02-09 | End: 2018-02-09 | Stop reason: HOSPADM

## 2018-02-09 RX ADMIN — SODIUM CHLORIDE 500 ML: 0.9 INJECTION, SOLUTION INTRAVENOUS at 02:02

## 2018-02-09 NOTE — DISCHARGE INSTRUCTIONS
Thank you for allowing us to care for you today. You may receive a survey about the care we provided. Your feedback is valuable and helps us provide excellent care throughout the community. Adult Procedural Sedation Instructions    Recovery After Procedural Sedation (Adult)  You have been given medicine by vein to make you sleep during your surgery. This may have included both a pain medicine and sleeping medicine. Most of the effects have worn off. But you may still have some drowsiness for the next 6 to 8 hours.  Home care  Follow these guidelines when you get home:  · For the next 8 hours, you should be watched by a responsible adult. This person should make sure your condition is not getting worse.  · Don't drink any alcohol for the next 24 hours.  · Don't drive, operate dangerous machinery, or make important business or personal decisions during the next 24 hours.  Note: Your healthcare provider may tell you not to take any medicine by mouth for pain or sleep in the next 4 hours. These medicines may react with the medicines you were given in the hospital. This could cause a much stronger response than usual.  Follow-up care  Follow up with your healthcare provider if you are not alert and back to your usual level of activity within 12 hours.  When to seek medical advice  Call your healthcare provider right away if any of these occur:  · Drowsiness gets worse  · Weakness or dizziness gets worse  · Repeated vomiting  · You can't be awakened   Date Last Reviewed: 10/18/2016  © 3951-6345 [employer]. [address] [place] [address]. All rights reserved. This information is not intended as a substitute for professional medical care. Always follow your healthcare professional's instructions.    Home Care Instructions Pain Management:    1. DIET:   You may resume your normal diet today.   2. BATHING:   You may shower with luke warm water.  3. DRESSING:   You may remove your bandage today.    4. ACTIVITY LEVEL:   You may resume your normal activities 24 hrs after your procedure.  5. MEDICATIONS:   You may resume your normal medications today.   6. SPECIAL INSTRUCTIONS:   No heat to the injection site for 24 hrs including, bath or shower, heating pad, moist heat, or hot tubs.    Use ice pack to injection site for any pain or discomfort.  Apply ice packs for 20 minute intervals as needed.   If you have received any sedatives by mouth today you may not drive for 12 hours.    If you have received any sedation through your IV, you may not drive for 24 hrs.     PLEASE CALL YOUR DOCTOR IF:  1. Redness or swelling around the injection site.  2. Fever of 101 degrees  3. Drainage (pus) from the injection site.  4. For any continuous bleeding (some dried blood over the incision is normal.)    FOR EMERGENCIES:   If any unusual problems or difficulties occur during clinic hours, call [phone] or [phone].

## 2018-02-09 NOTE — OP NOTE
Patient Name: Francoise Dykes  MRN: [id number]    INFORMED CONSENT: The procedure, risks, benefits and options were discussed with patient. There are no contraindications to the procedure. The patient expressed understanding and agreed to proceed. The personnel performing the procedure was discussed. I verify that I personally obtained Francoise's consent prior to the start of the procedure and the signed consent can be found on the patient's chart.    Procedure Date: 02/09/2018    Anesthesia: Topical    Pre Procedure diagnosis: Lumbar spondylosis [M47.816]  1. Osteoarthritis of spine, unspecified spinal osteoarthritis complication status, unspecified spinal region    2. Chronic pain      Post-Procedure diagnosis: SAME      Moderate Sedation: Yes - Fentanyl 50 mcg and Midazolam 2 mg    PROCEDURE: left L2-3-4-5 FACET MEDIAL BRANCH NERVE RADIOFREQUENCY NEUROTOMY (lumbar)          DESCRIPTION OF PROCEDURE: The patient was brought to the procedure room.  After performing time out IV access was obtained prior to the procedure. The patient was positioned prone on the fluoroscopy table. Continuous hemodynamic monitoring was initiated including blood pressure and pulse oximetry. IV sedation was administered incrementally to allow the patient to remain comfortable and conversant throughout the procedure. The area of the lumbar spine was prepped chlorhexidine three times and draped into a sterile field.  Fluoroscopy was used to identify the location of the LEFT side L2, L3, L4, and L5 medial branch nerves at the junctions of the superior articular process and the transverse processes of L3, L4, L5, and the sacral ala respectively.  Skin anesthesia was achieved using 3 cc of Lidocaine 1% over the injection sites. A 22 gauge, 100mm (10mm active tip) curved RF needle was slowly inserted at each level using AP, lateral and oblique fluoroscopic imaging. Negative aspiration for blood or CSF was confirmed.  Sensory stimulation at 50Hz  below 0.5V was achieved at every level. Motor stimulation at 2Hz up to 1.5V did not cause any radicular symptoms at any level. Each level was anesthetized with 1.5 cc of lidocaine 1%.  Radiofrequency lesioning was performed for 90 seconds at 80 degrees in two different positions at each level.  Total of 4 cc of bupivacaine 0.25% and 10 mg of Decadron was injected was injected at all levels.. The needles were removed and bleeding was nil.  A sterile dressing was applied. Francoise was taken back to the recovery room for further observation.     Stimulation Results:  L2 = Sensory positive @ 0.5, Motor negative @ 1.5  L3 = Sensory positive @ 0.4, Motor negative @ 1.5  L4 = Sensory positive @ 0.5, Motor negative @ 1.5  L5 = Sensory positive @ 0.7, Motor negative @ 1.5    Blood Loss: Nill  Specimen: None    Mariusz Jang MD

## 2018-02-09 NOTE — H&P (VIEW-ONLY)
Chronic patient Established Note (Follow up visit)      SUBJECTIVE:    Francoise Dykes presents to the clinic for a follow-up appointment for low back pain. She is s/p bilateral L2,3,4,5 MBB on 12/19/2017. She reports 100% relief for one day. She reports that she was able to walk longer without pain. Since then, her pain has returned to baseline. She reports low back pain that is aching and constant. She does report aching pain into her bilateral buttocks. She denies any radiating leg pain. She reports that her pain is worse with prolonged walking and standing. 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 She denies any other health changes. She denies any bowel or bladder incontinence. Since the last visit, Francoise Dykes states the pain has been persistant. Current pain intensity is 10/10.    Pain Disability Index Review:  Last 3 PDI Scores 1/24/2018 12/7/2017   Pain Disability Index (PDI) 51 43       Pain Medications:  Ibuprofen 800 mg     Opioid Contract: no     report:  Reviewed and consistent with medication use as prescribed.    Pain Procedures:   7/21/2017- Left SI joint injection   11/3/2017- Left SI joint injection  12/19/2017- Bilateral L2,3,4,5 MBB    Physical Therapy/Home Exercise: yes    Imaging:   Xray Lumbar Spine 11/12/2016:  There are 5 lumbar vertebral bodies. There is no evidence for acute fracture, dislocation or destructive process. Vertebral body heights are maintained. There is disc space narrowing at L5-S1. Intervertebral disc space desiccation at L2-L3 is also noted. Facet joints unremarkable. Spinous processes demonstrate normal mildly. The visualized SI joints and sacrum appear unremarkable. There is no translational abnormality. Surrounding soft tissues appear unremarkable.   Impression      Degenerative changes of the spine as above. Overall appearance is similar to prior.     MRI Lumbar Spine 9/12/2016:  Results: The alignment of the lumbar  spine is normal.  The vertebral body heights are well-maintained.  Mild disc desiccation noted at L3-L4 and L4-L5, severe disc space narrowing at L5-S1.  Small benign hemangioma is noted within the L3 and L4 vertebrae.  No evidence of malignant bone marrow replacement process or infection.  The conus medullaris terminates in good position.    T12-L1 and L1-L2 demonstrate a mild diffuse disc bulge, there is no central canal or foraminal stenosis.    L2-L3 demonstrates no disc abnormality, no central canal foraminal stenosis.    L3-L4 demonstrates a mild diffuse disc bulge, mild ligamentum flavum hypertrophy.  Mild narrowing of the central canal, no significant foraminal narrowing.    L4-L5 demonstrates mild diffuse disc bulge, mild ligamentum flavum hypertrophy and mild facet joint degenerative change, no significant central canal stenosis or significant foramina narrowing.    L5-S1 demonstrates a mild diffuse disc bulge, there is no central canal stenosis, there is mild left foraminal narrowing.    The paraspinal soft tissues appear normal.   Impression         Mild to moderate spondylosis of the lumbar spine without severe central or severe foraminal narrowing         Allergies:   Review of patient's allergies indicates:   Allergen Reactions    Adhesive tape-silicones Itching     Manifested in 12/2015 following AF ablation.       Current Medications:   Current Outpatient Prescriptions   Medication Sig Dispense Refill    apixaban 5 mg Tab Take 1 tablet (5 mg total) by mouth 2 (two) times daily. 60 tablet 11    diltiaZEM (DILACOR XR) 120 MG CDCR Take 1 capsule (120 mg total) by mouth once daily. 90 capsule 3    ibuprofen (ADVIL,MOTRIN) 800 MG tablet Take 1 tablet (800 mg total) by mouth 3 (three) times daily with meals. 270 tablet 0    propafenone (RYTHMOL SR) 225 MG Cp12 Take 1 capsule (225 mg total) by mouth every 12 (twelve) hours. 60 capsule 11    azelastine (ASTELIN) 137 mcg (0.1 %) nasal spray 1 spray  (137 mcg total) by Nasal route 2 (two) times daily. 30 mL 3    cetirizine (ZYRTEC) 10 MG tablet Take 10 mg by mouth once daily.      fluticasone (FLONASE) 50 mcg/actuation nasal spray 1 spray by Each Nare route 2 (two) times daily. 1 Bottle 3    hydrocortisone 0.5 % cream Apply topically daily as needed.       No current facility-administered medications for this visit.        REVIEW OF SYSTEMS:    GENERAL:  No weight loss, malaise or fevers.  HEENT:  Negative for frequent or significant headaches.  NECK:  Negative for lumps, goiter, pain and significant neck swelling.  RESPIRATORY:  Negative for cough, wheezing or shortness of breath.  CARDIOVASCULAR:  Negative for chest pain, leg swelling or palpitations. Afib, HTN  GI:  Negative for abdominal discomfort, blood in stools or black stools or change in bowel habits.  MUSCULOSKELETAL:  See HPI.  SKIN:  Negative for lesions, rash, and itching.  PSYCH:  Negative for sleep disturbance, mood disorder and recent psychosocial stressors.  HEMATOLOGY/LYMPHOLOGY:  Negative for swollen nodes. Eliquis  NEURO:   No history of headaches, syncope, paralysis, seizures or tremors.  All other reviewed and negative other than HPI.    Past Medical History:  Past Medical History:   Diagnosis Date    Allergy     seasonal    Atrial fibrillation     Essential hypertension 2017    Pt states that she does not have HTN.        Past Surgical History:  Past Surgical History:   Procedure Laterality Date     SECTION      heart ablation      HYSTERECTOMY      [facility] [place]       Family History:  Family History   Problem Relation Age of Onset    Hypertension Mother     Diabetes Mother     Glaucoma Mother     Asbestos Father     Obesity Father     Eczema Son     Hypertension Son     Heart disease Maternal Grandmother      chf    Diabetes Maternal Grandfather     Cancer Paternal Grandmother      lung, 2/2 second hand smoke    Glaucoma Paternal Grandfather     Blindness  "Paternal Grandfather     Melanoma Neg Hx     Psoriasis Neg Hx     Lupus Neg Hx     Acne Neg Hx     Breast cancer Neg Hx     Colon cancer Neg Hx     Ovarian cancer Neg Hx        Social History:  Social History     Social History    Marital status:      Spouse name: N/A    Number of children: N/A    Years of education: N/A     Occupational History    Teacher [employer]      Social History Main Topics    Smoking status: Never Smoker    Smokeless tobacco: Never Used    Alcohol use Yes      Comment: rarely, 1 drink/week    Drug use: No    Sexual activity: Yes     Partners: Male     Other Topics Concern    Are You Pregnant Or Think You May Be? No    Breast-Feeding No     Social History Narrative    Recently moved back to [facility] to help take care of mom.       OBJECTIVE:    /77   Pulse 74   Temp 98 °F (36.7 °C)   Ht 5' 4" (1.626 m)   Wt 97.1 kg (214 lb 1.1 oz)   LMP  (LMP Unknown)   BMI 36.74 kg/m²     PHYSICAL EXAMINATION:    General appearance: Well appearing, in no acute distress, alert and oriented x3.  Psych:  Mood and affect appropriate.  Skin: Skin color, texture, turgor normal, no rashes or lesions, in both upper and lower body.  Head/face:  Atraumatic, normocephalic. No palpable lymph nodes  Neck: No pain to palpation over the cervical paraspinous muscles. Spurling Negative. No pain with neck flexion, extension, or lateral flexion. .  GI: Abdomen soft and non-tender.  Back: Straight leg raising in the sitting and supine positions is negative to radicular pain. There is pain with palpation over lumbar spine. Limited ROM with pain on flexion and extension. Positive facet loading bilaterally.   Extremities: Peripheral joint ROM is full and pain free without obvious instability or laxity in all four extremities. No deformities, edema, or skin discoloration. Good capillary refill.  Musculoskeletal: Shoulder, hip, sacroiliac and knee provocative maneuvers are negative. Mild pain with " palpation over bilateral SI joints. FABERs is negative bilaterally. Bilateral upper and lower extremity strength is normal and symmetric.  No atrophy or tone abnormalities are noted.  Neuro: Bilateral upper and lower extremity coordination and muscle stretch reflexes are physiologic and symmetric.  Plantar response are downgoing. No loss of sensation is noted.  Gait: Antalgic- ambulates without assistance.     ASSESSMENT: 57 y.o. year old female with low back pain pain, consistent with the followin. Spondylosis of lumbar region without myelopathy or radiculopathy     2. Facet arthritis of lumbar region     3. DDD (degenerative disc disease), lumbar           PLAN:     - Previous imaging was reviewed and discussed with the patient today.    - Schedule for left then right L2,3,4,5 RFA one side at a time, two weeks apart.   The procedure, risks, benefits and options were discussed with patient. There are no contraindications to the procedure. The patient expressed understanding and agreed to proceed.  Consent obtained today.    - We can repeat SI joint injections in the future if needed.     - I have stressed the importance of physical activity and a home exercise plan to help with pain and improve health.    - RTC 3 weeks after above procedures.     - Counseled patient regarding the importance of activity modification and constant sleeping habits.    - Dr. Jang was consulted on the patient and agrees with this plan.    The above plan and management options were discussed at length with patient. Patient is in agreement with the above and verbalized understanding.    Becca Canchola, JUAN PABLO  2018

## 2018-02-15 NOTE — DISCHARGE SUMMARY
Discharge Note  Short Stay      SUMMARY     Admit Date: 2/9/2018    Attending Physician: Mariusz Jang      Discharge Physician: Mariusz Jang      Discharge Date: 2/9/2018     Final Diagnosis: Lumbar spondylosis [M47.816]    Disposition: Home or self care    Patient Instructions:   Discharge Medication List as of 2/9/2018  3:28 PM      CONTINUE these medications which have NOT CHANGED    Details   apixaban 5 mg Tab Take 1 tablet (5 mg total) by mouth 2 (two) times daily., Starting Mon 6/19/2017, Normal      azelastine (ASTELIN) 137 mcg (0.1 %) nasal spray 1 spray (137 mcg total) by Nasal route 2 (two) times daily., Starting Mon 11/13/2017, Until Tue 11/13/2018, Normal      cetirizine (ZYRTEC) 10 MG tablet Take 10 mg by mouth once daily., Until Discontinued, Historical Med      diltiaZEM (DILACOR XR) 120 MG CDCR Take 1 capsule (120 mg total) by mouth once daily., Starting Tue 10/3/2017, Until Wed 10/3/2018, Normal      fluticasone (FLONASE) 50 mcg/actuation nasal spray 1 spray by Each Nare route 2 (two) times daily., Starting Mon 11/13/2017, Normal      hydrocortisone 0.5 % cream Apply topically daily as needed., Until Discontinued, Historical Med      ibuprofen (ADVIL,MOTRIN) 800 MG tablet Take 1 tablet (800 mg total) by mouth 3 (three) times daily with meals., Starting Tue 6/6/2017, Normal      propafenone (RYTHMOL SR) 325 MG Cp12 Take 1 capsule (325 mg total) by mouth every 12 (twelve) hours., Starting Fri 1/26/2018, Until Sat 1/26/2019, Normal                 Discharge Diagnosis: Lumbar spondylosis [M47.816]  Condition on Discharge: Stable.  Diet on Discharge: Same as before.  Activity: as per instruction sheet.  Discharge to: Home with a responsible adult.  Follow up: as per Discharge instructions.

## 2018-04-05 ENCOUNTER — TELEPHONE (OUTPATIENT)
Dept: ELECTROPHYSIOLOGY | Facility: CLINIC | Age: 58
End: 2018-04-05

## 2018-04-05 DIAGNOSIS — I48.0 PAROXYSMAL ATRIAL FIBRILLATION: ICD-10-CM

## 2018-04-05 RX ORDER — PROPAFENONE HYDROCHLORIDE 325 MG/1
325 CAPSULE, EXTENDED RELEASE ORAL EVERY 12 HOURS
Qty: 60 CAPSULE | Refills: 11 | Status: SHIPPED | OUTPATIENT
Start: 2018-04-05 | End: 2018-08-26 | Stop reason: SDUPTHER

## 2018-04-05 NOTE — TELEPHONE ENCOUNTER
Rx sent to [facility]    ----- Message from Emma Thomas sent at 4/5/2018 11:08 AM CDT -----  Contact: patient  Please call pt at [phone] if any questions. New RX needed for Propafenone 325 mg called into [facility] at [phone] stat, patient is completely out of meds    Thank you

## 2018-04-30 ENCOUNTER — HOSPITAL ENCOUNTER (OUTPATIENT)
Dept: CARDIOLOGY | Facility: CLINIC | Age: 58
Discharge: HOME | End: 2018-04-30
Payer: COMMERCIAL

## 2018-04-30 ENCOUNTER — OFFICE VISIT (OUTPATIENT)
Dept: ELECTROPHYSIOLOGY | Facility: CLINIC | Age: 58
End: 2018-04-30
Payer: COMMERCIAL

## 2018-04-30 VITALS
DIASTOLIC BLOOD PRESSURE: 70 MMHG | HEIGHT: 65 IN | WEIGHT: 209.19 LBS | BODY MASS INDEX: 34.85 KG/M2 | HEART RATE: 72 BPM | SYSTOLIC BLOOD PRESSURE: 158 MMHG

## 2018-04-30 DIAGNOSIS — Z86.79 S/P ABLATION OF ATRIAL FIBRILLATION: ICD-10-CM

## 2018-04-30 DIAGNOSIS — Z98.890 S/P ABLATION OF ATRIAL FIBRILLATION: ICD-10-CM

## 2018-04-30 DIAGNOSIS — I10 ESSENTIAL HYPERTENSION: ICD-10-CM

## 2018-04-30 DIAGNOSIS — I48.92 ATRIAL FLUTTER, UNSPECIFIED TYPE: ICD-10-CM

## 2018-04-30 DIAGNOSIS — Z79.01 CURRENT USE OF LONG TERM ANTICOAGULATION: ICD-10-CM

## 2018-04-30 DIAGNOSIS — E66.01 SEVERE OBESITY (BMI 35.0-35.9 WITH COMORBIDITY): ICD-10-CM

## 2018-04-30 DIAGNOSIS — I48.0 PAROXYSMAL ATRIAL FIBRILLATION: Primary | ICD-10-CM

## 2018-04-30 DIAGNOSIS — I48.4 ATYPICAL ATRIAL FLUTTER: ICD-10-CM

## 2018-04-30 PROCEDURE — 99214 OFFICE O/P EST MOD 30 MIN: CPT | Mod: S$GLB,,, | Performed by: INTERNAL MEDICINE

## 2018-04-30 PROCEDURE — 3077F SYST BP >= 140 MM HG: CPT | Mod: CPTII,S$GLB,, | Performed by: INTERNAL MEDICINE

## 2018-04-30 PROCEDURE — 93000 ELECTROCARDIOGRAM COMPLETE: CPT | Mod: S$GLB,,, | Performed by: INTERNAL MEDICINE

## 2018-04-30 PROCEDURE — 99999 PR PBB SHADOW E&M-EST. PATIENT-LVL III: CPT | Mod: PBBFAC,,, | Performed by: INTERNAL MEDICINE

## 2018-04-30 PROCEDURE — 3078F DIAST BP <80 MM HG: CPT | Mod: CPTII,S$GLB,, | Performed by: INTERNAL MEDICINE

## 2018-04-30 NOTE — PROGRESS NOTES
Ms. Dykes is a patient of Dr. Meyers and was last seen in clinic 1/29/2018.      Subjective:   Patient ID:  Francoise Dykes is a 57 y.o. female who presents for follow-up of Atrial Fibrillation  .     HPI:    Ms. Dykes is a 56yo female with paroxysmal atrial fibrillation (s/p PVI and SVC ablation 12/2015 and repeat PVI 12/2016, on rhythmol and diltiazem) and HTN here for follow up.     Background:  Francoise Dykes is a 56yo female with a history of atrial fibrillation and PVI. 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 She presented to [facility] in [place], [place] where an ECG showed atrial fibrillation at a rate of 169 bpm (ECG in EPIC). In 11/2012, Mrs. Dykes had another episode of atrial fibrillation after an argument with her son. She presented to [facility], and once again reverted to sinus rhythm within 30 minutes of starting the Diltiazem drip. She was discharged on Flecainide 50 mg BID, Toprol XL 50 mg QD, and Aspirin.     When initially seen in 1/2014, she admitted to monthly palpitations, which would last seconds and resolve with coughing. She believed that Flecainide caused her some shortness of breath. At that office visit, Flecainide was stopped and she started Rythmol 600 mg PRN palpitations.     At her office visit in 7/2015 Ms. Dykes reported episodes of AF every 2-3 months, which would last minutes and resolve with vagal maneuvers. She continued to take Flecainide every night before she went to bed. At that time Flecainide was stopped. Unfortunately in 9/2015, [place]. [place] [facility] [place]. She took Rythmol, and reverted to sinus rhythm within 3 hours. Notably, prior to conversion, her AF organized into a regular rhythm (AFL vs SVT--no 12-lead available to review).    In 12/2015,  Ms. Dykes underwent a PVI and SVC ablation. She had frequent episodes of sustained AF during the case, which appeared to be arising from a RA source. She was started on Amiodarone that that time. At her follow-up visit in 1/2016, she was doing well, with no episodes of sustained palpitations. Amiodarone was stopped in early 3/2016. In 8/2016, Ms. Dykes was admitted with atypical atrial flutter with RVR. She was cardioverted to sinus rhythm, and started on Rythmol  mg BID.    In 12/2016, Ms. Dykes underwent repeat PVI. The right pulmonary veins were re-isolated, with isolated firing noted from the veins post-isolation. Additionally, a mid-sal AT was targeted and successfully ablated. She was discharged on Rythmol  mg bid.    In 3/2017 Rythmol was stopped. [place] weeks later [place] [facility] [place] with 2:1 AVB at a rate of 120 bpm. Rythmol was restarted at that time. A 4 week event monitor was ordered, but she could only wear it for 1 week due to skin sensitivity. She had one additional episode of palpitations which lasted for 1 hour, which resolved on its own. When I last saw Ms. Dykes in 6/2017, Rythmol and Diltiazem were continued.     In 8/2017, Ms. Dykes presented with atypical atrial flutter with RVR, and underwent SAMMIE/DCCV. Notably, Ms. Dykes missed her AM dose of Rythmol. She was continued on Rythmol. In 12/2017 and 1/2018, Ms. Dykes had recurrent atypical atrial flutter, spontaneously converting to sinus rhythm. Her Rythmol was  increased to 325 mg bid 1/26/2018.    Update (4/30/2018):    She says that since her last clinic visit, she has felt only some minor palpitations lasting less than a minute, most lasting a second or two. Ms. Dykes denies chest pain with exertion or at rest, SOB, DUNN, dizziness, or syncope. She does have allergies and complains of a cough lasting 2 months. She can walk a mile or so almost every day and activity is limited by back pain.   Her weight is down 5lbs since last clinic visit and she is working on weight loss. She says her BP is elevated today in clinic but it is normally controlled.    She is taking rhythmol 325mg twice daily, diltiazem 120mg, and apixaban 5mg BID. She denies bleeding episodes.  Kidney function remains stable with a creatinine of 0.8mg in 1/2018.     I have personally reviewed the patient's EKG today, which shows normal sinus rhythm at 72bpm. WI interval is 178. QTc is 435.    Recent Cardiac Tests:    SAMMIE (8/22/2017) showed normal EF.    Current Outpatient Prescriptions   Medication Sig    apixaban 5 mg Tab Take 1 tablet (5 mg total) by mouth 2 (two) times daily.    azelastine (ASTELIN) 137 mcg (0.1 %) nasal spray 1 spray (137 mcg total) by Nasal route 2 (two) times daily.    cetirizine (ZYRTEC) 10 MG tablet Take 10 mg by mouth once daily.    diltiaZEM (DILACOR XR) 120 MG CDCR Take 1 capsule (120 mg total) by mouth once daily.    fluticasone (FLONASE) 50 mcg/actuation nasal spray 1 spray by Each Nare route 2 (two) times daily.    hydrocortisone 0.5 % cream Apply topically daily as needed.    ibuprofen (ADVIL,MOTRIN) 800 MG tablet Take 1 tablet (800 mg total) by mouth 3 (three) times daily with meals.    propafenone (RYTHMOL SR) 325 MG Cp12 Take 1 capsule (325 mg total) by mouth every 12 (twelve) hours.     No current facility-administered medications for this visit.      Review of Systems   Constitution: Negative for malaise/fatigue.   Cardiovascular: Negative for chest pain, dyspnea on exertion, irregular heartbeat, leg swelling and palpitations.   Respiratory: Positive for cough. Negative for shortness of breath.    Hematologic/Lymphatic: Negative for bleeding problem.   Skin: Negative for rash.   Musculoskeletal: Positive for back pain. Negative for myalgias.   Gastrointestinal: Negative for hematemesis, hematochezia and nausea.   Genitourinary: Negative for hematuria.   Neurological: Negative for  "light-headedness.   Psychiatric/Behavioral: Negative for altered mental status.   Allergic/Immunologic: Negative for persistent infections.     Objective:          BP (!) 158/70   Pulse 72   Ht 5' 5" (1.651 m)   Wt 94.9 kg (209 lb 3.5 oz)   LMP  (LMP Unknown)   BMI 34.82 kg/m²     Physical Exam   Constitutional: She is oriented to person, place, and time. She appears well-developed and well-nourished.   HENT:   Head: Normocephalic.   Nose: Nose normal.   Eyes: Pupils are equal, round, and reactive to light.   Cardiovascular: Normal rate, regular rhythm, S1 normal and S2 normal.    No murmur heard.  Pulses:       Radial pulses are 2+ on the right side, and 2+ on the left side.   Pulmonary/Chest: No respiratory distress. She has wheezes.   Abdominal: Normal appearance.   Musculoskeletal: Normal range of motion. She exhibits no edema.   Neurological: She is alert and oriented to person, place, and time.   Skin: Skin is warm and dry. No erythema.   Psychiatric: She has a normal mood and affect. Her speech is normal and behavior is normal.   Nursing note and vitals reviewed.    Lab Results   Component Value Date     01/26/2018    K 3.7 01/26/2018    MG 2.1 01/26/2018    BUN 11 01/26/2018    CREATININE 0.8 01/26/2018    ALT 17 01/25/2018    AST 18 01/25/2018    HGB 11.8 (L) 01/26/2018    HCT 34.1 (L) 01/26/2018    TSH 0.633 12/26/2017    LDLCALC 109.8 11/07/2016         Recent Labs  Lab 08/08/16  0441 12/12/16  1044 03/17/17  2307 01/25/18  2315   INR 1.0 1.0 1.0 1.0           Assessment:         1. Paroxysmal atrial fibrillation    2. Essential hypertension    3. Atypical atrial flutter    4. S/P ablation of atrial fibrillation    5. Severe obesity (BMI 35.0-35.9 with comorbidity)    6. Current use of long term anticoagulation    7. Atrial flutter, unspecified type      Plan:       In summary, Francoise Dykes is a 57-year old female with a history of symptomatic paroxysmal atrial fibrillation who underwent " PVI in 12/2015 and re-do PVI in 12/2016. She has since had several episodes of atypical atrial flutter, including in 8/2017, 12/2017, and 1/2018. She is currently on Rythmol 325 mg bid, apixaban 5mg BID, and diltiazem 120mg. Francoise Dykes's [id number] Score is 2 (SEX, HTN) and she should remain on OAC indefinitely.  Per Dr. Meyers's plan, should she have recurrent arrhythmias in the interim the plan will be for invasive EPS and ablation. However, she has not had any sustained episodes since her rhythmol was increased.  At this point she will hold the course and follow up in 6 months.     Follow-up in about 6 months (around 10/30/2018).    ------------------------------------------------------------------    JANES Gutierrez, NP-C  [facility]

## 2018-05-16 ENCOUNTER — OFFICE VISIT (OUTPATIENT)
Dept: FAMILY MEDICINE | Facility: CLINIC | Age: 58
End: 2018-05-16
Payer: COMMERCIAL

## 2018-05-16 ENCOUNTER — HOSPITAL ENCOUNTER (OUTPATIENT)
Dept: RADIOLOGY | Facility: HOSPITAL | Age: 58
Discharge: HOME | End: 2018-05-16
Attending: FAMILY MEDICINE
Payer: COMMERCIAL

## 2018-05-16 VITALS
HEIGHT: 65 IN | SYSTOLIC BLOOD PRESSURE: 134 MMHG | DIASTOLIC BLOOD PRESSURE: 80 MMHG | OXYGEN SATURATION: 98 % | BODY MASS INDEX: 34.89 KG/M2 | HEART RATE: 76 BPM | WEIGHT: 209.44 LBS | TEMPERATURE: 99 F

## 2018-05-16 DIAGNOSIS — R05.3 PERSISTENT COUGH: ICD-10-CM

## 2018-05-16 DIAGNOSIS — Z12.11 COLON CANCER SCREENING: ICD-10-CM

## 2018-05-16 DIAGNOSIS — R06.2 WHEEZING: ICD-10-CM

## 2018-05-16 DIAGNOSIS — R05.3 PERSISTENT COUGH: Primary | ICD-10-CM

## 2018-05-16 DIAGNOSIS — L30.9 ECZEMA, UNSPECIFIED TYPE: ICD-10-CM

## 2018-05-16 PROCEDURE — 99213 OFFICE O/P EST LOW 20 MIN: CPT | Mod: S$GLB,,, | Performed by: FAMILY MEDICINE

## 2018-05-16 PROCEDURE — 3079F DIAST BP 80-89 MM HG: CPT | Mod: CPTII,S$GLB,, | Performed by: FAMILY MEDICINE

## 2018-05-16 PROCEDURE — 3075F SYST BP GE 130 - 139MM HG: CPT | Mod: CPTII,S$GLB,, | Performed by: FAMILY MEDICINE

## 2018-05-16 PROCEDURE — 71046 X-RAY EXAM CHEST 2 VIEWS: CPT | Mod: 26,,, | Performed by: RADIOLOGY

## 2018-05-16 PROCEDURE — 71046 X-RAY EXAM CHEST 2 VIEWS: CPT | Mod: TC,FY,PO

## 2018-05-16 PROCEDURE — 99999 PR PBB SHADOW E&M-EST. PATIENT-LVL III: CPT | Mod: PBBFAC,,, | Performed by: FAMILY MEDICINE

## 2018-05-16 RX ORDER — ALBUTEROL SULFATE 90 UG/1
2 AEROSOL, METERED RESPIRATORY (INHALATION) EVERY 4 HOURS PRN
Qty: 1 INHALER | Refills: 3 | Status: SHIPPED | OUTPATIENT
Start: 2018-05-16 | End: 2019-03-25

## 2018-05-16 RX ORDER — TRIAMCINOLONE ACETONIDE 1 MG/G
CREAM TOPICAL 2 TIMES DAILY
Qty: 45 G | Refills: 1 | Status: SHIPPED | OUTPATIENT
Start: 2018-05-16 | End: 2019-03-25

## 2018-05-16 RX ORDER — FLUTICASONE PROPIONATE 50 MCG
1 SPRAY, SUSPENSION (ML) NASAL 2 TIMES DAILY
Qty: 1 BOTTLE | Refills: 3 | Status: SHIPPED | OUTPATIENT
Start: 2018-05-16 | End: 2019-03-25

## 2018-05-16 RX ORDER — PREDNISONE 20 MG/1
40 TABLET ORAL DAILY
Qty: 10 TABLET | Refills: 0 | Status: SHIPPED | OUTPATIENT
Start: 2018-05-16 | End: 2018-05-21

## 2018-05-16 NOTE — PROGRESS NOTES
Subjective:       Patient ID: Francoise Dykes is a 57 y.o. female.    Chief Complaint: Cough (for several weeks) and Eczema (on both legs)    HPI    Cough - onset 2 months ago of intermittent dry cough that is not a/w fevers, chills, vomiting, + nausea, PND. Rare acid reflux.       Little relief with AS cold plus.     Pt is not currently exercising, but wants to buy a bike.     Eczema  - bilat x 2 months that is worsening.       Outpatient Prescriptions Marked as Taking for the 5/16/18 encounter (Office Visit) with Bakari Winchester MD   Medication Sig Dispense Refill    apixaban 5 mg Tab Take 1 tablet (5 mg total) by mouth 2 (two) times daily. 180 tablet 3    azelastine (ASTELIN) 137 mcg (0.1 %) nasal spray 1 spray (137 mcg total) by Nasal route 2 (two) times daily. 30 mL 3    cetirizine (ZYRTEC) 10 MG tablet Take 10 mg by mouth once daily.      diltiaZEM (DILACOR XR) 120 MG CDCR Take 1 capsule (120 mg total) by mouth once daily. 90 capsule 3    hydrocortisone 0.5 % cream Apply topically daily as needed.      ibuprofen (ADVIL,MOTRIN) 800 MG tablet Take 1 tablet (800 mg total) by mouth 3 (three) times daily with meals. 270 tablet 0    propafenone (RYTHMOL SR) 325 MG Cp12 Take 1 capsule (325 mg total) by mouth every 12 (twelve) hours. 60 capsule 11    [DISCONTINUED] fluticasone (FLONASE) 50 mcg/actuation nasal spray 1 spray by Each Nare route 2 (two) times daily. 1 Bottle 3       Past Medical History:   Diagnosis Date    Allergy     seasonal    Atrial fibrillation     Essential hypertension 11/20/2017    Pt states that she does not have HTN.        Family History   Problem Relation Age of Onset    Hypertension Mother     Diabetes Mother     Glaucoma Mother     Asbestos Father     Obesity Father     Eczema Son     Hypertension Son     Heart disease Maternal Grandmother         chf    Diabetes Maternal Grandfather     Cancer Paternal Grandmother         lung, 2/2 second hand smoke     Glaucoma Paternal Grandfather     Blindness Paternal Grandfather     Melanoma Neg Hx     Psoriasis Neg Hx     Lupus Neg Hx     Acne Neg Hx     Breast cancer Neg Hx     Colon cancer Neg Hx     Ovarian cancer Neg Hx         reports that she has never smoked. She has never used smokeless tobacco. She reports that she drinks alcohol. She reports that she does not use drugs.    Review of Systems  see hpi  Objective:     Vitals:    05/16/18 1341   BP: 134/80   Pulse: 76   Temp: 98.6 °F (37 °C)        Physical Exam   Constitutional: She appears well-developed. No distress.   HENT:   Head: Normocephalic and atraumatic.   Eyes: Conjunctivae are normal. Right eye exhibits no discharge. Left eye exhibits no discharge. No scleral icterus.   Cardiovascular: Normal rate, regular rhythm and normal heart sounds.  Exam reveals no gallop and no friction rub.    No murmur heard.  Pulmonary/Chest: Effort normal. No respiratory distress. She has wheezes. She has no rales.   Neurological: She is alert.   Skin: She is not diaphoretic.   Psychiatric: She has a normal mood and affect.   Vitals reviewed.      Assessment:       1. Persistent cough    2. Eczema, unspecified type    3. Colon cancer screening    4. Wheezing        Plan:       Francoise was seen today for cough and eczema.    Diagnoses and all orders for this visit:    Persistent cough  Diff includes obstructive disease, likely asthma vs restrictive. PFts    Steroid course and albuterol for now.     Pt will let me know if she develops palpitations with albuterol.     Eczema, unspecified type  -     triamcinolone acetonide 0.1% (KENALOG) 0.1 % cream; Apply topically 2 (two) times daily.    Colon cancer screening  Due for screening    Wheezing  See #1          Follow-up in about 6 weeks (around 6/27/2018) for Annual Physical.        Pt verbalized understanding and agreed with our plan.

## 2018-05-21 DIAGNOSIS — Z12.11 SCREEN FOR COLON CANCER: Primary | ICD-10-CM

## 2018-06-20 ENCOUNTER — OFFICE VISIT (OUTPATIENT)
Dept: FAMILY MEDICINE | Facility: CLINIC | Age: 58
End: 2018-06-20
Payer: COMMERCIAL

## 2018-06-20 ENCOUNTER — LAB VISIT (OUTPATIENT)
Dept: LAB | Facility: HOSPITAL | Age: 58
End: 2018-06-20
Attending: FAMILY MEDICINE
Payer: COMMERCIAL

## 2018-06-20 ENCOUNTER — PATIENT MESSAGE (OUTPATIENT)
Dept: ELECTROPHYSIOLOGY | Facility: CLINIC | Age: 58
End: 2018-06-20

## 2018-06-20 ENCOUNTER — OFFICE VISIT (OUTPATIENT)
Dept: PAIN MEDICINE | Facility: CLINIC | Age: 58
End: 2018-06-20
Payer: COMMERCIAL

## 2018-06-20 ENCOUNTER — TELEPHONE (OUTPATIENT)
Dept: FAMILY MEDICINE | Facility: CLINIC | Age: 58
End: 2018-06-20

## 2018-06-20 VITALS
HEIGHT: 65 IN | SYSTOLIC BLOOD PRESSURE: 134 MMHG | DIASTOLIC BLOOD PRESSURE: 81 MMHG | HEART RATE: 73 BPM | TEMPERATURE: 98 F | BODY MASS INDEX: 35.26 KG/M2 | WEIGHT: 211.63 LBS

## 2018-06-20 VITALS
OXYGEN SATURATION: 96 % | RESPIRATION RATE: 16 BRPM | BODY MASS INDEX: 35.04 KG/M2 | TEMPERATURE: 98 F | HEART RATE: 73 BPM | SYSTOLIC BLOOD PRESSURE: 140 MMHG | HEIGHT: 65 IN | WEIGHT: 210.31 LBS | DIASTOLIC BLOOD PRESSURE: 86 MMHG

## 2018-06-20 DIAGNOSIS — M47.816 FACET ARTHRITIS OF LUMBAR REGION: ICD-10-CM

## 2018-06-20 DIAGNOSIS — Z00.00 ANNUAL PHYSICAL EXAM: ICD-10-CM

## 2018-06-20 DIAGNOSIS — M70.62 GREATER TROCHANTERIC BURSITIS OF LEFT HIP: Primary | ICD-10-CM

## 2018-06-20 DIAGNOSIS — Z79.899 LONG TERM CURRENT USE OF ANTIARRHYTHMIC DRUG: ICD-10-CM

## 2018-06-20 DIAGNOSIS — Z12.31 ENCOUNTER FOR SCREENING MAMMOGRAM FOR BREAST CANCER: ICD-10-CM

## 2018-06-20 DIAGNOSIS — M53.3 SACROILIAC JOINT PAIN: ICD-10-CM

## 2018-06-20 DIAGNOSIS — M47.816 SPONDYLOSIS OF LUMBAR REGION WITHOUT MYELOPATHY OR RADICULOPATHY: ICD-10-CM

## 2018-06-20 DIAGNOSIS — L81.0 POST-INFLAMMATORY HYPERPIGMENTATION: ICD-10-CM

## 2018-06-20 DIAGNOSIS — L30.9 ECZEMA, UNSPECIFIED TYPE: ICD-10-CM

## 2018-06-20 DIAGNOSIS — I10 HYPERTENSION, UNCONTROLLED: Primary | ICD-10-CM

## 2018-06-20 DIAGNOSIS — Z00.00 ANNUAL PHYSICAL EXAM: Primary | ICD-10-CM

## 2018-06-20 DIAGNOSIS — M51.36 DDD (DEGENERATIVE DISC DISEASE), LUMBAR: ICD-10-CM

## 2018-06-20 LAB
ALBUMIN SERPL BCP-MCNC: 4 G/DL
ALP SERPL-CCNC: 96 U/L
ALT SERPL W/O P-5'-P-CCNC: 17 U/L
ANION GAP SERPL CALC-SCNC: 7 MMOL/L
AST SERPL-CCNC: 19 U/L
BASOPHILS # BLD AUTO: 0.07 K/UL
BASOPHILS NFR BLD: 1.7 %
BILIRUB SERPL-MCNC: 0.7 MG/DL
BUN SERPL-MCNC: 10 MG/DL
CALCIUM SERPL-MCNC: 9.7 MG/DL
CHLORIDE SERPL-SCNC: 109 MMOL/L
CHOLEST SERPL-MCNC: 189 MG/DL
CHOLEST/HDLC SERPL: 4 {RATIO}
CO2 SERPL-SCNC: 27 MMOL/L
CREAT SERPL-MCNC: 0.8 MG/DL
DIFFERENTIAL METHOD: ABNORMAL
EOSINOPHIL # BLD AUTO: 0.3 K/UL
EOSINOPHIL NFR BLD: 6.2 %
ERYTHROCYTE [DISTWIDTH] IN BLOOD BY AUTOMATED COUNT: 13.1 %
EST. GFR  (AFRICAN AMERICAN): >60 ML/MIN/1.73 M^2
EST. GFR  (NON AFRICAN AMERICAN): >60 ML/MIN/1.73 M^2
ESTIMATED AVG GLUCOSE: 82 MG/DL
GLUCOSE SERPL-MCNC: 88 MG/DL
HBA1C MFR BLD HPLC: 4.5 %
HCT VFR BLD AUTO: 38.4 %
HDLC SERPL-MCNC: 47 MG/DL
HDLC SERPL: 24.9 %
HGB BLD-MCNC: 13 G/DL
IMM GRANULOCYTES # BLD AUTO: 0.06 K/UL
IMM GRANULOCYTES NFR BLD AUTO: 1.5 %
LDLC SERPL CALC-MCNC: 111.6 MG/DL
LYMPHOCYTES # BLD AUTO: 1.2 K/UL
LYMPHOCYTES NFR BLD: 29.6 %
MCH RBC QN AUTO: 28.1 PG
MCHC RBC AUTO-ENTMCNC: 33.9 G/DL
MCV RBC AUTO: 83 FL
MONOCYTES # BLD AUTO: 0.4 K/UL
MONOCYTES NFR BLD: 8.6 %
NEUTROPHILS # BLD AUTO: 2.1 K/UL
NEUTROPHILS NFR BLD: 52.4 %
NONHDLC SERPL-MCNC: 142 MG/DL
NRBC BLD-RTO: 0 /100 WBC
PLATELET # BLD AUTO: 204 K/UL
PMV BLD AUTO: 11.4 FL
POTASSIUM SERPL-SCNC: 4.4 MMOL/L
PROT SERPL-MCNC: 7.6 G/DL
RBC # BLD AUTO: 4.62 M/UL
SODIUM SERPL-SCNC: 143 MMOL/L
TRIGL SERPL-MCNC: 152 MG/DL
WBC # BLD AUTO: 4.06 K/UL

## 2018-06-20 PROCEDURE — 99999 PR PBB SHADOW E&M-EST. PATIENT-LVL IV: CPT | Mod: PBBFAC,,, | Performed by: FAMILY MEDICINE

## 2018-06-20 PROCEDURE — 3008F BODY MASS INDEX DOCD: CPT | Mod: CPTII,S$GLB,, | Performed by: NURSE PRACTITIONER

## 2018-06-20 PROCEDURE — 85025 COMPLETE CBC W/AUTO DIFF WBC: CPT

## 2018-06-20 PROCEDURE — 3077F SYST BP >= 140 MM HG: CPT | Mod: CPTII,S$GLB,, | Performed by: FAMILY MEDICINE

## 2018-06-20 PROCEDURE — 3008F BODY MASS INDEX DOCD: CPT | Mod: CPTII,S$GLB,, | Performed by: FAMILY MEDICINE

## 2018-06-20 PROCEDURE — 3079F DIAST BP 80-89 MM HG: CPT | Mod: CPTII,S$GLB,, | Performed by: FAMILY MEDICINE

## 2018-06-20 PROCEDURE — 3075F SYST BP GE 130 - 139MM HG: CPT | Mod: CPTII,S$GLB,, | Performed by: NURSE PRACTITIONER

## 2018-06-20 PROCEDURE — 83036 HEMOGLOBIN GLYCOSYLATED A1C: CPT

## 2018-06-20 PROCEDURE — 80053 COMPREHEN METABOLIC PANEL: CPT

## 2018-06-20 PROCEDURE — 3079F DIAST BP 80-89 MM HG: CPT | Mod: CPTII,S$GLB,, | Performed by: NURSE PRACTITIONER

## 2018-06-20 PROCEDURE — 80061 LIPID PANEL: CPT

## 2018-06-20 PROCEDURE — 36415 COLL VENOUS BLD VENIPUNCTURE: CPT | Mod: PO

## 2018-06-20 PROCEDURE — 99213 OFFICE O/P EST LOW 20 MIN: CPT | Mod: S$GLB,,, | Performed by: NURSE PRACTITIONER

## 2018-06-20 PROCEDURE — 99214 OFFICE O/P EST MOD 30 MIN: CPT | Mod: S$GLB,,, | Performed by: FAMILY MEDICINE

## 2018-06-20 PROCEDURE — 86703 HIV-1/HIV-2 1 RESULT ANTBDY: CPT

## 2018-06-20 PROCEDURE — 99999 PR PBB SHADOW E&M-EST. PATIENT-LVL III: CPT | Mod: PBBFAC,,, | Performed by: NURSE PRACTITIONER

## 2018-06-20 RX ORDER — HYDROCHLOROTHIAZIDE 12.5 MG/1
12.5 TABLET ORAL DAILY
Qty: 90 TABLET | Refills: 2 | Status: SHIPPED | OUTPATIENT
Start: 2018-06-20 | End: 2018-08-29 | Stop reason: SDUPTHER

## 2018-06-20 NOTE — PATIENT INSTRUCTIONS
"Please look into the Dietary Approach to Stopping Hypertension or the "DASH" diet - google this! If you want recipes, you can also search for these for free!    If time constraints are a big obstacle to healthy cooking/eating in your life, you may be interested in :     1) Healthy Course / Skinny Course - in [place]! Please look up on google.     2) Freshly - healthy meals that are usually low carb. Check the nutrition facts before you buy and select options with lower saturated fat (less than 4 grams if possible). Some options that qualify as low in saturated fat are: Bur[place]ian Chicken, Chicken Rice Pilaf, and Southwestern Chicken.     Nutrition Action Health Letter - by the [employer] for Science in the Public Interest. Make informed dietary decisions and obtain great recipes as well! Not funded by "the industry".     Read Food Labels - Serving Size, calories, fat and sugar.     My Fitness Pal - a free vale that can help calorie counting    Weight Watchers - new product design is more user friendly and I love the support group facet to this intervention! They help teach you to make more informed decisions about what you eat.     Portion Control + More: The food pyramid has been replaced! Check out choosemyplate.gov - published by the NIH.     "

## 2018-06-20 NOTE — PROGRESS NOTES
Subjective:       Patient ID: Francoise Dykes is a 57 y.o. female.    Chief Complaint: Eczema (both leg - follow up )    HPI    Eczema - pt has been adherent with cerave and triamcinolone BID which has improved her sxs somewhat, but she still has a red raised rash over her R > L anterior shin,     A Flutter - pt is followed by cards and recently had her propafenone increased.  No side effects noticed.    Htn - repeat elevation on intake today. Pt does not take bp at home.     Outpatient Prescriptions Marked as Taking for the 6/20/18 encounter (Office Visit) with Bakari Winchester MD   Medication Sig Dispense Refill    albuterol 90 mcg/actuation inhaler Inhale 2 puffs into the lungs every 4 (four) hours as needed for Wheezing. 1 Inhaler 3    apixaban 5 mg Tab Take 1 tablet (5 mg total) by mouth 2 (two) times daily. 180 tablet 3    azelastine (ASTELIN) 137 mcg (0.1 %) nasal spray 1 spray (137 mcg total) by Nasal route 2 (two) times daily. 30 mL 3    cetirizine (ZYRTEC) 10 MG tablet Take 10 mg by mouth once daily.      diltiaZEM (DILACOR XR) 120 MG CDCR Take 1 capsule (120 mg total) by mouth once daily. 90 capsule 3    fluticasone (FLONASE) 50 mcg/actuation nasal spray 1 spray (50 mcg total) by Each Nare route 2 (two) times daily. 1 Bottle 3    hydrocortisone 0.5 % cream Apply topically daily as needed.      ibuprofen (ADVIL,MOTRIN) 800 MG tablet Take 1 tablet (800 mg total) by mouth 3 (three) times daily with meals. 270 tablet 0    propafenone (RYTHMOL SR) 325 MG Cp12 Take 1 capsule (325 mg total) by mouth every 12 (twelve) hours. 60 capsule 11    triamcinolone acetonide 0.1% (KENALOG) 0.1 % cream Apply topically 2 (two) times daily. 45 g 1       Past Medical History:   Diagnosis Date    Allergy     seasonal    Atrial fibrillation     Essential hypertension 11/20/2017    Pt states that she does not have HTN.        Family History   Problem Relation Age of Onset    Hypertension Mother     Diabetes  Mother     Glaucoma Mother     Asbestos Father     Obesity Father     Eczema Son     Hypertension Son     Heart disease Maternal Grandmother         chf    Diabetes Maternal Grandfather     Cancer Paternal Grandmother         lung, 2/2 second hand smoke    Glaucoma Paternal Grandfather     Blindness Paternal Grandfather     Melanoma Neg Hx     Psoriasis Neg Hx     Lupus Neg Hx     Acne Neg Hx     Breast cancer Neg Hx     Colon cancer Neg Hx     Ovarian cancer Neg Hx         reports that she has never smoked. She has never used smokeless tobacco. She reports that she drinks alcohol. She reports that she does not use drugs.    Review of Systems   Respiratory: Negative for cough and shortness of breath.    Cardiovascular: Negative for chest pain and palpitations.   Gastrointestinal: Negative for vomiting.       Objective:     Vitals:    06/20/18 0805   BP: (!) 140/86   Pulse: 73   Resp: 16   Temp: 97.9 °F (36.6 °C)        Physical Exam   Constitutional: She appears well-developed. No distress.   HENT:   Head: Normocephalic and atraumatic.   Eyes: Conjunctivae are normal. No scleral icterus.   Pulmonary/Chest: Effort normal.   Neurological: She is alert.   Skin: She is not diaphoretic.   maculopapaular rash as seen in image below over hyperpigmented skin   Psychiatric: She has a normal mood and affect. Her behavior is normal.   Vitals reviewed.            Assessment:       1. Hypertension, uncontrolled    2. Encounter for screening mammogram for breast cancer    3. Long term current use of antiarrhythmic drug    4. Eczema, unspecified type    5. Post-inflammatory hyperpigmentation        Plan:       Francoise was seen today for eczema.    Diagnoses and all orders for this visit:    Hypertension, uncontrolled  -     hydroCHLOROthiazide (HYDRODIURIL) 12.5 MG Tab; Take 1 tablet (12.5 mg total) by mouth once daily.  Pts blood pressure is uncontrolled. I advised the following lifestyle changes:  - exercise for  20 mins x 3-5 a week per AHA recommendations  - weight reduction if overweight by calorie restriction  - increase consumption of fruit/vegetables to 5 or more servings a day        - reduce sodium intake    At this stage, we discussed that their risk for MI and CVA is too high with their current BP, and will adjust their meds and add hctz 12.5mg.         Pt asked to keep BP log with date/BP, taking BP at least 4 x a week. They will bring this with them to their next appointment.     Pt asked to call me if BPs consistently above 140/90.    Pts questions were answered.    The 10-year CVD risk score (HUY'Corbin, et al., 2008) is: 10%    Values used to calculate the score:      Age: 57 years      Sex: Female      Diabetic: No      Tobacco smoker: No      Systolic Blood Pressure: 140 mmHg      Is BP treated: Yes      HDL Cholesterol: 39 mg/dL      Total Cholesterol: 173 mg/dL    Encounter for screening mammogram for breast cancer  -     Mammo Digital Screening Bilat with CAD; Future    Long term current use of antiarrhythmic drug  Chronic - stable    Eczema, unspecified type  -     Ambulatory consult to Dermatology  Continue triamcinolone    Post-inflammatory hyperpigmentation  New dx - pt educated on disease etiology, prognosis and treatment. Answered pts questions.            Follow-up in about 2 weeks (around 7/4/2018) for Annual Physical.        Pt verbalized understanding and agreed with our plan.

## 2018-06-20 NOTE — PROGRESS NOTES
Chronic patient Established Note (Follow up visit)      SUBJECTIVE:    Francoise Dykes presents to the clinic for a follow-up appointment for low back pain. She is s/p left L2,3,4,5 RFA on 2/9/2018. She reports significant relief of her low back pain. She did not have the right side RFA due to illness. She reports intermittent right sided low back pain. Her primary concern today is left hip pain that is aching and sharp. She reports that this pain is worse with prolonged exercise and laying on her left side. She denies any radicular leg pain. She continues to be physically active. She denies any other health changes. She denies any bowel or bladder incontinence.     Pain Disability Index Review:  Last 3 PDI Scores 6/20/2018 1/24/2018 12/7/2017   Pain Disability Index (PDI) 45 51 43       Pain Medications:  Ibuprofen 800 mg     Opioid Contract: no     report:  Reviewed and consistent with medication use as prescribed.    Pain Procedures:   7/21/2017- Left SI joint injection   11/3/2017- Left SI joint injection  12/19/2017- Bilateral L2,3,4,5 MBB  2/9/2018- left L2,3,4,5 RFA    Physical Therapy/Home Exercise: yes    Imaging:   Xray Lumbar Spine 11/12/2016:  There are 5 lumbar vertebral bodies. There is no evidence for acute fracture, dislocation or destructive process. Vertebral body heights are maintained. There is disc space narrowing at L5-S1. Intervertebral disc space desiccation at L2-L3 is also noted. Facet joints unremarkable. Spinous processes demonstrate normal mildly. The visualized SI joints and sacrum appear unremarkable. There is no translational abnormality. Surrounding soft tissues appear unremarkable.   Impression      Degenerative changes of the spine as above. Overall appearance is similar to prior.     MRI Lumbar Spine 9/12/2016:  Results: The alignment of the lumbar spine is normal.  The vertebral body heights are well-maintained.  Mild disc desiccation noted at L3-L4 and L4-L5, severe disc  space narrowing at L5-S1.  Small benign hemangioma is noted within the L3 and L4 vertebrae.  No evidence of malignant bone marrow replacement process or infection.  The conus medullaris terminates in good position.    T12-L1 and L1-L2 demonstrate a mild diffuse disc bulge, there is no central canal or foraminal stenosis.    L2-L3 demonstrates no disc abnormality, no central canal foraminal stenosis.    L3-L4 demonstrates a mild diffuse disc bulge, mild ligamentum flavum hypertrophy.  Mild narrowing of the central canal, no significant foraminal narrowing.    L4-L5 demonstrates mild diffuse disc bulge, mild ligamentum flavum hypertrophy and mild facet joint degenerative change, no significant central canal stenosis or significant foramina narrowing.    L5-S1 demonstrates a mild diffuse disc bulge, there is no central canal stenosis, there is mild left foraminal narrowing.    The paraspinal soft tissues appear normal.   Impression         Mild to moderate spondylosis of the lumbar spine without severe central or severe foraminal narrowing         Allergies:   Review of patient's allergies indicates:   Allergen Reactions    Adhesive tape-silicones Itching     Manifested in 12/2015 following AF ablation.       Current Medications:   Current Outpatient Prescriptions   Medication Sig Dispense Refill    albuterol 90 mcg/actuation inhaler Inhale 2 puffs into the lungs every 4 (four) hours as needed for Wheezing. 1 Inhaler 3    apixaban 5 mg Tab Take 1 tablet (5 mg total) by mouth 2 (two) times daily. 180 tablet 3    azelastine (ASTELIN) 137 mcg (0.1 %) nasal spray 1 spray (137 mcg total) by Nasal route 2 (two) times daily. 30 mL 3    cetirizine (ZYRTEC) 10 MG tablet Take 10 mg by mouth once daily.      diltiaZEM (DILACOR XR) 120 MG CDCR Take 1 capsule (120 mg total) by mouth once daily. 90 capsule 3    fluticasone (FLONASE) 50 mcg/actuation nasal spray 1 spray (50 mcg total) by Each Nare route 2 (two) times daily. 1  Bottle 3    hydroCHLOROthiazide (HYDRODIURIL) 12.5 MG Tab Take 1 tablet (12.5 mg total) by mouth once daily. 90 tablet 2    hydrocortisone 0.5 % cream Apply topically daily as needed.      ibuprofen (ADVIL,MOTRIN) 800 MG tablet Take 1 tablet (800 mg total) by mouth 3 (three) times daily with meals. 270 tablet 0    propafenone (RYTHMOL SR) 325 MG Cp12 Take 1 capsule (325 mg total) by mouth every 12 (twelve) hours. 60 capsule 11    triamcinolone acetonide 0.1% (KENALOG) 0.1 % cream Apply topically 2 (two) times daily. 45 g 1     No current facility-administered medications for this visit.        REVIEW OF SYSTEMS:    GENERAL:  No weight loss, malaise or fevers.  HEENT:  Negative for frequent or significant headaches.  NECK:  Negative for lumps, goiter, pain and significant neck swelling.  RESPIRATORY:  Negative for cough, wheezing or shortness of breath.  CARDIOVASCULAR:  Negative for chest pain, leg swelling or palpitations. Afib, HTN  GI:  Negative for abdominal discomfort, blood in stools or black stools or change in bowel habits.  MUSCULOSKELETAL:  See HPI.  SKIN:  Negative for lesions, rash, and itching.  PSYCH:  Negative for sleep disturbance, mood disorder and recent psychosocial stressors.  HEMATOLOGY/LYMPHOLOGY:  Negative for swollen nodes. Eliquis  NEURO:   No history of headaches, syncope, paralysis, seizures or tremors.  All other reviewed and negative other than HPI.    Past Medical History:  Past Medical History:   Diagnosis Date    Allergy     seasonal    Atrial fibrillation     Essential hypertension 2017    Pt states that she does not have HTN.        Past Surgical History:  Past Surgical History:   Procedure Laterality Date     SECTION      heart ablation      HY[facility]CTOMY      [facility]       Family History:  Family History   Problem Relation Age of Onset    Hypertension Mother     Diabetes Mother     Glaucoma Mother     Asbestos Father     Obesity Father     Eczema Son   "   Hypertension Son     Heart disease Maternal Grandmother         chf    Diabetes Maternal Grandfather     Cancer Paternal Grandmother         lung, 2/2 second hand smoke    Glaucoma Paternal Grandfather     Blindness Paternal Grandfather     Melanoma Neg Hx     Psoriasis Neg Hx     Lupus Neg Hx     Acne Neg Hx     Breast cancer Neg Hx     Colon cancer Neg Hx     Ovarian cancer Neg Hx        Social History:  Social History     Social History    Marital status:      Spouse name: N/A    Number of children: N/A    Years of education: N/A     Occupational History    Teacher [employer]      Social History Main Topics    Smoking status: Never Smoker    Smokeless tobacco: Never Used    Alcohol use Yes      Comment: rarely, 1 drink/week    Drug use: No    Sexual activity: Yes     Partners: Male     Other Topics Concern    Are You Pregnant Or Think You May Be? No    Breast-Feeding No     Social History Narrative    Recently moved back to [place] to help take care of mom.       OBJECTIVE:    /81   Pulse 73   Temp 98.3 °F (36.8 °C)   Ht 5' 5" (1.651 m)   Wt 96 kg (211 lb 10.3 oz)   LMP  (LMP Unknown)   BMI 35.22 kg/m²     PHYSICAL EXAMINATION:    General appearance: Well appearing, in no acute distress, alert and oriented x3.  Psych:  Mood and affect appropriate.  Skin: Skin color, texture, turgor normal, no rashes or lesions, in both upper and lower body.  Head/face:  Atraumatic, normocephalic. No palpable lymph nodes  Neck: No pain to palpation over the cervical paraspinous muscles. Spurling Negative. No pain with neck flexion, extension, or lateral flexion. .  GI: Abdomen soft and non-tender.  Back: Straight leg raising in the sitting and supine positions is negative to radicular pain. There is mild pain with palpation over lumbar spine. Limited ROM with pain on extension. Mildly positive facet loading bilaterally.   Extremities: Peripheral joint ROM is full and pain free without " obvious instability or laxity in all four extremities. No deformities, edema, or skin discoloration. Good capillary refill.  Musculoskeletal: Shoulder, hip, sacroiliac and knee provocative maneuvers are negative. Mild pain with palpation over bilateral SI joints. FABERs is negative bilaterally. There is pain with palpation over left GT bursa. Bilateral upper and lower extremity strength is normal and symmetric.  No atrophy or tone abnormalities are noted.  Neuro: Bilateral upper and lower extremity coordination and muscle stretch reflexes are physiologic and symmetric.  Plantar response are downgoing. No loss of sensation is noted.  Gait: Antalgic- ambulates without assistance.     ASSESSMENT: 57 y.o. year old female with low back pain pain, consistent with the followin. Greater trochanteric bursitis of left hip     2. Facet arthritis of lumbar region     3. Spondylosis of lumbar region without myelopathy or radiculopathy     4. DDD (degenerative disc disease), lumbar     5. Sacroiliac joint pain           PLAN:     - Previous imaging was reviewed and discussed with the patient today.    - Schedule for left GT bursa injection in office under ultrasound.   The procedure, risks, benefits and options were discussed with patient. There are no contraindications to the procedure. The patient expressed understanding and agreed to proceed.    - She is s/p left L2,3,4,5 RFA with benefit. We can consider right side RFA if needed.     - We can repeat SI joint injections in the future if needed.     - I have stressed the importance of physical activity and a home exercise plan to help with pain and improve health.    - RTC 2 weeks after above procedures.     - Counseled patient regarding the importance of activity modification and constant sleeping habits.    - Dr. Jang was consulted on the patient and agrees with this plan.    The above plan and management options were discussed at length with patient. Patient is  in agreement with the above and verbalized understanding.    Becca Canchola NP  06/20/2018

## 2018-06-21 ENCOUNTER — HOSPITAL ENCOUNTER (OUTPATIENT)
Dept: RADIOLOGY | Facility: HOSPITAL | Age: 58
Discharge: HOME | End: 2018-06-21
Attending: FAMILY MEDICINE
Payer: COMMERCIAL

## 2018-06-21 VITALS — BODY MASS INDEX: 35.16 KG/M2 | WEIGHT: 211 LBS | HEIGHT: 65 IN

## 2018-06-21 DIAGNOSIS — Z12.31 ENCOUNTER FOR SCREENING MAMMOGRAM FOR BREAST CANCER: ICD-10-CM

## 2018-06-21 LAB — HIV 1+2 AB+HIV1 P24 AG SERPL QL IA: NEGATIVE

## 2018-06-21 PROCEDURE — 77067 SCR MAMMO BI INCL CAD: CPT | Mod: TC

## 2018-06-21 PROCEDURE — 77063 BREAST TOMOSYNTHESIS BI: CPT | Mod: 26,,, | Performed by: RADIOLOGY

## 2018-06-21 PROCEDURE — 77067 SCR MAMMO BI INCL CAD: CPT | Mod: 26,,, | Performed by: RADIOLOGY

## 2018-07-03 ENCOUNTER — OFFICE VISIT (OUTPATIENT)
Dept: FAMILY MEDICINE | Facility: CLINIC | Age: 58
End: 2018-07-03
Payer: COMMERCIAL

## 2018-07-03 VITALS
SYSTOLIC BLOOD PRESSURE: 126 MMHG | TEMPERATURE: 99 F | HEART RATE: 71 BPM | BODY MASS INDEX: 35.18 KG/M2 | DIASTOLIC BLOOD PRESSURE: 78 MMHG | OXYGEN SATURATION: 96 % | HEIGHT: 65 IN | WEIGHT: 211.19 LBS | RESPIRATION RATE: 16 BRPM

## 2018-07-03 DIAGNOSIS — E66.01 SEVERE OBESITY (BMI 35.0-35.9 WITH COMORBIDITY): ICD-10-CM

## 2018-07-03 DIAGNOSIS — Z00.00 ANNUAL PHYSICAL EXAM: Primary | ICD-10-CM

## 2018-07-03 PROCEDURE — 99396 PREV VISIT EST AGE 40-64: CPT | Mod: S$GLB,,, | Performed by: FAMILY MEDICINE

## 2018-07-03 PROCEDURE — 3074F SYST BP LT 130 MM HG: CPT | Mod: CPTII,S$GLB,, | Performed by: FAMILY MEDICINE

## 2018-07-03 PROCEDURE — 99999 PR PBB SHADOW E&M-EST. PATIENT-LVL IV: CPT | Mod: PBBFAC,,, | Performed by: FAMILY MEDICINE

## 2018-07-03 PROCEDURE — 3078F DIAST BP <80 MM HG: CPT | Mod: CPTII,S$GLB,, | Performed by: FAMILY MEDICINE

## 2018-07-03 NOTE — PATIENT INSTRUCTIONS
"Please look into the Dietary Approach to Stopping Hypertension or the "DASH" diet - google this! If you want recipes, you can also search for these for free!    If time constraints are a big obstacle to healthy cooking/eating in your life, you may be interested in :     1) Healthy Course / Skinny Course - in [place]! Please look up on google.     2) Freshly - healthy meals that are usually low carb. Check the nutrition facts before you buy and select options with lower saturated fat (less than 4 grams if possible). Some options that qualify as low in saturated fat are: [place] Chicken, Chicken Rice Pilaf, and Southwestern Chicken.     Nutrition Action Health Letter - by the [employer] Science in the Public Interest. Make informed dietary decisions and obtain great recipes as well! Not funded by "the industry".     Read Food Labels - Serving Size, calories, fat and sugar.     My Fitness Pal - a free vale that can help calorie counting    Weight Watchers - new product design is more user friendly and I love the support group facet to this intervention! They help teach you to make more informed decisions about what you eat.     Portion Control + More: The food pyramid has been replaced! Check out choosemyplate.gov - published by the NIH.     "

## 2018-07-03 NOTE — PROGRESS NOTES
Subjective:       Patient ID: Francoise Dykes is a 57 y.o. female.    Chief Complaint: Annual Exam; Otalgia (level 7 pain); and Sore Throat    HPI    Annual Physical    Diet: Pt has been working towards eliminating rice/bread and eating less sodas.     Exercise: pt has been riding her bike 4 miles almost every day.     Immunizations required: colonoscopy needed.     Cancer screenings required: scheduled colonoscopy!    Other screenings required: UTD    Acute complaints today:  Otalgia x 2 days a/w some sore throat. Preceded by malaise and feverish feeling the day prior that has since resolved.        Outpatient Prescriptions Marked as Taking for the 7/3/18 encounter (Office Visit) with Bakari Winchester MD   Medication Sig Dispense Refill    albuterol 90 mcg/actuation inhaler Inhale 2 puffs into the lungs every 4 (four) hours as needed for Wheezing. 1 Inhaler 3    apixaban 5 mg Tab Take 1 tablet (5 mg total) by mouth 2 (two) times daily. 180 tablet 3    azelastine (ASTELIN) 137 mcg (0.1 %) nasal spray 1 spray (137 mcg total) by Nasal route 2 (two) times daily. 30 mL 3    cetirizine (ZYRTEC) 10 MG tablet Take 10 mg by mouth once daily.      diltiaZEM (DILACOR XR) 120 MG CDCR Take 1 capsule (120 mg total) by mouth once daily. 90 capsule 3    fluticasone (FLONASE) 50 mcg/actuation nasal spray 1 spray (50 mcg total) by Each Nare route 2 (two) times daily. 1 Bottle 3    hydroCHLOROthiazide (HYDRODIURIL) 12.5 MG Tab Take 1 tablet (12.5 mg total) by mouth once daily. 90 tablet 2    hydrocortisone 0.5 % cream Apply topically daily as needed.      ibuprofen (ADVIL,MOTRIN) 800 MG tablet Take 1 tablet (800 mg total) by mouth 3 (three) times daily with meals. 270 tablet 0    propafenone (RYTHMOL SR) 325 MG Cp12 Take 1 capsule (325 mg total) by mouth every 12 (twelve) hours. 60 capsule 11    triamcinolone acetonide 0.1% (KENALOG) 0.1 % cream Apply topically 2 (two) times daily. 45 g 1       Past Medical History:    Diagnosis Date    Allergy     seasonal    Atrial fibrillation     Essential hypertension 11/20/2017    Pt states that she does not have HTN.        Family History   Problem Relation Age of Onset    Hypertension Mother     Diabetes Mother     Glaucoma Mother     Asbestos Father     Obesity Father     Eczema Son     Hypertension Son     Heart disease Maternal Grandmother         chf    Diabetes Maternal Grandfather     Cancer Paternal Grandmother         lung, 2/2 second hand smoke    Glaucoma Paternal Grandfather     Blindness Paternal Grandfather     Melanoma Neg Hx     Psoriasis Neg Hx     Lupus Neg Hx     Acne Neg Hx     Breast cancer Neg Hx     Colon cancer Neg Hx     Ovarian cancer Neg Hx         reports that she has never smoked. She has never used smokeless tobacco. She reports that she drinks alcohol. She reports that she does not use drugs.    Review of Systems   Constitutional: Negative for chills and fever.   HENT: Positive for ear pain and sore throat. Negative for congestion, hearing loss and rhinorrhea.    Eyes: Negative for pain and discharge.   Respiratory: Negative for cough and shortness of breath.    Cardiovascular: Negative for chest pain and palpitations.   Gastrointestinal: Negative for diarrhea, nausea and vomiting.   Genitourinary: Negative for difficulty urinating, dysuria and frequency.   Allergic/Immunologic: Negative for environmental allergies and food allergies.   Neurological: Negative for seizures and weakness.   Psychiatric/Behavioral: Negative for dysphoric mood. The patient is not nervous/anxious.        Objective:     Vitals:    07/03/18 0814   BP: 126/78   Pulse: 71   Resp: 16   Temp: 98.5 °F (36.9 °C)        Physical Exam   Constitutional: She is oriented to person, place, and time. She appears well-developed.   O   HENT:   Head: Normocephalic and atraumatic.       Right Ear: External ear normal. No foreign bodies. Tympanic membrane is not injected. No  middle ear effusion.   Left Ear: External ear normal. No foreign bodies. Tympanic membrane is not injected.  No middle ear effusion.   Mouth/Throat: Oropharynx is clear and moist. No oral lesions. No dental abscesses.   Eyes: Conjunctivae and EOM are normal. Pupils are equal, round, and reactive to light. Right eye exhibits no discharge. Left eye exhibits no discharge.   Neck: No tracheal deviation present. No thyromegaly present.   Cardiovascular: Normal rate, regular rhythm and normal heart sounds.  Exam reveals no gallop and no friction rub.    No murmur heard.  Pulmonary/Chest: Effort normal and breath sounds normal. No respiratory distress. She has no wheezes. She has no rales.   Abdominal: Soft. She exhibits no distension and no mass. There is no tenderness. There is no rebound and no guarding.   Lymphadenopathy:     She has cervical adenopathy (L sided anterior cervical).   Neurological: She is alert and oriented to person, place, and time.   Skin: Skin is warm and dry.   Psychiatric: She has a normal mood and affect.   Vitals reviewed.      Assessment:       1. Annual physical exam    2. Severe obesity (BMI 35.0-35.9 with comorbidity)        Plan:       Francoise was seen today for annual exam, otalgia and sore throat.    Diagnoses and all orders for this visit:    Annual physical exam  Counseled on age appropriate medical preventative services, including age appropriate cancer screenings, over all nutritional health, need for a consistent exercise regimen and an over all push towards maintaining a vigorous and active lifestyle.      Counseled on age appropriate vaccines and discussed upcoming health care needs based on age/gender.  Spent time with patient counseling on need for a good patient/doctor relationship moving forward.      Severe obesity (BMI 35.0-35.9 with comorbidity)    Sore throat and L ear pain - viral illness + TMJ. Pt will let me know if sxs continue to bother her, or if she develops new or  worsening sxs. Education given on TMJ prevention and on viral illnesses.         Follow-up in about 3 months (around 10/3/2018) for Hypertension.        Pt verbalized understanding and agreed with our plan.

## 2018-07-11 ENCOUNTER — ANESTHESIA EVENT (OUTPATIENT)
Dept: ENDOSCOPY | Facility: HOSPITAL | Age: 58
End: 2018-07-11
Payer: COMMERCIAL

## 2018-07-12 ENCOUNTER — ANESTHESIA (OUTPATIENT)
Dept: ENDOSCOPY | Facility: HOSPITAL | Age: 58
End: 2018-07-12
Payer: COMMERCIAL

## 2018-07-12 ENCOUNTER — SURGERY (OUTPATIENT)
Age: 58
End: 2018-07-12

## 2018-07-12 ENCOUNTER — HOSPITAL ENCOUNTER (OUTPATIENT)
Facility: HOSPITAL | Age: 58
Discharge: HOME | End: 2018-07-12
Attending: INTERNAL MEDICINE | Admitting: INTERNAL MEDICINE
Payer: COMMERCIAL

## 2018-07-12 VITALS
DIASTOLIC BLOOD PRESSURE: 66 MMHG | HEIGHT: 65 IN | RESPIRATION RATE: 18 BRPM | BODY MASS INDEX: 35.16 KG/M2 | WEIGHT: 211 LBS | HEART RATE: 62 BPM | SYSTOLIC BLOOD PRESSURE: 134 MMHG | TEMPERATURE: 98 F | OXYGEN SATURATION: 99 %

## 2018-07-12 PROCEDURE — D9220A PRA ANESTHESIA: Mod: PT,CRNA,, | Performed by: NURSE ANESTHETIST, CERTIFIED REGISTERED

## 2018-07-12 PROCEDURE — 63600175 PHARM REV CODE 636 W HCPCS: Performed by: NURSE ANESTHETIST, CERTIFIED REGISTERED

## 2018-07-12 PROCEDURE — 25000003 PHARM REV CODE 250: Performed by: ANESTHESIOLOGY

## 2018-07-12 PROCEDURE — 37000008 HC ANESTHESIA 1ST 15 MINUTES: Performed by: INTERNAL MEDICINE

## 2018-07-12 PROCEDURE — 45385 COLONOSCOPY W/LESION REMOVAL: CPT | Performed by: INTERNAL MEDICINE

## 2018-07-12 PROCEDURE — 88305 TISSUE EXAM BY PATHOLOGIST: CPT | Mod: 26,,, | Performed by: PATHOLOGY

## 2018-07-12 PROCEDURE — 88305 TISSUE EXAM BY PATHOLOGIST: CPT | Performed by: PATHOLOGY

## 2018-07-12 PROCEDURE — 27201089 HC SNARE, DISP (ANY): Performed by: INTERNAL MEDICINE

## 2018-07-12 PROCEDURE — 37000009 HC ANESTHESIA EA ADD 15 MINS: Performed by: INTERNAL MEDICINE

## 2018-07-12 PROCEDURE — D9220A PRA ANESTHESIA: Mod: PT,ANES,, | Performed by: ANESTHESIOLOGY

## 2018-07-12 RX ORDER — LIDOCAINE HCL/PF 100 MG/5ML
SYRINGE (ML) INTRAVENOUS
Status: DISCONTINUED | OUTPATIENT
Start: 2018-07-12 | End: 2018-07-12

## 2018-07-12 RX ORDER — PROPOFOL 10 MG/ML
VIAL (ML) INTRAVENOUS
Status: DISCONTINUED | OUTPATIENT
Start: 2018-07-12 | End: 2018-07-12

## 2018-07-12 RX ORDER — LIDOCAINE HCL/PF 100 MG/5ML
SYRINGE (ML) INTRAVENOUS
Status: DISCONTINUED
Start: 2018-07-12 | End: 2018-07-12 | Stop reason: HOSPADM

## 2018-07-12 RX ORDER — PROPOFOL 10 MG/ML
VIAL (ML) INTRAVENOUS
Status: DISCONTINUED
Start: 2018-07-12 | End: 2018-07-12 | Stop reason: HOSPADM

## 2018-07-12 RX ORDER — LIDOCAINE HYDROCHLORIDE 10 MG/ML
1 INJECTION, SOLUTION EPIDURAL; INFILTRATION; INTRACAUDAL; PERINEURAL ONCE
Status: DISCONTINUED | OUTPATIENT
Start: 2018-07-12 | End: 2018-07-12 | Stop reason: HOSPADM

## 2018-07-12 RX ORDER — SODIUM CHLORIDE 9 MG/ML
INJECTION, SOLUTION INTRAVENOUS CONTINUOUS
Status: DISCONTINUED | OUTPATIENT
Start: 2018-07-12 | End: 2018-07-12 | Stop reason: HOSPADM

## 2018-07-12 RX ADMIN — PROPOFOL 50 MG: 10 INJECTION, EMULSION INTRAVENOUS at 10:07

## 2018-07-12 RX ADMIN — PROPOFOL 30 MG: 10 INJECTION, EMULSION INTRAVENOUS at 11:07

## 2018-07-12 RX ADMIN — PROPOFOL 20 MG: 10 INJECTION, EMULSION INTRAVENOUS at 11:07

## 2018-07-12 RX ADMIN — LIDOCAINE HYDROCHLORIDE 100 MG: 20 INJECTION, SOLUTION INTRAVENOUS at 10:07

## 2018-07-12 RX ADMIN — PROPOFOL 50 MG: 10 INJECTION, EMULSION INTRAVENOUS at 11:07

## 2018-07-12 RX ADMIN — SODIUM CHLORIDE: 0.9 INJECTION, SOLUTION INTRAVENOUS at 09:07

## 2018-07-12 RX ADMIN — PROPOFOL 150 MG: 10 INJECTION, EMULSION INTRAVENOUS at 10:07

## 2018-07-12 NOTE — DISCHARGE INSTRUCTIONS
Colonoscopy     A camera attached to a flexible tube with a viewing lens is used to take video pictures.     Colonoscopy is a test to view the inside of your lower digestive tract (colon and rectum). Sometimes it can show the last part of the small intestine (ileum). During the test, small pieces of tissue may be removed for testing. This is called a biopsy. Small growths, such as polyps, may also be removed.   Why is colonoscopy done?  The test is done to help look for colon cancer. And it can help find the source of abdominal pain, bleeding, and changes in bowel habits. It may be needed once a year, depending on factors such as your:  · Age  · Health history  · Family health history  · Symptoms  · Results from any prior colonoscopy  Risks and possible complications  These include:  · Bleeding               · A puncture or tear in the colon   · Risks of anesthesia  · A cancer lesion not being seen  Getting ready   To prepare for the test:  · Talk with your healthcare provider about the risks of the test (see below). Also ask your healthcare provider about alternatives to the test.  · Tell your healthcare provider about any medicines you take. Also tell him or her about any health conditions you may have.  · Make sure your rectum and colon are empty for the test. Follow the diet and bowel prep instructions exactly. If you dont, the test may need to be rescheduled.  · Plan for a friend or family member to drive you home after the test.     Colonoscopy provides an inside view of the entire colon.     You may discuss the results with your doctor right away or at a future visit.  During the test   The test is usually done in the hospital on an outpatient basis. This means you go home the same day. The procedure takes about 30 minutes. During that time:  · You are given relaxing (sedating) medicine through an IV line. You may be drowsy, or fully asleep.  · The healthcare provider will first give you a physical exam to  check for anal and rectal problems.  · Then the anus is lubricated and the scope inserted.  · If you are awake, you may have a feeling similar to needing to have a bowel movement. You may also feel pressure as air is pumped into the colon. Its OK to pass gas during the procedure.  · Biopsy, polyp removal, or other treatments may be done during the test.  After the test   You may have gas right after the test. It can help to try to pass it to help prevent later bloating. Your healthcare provider may discuss the results with you right away. Or you may need to schedule a follow-up visit to talk about the results. After the test, you can go back to your normal eating and other activities. You may be tired from the sedation and need to rest for a few hours.  Date Last Reviewed: 11/1/2016 © 2000-2017 [employer] [address] [place] [address], [place] [address]. All rights reserved. This information is not intended as a substitute for professional medical care. Always follow your healthcare professional's instructions.

## 2018-07-12 NOTE — TRANSFER OF CARE
"Anesthesia Transfer of Care Note    Patient: Francoise Dykes    Procedure(s) Performed: Procedure(s) (LRB):  COLONOSCOPY (N/A)    Patient location: GI    Anesthesia Type: general    Transport from OR: Transported from OR on room air with adequate spontaneous ventilation    Post pain: adequate analgesia    Post assessment: no apparent anesthetic complications and tolerated procedure well    Post vital signs: stable    Level of consciousness: sedated and responds to stimulation    Nausea/Vomiting: no nausea/vomiting    Complications: none    Transfer of care protocol was followed      Last vitals:   Visit Vitals  BP (!) 147/63 (BP Location: Left arm, Patient Position: Lying)   Pulse 69   Temp 36.6 °C (97.9 °F) (Oral)   Resp 18   Ht 5' 5" (1.651 m)   Wt 95.7 kg (211 lb)   LMP  (LMP Unknown)   SpO2 99%   Breastfeeding? No   BMI 35.11 kg/m²     "

## 2018-07-12 NOTE — ANESTHESIA PREPROCEDURE EVALUATION
07/12/2018  Francoise Dykes is a 57 y.o., female.    Anesthesia Evaluation    I have reviewed the Patient Summary Reports.     I have reviewed the Medications.     Review of Systems  Anesthesia Hx:  No problems with previous Anesthesia  History of prior surgery of interest to airway management or planning: Denies Family Hx of Anesthesia complications.   Denies Personal Hx of Anesthesia complications.   Social:  Alcohol Use, Non-Smoker    Cardiovascular:   Denies Hypertension.  Denies MI.  Denies CAD.   Dysrhythmias  ECG has been reviewed. Echo 8/22/17:  CONCLUSIONS     1 - Left atrial appendage is multilobed with no visualized thrombus.     2 - Normal left ventricular systolic function (EF 60-65%).     3 - Normal left ventricular diastolic function.     4 - Trivial mitral regurgitation.     5 - No visualized thrombus in the left atrium.     S/p ablation for afib, now in sinus rhythm   Pulmonary:  Pulmonary Normal    Renal/:  Renal/ Normal     Hepatic/GI:  Hepatic/GI Normal    Musculoskeletal:   Arthritis         Physical Exam  General:  Well nourished, Obesity    Airway/Jaw/Neck:  Airway Findings: Mouth Opening: Normal Tongue: Normal  General Airway Assessment: Adult  Mallampati: II  TM Distance: Normal, at least 6 cm  Jaw/Neck Findings:  Neck ROM: Normal ROM      Dental:  Dental Findings: In tact   Chest/Lungs:  Chest/Lungs Findings: Clear to auscultation     Heart/Vascular:  Heart Findings: Normal Heart murmur: negative       Mental Status:  Mental Status Findings:  Cooperative, Alert and Oriented         Anesthesia Plan  Type of Anesthesia, risks & benefits discussed:  Anesthesia Type:  general  Patient's Preference:   Intra-op Monitoring Plan: standard ASA monitors  Intra-op Monitoring Plan Comments:   Post Op Pain Control Plan: multimodal analgesia, IV/PO Opioids PRN and per primary service following  discharge from PACU  Post Op Pain Control Plan Comments:   Induction:   IV  Beta Blocker:  Patient is not currently on a Beta-Blocker (No further documentation required).       Informed Consent: Patient understands risks and agrees with Anesthesia plan.  Questions answered. Anesthesia consent signed with patient.  ASA Score: 2     Day of Surgery Review of History & Physical: I have interviewed and examined the patient. I have reviewed the patient's H&P dated:    H&P update referred to the provider.         Ready For Surgery From Anesthesia Perspective.

## 2018-07-12 NOTE — PROVATION PATIENT INSTRUCTIONS
Discharge Summary/Instructions after an Endoscopic Procedure  Patient Name: Francoise Dykes  Patient MRN: [id number]  Patient YOB: 1960 Thursday, July 12, 2018  Brodie Lara MD  RESTRICTIONS:  During your procedure today, you received medications for sedation.  These   medications may affect your judgment, balance and coordination.  Therefore,   for 24 hours, you have the following restrictions:   - DO NOT drive a car, operate machinery, make legal/financial decisions,   sign important papers or drink alcohol.    ACTIVITY:  Today: no heavy lifting, straining or running due to procedural   sedation/anesthesia.  The following day: return to full activity including work.  DIET:  Eat and drink normally unless instructed otherwise.     TREATMENT FOR COMMON SIDE EFFECTS:  - Mild abdominal pain, nausea, belching, bloating or excessive gas:  rest,   eat lightly and use a heating pad.  - Sore Throat: treat with throat lozenges and/or gargle with warm salt   water.  - Because air was used during the procedure, expelling large amounts of air   from your rectum or belching is normal.  - If a bowel prep was taken, you may not have a bowel movement for 1-3 days.    This is normal.  SYMPTOMS TO WATCH FOR AND REPORT TO YOUR PHYSICIAN:  1. Abdominal pain or bloating, other than gas cramps.  2. Chest pain.  3. Back pain.  4. Signs of infection such as: chills or fever occurring within 24 hours   after the procedure.  5. Rectal bleeding, which would show as bright red, maroon, or black stools.   (A tablespoon of blood from the rectum is not serious, especially if   hemorrhoids are present.)  6. Vomiting.  7. Weakness or dizziness.  GO DIRECTLY TO THE NEAREST EMERGENCY ROOM IF YOU HAVE ANY OF THE FOLLOWING:      Difficulty breathing              Chills and/or fever over 101 F   Persistent vomiting and/or vomiting blood   Severe abdominal pain   Severe chest pain   Black, tarry stools   Bleeding- more than one  tablespoon   Any other symptom or condition that you feel may need urgent attention  Your doctor recommends these additional instructions:  If any biopsies were taken, your doctors clinic will contact you in 1 to 2   weeks with any results.  - Discharge patient to home.   - High fiber diet.   - Continue present medications.   - Await pathology results.   - Repeat colonoscopy in 5 years for surveillance based on pathology results.     - Return to primary care physician as previously scheduled.   - Return to GI clinic PRN.   - The findings and recommendations were discussed with the patient's family.     - Patient has a contact number available for emergencies.  The signs and   symptoms of potential delayed complications were discussed with the   patient.  Return to normal activities tomorrow.  Written discharge   instructions were provided to the patient.  For questions, problems or results please call your physician - Brodie Lara MD at Work:  [phone].  [facility] can be reached at [phone]     IF A COMPLICATION OR EMERGENCY SITUATION ARISES AND YOU ARE UNABLE TO REACH   YOUR PHYSICIAN - GO DIRECTLY TO THE EMERGENCY ROOM.  Brodie Lara MD  7/12/2018 11:19:26 AM  This report has been verified and signed electronically.  [employer]ATION

## 2018-07-13 ENCOUNTER — OFFICE VISIT (OUTPATIENT)
Dept: OBSTETRICS AND GYNECOLOGY | Facility: CLINIC | Age: 58
End: 2018-07-13
Payer: COMMERCIAL

## 2018-07-13 VITALS
SYSTOLIC BLOOD PRESSURE: 140 MMHG | BODY MASS INDEX: 34.89 KG/M2 | DIASTOLIC BLOOD PRESSURE: 78 MMHG | HEIGHT: 65 IN | WEIGHT: 209.44 LBS

## 2018-07-13 DIAGNOSIS — Z01.419 ENCOUNTER FOR GYNECOLOGICAL EXAMINATION WITHOUT ABNORMAL FINDING: Primary | ICD-10-CM

## 2018-07-13 PROBLEM — Z79.01 CURRENT USE OF LONG TERM ANTICOAGULATION: Status: RESOLVED | Noted: 2017-04-12 | Resolved: 2018-07-13

## 2018-07-13 PROBLEM — I48.4 ATYPICAL ATRIAL FLUTTER: Status: RESOLVED | Noted: 2017-11-20 | Resolved: 2018-07-13

## 2018-07-13 PROCEDURE — 99396 PREV VISIT EST AGE 40-64: CPT | Mod: S$GLB,,, | Performed by: OBSTETRICS & GYNECOLOGY

## 2018-07-13 PROCEDURE — 3078F DIAST BP <80 MM HG: CPT | Mod: CPTII,S$GLB,, | Performed by: OBSTETRICS & GYNECOLOGY

## 2018-07-13 PROCEDURE — 3077F SYST BP >= 140 MM HG: CPT | Mod: CPTII,S$GLB,, | Performed by: OBSTETRICS & GYNECOLOGY

## 2018-07-13 PROCEDURE — 99999 PR PBB SHADOW E&M-EST. PATIENT-LVL II: CPT | Mod: PBBFAC,,, | Performed by: OBSTETRICS & GYNECOLOGY

## 2018-07-13 NOTE — PROGRESS NOTES
PT HERE FOR ANNUAL.    ROS:  GENERAL: No fever, chills, fatigability or weight loss.  VULVAR: No pain, no lesions and no itching.  VAGINAL: No relaxation, no itching, no discharge, no abnormal bleeding and no lesions.  ABDOMEN: No abdominal pain. Denies nausea. Denies vomiting. No diarrhea. No constipation  BREAST: Denies pain. No lumps. No discharge.  URINARY: No incontinence, no nocturia, no frequency and no dysuria.  CARDIOVASCULAR: No chest pain. No shortness of breath. No leg cramps.  NEUROLOGICAL: No headaches. No vision changes.  The remainder of the review of systems was negative.    PE:   General Appearance: overweight Well developed. Well nourished. In no acute distress.  Urethral Meatus: Normal size. Normal location. No lesions. No prolapse.  Vulva: Atrophic. Lesions: No.  Urethra: No masses. No tenderness. No prolapse. No scarring.  Bladder: No masses. No tenderness.  Vagina: Mucosa NI: yes Discharge: no Atrophic: yes Rectocele: no Cystocele: no Vaginal cuff intact: yes  Cervix: Absent.  Uterus: Absent.  Adnexa: Masses: No Tenderness: No       CDS Nodularity: No   Abdomen: overweight  No masses. No tenderness.  Breasts: No bilateral masses. No bilateral discharge. No bilateral tenderness. No bilateral fibrocystic changes.  Neck: No thyroid enlargement. No thyroid masses.  Skin: Rashes: No    PROCEDURES:    DIAGNOSIS:  1. Encounter for gynecological examination without abnormal finding        PLAN:     MEDICATIONS & ORDERS:       Patient was counseled today on the new ACS guidelines for cervical cytology screening as well as the current recommendations for breast cancer screening. She was counseled to follow up with her PCP for other routine health maintenance. Counseling session lasted approximately 10 minutes, and all her questions were answered.         FOLLOW-UP: With me in 12 month

## 2018-07-16 NOTE — ANESTHESIA POSTPROCEDURE EVALUATION
"Anesthesia Post Evaluation    Patient: Francoise Dykes    Procedure(s) Performed: Procedure(s) (LRB):  COLONOSCOPY (N/A)    Final Anesthesia Type: general  Patient location during evaluation: GI PACU  Patient participation: Yes- Able to Participate  Level of consciousness: awake and alert, awake and oriented  Post-procedure vital signs: reviewed and stable  Pain management: adequate  Airway patency: patent  PONV status at discharge: No PONV  Anesthetic complications: no      Cardiovascular status: blood pressure returned to baseline and hemodynamically stable  Respiratory status: unassisted, spontaneous ventilation and room air  Hydration status: euvolemic  Follow-up not needed.        Visit Vitals  /66   Pulse 62   Temp 36.6 °C (97.9 °F) (Oral)   Resp 18   Ht 5' 5" (1.651 m)   Wt 95.7 kg (211 lb)   LMP  (LMP Unknown)   SpO2 99%   Breastfeeding? No   BMI 35.11 kg/m²       Pain/Theo Score: No Data Recorded      "

## 2018-08-20 ENCOUNTER — LAB VISIT (OUTPATIENT)
Dept: LAB | Facility: HOSPITAL | Age: 58
End: 2018-08-20
Attending: ALLERGY & IMMUNOLOGY
Payer: COMMERCIAL

## 2018-08-20 ENCOUNTER — HOSPITAL ENCOUNTER (OUTPATIENT)
Dept: PULMONOLOGY | Facility: CLINIC | Age: 58
Discharge: HOME | End: 2018-08-20
Payer: COMMERCIAL

## 2018-08-20 ENCOUNTER — OFFICE VISIT (OUTPATIENT)
Dept: ALLERGY | Facility: CLINIC | Age: 58
End: 2018-08-20
Payer: COMMERCIAL

## 2018-08-20 VITALS
BODY MASS INDEX: 35.9 KG/M2 | SYSTOLIC BLOOD PRESSURE: 138 MMHG | DIASTOLIC BLOOD PRESSURE: 80 MMHG | HEIGHT: 64 IN | WEIGHT: 210.31 LBS

## 2018-08-20 DIAGNOSIS — R05.9 COUGH: Primary | ICD-10-CM

## 2018-08-20 DIAGNOSIS — R05.9 COUGH: ICD-10-CM

## 2018-08-20 DIAGNOSIS — L30.9 DERMATITIS: ICD-10-CM

## 2018-08-20 DIAGNOSIS — I10 ESSENTIAL HYPERTENSION: ICD-10-CM

## 2018-08-20 DIAGNOSIS — Z86.79 S/P ABLATION OF ATRIAL FIBRILLATION: ICD-10-CM

## 2018-08-20 DIAGNOSIS — Z79.899 LONG TERM CURRENT USE OF ANTIARRHYTHMIC DRUG: ICD-10-CM

## 2018-08-20 DIAGNOSIS — J31.0 CHRONIC RHINITIS: ICD-10-CM

## 2018-08-20 DIAGNOSIS — Z98.890 S/P ABLATION OF ATRIAL FIBRILLATION: ICD-10-CM

## 2018-08-20 DIAGNOSIS — I48.92 ATRIAL FLUTTER, UNSPECIFIED TYPE: ICD-10-CM

## 2018-08-20 LAB
POST FEV1 FVC: 0.79
POST FEV1: 2.4
POST FVC: 3.03
PRE FEV1 FVC: 77
PRE FEV1: 2.34
PRE FVC: 3.03
PREDICTED FEV1 FVC: 81
PREDICTED FEV1: 2.52
PREDICTED FVC: 3.11

## 2018-08-20 PROCEDURE — 3079F DIAST BP 80-89 MM HG: CPT | Mod: CPTII,S$GLB,, | Performed by: ALLERGY & IMMUNOLOGY

## 2018-08-20 PROCEDURE — 86003 ALLG SPEC IGE CRUDE XTRC EA: CPT | Mod: 59

## 2018-08-20 PROCEDURE — 99999 PR PBB SHADOW E&M-EST. PATIENT-LVL III: CPT | Mod: PBBFAC,,, | Performed by: ALLERGY & IMMUNOLOGY

## 2018-08-20 PROCEDURE — 86003 ALLG SPEC IGE CRUDE XTRC EA: CPT

## 2018-08-20 PROCEDURE — 36415 COLL VENOUS BLD VENIPUNCTURE: CPT

## 2018-08-20 PROCEDURE — 99215 OFFICE O/P EST HI 40 MIN: CPT | Mod: S$GLB,,, | Performed by: ALLERGY & IMMUNOLOGY

## 2018-08-20 PROCEDURE — 94060 EVALUATION OF WHEEZING: CPT | Mod: S$GLB,,, | Performed by: INTERNAL MEDICINE

## 2018-08-20 PROCEDURE — 3075F SYST BP GE 130 - 139MM HG: CPT | Mod: CPTII,S$GLB,, | Performed by: ALLERGY & IMMUNOLOGY

## 2018-08-20 PROCEDURE — 3008F BODY MASS INDEX DOCD: CPT | Mod: CPTII,S$GLB,, | Performed by: ALLERGY & IMMUNOLOGY

## 2018-08-20 RX ORDER — PREDNISONE 10 MG/1
TABLET ORAL
Qty: 21 TABLET | Refills: 0 | Status: SHIPPED | OUTPATIENT
Start: 2018-08-20 | End: 2020-01-15

## 2018-08-20 NOTE — PROGRESS NOTES
Francoise Dykes returns to clinic today for continued evaluation of allergic rhinitis and conjunctivitis.  She was last seen October 27, 2015.  She is here alone.    When she was living in [place] she had an evaluation by an allergist, Dr. Greco, at the [facility].  She was allergic to cats, dogs, dust mites, grasses, and trees.  She was not started on immunotherapy.    After moving to [place] she did well.  She has only needed occasional antihistamines.  In the past she has used Dymista with much improvement.  She has tried Astelin and Flonase together which does not work as well.  It also dries her nose out and it bleeds.  Dymista is not covered on her insurance.    About two months ago she started having increased symptoms with sneezing, clear to yellow rhinorrhea, postnasal drip, a sensation that something is in the back of the throat, frequent throat clearing, and cough that has occasionally been productive of yellow mucus.    She started taking Dymista that she paid for at about $120.  It has not worked as well this time.    The symptoms are worse in the evening and when she lies down.    She does have GERD with spicy foods and takes Mylanta when needed.    Dr. Winchester her primary care physician saw her on July 3, 2018 and hurt wheezing.  He started on albuterol.  She is taking this twice a day.  She does not feel that this helps.    She does not hear wheezing from her chest.  She may hear some noise from her nose.  She denies any shortness of breath.  She denies any prior history of asthma.    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  [place]    She has to take off work when she comes for appointments.    She has had a hyperpigmented rash on both lower extremities.  It is worse on the right.  She is seen a dermatologist and it was recently biopsied.  She does not have this result yet.    She has hypertension and is on  hydrochlorothiazide and diltiazem.    She continues to have atrial fibrillation.  She is on Eliquis and Rythmol.  She is followed by Dr. Meyers.  She has had two ablations in the past.    In the recent past she had to take high-dose steroids for several days.  She developed atrial flutter and Dr. Meyers thought this was a result of the steroids.  He advised her to use steroids with caution.    [facility] ALLERGY QUESTIONNAIRE SHORT 8/20/2018   Are you taking any new medications since your last visit? Yes   Constitution: No symptoms, No changes since my last visit with this provider   Head or facial pain: Sinus pressure   Eyes: Itching   Ears: Itching, Fullness, Pressure   Nose: Itching, Nose bleeds, Post nasal drip, Sniffling, Sneezing, Runny nose, Snoring, Blocked nose   Throat: Sore throat, Frequent clearing, Reflux/ heartburn   Sinuses: No symptoms   Lungs: Cough, Wheezing, Use of inhalers, Choking   Skin: Itching, Redness, Rash   Cardiovascular: No symptoms   Gastrointestinal: Heartburn/ indigestion/ reflux   Genital/ urinary No symptoms   Musculoskeletal: Back pain, Joint pain   Neurologic: Headaches   Endocrine: No symptoms   Hematologic: No symptoms     Physical Examination:  General: Well-developed, well-nourished, no acute distress.  Clearing throat throughout interview.  Head: No sinus tenderness.  Eyes: Conjunctivae:  No bulbar or palpebral conjunctival injection.  Ears: EAC's clear.  TM's clear.  No pre-auricular nodes.  Nose: Nasal Mucosa:  Pink.  Septum: No apparent deviation.  Turbinates:  No significant edema.  Polyps/Mass:  None visible.  Teeth/Gums:  No bleeding noted.  Oropharynx: No exudates.  Neck: Supple without thyromegaly. No cervical lymphadenopathy.    Respiratory/Chest: Effort: Good.  Auscultation:  Mild bilateral wheezing.  Skin: Good turgor.  No urticaria or angioedema.  Hyper pigmented lesions on both lower extremities, right greater than left.  Neuro/Psych: Oriented x 3.    Assessment:  1.  Allergic rhinitis.  2. Allergic conjunctivitis.  3. Dermatitis of uncertain etiology.  4. GERD.  5.  Consider LPR.  6. Atrial fibrillation on Eliquis and Rythmol.  7. Nasal bleeding on topical nasal steroids in the past.    Recommendations:  1.  Laboratory as ordered.  2.  Spirometry.  3.  Azelastine 1 to 2 sprays each nostril b.i.d. p.r.n. rhinitis.  4.  Albuterol two puffs every 6 hr as needed.  5.  May need oral steroids.  She will contact Dr. Meyers.  6.  ENT evaluation.  7.  Return to clinic in four days.    Proper use of albuterol was demonstrated.    One hour was spent with this patient, over half in counseling and coordinating care.

## 2018-08-22 LAB
A ALTERNATA IGE QN: <0.35 KU/L
A FUMIGATUS IGE QN: <0.35 KU/L
BERMUDA GRASS IGE QN: <0.35 KU/L
CAT DANDER IGE QN: <0.35 KU/L
CEDAR IGE QN: <0.35 KU/L
D FARINAE IGE QN: <0.35 KU/L
D PTERONYSS IGE QN: <0.35 KU/L
DEPRECATED A ALTERNATA IGE RAST QL: NORMAL
DEPRECATED A FUMIGATUS IGE RAST QL: NORMAL
DEPRECATED BERMUDA GRASS IGE RAST QL: NORMAL
DEPRECATED CAT DANDER IGE RAST QL: NORMAL
DEPRECATED CEDAR IGE RAST QL: NORMAL
DEPRECATED D FARINAE IGE RAST QL: NORMAL
DEPRECATED D PTERONYSS IGE RAST QL: NORMAL
DEPRECATED DOG DANDER IGE RAST QL: NORMAL
DEPRECATED ENGL PLANTAIN IGE RAST QL: NORMAL
DEPRECATED MARSH ELDER IGE RAST QL: NORMAL
DEPRECATED PECAN/HICK TREE IGE RAST QL: NORMAL
DEPRECATED ROACH IGE RAST QL: NORMAL
DEPRECATED TIMOTHY IGE RAST QL: NORMAL
DEPRECATED WHITE OAK IGE RAST QL: NORMAL
DOG DANDER IGE QN: <0.35 KU/L
ENGL PLANTAIN IGE QN: <0.35 KU/L
MARSH ELDER IGE QN: <0.35 KU/L
PECAN/HICK TREE IGE QN: <0.35 KU/L
RAGWEED, WESTERN IGE: <0.35 KU/L
RAGWEED, WESTERN, CLASS: NORMAL
ROACH IGE QN: <0.35 KU/L
TIMOTHY IGE QN: <0.35 KU/L
WHITE OAK IGE QN: <0.35 KU/L

## 2018-08-24 ENCOUNTER — OFFICE VISIT (OUTPATIENT)
Dept: OTOLARYNGOLOGY | Facility: CLINIC | Age: 58
End: 2018-08-24
Payer: COMMERCIAL

## 2018-08-24 ENCOUNTER — PATIENT MESSAGE (OUTPATIENT)
Dept: ALLERGY | Facility: CLINIC | Age: 58
End: 2018-08-24

## 2018-08-24 ENCOUNTER — OFFICE VISIT (OUTPATIENT)
Dept: ALLERGY | Facility: CLINIC | Age: 58
End: 2018-08-24
Payer: COMMERCIAL

## 2018-08-24 VITALS
BODY MASS INDEX: 36.35 KG/M2 | HEIGHT: 64 IN | HEART RATE: 74 BPM | OXYGEN SATURATION: 95 % | DIASTOLIC BLOOD PRESSURE: 80 MMHG | SYSTOLIC BLOOD PRESSURE: 138 MMHG | WEIGHT: 212.94 LBS

## 2018-08-24 VITALS
HEIGHT: 64 IN | DIASTOLIC BLOOD PRESSURE: 80 MMHG | HEART RATE: 76 BPM | WEIGHT: 211.63 LBS | BODY MASS INDEX: 36.13 KG/M2 | SYSTOLIC BLOOD PRESSURE: 150 MMHG

## 2018-08-24 DIAGNOSIS — R09.89 CHRONIC THROAT CLEARING: ICD-10-CM

## 2018-08-24 DIAGNOSIS — R09.A2 GLOBUS SENSATION: ICD-10-CM

## 2018-08-24 DIAGNOSIS — Z98.890 S/P ABLATION OF ATRIAL FIBRILLATION: ICD-10-CM

## 2018-08-24 DIAGNOSIS — R05.3 CHRONIC COUGH: ICD-10-CM

## 2018-08-24 DIAGNOSIS — R06.2 WHEEZING: ICD-10-CM

## 2018-08-24 DIAGNOSIS — K21.9 LPRD (LARYNGOPHARYNGEAL REFLUX DISEASE): Primary | ICD-10-CM

## 2018-08-24 DIAGNOSIS — I10 ESSENTIAL HYPERTENSION: ICD-10-CM

## 2018-08-24 DIAGNOSIS — J31.0 RHINITIS, UNSPECIFIED TYPE: ICD-10-CM

## 2018-08-24 DIAGNOSIS — Z86.79 S/P ABLATION OF ATRIAL FIBRILLATION: ICD-10-CM

## 2018-08-24 DIAGNOSIS — K21.9 LARYNGOPHARYNGEAL REFLUX: ICD-10-CM

## 2018-08-24 DIAGNOSIS — R05.9 COUGH: Primary | ICD-10-CM

## 2018-08-24 DIAGNOSIS — K21.9 GASTROESOPHAGEAL REFLUX DISEASE WITHOUT ESOPHAGITIS: ICD-10-CM

## 2018-08-24 DIAGNOSIS — I48.3 TYPICAL ATRIAL FLUTTER: ICD-10-CM

## 2018-08-24 DIAGNOSIS — R49.0 DYSPHONIA: ICD-10-CM

## 2018-08-24 PROCEDURE — 3079F DIAST BP 80-89 MM HG: CPT | Mod: CPTII,S$GLB,, | Performed by: ALLERGY & IMMUNOLOGY

## 2018-08-24 PROCEDURE — 99999 PR PBB SHADOW E&M-EST. PATIENT-LVL IV: CPT | Mod: PBBFAC,,, | Performed by: NURSE PRACTITIONER

## 2018-08-24 PROCEDURE — 99999 PR PBB SHADOW E&M-EST. PATIENT-LVL III: CPT | Mod: PBBFAC,,, | Performed by: ALLERGY & IMMUNOLOGY

## 2018-08-24 PROCEDURE — 31575 DIAGNOSTIC LARYNGOSCOPY: CPT | Mod: S$GLB,,, | Performed by: NURSE PRACTITIONER

## 2018-08-24 PROCEDURE — 3008F BODY MASS INDEX DOCD: CPT | Mod: CPTII,S$GLB,, | Performed by: ALLERGY & IMMUNOLOGY

## 2018-08-24 PROCEDURE — 3075F SYST BP GE 130 - 139MM HG: CPT | Mod: CPTII,S$GLB,, | Performed by: ALLERGY & IMMUNOLOGY

## 2018-08-24 PROCEDURE — 99243 OFF/OP CNSLTJ NEW/EST LOW 30: CPT | Mod: 25,S$GLB,, | Performed by: NURSE PRACTITIONER

## 2018-08-24 PROCEDURE — 99214 OFFICE O/P EST MOD 30 MIN: CPT | Mod: S$GLB,,, | Performed by: ALLERGY & IMMUNOLOGY

## 2018-08-24 RX ORDER — OMEPRAZOLE 40 MG/1
40 CAPSULE, DELAYED RELEASE ORAL
Qty: 30 CAPSULE | Refills: 11 | Status: SHIPPED | OUTPATIENT
Start: 2018-08-24 | End: 2019-03-25 | Stop reason: SDUPTHER

## 2018-08-24 NOTE — PROGRESS NOTES
Francoise Dykes returns to clinic today for continued evaluation of chronic rhinitis, conjunctivitis, and cough.  She was last seen August 20, 2018.  She is here with her cousin Thi.    This morning she saw NP Kae Duran who did see evidence of LPR.  She started her on omeprazole in the morning and ranitidine at night.    When she was living in [place] several years ago she was evaluated at the [facility] she had skin testing done and was told that she was allergic to cats, dogs, dust mites, grass, and trees.    She has been taking Zyrtec which has not been effective.  She has also taking Dymista in the past.    Last Monday when she was seen she had a spirometry done that included albuterol.  She says that after albuterol she felt much better.  Her cough resolved for a while.  Since then she has been using albuterol MDI two puffs every 6 hr with improvement in her cough.  When she does take if her cough resolves.    She has not had any shortness of breath.    Records from [place] were requested but have not yet been received.    Changes in health care status since last visit:  None except as above.    Physical Examination:  General: Well-developed, well-nourished, no acute distress.    Head: No sinus tenderness.  Eyes: Conjunctivae:  No bulbar or palpebral conjunctival injection.  Ears: EAC's clear.  TM's clear.  No pre-auricular nodes.  Nose: Nasal Mucosa:  Pink.  Septum: No apparent deviation.  Turbinates:  No significant edema.  Polyps/Mass:  None visible.  Teeth/Gums:  No bleeding noted.  Oropharynx: No exudates.  Neck: Supple without thyromegaly. No cervical lymphadenopathy.    Respiratory/Chest: Effort: Good.  Auscultation:  Mild bilateral wheezing.  Skin: Good turgor.  No urticaria or angioedema.  Hyper pigmented lesions on both lower extremities, right greater than left.  Neuro/Psych: Oriented x 3.    Spirometry 08/20/2018:  Normal.    Laboratory 08/20/2018:  ImmunoCAP:   Negative.    Assessment:  1.  Chronic rhinitis, consider allergic.  2.  Chronic conjunctivitis, consider allergic.  3.  Chronic cough, probably secondary to GERD.  4.  GERD.  5.  LPR.  6.  Dermatitis of uncertain etiology.  7. Atrial fibrillation on Eliquis and Rythmol.  8. Nasal bleeding on topical nasal steroids in the past.    Recommendations:  1.  Start omeprazole 40 mg a day.  2.  Start ranitidine at night.  3.  Follow symptoms on above.  4.  Albuterol two puffs every 6 hr as needed.  5.  Zyrtec as needed.  6.  Return to clinic if symptoms are not controlled or in six weeks.  7.  Consider skin testing on return to clinic.  8.  Hold antihistamines three days before.  9.  Await records from [place].

## 2018-08-24 NOTE — LETTER
August 24, 2018      AMBREEN Long III, MD  [address] [facility]  [facility] [address]           [facility]  [address] [facility] [address] [place] [address]  Phone: [phone]  Fax: [phone]          Patient: Francoise Dykes   MR Number: [id number]   YOB: 1960   Date of Visit: 8/24/2018       Dear Dr. AMBREEN Long III:    Thank you for referring Francoise Dykes to me for evaluation. Attached you will find relevant portions of my assessment and plan of care.    If you have questions, please do not hesitate to call me. I look forward to following Francoise Dykes along with you.    Sincerely,    Kae Duran NP    Enclosure  CC:  No Recipients    If you would like to receive this communication electronically, please contact [email or web address] or [phone] to request more information on viv[employer] Link access.    For providers and/or their staff who would like to refer a patient to [facility], please contact us through our one-stop-shop provider referral line, [facility], at [phone].    If you feel you have received this communication in error or would no longer like to receive these types of communications, please e-mail [email or web address]

## 2018-08-24 NOTE — PATIENT INSTRUCTIONS
"  How Acid Reflux Affects Your Throat    Do you have to clear your throat or cough often? Are you hoarse? Do you have trouble swallowing? If you have these or other throat symptoms, you may have acid reflux. This occurs when stomach acid flows back up and irritates your throat.  Why you have throat symptoms  There are muscles (esophageal sphincters) at both ends of the tube that carries food to your stomach (the esophagus). These muscles relax to let food pass. Then they tighten to keep stomach acid down. When the lower esophageal sphincter (LES) doesnt tighten enough, acid can flow back (reflux) from your stomach into your esophagus. This may cause heartburn. In some cases the upper esophageal sphincter (UES) also doesnt work well. Then acid can travel higher and enter your throat (pharynx). In many cases, this causes throat symptoms.  Common throat symptoms  · Need to clear your throat often  · Feeling like youre choking  · Long-term (chronic) cough  · Hoarseness  · Trouble swallowing  · Feel like you have a lump in your throat  · Sour or acid taste  · Sore throat that keeps coming back     LARYNGOPHARYNGEAL REFLUX  (SILENT OR ATYPICAL REFLUX)    If you have any of the following symptoms you may have laryngopharyngeal reflux (LPR):  hoarseness, thick or too much mucus, chronic throat pain/irritation, chronic throat clearing, chronic cough, especially cough that wake you up from sleep, chronic "postnasal drip" without the need to blow your nose.     Many people with LPR do not have symptoms of heartburn. Compared to the esophagus, the voice box and the back of the throat are significantly more sensitive to the effects of acid on surrounding tissue. Acid passing quickly through the esophagus does not have a chance to irritate the area for too long.  However acid that pools in the throat or voice box can cause prolonged irritation resulting in the symptoms of LPR.    The symptoms of LPR can consist of a dry cough " "and the sensation of something being stuck in the throat.  Some people will complain of heartburn while others may have intermittent hoarseness or loss of voice.  Another major symptom of LPR is "postnasal drip."  Patients are often told symptoms are due to abnormal nasal drainage or sinus infection; however this is rarely the cause of chronic throat irritation. For post nasal drip to cause the complaints described, signs and symptoms of an active nasal infection should be present.     Treatments for LPR include:  postural changes, weight reduction, diet modification, medication to reduce stomach acid and promote normal motility, and surgery to prevent reflux. Most patients will begin to notice some relief in her symptoms about 2 weeks after starting the medication; however it is generally recommended the medication should be continued for 2 months. If the symptoms completely resolve, the medication can then be tapered.  Some people will remain symptom free while others may have relapses which required treatment again.    Things you can do to prevent reflux include:  Do not smoke.  Smoking will cause reflux.  Avoid tight fitting clothes or belts around the waist.  Avoid eating at least 2 hours prior to bedtime.  In fact avoid eating your largest meal at night.  Weight loss.  For patient's with recent weight gain, shedding a few pounds is all that is required to improve reflux.  Avoid caffeine, cola beverages, citrus beverages, mints, alcoholic beverages, particularly at night, cheese, fried foods, spicy foods, eggs, and chocolate.  Sleep with the head of bed elevated at least 6 inches.    Recommendations:    Take Nexium or Prilosec (PPI) every morning on an empty stomach (30-60 minutes before eating) 40 MG.   At bedtime take Zantac (H2-blocker) 150-300 mg.    After 4-8 weeks, with significant symptomatic improvement, you may begin weaning your reflux medications down:  Nexium or Prilosec 40 mg --> to 20 mg " (over-the-counter strength)  Zantac 300 mg --> to 150 mg (over-the-counter stength)  Wean as tolerated.   See a Gastro doctor (GI) for refractory symptoms and continued management.

## 2018-08-24 NOTE — PROGRESS NOTES
Subjective:       Patient ID: Francoise Dykes is a 57 y.o. female.    Chief Complaint: Cough (Eval LPR ref by Dr. Long)    HPI   Patient is referred by Dr. Long for consultation for suspected LPRD. Dr. Long' note from four days ago was reviewed in detail. Patient's chief concern is chronic cough (dry), chronic throat clearing, chronic sensation of phlegm in back of throat. Most recent CXR was negative three months ago. She feels like there is mucus or something in the back of her throat when she takes a deep inhale, and this will trigger cough. 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 She feels fine; does not feel ill, but wishes to have the coughing cease. Patient denies significant allergic stigmata:  No itchy, red, watery eyes; no itchy, red, watery nose; no excessive sneezing or stuffiness.   Patient denies s/s of acute bacterial sinusitis:  No mucopus from nose or throat, no facial swelling/pain, no dental pain, no diminished olfaction/taste, no headaches around the eyes, no sore throat or productive cough.   Immunocap was negative (cannot locate IgE result). No EGD.     Review of Systems   Constitutional: Negative.    HENT: Negative.         Sensation of thick or too much mucus in the back of the throat  Frequent throat clearing   Eyes: Negative.    Respiratory: Negative.    Cardiovascular: Negative.    Gastrointestinal: Negative.    Musculoskeletal: Negative.    Skin: Negative.    Neurological: Negative.    Hematological: Negative.    Psychiatric/Behavioral: Negative.        Objective:      Physical Exam   Constitutional: She is oriented to person, place, and time. Vital signs are normal. She appears well-developed and well-nourished. She is cooperative. She does not appear ill. No distress.   HENT:   Head: Normocephalic and atraumatic.   Right Ear: Hearing, tympanic membrane, external ear and ear canal normal. Tympanic membrane is not  erythematous. No middle ear effusion.   Left Ear: Hearing, tympanic membrane, external ear and ear canal normal. Tympanic membrane is not erythematous.  No middle ear effusion.   Nose: Nose normal. No mucosal edema or rhinorrhea. Right sinus exhibits no maxillary sinus tenderness and no frontal sinus tenderness. Left sinus exhibits no maxillary sinus tenderness and no frontal sinus tenderness.   Mouth/Throat: Uvula is midline, oropharynx is clear and moist and mucous membranes are normal. Mucous membranes are not pale, not dry and not cyanotic. No oral lesions. No oropharyngeal exudate, posterior oropharyngeal edema or posterior oropharyngeal erythema.   Eyes: Conjunctivae, EOM and lids are normal. Pupils are equal, round, and reactive to light. Right eye exhibits no discharge. Left eye exhibits no discharge. No scleral icterus.   Neck: Trachea normal and normal range of motion. Neck supple. No tracheal deviation present. No thyroid mass and no thyromegaly present.   Cardiovascular: Normal rate.   Pulmonary/Chest: Effort normal. No stridor. No respiratory distress. She has no wheezes.   Musculoskeletal: Normal range of motion.   Lymphadenopathy:        Head (right side): No submental, no submandibular, no tonsillar, no preauricular and no posterior auricular adenopathy present.        Head (left side): No submental, no submandibular, no tonsillar, no preauricular and no posterior auricular adenopathy present.     She has no cervical adenopathy.        Right cervical: No superficial cervical and no posterior cervical adenopathy present.       Left cervical: No superficial cervical and no posterior cervical adenopathy present.   Neurological: She is alert and oriented to person, place, and time. She has normal strength. Coordination and gait normal.   Skin: Skin is warm, dry and intact. No lesion and no rash noted. She is not diaphoretic. No cyanosis. No pallor.   Psychiatric: She has a normal mood and affect. Her  speech is normal and behavior is normal. Judgment and thought content normal. Cognition and memory are normal.   Nursing note and vitals reviewed.      Procedure: Flexible laryngoscopy    In order to fully examine the upper aerodigestive tract, including the larynx, in a patient with a hyperactive gag reflex, and suboptimal visualization with indirect mirror exam,  flexible endoscopy is required.   After explaining the procedure and obtaining verbal consent, a timeout was performed with the patient's participation according to the universal protocol. Both nasal cavities were anesthetized with 4% Xylocaine spray mixed with Parth-Synephrine. The flexible laryngoscope  was inserted into the nasal cavity and advanced to visualize the nasal cavity, nasopharynx, the posterior oropharynx, hypopharynx, and the endolarynx with the  findings noted. The scope was removed and the procedure terminated. The patient tolerated this procedure well without apparent complication.     OVERALL FINDINGS  Nasopharynx - the torus is clear. There are no lesions of the posterior wall.   Oropharynx - no lesions of the tongue base. There is no obvious fullness or asymmetry.  Hypopharynx - there are no lesions of the pyriform sinuses or postcricoid region   Larynx - there are no lesions of the supraglottic or glottic larynx.  Vocal fold mobility is normal.     SPECIFIC FINDINGS  Adenoid tissue - normal   Nasopharynx & eustachian tube orifices - normal   Posterior pharyngeal wall - normal   Base of tongue - normal   Epiglottis - normal   Valleculae - normal   Pyriform sinuses - normal   False vocal cords - normal   True vocal cords - normal  Arytenoids - marked edema   Interarytenoid space - marked edema  Right Base of Tongue    Left Base of Tongue    Larynx    Larynx (marked reflux changes)    Assessment:     LPRD, manifested as chronic cough, chronic throat clearing, globus sensation, dysphonia      Plan:     Advised/Cautioned: The results of  today's ENT exam and flexible endoscopy were detailed to the patient and her questions were answered. Patient education centered around GERD, known exacerbants and contemporary treatment options. Laryngoscope photos were given to the patient. Handouts given on LPRD and GERD were given to the patient. After review of these, patient elected to be placed on PPI 40 mg QAM on an empty stomach for the next 6-8 weeks, and H2-blocker QHS. I encouraged the patient once she has completed the evening meal to not snack or consume any other food products or caffeinated beverages for at least  minutes before retiring. Finally, I encouraged the patient to sleep about 30 degrees above horizontal, and this can be facilitated by using 2-3 pillows or a wedge foam product. If the patient is not demonstrably improved in 6-8 weeks, consultation with gastroenterology may be indicated to rule out intrinsic disease in the lower esophagus, stomach, or proximal duodenum.     Discussed typical constellation of symptoms seen with acute allergic rhinitis exacerbation, acute bacterial sinusitis, acute bacterial pharyngitis/tonsillitis, and LPRD/GERD.  Discussed common differentials for chronic cough may include but not limited to: allergic rhinitis (see allergist or take daily allergy meds), silent reflux (see GI or take daily reflux meds), sinusitis (imaging), pulmonary issues (see pulmonologist), ACEi. Discussed her lack of sinus symptoms does not warrant sinus imaging at this time.

## 2018-08-26 DIAGNOSIS — I48.0 PAROXYSMAL ATRIAL FIBRILLATION: ICD-10-CM

## 2018-08-26 RX ORDER — DILTIAZEM HYDROCHLORIDE 120 MG/1
120 CAPSULE, EXTENDED RELEASE ORAL DAILY
Qty: 90 CAPSULE | Refills: 3 | Status: SHIPPED | OUTPATIENT
Start: 2018-08-26 | End: 2018-08-30 | Stop reason: SDUPTHER

## 2018-08-26 RX ORDER — PROPAFENONE HYDROCHLORIDE 325 MG/1
325 CAPSULE, EXTENDED RELEASE ORAL EVERY 12 HOURS
Qty: 60 CAPSULE | Refills: 11 | Status: SHIPPED | OUTPATIENT
Start: 2018-08-26 | End: 2018-08-30 | Stop reason: SDUPTHER

## 2018-08-29 DIAGNOSIS — I10 HYPERTENSION, UNCONTROLLED: ICD-10-CM

## 2018-08-29 RX ORDER — HYDROCHLOROTHIAZIDE 12.5 MG/1
12.5 TABLET ORAL DAILY
Qty: 90 TABLET | Refills: 2 | Status: SHIPPED | OUTPATIENT
Start: 2018-08-29 | End: 2019-05-23 | Stop reason: SDUPTHER

## 2018-08-29 NOTE — TELEPHONE ENCOUNTER
----- Message from Sheri Guevara sent at 8/29/2018  9:23 AM CDT -----  Contact: April with [email or web address]/ [phone]   April calling to follow up on refill request for [hydroCHLOROthiazide (HYDRODIURIL) 12.5 MG Tab]. Please call with status. Please send to [email or web address] mail order [facility]. Thank you.

## 2018-08-30 DIAGNOSIS — I48.0 PAROXYSMAL ATRIAL FIBRILLATION: ICD-10-CM

## 2018-08-31 RX ORDER — PROPAFENONE HYDROCHLORIDE 325 MG/1
325 CAPSULE, EXTENDED RELEASE ORAL EVERY 12 HOURS
Qty: 60 CAPSULE | Refills: 11 | Status: SHIPPED | OUTPATIENT
Start: 2018-08-31 | End: 2019-07-10 | Stop reason: SDUPTHER

## 2018-08-31 RX ORDER — DILTIAZEM HYDROCHLORIDE 120 MG/1
120 CAPSULE, EXTENDED RELEASE ORAL DAILY
Qty: 90 CAPSULE | Refills: 3 | Status: SHIPPED | OUTPATIENT
Start: 2018-08-31 | End: 2019-03-25 | Stop reason: SDUPTHER

## 2018-09-24 ENCOUNTER — OFFICE VISIT (OUTPATIENT)
Dept: FAMILY MEDICINE | Facility: CLINIC | Age: 58
End: 2018-09-24
Payer: COMMERCIAL

## 2018-09-24 VITALS
WEIGHT: 212.94 LBS | HEART RATE: 79 BPM | SYSTOLIC BLOOD PRESSURE: 124 MMHG | OXYGEN SATURATION: 96 % | HEIGHT: 64 IN | TEMPERATURE: 99 F | DIASTOLIC BLOOD PRESSURE: 82 MMHG | RESPIRATION RATE: 16 BRPM | BODY MASS INDEX: 36.35 KG/M2

## 2018-09-24 DIAGNOSIS — I48.92 ATRIAL FLUTTER WITH RAPID VENTRICULAR RESPONSE: ICD-10-CM

## 2018-09-24 DIAGNOSIS — R35.0 URINE FREQUENCY: Primary | ICD-10-CM

## 2018-09-24 DIAGNOSIS — E66.01 SEVERE OBESITY (BMI 35.0-35.9 WITH COMORBIDITY): ICD-10-CM

## 2018-09-24 PROCEDURE — 3074F SYST BP LT 130 MM HG: CPT | Mod: CPTII,S$GLB,, | Performed by: FAMILY MEDICINE

## 2018-09-24 PROCEDURE — 87088 URINE BACTERIA CULTURE: CPT

## 2018-09-24 PROCEDURE — 87186 SC STD MICRODIL/AGAR DIL: CPT

## 2018-09-24 PROCEDURE — 3008F BODY MASS INDEX DOCD: CPT | Mod: CPTII,S$GLB,, | Performed by: FAMILY MEDICINE

## 2018-09-24 PROCEDURE — 3079F DIAST BP 80-89 MM HG: CPT | Mod: CPTII,S$GLB,, | Performed by: FAMILY MEDICINE

## 2018-09-24 PROCEDURE — 99999 PR PBB SHADOW E&M-EST. PATIENT-LVL V: CPT | Mod: PBBFAC,,, | Performed by: FAMILY MEDICINE

## 2018-09-24 PROCEDURE — 87077 CULTURE AEROBIC IDENTIFY: CPT

## 2018-09-24 PROCEDURE — 99214 OFFICE O/P EST MOD 30 MIN: CPT | Mod: S$GLB,,, | Performed by: FAMILY MEDICINE

## 2018-09-24 PROCEDURE — 87086 URINE CULTURE/COLONY COUNT: CPT

## 2018-09-24 RX ORDER — SULFAMETHOXAZOLE AND TRIMETHOPRIM 800; 160 MG/1; MG/1
1 TABLET ORAL 2 TIMES DAILY
Qty: 6 TABLET | Refills: 0 | Status: SHIPPED | OUTPATIENT
Start: 2018-09-24 | End: 2018-09-27

## 2018-09-24 NOTE — PROGRESS NOTES
Subjective:       Patient ID: Francoise Dykes is a 58 y.o. female.    Chief Complaint: Abdominal Pain and Urinary Frequency    HPI    Dysuria with lower abd pain - int sxs a/w urinary frequency. No f/c/n/v.   + azo.     M Obesity - She is stressed about her inability to lose weight.     htn - Pts blood pressure looks good today. She has it checked at work and it is very well controlled. Never 140/90. She is adherent with her medications    Current Outpatient Medications on File Prior to Visit   Medication Sig Dispense Refill    apixaban 5 mg Tab Take 1 tablet (5 mg total) by mouth 2 (two) times daily. 180 tablet 3    azelastine (ASTELIN) 137 mcg (0.1 %) nasal spray 1 spray (137 mcg total) by Nasal route 2 (two) times daily. 30 mL 3    cetirizine (ZYRTEC) 10 MG tablet Take 10 mg by mouth once daily.      diltiaZEM (DILACOR XR) 120 MG CDCR Take 1 capsule (120 mg total) by mouth once daily. 90 capsule 3    hydroCHLOROthiazide (HYDRODIURIL) 12.5 MG Tab Take 1 tablet (12.5 mg total) by mouth once daily. 90 tablet 2    ibuprofen (ADVIL,MOTRIN) 800 MG tablet Take 1 tablet (800 mg total) by mouth 3 (three) times daily with meals. 270 tablet 0    omeprazole (PRILOSEC) 40 MG capsule Take 1 capsule (40 mg total) by mouth before breakfast. Take on empty stomach 30-60 minutes before meal 30 capsule 11    propafenone (RYTHMOL SR) 325 MG Cp12 Take 1 capsule (325 mg total) by mouth every 12 (twelve) hours. 60 capsule 11    triamcinolone acetonide 0.1% (KENALOG) 0.1 % cream Apply topically 2 (two) times daily. 45 g 1    albuterol 90 mcg/actuation inhaler Inhale 2 puffs into the lungs every 4 (four) hours as needed for Wheezing. 1 Inhaler 3    fluticasone (FLONASE) 50 mcg/actuation nasal spray 1 spray (50 mcg total) by Each Nare route 2 (two) times daily. 1 Bottle 3    hydrocortisone 0.5 % cream Apply topically daily as needed.      predniSONE (DELTASONE) 10 MG tablet Day 1 60 mg Day 2 50 mg Day 3 40 mg Day 4 30 mg Day 5  20 mg Day 6 10 mg 21 tablet 0    ranitidine (ZANTAC) 300 MG tablet Take 1 tablet (300 mg total) by mouth every evening. 30 tablet 11     No current facility-administered medications on file prior to visit.        Past Medical History:   Diagnosis Date    Acid reflux     Allergy     seasonal    Atrial fibrillation     Essential hypertension 11/20/2017    Pt states that she does not have HTN.        Family History   Problem Relation Age of Onset    Hypertension Mother     Diabetes Mother     Glaucoma Mother     Allergies Mother     Asbestos Father     Obesity Father     Eczema Son     Hypertension Son     Heart disease Maternal Grandmother         chf    Diabetes Maternal Grandfather     Cancer Paternal Grandmother         lung, 2/2 second hand smoke    Glaucoma Paternal Grandfather     Blindness Paternal Grandfather     Melanoma Neg Hx     Psoriasis Neg Hx     Lupus Neg Hx     Acne Neg Hx     Breast cancer Neg Hx     Colon cancer Neg Hx     Ovarian cancer Neg Hx         reports that  has never smoked. she has never used smokeless tobacco. She reports that she drinks alcohol. She reports that she does not use drugs.    Review of Systems  see hpi  Objective:     Vitals:    09/24/18 1256   BP: 124/82   Pulse: 79   Resp: 16   Temp: 98.6 °F (37 °C)        Physical Exam   Constitutional: She appears well-developed. No distress.   HENT:   Head: Normocephalic and atraumatic.   Eyes: Conjunctivae are normal. No scleral icterus.   Pulmonary/Chest: Effort normal.   Neurological: She is alert.   Skin: She is not diaphoretic.   Psychiatric: She has a normal mood and affect. Her behavior is normal.   Vitals reviewed.      Assessment:       1. Urine frequency    2. Atrial flutter with rapid ventricular response    3. Severe obesity (BMI 35.0-35.9 with comorbidity)        Plan:       Francoise was seen today for abdominal pain and urinary frequency.    Diagnoses and all orders for this visit:    Urine  frequency  -     Urine culture  -     sulfamethoxazole-trimethoprim 800-160mg (BACTRIM DS) 800-160 mg Tab; Take 1 tablet by mouth 2 (two) times daily. for 3 days  New dx - pt educated on disease etiology, prognosis and treatment. Answered pts questions.    Atrial flutter with rapid ventricular response     Chronic - stable - continue current medications    MO -  Chronic - pt will try to work on lifestyle changes        Follow-up in about 3 months (around 12/24/2018) for Hypertension, weight, .        Pt verbalized understanding and agreed with our plan.

## 2018-09-27 LAB — BACTERIA UR CULT: NORMAL

## 2018-11-19 ENCOUNTER — OFFICE VISIT (OUTPATIENT)
Dept: ALLERGY | Facility: CLINIC | Age: 58
End: 2018-11-19
Payer: COMMERCIAL

## 2018-11-19 VITALS
HEIGHT: 65 IN | OXYGEN SATURATION: 96 % | WEIGHT: 211.88 LBS | BODY MASS INDEX: 35.3 KG/M2 | HEART RATE: 73 BPM | DIASTOLIC BLOOD PRESSURE: 70 MMHG | SYSTOLIC BLOOD PRESSURE: 124 MMHG

## 2018-11-19 DIAGNOSIS — I10 ESSENTIAL HYPERTENSION: ICD-10-CM

## 2018-11-19 DIAGNOSIS — J31.0 RHINITIS, UNSPECIFIED TYPE: Primary | ICD-10-CM

## 2018-11-19 DIAGNOSIS — I48.92 ATRIAL FLUTTER WITH RAPID VENTRICULAR RESPONSE: ICD-10-CM

## 2018-11-19 DIAGNOSIS — K21.9 LARYNGOPHARYNGEAL REFLUX: ICD-10-CM

## 2018-11-19 DIAGNOSIS — R09.89 CHRONIC THROAT CLEARING: ICD-10-CM

## 2018-11-19 DIAGNOSIS — R05.9 COUGH: ICD-10-CM

## 2018-11-19 PROCEDURE — 3074F SYST BP LT 130 MM HG: CPT | Mod: CPTII,S$GLB,, | Performed by: ALLERGY & IMMUNOLOGY

## 2018-11-19 PROCEDURE — 99214 OFFICE O/P EST MOD 30 MIN: CPT | Mod: S$GLB,,, | Performed by: ALLERGY & IMMUNOLOGY

## 2018-11-19 PROCEDURE — 99999 PR PBB SHADOW E&M-EST. PATIENT-LVL III: CPT | Mod: PBBFAC,,, | Performed by: ALLERGY & IMMUNOLOGY

## 2018-11-19 PROCEDURE — 3078F DIAST BP <80 MM HG: CPT | Mod: CPTII,S$GLB,, | Performed by: ALLERGY & IMMUNOLOGY

## 2018-11-19 PROCEDURE — 3008F BODY MASS INDEX DOCD: CPT | Mod: CPTII,S$GLB,, | Performed by: ALLERGY & IMMUNOLOGY

## 2018-11-19 RX ORDER — OMEPRAZOLE 40 MG/1
40 CAPSULE, DELAYED RELEASE ORAL
Qty: 60 CAPSULE | Refills: 3 | Status: SHIPPED | OUTPATIENT
Start: 2018-11-19 | End: 2019-10-24 | Stop reason: SDUPTHER

## 2018-11-19 NOTE — PROGRESS NOTES
Francoise Dykes returns to clinic today for continued evaluation of chronic rhinitis, conjunctivitis, LPR, and cough.  She was last seen August 24, 2018.  She is here alone.    After she was started on omeprazole and ranitidine by JUAN PABLO Duran her symptoms improved.  She became almost completely asymptomatic.  Her cough resolved.  Her throat clearing improved.  Her postnasal drip was less.    She then began to stop the Ranitidine in the afternoon.    Over several weeks her symptoms again increased with postnasal drip, throat clearing, sensation that something was in the back of the throat, and cough that is nonproductive.    She has also started having some mild heartburn.    She is taking Rythmol SR for an arrhythmia.    [facility] ALLERGY QUESTIONNAIRE SHORT 11/19/2018   Are you taking any new medications since your last visit? No   Constitution: No changes since my last visit with this provider   Head or facial pain: No changes since my last visit with this provider   Eyes: No symptoms   Ears: Itching   Nose: Post nasal drip   Throat: Frequent clearing, Reflux/ heartburn   Sinuses: No symptoms   Lungs: Cough   Skin: Itching, Rash   Cardiovascular: High blood pressue   Gastrointestinal: No symptoms   Genital/ urinary No symptoms   Musculoskeletal: Back pain   Neurologic: No symptoms   Endocrine: No symptoms   Hematologic: No symptoms     Physical Examination:  General: Well-developed, well-nourished, no acute distress.  Clearing throat throughout interview.  Head: No sinus tenderness.  Eyes: Conjunctivae:  No bulbar or palpebral conjunctival injection.  Ears: EAC's clear.  TM's clear.  No pre-auricular nodes.  Nose: Nasal Mucosa:  Pink.  Septum: No apparent deviation.  Turbinates:  No significant edema.  Polyps/Mass:  None visible.  Teeth/Gums:  No bleeding noted.  Oropharynx: No exudates.  Neck: Supple without thyromegaly. No cervical lymphadenopathy.    Respiratory/Chest: Effort: Good.  Auscultation:  Mild  bilateral wheezing.  Skin: Good turgor.  No urticaria or angioedema.  Hyper pigmented lesions on both lower extremities, right greater than left.  Neuro/Psych: Oriented x 3.    Spirometry 08/20/2018:  Normal.    Laboratory 08/20/2018:  ImmunoCAP:  Negative.    Assessment:  1.  Chronic rhinitis, consider allergic.  2.  Chronic conjunctivitis, consider allergic.  3.  Chronic cough, probably secondary to GERD.  4.  GERD.  5.  LPR.  6.  Dermatitis of uncertain etiology.  7.  Atrial fibrillation on Eliquis and Rythmol.  8.  Nasal bleeding on topical nasal steroids in the past.    Recommendations:  1.  Discontinue ranitidine.  2.  Increase omeprazole 40 mg twice a day.  3.  Follow symptoms on above.  4.  Albuterol as needed.  5.  Zyrtec as needed.  6.  Return to clinic in two months or sooner if needed.  7.  Consider skin testing off antihistamines and Rythmol if needed.

## 2019-02-08 ENCOUNTER — TELEPHONE (OUTPATIENT)
Dept: PAIN MEDICINE | Facility: CLINIC | Age: 59
End: 2019-02-08

## 2019-02-08 NOTE — TELEPHONE ENCOUNTER
Staff contacted the patient to confirm her follow up appointment on 2/11/19 for 2 pm with Dr. Jang. Patient can give us a call at [phone] if the patient needed to cancel or reschedule.     Patient did not answer home/mobile number therefore staff left a detailed voice message informing the patient of the above.

## 2019-02-11 ENCOUNTER — OFFICE VISIT (OUTPATIENT)
Dept: SPINE | Facility: CLINIC | Age: 59
End: 2019-02-11
Attending: ANESTHESIOLOGY
Payer: COMMERCIAL

## 2019-02-11 ENCOUNTER — TELEPHONE (OUTPATIENT)
Dept: FAMILY MEDICINE | Facility: CLINIC | Age: 59
End: 2019-02-11

## 2019-02-11 VITALS
DIASTOLIC BLOOD PRESSURE: 67 MMHG | BODY MASS INDEX: 35.16 KG/M2 | SYSTOLIC BLOOD PRESSURE: 129 MMHG | WEIGHT: 211 LBS | TEMPERATURE: 99 F | HEIGHT: 65 IN | HEART RATE: 74 BPM

## 2019-02-11 DIAGNOSIS — M47.9 OSTEOARTHRITIS OF SPINE, UNSPECIFIED SPINAL OSTEOARTHRITIS COMPLICATION STATUS, UNSPECIFIED SPINAL REGION: Primary | ICD-10-CM

## 2019-02-11 DIAGNOSIS — M53.3 SACROILIAC JOINT PAIN: ICD-10-CM

## 2019-02-11 DIAGNOSIS — M47.819 SPONDYLOSIS WITHOUT MYELOPATHY: ICD-10-CM

## 2019-02-11 DIAGNOSIS — M51.37 DEGENERATION OF LUMBAR OR LUMBOSACRAL INTERVERTEBRAL DISC: ICD-10-CM

## 2019-02-11 PROCEDURE — 99999 PR PBB SHADOW E&M-EST. PATIENT-LVL III: ICD-10-PCS | Mod: PBBFAC,,, | Performed by: ANESTHESIOLOGY

## 2019-02-11 PROCEDURE — 99999 PR PBB SHADOW E&M-EST. PATIENT-LVL III: CPT | Mod: PBBFAC,,, | Performed by: ANESTHESIOLOGY

## 2019-02-11 PROCEDURE — 3074F SYST BP LT 130 MM HG: CPT | Mod: CPTII,S$GLB,, | Performed by: ANESTHESIOLOGY

## 2019-02-11 PROCEDURE — 99214 OFFICE O/P EST MOD 30 MIN: CPT | Mod: S$GLB,,, | Performed by: ANESTHESIOLOGY

## 2019-02-11 PROCEDURE — 3074F PR MOST RECENT SYSTOLIC BLOOD PRESSURE < 130 MM HG: ICD-10-PCS | Mod: CPTII,S$GLB,, | Performed by: ANESTHESIOLOGY

## 2019-02-11 PROCEDURE — 3008F BODY MASS INDEX DOCD: CPT | Mod: CPTII,S$GLB,, | Performed by: ANESTHESIOLOGY

## 2019-02-11 PROCEDURE — 3078F PR MOST RECENT DIASTOLIC BLOOD PRESSURE < 80 MM HG: ICD-10-PCS | Mod: CPTII,S$GLB,, | Performed by: ANESTHESIOLOGY

## 2019-02-11 PROCEDURE — 3008F PR BODY MASS INDEX (BMI) DOCUMENTED: ICD-10-PCS | Mod: CPTII,S$GLB,, | Performed by: ANESTHESIOLOGY

## 2019-02-11 PROCEDURE — 99214 PR OFFICE/OUTPT VISIT, EST, LEVL IV, 30-39 MIN: ICD-10-PCS | Mod: S$GLB,,, | Performed by: ANESTHESIOLOGY

## 2019-02-11 PROCEDURE — 3078F DIAST BP <80 MM HG: CPT | Mod: CPTII,S$GLB,, | Performed by: ANESTHESIOLOGY

## 2019-02-11 NOTE — H&P (VIEW-ONLY)
Chronic patient Established Note (Follow up visit)      SUBJECTIVE:    Francoise Dykes presents to the clinic for a follow-up appointment for left sided back pain . Since the last visit, Francoise Dykes states the pain has been worsening. Current pain intensity is 5/10.    Patient presents for worsening lower back pain since December 2018. After having left L2-5 RFA in February 2018, she felt perfect and was pain free. She did not have right sided RFA done. Starting in December, the back pain came back. She has pain on both sides of lower back, worse on the left. She also occasionally has numbness and shooting pains down right leg more than her left leg. Pain is worse when she has to stand and/or walk. Movement makes the pain worse. She is not currently doing any physical therapy. She is on eliquis for afib which we instructed her to stop 3-5 days prior to RFA. Plan is to repeat RFA starting with the left side.     Pain Disability Index Review:  Last 3 PDI Scores 6/20/2018 1/24/2018 12/7/2017   Pain Disability Index (PDI) 45 51 43       Pain Medications:    - Opioids: n/a  - Adjuvant Medications: Advil,Motrin ( Ibuprofen)  - Anti-Coagulants: Eliquis   - Others: see med list    Opioid Contract: no     report:  Reviewed and consistent with medication use as prescribed.    Pain Procedures:   7/21/2017- Left SI joint injection   11/3/2017- Left SI joint injection  12/19/2017- Bilateral L2,3,4,5 MBB  2/9/2018- left L2,3,4,5 RFA      Physical Therapy/Home Exercise: yes    Imaging:  Xray Lumbar Spine 11/12/2016:  There are 5 lumbar vertebral bodies. There is no evidence for acute fracture, dislocation or destructive process. Vertebral body heights are maintained. There is disc space narrowing at L5-S1. Intervertebral disc space desiccation at L2-L3 is also noted. Facet joints unremarkable. Spinous processes demonstrate normal mildly. The visualized SI joints and sacrum appear unremarkable. There is no translational  abnormality. Surrounding soft tissues appear unremarkable.   Impression       Degenerative changes of the spine as above. Overall appearance is similar to prior.      MRI Lumbar Spine 9/12/2016:  Results: The alignment of the lumbar spine is normal.  The vertebral body heights are well-maintained.  Mild disc desiccation noted at L3-L4 and L4-L5, severe disc space narrowing at L5-S1.  Small benign hemangioma is noted within the L3 and L4 vertebrae.  No evidence of malignant bone marrow replacement process or infection.  The conus medullaris terminates in good position.    T12-L1 and L1-L2 demonstrate a mild diffuse disc bulge, there is no central canal or foraminal stenosis.    L2-L3 demonstrates no disc abnormality, no central canal foraminal stenosis.    L3-L4 demonstrates a mild diffuse disc bulge, mild ligamentum flavum hypertrophy.  Mild narrowing of the central canal, no significant foraminal narrowing.    L4-L5 demonstrates mild diffuse disc bulge, mild ligamentum flavum hypertrophy and mild facet joint degenerative change, no significant central canal stenosis or significant foramina narrowing.    L5-S1 demonstrates a mild diffuse disc bulge, there is no central canal stenosis, there is mild left foraminal narrowing.    The paraspinal soft tissues appear normal.   Impression          Mild to moderate spondylosis of the lumbar spine without severe central or severe foraminal narrowing            Allergies:   Review of patient's allergies indicates:   Allergen Reactions    Adhesive tape-silicones Itching     Manifested in 12/2015 following AF ablation.       Current Medications:   Current Outpatient Medications   Medication Sig Dispense Refill    albuterol 90 mcg/actuation inhaler Inhale 2 puffs into the lungs every 4 (four) hours as needed for Wheezing. 1 Inhaler 3    apixaban 5 mg Tab Take 1 tablet (5 mg total) by mouth 2 (two) times daily. 180 tablet 3    azelastine (ASTELIN) 137 mcg (0.1 %) nasal spray  1 spray (137 mcg total) by Nasal route 2 (two) times daily. 30 mL 3    cetirizine (ZYRTEC) 10 MG tablet Take 10 mg by mouth once daily.      diltiaZEM (DILACOR XR) 120 MG CDCR Take 1 capsule (120 mg total) by mouth once daily. 90 capsule 3    fluticasone (FLONASE) 50 mcg/actuation nasal spray 1 spray (50 mcg total) by Each Nare route 2 (two) times daily. 1 Bottle 3    hydroCHLOROthiazide (HYDRODIURIL) 12.5 MG Tab Take 1 tablet (12.5 mg total) by mouth once daily. 90 tablet 2    hydrocortisone 0.5 % cream Apply topically daily as needed.      ibuprofen (ADVIL,MOTRIN) 800 MG tablet Take 1 tablet (800 mg total) by mouth 3 (three) times daily with meals. 270 tablet 0    omeprazole (PRILOSEC) 40 MG capsule Take 1 capsule (40 mg total) by mouth before breakfast. Take on empty stomach 30-60 minutes before meal 30 capsule 11    omeprazole (PRILOSEC) 40 MG capsule Take 1 capsule (40 mg total) by mouth 2 (two) times daily before meals. 60 capsule 3    predniSONE (DELTASONE) 10 MG tablet Day 1 60 mg Day 2 50 mg Day 3 40 mg Day 4 30 mg Day 5 20 mg Day 6 10 mg 21 tablet 0    propafenone (RYTHMOL SR) 325 MG Cp12 Take 1 capsule (325 mg total) by mouth every 12 (twelve) hours. 60 capsule 11    ranitidine (ZANTAC) 300 MG tablet Take 1 tablet (300 mg total) by mouth every evening. 30 tablet 11    triamcinolone acetonide 0.1% (KENALOG) 0.1 % cream Apply topically 2 (two) times daily. 45 g 1     No current facility-administered medications for this visit.        REVIEW OF SYSTEMS:    GENERAL:  No weight loss, malaise or fevers.  HEENT:  Negative for frequent or significant headaches.  NECK:  Negative for lumps, goiter, pain and significant neck swelling.  RESPIRATORY:  Negative for cough, wheezing or shortness of breath.  CARDIOVASCULAR:  Negative for chest pain, leg swelling or palpitations.  GI:  Negative for abdominal discomfort, blood in stools or black stools or change in bowel habits.  MUSCULOSKELETAL:  See  HPI.  SKIN:  Negative for lesions, rash, and itching.  PSYCH:  + for sleep disturbance, mood disorder and recent psychosocial stressors.  HEMATOLOGY/LYMPHOLOGY:  Negative for prolonged bleeding, bruising easily or swollen nodes.  NEURO:   No history of headaches, syncope, paralysis, seizures or tremors.  All other reviewed and negative other than HPI.    Past Medical History:  Past Medical History:   Diagnosis Date    Acid reflux     Allergy     seasonal    Atrial fibrillation     Essential hypertension 2017    Pt states that she does not have HTN.        Past Surgical History:  Past Surgical History:   Procedure Laterality Date    ABLATION N/A 12/15/2016    [facility] CATH LAB    ABLATION N/A 2015    [facility] CATH LAB    BLOCK-NERVE-MEDIAL BRANCH-LUMBAR Bilateral 2017    [facility]    CARDIAC ELECTROPHYSIOLOGY STUDY AND ABLATION      CARDIOVERSION N/A 3/20/2017    Performed by Ambrocio Carey [facility] at [facility] CATH LAB    CARDIOVERSION N/A 2016    Performed by [facility]ers [facility] at [facility] CATH LAB     SECTION      COLONOSCOPY N/A 2018    [facility]    COLONOSCOPY N/A 2014    Performed by Jay Young [facility] at [facility] ENDO (4TH FLR)    HYSTERECTOMY  2000    MIGUEL    INJECTION-JOINT Left 11/3/2017    [facility]    INJECTION-JOINT Left 2017    [facility]    RADIOFREQUENCY THERMOCOAGULATION (RFTC)-NERVE-MEDIAN BRANCH-LUMBAR Left 2018    [facility]    TRANSESOPHAGEAL ECHOCARDIOGRAM (SAMMIE) N/A 2017    Performed [facility] at [facility] CATH LAB    TRANSESOPHAGEAL ECHOCARDIOGRAM (SAMMIE) N/A 12/15/2016    Per[facility] CATH LAB    TRANSESOPHAGEAL ECHOCARDIOGRAM (SAMMIE) N/A 2016  "   [facility] CATH LAB    TRANSESOPHAGEAL ECHOCARDIOGRAM (SAMMIE) N/A 8/8/2016    Per[facility] by Sonal Lee [facility] at [facility] CATH LAB       Family History:  Family History   Problem Relation Age of Onset    Hypertension Mother     Diabetes Mother     Glaucoma Mother     Allergies Mother     Asbestos Father     Obesity Father     Eczema Son     Hypertension Son     Heart disease Maternal Grandmother         chf    Diabetes Maternal Grandfather     Cancer Paternal Grandmother         lung, 2/2 second hand smoke    Glaucoma Paternal Grandfather     Blindness Paternal Grandfather     Melanoma Neg Hx     Psoriasis Neg Hx     Lupus Neg Hx     Acne Neg Hx     Breast cancer Neg Hx     Colon cancer Neg Hx     Ovarian cancer Neg Hx        Social History:  Social History     Socioeconomic History    Marital status:      Spouse name: Not on file    Number of children: Not on file    Years of education: Not on file    Highest education level: Not on file   Social Needs    Financial resource strain: Not on file    Food insecurity - worry: Not on file    Food insecurity - inability: Not on file    Transportation needs - medical: Not on file    Transportation needs - non-medical: Not on file   Occupational History    Occupation: Teacher     Employer: [employer]    Tobacco Use    Smoking status: Never Smoker    Smokeless tobacco: Never Used   Substance and Sexual Activity    Alcohol use: Yes     Comment: rarely, 1 drink/week    Drug use: No    Sexual activity: Yes     Partners: Male   Other Topics Concern    Are you pregnant or think you may be? No    Breast-feeding No   Social History Narrative    Recently moved back to [facility] to help take care of mom.       OBJECTIVE:    /67   Pulse 74   Temp 98.5 °F (36.9 °C) (Oral)   Ht 5' 4.5" (1.638 m)   Wt 95.7 kg (211 lb)   LMP  (LMP Unknown)   BMI 35.66 kg/m²     PHYSICAL EXAMINATION:    General appearance: " Well appearing, in no acute distress, alert and oriented x3.  Psych:  Mood and affect appropriate.  Skin: Skin color, texture, turgor normal, no rashes or lesions, in both upper and lower body.  Head/face:  Atraumatic, normocephalic. No palpable lymph nodes  Neck: No pain to palpation over the cervical paraspinous muscles. Spurling Negative. No pain with neck flexion, extension, or lateral flexion. .  Cor: RRR  Pulm: CTA  GI: Abdomen soft and non-tender.  Back: Straight leg raising in the sitting and supine positions is negative to radicular pain. Moderate pain to palpation over the spine or costovertebral angles. Normal range of motion without pain reproduction. Positive axial loading test bilateral.  Positive FABERE,Ganselin and Yeoman's test on the both side.negative FADIR  Extremities: Peripheral joint ROM is full and pain free without obvious instability or laxity in all four extremities. No deformities, edema, or skin discoloration. Good capillary refill.  Musculoskeletal: Shoulder, hip and knee provocative maneuvers are negative. Bilateral upper and lower extremity strength is normal and symmetric.  No atrophy or tone abnormalities are noted  Neuro: Bilateral upper and lower extremity coordination and muscle stretch reflexes are physiologic and symmetric.  Plantar response are downgoing. No loss of sensation is noted.  Gait: Normal.    ASSESSMENT: Patient presents for worsening lower back pain since December 2018. After having left L2-5 RFA in February 2018, she felt perfect and was pain free. She did not have right sided RFA done. Starting in December, the back pain came back. She has pain on both sides of lower back, worse on the left. She also occasionally has numbness and shooting pains down right leg more than her left leg. Pain is worse when she has to stand and/or walk. Movement makes the pain worse. She is not currently doing any physical therapy. She is on eliquis for afib which we instructed her she  would need to stop 3-5 days prior to RFA.      1. Osteoarthritis of spine, unspecified spinal osteoarthritis complication status, unspecified spinal region     2. Spondylosis without myelopathy     3. Degeneration of lumbar or lumbosacral intervertebral disc     4. Sacroiliac joint pain           PLAN:     - I have stressed the importance of physical activity and a home exercise plan to help with pain and improve health.  - Patient can continue with medications for now since they are providing benefits, using them appropriately, and without side effects.  - Schedule for a Radiofrequency ablation of the medial branches at L2,3,4, and L5 for longer pain relief; Will schedule the left side followed by the right side 1-2 weeks later  - RTC 4 weeks after second RFA procedure.   - Counseled patient regarding the importance of physical therapy.    The above plan and management options were discussed at length with patient. Patient is in agreement with the above and verbalized understanding.    Corey Ramey MD  [facility]   I have personally reviewed the history and exam of this patient and agree with the resident/fellow/NPs note as stated above.    Mariusz Jang MD

## 2019-02-11 NOTE — PROGRESS NOTES
Chronic patient Established Note (Follow up visit)      SUBJECTIVE:    Francoise Dykes presents to the clinic for a follow-up appointment for left sided back pain . Since the last visit, Francoise Dykes states the pain has been worsening. Current pain intensity is 5/10.    Patient presents for worsening lower back pain since December 2018. After having left L2-5 RFA in February 2018, she felt perfect and was pain free. She did not have right sided RFA done. Starting in December, the back pain came back. She has pain on both sides of lower back, worse on the left. She also occasionally has numbness and shooting pains down right leg more than her left leg. Pain is worse when she has to stand and/or walk. Movement makes the pain worse. She is not currently doing any physical therapy. She is on eliquis for afib which we instructed her to stop 3-5 days prior to RFA. Plan is to repeat RFA starting with the left side.     Pain Disability Index Review:  Last 3 PDI Scores 6/20/2018 1/24/2018 12/7/2017   Pain Disability Index (PDI) 45 51 43       Pain Medications:    - Opioids: n/a  - Adjuvant Medications: Advil,Motrin ( Ibuprofen)  - Anti-Coagulants: Eliquis   - Others: see med list    Opioid Contract: no     report:  Reviewed and consistent with medication use as prescribed.    Pain Procedures:   7/21/2017- Left SI joint injection   11/3/2017- Left SI joint injection  12/19/2017- Bilateral L2,3,4,5 MBB  2/9/2018- left L2,3,4,5 RFA      Physical Therapy/Home Exercise: yes    Imaging:  Xray Lumbar Spine 11/12/2016:  There are 5 lumbar vertebral bodies. There is no evidence for acute fracture, dislocation or destructive process. Vertebral body heights are maintained. There is disc space narrowing at L5-S1. Intervertebral disc space desiccation at L2-L3 is also noted. Facet joints unremarkable. Spinous processes demonstrate normal mildly. The visualized SI joints and sacrum appear unremarkable. There is no translational  abnormality. Surrounding soft tissues appear unremarkable.   Impression       Degenerative changes of the spine as above. Overall appearance is similar to prior.      MRI Lumbar Spine 9/12/2016:  Results: The alignment of the lumbar spine is normal.  The vertebral body heights are well-maintained.  Mild disc desiccation noted at L3-L4 and L4-L5, severe disc space narrowing at L5-S1.  Small benign hemangioma is noted within the L3 and L4 vertebrae.  No evidence of malignant bone marrow replacement process or infection.  The conus medullaris terminates in good position.    T12-L1 and L1-L2 demonstrate a mild diffuse disc bulge, there is no central canal or foraminal stenosis.    L2-L3 demonstrates no disc abnormality, no central canal foraminal stenosis.    L3-L4 demonstrates a mild diffuse disc bulge, mild ligamentum flavum hypertrophy.  Mild narrowing of the central canal, no significant foraminal narrowing.    L4-L5 demonstrates mild diffuse disc bulge, mild ligamentum flavum hypertrophy and mild facet joint degenerative change, no significant central canal stenosis or significant foramina narrowing.    L5-S1 demonstrates a mild diffuse disc bulge, there is no central canal stenosis, there is mild left foraminal narrowing.    The paraspinal soft tissues appear normal.   Impression          Mild to moderate spondylosis of the lumbar spine without severe central or severe foraminal narrowing            Allergies:   Review of patient's allergies indicates:   Allergen Reactions    Adhesive tape-silicones Itching     Manifested in 12/2015 following AF ablation.       Current Medications:   Current Outpatient Medications   Medication Sig Dispense Refill    albuterol 90 mcg/actuation inhaler Inhale 2 puffs into the lungs every 4 (four) hours as needed for Wheezing. 1 Inhaler 3    apixaban 5 mg Tab Take 1 tablet (5 mg total) by mouth 2 (two) times daily. 180 tablet 3    azelastine (ASTELIN) 137 mcg (0.1 %) nasal spray  1 spray (137 mcg total) by Nasal route 2 (two) times daily. 30 mL 3    cetirizine (ZYRTEC) 10 MG tablet Take 10 mg by mouth once daily.      diltiaZEM (DILACOR XR) 120 MG CDCR Take 1 capsule (120 mg total) by mouth once daily. 90 capsule 3    fluticasone (FLONASE) 50 mcg/actuation nasal spray 1 spray (50 mcg total) by Each Nare route 2 (two) times daily. 1 Bottle 3    hydroCHLOROthiazide (HYDRODIURIL) 12.5 MG Tab Take 1 tablet (12.5 mg total) by mouth once daily. 90 tablet 2    hydrocortisone 0.5 % cream Apply topically daily as needed.      ibuprofen (ADVIL,MOTRIN) 800 MG tablet Take 1 tablet (800 mg total) by mouth 3 (three) times daily with meals. 270 tablet 0    omeprazole (PRILOSEC) 40 MG capsule Take 1 capsule (40 mg total) by mouth before breakfast. Take on empty stomach 30-60 minutes before meal 30 capsule 11    omeprazole (PRILOSEC) 40 MG capsule Take 1 capsule (40 mg total) by mouth 2 (two) times daily before meals. 60 capsule 3    predniSONE (DELTASONE) 10 MG tablet Day 1 60 mg Day 2 50 mg Day 3 40 mg Day 4 30 mg Day 5 20 mg Day 6 10 mg 21 tablet 0    propafenone (RYTHMOL SR) 325 MG Cp12 Take 1 capsule (325 mg total) by mouth every 12 (twelve) hours. 60 capsule 11    ranitidine (ZANTAC) 300 MG tablet Take 1 tablet (300 mg total) by mouth every evening. 30 tablet 11    triamcinolone acetonide 0.1% (KENALOG) 0.1 % cream Apply topically 2 (two) times daily. 45 g 1     No current facility-administered medications for this visit.        REVIEW OF SYSTEMS:    GENERAL:  No weight loss, malaise or fevers.  HEENT:  Negative for frequent or significant headaches.  NECK:  Negative for lumps, goiter, pain and significant neck swelling.  RESPIRATORY:  Negative for cough, wheezing or shortness of breath.  CARDIOVASCULAR:  Negative for chest pain, leg swelling or palpitations.  GI:  Negative for abdominal discomfort, blood in stools or black stools or change in bowel habits.  MUSCULOSKELETAL:  See  HPI.  SKIN:  Negative for lesions, rash, and itching.  PSYCH:  + for sleep disturbance, mood disorder and recent psychosocial stressors.  HEMATOLOGY/LYMPHOLOGY:  Negative for prolonged bleeding, bruising easily or swollen nodes.  NEURO:   No history of headaches, syncope, paralysis, seizures or tremors.  All other reviewed and negative other than HPI.    Past Medical History:  Past Medical History:   Diagnosis Date    Acid reflux     Allergy     seasonal    Atrial fibrillation     Essential hypertension 2017    Pt states that she does not have HTN.        Past Surgical History:  Past Surgical History:   Procedure Laterality Date    ABLATION N/A 12/15/2016    [facility] CATH LAB    ABLATION N/A 2015    [facility] at [facility] CATH LAB    BLOCK-NERVE-MEDIAL BRANCH-LUMBAR Bilateral 2017    [facility]    CARDIAC ELECTROPHYSIOLOGY STUDY AND ABLATION      CARDIOVERSION N/A 3/20/2017    Performed by Ambrocio Carey [facility] at [facility] CATH LAB    CARDIOVERSION N/A 2016    [facility] at [facility] CATH LAB     SECTION      COLONOSCOPY N/A 2018    [facility] ENDO    COLONOSCOPY N/A 2014    Performed by Jay Young [facility] at [facility] ENDO (4TH FLR)    HYSTERECTOMY  2000    MIGUEL    INJECTION-JOINT Left 11/3/2017    [facility]    INJECTION-JOINT Left 2017    [facility]    RADIOFREQUENCY THERMOCOAGULATION (RFTC)-NERVE-MEDIAN BRANCH-LUMBAR Left 2018    [facility]    TRANSESOPHAGEAL ECHOCARDIOGRAM (SAMMIE) N/A 2017    Performed by [facility]za [facility] at [facility] CATH LAB    TRANSESOPHAGEAL ECHOCARDIOGRAM (SAMMIE) N/A 12/15/2016    Per[facility] at [facility] CATH LAB    TRANSESOPHAGEAL ECHOCARDIOGRAM (SAMMIE) N/A 2016  "   [facility] CATH LAB    TRANSESOPHAGEAL ECHOCARDIOGRAM (SAMMIE) N/A 8/8/2016    Performed by Sonal Lee [facility] at [facility] CATH LAB       Family History:  Family History   Problem Relation Age of Onset    Hypertension Mother     Diabetes Mother     Glaucoma Mother     Allergies Mother     Asbestos Father     Obesity Father     Eczema Son     Hypertension Son     Heart disease Maternal Grandmother         chf    Diabetes Maternal Grandfather     Cancer Paternal Grandmother         lung, 2/2 second hand smoke    Glaucoma Paternal Grandfather     Blindness Paternal Grandfather     Melanoma Neg Hx     Psoriasis Neg Hx     Lupus Neg Hx     Acne Neg Hx     Breast cancer Neg Hx     Colon cancer Neg Hx     Ovarian cancer Neg Hx        Social History:  Social History     Socioeconomic History    Marital status:      Spouse name: Not on file    Number of children: Not on file    Years of education: Not on file    Highest education level: Not on file   Social Needs    Financial resource strain: Not on file    Food insecurity - worry: Not on file    Food insecurity - inability: Not on file    Transportation needs - medical: Not on file    Transportation needs - non-medical: Not on file   Occupational History    Occupation: Teacher     Employer: [employer]    Tobacco Use    Smoking status: Never Smoker    Smokeless tobacco: Never Used   Substance and Sexual Activity    Alcohol use: Yes     Comment: rarely, 1 drink/week    Drug use: No    Sexual activity: Yes     Partners: Male   Other Topics Concern    Are you pregnant or think you may be? No    Breast-feeding No   Social History Narrative    Recently moved back to [facility] to help take care of mom.       OBJECTIVE:    /67   Pulse 74   Temp 98.5 °F (36.9 °C) (Oral)   Ht 5' 4.5" (1.638 m)   Wt 95.7 kg (211 lb)   LMP  (LMP Unknown)   BMI 35.66 kg/m²     PHYSICAL EXAMINATION:    General appearance: " Well appearing, in no acute distress, alert and oriented x3.  Psych:  Mood and affect appropriate.  Skin: Skin color, texture, turgor normal, no rashes or lesions, in both upper and lower body.  Head/face:  Atraumatic, normocephalic. No palpable lymph nodes  Neck: No pain to palpation over the cervical paraspinous muscles. Spurling Negative. No pain with neck flexion, extension, or lateral flexion. .  Cor: RRR  Pulm: CTA  GI: Abdomen soft and non-tender.  Back: Straight leg raising in the sitting and supine positions is negative to radicular pain. Moderate pain to palpation over the spine or costovertebral angles. Normal range of motion without pain reproduction. Positive axial loading test bilateral.  Positive FABERE,Ganselin and Yeoman's test on the both side.negative FADIR  Extremities: Peripheral joint ROM is full and pain free without obvious instability or laxity in all four extremities. No deformities, edema, or skin discoloration. Good capillary refill.  Musculoskeletal: Shoulder, hip and knee provocative maneuvers are negative. Bilateral upper and lower extremity strength is normal and symmetric.  No atrophy or tone abnormalities are noted  Neuro: Bilateral upper and lower extremity coordination and muscle stretch reflexes are physiologic and symmetric.  Plantar response are downgoing. No loss of sensation is noted.  Gait: Normal.    ASSESSMENT: Patient presents for worsening lower back pain since December 2018. After having left L2-5 RFA in February 2018, she felt perfect and was pain free. She did not have right sided RFA done. Starting in December, the back pain came back. She has pain on both sides of lower back, worse on the left. She also occasionally has numbness and shooting pains down right leg more than her left leg. Pain is worse when she has to stand and/or walk. Movement makes the pain worse. She is not currently doing any physical therapy. She is on eliquis for afib which we instructed her she  would need to stop 3-5 days prior to RFA.      1. Osteoarthritis of spine, unspecified spinal osteoarthritis complication status, unspecified spinal region     2. Spondylosis without myelopathy     3. Degeneration of lumbar or lumbosacral intervertebral disc     4. Sacroiliac joint pain           PLAN:     - I have stressed the importance of physical activity and a home exercise plan to help with pain and improve health.  - Patient can continue with medications for now since they are providing benefits, using them appropriately, and without side effects.  - Schedule for a Radiofrequency ablation of the medial branches at L2,3,4, and L5 for longer pain relief; Will schedule the left side followed by the right side 1-2 weeks later  - RTC 4 weeks after second RFA procedure.   - Counseled patient regarding the importance of physical therapy.    The above plan and management options were discussed at length with patient. Patient is in agreement with the above and verbalized understanding.    Corey Ramey MD  [facility]   I have personally reviewed the history and exam of this patient and agree with the resident/fellow/NPs note as stated above.    Mariusz Jang MD

## 2019-02-11 NOTE — H&P (VIEW-ONLY)
Chronic patient Established Note (Follow up visit)      SUBJECTIVE:    Francoise Dykes presents to the clinic for a follow-up appointment for left sided back pain . Since the last visit, Francoise Dykes states the pain has been worsening. Current pain intensity is 5/10.    Patient presents for worsening lower back pain since December 2018. After having left L2-5 RFA in February 2018, she felt perfect and was pain free. She did not have right sided RFA done. Starting in December, the back pain came back. She has pain on both sides of lower back, worse on the left. She also occasionally has numbness and shooting pains down right leg more than her left leg. Pain is worse when she has to stand and/or walk. Movement makes the pain worse. She is not currently doing any physical therapy. She is on eliquis for afib which we instructed her to stop 3-5 days prior to RFA. Plan is to repeat RFA starting with the left side.     Pain Disability Index Review:  Last 3 PDI Scores 6/20/2018 1/24/2018 12/7/2017   Pain Disability Index (PDI) 45 51 43       Pain Medications:    - Opioids: n/a  - Adjuvant Medications: Advil,Motrin ( Ibuprofen)  - Anti-Coagulants: Eliquis   - Others: see med list    Opioid Contract: no     report:  Reviewed and consistent with medication use as prescribed.    Pain Procedures:   7/21/2017- Left SI joint injection   11/3/2017- Left SI joint injection  12/19/2017- Bilateral L2,3,4,5 MBB  2/9/2018- left L2,3,4,5 RFA      Physical Therapy/Home Exercise: yes    Imaging:  Xray Lumbar Spine 11/12/2016:  There are 5 lumbar vertebral bodies. There is no evidence for acute fracture, dislocation or destructive process. Vertebral body heights are maintained. There is disc space narrowing at L5-S1. Intervertebral disc space desiccation at L2-L3 is also noted. Facet joints unremarkable. Spinous processes demonstrate normal mildly. The visualized SI joints and sacrum appear unremarkable. There is no translational  abnormality. Surrounding soft tissues appear unremarkable.   Impression       Degenerative changes of the spine as above. Overall appearance is similar to prior.      MRI Lumbar Spine 9/12/2016:  Results: The alignment of the lumbar spine is normal.  The vertebral body heights are well-maintained.  Mild disc desiccation noted at L3-L4 and L4-L5, severe disc space narrowing at L5-S1.  Small benign hemangioma is noted within the L3 and L4 vertebrae.  No evidence of malignant bone marrow replacement process or infection.  The conus medullaris terminates in good position.    T12-L1 and L1-L2 demonstrate a mild diffuse disc bulge, there is no central canal or foraminal stenosis.    L2-L3 demonstrates no disc abnormality, no central canal foraminal stenosis.    L3-L4 demonstrates a mild diffuse disc bulge, mild ligamentum flavum hypertrophy.  Mild narrowing of the central canal, no significant foraminal narrowing.    L4-L5 demonstrates mild diffuse disc bulge, mild ligamentum flavum hypertrophy and mild facet joint degenerative change, no significant central canal stenosis or significant foramina narrowing.    L5-S1 demonstrates a mild diffuse disc bulge, there is no central canal stenosis, there is mild left foraminal narrowing.    The paraspinal soft tissues appear normal.   Impression          Mild to moderate spondylosis of the lumbar spine without severe central or severe foraminal narrowing            Allergies:   Review of patient's allergies indicates:   Allergen Reactions    Adhesive tape-silicones Itching     Manifested in 12/2015 following AF ablation.       Current Medications:   Current Outpatient Medications   Medication Sig Dispense Refill    albuterol 90 mcg/actuation inhaler Inhale 2 puffs into the lungs every 4 (four) hours as needed for Wheezing. 1 Inhaler 3    apixaban 5 mg Tab Take 1 tablet (5 mg total) by mouth 2 (two) times daily. 180 tablet 3    azelastine (ASTELIN) 137 mcg (0.1 %) nasal spray  1 spray (137 mcg total) by Nasal route 2 (two) times daily. 30 mL 3    cetirizine (ZYRTEC) 10 MG tablet Take 10 mg by mouth once daily.      diltiaZEM (DILACOR XR) 120 MG CDCR Take 1 capsule (120 mg total) by mouth once daily. 90 capsule 3    fluticasone (FLONASE) 50 mcg/actuation nasal spray 1 spray (50 mcg total) by Each Nare route 2 (two) times daily. 1 Bottle 3    hydroCHLOROthiazide (HYDRODIURIL) 12.5 MG Tab Take 1 tablet (12.5 mg total) by mouth once daily. 90 tablet 2    hydrocortisone 0.5 % cream Apply topically daily as needed.      ibuprofen (ADVIL,MOTRIN) 800 MG tablet Take 1 tablet (800 mg total) by mouth 3 (three) times daily with meals. 270 tablet 0    omeprazole (PRILOSEC) 40 MG capsule Take 1 capsule (40 mg total) by mouth before breakfast. Take on empty stomach 30-60 minutes before meal 30 capsule 11    omeprazole (PRILOSEC) 40 MG capsule Take 1 capsule (40 mg total) by mouth 2 (two) times daily before meals. 60 capsule 3    predniSONE (DELTASONE) 10 MG tablet Day 1 60 mg Day 2 50 mg Day 3 40 mg Day 4 30 mg Day 5 20 mg Day 6 10 mg 21 tablet 0    propafenone (RYTHMOL SR) 325 MG Cp12 Take 1 capsule (325 mg total) by mouth every 12 (twelve) hours. 60 capsule 11    ranitidine (ZANTAC) 300 MG tablet Take 1 tablet (300 mg total) by mouth every evening. 30 tablet 11    triamcinolone acetonide 0.1% (KENALOG) 0.1 % cream Apply topically 2 (two) times daily. 45 g 1     No current facility-administered medications for this visit.        REVIEW OF SYSTEMS:    GENERAL:  No weight loss, malaise or fevers.  HEENT:  Negative for frequent or significant headaches.  NECK:  Negative for lumps, goiter, pain and significant neck swelling.  RESPIRATORY:  Negative for cough, wheezing or shortness of breath.  CARDIOVASCULAR:  Negative for chest pain, leg swelling or palpitations.  GI:  Negative for abdominal discomfort, blood in stools or black stools or change in bowel habits.  MUSCULOSKELETAL:  See  HPI.  SKIN:  Negative for lesions, rash, and itching.  PSYCH:  + for sleep disturbance, mood disorder and recent psychosocial stressors.  HEMATOLOGY/LYMPHOLOGY:  Negative for prolonged bleeding, bruising easily or swollen nodes.  NEURO:   No history of headaches, syncope, paralysis, seizures or tremors.  All other reviewed and negative other than HPI.    Past Medical History:  Past Medical History:   Diagnosis Date    Acid reflux     Allergy     seasonal    Atrial fibrillation     Essential hypertension 2017    Pt states that she does not have HTN.        Past Surgical History:  Past Surgical History:   Procedure Laterality Date    ABLATION N/A 12/15/2016    [facility]    ABLATION N/A 2015    [facility]    BLOCK-NERVE-MEDIAL BRANCH-LUMBAR Bilateral 2017    [facility]    CARDIAC ELECTROPHYSIOLOGY STUDY AND ABLATION      CARDIOVERSION N/A 3/20/2017    Per[facility] by [facility]rocio Carey [facility] at [facility] CATH LAB    CARDIOVERSION N/A 2016    [facility] C[facility]     SECTION      COLONOSCOPY N/A 2018    Performed by Brodie Lara [facility] ENDO    COLONOSCOPY N/A 2014    Performed by Jay Young [facility] ENDO (4TH FLR)    HYSTERECTOMY  2000    MIGUEL    INJECTION-JOINT Left 11/3/2017    [facility]    INJECTION-JOINT Left 2017    [facility]    RADIOFREQUENCY THERMOCOAGULATION (RFTC)-NERVE-MEDIAN BRANCH-LUMBAR Left 2018    [facility]    TRANSESOPHAGEAL ECHOCARDIOGRAM (SAMMIE) N/A 2017    [facility] at [facility] CATH LAB    TRANSESOPHAGEAL ECHOCARDIOGRAM (SAMMIE) N/A 12/15/2016    [facility] CATH LAB    TRANSESOPHAGEAL ECHOCARDIOGRAM (SAMMIE) N/A 2016  "   [facility] CATH LAB    TRANSESOPHAGEAL ECHOCARDIOGRAM (SAMMIE) N/A 8/8/2016    Per[facility] by Sonal Lee [facility] at [facility] CATH LAB       Family History:  Family History   Problem Relation Age of Onset    Hypertension Mother     Diabetes Mother     Glaucoma Mother     Allergies Mother     Asbestos Father     Obesity Father     Eczema Son     Hypertension Son     Heart disease Maternal Grandmother         chf    Diabetes Maternal Grandfather     Cancer Paternal Grandmother         lung, 2/2 second hand smoke    Glaucoma Paternal Grandfather     Blindness Paternal Grandfather     Melanoma Neg Hx     Psoriasis Neg Hx     Lupus Neg Hx     Acne Neg Hx     Breast cancer Neg Hx     Colon cancer Neg Hx     Ovarian cancer Neg Hx        Social History:  Social History     Socioeconomic History    Marital status:      Spouse name: Not on file    Number of children: Not on file    Years of education: Not on file    Highest education level: Not on file   Social Needs    Financial resource strain: Not on file    Food insecurity - worry: Not on file    Food insecurity - inability: Not on file    Transportation needs - medical: Not on file    Transportation needs - non-medical: Not on file   Occupational History    Occupation: Teacher     Employer: [employer]    Tobacco Use    Smoking status: Never Smoker    Smokeless tobacco: Never Used   Substance and Sexual Activity    Alcohol use: Yes     Comment: rarely, 1 drink/week    Drug use: No    Sexual activity: Yes     Partners: Male   Other Topics Concern    Are you pregnant or think you may be? No    Breast-feeding No   Social History Narrative    Recently moved back to [place] to help take care of mom.       OBJECTIVE:    /67   Pulse 74   Temp 98.5 °F (36.9 °C) (Oral)   Ht 5' 4.5" (1.638 m)   Wt 95.7 kg (211 lb)   LMP  (LMP Unknown)   BMI 35.66 kg/m²     PHYSICAL EXAMINATION:    General appearance: " Well appearing, in no acute distress, alert and oriented x3.  Psych:  Mood and affect appropriate.  Skin: Skin color, texture, turgor normal, no rashes or lesions, in both upper and lower body.  Head/face:  Atraumatic, normocephalic. No palpable lymph nodes  Neck: No pain to palpation over the cervical paraspinous muscles. Spurling Negative. No pain with neck flexion, extension, or lateral flexion. .  Cor: RRR  Pulm: CTA  GI: Abdomen soft and non-tender.  Back: Straight leg raising in the sitting and supine positions is negative to radicular pain. Moderate pain to palpation over the spine or costovertebral angles. Normal range of motion without pain reproduction. Positive axial loading test bilateral.  Positive FABERE,Ganselin and Yeoman's test on the both side.negative FADIR  Extremities: Peripheral joint ROM is full and pain free without obvious instability or laxity in all four extremities. No deformities, edema, or skin discoloration. Good capillary refill.  Musculoskeletal: Shoulder, hip and knee provocative maneuvers are negative. Bilateral upper and lower extremity strength is normal and symmetric.  No atrophy or tone abnormalities are noted  Neuro: Bilateral upper and lower extremity coordination and muscle stretch reflexes are physiologic and symmetric.  Plantar response are downgoing. No loss of sensation is noted.  Gait: Normal.    ASSESSMENT: Patient presents for worsening lower back pain since December 2018. After having left L2-5 RFA in February 2018, she felt perfect and was pain free. She did not have right sided RFA done. Starting in December, the back pain came back. She has pain on both sides of lower back, worse on the left. She also occasionally has numbness and shooting pains down right leg more than her left leg. Pain is worse when she has to stand and/or walk. Movement makes the pain worse. She is not currently doing any physical therapy. She is on eliquis for afib which we instructed her she  would need to stop 3-5 days prior to RFA.      1. Osteoarthritis of spine, unspecified spinal osteoarthritis complication status, unspecified spinal region     2. Spondylosis without myelopathy     3. Degeneration of lumbar or lumbosacral intervertebral disc     4. Sacroiliac joint pain           PLAN:     - I have stressed the importance of physical activity and a home exercise plan to help with pain and improve health.  - Patient can continue with medications for now since they are providing benefits, using them appropriately, and without side effects.  - Schedule for a Radiofrequency ablation of the medial branches at L2,3,4, and L5 for longer pain relief; Will schedule the left side followed by the right side 1-2 weeks later  - RTC 4 weeks after second RFA procedure.   - Counseled patient regarding the importance of physical therapy.    The above plan and management options were discussed at length with patient. Patient is in agreement with the above and verbalized understanding.    Corey Ramey MD  [facility]   I have personally reviewed the history and exam of this patient and agree with the resident/fellow/NPs note as stated above.    Mariusz Jang MD

## 2019-02-11 NOTE — TELEPHONE ENCOUNTER
----- Message from Marry Iqbal MA sent at 2/9/2019  9:13 PM CST -----  Pao GONZALEZ,     This patient is scheduled for an upcoming appointment in a specialty clinic [facility]. Upon reviewing the screening/pathology report for COLORECTAL screening, there is a finding that requires a review of the current modifier. Please review with the patients PCP if the modifier for COLORECTAL screening should be changed based on that finding.    Thank you,     KENDRA MILLIGAN, RMA

## 2019-02-13 ENCOUNTER — TELEPHONE (OUTPATIENT)
Dept: ELECTROPHYSIOLOGY | Facility: CLINIC | Age: 59
End: 2019-02-13

## 2019-02-13 NOTE — TELEPHONE ENCOUNTER
Discussed with Dr Joel. Per Dr joel Pt is cleared to hold Eliquis for 5 days prior to pain injections and resume when doctor deems safe.      ----- Message from Makeda Gamez MA sent at 2/12/2019  9:49 AM CST -----  Regarding: FW: Eliquis clearance  Pt of Dr. Joel's & pt is on Eliquis, prescribed by Dr. Joel.      ----- Message -----  From: Margaret Uriostegui  Sent: 2/12/2019   8:58 AM  To: [facility] Staff  Subject: Eliquis clearance                                Requesting clearance of Eliquis for 3 days prior. Patient needs to be scheduled with Dr Jang for a Left RFA L2-3-4-5.    Please advise if patient may hold medication.

## 2019-02-17 PROBLEM — M47.9 OSTEOARTHRITIS OF SPINE: Status: ACTIVE | Noted: 2019-02-17

## 2019-02-17 PROBLEM — M47.819 SPONDYLOSIS WITHOUT MYELOPATHY: Status: ACTIVE | Noted: 2019-02-17

## 2019-02-27 ENCOUNTER — HOSPITAL ENCOUNTER (OUTPATIENT)
Facility: OTHER | Age: 59
Discharge: HOME | End: 2019-02-27
Attending: ANESTHESIOLOGY | Admitting: ANESTHESIOLOGY
Payer: COMMERCIAL

## 2019-02-27 VITALS
BODY MASS INDEX: 35.17 KG/M2 | OXYGEN SATURATION: 97 % | HEART RATE: 73 BPM | RESPIRATION RATE: 18 BRPM | SYSTOLIC BLOOD PRESSURE: 149 MMHG | TEMPERATURE: 98 F | DIASTOLIC BLOOD PRESSURE: 74 MMHG | HEIGHT: 64 IN | WEIGHT: 206 LBS

## 2019-02-27 DIAGNOSIS — M47.819 SPONDYLOSIS WITHOUT MYELOPATHY: ICD-10-CM

## 2019-02-27 DIAGNOSIS — M47.9 OSTEOARTHRITIS OF SPINE, UNSPECIFIED SPINAL OSTEOARTHRITIS COMPLICATION STATUS, UNSPECIFIED SPINAL REGION: Primary | ICD-10-CM

## 2019-02-27 DIAGNOSIS — M47.816 OSTEOARTHRITIS OF LUMBAR SPINE: ICD-10-CM

## 2019-02-27 PROCEDURE — 64635 PR DESTROY LUMB/SAC FACET JNT: ICD-10-PCS | Mod: LT,,, | Performed by: ANESTHESIOLOGY

## 2019-02-27 PROCEDURE — 99152 PR MOD CONSCIOUS SEDATION, SAME PHYS, 5+ YRS, FIRST 15 MIN: ICD-10-PCS | Mod: ,,, | Performed by: ANESTHESIOLOGY

## 2019-02-27 PROCEDURE — 64636 DESTROY L/S FACET JNT ADDL: CPT | Performed by: ANESTHESIOLOGY

## 2019-02-27 PROCEDURE — 64636 PR DESTROY L/S FACET JNT ADDL: ICD-10-PCS | Mod: LT,,, | Performed by: ANESTHESIOLOGY

## 2019-02-27 PROCEDURE — 64635 DESTROY LUMB/SAC FACET JNT: CPT | Performed by: ANESTHESIOLOGY

## 2019-02-27 PROCEDURE — 63600175 PHARM REV CODE 636 W HCPCS: Performed by: ANESTHESIOLOGY

## 2019-02-27 PROCEDURE — 25000003 PHARM REV CODE 250: Performed by: ANESTHESIOLOGY

## 2019-02-27 PROCEDURE — 25000003 PHARM REV CODE 250: Performed by: PHYSICAL MEDICINE & REHABILITATION

## 2019-02-27 PROCEDURE — 99152 MOD SED SAME PHYS/QHP 5/>YRS: CPT | Mod: ,,, | Performed by: ANESTHESIOLOGY

## 2019-02-27 PROCEDURE — 64635 DESTROY LUMB/SAC FACET JNT: CPT | Mod: LT,,, | Performed by: ANESTHESIOLOGY

## 2019-02-27 PROCEDURE — 64636 DESTROY L/S FACET JNT ADDL: CPT | Mod: LT,,, | Performed by: ANESTHESIOLOGY

## 2019-02-27 RX ORDER — LIDOCAINE HYDROCHLORIDE 10 MG/ML
INJECTION INFILTRATION; PERINEURAL
Status: DISCONTINUED | OUTPATIENT
Start: 2019-02-27 | End: 2019-02-27 | Stop reason: HOSPADM

## 2019-02-27 RX ORDER — MIDAZOLAM HYDROCHLORIDE 1 MG/ML
INJECTION INTRAMUSCULAR; INTRAVENOUS
Status: DISCONTINUED | OUTPATIENT
Start: 2019-02-27 | End: 2019-02-27 | Stop reason: HOSPADM

## 2019-02-27 RX ORDER — BUPIVACAINE HYDROCHLORIDE 2.5 MG/ML
INJECTION, SOLUTION EPIDURAL; INFILTRATION; INTRACAUDAL
Status: DISCONTINUED | OUTPATIENT
Start: 2019-02-27 | End: 2019-02-27 | Stop reason: HOSPADM

## 2019-02-27 RX ORDER — FENTANYL CITRATE 50 UG/ML
INJECTION, SOLUTION INTRAMUSCULAR; INTRAVENOUS
Status: DISCONTINUED | OUTPATIENT
Start: 2019-02-27 | End: 2019-02-27 | Stop reason: HOSPADM

## 2019-02-27 RX ORDER — SODIUM CHLORIDE 9 MG/ML
500 INJECTION, SOLUTION INTRAVENOUS CONTINUOUS
Status: DISCONTINUED | OUTPATIENT
Start: 2019-02-27 | End: 2019-02-27 | Stop reason: HOSPADM

## 2019-02-27 RX ORDER — DEXAMETHASONE SODIUM PHOSPHATE 4 MG/ML
INJECTION, SOLUTION INTRA-ARTICULAR; INTRALESIONAL; INTRAMUSCULAR; INTRAVENOUS; SOFT TISSUE
Status: DISCONTINUED | OUTPATIENT
Start: 2019-02-27 | End: 2019-02-27 | Stop reason: HOSPADM

## 2019-02-27 RX ADMIN — SODIUM CHLORIDE 500 ML: 0.9 INJECTION, SOLUTION INTRAVENOUS at 03:02

## 2019-02-27 NOTE — INTERVAL H&P NOTE
The patient has been examined and the H&P has been reviewed:    I concur with the findings and no changes have occurred since H&P was written. Confirmed NPO.     Anesthesia/Surgery risks, benefits and alternative options discussed and understood by patient/family.          There are no hospital problems to display for this patient.

## 2019-02-27 NOTE — DISCHARGE SUMMARY
Discharge Note  Short Stay      SUMMARY     Admit Date: 2/27/2019    Attending Physician: Mariusz Jang      Discharge Physician: Mariusz Jang      Discharge Date: 2/27/2019 4:16 PM    Procedure(s) (LRB):  RADIOFREQUENCY ABLATION, LEFT L2,3,4,5 (Left)    Final Diagnosis: Lumbar spondylosis [M47.816]    Disposition: Home or self care    Patient Instructions:   Current Discharge Medication List      CONTINUE these medications which have NOT CHANGED    Details   albuterol 90 mcg/actuation inhaler Inhale 2 puffs into the lungs every 4 (four) hours as needed for Wheezing.  Qty: 1 Inhaler, Refills: 3    Associated Diagnoses: Wheezing      cetirizine (ZYRTEC) 10 MG tablet Take 10 mg by mouth once daily.      diltiaZEM (DILACOR XR) 120 MG CDCR Take 1 capsule (120 mg total) by mouth once daily.  Qty: 90 capsule, Refills: 3    Associated Diagnoses: Paroxysmal atrial fibrillation      fluticasone (FLONASE) 50 mcg/actuation nasal spray 1 spray (50 mcg total) by Each Nare route 2 (two) times daily.  Qty: 1 Bottle, Refills: 3    Associated Diagnoses: Persistent cough      hydroCHLOROthiazide (HYDRODIURIL) 12.5 MG Tab Take 1 tablet (12.5 mg total) by mouth once daily.  Qty: 90 tablet, Refills: 2    Associated Diagnoses: Hypertension, uncontrolled      hydrocortisone 0.5 % cream Apply topically daily as needed.      ibuprofen (ADVIL,MOTRIN) 800 MG tablet Take 1 tablet (800 mg total) by mouth 3 (three) times daily with meals.  Qty: 270 tablet, Refills: 0    Associated Diagnoses: Spondylosis without myelopathy; DDD (degenerative disc disease), lumbosacral; Spinal stenosis of lumbar region without neurogenic claudication; Thoracic and lumbosacral neuritis; Acquired spondylolisthesis; Degeneration of lumbar or lumbosacral intervertebral disc; Sacroiliac joint pain; Bilateral low back pain with left-sided sciatica, unspecified chronicity      !! omeprazole (PRILOSEC) 40 MG capsule Take 1 capsule (40 mg total) by mouth before  breakfast. Take on empty stomach 30-60 minutes before meal  Qty: 30 capsule, Refills: 11    Associated Diagnoses: LPRD (laryngopharyngeal reflux disease)      !! omeprazole (PRILOSEC) 40 MG capsule Take 1 capsule (40 mg total) by mouth 2 (two) times daily before meals.  Qty: 60 capsule, Refills: 3      predniSONE (DELTASONE) 10 MG tablet Day 1 60 mg Day 2 50 mg Day 3 40 mg Day 4 30 mg Day 5 20 mg Day 6 10 mg  Qty: 21 tablet, Refills: 0      propafenone (RYTHMOL SR) 325 MG Cp12 Take 1 capsule (325 mg total) by mouth every 12 (twelve) hours.  Qty: 60 capsule, Refills: 11    Associated Diagnoses: Paroxysmal atrial fibrillation      ranitidine (ZANTAC) 300 MG tablet Take 1 tablet (300 mg total) by mouth every evening.  Qty: 30 tablet, Refills: 11    Associated Diagnoses: LPRD (laryngopharyngeal reflux disease)      apixaban 5 mg Tab Take 1 tablet (5 mg total) by mouth 2 (two) times daily.  Qty: 180 tablet, Refills: 3      azelastine (ASTELIN) 137 mcg (0.1 %) nasal spray 1 spray (137 mcg total) by Nasal route 2 (two) times daily.  Qty: 30 mL, Refills: 3    Associated Diagnoses: Chronic seasonal allergic rhinitis, unspecified trigger      triamcinolone acetonide 0.1% (KENALOG) 0.1 % cream Apply topically 2 (two) times daily.  Qty: 45 g, Refills: 1    Associated Diagnoses: Eczema, unspecified type       !! - Potential duplicate medications found. Please discuss with provider.              Discharge Diagnosis: Lumbar spondylosis [M47.816]  Condition on Discharge: Stable with no complications to procedure   Diet on Discharge: Same as before.  Activity: as per instruction sheet.  Discharge to: Home with a responsible adult.  Follow up: 2-4 weeks

## 2019-02-27 NOTE — OP NOTE
Patient Name: Francoise Dykes  MRN: [id number]    INFORMED CONSENT: The procedure, risks, benefits and options were discussed with patient. There are no contraindications to the procedure. The patient expressed understanding and agreed to proceed. The personnel performing the procedure was discussed. I verify that I personally obtained Francoise's consent prior to the start of the procedure and the signed consent can be found on the patient's chart.    Procedure Date: 02/27/2019    Anesthesia: Topical    Pre Procedure diagnosis: Lumbar spondylosis [M47.816]  1. Osteoarthritis of spine, unspecified spinal osteoarthritis complication status, unspecified spinal region    2. Spondylosis without myelopathy    3. Osteoarthritis of lumbar spine      Post-Procedure diagnosis: SAME      Moderate Sedation: Yes - Fentanyl 100 mcg and Midazolam 2 mg    PROCEDURE: left L2-3-4-5 FACET MEDIAL BRANCH NERVE RADIOFREQUENCY NEUROTOMY (lumbar)          DESCRIPTION OF PROCEDURE: The patient was brought to the procedure room.  After performing time out IV access was obtained prior to the procedure. The patient was positioned prone on the fluoroscopy table. Continuous hemodynamic monitoring was initiated including blood pressure and pulse oximetry. IV sedation was administered incrementally to allow the patient to remain comfortable and conversant throughout the procedure. The area of the lumbar spine was prepped chlorhexidine three times and draped into a sterile field.  Fluoroscopy was used to identify the location of the LEFT side L2, L3, L4, and L5 medial branch nerves at the junctions of the superior articular process and the transverse processes of L3, L4, L5, and the sacral ala respectively.  Skin anesthesia was achieved using 3 cc of Lidocaine 1% over the injection sites. A 20 gauge, 100mm (10mm active tip) curved RF needle was slowly inserted at each level using AP, lateral and oblique fluoroscopic imaging. Negative aspiration for blood  or CSF was confirmed.  Sensory stimulation at 50Hz below 0.5V was achieved at every level. Motor stimulation at 2Hz up to 1.5V did not cause any radicular symptoms at any level. Each level was anesthetized with 1.5 cc of lidocaine 1%.  Radiofrequency lesioning was performed for 90 seconds at 80 degrees in two different positions at each level.  Total of 4 cc of bupivacaine 0.25% and 10 mg of Decadron was injected was injected at all levels.. The needles were removed and bleeding was nil.  A sterile dressing was applied. Francoise was taken back to the recovery room for further observation.     Stimulation Results:  L2 = Sensory positive @ 0.2, Motor negative @ 1.5  L3 = Sensory positive @ 0.4, Motor negative @ 1.5  L4 = Sensory positive @ 0.7, Motor negative @ 1.5  L5 = Sensory positive @ 0.5, Motor negative @ 1.5    Blood Loss: Nill  Specimen: None    Mariusz Jang MD

## 2019-03-13 ENCOUNTER — HOSPITAL ENCOUNTER (OUTPATIENT)
Facility: OTHER | Age: 59
Discharge: HOME | End: 2019-03-13
Attending: ANESTHESIOLOGY | Admitting: ANESTHESIOLOGY
Payer: COMMERCIAL

## 2019-03-13 VITALS
SYSTOLIC BLOOD PRESSURE: 149 MMHG | HEIGHT: 64 IN | HEART RATE: 64 BPM | TEMPERATURE: 98 F | DIASTOLIC BLOOD PRESSURE: 69 MMHG | RESPIRATION RATE: 18 BRPM | BODY MASS INDEX: 35.34 KG/M2 | WEIGHT: 207 LBS | OXYGEN SATURATION: 100 %

## 2019-03-13 DIAGNOSIS — M47.10 OSTEOARTHRITIS OF SPINE WITH MYELOPATHY, UNSPECIFIED SPINAL REGION: Primary | ICD-10-CM

## 2019-03-13 DIAGNOSIS — G89.29 CHRONIC PAIN: ICD-10-CM

## 2019-03-13 DIAGNOSIS — M47.819 SPONDYLOSIS WITHOUT MYELOPATHY: ICD-10-CM

## 2019-03-13 PROCEDURE — 64635 DESTROY LUMB/SAC FACET JNT: CPT | Mod: RT,,, | Performed by: ANESTHESIOLOGY

## 2019-03-13 PROCEDURE — 25000003 PHARM REV CODE 250: Performed by: ANESTHESIOLOGY

## 2019-03-13 PROCEDURE — 64636 DESTROY L/S FACET JNT ADDL: CPT | Performed by: ANESTHESIOLOGY

## 2019-03-13 PROCEDURE — 25000003 PHARM REV CODE 250: Performed by: PHYSICAL MEDICINE & REHABILITATION

## 2019-03-13 PROCEDURE — 99152 PR MOD CONSCIOUS SEDATION, SAME PHYS, 5+ YRS, FIRST 15 MIN: ICD-10-PCS | Mod: ,,, | Performed by: ANESTHESIOLOGY

## 2019-03-13 PROCEDURE — 64635 DESTROY LUMB/SAC FACET JNT: CPT | Performed by: ANESTHESIOLOGY

## 2019-03-13 PROCEDURE — 63600175 PHARM REV CODE 636 W HCPCS: Performed by: ANESTHESIOLOGY

## 2019-03-13 PROCEDURE — 64635 PR DESTROY LUMB/SAC FACET JNT: ICD-10-PCS | Mod: RT,,, | Performed by: ANESTHESIOLOGY

## 2019-03-13 PROCEDURE — 64636 DESTROY L/S FACET JNT ADDL: CPT | Mod: RT,,, | Performed by: ANESTHESIOLOGY

## 2019-03-13 PROCEDURE — 99152 MOD SED SAME PHYS/QHP 5/>YRS: CPT | Mod: ,,, | Performed by: ANESTHESIOLOGY

## 2019-03-13 PROCEDURE — 64636 PR DESTROY L/S FACET JNT ADDL: ICD-10-PCS | Mod: RT,,, | Performed by: ANESTHESIOLOGY

## 2019-03-13 RX ORDER — BUPIVACAINE HYDROCHLORIDE 2.5 MG/ML
INJECTION, SOLUTION EPIDURAL; INFILTRATION; INTRACAUDAL
Status: DISCONTINUED | OUTPATIENT
Start: 2019-03-13 | End: 2019-03-13 | Stop reason: HOSPADM

## 2019-03-13 RX ORDER — MIDAZOLAM HYDROCHLORIDE 1 MG/ML
INJECTION INTRAMUSCULAR; INTRAVENOUS
Status: DISCONTINUED | OUTPATIENT
Start: 2019-03-13 | End: 2019-03-13 | Stop reason: HOSPADM

## 2019-03-13 RX ORDER — DEXAMETHASONE SODIUM PHOSPHATE 4 MG/ML
INJECTION, SOLUTION INTRA-ARTICULAR; INTRALESIONAL; INTRAMUSCULAR; INTRAVENOUS; SOFT TISSUE
Status: DISCONTINUED | OUTPATIENT
Start: 2019-03-13 | End: 2019-03-13 | Stop reason: HOSPADM

## 2019-03-13 RX ORDER — SODIUM CHLORIDE 9 MG/ML
500 INJECTION, SOLUTION INTRAVENOUS CONTINUOUS
Status: DISCONTINUED | OUTPATIENT
Start: 2019-03-13 | End: 2019-03-13 | Stop reason: HOSPADM

## 2019-03-13 RX ORDER — FENTANYL CITRATE 50 UG/ML
INJECTION, SOLUTION INTRAMUSCULAR; INTRAVENOUS
Status: DISCONTINUED | OUTPATIENT
Start: 2019-03-13 | End: 2019-03-13 | Stop reason: HOSPADM

## 2019-03-13 RX ORDER — LIDOCAINE HYDROCHLORIDE 10 MG/ML
INJECTION INFILTRATION; PERINEURAL
Status: DISCONTINUED | OUTPATIENT
Start: 2019-03-13 | End: 2019-03-13 | Stop reason: HOSPADM

## 2019-03-13 RX ADMIN — SODIUM CHLORIDE 500 ML: 0.9 INJECTION, SOLUTION INTRAVENOUS at 02:03

## 2019-03-13 NOTE — DISCHARGE INSTRUCTIONS
Thank you for allowing us to care for you today. You may receive a survey about the care we provided. Your feedback is valuable and helps us provide excellent care throughout the community.     Home Care Instructions for Pain Management:    1. DIET:   You may resume your normal diet today.   2. BATHING:   You may shower with luke warm water. No tub baths or anything that will soak injection sites under water for the next 24 hours.  3. DRESSING:   You may remove your bandage today.   4. ACTIVITY LEVEL:   You may resume your normal activities 24 hrs after your procedure. Nothing strenuous today.  5. MEDICATIONS:   You may resume your normal medications today. To restart blood thinners, ask your doctor.  6. DRIVING    If you have received any sedatives by mouth today, you may not drive for 12 hours.    If you have received any sedation through your IV, you may not drive for 24 hrs.   7. SPECIAL INSTRUCTIONS:   No heat to the injection site for 24 hrs including, hot bath or shower, heating pad, moist heat, or hot tubs.    Use ice pack to injection site for any pain or discomfort.  Apply ice packs for 20 minute intervals as needed.    IF you have diabetes, be sure to monitor your blood sugar more closely. IF your injection contained steroids your blood sugar levels may become higher than normal.    If you are still having pain upon discharge:  Your pain may improve over the next 48 hours. The anesthetic (numbing medication) works immediately to 48 hours. IF your injection contained a steroid (anti-inflammatory medication), it takes approximately 3 days to start feeling relief and 7-10 days to see your greatest results from the medication. It is possible you may need subsequent injections. This would be discussed at your follow up appointment with pain management or your referring doctor.      PLEASE CALL YOUR DOCTOR IF:  1. Redness or swelling around the injection site.  2. Fever of 101 degrees or more  3. Drainage  (pus) from the injection site.  4. For any continuous bleeding (some dried blood over the incision is normal.)    FOR EMERGENCIES:   If any unusual problems or difficulties occur during clinic hours, call [phone] or 203. Adult Procedural Sedation Instructions    Recovery After Procedural Sedation (Adult)  You have been given medicine by vein to make you sleep during your surgery. This may have included both a pain medicine and sleeping medicine. Most of the effects have worn off. But you may still have some drowsiness for the next 6 to 8 hours.  Home care  Follow these guidelines when you get home:  · For the next 8 hours, you should be watched by a responsible adult. This person should make sure your condition is not getting worse.  · Don't drink any alcohol for the next 24 hours.  · Don't drive, operate dangerous machinery, or make important business or personal decisions during the next 24 hours.  Note: Your healthcare provider may tell you not to take any medicine by mouth for pain or sleep in the next 4 hours. These medicines may react with the medicines you were given in the hospital. This could cause a much stronger response than usual.  Follow-up care  Follow up with your healthcare provider if you are not alert and back to your usual level of activity within 12 hours.  When to seek medical advice  Call your healthcare provider right away if any of these occur:  · Drowsiness gets worse  · Weakness or dizziness gets worse  · Repeated vomiting  · You can't be awakened   Date Last Reviewed: 10/18/2016  © 5161-5859 [employer] [address] [place] [address], [place] [address]. All rights reserved. This information is not intended as a substitute for professional medical care. Always follow your healthcare professional's instructions.

## 2019-03-13 NOTE — OP NOTE
Patient Name: Francoise Dykes  MRN: [id number]    INFORMED CONSENT: The procedure, risks, benefits and options were discussed with patient. There are no contraindications to the procedure. The patient expressed understanding and agreed to proceed. The personnel performing the procedure was discussed. I verify that I personally obtained Francoise's consent prior to the start of the procedure and the signed consent can be found on the patient's chart.    Procedure Date: 03/13/2019    Anesthesia: Topical    Pre Procedure diagnosis: Lumbar spondylosis [M47.816]  1. Osteoarthritis of spine with myelopathy, unspecified spinal region    2. Spondylosis without myelopathy    3. Chronic pain      Post-Procedure diagnosis: SAME      Moderate Sedation: Yes - Fentanyl 100 mcg and Midazolam 2 mg    PROCEDURE: RIGHT L2-3-4-5 FACET MEDIAL BRANCH NERVE RADIOFREQUENCY NEUROTOMY (lumbar)      DESCRIPTION OF PROCEDURE: The patient was brought to the procedure room.  After performing time out IV access was obtained prior to the procedure. The patient was positioned prone on the fluoroscopy table. Continuous hemodynamic monitoring was initiated including blood pressure and pulse oximetry. IV sedation was administered incrementally to allow the patient to remain comfortable and conversant throughout the procedure. The area of the lumbar spine was prepped chlorhexidine three times and draped into a sterile field.  Fluoroscopy was used to identify the location of the RT side L2, L3, L4, and L5 medial branch nerves at the junctions of the superior articular process and the transverse processes of L3, L4, L5, and the sacral ala respectively.  Skin anesthesia was achieved using 3 cc of Lidocaine 1% over the injection sites. A 20 gauge, 100mm (10mm active tip) curved RF needle was slowly inserted at each level using AP, lateral and oblique fluoroscopic imaging. Negative aspiration for blood or CSF was confirmed.  Sensory stimulation at 50Hz below 0.5V  was achieved at every level. Motor stimulation at 2Hz up to 1.5V did not cause any radicular symptoms at any level. Each level was anesthetized with 1.5 cc of lidocaine 1%.  Radiofrequency lesioning was performed for 90 seconds at 80 degrees in two different positions at each level.  Total of 3 cc of bupivacaine 0.25% and 10 mg of Decadron was injected was injected at all levels.. The needles were removed and bleeding was nil.  A sterile dressing was applied. Francoise was taken back to the recovery room for further observation.     Stimulation Results:  L2 = Sensory positive @ 0.5, Motor negative @ 1.5  L3 = Sensory positive @ 0.4, Motor negative @ 1.5  L4 = Sensory positive @ 0.7, Motor negative @ 1.5  L5 = Sensory positive @ 0.5, Motor negative @ 1.5    Blood Loss: Nill  Specimen: None    Mariusz Jang MD

## 2019-03-13 NOTE — DISCHARGE SUMMARY
Discharge Note  Short Stay      SUMMARY     Admit Date: 3/13/2019    Attending Physician: Mariusz Jang      Discharge Physician: Mariusz Jang      Discharge Date: 3/13/2019 4:08 PM    Procedure(s) (LRB):  RADIOFREQUENCY ABLATION,  Right L2,3,4,5 (Right)    Final Diagnosis: Lumbar spondylosis [M47.816]    Disposition: Home or self care    Patient Instructions:   Current Discharge Medication List      CONTINUE these medications which have NOT CHANGED    Details   albuterol 90 mcg/actuation inhaler Inhale 2 puffs into the lungs every 4 (four) hours as needed for Wheezing.  Qty: 1 Inhaler, Refills: 3    Associated Diagnoses: Wheezing      apixaban 5 mg Tab Take 1 tablet (5 mg total) by mouth 2 (two) times daily.  Qty: 180 tablet, Refills: 3      azelastine (ASTELIN) 137 mcg (0.1 %) nasal spray 1 spray (137 mcg total) by Nasal route 2 (two) times daily.  Qty: 30 mL, Refills: 3    Associated Diagnoses: Chronic seasonal allergic rhinitis, unspecified trigger      cetirizine (ZYRTEC) 10 MG tablet Take 10 mg by mouth once daily.      diltiaZEM (DILACOR XR) 120 MG CDCR Take 1 capsule (120 mg total) by mouth once daily.  Qty: 90 capsule, Refills: 3    Associated Diagnoses: Paroxysmal atrial fibrillation      fluticasone (FLONASE) 50 mcg/actuation nasal spray 1 spray (50 mcg total) by Each Nare route 2 (two) times daily.  Qty: 1 Bottle, Refills: 3    Associated Diagnoses: Persistent cough      hydroCHLOROthiazide (HYDRODIURIL) 12.5 MG Tab Take 1 tablet (12.5 mg total) by mouth once daily.  Qty: 90 tablet, Refills: 2    Associated Diagnoses: Hypertension, uncontrolled      hydrocortisone 0.5 % cream Apply topically daily as needed.      ibuprofen (ADVIL,MOTRIN) 800 MG tablet Take 1 tablet (800 mg total) by mouth 3 (three) times daily with meals.  Qty: 270 tablet, Refills: 0    Associated Diagnoses: Spondylosis without myelopathy; DDD (degenerative disc disease), lumbosacral; Spinal stenosis of lumbar region without  neurogenic claudication; Thoracic and lumbosacral neuritis; Acquired spondylolisthesis; Degeneration of lumbar or lumbosacral intervertebral disc; Sacroiliac joint pain; Bilateral low back pain with left-sided sciatica, unspecified chronicity      !! omeprazole (PRILOSEC) 40 MG capsule Take 1 capsule (40 mg total) by mouth before breakfast. Take on empty stomach 30-60 minutes before meal  Qty: 30 capsule, Refills: 11    Associated Diagnoses: LPRD (laryngopharyngeal reflux disease)      !! omeprazole (PRILOSEC) 40 MG capsule Take 1 capsule (40 mg total) by mouth 2 (two) times daily before meals.  Qty: 60 capsule, Refills: 3      predniSONE (DELTASONE) 10 MG tablet Day 1 60 mg Day 2 50 mg Day 3 40 mg Day 4 30 mg Day 5 20 mg Day 6 10 mg  Qty: 21 tablet, Refills: 0      propafenone (RYTHMOL SR) 325 MG Cp12 Take 1 capsule (325 mg total) by mouth every 12 (twelve) hours.  Qty: 60 capsule, Refills: 11    Associated Diagnoses: Paroxysmal atrial fibrillation      ranitidine (ZANTAC) 300 MG tablet Take 1 tablet (300 mg total) by mouth every evening.  Qty: 30 tablet, Refills: 11    Associated Diagnoses: LPRD (laryngopharyngeal reflux disease)      triamcinolone acetonide 0.1% (KENALOG) 0.1 % cream Apply topically 2 (two) times daily.  Qty: 45 g, Refills: 1    Associated Diagnoses: Eczema, unspecified type       !! - Potential duplicate medications found. Please discuss with provider.              Discharge Diagnosis: Lumbar spondylosis [M47.816]  Condition on Discharge: Stable with no complications to procedure   Diet on Discharge: Same as before.  Activity: as per instruction sheet.  Discharge to: Home with a responsible adult.  Follow up: 2-4 weeks

## 2019-03-21 ENCOUNTER — TELEPHONE (OUTPATIENT)
Dept: ELECTROPHYSIOLOGY | Facility: CLINIC | Age: 59
End: 2019-03-21

## 2019-03-21 ENCOUNTER — PATIENT MESSAGE (OUTPATIENT)
Dept: ELECTROPHYSIOLOGY | Facility: CLINIC | Age: 59
End: 2019-03-21

## 2019-03-21 ENCOUNTER — HOSPITAL ENCOUNTER (EMERGENCY)
Facility: HOSPITAL | Age: 59
Discharge: HOME | End: 2019-03-21
Payer: COMMERCIAL

## 2019-03-21 VITALS
DIASTOLIC BLOOD PRESSURE: 72 MMHG | TEMPERATURE: 97 F | SYSTOLIC BLOOD PRESSURE: 116 MMHG | WEIGHT: 207 LBS | HEART RATE: 79 BPM | OXYGEN SATURATION: 98 % | RESPIRATION RATE: 20 BRPM | BODY MASS INDEX: 34.49 KG/M2 | HEIGHT: 65 IN

## 2019-03-21 DIAGNOSIS — I48.91 A-FIB: ICD-10-CM

## 2019-03-21 DIAGNOSIS — I48.0 PAF (PAROXYSMAL ATRIAL FIBRILLATION): Primary | ICD-10-CM

## 2019-03-21 DIAGNOSIS — I48.91 ATRIAL FIBRILLATION WITH RVR: Primary | ICD-10-CM

## 2019-03-21 DIAGNOSIS — R00.0 TACHYCARDIA: ICD-10-CM

## 2019-03-21 LAB
ALBUMIN SERPL BCP-MCNC: 4.1 G/DL
ALP SERPL-CCNC: 123 U/L
ALT SERPL W/O P-5'-P-CCNC: 16 U/L
ANION GAP SERPL CALC-SCNC: 10 MMOL/L
AST SERPL-CCNC: 16 U/L
BASOPHILS # BLD AUTO: 0.04 K/UL
BASOPHILS NFR BLD: 0.5 %
BILIRUB SERPL-MCNC: 0.5 MG/DL
BNP SERPL-MCNC: 50 PG/ML
BUN SERPL-MCNC: 14 MG/DL
CALCIUM SERPL-MCNC: 10.1 MG/DL
CHLORIDE SERPL-SCNC: 109 MMOL/L
CO2 SERPL-SCNC: 22 MMOL/L
CREAT SERPL-MCNC: 0.8 MG/DL
DIFFERENTIAL METHOD: ABNORMAL
EOSINOPHIL # BLD AUTO: 0 K/UL
EOSINOPHIL NFR BLD: 0.3 %
ERYTHROCYTE [DISTWIDTH] IN BLOOD BY AUTOMATED COUNT: 12.9 %
EST. GFR  (AFRICAN AMERICAN): >60 ML/MIN/1.73 M^2
EST. GFR  (NON AFRICAN AMERICAN): >60 ML/MIN/1.73 M^2
GLUCOSE SERPL-MCNC: 128 MG/DL
HCT VFR BLD AUTO: 40.4 %
HGB BLD-MCNC: 13.6 G/DL
IMM GRANULOCYTES # BLD AUTO: 0.11 K/UL
IMM GRANULOCYTES NFR BLD AUTO: 1.3 %
LYMPHOCYTES # BLD AUTO: 1.7 K/UL
LYMPHOCYTES NFR BLD: 19.6 %
MCH RBC QN AUTO: 27.7 PG
MCHC RBC AUTO-ENTMCNC: 33.7 G/DL
MCV RBC AUTO: 82 FL
MONOCYTES # BLD AUTO: 0.4 K/UL
MONOCYTES NFR BLD: 4.6 %
NEUTROPHILS # BLD AUTO: 6.4 K/UL
NEUTROPHILS NFR BLD: 73.7 %
NRBC BLD-RTO: 0 /100 WBC
PLATELET # BLD AUTO: 292 K/UL
PMV BLD AUTO: 11 FL
POTASSIUM SERPL-SCNC: 3.8 MMOL/L
PROT SERPL-MCNC: 8.4 G/DL
RBC # BLD AUTO: 4.91 M/UL
SODIUM SERPL-SCNC: 141 MMOL/L
TROPONIN I SERPL DL<=0.01 NG/ML-MCNC: <0.006 NG/ML
WBC # BLD AUTO: 8.71 K/UL

## 2019-03-21 PROCEDURE — 84484 ASSAY OF TROPONIN QUANT: CPT

## 2019-03-21 PROCEDURE — 83880 ASSAY OF NATRIURETIC PEPTIDE: CPT

## 2019-03-21 PROCEDURE — 80053 COMPREHEN METABOLIC PANEL: CPT

## 2019-03-21 PROCEDURE — 99284 EMERGENCY DEPT VISIT MOD MDM: CPT | Mod: ,,, | Performed by: PHYSICIAN ASSISTANT

## 2019-03-21 PROCEDURE — 96374 THER/PROPH/DIAG INJ IV PUSH: CPT

## 2019-03-21 PROCEDURE — 99284 PR EMERGENCY DEPT VISIT,LEVEL IV: ICD-10-PCS | Mod: ,,, | Performed by: PHYSICIAN ASSISTANT

## 2019-03-21 PROCEDURE — 85025 COMPLETE CBC W/AUTO DIFF WBC: CPT

## 2019-03-21 PROCEDURE — 99285 EMERGENCY DEPT VISIT HI MDM: CPT | Mod: 25

## 2019-03-21 PROCEDURE — 93010 EKG 12-LEAD: ICD-10-PCS | Mod: ,,, | Performed by: INTERNAL MEDICINE

## 2019-03-21 PROCEDURE — 93010 ELECTROCARDIOGRAM REPORT: CPT | Mod: ,,, | Performed by: INTERNAL MEDICINE

## 2019-03-21 PROCEDURE — 25000003 PHARM REV CODE 250: Performed by: EMERGENCY MEDICINE

## 2019-03-21 PROCEDURE — 93005 ELECTROCARDIOGRAM TRACING: CPT

## 2019-03-21 RX ORDER — METOPROLOL TARTRATE 1 MG/ML
10 INJECTION, SOLUTION INTRAVENOUS
Status: DISCONTINUED | OUTPATIENT
Start: 2019-03-21 | End: 2019-03-21 | Stop reason: HOSPADM

## 2019-03-21 RX ORDER — METOPROLOL TARTRATE 1 MG/ML
5 INJECTION, SOLUTION INTRAVENOUS
Status: COMPLETED | OUTPATIENT
Start: 2019-03-21 | End: 2019-03-21

## 2019-03-21 RX ADMIN — METOPROLOL TARTRATE 5 MG: 5 INJECTION INTRAVENOUS at 04:03

## 2019-03-21 NOTE — TELEPHONE ENCOUNTER
Returned call to Pt. Pt stated she went to the ER this morning because her HR was 150-170. She was given IV metoprolol and sent home.  She states her heart rhythm still doesn't feel normal. She states her HR is 84 but it is pounding in her chest. She feels fine lying down but if she gets up she is very dizzy and her heart states racing. Advised I would discuss with Dr Meyers and return the call.     Discussed with Dr Meyers. He recommends the Pt increase cardizem to 240mg qd and see him on Monday.    Called to advise Pt and she stated she can't even stand up with out falling. She called her cousin to bring her back to the ER.        ----- Message from Georgette Velasquez MA sent at 3/21/2019  3:16 PM CDT -----  Contact: self   Pt. is calling because she went to ER @ 4 am due to having  a.fib. and her heart converted back to normal. States that she is still not doing well. Please call

## 2019-03-21 NOTE — ED PROVIDER NOTES
Encounter Date: 3/21/2019    SCRIBE #1 NOTE: I, Lyssa Asif, am scribing for, and in the presence of,  Dr. Thomas . I have scribed the following portions of the note - Other sections scribed: MDM update.       History     Chief Complaint   Patient presents with    Atrial Fibrillation     pt states she has a history of afib and 2 ablasions. pt denies chest pain or SOB. pt feels palpitations     58-year-old female presents to the ER for evaluation of palpitations.  Past medical history significant for atrial fibrillation.  The patient takes metoprolol and propafenone and Eliquis for her AFib.  She is compliant with her medications.  She has had 2 ablations in the past.  She woke up from sleep with palpitations a few hours ago.  She took an extra tablet of her metoprolol without relief.  Upon arrival to the ED heart rate in the 180s.  She denies chest pain or shortness of breath.  She appears well otherwise.  Blood pressure 150s systolic.          Review of patient's allergies indicates:   Allergen Reactions    Methylprednisolone Other (See Comments)     Cause heart to race     Adhesive tape-silicones Itching     Manifested in 2015 following AF ablation.     Past Medical History:   Diagnosis Date    Acid reflux     Allergy     seasonal    Atrial fibrillation     Essential hypertension 2017    Pt states that she does not have HTN.      Past Surgical History:   Procedure Laterality Date    ABLATION N/A 12/15/2016    [facility] CATH LAB    ABLATION N/A 2015    [facility]    BLOCK-NERVE-MEDIAL BRANCH-LUMBAR Bilateral 2017    [facility]formed [facility] at [facility] PAIN T    CARDIAC ELECTROPHYSIOLOGY STUDY AND ABLATION      CARDIOVERSION N/A 3/20/2017    Performed by Ambrocio Carey MD at [facility] CATH LAB    CARDIOVERSION N/A 2016    [facility] CATH LAB     SECTION       COLONOSCOPY N/A 7/12/2018    [facility] ENDO    COLONOSCOPY N/A 11/24/2014    [facility] ([facility])    HYSTERECTOMY  2000    MIGUEL    INJECTION-JOINT Left 11/3/2017    [facility] PAIN MGT    INJECTION-JOINT Left 7/21/2017    [facility]    RADIOFREQUENCY ABLATION,  Right L2,3,4,5 Right 3/13/2019    [facility]    RADIOFREQUENCY ABLATION, LEFT L2,3,4,5 Left 2/27/2019    [facility]    RADIOFREQUENCY THERMOCOAGULATION (RFTC)-NERVE-MEDIAN BRANCH-LUMBAR Left 2/9/2018    [facility]    TRANSESOPHAGEAL ECHOCARDIOGRAM (SAMMIE) N/A 8/22/2017    [facility] CATH LAB    TRANSESOPHAGEAL ECHOCARDIOGRAM (SAMMIE) N/A 12/15/2016    [facility] CATH LAB    TRANSESOPHAGEAL ECHOCARDIOGRAM (SAMMIE) N/A 11/7/2016    [facility] Doug [facility] CATH LAB    TRANSESOPHAGEAL ECHOCARDIOGRAM (SAMMIE) N/A 8/8/2016    [facility] Sonal [facility] CATH LAB     Family History   Problem Relation Age of Onset    Hypertension Mother     Diabetes Mother     Glaucoma Mother     Allergies Mother     Asbestos Father     Obesity Father     Eczema Son     Hypertension Son     Heart disease Maternal Grandmother         chf    Diabetes Maternal Grandfather     Cancer Paternal Grandmother         lung, 2/2 second hand smoke    Glaucoma Paternal Grandfather     Blindness Paternal Grandfather     Melanoma Neg Hx     Psoriasis Neg Hx     Lupus Neg Hx     Acne Neg Hx     Breast cancer Neg Hx     Colon cancer Neg Hx     Ovarian cancer Neg Hx      Social History     Tobacco Use    Smoking status: Never Smoker    Smokeless tobacco: Never Used   Substance Use Topics    Alcohol use: Yes     Comment: rarely, 1  drink/week    Drug use: No     Review of Systems   Constitutional: Negative for fever.   HENT: Negative for sore throat.    Respiratory: Negative for shortness of breath.    Cardiovascular: Positive for palpitations. Negative for chest pain.   Gastrointestinal: Negative for nausea and vomiting.   Genitourinary: Negative for dysuria.   Musculoskeletal: Negative for back pain.   Skin: Negative for rash.   Neurological: Negative for weakness.   Hematological: Does not bruise/bleed easily.       Physical Exam     Initial Vitals [03/21/19 0436]   BP Pulse Resp Temp SpO2   (!) 154/82 (!) 184 18 98 °F (36.7 °C) 98 %      MAP       --         Physical Exam    Constitutional: Vital signs are normal. She appears well-developed and well-nourished.   HENT:   Head: Normocephalic and atraumatic.   Eyes: Conjunctivae are normal.   Cardiovascular:   AFib with RVR on arrival   Abdominal: Soft. Normal appearance and bowel sounds are normal.   Musculoskeletal: Normal range of motion.   Neurological: She is alert and oriented to person, place, and time.   Skin: Skin is warm and intact.   Psychiatric: She has a normal mood and affect. Her speech is normal and behavior is normal. Cognition and memory are normal.         ED Course   Procedures  Labs Reviewed   CBC W/ AUTO DIFFERENTIAL - Abnormal; Notable for the following components:       Result Value    Immature Granulocytes 1.3 (*)     Immature Grans (Abs) 0.11 (*)     Gran% 73.7 (*)     All other components within normal limits   COMPREHENSIVE METABOLIC PANEL - Abnormal; Notable for the following components:    CO2 22 (*)     Glucose 128 (*)     All other components within normal limits   TROPONIN I   B-TYPE NATRIURETIC PEPTIDE          Imaging Results          X-Ray Chest 1 View (Final result)  Result time 03/21/19 05:20:16    Final result by Joseph Andrews MD (03/21/19 05:20:16)                 Impression:      Borderline cardiomegaly      Electronically signed by: Joseph  MD Darryl  Date:    03/21/2019  Time:    05:20             Narrative:    EXAMINATION:  XR CHEST 1 VIEW    CLINICAL HISTORY:  Tachycardia, unspecified    TECHNIQUE:  Frontal view of the chest was performed.    COMPARISON:  05/16/2018    FINDINGS:  Multiple overlying cardiac monitoring leads. The cardiomediastinal silhouette is upper normal in size and midline. Pulmonary vascularity appears within normal limits.    The lungs appear clear without confluent pulmonary parenchymal opacity. No pleural fluid or pneumothorax.                                 Medical Decision Making:   Differential Diagnosis:   AFib with RVR, pneumonia, electrolyte derangement  ED Management:  58-year-old female with AFib with RVR.   After 5 mg metoprolol IV x1 patient admitted to normal sinus with improvement in rate      7:08 AM  I assumed care of this patient from Dr. Young.   Patient with Afib with RVR, this is well known to cardiology. Here pt converted with dose of metoprolol. His plan was to discharge the pt if the troponin was WNL.   Patient's troponin has resulted and is less than 0.006. Patient is asymptomatic at this time. Discharge paperwork was completed prior to my arrival by the initial team with the understanding of the plan.   At this time patient is clinically stable for discharge with close follow up in the EP clinic with Dr. Meyers.             Scribe Attestation:   Scribe #1: I performed the above scribed service and the documentation accurately describes the services I performed. I attest to the accuracy of the note.               Clinical Impression:       ICD-10-CM ICD-9-CM   1. Atrial fibrillation with RVR I48.91 427.31   2. A-fib I48.91 427.31   3. Tachycardia R00.0 785.0         Disposition:   Disposition: Discharged  Condition: Stable                        Jeffery Gonzalez PA-C  03/22/19 0517

## 2019-03-21 NOTE — ED NOTES
Patient resting in stretcher and is in NAD at this time. Pt is awake alert and oriented x4, VSS, respirations even and unlabored. NSR to cardiac monitor.  Pt updated on plan of care. Bed low and locked with side rails up x2, call bell in pt reach. Pt voices no needs at this time.  Will continue to monitor.

## 2019-03-21 NOTE — HPI
Francoise Dykes is a 57-year old female with a history of symptomatic paroxysmal atrial fibrillation who underwent PVI and SVC RFA ablation in 12/2015 and re-do PVI in 12/2016 of right pulmonary veins. She has since had several episodes of atypical atrial flutter requiring DCCV, including in 8/2017, 12/2017, and 1/2018.     She is currently on Rythmol 325 mg bid, apixaban 5mg BID, and diltiazem 120mg. Francoise Dykes's [id number] Score is 2 (SEX, HTN) and she should remain on OAC indefinitely.      Per Dr. Meyers's plan, should she have recurrent arrhythmias in the interim the plan will be for invasive EPS and ablation. However, she has not had any sustained episodes since her rhythmol was increased.  At this point she will hold the course and follow up in 6 months.

## 2019-03-21 NOTE — ED NOTES
Pt ambulated to the restroom.  When pt came back to room, pt felt heart beating fast again. EKG afib 115 for few seconds then converted to nsr.

## 2019-03-21 NOTE — ED NOTES
Pt able to ambulate to restroom without difficulty.  Pt NSR to monitor on return to bed, denies SOB/palpitations.

## 2019-03-21 NOTE — DISCHARGE INSTRUCTIONS
Continue taking medications as prescribed  Follow up with your electrophysiologist and return to the ED as needed    Our goal in the emergency department is to always give you outstanding care and exceptional service. You may receive a survey by mail or e-mail in the next week regarding your experience in our ED. We would greatly appreciate your completing and returning the survey. Your feedback provides us with a way to recognize our staff who give very good care and it helps us learn how to improve when your experience was below our aspiration of excellence.

## 2019-03-21 NOTE — ED TRIAGE NOTES
Pt reports waking up with heart racing fast. Pt with hx of afib, ablation x2. Pt denies chestpain, sob, n/v      LOC: The patient is awake, alert, aware of environment with an appropriate affect. Oriented x4, speaking appropriately  APPEARANCE: Pt resting comfortably, in no acute distress, pt is clean and well groomed, clothing properly fastened  SKIN:The skin is warm and dry, color consistent with ethnicity, patient has normal skin turgor and moist mucus membranes  RESPIRATORY:Airway is open and patent, respirations are spontaneous, patient has a normal effort and rate, no accessory muscle use noted.  CARDIAC: fast rate and irregular rhythm, no peripheral edema noted, capillary refill < 3 seconds, bilateral radial pulses 2+.  ABDOMEN: Soft, non tender, non distended.   NEUROLOGIC: PERRLA, facial expression is symmetrical, patient moving all extremities spontaneously, normal sensation in all extremities when touched with a finger.  Follows all commands appropriately  MUSCULOSKELETAL: Patient moving all extremities spontaneously, no obvious swelling or deformities noted.

## 2019-03-25 ENCOUNTER — OFFICE VISIT (OUTPATIENT)
Dept: ELECTROPHYSIOLOGY | Facility: CLINIC | Age: 59
End: 2019-03-25
Payer: COMMERCIAL

## 2019-03-25 ENCOUNTER — HOSPITAL ENCOUNTER (OUTPATIENT)
Dept: CARDIOLOGY | Facility: CLINIC | Age: 59
Discharge: HOME | End: 2019-03-25
Payer: COMMERCIAL

## 2019-03-25 VITALS
SYSTOLIC BLOOD PRESSURE: 122 MMHG | DIASTOLIC BLOOD PRESSURE: 78 MMHG | WEIGHT: 211.19 LBS | BODY MASS INDEX: 36.05 KG/M2 | HEIGHT: 64 IN | HEART RATE: 67 BPM

## 2019-03-25 DIAGNOSIS — I10 ESSENTIAL HYPERTENSION: ICD-10-CM

## 2019-03-25 DIAGNOSIS — Z79.899 LONG TERM CURRENT USE OF ANTIARRHYTHMIC DRUG: ICD-10-CM

## 2019-03-25 DIAGNOSIS — Z98.890 S/P ABLATION OF ATRIAL FIBRILLATION: ICD-10-CM

## 2019-03-25 DIAGNOSIS — I48.0 PAF (PAROXYSMAL ATRIAL FIBRILLATION): ICD-10-CM

## 2019-03-25 DIAGNOSIS — Z79.01 CURRENT USE OF LONG TERM ANTICOAGULATION: ICD-10-CM

## 2019-03-25 DIAGNOSIS — Z86.79 S/P ABLATION OF ATRIAL FIBRILLATION: ICD-10-CM

## 2019-03-25 DIAGNOSIS — I48.92 ATRIAL FLUTTER WITH RAPID VENTRICULAR RESPONSE: ICD-10-CM

## 2019-03-25 DIAGNOSIS — I48.4 ATYPICAL ATRIAL FLUTTER: ICD-10-CM

## 2019-03-25 DIAGNOSIS — I48.0 PAROXYSMAL ATRIAL FIBRILLATION: Primary | ICD-10-CM

## 2019-03-25 PROCEDURE — 99999 PR PBB SHADOW E&M-EST. PATIENT-LVL III: ICD-10-PCS | Mod: PBBFAC,,, | Performed by: INTERNAL MEDICINE

## 2019-03-25 PROCEDURE — 99214 PR OFFICE/OUTPT VISIT, EST, LEVL IV, 30-39 MIN: ICD-10-PCS | Mod: S$GLB,,, | Performed by: INTERNAL MEDICINE

## 2019-03-25 PROCEDURE — 93000 ELECTROCARDIOGRAM COMPLETE: CPT | Mod: S$GLB,,, | Performed by: INTERNAL MEDICINE

## 2019-03-25 PROCEDURE — 3078F PR MOST RECENT DIASTOLIC BLOOD PRESSURE < 80 MM HG: ICD-10-PCS | Mod: CPTII,S$GLB,, | Performed by: INTERNAL MEDICINE

## 2019-03-25 PROCEDURE — 93000 RHYTHM STRIP: ICD-10-PCS | Mod: S$GLB,,, | Performed by: INTERNAL MEDICINE

## 2019-03-25 PROCEDURE — 99999 PR PBB SHADOW E&M-EST. PATIENT-LVL III: CPT | Mod: PBBFAC,,, | Performed by: INTERNAL MEDICINE

## 2019-03-25 PROCEDURE — 99214 OFFICE O/P EST MOD 30 MIN: CPT | Mod: S$GLB,,, | Performed by: INTERNAL MEDICINE

## 2019-03-25 PROCEDURE — 3008F PR BODY MASS INDEX (BMI) DOCUMENTED: ICD-10-PCS | Mod: CPTII,S$GLB,, | Performed by: INTERNAL MEDICINE

## 2019-03-25 PROCEDURE — 3078F DIAST BP <80 MM HG: CPT | Mod: CPTII,S$GLB,, | Performed by: INTERNAL MEDICINE

## 2019-03-25 PROCEDURE — 3074F SYST BP LT 130 MM HG: CPT | Mod: CPTII,S$GLB,, | Performed by: INTERNAL MEDICINE

## 2019-03-25 PROCEDURE — 3074F PR MOST RECENT SYSTOLIC BLOOD PRESSURE < 130 MM HG: ICD-10-PCS | Mod: CPTII,S$GLB,, | Performed by: INTERNAL MEDICINE

## 2019-03-25 PROCEDURE — 3008F BODY MASS INDEX DOCD: CPT | Mod: CPTII,S$GLB,, | Performed by: INTERNAL MEDICINE

## 2019-03-25 RX ORDER — DILTIAZEM HYDROCHLORIDE 240 MG/1
240 CAPSULE, EXTENDED RELEASE ORAL DAILY
Qty: 90 CAPSULE | Refills: 3 | Status: SHIPPED | OUTPATIENT
Start: 2019-03-25 | End: 2019-05-02 | Stop reason: SDUPTHER

## 2019-03-25 NOTE — PROGRESS NOTES
Subjective:     HPI     I had the pleasure of seeing Francoise Dykes in follow-up today for her history of atrial fibrillation and PVI. 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 She presented to [facility] in [place], [place] where an ECG showed atrial fibrillation at a rate of 169 bpm (ECG in EPIC). In 11/2012, Mrs. Dykes had another episode of atrial fibrillation after an argument with her son. She presented to [facility], and once again reverted to sinus rhythm within 30 minutes. She was discharged on Flecainide 50 mg BID, Toprol XL 50 mg QD, and Aspirin.     When I initially saw Ms. Dykes in 1/2014, she admitted to monthly palpitations, which would last seconds and resolve with coughing. She believed that Flecainide caused her some shortness of breath. At that office visit, I stopped Flecainide and started Rythmol 600 mg PRN palpitations.     At her office visit with me in 7/2015 Ms. Dykes reported episodes of AF every 2-3 months, which would last minutes and resolve with vagal maneuvers. She continued to take Flecainide every night before she went to bed. At that time Flecainide was stopped. Unfortunately in 9/2015, Ms. Dykes presented to [facility] with AF with RVR. She took Rythmol, and reverted to sinus rhythm within 3 hours. Notably, prior to conversion, her AF organized into a regular rhythm (AFL vs SVT--no 12-lead available to review).    In 12/2015, Ms. Dykes underwent a PVI and SVC ablation. She had frequent episodes of sustained AF during the case, which appeared to be arising from a RA source. She was started on Amiodarone that that time. At her follow-up visit in 1/2016, she was doing well, with no episodes of sustained palpitations. Amiodarone was stopped in early 3/2016. In 8/2016, Ms. Dykes was  admitted with atypical atrial flutter with RVR. She was cardioverted to sinus rhythm, and started on Rythmol  mg BID.    In 12/2016, Ms. Dykes underwent repeat PVI. The right pulmonary veins were re-isolated, with isolated firing noted from the veins post-isolation. Additionally, a mid-sal AT was targeted and successfully ablated. She was discharged on Rythmol  mg bid.    In 3/2017 Rythmol was stopped. Several weeks later she presented to [facility] with atypical atrial flutter with 2:1 AVB at a rate of 120 bpm. Rythmol was restarted at that time. A 4 week event monitor was ordered, but she could only wear it for 1 week due to skin sensitivity. She had one additional episode of palpitations which lasted for 1 hour, which resolved on its own. In 8/2017, Ms. Dykes presented with atypical atrial flutter with RVR, and underwent SAMMIE/DCCV. Notably, Ms. Dykes missed her AM dose of Rythmol. She was continued on Rythmol. In 12/2017 and 1/2018, Ms. Dykes had recurrent atypical atrial flutter, spontaneously converting to sinus rhythm. At that time her Rythmol was increased to 325 mg bid. AT her 4/2018 visit Ms. Dykes was doing well, and the plan was to hold the course.    On 3/21/2019, Ms. Dykes presented to [facility] with palpitations and was found to be in atypical atrial flutter with RVR, which terminated after receiving an extra dose of Rythmol. In total the episode lasted 2-3 hours. She had several episodes of palpitations lasting seconds in the weeks prior, which terminated spontaneously. Notably, she took steroids for an Achilles heel injury several days prior to her ED visit.    My interpretation of today's ECG is sinus rhythm at 67 bpm.    Review of Systems   Constitution: Negative for decreased appetite, malaise/fatigue, weight gain and weight loss.   HENT: Negative for sore throat.    Eyes: Negative for blurred vision.   Cardiovascular: Positive for palpitations. Negative for chest pain, dyspnea on exertion,  irregular heartbeat, leg swelling, near-syncope, orthopnea, paroxysmal nocturnal dyspnea and syncope.   Respiratory: Negative for shortness of breath.    Skin: Negative for rash.   Musculoskeletal: Negative for arthritis.   Gastrointestinal: Negative for abdominal pain.   Neurological: Negative for focal weakness.   Psychiatric/Behavioral: Negative for altered mental status.        Objective:    Physical Exam   Constitutional: She is oriented to person, place, and time. She appears well-developed and well-nourished. No distress.   HENT:   Head: Normocephalic and atraumatic.   Mouth/Throat: Oropharynx is clear and moist.   Eyes: Pupils are equal, round, and reactive to light. Conjunctivae are normal. No scleral icterus.   Neck: Normal range of motion. Neck supple. No JVD present. No thyromegaly present.   Cardiovascular: Normal rate, regular rhythm, normal heart sounds and intact distal pulses. Exam reveals no gallop and no friction rub.   No murmur heard.  Pulmonary/Chest: Effort normal and breath sounds normal. No respiratory distress. She has no rales.   Abdominal: Soft. Bowel sounds are normal. She exhibits no distension.   Musculoskeletal: She exhibits no edema.   Neurological: She is alert and oriented to person, place, and time.   Skin: Skin is warm and dry.   Psychiatric: She has a normal mood and affect. Her behavior is normal.   Vitals reviewed.        Assessment:       1. Paroxysmal atrial fibrillation    2. S/P ablation of atrial fibrillation    3. Essential hypertension    4. Atypical atrial flutter    5. Atrial flutter with rapid ventricular response    6. Long term current use of antiarrhythmic drug    7. Current use of long term anticoagulation         Plan:       In summary, Francoise Dykes is a 58-year old female with a history of symptomatic paroxysmal atrial fibrillation who underwent PVI in 12/2015 and re-do PVI in 12/2016. She has since had several episodes of atypical atrial flutter,  including in 8/2017, 12/2017, and 1/2018. She is currently on Rythmol 325 mg bid, and has had only one episode of sustained AT over the past year, which occurred in the setting of taking a short course of steroids. We discussed options including alternative antiarrhythmics (Sotalol) vs re-do PVI/AT ablation. For now Ms. Dykes would like to hold the course.    The plan is to continue Eliquis, Rythmol, and Diltiazem, and follow-up in 6 months.     Thank you for allowing me to participate in the care of this patient. Please do not hesitate to call me with any questions or concerns.

## 2019-05-02 ENCOUNTER — PATIENT MESSAGE (OUTPATIENT)
Dept: ELECTROPHYSIOLOGY | Facility: CLINIC | Age: 59
End: 2019-05-02

## 2019-05-02 DIAGNOSIS — I48.0 PAROXYSMAL ATRIAL FIBRILLATION: ICD-10-CM

## 2019-05-02 RX ORDER — DILTIAZEM HYDROCHLORIDE 240 MG/1
240 CAPSULE, EXTENDED RELEASE ORAL DAILY
Qty: 7 CAPSULE | Refills: 0 | Status: SHIPPED | OUTPATIENT
Start: 2019-05-02 | End: 2019-08-08 | Stop reason: SDUPTHER

## 2019-05-23 DIAGNOSIS — I10 HYPERTENSION, UNCONTROLLED: ICD-10-CM

## 2019-05-23 RX ORDER — HYDROCHLOROTHIAZIDE 12.5 MG/1
TABLET ORAL
Qty: 90 TABLET | Refills: 2 | Status: SHIPPED | OUTPATIENT
Start: 2019-05-23 | End: 2020-03-16 | Stop reason: SDUPTHER

## 2019-07-10 DIAGNOSIS — I48.0 PAROXYSMAL ATRIAL FIBRILLATION: ICD-10-CM

## 2019-07-10 RX ORDER — PROPAFENONE HYDROCHLORIDE 325 MG/1
325 CAPSULE, EXTENDED RELEASE ORAL EVERY 12 HOURS
Qty: 60 CAPSULE | Refills: 11 | Status: SHIPPED | OUTPATIENT
Start: 2019-07-10 | End: 2019-07-12 | Stop reason: SDUPTHER

## 2019-07-12 DIAGNOSIS — I48.0 PAROXYSMAL ATRIAL FIBRILLATION: ICD-10-CM

## 2019-07-14 RX ORDER — PROPAFENONE HYDROCHLORIDE 325 MG/1
325 CAPSULE, EXTENDED RELEASE ORAL EVERY 12 HOURS
Qty: 60 CAPSULE | Refills: 11 | Status: SHIPPED | OUTPATIENT
Start: 2019-07-14 | End: 2019-08-08 | Stop reason: SDUPTHER

## 2019-08-07 ENCOUNTER — PATIENT MESSAGE (OUTPATIENT)
Dept: ELECTROPHYSIOLOGY | Facility: CLINIC | Age: 59
End: 2019-08-07

## 2019-08-08 DIAGNOSIS — I48.0 PAROXYSMAL ATRIAL FIBRILLATION: ICD-10-CM

## 2019-08-08 RX ORDER — DILTIAZEM HYDROCHLORIDE 240 MG/1
240 CAPSULE, EXTENDED RELEASE ORAL DAILY
Qty: 90 CAPSULE | Refills: 3 | Status: SHIPPED | OUTPATIENT
Start: 2019-08-08 | End: 2019-12-23 | Stop reason: SDUPTHER

## 2019-08-08 RX ORDER — PROPAFENONE HYDROCHLORIDE 325 MG/1
325 CAPSULE, EXTENDED RELEASE ORAL EVERY 12 HOURS
Qty: 180 CAPSULE | Refills: 3 | Status: SHIPPED | OUTPATIENT
Start: 2019-08-08 | End: 2019-12-23 | Stop reason: SDUPTHER

## 2019-08-23 NOTE — DISCHARGE INSTRUCTIONS
Adult Procedural Sedation Instructions    Recovery After Procedural Sedation (Adult)  You have been given medicine by vein to make you sleep during your surgery. This may have included both a pain medicine and sleeping medicine. Most of the effects have worn off. But you may still have some drowsiness for the next 6 to 8 hours.  Home care  Follow these guidelines when you get home:  · For the next 8 hours, you should be watched by a responsible adult. This person should make sure your condition is not getting worse.  · Don't drink any alcohol for the next 24 hours.  · Don't drive, operate dangerous machinery, or make important business or personal decisions during the next 24 hours.  Note: Your healthcare provider may tell you not to take any medicine by mouth for pain or sleep in the next 4 hours. These medicines may react with the medicines you were given in the hospital. This could cause a much stronger response than usual.  Follow-up care  Follow up with your healthcare provider if you are not alert and back to your usual level of activity within 12 hours.  When to seek medical advice  Call your healthcare provider right away if any of these occur:  · Drowsiness gets worse  · Weakness or dizziness gets worse  · Repeated vomiting  · You can't be awakened   Date Last Reviewed: 10/18/2016  © 2472-4207 [employer] [address] [place] [address], [place] [address]. All rights reserved. This information is not intended as a substitute for professional medical care. Always follow your healthcare professional's instructions.       Thank you for allowing us to care for you today. You may receive a survey about the care we provided. Your feedback is valuable and helps us provide excellent care throughout the community.     Home Care Instructions for Pain Management:    1. DIET:   You may resume your normal diet today.   2. BATHING:   You may shower with luke warm water. No tub baths or anything that will soak  injection sites under water for the next 24 hours.  3. DRESSING:   You may remove your bandage today.   4. ACTIVITY LEVEL:   You may resume your normal activities 24 hrs after your procedure. Nothing strenuous today.  5. MEDICATIONS:   You may resume your normal medications today. To restart blood thinners, ask your doctor.  6. DRIVING    If you have received any sedatives by mouth today, you may not drive for 12 hours.    If you have received any sedation through your IV, you may not drive for 24 hrs.   7. SPECIAL INSTRUCTIONS:   No heat to the injection site for 24 hrs including, hot bath or shower, heating pad, moist heat, or hot tubs.    Use ice pack to injection site for any pain or discomfort.  Apply ice packs for 20 minute intervals as needed.    IF you have diabetes, be sure to monitor your blood sugar more closely. IF your injection contained steroids your blood sugar levels may become higher than normal.    If you are still having pain upon discharge:  Your pain may improve over the next 48 hours. The anesthetic (numbing medication) works immediately to 48 hours. IF your injection contained a steroid (anti-inflammatory medication), it takes approximately 3 days to start feeling relief and 7-10 days to see your greatest results from the medication. It is possible you may need subsequent injections. This would be discussed at your follow up appointment with pain management or your referring doctor.      PLEASE CALL YOUR DOCTOR IF:  1. Redness or swelling around the injection site.  2. Fever of 101 degrees or more  3. Drainage (pus) from the injection site.  4. For any continuous bleeding (some dried blood over the incision is normal.)    FOR EMERGENCIES:   If any unusual problems or difficulties occur during clinic hours, call [phone] or [phone].    - - -

## 2019-08-31 DIAGNOSIS — K21.9 LPRD (LARYNGOPHARYNGEAL REFLUX DISEASE): ICD-10-CM

## 2019-09-02 RX ORDER — OMEPRAZOLE 40 MG/1
CAPSULE, DELAYED RELEASE ORAL
Qty: 30 CAPSULE | Refills: 11 | OUTPATIENT
Start: 2019-09-02

## 2019-10-24 ENCOUNTER — OFFICE VISIT (OUTPATIENT)
Dept: ALLERGY | Facility: CLINIC | Age: 59
End: 2019-10-24
Payer: COMMERCIAL

## 2019-10-24 VITALS
BODY MASS INDEX: 35.88 KG/M2 | HEART RATE: 92 BPM | HEIGHT: 65 IN | WEIGHT: 215.38 LBS | SYSTOLIC BLOOD PRESSURE: 114 MMHG | DIASTOLIC BLOOD PRESSURE: 70 MMHG

## 2019-10-24 DIAGNOSIS — J31.0 CHRONIC RHINITIS: ICD-10-CM

## 2019-10-24 DIAGNOSIS — K21.9 LARYNGOPHARYNGEAL REFLUX: ICD-10-CM

## 2019-10-24 DIAGNOSIS — L30.9 DERMATITIS: ICD-10-CM

## 2019-10-24 DIAGNOSIS — I10 ESSENTIAL HYPERTENSION: ICD-10-CM

## 2019-10-24 DIAGNOSIS — R05.9 COUGH: Primary | ICD-10-CM

## 2019-10-24 DIAGNOSIS — K21.9 GASTROESOPHAGEAL REFLUX DISEASE, ESOPHAGITIS PRESENCE NOT SPECIFIED: ICD-10-CM

## 2019-10-24 DIAGNOSIS — H61.22 CERUMEN DEBRIS ON TYMPANIC MEMBRANE OF LEFT EAR: ICD-10-CM

## 2019-10-24 DIAGNOSIS — I48.4 ATYPICAL ATRIAL FLUTTER: ICD-10-CM

## 2019-10-24 PROCEDURE — 99999 PR PBB SHADOW E&M-EST. PATIENT-LVL IV: CPT | Mod: PBBFAC,,, | Performed by: ALLERGY & IMMUNOLOGY

## 2019-10-24 PROCEDURE — 3008F BODY MASS INDEX DOCD: CPT | Mod: CPTII,S$GLB,, | Performed by: ALLERGY & IMMUNOLOGY

## 2019-10-24 PROCEDURE — 99214 OFFICE O/P EST MOD 30 MIN: CPT | Mod: S$GLB,,, | Performed by: ALLERGY & IMMUNOLOGY

## 2019-10-24 PROCEDURE — 3008F PR BODY MASS INDEX (BMI) DOCUMENTED: ICD-10-PCS | Mod: CPTII,S$GLB,, | Performed by: ALLERGY & IMMUNOLOGY

## 2019-10-24 PROCEDURE — 3078F PR MOST RECENT DIASTOLIC BLOOD PRESSURE < 80 MM HG: ICD-10-PCS | Mod: CPTII,S$GLB,, | Performed by: ALLERGY & IMMUNOLOGY

## 2019-10-24 PROCEDURE — 99214 PR OFFICE/OUTPT VISIT, EST, LEVL IV, 30-39 MIN: ICD-10-PCS | Mod: S$GLB,,, | Performed by: ALLERGY & IMMUNOLOGY

## 2019-10-24 PROCEDURE — 99999 PR PBB SHADOW E&M-EST. PATIENT-LVL IV: ICD-10-PCS | Mod: PBBFAC,,, | Performed by: ALLERGY & IMMUNOLOGY

## 2019-10-24 PROCEDURE — 3074F SYST BP LT 130 MM HG: CPT | Mod: CPTII,S$GLB,, | Performed by: ALLERGY & IMMUNOLOGY

## 2019-10-24 PROCEDURE — 3074F PR MOST RECENT SYSTOLIC BLOOD PRESSURE < 130 MM HG: ICD-10-PCS | Mod: CPTII,S$GLB,, | Performed by: ALLERGY & IMMUNOLOGY

## 2019-10-24 PROCEDURE — 3078F DIAST BP <80 MM HG: CPT | Mod: CPTII,S$GLB,, | Performed by: ALLERGY & IMMUNOLOGY

## 2019-10-24 RX ORDER — OMEPRAZOLE 40 MG/1
40 CAPSULE, DELAYED RELEASE ORAL
Qty: 60 CAPSULE | Refills: 5 | Status: SHIPPED | OUTPATIENT
Start: 2019-10-24 | End: 2020-04-30

## 2019-10-24 NOTE — PROGRESS NOTES
Francoise Dykes returns to clinic today for continued evaluation of chronic rhinitis, conjunctivitis, LPR, and cough.  She was last seen November 19, 2018.  She is here alone.    After her last visit, she continued to take omeprazole 40 milligrams twice a day.  Her symptoms were completely controlled and she reduce this to once a day.  She then discontinued this and did well for several months.    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  Her symptoms are much worse when she lies down at night.    She also started having increased heartburn with acid in her mouth frequently.    She restarted ranitidine at night but continues to have symptoms.  She has also tried Astelin.  They have improved.    She has also developed discoloration of both lower extremities.  She sees a dermatologist on the [facility].    She has had some itching in the left ear.    [facility] PEQ ALLERGY QUESTIONNAIRE SHORT 10/24/2019   Are you taking any new medications since your last visit? No   Constitution: No changes since my last visit with this provider   Head or facial pain: Sinus pressure   Eyes: Itching   Ears: Itching, Fullness   Nose: Post nasal drip, Sniffling, Sneezing, Runny nose   Throat: Frequent clearing, Reflux/ heartburn   Sinuses: No symptoms   Lungs: Cough, Wheezing   Skin: Itching, Redness   Cardiovascular: High blood pressue   Gastrointestinal: Heartburn/ indigestion/ reflux   Genital/ urinary No symptoms   Musculoskeletal: Joint pain   Neurologic: No symptoms   Endocrine: No symptoms   Hematologic: Bruises/ bleeds easily     Physical Examination:  General: Well-developed, well-nourished, no acute distress.  Clearing throat throughout interview.  Head: No sinus tenderness.  Eyes: Conjunctivae:  No bulbar or palpebral conjunctival injection.  Ears: EAC's left with cerumen.   TM's not seen on the left but normal on the right.  No pre-auricular nodes.  Nose: Nasal Mucosa:  Pink.  Septum: No apparent deviation.  Turbinates:  No significant edema.  Polyps/Mass:  None visible.  Teeth/Gums:  No bleeding noted.  Oropharynx: No exudates.  Neck: Supple without thyromegaly. No cervical lymphadenopathy.    Respiratory/Chest: Effort: Good.  Auscultation:  Mild bilateral wheezing.  Skin: Good turgor.  No urticaria or angioedema.  Hyper pigmented lesions on both lower extremities, right greater than left.  Neuro/Psych: Oriented x 3.    Spirometry 08/20/2018:  Normal.    Laboratory 08/20/2018:  ImmunoCAP:  Negative.    Assessment:  1.  Chronic rhinitis, consider allergic.  2.  Chronic conjunctivitis, consider allergic.  3.  Chronic cough with wheezing, probably secondary to GERD.  4.  GERD.  5.  LPR.  6.  Dermatitis of uncertain etiology, probably venous stasis.  7.  Atrial fibrillation on Eliquis and Rythmol.  8.  Nasal bleeding on topical nasal steroids in the past.    Recommendations:  1.  Discontinue ranitidine.  2.  Start omeprazole 40 mg twice a day.  3.  Follow symptoms on above.  4.  Albuterol as needed.  5.  Zyrtec as needed.  6.  Return to clinic in two weeks or sooner if needed.  7.  Consider skin testing off antihistamines and Rythmol if needed.  8.  ENT for earwax removal.

## 2019-10-28 ENCOUNTER — OFFICE VISIT (OUTPATIENT)
Dept: OTOLARYNGOLOGY | Facility: CLINIC | Age: 59
End: 2019-10-28
Payer: COMMERCIAL

## 2019-10-28 DIAGNOSIS — H61.23 BILATERAL IMPACTED CERUMEN: Primary | ICD-10-CM

## 2019-10-28 PROCEDURE — 69210 REMOVE IMPACTED EAR WAX UNI: CPT | Mod: S$GLB,,, | Performed by: NURSE PRACTITIONER

## 2019-10-28 PROCEDURE — 99499 UNLISTED E&M SERVICE: CPT | Mod: S$GLB,,, | Performed by: NURSE PRACTITIONER

## 2019-10-28 PROCEDURE — 99999 PR PBB SHADOW E&M-EST. PATIENT-LVL II: CPT | Mod: PBBFAC,,, | Performed by: NURSE PRACTITIONER

## 2019-10-28 PROCEDURE — 99499 NO LOS: ICD-10-PCS | Mod: S$GLB,,, | Performed by: NURSE PRACTITIONER

## 2019-10-28 PROCEDURE — 69210 EAR CERUMEN REMOVAL: ICD-10-PCS | Mod: S$GLB,,, | Performed by: NURSE PRACTITIONER

## 2019-10-28 PROCEDURE — 99999 PR PBB SHADOW E&M-EST. PATIENT-LVL II: ICD-10-PCS | Mod: PBBFAC,,, | Performed by: NURSE PRACTITIONER

## 2019-10-28 NOTE — PROCEDURES
Ear Cerumen Removal  Date/Time: 10/28/2019 1:30 PM  Performed by: Valerie Marroquin NP  Authorized by: Valerie Marroquin NP     Location details:  Both ears  Procedure type: curette    Cerumen  Removal Results:  Cerumen completely removed  Patient tolerance:  Patient tolerated the procedure well with no immediate complications     Procedure Note:    The patient was brought to the minor procedure room and placed under the operating microscope of the left ear canal which was cleaned of ceruminous debris. Using a combination of suction, curettes and cup forceps the patient's cerumen impaction was removed. The tympanic membrane was evaluated and was unremarkable. The patient tolerated the procedure well. There were no complications.  Procedure Note:    Patient was brought to the minor procedure room and using the operating microscope of the right ear canal which was cleaned of ceruminous debris. There was a significant cerumen impaction.  Using a combination of suction, curettes and cup forceps the patient's cerumen impaction was removed. Tympanic membrane intact. Pt tolerated well. There were no complications.

## 2019-10-28 NOTE — LETTER
October 28, 2019      AMBREEN Long III, MD  [address] [facility]  [facility] [address]           [facility] - Otorhinolaryngology  [address]  [place] [address]  Phone: [phone]  Fax: [phone]          Patient: Francoise Dykes   MR Number: [id number]   YOB: 1960   Date of Visit: 10/28/2019       Dear Dr. AMBREEN Long III:    Thank you for referring Francoise Dykes to me for evaluation. Attached you will find relevant portions of my assessment and plan of care.    If you have questions, please do not hesitate to call me. I look forward to following Francoise Dkyes along with you.    Sincerely,    Valerie Marroquin, NP    Enclosure  CC:  No Recipients    If you would like to receive this communication electronically, please contact [email or web address] or [phone] to request more information on [employer] Link access.    For providers and/or their staff who would like to refer a patient to [facility], please contact us through our one-stop-shop provider referral line, [facility], at [phone].    If you feel you have received this communication in error or would no longer like to receive these types of communications, please e-mail [email or web address]

## 2019-11-01 ENCOUNTER — OFFICE VISIT (OUTPATIENT)
Dept: DERMATOLOGY | Facility: CLINIC | Age: 59
End: 2019-11-01
Payer: COMMERCIAL

## 2019-11-01 VITALS — WEIGHT: 215 LBS | BODY MASS INDEX: 36.33 KG/M2

## 2019-11-01 DIAGNOSIS — L30.9 DERMATITIS: Primary | ICD-10-CM

## 2019-11-01 DIAGNOSIS — D23.10 SYRINGOMA OF EYELID, UNSPECIFIED LATERALITY: ICD-10-CM

## 2019-11-01 PROCEDURE — 99202 OFFICE O/P NEW SF 15 MIN: CPT | Mod: S$GLB,,, | Performed by: DERMATOLOGY

## 2019-11-01 PROCEDURE — 99202 PR OFFICE/OUTPT VISIT, NEW, LEVL II, 15-29 MIN: ICD-10-PCS | Mod: S$GLB,,, | Performed by: DERMATOLOGY

## 2019-11-01 PROCEDURE — 99999 PR PBB SHADOW E&M-EST. PATIENT-LVL III: ICD-10-PCS | Mod: PBBFAC,,, | Performed by: DERMATOLOGY

## 2019-11-01 PROCEDURE — 3008F PR BODY MASS INDEX (BMI) DOCUMENTED: ICD-10-PCS | Mod: CPTII,S$GLB,, | Performed by: DERMATOLOGY

## 2019-11-01 PROCEDURE — 99999 PR PBB SHADOW E&M-EST. PATIENT-LVL III: CPT | Mod: PBBFAC,,, | Performed by: DERMATOLOGY

## 2019-11-01 PROCEDURE — 3008F BODY MASS INDEX DOCD: CPT | Mod: CPTII,S$GLB,, | Performed by: DERMATOLOGY

## 2019-11-01 RX ORDER — BETAMETHASONE DIPROPIONATE 0.5 MG/G
CREAM TOPICAL 2 TIMES DAILY
Qty: 45 G | Refills: 3 | Status: SHIPPED | OUTPATIENT
Start: 2019-11-01 | End: 2020-10-23

## 2019-11-01 NOTE — PROGRESS NOTES
Subjective:       Patient ID:  Francoise Dykes is a 59 y.o. female who presents for   Chief Complaint   Patient presents with    Skin Discoloration     bilateral legs, itching,     Spot     under bilateral eyes,      Dermatitis lower legs off and on for years, see note 2014.  Uses otc cream now and getting worse, itches.  Also new lesions on lower lids not painful no tx.     Skin Discoloration  - Initial  Affected locations: left lower leg, right lower leg, left eye and right eye  Signs / symptoms: itching  Aggravated by: nothing  Relieving factors/Treatments tried: nothing    Spot         Review of Systems   Constitutional: Negative for fever, chills, weight loss, weight gain, fatigue, night sweats and malaise.   Skin: Positive for daily sunscreen use and activity-related sunscreen use. Negative for wears hat.   Hematologic/Lymphatic: Bruises/bleeds easily.        Objective:    Physical Exam   Constitutional: She appears well-developed and well-nourished.   Eyes:       Neurological: She is alert and oriented to person, place, and time.   Psychiatric: She has a normal mood and affect.   Skin:   Areas Examined (abnormalities noted in diagram):   Head / Face Inspection Performed  Neck Inspection Performed  RUE Inspected  LUE Inspection Performed  RLE Inspected  LLE Inspection Performed                   Diagram Legend     Erythematous scaling macule/papule c/w actinic keratosis       Vascular papule c/w angioma      Pigmented verrucoid papule/plaque c/w seborrheic keratosis      Yellow umbilicated papule c/w sebaceous hyperplasia      Irregularly shaped tan macule c/w lentigo     1-2 mm smooth white papules consistent with Milia      Movable subcutaneous cyst with punctum c/w epidermal inclusion cyst      Subcutaneous movable cyst c/w pilar cyst      Firm pink to brown papule c/w dermatofibroma      Pedunculated fleshy papule(s) c/w skin tag(s)      Evenly pigmented macule c/w junctional nevus     Mildly  variegated pigmented, slightly irregular-bordered macule c/w mildly atypical nevus      Flesh colored to evenly pigmented papule c/w intradermal nevus       Pink pearly papule/plaque c/w basal cell carcinoma      Erythematous hyperkeratotic cursted plaque c/w SCC      Surgical scar with no sign of skin cancer recurrence      Open and closed comedones      Inflammatory papules and pustules      Verrucoid papule consistent consistent with wart     Erythematous eczematous patches and plaques     Dystrophic onycholytic nail with subungual debris c/w onychomycosis     Umbilicated papule    Erythematous-base heme-crusted tan verrucoid plaque consistent with inflamed seborrheic keratosis     Erythematous Silvery Scaling Plaque c/w Psoriasis     See annotation      Assessment / Plan:        Dermatitis, irritant vs allergic  -     betamethasone dipropionate (DIPROLENE) 0.05 % cream; Apply topically 2 (two) times daily. for 10 days  Dispense: 45 g; Refill: 3  cerave itch relief  Dc ribbing areas  Basis soap  Call if recurrent for patch tests    Syringoma of eyelid, unspecified laterality  Refer for removal    Seborrheic keratoses  reassurance               Follow up if symptoms worsen or fail to improve.

## 2019-11-01 NOTE — LETTER
November 1, 2019      AMBREEN Long III, MD  [address] [facility]  [place] [address]           [facility] - Dermatology  [address] [facility]  [place] [address]  Phone: [phone]  Fax: [phone]          Patient: Francoise Dykes   MR Number: [id number]   YOB: 1960   Date of Visit: 11/1/2019       Dear Dr. AMBREEN Long III:    Thank you for referring Francoise Dykes to me for evaluation. Attached you will find relevant portions of my assessment and plan of care.    If you have questions, please do not hesitate to call me. I look forward to following Francoise Dykes along with you.    Sincerely,    Siomara Gutierrez MD    Enclosure  CC:  No Recipients    If you would like to receive this communication electronically, please contact [email or web address] or [phone] to request more information on [employer] Link access.    For providers and/or their staff who would like to refer a patient to [facility], please contact us through our one-stop-shop provider referral line, [facility], at [phone].    If you feel you have received this communication in error or would no longer like to receive these types of communications, please e-mail [email or web address]

## 2019-12-19 NOTE — PROGRESS NOTES
Subjective:     HPI     I had the pleasure of seeing Francoise Dykes in follow-up today for her history of atrial fibrillation and PVI. 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 She presented to [facility] in [place], [place] where an ECG showed atrial fibrillation at a rate of 169 bpm (ECG in EPIC). In 11/2012, Mrs. Dykes had another episode of atrial fibrillation after an argument with her son. She presented to [facility], and once again reverted to sinus rhythm within 30 minutes. She was discharged on Flecainide 50 mg BID, Toprol XL 50 mg QD, and Aspirin.     When I initially saw Ms. Dykes in 1/2014, she admitted to monthly palpitations, which would last seconds and resolve with coughing. She believed that Flecainide caused her some shortness of breath. At that office visit, I stopped Flecainide and started Rythmol 600 mg PRN palpitations.     At her office visit with me in 7/2015 Ms. Dykes reported episodes of AF every 2-3 months, which would last minutes and resolve with vagal maneuvers. She continued to take Flecainide every night before she went to bed. At that time Flecainide was stopped. Unfortunately in 9/2015, Ms. Dykes presented to [facility] with AF with RVR. She took Rythmol, and reverted to sinus rhythm within 3 hours. Notably, prior to conversion, her AF organized into a regular rhythm (AFL vs SVT--no 12-lead available to review).    In 12/2015, Ms. Dykes underwent a PVI and SVC ablation. She had frequent episodes of sustained AF during the case, which appeared to be arising from a RA source. She was started on Amiodarone that that time. At her follow-up visit in 1/2016, she was doing well, with no episodes of sustained palpitations. Amiodarone was stopped in early 3/2016. In 8/2016, Ms. Dykes was  admitted with atypical atrial flutter with RVR. She was cardioverted to sinus rhythm, and started on Rythmol  mg BID.    In 12/2016, Ms. Dykes underwent repeat PVI. The right pulmonary veins were re-isolated, with isolated firing noted from the veins post-isolation. Additionally, a mid-sal AT was targeted and successfully ablated. She was discharged on Rythmol  mg bid.    In 3/2017 Rythmol was stopped. Several weeks later she presented to [facility] with atypical atrial flutter with 2:1 AVB at a rate of 120 bpm. Rythmol was restarted at that time. A 4 week event monitor was ordered, but she could only wear it for 1 week due to skin sensitivity. She had one additional episode of palpitations which lasted for 1 hour, which resolved on its own. In 8/2017, Ms. Dykes presented with atypical atrial flutter with RVR, and underwent SAMMIE/DCCV. Notably, Ms. Dykes missed her AM dose of Rythmol. She was continued on Rythmol. In 12/2017 and 1/2018, Ms. Dykes had recurrent atypical atrial flutter, spontaneously converting to sinus rhythm. At that time her Rythmol was increased to 325 mg bid. AT her 4/2018 visit Ms. Dykes was doing well, and the plan was to hold the course.    On 3/21/2019, Ms. Dykes presented to [facility] with palpitations and was found to be in atypical atrial flutter with RVR, which terminated after receiving an extra dose of Rythmol. In total the episode lasted 2-3 hours. She had several episodes of palpitations lasting seconds in the weeks prior, which terminated spontaneously. Notably, she took steroids for an Achilles heel injury several days prior to her ED visit.    Today in the office Ms. Dykes is doing well. She has had no recurrent arrhythmia episodes.    My interpretation of today's ECG is sinus rhythm at 63 bpm.    Review of Systems   Constitution: Negative for decreased appetite, malaise/fatigue, weight gain and weight loss.   HENT: Negative for sore throat.    Eyes: Negative for blurred  vision.   Cardiovascular: Negative for chest pain, dyspnea on exertion, irregular heartbeat, leg swelling, near-syncope, orthopnea, palpitations, paroxysmal nocturnal dyspnea and syncope.   Respiratory: Negative for shortness of breath.    Skin: Negative for rash.   Musculoskeletal: Negative for arthritis.   Gastrointestinal: Negative for abdominal pain.   Neurological: Negative for focal weakness.   Psychiatric/Behavioral: Negative for altered mental status.        Objective:    Physical Exam   Constitutional: She is oriented to person, place, and time. She appears well-developed and well-nourished. No distress.   HENT:   Head: Normocephalic and atraumatic.   Mouth/Throat: Oropharynx is clear and moist.   Eyes: Pupils are equal, round, and reactive to light. Conjunctivae are normal. No scleral icterus.   Neck: Normal range of motion. Neck supple. No JVD present. No thyromegaly present.   Cardiovascular: Normal rate, regular rhythm, normal heart sounds and intact distal pulses. Exam reveals no gallop and no friction rub.   No murmur heard.  Pulmonary/Chest: Effort normal and breath sounds normal. No respiratory distress. She has no rales.   Abdominal: Soft. Bowel sounds are normal. She exhibits no distension.   Musculoskeletal: She exhibits no edema.   Neurological: She is alert and oriented to person, place, and time.   Skin: Skin is warm and dry.   Psychiatric: She has a normal mood and affect. Her behavior is normal.   Vitals reviewed.        Assessment:       1. Paroxysmal atrial fibrillation    2. S/P ablation of atrial fibrillation    3. Essential hypertension    4. Atrial flutter, unspecified type    5. Current use of long term anticoagulation    6. Long term current use of antiarrhythmic drug         Plan:       In summary, Francoise Dykes is a 59-year old female with a history of symptomatic paroxysmal atrial fibrillation who underwent PVI in 12/2015 and re-do PVI in 12/2016. She has since had several  episodes of atypical atrial flutter, including in 8/2017, 12/2017, 1/2018, and 3/2019. She is currently on Rythmol 325 mg bid (only one episode since restarting Rythmol which followed steroid injection).    The plan is to continue Eliquis, Rythmol, and Diltiazem, and follow-up in 12 months.     Thank you for allowing me to participate in the care of this patient. Please do not hesitate to call me with any questions or concerns.

## 2019-12-23 ENCOUNTER — HOSPITAL ENCOUNTER (OUTPATIENT)
Dept: CARDIOLOGY | Facility: CLINIC | Age: 59
Discharge: HOME | End: 2019-12-23
Payer: COMMERCIAL

## 2019-12-23 ENCOUNTER — OFFICE VISIT (OUTPATIENT)
Dept: ELECTROPHYSIOLOGY | Facility: CLINIC | Age: 59
End: 2019-12-23
Payer: COMMERCIAL

## 2019-12-23 VITALS
HEART RATE: 63 BPM | DIASTOLIC BLOOD PRESSURE: 64 MMHG | BODY MASS INDEX: 35.55 KG/M2 | SYSTOLIC BLOOD PRESSURE: 138 MMHG | HEIGHT: 65 IN | WEIGHT: 213.38 LBS

## 2019-12-23 DIAGNOSIS — Z79.01 CURRENT USE OF LONG TERM ANTICOAGULATION: ICD-10-CM

## 2019-12-23 DIAGNOSIS — Z79.899 LONG TERM CURRENT USE OF ANTIARRHYTHMIC DRUG: ICD-10-CM

## 2019-12-23 DIAGNOSIS — I10 ESSENTIAL HYPERTENSION: ICD-10-CM

## 2019-12-23 DIAGNOSIS — I48.0 PAF (PAROXYSMAL ATRIAL FIBRILLATION): ICD-10-CM

## 2019-12-23 DIAGNOSIS — Z86.79 S/P ABLATION OF ATRIAL FIBRILLATION: ICD-10-CM

## 2019-12-23 DIAGNOSIS — I48.92 ATRIAL FLUTTER, UNSPECIFIED TYPE: ICD-10-CM

## 2019-12-23 DIAGNOSIS — I48.0 PAROXYSMAL ATRIAL FIBRILLATION: Primary | ICD-10-CM

## 2019-12-23 DIAGNOSIS — Z98.890 S/P ABLATION OF ATRIAL FIBRILLATION: ICD-10-CM

## 2019-12-23 PROCEDURE — 93010 RHYTHM STRIP: ICD-10-PCS | Mod: S$GLB,,, | Performed by: INTERNAL MEDICINE

## 2019-12-23 PROCEDURE — 99214 OFFICE O/P EST MOD 30 MIN: CPT | Mod: S$GLB,,, | Performed by: INTERNAL MEDICINE

## 2019-12-23 PROCEDURE — 3008F PR BODY MASS INDEX (BMI) DOCUMENTED: ICD-10-PCS | Mod: CPTII,S$GLB,, | Performed by: INTERNAL MEDICINE

## 2019-12-23 PROCEDURE — 99999 PR PBB SHADOW E&M-EST. PATIENT-LVL III: ICD-10-PCS | Mod: PBBFAC,,, | Performed by: INTERNAL MEDICINE

## 2019-12-23 PROCEDURE — 93010 ELECTROCARDIOGRAM REPORT: CPT | Mod: S$GLB,,, | Performed by: INTERNAL MEDICINE

## 2019-12-23 PROCEDURE — 3075F SYST BP GE 130 - 139MM HG: CPT | Mod: CPTII,S$GLB,, | Performed by: INTERNAL MEDICINE

## 2019-12-23 PROCEDURE — 99214 PR OFFICE/OUTPT VISIT, EST, LEVL IV, 30-39 MIN: ICD-10-PCS | Mod: S$GLB,,, | Performed by: INTERNAL MEDICINE

## 2019-12-23 PROCEDURE — 93005 ELECTROCARDIOGRAM TRACING: CPT | Mod: S$GLB,,, | Performed by: INTERNAL MEDICINE

## 2019-12-23 PROCEDURE — 3078F DIAST BP <80 MM HG: CPT | Mod: CPTII,S$GLB,, | Performed by: INTERNAL MEDICINE

## 2019-12-23 PROCEDURE — 93005 RHYTHM STRIP: ICD-10-PCS | Mod: S$GLB,,, | Performed by: INTERNAL MEDICINE

## 2019-12-23 PROCEDURE — 3075F PR MOST RECENT SYSTOLIC BLOOD PRESS GE 130-139MM HG: ICD-10-PCS | Mod: CPTII,S$GLB,, | Performed by: INTERNAL MEDICINE

## 2019-12-23 PROCEDURE — 3008F BODY MASS INDEX DOCD: CPT | Mod: CPTII,S$GLB,, | Performed by: INTERNAL MEDICINE

## 2019-12-23 PROCEDURE — 3078F PR MOST RECENT DIASTOLIC BLOOD PRESSURE < 80 MM HG: ICD-10-PCS | Mod: CPTII,S$GLB,, | Performed by: INTERNAL MEDICINE

## 2019-12-23 PROCEDURE — 99999 PR PBB SHADOW E&M-EST. PATIENT-LVL III: CPT | Mod: PBBFAC,,, | Performed by: INTERNAL MEDICINE

## 2019-12-23 RX ORDER — PROPAFENONE HYDROCHLORIDE 325 MG/1
325 CAPSULE, EXTENDED RELEASE ORAL EVERY 12 HOURS
Qty: 180 CAPSULE | Refills: 3 | Status: SHIPPED | OUTPATIENT
Start: 2019-12-23 | End: 2020-10-26 | Stop reason: SDUPTHER

## 2019-12-23 RX ORDER — DILTIAZEM HYDROCHLORIDE 120 MG/1
120 CAPSULE, EXTENDED RELEASE ORAL DAILY
Qty: 90 CAPSULE | Refills: 3 | Status: SHIPPED | OUTPATIENT
Start: 2019-12-23 | End: 2020-12-14 | Stop reason: SDUPTHER

## 2020-01-15 ENCOUNTER — OFFICE VISIT (OUTPATIENT)
Dept: FAMILY MEDICINE | Facility: CLINIC | Age: 60
End: 2020-01-15
Payer: COMMERCIAL

## 2020-01-15 ENCOUNTER — TELEPHONE (OUTPATIENT)
Dept: FAMILY MEDICINE | Facility: CLINIC | Age: 60
End: 2020-01-15

## 2020-01-15 VITALS
BODY MASS INDEX: 34.47 KG/M2 | DIASTOLIC BLOOD PRESSURE: 76 MMHG | TEMPERATURE: 99 F | SYSTOLIC BLOOD PRESSURE: 134 MMHG | WEIGHT: 214.5 LBS | HEART RATE: 82 BPM | OXYGEN SATURATION: 97 % | HEIGHT: 66 IN | RESPIRATION RATE: 20 BRPM

## 2020-01-15 DIAGNOSIS — J20.9 ACUTE BRONCHITIS, UNSPECIFIED ORGANISM: Primary | ICD-10-CM

## 2020-01-15 PROCEDURE — 99214 PR OFFICE/OUTPT VISIT, EST, LEVL IV, 30-39 MIN: ICD-10-PCS | Mod: S$GLB,,, | Performed by: FAMILY MEDICINE

## 2020-01-15 PROCEDURE — 3008F PR BODY MASS INDEX (BMI) DOCUMENTED: ICD-10-PCS | Mod: CPTII,S$GLB,, | Performed by: FAMILY MEDICINE

## 2020-01-15 PROCEDURE — 3008F BODY MASS INDEX DOCD: CPT | Mod: CPTII,S$GLB,, | Performed by: FAMILY MEDICINE

## 2020-01-15 PROCEDURE — 3075F PR MOST RECENT SYSTOLIC BLOOD PRESS GE 130-139MM HG: ICD-10-PCS | Mod: CPTII,S$GLB,, | Performed by: FAMILY MEDICINE

## 2020-01-15 PROCEDURE — 99214 OFFICE O/P EST MOD 30 MIN: CPT | Mod: S$GLB,,, | Performed by: FAMILY MEDICINE

## 2020-01-15 PROCEDURE — 99999 PR PBB SHADOW E&M-EST. PATIENT-LVL IV: CPT | Mod: PBBFAC,,, | Performed by: FAMILY MEDICINE

## 2020-01-15 PROCEDURE — 99999 PR PBB SHADOW E&M-EST. PATIENT-LVL IV: ICD-10-PCS | Mod: PBBFAC,,, | Performed by: FAMILY MEDICINE

## 2020-01-15 PROCEDURE — 3078F PR MOST RECENT DIASTOLIC BLOOD PRESSURE < 80 MM HG: ICD-10-PCS | Mod: CPTII,S$GLB,, | Performed by: FAMILY MEDICINE

## 2020-01-15 PROCEDURE — 3075F SYST BP GE 130 - 139MM HG: CPT | Mod: CPTII,S$GLB,, | Performed by: FAMILY MEDICINE

## 2020-01-15 PROCEDURE — 3078F DIAST BP <80 MM HG: CPT | Mod: CPTII,S$GLB,, | Performed by: FAMILY MEDICINE

## 2020-01-15 RX ORDER — ALBUTEROL SULFATE 90 UG/1
2 AEROSOL, METERED RESPIRATORY (INHALATION) EVERY 6 HOURS PRN
Qty: 18 G | Refills: 0 | Status: SHIPPED | OUTPATIENT
Start: 2020-01-15 | End: 2020-10-23

## 2020-01-15 RX ORDER — FLUTICASONE PROPIONATE 50 MCG
1 SPRAY, SUSPENSION (ML) NASAL DAILY
Qty: 1 BOTTLE | Refills: 0 | Status: SHIPPED | OUTPATIENT
Start: 2020-01-15 | End: 2021-11-18 | Stop reason: SDUPTHER

## 2020-01-15 RX ORDER — BETAMETHASONE DIPROPIONATE 0.5 MG/G
CREAM TOPICAL
COMMUNITY
Start: 2019-12-26 | End: 2020-02-18

## 2020-01-15 RX ORDER — DOXYCYCLINE 100 MG/1
100 CAPSULE ORAL 2 TIMES DAILY
Qty: 14 CAPSULE | Refills: 0 | Status: SHIPPED | OUTPATIENT
Start: 2020-01-15 | End: 2020-01-22

## 2020-01-15 RX ORDER — TRIAMCINOLONE ACETONIDE 1 MG/G
OINTMENT TOPICAL
COMMUNITY
Start: 2019-12-23 | End: 2020-10-23

## 2020-01-15 RX ORDER — ACETAMINOPHEN AND CODEINE PHOSPHATE 120; 12 MG/5ML; MG/5ML
5 SOLUTION ORAL EVERY 4 HOURS PRN
Qty: 118 ML | Refills: 0 | Status: SHIPPED | OUTPATIENT
Start: 2020-01-15 | End: 2020-01-25

## 2020-01-15 RX ORDER — PREDNISONE 10 MG/1
10 TABLET ORAL DAILY
Qty: 5 TABLET | Refills: 0 | Status: SHIPPED | OUTPATIENT
Start: 2020-01-15 | End: 2020-01-20

## 2020-01-15 NOTE — TELEPHONE ENCOUNTER
----- Message from Tayal Lamas sent at 1/15/2020 10:18 AM CST -----  Contact: [phone] or [phone]  Type:  Same Day Appointment Request    Caller is requesting a same day appointment.  Caller declined first available appointment listed below.    Name of Caller: PT   When is the first available appointment?01-16-20  Symptoms:Severe cough and sore throat   Best Call Back Number: [phone]  Additional Information:

## 2020-01-15 NOTE — PROGRESS NOTES
Subjective:       Patient ID: Francoise Dykes is a 59 y.o. female.    Chief Complaint: Cough and Sore Throat      HPI  [age] year female presents for cough.  Patient states started few days ago.  Endorses sore throat, postnasal drip, and congestion. Denies any shortness of breath or wheezing.  Denies any fever chills.      Review of Systems   Constitutional: Negative.    HENT: Positive for congestion, sinus pressure, sneezing and sore throat.    Respiratory: Positive for cough.    Cardiovascular: Negative.    Gastrointestinal: Negative.    Endocrine: Negative.    Genitourinary: Negative.    Musculoskeletal: Negative.    Neurological: Negative.    Psychiatric/Behavioral: Negative.           Past Medical History:   Diagnosis Date    Acid reflux     Allergy     seasonal    Atrial fibrillation     Essential hypertension 2017    Pt states that she does not have HTN.      Past Surgical History:   Procedure Laterality Date    CARDIAC ELECTROPHYSIOLOGY STUDY AND ABLATION       SECTION      COLONOSCOPY N/A 2018    Procedure: COLONOSCOPY;  Surgeon: Brodie Lara MD;  Location: [facility];  Service: Endoscopy;  Laterality: N/A;  confirmed appt-ss    HYSTERECTOMY      MIGUEL    RADIOFREQUENCY ABLATION Left 2019    Procedure: RADIOFREQUENCY ABLATION, LEFT L2,3,4,5;  Surgeon: Mariusz Jang MD;  Location: [facility];  Service: Pain Management;  Laterality: Left;  Left RFA L2,3,4,5  1 of 2  *Ok to hold [address]    RADIOFREQUENCY ABLATION Right 3/13/2019    Procedure: RADIOFREQUENCY ABLATION,  Right L2,3,4,5;  Surgeon: Mariusz Jang MD;  Location: [facility];  Service: Pain Management;  Laterality: Right;  Right RFA @ L2,3,4,5  2 of 2     Family History   Problem Relation Age of Onset    Hypertension Mother     Diabetes Mother     Glaucoma Mother     Allergies Mother     Asbestos Father     Obesity Father     Eczema Son     Hypertension Son     Heart  disease Maternal Grandmother         chf    Diabetes Maternal Grandfather     Cancer Paternal Grandmother         lung, 2/2 second hand smoke    Glaucoma Paternal Grandfather     Blindness Paternal Grandfather     Melanoma Neg Hx     Psoriasis Neg Hx     Lupus Neg Hx     Acne Neg Hx     Breast cancer Neg Hx     Colon cancer Neg Hx     Ovarian cancer Neg Hx      Social History     Socioeconomic History    Marital status:      Spouse name: Not on file    Number of children: Not on file    Years of education: Not on file    Highest education level: Not on file   Occupational History    Occupation: Teacher     Employer: [employer]    Social Needs    Financial resource strain: Not on file    Food insecurity:     Worry: Not on file     Inability: Not on file    Transportation needs:     Medical: Not on file     Non-medical: Not on file   Tobacco Use    Smoking status: Never Smoker    Smokeless tobacco: Never Used   Substance and Sexual Activity    Alcohol use: Yes     Comment: rarely, 1 drink/week    Drug use: No    Sexual activity: Yes     Partners: Male   Lifestyle    Physical activity:     Days per week: Not on file     Minutes per session: Not on file    Stress: Not on file   Relationships    Social connections:     Talks on phone: Not on file     Gets together: Not on file     Attends Druze service: Not on file     Active member of club or organization: Not on file     Attends meetings of clubs or organizations: Not on file     Relationship status: Not on file   Other Topics Concern    Are you pregnant or think you may be? No    Breast-feeding No   Social History Narrative    Recently moved back to [facility] to help take care of mom.       Current Outpatient Medications:     apixaban (ELIQUIS) 5 mg Tab, Take 1 tablet (5 mg total) by mouth 2 (two) times daily., Disp: 180 tablet, Rfl: 3    augmented betamethasone dipropionate (DIPROLENE-AF) 0.05 % cream, , Disp: , Rfl:      diltiaZEM (DILACOR XR) 120 MG CDCR, Take 1 capsule (120 mg total) by mouth once daily., Disp: 90 capsule, Rfl: 3    hydroCHLOROthiazide (HYDRODIURIL) 12.5 MG Tab, TAKE 1 TABLET EVERY DAY, Disp: 90 tablet, Rfl: 2    hydrocortisone 0.5 % cream, Apply topically daily as needed., Disp: , Rfl:     omeprazole (PRILOSEC) 40 MG capsule, Take 1 capsule (40 mg total) by mouth 2 (two) times daily before meals., Disp: 60 capsule, Rfl: 5    propafenone (RYTHMOL SR) 325 MG Cp12, Take 1 capsule (325 mg total) by mouth every 12 (twelve) hours., Disp: 180 capsule, Rfl: 3    triamcinolone acetonide 0.1% (KENALOG) 0.1 % ointment, , Disp: , Rfl:     acetaminophen with codeine (ACETAMINOPHEN-CODEINE) 120mg 12mg 5mL Soln, Take 5 mLs by mouth every 4 (four) hours as needed., Disp: 118 mL, Rfl: 0    albuterol (PROVENTIL/VENTOLIN HFA) 90 mcg/actuation inhaler, Inhale 2 puffs into the lungs every 6 (six) hours as needed for Wheezing. Rescue, Disp: 18 g, Rfl: 0    azelastine (ASTELIN) 137 mcg (0.1 %) nasal spray, 1 spray (137 mcg total) by Nasal route 2 (two) times daily., Disp: 30 mL, Rfl: 3    betamethasone dipropionate (DIPROLENE) 0.05 % cream, Apply topically 2 (two) times daily. for 10 days, Disp: 45 g, Rfl: 3    cetirizine (ZYRTEC) 10 MG tablet, Take 10 mg by mouth once daily., Disp: , Rfl:     doxycycline (VIBRAMYCIN) 100 MG Cap, Take 1 capsule (100 mg total) by mouth 2 (two) times daily. for 7 days, Disp: 14 capsule, Rfl: 0    fluticasone propionate (FLONASE) 50 mcg/actuation nasal spray, 1 spray (50 mcg total) by Each Nostril route once daily., Disp: 1 Bottle, Rfl: 0    predniSONE (DELTASONE) 10 MG tablet, Take 1 tablet (10 mg total) by mouth once daily. for 5 days, Disp: 5 tablet, Rfl: 0   Objective:      Vitals:    01/15/20 1421   BP: 134/76   BP Location: Right arm   Patient Position: Sitting   BP Method: Large (Manual)   Pulse: 82   Resp: 20   Temp: 98.5 °F (36.9 °C)   TempSrc: Oral   SpO2: 97%   Weight: 97.3 kg (214  "lb 8.1 oz)   Height: 5' 5.5" (1.664 m)       Physical Exam   Constitutional: She is oriented to person, place, and time. No distress.   HENT:   Head: Normocephalic and atraumatic.   Mouth/Throat: No oropharyngeal exudate.   B/l nasal turbinate swelling   Eyes: Conjunctivae are normal.   Neck: Neck supple.   Cardiovascular: Normal rate, regular rhythm and normal heart sounds. Exam reveals no gallop and no friction rub.   No murmur heard.  Pulmonary/Chest: Effort normal. She has wheezes (in all lung fields). She has no rales.   Lymphadenopathy:     She has no cervical adenopathy.   Neurological: She is alert and oriented to person, place, and time.   Skin: Skin is warm and dry.   Psychiatric: She has a normal mood and affect. Her behavior is normal. Judgment and thought content normal.          Assessment:       1. Acute bronchitis, unspecified organism        Plan:       Acute bronchitis, unspecified organism  -     predniSONE (DELTASONE) 10 MG tablet; Take 1 tablet (10 mg total) by mouth once daily. for 5 days  Dispense: 5 tablet; Refill: 0  -     doxycycline (VIBRAMYCIN) 100 MG Cap; Take 1 capsule (100 mg total) by mouth 2 (two) times daily. for 7 days  Dispense: 14 capsule; Refill: 0  -     albuterol (PROVENTIL/VENTOLIN HFA) 90 mcg/actuation inhaler; Inhale 2 puffs into the lungs every 6 (six) hours as needed for Wheezing. Rescue  Dispense: 18 g; Refill: 0  -     fluticasone propionate (FLONASE) 50 mcg/actuation nasal spray; 1 spray (50 mcg total) by Each Nostril route once daily.  Dispense: 1 Bottle; Refill: 0  -     acetaminophen with codeine (ACETAMINOPHEN-CODEINE) 120mg 12mg 5mL Soln; Take 5 mLs by mouth every 4 (four) hours as needed.  Dispense: 118 mL; Refill: 0      Advised caution w/ meds due to her hx of PAF. Pt does need to use steroid and inhaler. Given abx due to severity of her symptoms.            Juice So MD      Patient note was created using [place].  Any errors in syntax or even information " may not have been identified and edited on initial review prior to signing this note.

## 2020-02-06 ENCOUNTER — PATIENT OUTREACH (OUTPATIENT)
Dept: ADMINISTRATIVE | Facility: OTHER | Age: 60
End: 2020-02-06

## 2020-02-10 ENCOUNTER — OFFICE VISIT (OUTPATIENT)
Dept: ALLERGY | Facility: CLINIC | Age: 60
End: 2020-02-10
Payer: COMMERCIAL

## 2020-02-10 VITALS — HEIGHT: 66 IN | WEIGHT: 214.31 LBS | BODY MASS INDEX: 34.44 KG/M2

## 2020-02-10 DIAGNOSIS — J06.9 UPPER RESPIRATORY TRACT INFECTION, UNSPECIFIED TYPE: ICD-10-CM

## 2020-02-10 DIAGNOSIS — Z79.899 LONG TERM CURRENT USE OF ANTIARRHYTHMIC DRUG: ICD-10-CM

## 2020-02-10 DIAGNOSIS — J40 BRONCHITIS: ICD-10-CM

## 2020-02-10 DIAGNOSIS — I48.0 PAROXYSMAL ATRIAL FIBRILLATION: ICD-10-CM

## 2020-02-10 DIAGNOSIS — R05.9 COUGH: Primary | ICD-10-CM

## 2020-02-10 PROCEDURE — 99214 PR OFFICE/OUTPT VISIT, EST, LEVL IV, 30-39 MIN: ICD-10-PCS | Mod: S$GLB,,, | Performed by: ALLERGY & IMMUNOLOGY

## 2020-02-10 PROCEDURE — 99999 PR PBB SHADOW E&M-EST. PATIENT-LVL III: ICD-10-PCS | Mod: PBBFAC,,, | Performed by: ALLERGY & IMMUNOLOGY

## 2020-02-10 PROCEDURE — 99999 PR PBB SHADOW E&M-EST. PATIENT-LVL III: CPT | Mod: PBBFAC,,, | Performed by: ALLERGY & IMMUNOLOGY

## 2020-02-10 PROCEDURE — 99214 OFFICE O/P EST MOD 30 MIN: CPT | Mod: S$GLB,,, | Performed by: ALLERGY & IMMUNOLOGY

## 2020-02-10 PROCEDURE — 3008F BODY MASS INDEX DOCD: CPT | Mod: CPTII,S$GLB,, | Performed by: ALLERGY & IMMUNOLOGY

## 2020-02-10 PROCEDURE — 3008F PR BODY MASS INDEX (BMI) DOCUMENTED: ICD-10-PCS | Mod: CPTII,S$GLB,, | Performed by: ALLERGY & IMMUNOLOGY

## 2020-02-10 RX ORDER — PREDNISONE 10 MG/1
TABLET ORAL
Qty: 21 TABLET | Refills: 0 | Status: SHIPPED | OUTPATIENT
Start: 2020-02-10 | End: 2020-02-12 | Stop reason: SDUPTHER

## 2020-02-10 RX ORDER — AMOXICILLIN AND CLAVULANATE POTASSIUM 875; 125 MG/1; MG/1
1 TABLET, FILM COATED ORAL EVERY 12 HOURS
Qty: 20 TABLET | Refills: 0 | Status: SHIPPED | OUTPATIENT
Start: 2020-02-10 | End: 2020-09-23 | Stop reason: SDUPTHER

## 2020-02-10 RX ORDER — ALBUTEROL SULFATE 90 UG/1
2 AEROSOL, METERED RESPIRATORY (INHALATION) EVERY 4 HOURS PRN
Qty: 1 G | Refills: 2 | Status: SHIPPED | OUTPATIENT
Start: 2020-02-10 | End: 2020-10-23

## 2020-02-10 NOTE — LETTER
February 10, 2020      [facility] - All[facility]/ Immunology  [address]  [facility] [address]  Phone: [phone]  Fax: [phone]       Patient: Francoise Dykes   YOB: 1960  Date of Visit: 02/10/2020    To Whom It May Concern:    Maddie Dykes  was at [facility] on 02/10/2020. Please excuse her absence from 2/10/2020 thru 2/13/2020. If you have any questions or concerns, or if I can be of further assistance, please do not hesitate to contact me.    Sincerely,    Leticia Wagner LPN

## 2020-02-10 NOTE — PROGRESS NOTES
Francoise Dykes returns to clinic today for continued evaluation of chronic rhinitis, conjunctivitis, LPR, and cough.  She was last seen October 24, 2019. She is here alone.    After last visit, she had her cerumen impaction removed in ENT.    She also saw Dr. Gutierrez in Dermatology who gave her Diprolene for her dermatitis.    On omeprazole 40 milligrams a day, the symptoms that she was having resolved.  She was not having any throat clearing, postnasal drip, or cough.    Around January 10, 2020 she developed an upper respiratory infection with increased rhinitis and cough that was productive of yellow sputum.  She was seen by Dr. Rg her primary care physician who heard wheezing.  She was given prednisone 10 milligrams a day for five days and doxycycline for seven days.  She was also given an albuterol inhaler.  She was told to take Flonase and [place] with Codeine.    She does have a history of paroxysmal atrial fibrillation and is followed by Dr. Meyers in Cardiology.  She developed palpitations and was seen in the emergency department in March 2019.  She had atrial flutter with a rapid ventricular response.  This resolved after an additional dose of Rythmol.  She had taken steroids for an Achilles heel injury several days prior to this.  She was advised to take steroids with caution in the future.    On prednisone and antibiotics her symptoms improve but over the last week she has again developed increased rhinitis with a cough productive of yellow sputum.  She has had increased wheezing and shortness of breath that her colleagues have been able to hear.  She has been taking albuterol MDI every 3 to 4 hours while awake.  This does help but her symptoms recur.    She has not had any fever.  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    [facility] ALLERGY QUESTIONNAIRE SHORT 2/6/2020   Head - No symptoms Yes   Ear pain? Yes   Postnasal drip? Yes   Congestion? Yes   Voice change? Yes   Eye - No symptoms Yes    Wheezing? Yes   Skin - No symptoms Yes     Physical Examination:  General: Well-developed, well-nourished, no acute distress.  Appears acutely ill.  Head: No sinus tenderness.  Eyes: Conjunctivae:  No bulbar or palpebral conjunctival injection.  Ears: EAC's left with cerumen.  TM's not seen on the left but normal on the right.  No pre-auricular nodes.  Nose: Nasal Mucosa:  Pink.  Septum: No apparent deviation.  Turbinates:  No significant edema.  Polyps/Mass:  None visible.  Teeth/Gums:  No bleeding noted.  Oropharynx: No exudates.  Neck: Supple without thyromegaly. No cervical lymphadenopathy.    Respiratory/Chest: Effort: Good.  Auscultation:   Bilateral expiratory wheezing.  Skin: Good turgor.  No urticaria or angioedema.    Neuro/Psych: Oriented x 3.    Spirometry 08/20/2018:  Normal.    Laboratory 08/20/2018:  ImmunoCAP:  Negative.    Assessment:  1.  Chronic rhinitis, consider allergic.  2.  Chronic conjunctivitis, consider allergic.  3.  Viral URI with bronchitis and wheezing.  4.  GERD.  5.  LPR.  6.  Dermatitis of uncertain etiology.  7.  Atrial fibrillation on Eliquis and Rythmol.  8.  Nasal bleeding on topical nasal steroids in the past.    Recommendations:  1.  Start Augmentin 875 milligrams b.i.d. for 10 days.  2.  Prednisone taper after talking with Dr. Meyers.  3.  Continue omeprazole 40 mg twice a day.  4.  Albuterol as needed.  5.  Zyrtec as needed.  6.  Return to clinic in two two days.  7.  Consider skin testing in the future off antihistamines and Rythmol if needed.

## 2020-02-12 ENCOUNTER — PATIENT OUTREACH (OUTPATIENT)
Dept: ADMINISTRATIVE | Facility: OTHER | Age: 60
End: 2020-02-12

## 2020-02-12 ENCOUNTER — OFFICE VISIT (OUTPATIENT)
Dept: ALLERGY | Facility: CLINIC | Age: 60
End: 2020-02-12
Payer: COMMERCIAL

## 2020-02-12 VITALS — HEIGHT: 66 IN | WEIGHT: 215.19 LBS | BODY MASS INDEX: 34.58 KG/M2

## 2020-02-12 DIAGNOSIS — J40 BRONCHITIS: ICD-10-CM

## 2020-02-12 DIAGNOSIS — I10 ESSENTIAL HYPERTENSION: ICD-10-CM

## 2020-02-12 DIAGNOSIS — I48.92 ATRIAL FLUTTER WITH RAPID VENTRICULAR RESPONSE: ICD-10-CM

## 2020-02-12 DIAGNOSIS — I48.92 ATRIAL FLUTTER, UNSPECIFIED TYPE: ICD-10-CM

## 2020-02-12 DIAGNOSIS — R05.9 COUGH: Primary | ICD-10-CM

## 2020-02-12 DIAGNOSIS — J06.9 UPPER RESPIRATORY TRACT INFECTION, UNSPECIFIED TYPE: ICD-10-CM

## 2020-02-12 PROCEDURE — 99999 PR PBB SHADOW E&M-EST. PATIENT-LVL II: ICD-10-PCS | Mod: PBBFAC,,, | Performed by: ALLERGY & IMMUNOLOGY

## 2020-02-12 PROCEDURE — 99214 PR OFFICE/OUTPT VISIT, EST, LEVL IV, 30-39 MIN: ICD-10-PCS | Mod: S$GLB,,, | Performed by: ALLERGY & IMMUNOLOGY

## 2020-02-12 PROCEDURE — 3008F BODY MASS INDEX DOCD: CPT | Mod: CPTII,S$GLB,, | Performed by: ALLERGY & IMMUNOLOGY

## 2020-02-12 PROCEDURE — 99999 PR PBB SHADOW E&M-EST. PATIENT-LVL II: CPT | Mod: PBBFAC,,, | Performed by: ALLERGY & IMMUNOLOGY

## 2020-02-12 PROCEDURE — 3008F PR BODY MASS INDEX (BMI) DOCUMENTED: ICD-10-PCS | Mod: CPTII,S$GLB,, | Performed by: ALLERGY & IMMUNOLOGY

## 2020-02-12 PROCEDURE — 99214 OFFICE O/P EST MOD 30 MIN: CPT | Mod: S$GLB,,, | Performed by: ALLERGY & IMMUNOLOGY

## 2020-02-12 RX ORDER — PREDNISONE 10 MG/1
TABLET ORAL
Qty: 21 TABLET | Refills: 0 | Status: SHIPPED | OUTPATIENT
Start: 2020-02-12 | End: 2020-10-23

## 2020-02-12 NOTE — PROGRESS NOTES
Francoise Dykes returns to clinic today for continued evaluation chronic rhinitis, conjunctivitis, LPR, and cough.  She was last seen February 10, 2020.  She is here alone.    After her last visit, she took oral prednisone 20 milligrams and one dose of Augmentin.  Within several hours she felt much improved.  She was able to sleep that night.    She has continued to take tapering prednisone and is now on 40 milligrams a day.  She continues to take Augmentin as well.    She stayed home from work for two days and does think rest helped.    Her rhinitis has resolved.  She has no longer coughing.  She does not have any shortness of breath.    She denies any fever.    She tolerated the prednisone without any increase in her heart rate.    [facility] PE[facility] ALLERGY QUESTIONNAIRE SHORT 2/12/2020   Head - No symptoms Yes   Ears - No symptoms Yes   Ear pain? -   Postnasal drip? Yes   Congestion? -   Voice change? Yes   Eye - No symptoms Yes   Cough? Yes   Wheezing? -   Skin - No symptoms Yes     Physical Examination:  General: Well-developed, well-nourished, no acute distress.    Head: No sinus tenderness.  Eyes: Conjunctivae:  No bulbar or palpebral conjunctival injection.  Ears: EAC's left with cerumen.  TM's not seen on the left but normal on the right.  No pre-auricular nodes.  Nose: Nasal Mucosa:  Pink.  Septum: No apparent deviation.  Turbinates:  No significant edema.  Polyps/Mass:  None visible.  Teeth/Gums:  No bleeding noted.  Oropharynx: No exudates.  Neck: Supple without thyromegaly. No cervical lymphadenopathy.    Respiratory/Chest: Effort: Good.  Auscultation:   Mild end-expiratory wheezing.  Skin: Good turgor.  No urticaria or angioedema.    Neuro/Psych: Oriented x 3.    Spirometry 08/20/2018:  Normal.    Laboratory 08/20/2018:  ImmunoCAP:  Negative.    Assessment:  1.  Chronic rhinitis, consider allergic.  2.  Chronic conjunctivitis, consider allergic.  3.  Viral URI with bronchitis and wheezing, resolving.  4.   GERD.  5.  LPR.  6.  Dermatitis of uncertain etiology.  7.  Atrial fibrillation on Eliquis and Rythmol.  8.  Nasal bleeding on topical nasal steroids in the past.    Recommendations:  1.  Finish Augmentin 875 milligrams b.i.d. for 10 days.  2.  Prednisone taper.  3.  Continue omeprazole 40 mg twice a day.  4.  Albuterol as needed.  5.  Zyrtec as needed.  6.  Return to clinic next week if needed.  7.  Consider skin testing in the future off antihistamines and Rythmol if needed.

## 2020-02-12 NOTE — LETTER
February 12, 2020      [facility] - All[facility]/ Immunology  [address]  [facility] [address]  Phone: [phone]  Fax: [phone]       Patient: Francoise Dykes   YOB: 1960  Date of Visit: 02/12/2020    To Whom It May Concern:    Maddie Dykes  was at [facility] on 02/12/2020. Patient may return to work on Thursday, February 12 th with no restrictions.If you have any questions or concerns, or if I can be of further assistance, please do not hesitate to contact me.    Sincerely,        Elizabeth A Bosworth, LPN

## 2020-02-12 NOTE — LETTER
February 12, 2020      [facility] - All[facility]/ Immunology  [address]  [facility] [address]  Phone: [phone]  Fax: [phone]       Patient: Francoise Dykes   YOB: 1960  Date of Visit: 02/12/2020    To Whom It May Concern:    Maddie Dykes  was at [facility] on 02/12/2020. Patient may return to work tomorrow, Thursday February 13th with no restrictions. If you have any questions or concerns, or if I can be of further assistance, please do not hesitate to contact me.    Sincerely,        Elizabeth A Bosworth, LPN

## 2020-02-18 ENCOUNTER — OFFICE VISIT (OUTPATIENT)
Dept: OBSTETRICS AND GYNECOLOGY | Facility: CLINIC | Age: 60
End: 2020-02-18
Payer: COMMERCIAL

## 2020-02-18 ENCOUNTER — PATIENT OUTREACH (OUTPATIENT)
Dept: ADMINISTRATIVE | Facility: OTHER | Age: 60
End: 2020-02-18

## 2020-02-18 VITALS
DIASTOLIC BLOOD PRESSURE: 64 MMHG | BODY MASS INDEX: 34.93 KG/M2 | WEIGHT: 217.38 LBS | SYSTOLIC BLOOD PRESSURE: 122 MMHG | HEIGHT: 66 IN

## 2020-02-18 DIAGNOSIS — Z12.31 SCREENING MAMMOGRAM, ENCOUNTER FOR: ICD-10-CM

## 2020-02-18 DIAGNOSIS — Z01.419 ENCOUNTER FOR GYNECOLOGICAL EXAMINATION WITHOUT ABNORMAL FINDING: Primary | ICD-10-CM

## 2020-02-18 PROBLEM — I48.92 ATRIAL FLUTTER: Status: RESOLVED | Noted: 2017-12-26 | Resolved: 2020-02-18

## 2020-02-18 PROBLEM — M47.9 OSTEOARTHRITIS OF SPINE: Status: RESOLVED | Noted: 2019-02-17 | Resolved: 2020-02-18

## 2020-02-18 PROBLEM — I48.92 ATRIAL FLUTTER WITH RAPID VENTRICULAR RESPONSE: Status: RESOLVED | Noted: 2018-01-26 | Resolved: 2020-02-18

## 2020-02-18 PROCEDURE — 99396 PR PREVENTIVE VISIT,EST,40-64: ICD-10-PCS | Mod: S$GLB,,, | Performed by: OBSTETRICS & GYNECOLOGY

## 2020-02-18 PROCEDURE — 99999 PR PBB SHADOW E&M-EST. PATIENT-LVL III: ICD-10-PCS | Mod: PBBFAC,,, | Performed by: OBSTETRICS & GYNECOLOGY

## 2020-02-18 PROCEDURE — 3074F PR MOST RECENT SYSTOLIC BLOOD PRESSURE < 130 MM HG: ICD-10-PCS | Mod: CPTII,S$GLB,, | Performed by: OBSTETRICS & GYNECOLOGY

## 2020-02-18 PROCEDURE — 3078F PR MOST RECENT DIASTOLIC BLOOD PRESSURE < 80 MM HG: ICD-10-PCS | Mod: CPTII,S$GLB,, | Performed by: OBSTETRICS & GYNECOLOGY

## 2020-02-18 PROCEDURE — 3074F SYST BP LT 130 MM HG: CPT | Mod: CPTII,S$GLB,, | Performed by: OBSTETRICS & GYNECOLOGY

## 2020-02-18 PROCEDURE — 99999 PR PBB SHADOW E&M-EST. PATIENT-LVL III: CPT | Mod: PBBFAC,,, | Performed by: OBSTETRICS & GYNECOLOGY

## 2020-02-18 PROCEDURE — 3078F DIAST BP <80 MM HG: CPT | Mod: CPTII,S$GLB,, | Performed by: OBSTETRICS & GYNECOLOGY

## 2020-02-18 PROCEDURE — 99396 PREV VISIT EST AGE 40-64: CPT | Mod: S$GLB,,, | Performed by: OBSTETRICS & GYNECOLOGY

## 2020-02-18 NOTE — PROGRESS NOTES
PT HERE FOR ANNUAL.  HAS SKIN TINGLING IN L BREAST.    ROS:  GENERAL: No fever, chills, fatigability or weight loss.  VULVAR: No pain, no lesions and no itching.  VAGINAL: No relaxation, no itching, no discharge, no abnormal bleeding and no lesions.  ABDOMEN: No abdominal pain. Denies nausea. Denies vomiting. No diarrhea. No constipation  BREAST: Denies pain. No lumps. No discharge.  URINARY: No incontinence, no nocturia, no frequency and no dysuria.  CARDIOVASCULAR: No chest pain. No shortness of breath. No leg cramps.  NEUROLOGICAL: No headaches. No vision changes.  The remainder of the review of systems was negative.    PE:   General Appearance: obese Well developed. Well nourished. In no acute distress.  Urethral Meatus: Normal size. Normal location. No lesions. No prolapse.  Vulva: Atrophic. Lesions: No.  Urethra: No masses. No tenderness. No prolapse. No scarring.  Bladder: No masses. No tenderness.  Vagina: Mucosa NI: yes Discharge: no Atrophic: yes Rectocele: no Cystocele: no Vaginal cuff intact: yes  Cervix: Absent.  Uterus: Absent.  Adnexa: Masses: No Tenderness: No       CDS Nodularity: No   Abdomen: obese  No masses. No tenderness.  Breasts: No bilateral masses. No bilateral discharge. No bilateral tenderness. No bilateral fibrocystic changes.  Neck: No thyroid enlargement. No thyroid masses.  Skin: Rashes: No    PROCEDURES:    DIAGNOSIS:  1. Encounter for gynecological examination without abnormal finding    2. Screening mammogram, encounter for        PLAN:     MEDICATIONS & ORDERS:  Orders Placed This Encounter    Mammo Digital Screening Bilat       Patient was counseled today on the new ACS guidelines for cervical cytology screening as well as the current recommendations for breast cancer screening. She was counseled to follow up with her PCP for other routine health maintenance. Counseling session lasted approximately 10 minutes, and all her questions were answered.         FOLLOW-UP: With me in 12  month

## 2020-02-26 ENCOUNTER — HOSPITAL ENCOUNTER (OUTPATIENT)
Dept: RADIOLOGY | Facility: OTHER | Age: 60
Discharge: HOME | End: 2020-02-26
Attending: OBSTETRICS & GYNECOLOGY
Payer: COMMERCIAL

## 2020-02-26 VITALS — BODY MASS INDEX: 34.93 KG/M2 | WEIGHT: 217.38 LBS | HEIGHT: 66 IN

## 2020-02-26 DIAGNOSIS — Z12.31 SCREENING MAMMOGRAM, ENCOUNTER FOR: ICD-10-CM

## 2020-02-26 PROCEDURE — 77067 SCR MAMMO BI INCL CAD: CPT | Mod: TC

## 2020-02-26 PROCEDURE — 77067 SCR MAMMO BI INCL CAD: CPT | Mod: 26,,, | Performed by: INTERNAL MEDICINE

## 2020-02-26 PROCEDURE — 77067 MAMMO DIGITAL SCREENING BILAT WITH CAD: ICD-10-PCS | Mod: 26,,, | Performed by: INTERNAL MEDICINE

## 2020-03-01 ENCOUNTER — HOSPITAL ENCOUNTER (EMERGENCY)
Facility: HOSPITAL | Age: 60
Discharge: HOME | End: 2020-03-01
Attending: EMERGENCY MEDICINE
Payer: COMMERCIAL

## 2020-03-01 VITALS
HEIGHT: 65 IN | TEMPERATURE: 98 F | SYSTOLIC BLOOD PRESSURE: 132 MMHG | OXYGEN SATURATION: 98 % | HEART RATE: 72 BPM | RESPIRATION RATE: 18 BRPM | BODY MASS INDEX: 35.16 KG/M2 | DIASTOLIC BLOOD PRESSURE: 67 MMHG | WEIGHT: 211 LBS

## 2020-03-01 DIAGNOSIS — N39.0 ACUTE URINARY TRACT INFECTION: Primary | ICD-10-CM

## 2020-03-01 LAB
BILIRUBIN, POC UA: NEGATIVE
BLOOD, POC UA: ABNORMAL
CLARITY, POC UA: ABNORMAL
COLOR, POC UA: ABNORMAL
GLUCOSE, POC UA: NEGATIVE
KETONES, POC UA: NEGATIVE
LEUKOCYTE EST, POC UA: ABNORMAL
NITRITE, POC UA: NEGATIVE
PH UR STRIP: 6 [PH]
PROTEIN, POC UA: ABNORMAL
SPECIFIC GRAVITY, POC UA: 1.02
UROBILINOGEN, POC UA: 0.2 E.U./DL

## 2020-03-01 PROCEDURE — 87077 CULTURE AEROBIC IDENTIFY: CPT

## 2020-03-01 PROCEDURE — 87086 URINE CULTURE/COLONY COUNT: CPT

## 2020-03-01 PROCEDURE — 81003 URINALYSIS AUTO W/O SCOPE: CPT | Mod: ER

## 2020-03-01 PROCEDURE — 87186 SC STD MICRODIL/AGAR DIL: CPT

## 2020-03-01 PROCEDURE — 87088 URINE BACTERIA CULTURE: CPT

## 2020-03-01 PROCEDURE — 99284 EMERGENCY DEPT VISIT MOD MDM: CPT | Mod: ER

## 2020-03-01 RX ORDER — NITROFURANTOIN 25; 75 MG/1; MG/1
100 CAPSULE ORAL 2 TIMES DAILY
Qty: 10 CAPSULE | Refills: 0 | Status: SHIPPED | OUTPATIENT
Start: 2020-03-01 | End: 2020-03-06

## 2020-03-01 RX ORDER — PHENAZOPYRIDINE HYDROCHLORIDE 200 MG/1
200 TABLET, FILM COATED ORAL 3 TIMES DAILY PRN
Qty: 6 TABLET | Refills: 0 | Status: SHIPPED | OUTPATIENT
Start: 2020-03-01 | End: 2020-03-03

## 2020-03-01 RX ORDER — IBUPROFEN 600 MG/1
600 TABLET ORAL EVERY 6 HOURS PRN
Qty: 20 TABLET | Refills: 0 | Status: SHIPPED | OUTPATIENT
Start: 2020-03-01 | End: 2020-10-23

## 2020-03-01 RX ORDER — ACETAMINOPHEN 500 MG
1000 TABLET ORAL EVERY 6 HOURS PRN
Qty: 30 TABLET | Refills: 0 | Status: SHIPPED | OUTPATIENT
Start: 2020-03-01 | End: 2020-10-23

## 2020-03-01 NOTE — ED PROVIDER NOTES
Encounter Date: 3/1/2020    SCRIBE #1 NOTE: I, Shama Tracey, am scribing for, and in the presence of,  Dr. Vaca. I have scribed the following portions of the note - Other sections scribed: HPI, PE, ROS.       History     Chief Complaint   Patient presents with    Dysuria     constantly hurting with urination.  no visable blood in the urine.  also back pain.      Francoise Dykes is a 59 y.o. female who presents to the ED complaining of pressure in her abdomen, dysuria, frequency, urgency and lower back pain for the last 3 days. She reports taking Azo and Tylenol for pain with little relief. The last dose of Tylenol she took was last night before bed. She denies any fever, but report slight chills.     The history is provided by the patient. No  was used.     Review of patient's allergies indicates:   Allergen Reactions    Adhesive tape-silicones Itching     Manifested in 2015 following AF ablation.     Past Medical History:   Diagnosis Date    Acid reflux     Allergy     seasonal    Atrial fibrillation     Essential hypertension 2017    Pt states that she does not have HTN.      Past Surgical History:   Procedure Laterality Date    CARDIAC ELECTROPHYSIOLOGY STUDY AND ABLATION       SECTION      COLONOSCOPY N/A 2018    Procedure: COLONOSCOPY;  Surgeon: Brodie Lara MD;  Location: [facility];  Service: Endoscopy;  Laterality: N/A;  confirmed appt-ss    HYSTERECTOMY      MIGUEL    RADIOFREQUENCY ABLATION Left 2019    Procedure: RADIOFREQUENCY ABLATION, LEFT L2,3,4,5;  Surgeon: Mariusz Jang MD;  Location: [facility];  Service: Pain Management;  Laterality: Left;  Left RFA L2,3,4,5  1 of 2  *Ok to hold el[address]    RADIOFREQUENCY ABLATION Right 3/13/2019    Procedure: RADIOFREQUENCY ABLATION,  Right L2,3,4,5;  Surgeon: Mariusz Jang MD;  Location: [facility];  Service: Pain Management;  Laterality: Right;  Right RFA @  L2,3,4,5  2 of 2     Family History   Problem Relation Age of Onset    Hypertension Mother     Diabetes Mother     Glaucoma Mother     Allergies Mother     Asbestos Father     Obesity Father     Eczema Son     Hypertension Son     Heart disease Maternal Grandmother         chf    Diabetes Maternal Grandfather     Cancer Paternal Grandmother         lung, 2/2 second hand smoke    Glaucoma Paternal Grandfather     Blindness Paternal Grandfather     Melanoma Neg Hx     Psoriasis Neg Hx     Lupus Neg Hx     Acne Neg Hx     Breast cancer Neg Hx     Colon cancer Neg Hx     Ovarian cancer Neg Hx      Social History     Tobacco Use    Smoking status: Never Smoker    Smokeless tobacco: Never Used   Substance Use Topics    Alcohol use: Yes     Comment: rarely, 1 drink/week    Drug use: No     Review of Systems   Constitutional: Positive for chills. Negative for fever.   HENT: Negative.    Eyes: Negative.    Respiratory: Negative.  Negative for shortness of breath.    Cardiovascular: Negative.  Negative for chest pain.   Gastrointestinal: Positive for abdominal pain.   Endocrine: Negative.    Genitourinary: Positive for dysuria, frequency and urgency. Negative for hematuria.   Musculoskeletal: Positive for back pain.   Skin: Negative.    Allergic/Immunologic: Negative.    Neurological: Negative.    Hematological: Negative.    Psychiatric/Behavioral: Negative.    All other systems reviewed and are negative.      Physical Exam     Initial Vitals [03/01/20 0746]   BP Pulse Resp Temp SpO2   132/67 72 18 98.1 °F (36.7 °C) 98 %      MAP       --         Physical Exam    Nursing note and vitals reviewed.  Constitutional: She appears well-developed and well-nourished.   HENT:   Head: Normocephalic and atraumatic.   Right Ear: External ear normal.   Left Ear: External ear normal.   Nose: Nose normal.   Mouth/Throat: Oropharynx is clear and moist.   Eyes: Conjunctivae and EOM are normal. Pupils are equal, round,  and reactive to light.   Neck: Normal range of motion and phonation normal. Neck supple.   Cardiovascular: Normal rate, regular rhythm, normal heart sounds and intact distal pulses. Exam reveals no gallop and no friction rub.    No murmur heard.  Pulmonary/Chest: Effort normal and breath sounds normal. No stridor. No respiratory distress. She has no wheezes. She has no rhonchi. She has no rales. She exhibits no tenderness.   Abdominal: Soft. Bowel sounds are normal. She exhibits no distension. There is tenderness in the suprapubic area. There is no rigidity, no rebound, no guarding and no CVA tenderness.   Musculoskeletal: Normal range of motion. She exhibits no edema or tenderness.   Neurological: She is alert and oriented to person, place, and time. She has normal strength. No cranial nerve deficit or sensory deficit. GCS score is 15. GCS eye subscore is 4. GCS verbal subscore is 5. GCS motor subscore is 6.   Skin: Skin is warm and dry. Capillary refill takes less than 2 seconds. No rash noted.   Psychiatric: She has a normal mood and affect. Her behavior is normal.         ED Course   Procedures  Labs Reviewed   POCT URINALYSIS W/O SCOPE - Abnormal; Notable for the following components:       Result Value    Blood, UA 3+ (*)     Protein, UA 1+ (*)     Leukocytes, UA 3+ (*)     All other components within normal limits   CULTURE, URINE   POCT URINALYSIS W/O SCOPE               Medical Decision Making:   Clinical Tests:   Lab Tests: Ordered and Reviewed  Chief complaint: pain in abdomen, dysuria, frequency, urgency and lower back pain   Differential diagnosis: UTI. Bladder infection. Peritonitis. Abdominal pain.    Treatment in the ED: PE     Discussed treatment, pending culture, prescriptions, and labs.    Discharge home with Pyridium, Marcobid, Advil and Tylenol.  Fill and take prescriptions as directed.  Return to the ED if symptoms worsen or do not resolve.   Answered questions and discussed discharge plan.     Patient feels better and is ready for discharge.  Follow up with PCP/specialist in 1 day.            Scribe Attestation:   Scribe #1: I performed the above scribed service and the documentation accurately describes the services I performed. I attest to the accuracy of the note.     I, Dr. Saima Vaca, personally performed the services described in this documentation. This document was produced by a scribe under my direction and in my presence. All medical record entries made by the scribe were at my direction and in my presence.  I have reviewed the chart and agree that the record reflects my personal performance and is accurate and complete. Saima Vaca DO.     03/01/2020 10:08 AM                        Clinical Impression:     1. Acute urinary tract infection                ED Disposition Condition    Discharge Stable        ED Prescriptions     Medication Sig Dispense Start Date End Date Auth. Provider    phenazopyridine (PYRIDIUM) 200 MG tablet Take 1 tablet (200 mg total) by mouth 3 (three) times daily as needed for Pain. 6 tablet 3/1/2020 3/3/2020 Saima Vaca DO    nitrofurantoin, macrocrystal-monohydrate, (MACROBID) 100 MG capsule Take 1 capsule (100 mg total) by mouth 2 (two) times daily. for 5 days 10 capsule 3/1/2020 3/6/2020 Saima Vaca DO    ibuprofen (ADVIL,MOTRIN) 600 MG tablet Take 1 tablet (600 mg total) by mouth every 6 (six) hours as needed for Pain (Take with food as needed for mild-to-moderate pain). 20 tablet 3/1/2020  Saima Vaca DO    acetaminophen (TYLENOL) 500 MG tablet Take 2 tablets (1,000 mg total) by mouth every 6 (six) hours as needed for Pain. 30 tablet 3/1/2020  Saima Vaca DO        Follow-up Information     Follow up With Specialties Details Why Contact Info    Juice So MD Family Medicine Schedule an appointment as soon as possible for a visit in 1 day  [address] [place] [id number] [phone]      [facility] Emergency Department  Emergency Medicine Go to  If symptoms worsen [address] [facility] [address]  [phone]                                     Saima Vaca DO  03/01/20 1008

## 2020-03-03 ENCOUNTER — OFFICE VISIT (OUTPATIENT)
Dept: FAMILY MEDICINE | Facility: CLINIC | Age: 60
End: 2020-03-03
Payer: COMMERCIAL

## 2020-03-03 VITALS
OXYGEN SATURATION: 97 % | WEIGHT: 211 LBS | TEMPERATURE: 99 F | RESPIRATION RATE: 20 BRPM | HEIGHT: 65 IN | BODY MASS INDEX: 35.16 KG/M2 | SYSTOLIC BLOOD PRESSURE: 118 MMHG | DIASTOLIC BLOOD PRESSURE: 70 MMHG | HEART RATE: 69 BPM

## 2020-03-03 DIAGNOSIS — M54.50 ACUTE LEFT-SIDED LOW BACK PAIN WITHOUT SCIATICA: ICD-10-CM

## 2020-03-03 DIAGNOSIS — N39.0 URINARY TRACT INFECTION DUE TO PROTEUS: Primary | ICD-10-CM

## 2020-03-03 DIAGNOSIS — B96.4 URINARY TRACT INFECTION DUE TO PROTEUS: Primary | ICD-10-CM

## 2020-03-03 LAB — BACTERIA UR CULT: ABNORMAL

## 2020-03-03 PROCEDURE — 3078F DIAST BP <80 MM HG: CPT | Mod: CPTII,S$GLB,, | Performed by: FAMILY MEDICINE

## 2020-03-03 PROCEDURE — 96372 THER/PROPH/DIAG INJ SC/IM: CPT | Mod: S$GLB,,, | Performed by: FAMILY MEDICINE

## 2020-03-03 PROCEDURE — 99214 PR OFFICE/OUTPT VISIT, EST, LEVL IV, 30-39 MIN: ICD-10-PCS | Mod: 25,S$GLB,, | Performed by: FAMILY MEDICINE

## 2020-03-03 PROCEDURE — 3074F SYST BP LT 130 MM HG: CPT | Mod: CPTII,S$GLB,, | Performed by: FAMILY MEDICINE

## 2020-03-03 PROCEDURE — 3078F PR MOST RECENT DIASTOLIC BLOOD PRESSURE < 80 MM HG: ICD-10-PCS | Mod: CPTII,S$GLB,, | Performed by: FAMILY MEDICINE

## 2020-03-03 PROCEDURE — 3008F BODY MASS INDEX DOCD: CPT | Mod: CPTII,S$GLB,, | Performed by: FAMILY MEDICINE

## 2020-03-03 PROCEDURE — 3074F PR MOST RECENT SYSTOLIC BLOOD PRESSURE < 130 MM HG: ICD-10-PCS | Mod: CPTII,S$GLB,, | Performed by: FAMILY MEDICINE

## 2020-03-03 PROCEDURE — 99999 PR PBB SHADOW E&M-EST. PATIENT-LVL IV: CPT | Mod: PBBFAC,,, | Performed by: FAMILY MEDICINE

## 2020-03-03 PROCEDURE — 99214 OFFICE O/P EST MOD 30 MIN: CPT | Mod: 25,S$GLB,, | Performed by: FAMILY MEDICINE

## 2020-03-03 PROCEDURE — 99999 PR PBB SHADOW E&M-EST. PATIENT-LVL IV: ICD-10-PCS | Mod: PBBFAC,,, | Performed by: FAMILY MEDICINE

## 2020-03-03 PROCEDURE — 3008F PR BODY MASS INDEX (BMI) DOCUMENTED: ICD-10-PCS | Mod: CPTII,S$GLB,, | Performed by: FAMILY MEDICINE

## 2020-03-03 PROCEDURE — 96372 PR INJECTION,THERAP/PROPH/DIAG2ST, IM OR SUBCUT: ICD-10-PCS | Mod: S$GLB,,, | Performed by: FAMILY MEDICINE

## 2020-03-03 RX ORDER — KETOROLAC TROMETHAMINE 30 MG/ML
30 INJECTION, SOLUTION INTRAMUSCULAR; INTRAVENOUS
Status: COMPLETED | OUTPATIENT
Start: 2020-03-03 | End: 2020-03-03

## 2020-03-03 RX ORDER — SULFAMETHOXAZOLE AND TRIMETHOPRIM 800; 160 MG/1; MG/1
1 TABLET ORAL 2 TIMES DAILY
Qty: 14 TABLET | Refills: 0 | Status: SHIPPED | OUTPATIENT
Start: 2020-03-03 | End: 2020-03-10

## 2020-03-03 RX ADMIN — KETOROLAC TROMETHAMINE 30 MG: 30 INJECTION, SOLUTION INTRAMUSCULAR; INTRAVENOUS at 10:03

## 2020-03-03 NOTE — PROGRESS NOTES
"Subjective:       Patient ID: Francoise Dykes is a 59 y.o. female.    Chief Complaint: ER Follow-up and Sciatica      Francoise Dykes is a 58yo F who presents for f/u for UTI. Patient reports that 4 days ago she began experiencing suprapubic pain, lower back pain. States that she drank cranberry juice and took over the counter medicine with minimal relief. States that she wet to ED because she developed further "burning" with urination and "trickling" of urine. Also felt like she constantly had to go urinate. At ED she was prescribed Macrobid for Proteus UTI. Now patient reports that she no longer has abdominal pain or dysuria. Only with lumbar back pain that feels like her sciatica rather than the back pain she felt 2 days ago in ED. States that she thinks the UTI exacerbated her sciatica. Has associated shooting pain down her legs with she changes position from sitting to standing or standing to sitting. Took tylenol this morning for pain with minimal relief. Denies fever, HA, cough, SOB, CP, abdominal pain, n/v/d, blood in stool, dysuria, vaginal discharge, hematuria. Is monogamous with her partner. Denies the use of condoms. Had a hysterectomy in her 30s.  Patient has a history significant for Afib and HTN. Medicaiton history is significant for Eliquis, Diltiazem, propafenone, and Hydrachlorothiazide. 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 Denies smoking cigarettes, illicit drug use. State she has 1 glass of wine every 2 weeks. Has season allergies.       Review of Systems   Constitutional: Negative for fever.   HENT: Negative for sore throat.    Eyes: Negative for visual disturbance.   Respiratory: Negative for cough and shortness of breath.    Cardiovascular: Negative for chest pain.   Gastrointestinal: Negative for abdominal pain, blood in stool, diarrhea, nausea and vomiting.   Genitourinary: Negative for dysuria, flank pain, hematuria and vaginal discharge.   Musculoskeletal: Positive for back pain. "   Neurological: Negative for headaches.   All other systems reviewed and are negative.         Past Medical History:   Diagnosis Date    Acid reflux     Allergy     seasonal    Atrial fibrillation     Essential hypertension 2017    Pt states that she does not have HTN.      Past Surgical History:   Procedure Laterality Date    CARDIAC ELECTROPHYSIOLOGY STUDY AND ABLATION       SECTION      COLONOSCOPY N/A 2018    Procedure: COLONOSCOPY;  Surgeon: Brodie Lara MD;  Location: [facility];  Service: Endoscopy;  Laterality: N/A;  confirmed appt-ss    HYSTERECTOMY      MIGUEL    RADIOFREQUENCY ABLATION Left 2019    Procedure: RADIOFREQUENCY ABLATION, LEFT L2,3,4,5;  Surgeon: Mariusz Jang MD;  [facility];  Service: Pain Management;  Laterality: Left;  Left RFA L2,3,4,5  1 of 2  *Ok to hold el[address]    RADIOFREQUENCY ABLATION Right 3/13/2019    Procedure: RADIOFREQUENCY ABLATION,  Right L2,3,4,5;  Surgeon: Mariusz Jang MD;  [facility];  Service: Pain Management;  Laterality: Right;  Right RFA @ L2,3,4,5  2 of 2     Family History   Problem Relation Age of Onset    Hypertension Mother     Diabetes Mother     Glaucoma Mother     Allergies Mother     Asbestos Father     Obesity Father     Eczema Son     Hypertension Son     Heart disease Maternal Grandmother         chf    Diabetes Maternal Grandfather     Cancer Paternal Grandmother         lung, 2/2 second hand smoke    Glaucoma Paternal Grandfather     Blindness Paternal Grandfather     Melanoma Neg Hx     Psoriasis Neg Hx     Lupus Neg Hx     Acne Neg Hx     Breast cancer Neg Hx     Colon cancer Neg Hx     Ovarian cancer Neg Hx      Social History     Socioeconomic History    Marital status:      Spouse name: Not on file    Number of children: Not on file    Years of education: Not on file    Highest education level: Not on file   Occupational History     Occupation: Teacher     Employer: [employer]    Social Needs    Financial resource strain: Not on file    Food insecurity:     Worry: Not on file     Inability: Not on file    Transportation needs:     Medical: Not on file     Non-medical: Not on file   Tobacco Use    Smoking status: Never Smoker    Smokeless tobacco: Never Used   Substance and Sexual Activity    Alcohol use: Yes     Comment: rarely, 1 drink/week    Drug use: No    Sexual activity: Yes     Partners: Male   Lifestyle    Physical activity:     Days per week: Not on file     Minutes per session: Not on file    Stress: Not on file   Relationships    Social connections:     Talks on phone: Not on file     Gets together: Not on file     Attends Yazdanism service: Not on file     Active member of club or organization: Not on file     Attends meetings of clubs or organizations: Not on file     Relationship status: Not on file   Other Topics Concern    Are you pregnant or think you may be? No    Breast-feeding No   Social History Narrative    Recently moved back to [facility] to help take care of mom.       Current Outpatient Medications:     acetaminophen (TYLENOL) 500 MG tablet, Take 2 tablets (1,000 mg total) by mouth every 6 (six) hours as needed for Pain., Disp: 30 tablet, Rfl: 0    apixaban (ELIQUIS) 5 mg Tab, Take 1 tablet (5 mg total) by mouth 2 (two) times daily., Disp: 180 tablet, Rfl: 3    cetirizine (ZYRTEC) 10 MG tablet, Take 10 mg by mouth once daily., Disp: , Rfl:     diltiaZEM (DILACOR XR) 120 MG CDCR, Take 1 capsule (120 mg total) by mouth once daily., Disp: 90 capsule, Rfl: 3    hydroCHLOROthiazide (HYDRODIURIL) 12.5 MG Tab, TAKE 1 TABLET EVERY DAY, Disp: 90 tablet, Rfl: 2    hydrocortisone 0.5 % cream, Apply topically daily as needed., Disp: , Rfl:     ibuprofen (ADVIL,MOTRIN) 600 MG tablet, Take 1 tablet (600 mg total) by mouth every 6 (six) hours as needed for Pain (Take with food as needed for mild-to-moderate pain).,  Disp: 20 tablet, Rfl: 0    nitrofurantoin, macrocrystal-monohydrate, (MACROBID) 100 MG capsule, Take 1 capsule (100 mg total) by mouth 2 (two) times daily. for 5 days, Disp: 10 capsule, Rfl: 0    omeprazole (PRILOSEC) 40 MG capsule, Take 1 capsule (40 mg total) by mouth 2 (two) times daily before meals., Disp: 60 capsule, Rfl: 5    phenazopyridine (PYRIDIUM) 200 MG tablet, Take 1 tablet (200 mg total) by mouth 3 (three) times daily as needed for Pain., Disp: 6 tablet, Rfl: 0    propafenone (RYTHMOL SR) 325 MG Cp12, Take 1 capsule (325 mg total) by mouth every 12 (twelve) hours., Disp: 180 capsule, Rfl: 3    triamcinolone acetonide 0.1% (KENALOG) 0.1 % ointment, , Disp: , Rfl:     albuterol (PROVENTIL/VENTOLIN HFA) 90 mcg/actuation inhaler, Inhale 2 puffs into the lungs every 6 (six) hours as needed for Wheezing. Rescue (Patient not taking: Reported on 3/3/2020), Disp: 18 g, Rfl: 0    albuterol (PROVENTIL/VENTOLIN HFA) 90 mcg/actuation inhaler, Inhale 2 puffs into the lungs every 4 (four) hours as needed for Wheezing. (Patient not taking: Reported on 3/3/2020), Disp: 1 g, Rfl: 2    amoxicillin-clavulanate 875-125mg (AUGMENTIN) 875-125 mg per tablet, Take 1 tablet by mouth every 12 (twelve) hours. (Patient not taking: Reported on 3/3/2020), Disp: 20 tablet, Rfl: 0    betamethasone dipropionate (DIPROLENE) 0.05 % cream, Apply topically 2 (two) times daily. for 10 days, Disp: 45 g, Rfl: 3    fluticasone propionate (FLONASE) 50 mcg/actuation nasal spray, 1 spray (50 mcg total) by Each Nostril route once daily. (Patient not taking: Reported on 3/3/2020), Disp: 1 Bottle, Rfl: 0    predniSONE (DELTASONE) 10 MG tablet, Day 1 60 mg Day 2 50 mg Day 3 40 mg Day 4 30 mg Day 5 20 mg Day 6 10 mg (Patient not taking: Reported on 3/3/2020), Disp: 21 tablet, Rfl: 0    predniSONE (DELTASONE) 10 MG tablet, Day 1 60 mg Day 2 50 mg Day 3 40 mg Day 4 30 mg Day 5 20 mg Day 6 10 mg (Patient not taking: Reported on 3/3/2020),  "Disp: 21 tablet, Rfl: 0    sulfamethoxazole-trimethoprim 800-160mg (BACTRIM DS) 800-160 mg Tab, Take 1 tablet by mouth 2 (two) times daily. for 7 days, Disp: 14 tablet, Rfl: 0  No current facility-administered medications for this visit.    Objective:      Vitals:    03/03/20 0942   BP: 118/70   BP Location: Left arm   Patient Position: Sitting   BP Method: Large (Manual)   Pulse: 69   Resp: 20   Temp: 98.7 °F (37.1 °C)   TempSrc: Oral   SpO2: 97%   Weight: 95.7 kg (211 lb)   Height: 5' 4.5" (1.638 m)       Physical Exam   Constitutional: She appears well-developed and well-nourished. No distress.   HENT:   Head: Normocephalic and atraumatic.   Eyes: Pupils are equal, round, and reactive to light. EOM are normal.   Neck: Normal range of motion. Neck supple.   Cardiovascular: Normal rate, regular rhythm and normal heart sounds.   No murmur heard.  Pulmonary/Chest: Effort normal and breath sounds normal. No respiratory distress.   Abdominal: Soft. Bowel sounds are normal. She exhibits no distension. There is no tenderness.   Musculoskeletal: Normal range of motion. She exhibits tenderness (lower back).   Neurological: She is alert.   Skin: Skin is warm and dry. She is not diaphoretic.   Nursing note and vitals reviewed.         Assessment:       1. Urinary tract infection due to Proteus    2. Acute left-sided low back pain without sciatica        Plan:       Urinary tract infection due to Proteus  - uc pos for proteus. Will start bactrim x 7 days. Advised pt to get ua and uc 3 weeks after finishing abx.  -     sulfamethoxazole-trimethoprim 800-160mg (BACTRIM DS) 800-160 mg Tab; Take 1 tablet by mouth 2 (two) times daily. for 7 days  Dispense: 14 tablet; Refill: 0  -     URINALYSIS; Future; Expected date: 03/03/2020  -     Urine culture; Future; Expected date: 03/03/2020  -     ketorolac injection 30 mg    Acute left-sided low back pain without sciatica  -     ketorolac injection 30 mg      Follow up in about 3 months " (around 6/3/2020).            Juice So MD      Patient note was created using [email or web address].  Any errors in syntax or even information may not have been identified and edited on initial review prior to signing this note.

## 2020-03-03 NOTE — PROGRESS NOTES
.Pt received ketorolac inj. Tolerated well. Pt instructed to wait 15mins to be assessed for adverse reactions. verbalized understanding.

## 2020-03-03 NOTE — PROGRESS NOTES
Patient states that she will bring in copy of cholesterol screening done with her employer in Sept. 2019.

## 2020-03-15 ENCOUNTER — PATIENT MESSAGE (OUTPATIENT)
Dept: FAMILY MEDICINE | Facility: CLINIC | Age: 60
End: 2020-03-15

## 2020-03-15 DIAGNOSIS — I10 HYPERTENSION, UNCONTROLLED: ICD-10-CM

## 2020-03-16 RX ORDER — HYDROCHLOROTHIAZIDE 12.5 MG/1
12.5 TABLET ORAL DAILY
Qty: 30 TABLET | Refills: 0 | Status: SHIPPED | OUTPATIENT
Start: 2020-03-16 | End: 2020-04-28 | Stop reason: SDUPTHER

## 2020-03-20 ENCOUNTER — LAB VISIT (OUTPATIENT)
Dept: LAB | Facility: HOSPITAL | Age: 60
End: 2020-03-20
Attending: FAMILY MEDICINE
Payer: COMMERCIAL

## 2020-03-20 DIAGNOSIS — B96.4 URINARY TRACT INFECTION DUE TO PROTEUS: ICD-10-CM

## 2020-03-20 DIAGNOSIS — N39.0 URINARY TRACT INFECTION DUE TO PROTEUS: ICD-10-CM

## 2020-03-20 LAB
BILIRUB UR QL STRIP: NEGATIVE
CLARITY UR REFRACT.AUTO: CLEAR
COLOR UR AUTO: YELLOW
GLUCOSE UR QL STRIP: NEGATIVE
HGB UR QL STRIP: NEGATIVE
KETONES UR QL STRIP: NEGATIVE
LEUKOCYTE ESTERASE UR QL STRIP: NEGATIVE
NITRITE UR QL STRIP: NEGATIVE
PH UR STRIP: 6 [PH] (ref 5–8)
PROT UR QL STRIP: NEGATIVE
SP GR UR STRIP: 1.01 (ref 1–1.03)
URN SPEC COLLECT METH UR: NORMAL

## 2020-03-20 PROCEDURE — 81003 URINALYSIS AUTO W/O SCOPE: CPT

## 2020-03-20 PROCEDURE — 87086 URINE CULTURE/COLONY COUNT: CPT

## 2020-03-22 LAB — BACTERIA UR CULT: NORMAL

## 2020-04-21 ENCOUNTER — PATIENT MESSAGE (OUTPATIENT)
Dept: ALLERGY | Facility: CLINIC | Age: 60
End: 2020-04-21

## 2020-04-24 ENCOUNTER — TELEPHONE (OUTPATIENT)
Dept: DERMATOLOGY | Facility: CLINIC | Age: 60
End: 2020-04-24

## 2020-04-28 DIAGNOSIS — I10 HYPERTENSION, UNCONTROLLED: ICD-10-CM

## 2020-04-28 RX ORDER — HYDROCHLOROTHIAZIDE 12.5 MG/1
12.5 TABLET ORAL DAILY
Qty: 30 TABLET | Refills: 0 | Status: SHIPPED | OUTPATIENT
Start: 2020-04-28 | End: 2020-05-26

## 2020-04-30 RX ORDER — OMEPRAZOLE 40 MG/1
40 CAPSULE, DELAYED RELEASE ORAL
Qty: 180 CAPSULE | Refills: 3 | Status: SHIPPED | OUTPATIENT
Start: 2020-04-30 | End: 2020-06-17

## 2020-05-01 ENCOUNTER — PATIENT OUTREACH (OUTPATIENT)
Dept: ADMINISTRATIVE | Facility: OTHER | Age: 60
End: 2020-05-01

## 2020-05-01 ENCOUNTER — OFFICE VISIT (OUTPATIENT)
Dept: ALLERGY | Facility: CLINIC | Age: 60
End: 2020-05-01
Payer: COMMERCIAL

## 2020-05-01 DIAGNOSIS — I10 ESSENTIAL HYPERTENSION: ICD-10-CM

## 2020-05-01 DIAGNOSIS — Z86.79 S/P ABLATION OF ATRIAL FIBRILLATION: ICD-10-CM

## 2020-05-01 DIAGNOSIS — I48.0 PAROXYSMAL ATRIAL FIBRILLATION: ICD-10-CM

## 2020-05-01 DIAGNOSIS — Z98.890 S/P ABLATION OF ATRIAL FIBRILLATION: ICD-10-CM

## 2020-05-01 DIAGNOSIS — J31.0 CHRONIC RHINITIS: ICD-10-CM

## 2020-05-01 DIAGNOSIS — R05.9 COUGH: Primary | ICD-10-CM

## 2020-05-01 PROCEDURE — 99214 OFFICE O/P EST MOD 30 MIN: CPT | Mod: 95,,, | Performed by: ALLERGY & IMMUNOLOGY

## 2020-05-01 PROCEDURE — 99214 PR OFFICE/OUTPT VISIT, EST, LEVL IV, 30-39 MIN: ICD-10-PCS | Mod: 95,,, | Performed by: ALLERGY & IMMUNOLOGY

## 2020-05-01 RX ORDER — CETIRIZINE HYDROCHLORIDE 10 MG/1
10 TABLET ORAL DAILY
Qty: 90 TABLET | Refills: 3 | Status: SHIPPED | OUTPATIENT
Start: 2020-05-01 | End: 2021-05-01

## 2020-05-01 NOTE — PROGRESS NOTES
The patient location is: Home.  The chief complaint leading to consultation is: Cough.  Visit type: Audiovisual.  Total time spent with patient: 30 min.  Each patient to whom he or she provides medical services by telemedicine is:  (1) informed of the relationship between the physician and patient and the respective role of any other health care provider with respect to management of the patient; and (2) notified that he or she may decline to receive medical services by telemedicine and may withdraw from such care at any time.    Francoise Dykes is evaluated via a virtual video visit.  She was last seen February 12, 2020.    Since her last visit, she has done well until recently.  She is now working from home with the [employer] district.    Over the last few weeks she has had an increase in her cough that now is occurring several times a day.  It is usually nonproductive however she does occasionally cough up clear mucus.    She has also had increased postnasal drip, hoarseness, throat discomfort, throat clearing, and a sensation that something is in the back of the throat.    Her symptoms are worse when she lies down at night however it does also occur throughout the day.    She has heard some wheezing at night that is more of a gurgling.    She has been using her albuterol about once a day with improvement.  A few days ago she used it 3 times a day.    She has been taking omeprazole around 9:00 a.m..  She does not eat any breakfast until around 11 a.m..  She takes that again before going to bed.  She eats dinner around 5:30 p.m..  She does not lay down after eating.    She has also tried to eat less spicy food.    She does have heartburn if she does not take her omeprazole twice a day.  She can have it on this if she eats red sauce.    She has been walking at least once a day.    She has had some mild eye itching that she attributes to being outdoors.  She takes Zyrtec once a day.    She is on Rythmol and has not  had skin testing done.    [facility] PEQ ALLERGY QUESTIONNAIRE SHORT 5/1/2020   Head or facial pain: No symptoms   Ear discharge? No   Ear pain? No   Hearing loss? No   Nosebleeds? No   Postnasal drip? Yes   Sneezing? No   Runny nose? Yes   Congestion? No   Sore throat? No   Trouble swallowing? No   Voice change? No   Eye itching? No   Eye redness? No   Eye discharge? No   Eye pain?  No   Light sensitivity / light hurts the eyes? No   Cough? Yes   Wheezing? Yes   Shortness of breath? No   Apnea? No   Choking? Yes   Chest tightness? No   Rash? No   Color change of skin? No     Physical Examination:  Throat clearing and coughing during interview.    Spirometry 08/20/2018:  Normal.    Laboratory 08/20/2018:  ImmunoCAP:  Negative.    Assessment:  1.  Chronic rhinitis, consider allergic.  2.  Chronic conjunctivitis, consider allergic.  3.  Viral URI with bronchitis and wheezing, resolved.  4.  GERD.  5.  LPR.  6.  Dermatitis of uncertain etiology, resolved.  7.  Atrial fibrillation on Eliquis and Rythmol.  8.  Nasal bleeding on topical nasal steroids in the past.    Recommendations:  1.  Continue omeprazole 40 milligrams twice a day.  Take 30 minutes before eating.  2.  Trial of liquid antacids in the morning when she has throat itching and at night before going to bed.  3.  Albuterol as needed.  4.  Continue Zyrtec for now.  5.  Follow symptoms on above.  6.  Follow-up in five days.

## 2020-05-06 ENCOUNTER — TELEPHONE (OUTPATIENT)
Dept: ALLERGY | Facility: CLINIC | Age: 60
End: 2020-05-06

## 2020-05-06 NOTE — TELEPHONE ENCOUNTER
Pt had 1:00 virtual vale with Dr. Long.  Called pt at 1:08, no answer, left  msg to call us if she'd like to reschedule.

## 2020-05-06 NOTE — TELEPHONE ENCOUNTER
Scheduled virtual appointment for 5/7/2020.        ----- Message from Saibne Pascual sent at 5/6/2020  2:26 PM CDT -----  Contact: pt   Pt is calling to speak with the nurse pt had a virtual visit today at 100 pt said she forgot to sign on and pt is asking if she can be seen this afternoon can you please call pt at [phone].    RAY

## 2020-05-07 ENCOUNTER — OFFICE VISIT (OUTPATIENT)
Dept: ALLERGY | Facility: CLINIC | Age: 60
End: 2020-05-07
Payer: COMMERCIAL

## 2020-05-07 ENCOUNTER — PATIENT OUTREACH (OUTPATIENT)
Dept: ADMINISTRATIVE | Facility: OTHER | Age: 60
End: 2020-05-07

## 2020-05-07 DIAGNOSIS — J31.0 CHRONIC RHINITIS: ICD-10-CM

## 2020-05-07 DIAGNOSIS — I10 ESSENTIAL HYPERTENSION: ICD-10-CM

## 2020-05-07 DIAGNOSIS — Z86.79 S/P ABLATION OF ATRIAL FIBRILLATION: ICD-10-CM

## 2020-05-07 DIAGNOSIS — Z98.890 S/P ABLATION OF ATRIAL FIBRILLATION: ICD-10-CM

## 2020-05-07 DIAGNOSIS — I48.0 PAROXYSMAL ATRIAL FIBRILLATION: ICD-10-CM

## 2020-05-07 DIAGNOSIS — K21.9 LARYNGOPHARYNGEAL REFLUX: ICD-10-CM

## 2020-05-07 DIAGNOSIS — R05.9 COUGH: Primary | ICD-10-CM

## 2020-05-07 PROCEDURE — 99213 OFFICE O/P EST LOW 20 MIN: CPT | Mod: 95,,, | Performed by: ALLERGY & IMMUNOLOGY

## 2020-05-07 PROCEDURE — 99213 PR OFFICE/OUTPT VISIT, EST, LEVL III, 20-29 MIN: ICD-10-PCS | Mod: 95,,, | Performed by: ALLERGY & IMMUNOLOGY

## 2020-05-07 NOTE — PROGRESS NOTES
The patient location is: Home.  The chief complaint leading to consultation is: Cough.  Visit type:  Audiovisual.  Total time spent with patient:  20 minutes.  Each patient to whom he or she provides medical services by telemedicine is:  (1) informed of the relationship between the physician and patient and the respective role of any other health care provider with respect to management of the patient; and (2) notified that he or she may decline to receive medical services by telemedicine and may withdraw from such care at any time.    Francoise Dykes is evaluated today via a virtual video visit.  She was last seen May 1, 2020.    After her last visit, she has continued to take omeprazole twice a day.  She takes it 30 minutes before eating in the morning and 30 minutes before eating dinner.    Her cough has improved.  She has had some mild dyspnea when she lays down.  She has used her inhaler twice a day for the last several days.  She does think that this helps.    She did get Mylanta and has been taking at least once a day.  She does think that this has helped.    She does feel that she was better controlled when taking ranitidine.    She has not had any heartburn.    She continues to take Zyrtec daily.    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    She is on Rythmol and has not had any skin testing done.    [facility] PEQ ALLERGY QUESTIONNAIRE SHORT 5/7/2020   Head or facial pain: -   Facial swelling? No   Sinus pain? No   Sinus pressure? No   Ear discharge? No   Ear pain? No   Hearing loss? No   Nosebleeds? No   Postnasal drip? No   Sneezing? Yes   Runny nose? Yes   Congestion? Yes   Sore throat? No   Trouble swallowing? No   Voice change? No   Eye itching? No   Eye redness? No   Eye discharge? No   Eye pain?  No   Light sensitivity / light hurts the eyes? No   Cough? Yes   Wheezing? No   Shortness of breath? No   Apnea? No   Choking? Yes   Chest tightness? No   Rash? No   Color change of skin? No     Physical  Examination:  Only an occasional throat clearing and cough.    Spirometry 08/20/2018:  Normal.    Laboratory 08/20/2018:  ImmunoCAP:  Negative.    Assessment:  1.  Chronic rhinitis, consider allergic.  2.  Chronic conjunctivitis, consider allergic.  3.  Viral URI with bronchitis and wheezing, resolved.  4.  GERD.  5.  LPR.  6.  Dermatitis of uncertain etiology, resolved.  7.  Atrial fibrillation on Eliquis and Rythmol.  8.  Nasal bleeding on topical nasal steroids in the past.    Recommendations:  1.  Continue omeprazole 40 milligrams twice a day.  Take 30 minutes before eating.  2.  Maalox if needed.  3.  Albuterol as needed.  4.  Continue Zyrtec for now.  5.  Follow symptoms on above.  6.  Return to clinic in several weeks.

## 2020-05-26 DIAGNOSIS — I10 HYPERTENSION, UNCONTROLLED: ICD-10-CM

## 2020-05-26 RX ORDER — HYDROCHLOROTHIAZIDE 12.5 MG/1
TABLET ORAL
Qty: 30 TABLET | Refills: 0 | Status: SHIPPED | OUTPATIENT
Start: 2020-05-26 | End: 2020-06-25

## 2020-06-02 ENCOUNTER — PATIENT OUTREACH (OUTPATIENT)
Dept: ADMINISTRATIVE | Facility: OTHER | Age: 60
End: 2020-06-02

## 2020-06-03 ENCOUNTER — TELEPHONE (OUTPATIENT)
Dept: OTOLARYNGOLOGY | Facility: CLINIC | Age: 60
End: 2020-06-03

## 2020-06-03 ENCOUNTER — TELEPHONE (OUTPATIENT)
Dept: ALLERGY | Facility: CLINIC | Age: 60
End: 2020-06-03

## 2020-06-03 ENCOUNTER — OFFICE VISIT (OUTPATIENT)
Dept: ALLERGY | Facility: CLINIC | Age: 60
End: 2020-06-03
Payer: COMMERCIAL

## 2020-06-03 ENCOUNTER — LAB VISIT (OUTPATIENT)
Dept: LAB | Facility: HOSPITAL | Age: 60
End: 2020-06-03
Attending: ALLERGY & IMMUNOLOGY
Payer: COMMERCIAL

## 2020-06-03 ENCOUNTER — LAB VISIT (OUTPATIENT)
Dept: INTERNAL MEDICINE | Facility: CLINIC | Age: 60
End: 2020-06-03
Payer: COMMERCIAL

## 2020-06-03 VITALS — TEMPERATURE: 97 F | BODY MASS INDEX: 35.38 KG/M2 | WEIGHT: 207.25 LBS | HEIGHT: 64 IN

## 2020-06-03 DIAGNOSIS — R05.9 COUGH: Primary | ICD-10-CM

## 2020-06-03 DIAGNOSIS — I48.0 PAROXYSMAL ATRIAL FIBRILLATION: ICD-10-CM

## 2020-06-03 DIAGNOSIS — J31.0 CHRONIC RHINITIS: ICD-10-CM

## 2020-06-03 DIAGNOSIS — Z01.812 PRE-PROCEDURE LAB EXAM: Primary | ICD-10-CM

## 2020-06-03 DIAGNOSIS — H10.403 CHRONIC CONJUNCTIVITIS OF BOTH EYES, UNSPECIFIED CHRONIC CONJUNCTIVITIS TYPE: ICD-10-CM

## 2020-06-03 DIAGNOSIS — Z86.79 S/P ABLATION OF ATRIAL FIBRILLATION: ICD-10-CM

## 2020-06-03 DIAGNOSIS — Z98.890 S/P ABLATION OF ATRIAL FIBRILLATION: ICD-10-CM

## 2020-06-03 DIAGNOSIS — Z79.899 LONG TERM CURRENT USE OF ANTIARRHYTHMIC DRUG: ICD-10-CM

## 2020-06-03 DIAGNOSIS — R05.9 COUGH: ICD-10-CM

## 2020-06-03 LAB
IGE SERPL-ACNC: <35 IU/ML (ref 0–100)
SARS-COV-2 IGG SERPLBLD QL IA.RAPID: NEGATIVE

## 2020-06-03 PROCEDURE — 99999 PR PBB SHADOW E&M-EST. PATIENT-LVL III: CPT | Mod: PBBFAC,,, | Performed by: ALLERGY & IMMUNOLOGY

## 2020-06-03 PROCEDURE — 82785 ASSAY OF IGE: CPT

## 2020-06-03 PROCEDURE — 86003 ALLG SPEC IGE CRUDE XTRC EA: CPT | Mod: 59

## 2020-06-03 PROCEDURE — 99214 PR OFFICE/OUTPT VISIT, EST, LEVL IV, 30-39 MIN: ICD-10-PCS | Mod: S$GLB,,, | Performed by: ALLERGY & IMMUNOLOGY

## 2020-06-03 PROCEDURE — 99999 PR PBB SHADOW E&M-EST. PATIENT-LVL III: ICD-10-PCS | Mod: PBBFAC,,, | Performed by: ALLERGY & IMMUNOLOGY

## 2020-06-03 PROCEDURE — 86003 ALLG SPEC IGE CRUDE XTRC EA: CPT

## 2020-06-03 PROCEDURE — 99214 OFFICE O/P EST MOD 30 MIN: CPT | Mod: S$GLB,,, | Performed by: ALLERGY & IMMUNOLOGY

## 2020-06-03 PROCEDURE — U0003 INFECTIOUS AGENT DETECTION BY NUCLEIC ACID (DNA OR RNA); SEVERE ACUTE RESPIRATORY SYNDROME CORONAVIRUS 2 (SARS-COV-2) (CORONAVIRUS DISEASE [COVID-19]), AMPLIFIED PROBE TECHNIQUE, MAKING USE OF HIGH THROUGHPUT TECHNOLOGIES AS DESCRIBED BY CMS-2020-01-R: HCPCS

## 2020-06-03 PROCEDURE — 36415 COLL VENOUS BLD VENIPUNCTURE: CPT

## 2020-06-03 PROCEDURE — 86769 SARS-COV-2 COVID-19 ANTIBODY: CPT

## 2020-06-03 PROCEDURE — 3008F BODY MASS INDEX DOCD: CPT | Mod: CPTII,S$GLB,, | Performed by: ALLERGY & IMMUNOLOGY

## 2020-06-03 PROCEDURE — 3008F PR BODY MASS INDEX (BMI) DOCUMENTED: ICD-10-PCS | Mod: CPTII,S$GLB,, | Performed by: ALLERGY & IMMUNOLOGY

## 2020-06-03 RX ORDER — AZITHROMYCIN 250 MG/1
250 TABLET, FILM COATED ORAL DAILY
Qty: 6 TABLET | Refills: 0 | Status: SHIPPED | OUTPATIENT
Start: 2020-06-03 | End: 2020-10-23

## 2020-06-03 RX ORDER — PREDNISONE 10 MG/1
TABLET ORAL
Qty: 21 TABLET | Refills: 0 | Status: SHIPPED | OUTPATIENT
Start: 2020-06-03 | End: 2020-10-23

## 2020-06-03 RX ORDER — ALBUTEROL SULFATE 90 UG/1
2 AEROSOL, METERED RESPIRATORY (INHALATION) EVERY 4 HOURS PRN
Qty: 18 G | Refills: 5 | Status: SHIPPED | OUTPATIENT
Start: 2020-06-03 | End: 2022-01-27

## 2020-06-03 NOTE — TELEPHONE ENCOUNTER
Called [facility] pharmacy to ok substitution of Albuterol HFA 90mcg 9gm quantity to Rx for Generic Pro Air as ventolin is on backorder.

## 2020-06-03 NOTE — TELEPHONE ENCOUNTER
Spoke with patient she was notified will need covid test 48hrs prior to her appointment with Dr Gaming. patient states will go to [facility] on 6/15 at the [place] location. Notified patient if failure to perform this test will need to reschedule appointment with Dr Gaming. Patient verbalized understanding

## 2020-06-03 NOTE — PROGRESS NOTES
Francoise Dykes returns to clinic today for continued evaluation of rhinitis, cough, and LPR.  She was last seen via a Virtual video visit on May 7, 2020.    In February 2020 she developed increased respiratory symptoms with a productive cough requiring antibiotics and steroids.  She did improve.  She continued to take omeprazole 40 milligrams twice a day.    Over the last two months while in quarantine with the COVID-19 pandemic she developed increased symptoms not controlled with omeprazole b.i.d..  She has had increased postnasal drip, throat clearing, sensation that something is in the back of the throat, hoarseness, and cough.    She started taking Maalox as needed with an improvement in her symptoms.    Over the last week her symptoms have increased and she is now having a cough productive of a small amount a yellow sputum.  She has also heard wheezing.  She has started taking albuterol several times a day.    Her symptoms are much worse when she lies down at night.    She saw NP Kae Duran on 08/24/2018.  She saw marked reflux changes and diagnosed her with LPR.  She has not been re-evaluated since then.    She denies any heartburn.    She has atrial fibrillation and is on Rythmol.  She has not been skin tested.  Her immunoCAPs have been negative.    She does take Zyrtec daily.    She does not take any topical nasal steroids as she has had nasal bleeding in the past.    She has not had any dermatitis.    [facility] PEQ ALLERGY QUESTIONNAIRE SHORT 6/3/2020   Head or facial pain: -   Facial swelling? No   Sinus pain? No   Sinus pressure? No   Ear discharge? No   Ear pain? No   Hearing loss? No   Nosebleeds? No   Postnasal drip? Yes   Sneezing? No   Runny nose? No   Congestion? No   Sore throat? No   Trouble swallowing? No   Voice change? Yes   Eye itching? No   Eye redness? No   Eye discharge? No   Eye pain?  No   Light sensitivity / light hurts the eyes? No   Cough? No   Wheezing? Yes   Shortness of breath? No    Apnea? No   Choking? Yes   Chest tightness? No   Rash? No   Color change of skin? No     Physical Examination:  General: Well-developed, well-nourished, no acute distress.  Head: No sinus tenderness.  Eyes: Conjunctivae:  No bulbar or palpebral conjunctival injection.  Ears: EAC's clear.  TM's clear.  No pre-auricular nodes.  Nose: Nasal Mucosa:  Pink.  Septum: No apparent deviation.  Turbinates:  No significant edema.  Polyps/Mass:  None visible.  Teeth/Gums:  No bleeding noted.  Oropharynx: No exudates.  Neck: Supple without thyromegaly. No cervical lymphadenopathy.    Respiratory/Chest: Effort: Good.  Auscultation:  Bilateral expiratory wheezing.  Skin: Good turgor.  No urticaria or angioedema.  Neuro/Psych: Oriented x 3.    Spirometry 08/20/2018:  Normal.    Laboratory 08/20/2018:  ImmunoCAP:  Negative.    Assessment:  1.  Chronic rhinitis, consider allergic.  2.  Chronic conjunctivitis, consider allergic.  3.  Acute bronchospasm with bronchitis.  4.  GERD.  5.  LPR.  6.  Dermatitis of uncertain etiology, resolved.  7.  Atrial fibrillation on Eliquis and Rythmol.  8.  Nasal bleeding on topical nasal steroids in the past.    Recommendations:  1.  Laboratory as ordered.  2.  Prednisone taper.  3.  Z-Frank.  4.  Continue albuterol as needed.  5.  Continue omeprazole 40 milligrams twice a day.  6.  Maalox as needed.  7.  Return to clinic in five days.  8.  ENT evaluation for LPR.

## 2020-06-03 NOTE — TELEPHONE ENCOUNTER
Please call patient to schedule appointment with Dr. Amberly Chan to evaluate for LPR.    Thank you,  Leticia

## 2020-06-04 LAB — SARS-COV-2 RNA RESP QL NAA+PROBE: NOT DETECTED

## 2020-06-08 LAB
A ALTERNATA IGE QN: <0.1 KU/L
A FUMIGATUS IGE QN: <0.1 KU/L
BERMUDA GRASS IGE QN: <0.1 KU/L
CAT DANDER IGE QN: 0.2 KU/L
CEDAR IGE QN: <0.1 KU/L
D FARINAE IGE QN: <0.1 KU/L
D PTERONYSS IGE QN: <0.1 KU/L
DEPRECATED A ALTERNATA IGE RAST QL: NORMAL
DEPRECATED A FUMIGATUS IGE RAST QL: NORMAL
DEPRECATED BERMUDA GRASS IGE RAST QL: NORMAL
DEPRECATED CAT DANDER IGE RAST QL: ABNORMAL
DEPRECATED CEDAR IGE RAST QL: NORMAL
DEPRECATED D FARINAE IGE RAST QL: NORMAL
DEPRECATED D PTERONYSS IGE RAST QL: NORMAL
DEPRECATED DOG DANDER IGE RAST QL: NORMAL
DEPRECATED ELDER IGE RAST QL: NORMAL
DEPRECATED ENGL PLANTAIN IGE RAST QL: NORMAL
DEPRECATED PECAN/HICK TREE IGE RAST QL: NORMAL
DEPRECATED ROACH IGE RAST QL: NORMAL
DEPRECATED TIMOTHY IGE RAST QL: NORMAL
DEPRECATED WEST RAGWEED IGE RAST QL: NORMAL
DEPRECATED WHITE OAK IGE RAST QL: NORMAL
DOG DANDER IGE QN: <0.1 KU/L
ELDER IGE QN: <0.1 KU/L
ENGL PLANTAIN IGE QN: <0.1 KU/L
PECAN/HICK TREE IGE QN: <0.1 KU/L
ROACH IGE QN: <0.1 KU/L
TIMOTHY IGE QN: <0.1 KU/L
WEST RAGWEED IGE QN: <0.1 KU/L
WHITE OAK IGE QN: <0.1 KU/L

## 2020-06-12 ENCOUNTER — OFFICE VISIT (OUTPATIENT)
Dept: ALLERGY | Facility: CLINIC | Age: 60
End: 2020-06-12
Payer: COMMERCIAL

## 2020-06-12 VITALS — WEIGHT: 210.75 LBS | BODY MASS INDEX: 35.98 KG/M2 | RESPIRATION RATE: 16 BRPM | HEIGHT: 64 IN

## 2020-06-12 DIAGNOSIS — R05.9 COUGH: Primary | ICD-10-CM

## 2020-06-12 DIAGNOSIS — J31.0 CHRONIC RHINITIS: ICD-10-CM

## 2020-06-12 DIAGNOSIS — Z98.890 S/P ABLATION OF ATRIAL FIBRILLATION: ICD-10-CM

## 2020-06-12 DIAGNOSIS — I10 ESSENTIAL HYPERTENSION: ICD-10-CM

## 2020-06-12 DIAGNOSIS — Z79.899 LONG TERM CURRENT USE OF ANTIARRHYTHMIC DRUG: ICD-10-CM

## 2020-06-12 DIAGNOSIS — Z86.79 S/P ABLATION OF ATRIAL FIBRILLATION: ICD-10-CM

## 2020-06-12 PROCEDURE — 99999 PR PBB SHADOW E&M-EST. PATIENT-LVL III: ICD-10-PCS | Mod: PBBFAC,,, | Performed by: ALLERGY & IMMUNOLOGY

## 2020-06-12 PROCEDURE — 3008F BODY MASS INDEX DOCD: CPT | Mod: CPTII,S$GLB,, | Performed by: ALLERGY & IMMUNOLOGY

## 2020-06-12 PROCEDURE — 99214 PR OFFICE/OUTPT VISIT, EST, LEVL IV, 30-39 MIN: ICD-10-PCS | Mod: S$GLB,,, | Performed by: ALLERGY & IMMUNOLOGY

## 2020-06-12 PROCEDURE — 99214 OFFICE O/P EST MOD 30 MIN: CPT | Mod: S$GLB,,, | Performed by: ALLERGY & IMMUNOLOGY

## 2020-06-12 PROCEDURE — 3008F PR BODY MASS INDEX (BMI) DOCUMENTED: ICD-10-PCS | Mod: CPTII,S$GLB,, | Performed by: ALLERGY & IMMUNOLOGY

## 2020-06-12 PROCEDURE — 99999 PR PBB SHADOW E&M-EST. PATIENT-LVL III: CPT | Mod: PBBFAC,,, | Performed by: ALLERGY & IMMUNOLOGY

## 2020-06-15 ENCOUNTER — LAB VISIT (OUTPATIENT)
Dept: FAMILY MEDICINE | Facility: CLINIC | Age: 60
End: 2020-06-15
Payer: COMMERCIAL

## 2020-06-15 DIAGNOSIS — Z01.812 PRE-PROCEDURE LAB EXAM: ICD-10-CM

## 2020-06-15 PROCEDURE — U0003 INFECTIOUS AGENT DETECTION BY NUCLEIC ACID (DNA OR RNA); SEVERE ACUTE RESPIRATORY SYNDROME CORONAVIRUS 2 (SARS-COV-2) (CORONAVIRUS DISEASE [COVID-19]), AMPLIFIED PROBE TECHNIQUE, MAKING USE OF HIGH THROUGHPUT TECHNOLOGIES AS DESCRIBED BY CMS-2020-01-R: HCPCS

## 2020-06-16 ENCOUNTER — PATIENT OUTREACH (OUTPATIENT)
Dept: ADMINISTRATIVE | Facility: OTHER | Age: 60
End: 2020-06-16

## 2020-06-17 ENCOUNTER — TELEPHONE (OUTPATIENT)
Dept: OTOLARYNGOLOGY | Facility: CLINIC | Age: 60
End: 2020-06-17

## 2020-06-17 ENCOUNTER — OFFICE VISIT (OUTPATIENT)
Dept: OTOLARYNGOLOGY | Facility: CLINIC | Age: 60
End: 2020-06-17
Payer: COMMERCIAL

## 2020-06-17 VITALS
SYSTOLIC BLOOD PRESSURE: 137 MMHG | DIASTOLIC BLOOD PRESSURE: 71 MMHG | WEIGHT: 212.06 LBS | HEART RATE: 66 BPM | BODY MASS INDEX: 35.85 KG/M2

## 2020-06-17 DIAGNOSIS — Z01.812 PRE-PROCEDURE LAB EXAM: ICD-10-CM

## 2020-06-17 DIAGNOSIS — J31.0 CHRONIC RHINITIS: ICD-10-CM

## 2020-06-17 DIAGNOSIS — R09.A2 GLOBUS SENSATION: ICD-10-CM

## 2020-06-17 DIAGNOSIS — K21.9 LARYNGOPHARYNGEAL REFLUX (LPR): Primary | ICD-10-CM

## 2020-06-17 LAB — SARS-COV-2 RNA RESP QL NAA+PROBE: NOT DETECTED

## 2020-06-17 PROCEDURE — 99999 PR PBB SHADOW E&M-EST. PATIENT-LVL V: ICD-10-PCS | Mod: PBBFAC,,, | Performed by: OTOLARYNGOLOGY

## 2020-06-17 PROCEDURE — 31575 DIAGNOSTIC LARYNGOSCOPY: CPT | Mod: S$GLB,,, | Performed by: OTOLARYNGOLOGY

## 2020-06-17 PROCEDURE — 99999 PR PBB SHADOW E&M-EST. PATIENT-LVL V: CPT | Mod: PBBFAC,,, | Performed by: OTOLARYNGOLOGY

## 2020-06-17 PROCEDURE — 31575 PR LARYNGOSCOPY, FLEXIBLE; DIAGNOSTIC: ICD-10-PCS | Mod: S$GLB,,, | Performed by: OTOLARYNGOLOGY

## 2020-06-17 PROCEDURE — 99243 OFF/OP CNSLTJ NEW/EST LOW 30: CPT | Mod: 25,S$GLB,, | Performed by: OTOLARYNGOLOGY

## 2020-06-17 PROCEDURE — 99243 PR OFFICE CONSULTATION,LEVEL III: ICD-10-PCS | Mod: 25,S$GLB,, | Performed by: OTOLARYNGOLOGY

## 2020-06-17 RX ORDER — OMEPRAZOLE 40 MG/1
40 CAPSULE, DELAYED RELEASE ORAL EVERY MORNING
Qty: 90 CAPSULE | Refills: 0 | Status: SHIPPED | OUTPATIENT
Start: 2020-06-17 | End: 2020-09-18 | Stop reason: SDUPTHER

## 2020-06-17 NOTE — PROCEDURES
Procedures     Due to indication in patient's history, presentation or risk factors,  a fiber optic exam was performed. Risks, benefits and alternatives were discussed, patient had no further questions, or all questions answered and patient stated understanding.    SEPARATE PROCEDURE NOTE:    ANESTHESIA:  Topical xylocaine with roman-synephrine    FINDINGS:  Moderate inaterarytenoid erythema and edema    PROCEDURE:  After verbal consent was obtained, the flexible scope was passed through the patient's nasal cavity without difficulty.  The nasopharynx (adenoid pad) and eustachian tube orifices were first visualized and were found to be normal, without masses or irregularity.  The posterior pharyngeal wall and base of tongue were then examined and no mass or irregular tissue was seen.  The scope was then advanced to the larynx, and the epiglottis, valleculae, and piriform sinuses were normal, without masses or mucosal irregularity.  The false vocal folds and true vocal folds were then examined and were found to have normal mobility (full abduction and adduction) and no masses or mucosal irregularity was seen.  The interartyenoid area had moderate edema and erythema consistent with reflux.

## 2020-06-17 NOTE — LETTER
June 17, 2020      AMBREEN Long III, MD  [address] [facility]  [place] [address]           [place] - Otorhinolaryngology  [address] [address]  Phone: [phone]  Fax: [phone]          Patient: Francoise Dykes   MR Number: [id number]   YOB: 1960   Date of Visit: 6/17/2020       Dear Dr. AMBREEN Long III:    Thank you for referring Francoise Dykes to me for evaluation. Attached you will find relevant portions of my assessment and plan of care.    If you have questions, please do not hesitate to call me. I look forward to following Francoise Dykes along with you.    Sincerely,    Lydia Chan MD    Enclosure  CC:  No Recipients    If you would like to receive this communication electronically, please contact [email or web address] or [phone] to request more information on [employer] Link access.    For providers and/or their staff who would like to refer a patient to [facility], please contact us through our one-stop-shop provider referral line, [facility], at [phone].    If you feel you have received this communication in error or would no longer like to receive these types of communications, please e-mail [email or web address]

## 2020-06-17 NOTE — PROGRESS NOTES
Chief Complaint   Patient presents with    Cough      pt states coughing has improved since seen Dr Long   .     HPI:Francoise Dykes is a 59 y.o. female who has been sent for consultation today  by Dr. AMBREEN Long MD for evaluation for possible LPR.  She reports a  6 months history of thorat clearing and cough.  She was recently treated by Dr. Long with augmentin, prednisone, and omeprazole 40mg PO BID.  She states that she is doing much better since this treatment.  She has a prior dx of 2018 of LPR.  She reports that cough is imporved. She notes mild globus sensation and  Hoarseness in the mornings. Her voice is not progressively worsening over this time. There are pitch breaks or cracks. There is not vocal fatigue. She denies dysphagia, odynophagia, throat pain, and otalgia.  There is no hemoptysis or hematemesis. She is breathing well.     She denies throat clearing and cough. She admits to heartburn and reflux. She notes she will have heartburn episodes if she eats spicy or fried foods.         Past Medical History:   Diagnosis Date    Acid reflux     Allergy     seasonal    Atrial fibrillation     Essential hypertension 11/20/2017    Pt states that she does not have HTN.      Social History     Socioeconomic History    Marital status:      Spouse name: Not on file    Number of children: Not on file    Years of education: Not on file    Highest education level: Not on file   Occupational History    Occupation: Teacher     Employer: [employer]    Social Needs    Financial resource strain: Not on file    Food insecurity     Worry: Not on file     Inability: Not on file    Transportation needs     Medical: Not on file     Non-medical: Not on file   Tobacco Use    Smoking status: Never Smoker    Smokeless tobacco: Never Used   Substance and Sexual Activity    Alcohol use: Yes     Comment: rarely, 1 drink/week    Drug use: No    Sexual activity: Yes     Partners: Male   Lifestyle     Physical activity     Days per week: Not on file     Minutes per session: Not on file    Stress: Not on file   Relationships    Social connections     Talks on phone: Not on file     Gets together: Not on file     Attends [employer] service: Not on file     Active member of club or organization: Not on file     Attends meetings of clubs or organizations: Not on file     Relationship status: Not on file   Other Topics Concern    Are you pregnant or think you may be? No    Breast-feeding No   Social History Narrative    Recently moved back to [facility] to help take care of mom.     Past Surgical History:   Procedure Laterality Date    CARDIAC ELECTROPHYSIOLOGY STUDY AND ABLATION       SECTION      COLONOSCOPY N/A 2018    Procedure: COLONOSCOPY;  Surgeon: Brodie Lara MD;  Location: [facility];  Service: Endoscopy;  Laterality: N/A;  confirmed appt-ss    HYSTERECTOMY      MIGUEL    RADIOFREQUENCY ABLATION Left 2019    Procedure: RADIOFREQUENCY ABLATION, LEFT L2,3,4,5;  Surgeon: Mariusz Jang MD;  Location: [facility];  Service: Pain Management;  Laterality: Left;  Left RFA L2,3,4,5  1 of 2  *Ok to hold elgin [address]    RADIOFREQUENCY ABLATION Right 3/13/2019    Procedure: RADIOFREQUENCY ABLATION,  Right L2,3,4,5;  Surgeon: Mariusz Jang MD;  Location: [facility];  Service: Pain Management;  Laterality: Right;  Right RFA @ L2,3,4,5  2 of 2     Family History   Problem Relation Age of Onset    Hypertension Mother     Diabetes Mother     Glaucoma Mother     Allergies Mother     Asbestos Father     Obesity Father     Eczema Son     Hypertension Son     Heart disease Maternal Grandmother         chf    Diabetes Maternal Grandfather     Cancer Paternal Grandmother         lung, 2/2 second hand smoke    Glaucoma Paternal Grandfather     Blindness Paternal Grandfather     Melanoma Neg Hx     Psoriasis Neg Hx     Lupus Neg Hx     Acne Neg Hx     Breast  cancer Neg Hx     Colon cancer Neg Hx     Ovarian cancer Neg Hx            Review of Systems  General: negative for chills, fever or weight loss  Psychological: negative for mood changes or depression  Ophthalmic: negative for blurry vision, photophobia or eye pain  ENT: see HPI  Respiratory: no cough, shortness of breath, or wheezing  Cardiovascular: no chest pain or dyspnea on exertion  Gastrointestinal: no abdominal pain, change in bowel habits, or black/ bloody stools  Musculoskeletal: negative for gait disturbance or muscular weakness  Neurological: no syncope or seizures; no ataxia  Dermatological: negative for puritis,  rash and jaundice  Hematologic/lymphatic: no easy bruising, no new lumps or bumps      Physical Exam:    Vitals:    06/17/20 0839   BP: 137/71   Pulse: 66       Constitutional: Well appearing / communicating without difficutly.  NAD.  Eyes: EOM I Bilaterally  Head/Face: Normocephalic.  Negative paranasal sinus pressure/tenderness.  Salivary glands WNL.  House Brackmann I Bilaterally.    Right Ear: Auricle normal appearance. External Auditory Canal within normal limits no lesions or masses,TM w/o masses/lesions/perforations. TM mobility noted.   Left Ear: Auricle normal appearance. External Auditory Canal within normal limits no lesions or masses,TM w/o masses/lesions/perforations. TM mobility noted.  Nose: No gross nasal septal deviation. Inferior Turbinates 3+ bilaterally. No septal perforation. No masses/lesions. External nasal skin appears normal without masses/lesions.  Oral Cavity: Gingiva/lips within normal limits.  Dentition/gingiva healthy appearing. Mucus membranes moist. Floor of mouth soft, no masses palpated. Oral Tongue mobile. Hard Palate appears normal.    Oropharynx: Base of tongue appears normal. No masses/lesions noted. Tonsillar fossa/pharyngeal wall without lesions. Posterior oropharynx WNL.  Soft palate without masses. Midline uvula.   Neck/Lymphatic: No LAD I-VI  bilaterally.  No thyromegaly.  No masses noted on exam.    Mirror laryngoscopy/nasopharyngoscopy: Active gag reflex.  Unable to perform.    Neuro/Psychiatric: AOx3.  Normal mood and affect.   Cardiovascular: Normal carotid pulses bilaterally, no increasing jugular venous distention noted at cervical region bilaterally.    Respiratory: Normal respiratory effort, no stridor, no retractions noted.      See separate procedure note for FFL.       Assessment:    ICD-10-CM ICD-9-CM    1. Laryngopharyngeal reflux (LPR)  K21.9 478.79    2. Globus sensation  R09.89 306.4    3. Chronic rhinitis  J31.0 472.0      The primary encounter diagnosis was Laryngopharyngeal reflux (LPR). Diagnoses of Globus sensation and Chronic rhinitis were also pertinent to this visit.      Plan:  No orders of the defined types were placed in this encounter.    Continue Flonase and Zyrtec.      Decrease omeprazole to 40 mg p.o. daily from b.i.d..Prescription given.     I also recommended that the patient refrain from eating within 3 hours of going to bed at night, to elevate the head of bed very subtly and optimize the impact of gravity on the potential reflux, and to  avoid alcohol, caffeine, tobacco, tomato sauce, spicy foods, fried food, and chocolate.      Follow up in 6 weeks to reassess progress with treatment regimen.     Thank you kindly for allowing me to participate in the patient's care.     Lydia Chan MD

## 2020-06-25 ENCOUNTER — OFFICE VISIT (OUTPATIENT)
Dept: OPHTHALMOLOGY | Facility: CLINIC | Age: 60
End: 2020-06-25
Payer: COMMERCIAL

## 2020-06-25 ENCOUNTER — CLINICAL SUPPORT (OUTPATIENT)
Dept: OPHTHALMOLOGY | Facility: CLINIC | Age: 60
End: 2020-06-25
Payer: COMMERCIAL

## 2020-06-25 DIAGNOSIS — H02.413 MECHANICAL PTOSIS, BILATERAL: Primary | ICD-10-CM

## 2020-06-25 DIAGNOSIS — H02.835 DERMATOCHALASIS OF BOTH LOWER EYELIDS: ICD-10-CM

## 2020-06-25 DIAGNOSIS — H02.9 LESION OF RIGHT LOWER EYELID: ICD-10-CM

## 2020-06-25 DIAGNOSIS — H02.9 LESION OF LEFT LOWER EYELID: Primary | ICD-10-CM

## 2020-06-25 DIAGNOSIS — H02.9 LESION OF RIGHT UPPER EYELID: ICD-10-CM

## 2020-06-25 DIAGNOSIS — H02.413 MECHANICAL PTOSIS, BILATERAL: ICD-10-CM

## 2020-06-25 DIAGNOSIS — H02.832 DERMATOCHALASIS OF BOTH LOWER EYELIDS: ICD-10-CM

## 2020-06-25 PROCEDURE — 99999 PR PBB SHADOW E&M-EST. PATIENT-LVL III: ICD-10-PCS | Mod: PBBFAC,,, | Performed by: OPHTHALMOLOGY

## 2020-06-25 PROCEDURE — 99999 PR PBB SHADOW E&M-EST. PATIENT-LVL III: CPT | Mod: PBBFAC,,, | Performed by: OPHTHALMOLOGY

## 2020-06-25 PROCEDURE — 92285 EXTERNAL OCULAR PHOTOGRAPHY: CPT | Mod: S$GLB,,, | Performed by: OPHTHALMOLOGY

## 2020-06-25 PROCEDURE — 92285 EXTERNAL PHOTOGRAPHY - OU - BOTH EYES: ICD-10-PCS | Mod: S$GLB,,, | Performed by: OPHTHALMOLOGY

## 2020-06-25 PROCEDURE — 92004 PR EYE EXAM, NEW PATIENT,COMPREHESV: ICD-10-PCS | Mod: S$GLB,,, | Performed by: OPHTHALMOLOGY

## 2020-06-25 PROCEDURE — 92004 COMPRE OPH EXAM NEW PT 1/>: CPT | Mod: S$GLB,,, | Performed by: OPHTHALMOLOGY

## 2020-06-25 NOTE — LETTER
June 28, 2020      Siomara Gutierrez MD  [address]  [facility] [address]           [facility]  [address]  [facility] [address]  Phone: [phone]  Fax: [phone]          Patient: Francoise Dykes   MR Number: [id number]   YOB: 1960   Date of Visit: 6/25/2020       Dear Dr. Siomara Gutierrez:    Thank you for referring Francoise Dykes to me for evaluation. Attached you will find relevant portions of my assessment and plan of care.    If you have questions, please do not hesitate to call me. I look forward to following Francoise Dykes along with you.    Sincerely,    Maryse Arora MD    Enclosure  CC:  No Recipients    If you would like to receive this communication electronically, please contact [email or web address] or [phone] to request more information on [employer] Link access.    For providers and/or their staff who would like to refer a patient to [facility], please contact us through our one-stop-shop provider referral line, [facility], at [phone].    If you feel you have received this communication in error or would no longer like to receive these types of communications, please e-mail [email or web address]

## 2020-06-25 NOTE — PROGRESS NOTES
HPI     Patient here for eyelid lesions. Dr. Siomara Gutierrez at [facility].  How long has lesion been present? For a few months.  Is lesion bothersome? No.  Does the lesion bleed? No.  Patient consulted with Dr. Siomara Gutierrez at [facility] on 11/1/2019   and was dx with syringoma of eyelid. Patient states that she 'doesn't like   the sight of the lesions'. Patient states that the lesions get bigger and   shrink intermittently over time. Patient denies any other complaints.    No eye meds.    No previous eye sx.    Last edited by Marsye Arora MD on 6/28/2020  4:12 PM. (History)            Assessment /Plan     For exam results, see Encounter Report.    Lesion of left lower eyelid  -     External Photography - [facility] - Both Eyes    Lesion of right lower eyelid  -     External Photography - [facility] - Both Eyes    Mechanical ptosis, bilateral    Dermatochalasis of both lower eyelids    Lesion of right upper eyelid      The patient is a pleasant 59-year-old female here for evaluation of bilateral lower eyelid lesions and right upper eyelid lesion.  Patient is bothered by the appearance of the eyelid lesions.  They have been present for a few years.  They have actually decreased in size.    On exam, the patient has yellowish colored macules in the bilateral tear troughs.  She also has 1 macule at the medial aspect of the right upper eyelid.  These findings are consistent with xanthelasma.  Recommend repeat lipid panel for the patient. She has not had one recently. She does have a history of hyperlipidemia.     The patient has bilateral upper eyelid mechanical ptosis.  She also has bilateral lower eyelid fat prolapse. Patient is interested in cosmetic enhancement of the lower eyelids.    The patient has a history of going into atrial fibrillation once placed under general anesthesia.  She will decide upon lower eyelid surgery as I would recommend subtractive blepharoplasty ideally under general anesthesia. Consider  lower eyelid subtractive blepharoplasty in minor procedure room with valium.    Patient to return in 3 months, and we will discuss surgical options and obtain consents.

## 2020-07-10 DIAGNOSIS — I10 HYPERTENSION, UNCONTROLLED: ICD-10-CM

## 2020-07-10 RX ORDER — HYDROCHLOROTHIAZIDE 12.5 MG/1
12.5 TABLET ORAL DAILY
Qty: 90 TABLET | Refills: 1 | Status: SHIPPED | OUTPATIENT
Start: 2020-07-10 | End: 2020-12-07

## 2020-07-22 DIAGNOSIS — H02.835 DERMATOCHALASIS OF BOTH LOWER EYELIDS: Primary | ICD-10-CM

## 2020-07-22 DIAGNOSIS — H02.832 DERMATOCHALASIS OF BOTH LOWER EYELIDS: Primary | ICD-10-CM

## 2020-07-28 ENCOUNTER — PATIENT OUTREACH (OUTPATIENT)
Dept: ADMINISTRATIVE | Facility: OTHER | Age: 60
End: 2020-07-28

## 2020-07-28 NOTE — PROGRESS NOTES
Requested updates within Care Everywhere.  Patient's chart was reviewed for overdue VERENA topics.  Immunizations reconciled.    Orders placed:  Tasked appts:  Labs Linked:

## 2020-08-28 ENCOUNTER — OFFICE VISIT (OUTPATIENT)
Dept: ALLERGY | Facility: CLINIC | Age: 60
End: 2020-08-28
Payer: COMMERCIAL

## 2020-08-28 DIAGNOSIS — Z79.01 CURRENT USE OF LONG TERM ANTICOAGULATION: ICD-10-CM

## 2020-08-28 DIAGNOSIS — Z86.79 S/P ABLATION OF ATRIAL FIBRILLATION: ICD-10-CM

## 2020-08-28 DIAGNOSIS — J31.0 CHRONIC RHINITIS: ICD-10-CM

## 2020-08-28 DIAGNOSIS — Z98.890 S/P ABLATION OF ATRIAL FIBRILLATION: ICD-10-CM

## 2020-08-28 DIAGNOSIS — R05.9 COUGH: ICD-10-CM

## 2020-08-28 DIAGNOSIS — I10 ESSENTIAL HYPERTENSION: ICD-10-CM

## 2020-08-28 DIAGNOSIS — K21.9 LARYNGOPHARYNGEAL REFLUX: Primary | ICD-10-CM

## 2020-08-28 DIAGNOSIS — Z79.899 LONG TERM CURRENT USE OF ANTIARRHYTHMIC DRUG: ICD-10-CM

## 2020-08-28 PROCEDURE — 99214 OFFICE O/P EST MOD 30 MIN: CPT | Mod: 95,,, | Performed by: ALLERGY & IMMUNOLOGY

## 2020-08-28 PROCEDURE — 99214 PR OFFICE/OUTPT VISIT, EST, LEVL IV, 30-39 MIN: ICD-10-PCS | Mod: 95,,, | Performed by: ALLERGY & IMMUNOLOGY

## 2020-08-28 RX ORDER — AZITHROMYCIN 250 MG/1
250 TABLET, FILM COATED ORAL DAILY
Qty: 6 TABLET | Refills: 0 | Status: SHIPPED | OUTPATIENT
Start: 2020-08-28 | End: 2020-10-23

## 2020-08-28 NOTE — PROGRESS NOTES
The patient location is: Home.  The chief complaint leading to consultation is: Cough.    Visit type: Audiovisual.    Face to Face time with patient: 20  15 minutes of total time spent on the encounter, which includes face to face time and non-face to face time preparing to see the patient (eg, review of tests), Obtaining and/or reviewing separately obtained history, Documenting clinical information in the electronic or other health record, Independently interpreting results (not separately reported) and communicating results to the patient/family/caregiver, or Care coordination (not separately reported).     Each patient to whom he or she provides medical services by telemedicine is:  (1) informed of the relationship between the physician and patient and the respective role of any other health care provider with respect to management of the patient; and (2) notified that he or she may decline to receive medical services by telemedicine and may withdraw from such care at any time.    Francoise Dykes is evaluated today via a virtual video visit.  She was last seen June 12, 2020.    After her last visit, she saw Dr. Lydia Tyson who saw moderate edema and erythema consistent with reflux on her exam.    She was on omeprazole 40 milligrams twice a day and her symptoms were well controlled.  She reduced her dose to 40 milligrams once a day.    Since then she has started back with school.  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  She is not walking.    Over the past week she has had increased mucus in her throat with throat clearing.  She has been coughing up thick yellow mucus at night and first thing in the morning when she wakes up.    She had prednisone at home and started taking two days ago.  She took 60 milligrams and then 50 milligrams yesterday.    She has had a small amount of wheezing when she is lying down.    She denies any sore  throat or hoarseness.  She denies any other rhinitis or conjunctivitis.    She denies any heartburn.    She increased her omeprazole to 40 milligrams twice a day several days ago.  She does think she has improved somewhat.    [facility] PE[facility] ALLERGY QUESTIONNAIRE SHORT 8/28/2020   Head or facial pain: -   Facial swelling? No   Sinus pain? No   Sinus pressure? No   Ears: -   Ear discharge? No   Ear pain? No   Hearing loss? No   Nose: -   Nosebleeds? No   Postnasal drip? No   Sneezing? No   Runny nose? No   Congestion? No   Throat: -   Sore throat? No   Trouble swallowing? No   Voice change? Yes   Eyes: -   Eye itching? Yes   Eye redness? No   Eye discharge? No   Eye pain?  No   Light sensitivity / light hurts the eyes? No   Lungs: -   Cough? Yes   Wheezing? Yes   Shortness of breath? No   Apnea? No   Choking? Yes   Chest tightness? No   Skin: -   Rash? Yes   Color change of skin? Yes     Physical Examination:  Televisit.    Spirometry 08/20/2018:  Normal.    Laboratory 08/20/2018:  ImmunoCAP:  Negative.    Assessment:  1.  Chronic rhinitis, consider allergic.  2.  Chronic conjunctivitis, consider allergic.  3.  Acute bronchospasm with bronchitis, now resolved.  4.  GERD.  5.  LPR.  6.  Dermatitis of uncertain etiology, resolved.  7.  Atrial fibrillation on Eliquis and Rythmol.  8.  Nasal bleeding on topical nasal steroids in the past.    Recommendations:  1.  Continue omeprazole 40 milligrams twice a day.  2.  Discontinue prednisone.  3.  Z-Frank if mucus and cough continue.  4.  Return to clinic in one month or sooner if needed.

## 2020-09-09 ENCOUNTER — TELEPHONE (OUTPATIENT)
Dept: ALLERGY | Facility: CLINIC | Age: 60
End: 2020-09-09

## 2020-09-09 NOTE — TELEPHONE ENCOUNTER
Spoke with Lexx Ratliff at [facility] pharmacy to clarify Azithromycin is 2 tables on day 1, 1 tablet on day 2-5.        ----- Message from Sabine Pascual sent at 9/9/2020  4:47 PM CDT -----  Regarding: [facility]  Pharmacy is calling to speak with the nurse to get clarification on the pts prescription that was called in called azithromyzin  can you please call pharmacy at [phone].    RAY

## 2020-09-21 RX ORDER — OMEPRAZOLE 40 MG/1
40 CAPSULE, DELAYED RELEASE ORAL EVERY MORNING
Qty: 90 CAPSULE | Refills: 0 | Status: SHIPPED | OUTPATIENT
Start: 2020-09-21 | End: 2020-09-30 | Stop reason: SDUPTHER

## 2020-09-22 ENCOUNTER — PATIENT MESSAGE (OUTPATIENT)
Dept: ALLERGY | Facility: CLINIC | Age: 60
End: 2020-09-22

## 2020-09-23 ENCOUNTER — OFFICE VISIT (OUTPATIENT)
Dept: ALLERGY | Facility: CLINIC | Age: 60
End: 2020-09-23
Payer: COMMERCIAL

## 2020-09-23 DIAGNOSIS — K21.9 LARYNGOPHARYNGEAL REFLUX: ICD-10-CM

## 2020-09-23 DIAGNOSIS — R05.9 COUGH: Primary | ICD-10-CM

## 2020-09-23 DIAGNOSIS — Z79.899 LONG TERM CURRENT USE OF ANTIARRHYTHMIC DRUG: ICD-10-CM

## 2020-09-23 DIAGNOSIS — J31.0 CHRONIC RHINITIS: ICD-10-CM

## 2020-09-23 DIAGNOSIS — I48.0 PAROXYSMAL ATRIAL FIBRILLATION: ICD-10-CM

## 2020-09-23 PROCEDURE — 99213 OFFICE O/P EST LOW 20 MIN: CPT | Mod: 95,,, | Performed by: ALLERGY & IMMUNOLOGY

## 2020-09-23 PROCEDURE — 99213 PR OFFICE/OUTPT VISIT, EST, LEVL III, 20-29 MIN: ICD-10-PCS | Mod: 95,,, | Performed by: ALLERGY & IMMUNOLOGY

## 2020-09-23 RX ORDER — AMOXICILLIN AND CLAVULANATE POTASSIUM 875; 125 MG/1; MG/1
1 TABLET, FILM COATED ORAL EVERY 12 HOURS
Qty: 20 TABLET | Refills: 0 | Status: SHIPPED | OUTPATIENT
Start: 2020-09-23 | End: 2021-03-01

## 2020-09-23 NOTE — PROGRESS NOTES
The patient location is: School.  The chief complaint leading to consultation is: Cough and mucus.    Visit type: Audiovisual.    Face to Face time with patient: 10 min.  20 minutes of total time spent on the encounter, which includes face to face time and non-face to face time preparing to see the patient (eg, review of tests), Obtaining and/or reviewing separately obtained history, Documenting clinical information in the electronic or other health record, Independently interpreting results (not separately reported) and communicating results to the patient/family/caregiver, or Care coordination (not separately reported).     Each patient to whom he or she provides medical services by telemedicine is:  (1) informed of the relationship between the physician and patient and the respective role of any other health care provider with respect to management of the patient; and (2) notified that he or she may decline to receive medical services by telemedicine and may withdraw from such care at any time.    Francoise Dykes is evaluated today via a Virtual video visit.  She was last seen in August 28, 2020.    She was doing well but over the last few days has had increased postnasal drip, hoarseness, throat clearing, a sensation that something is in the back of the throat, and cough that has been productive of yellow thick sputum.    She took antibiotics in February and this improved her symptoms for several months.    She is back in school today preparing for students return tomorrow.    She is on a Zoom call for 2 hours.    [facility] PEQ ALLERGY QUESTIONNAIRE SHORT 8/28/2020   Head or facial pain: -   Facial swelling? No   Sinus pain? No   Sinus pressure? No   Ears: -   Ear discharge? No   Ear pain? No   Hearing loss? No   Nose: -   Nosebleeds? No   Postnasal drip? No   Sneezing? No   Runny nose? No   Congestion? No   Throat: -   Sore throat? No   Trouble swallowing? No   Voice change? Yes   Eyes: -   Eye itching? Yes   Eye  redness? No   Eye discharge? No   Eye pain?  No   Light sensitivity / light hurts the eyes? No   Lungs: -   Cough? Yes   Wheezing? Yes   Shortness of breath? No   Apnea? No   Choking? Yes   Chest tightness? No   Skin: -   Rash? Yes   Color change of skin? Yes     Physical Examination:  Televisit.    Spirometry 08/20/2018:  Normal.    Laboratory 08/20/2018:  ImmunoCAP:  Negative.    Assessment:  1.  Chronic rhinitis, consider allergic.  2.  Chronic conjunctivitis, consider allergic.  3.  Acute bronchospasm with bronchitis, now resolved.  4.  GERD.  5.  LPR.  6.  Dermatitis of uncertain etiology, resolved.  7.  Atrial fibrillation on Eliquis and Rythmol.  8.  Nasal bleeding on topical nasal steroids in the past.    Recommendations:  1.  Continue omeprazole 40 milligrams twice a day.  2.  Augmentin 875 b.i.d. for 10 days.  3.  Follow symptoms on above.  4.  Follow-up scheduled for two weeks.

## 2020-09-30 RX ORDER — OMEPRAZOLE 40 MG/1
40 CAPSULE, DELAYED RELEASE ORAL EVERY MORNING
Qty: 90 CAPSULE | Refills: 0 | Status: SHIPPED | OUTPATIENT
Start: 2020-09-30 | End: 2021-02-03 | Stop reason: SDUPTHER

## 2020-10-05 ENCOUNTER — PATIENT OUTREACH (OUTPATIENT)
Dept: ADMINISTRATIVE | Facility: OTHER | Age: 60
End: 2020-10-05

## 2020-10-12 ENCOUNTER — HOSPITAL ENCOUNTER (EMERGENCY)
Facility: HOSPITAL | Age: 60
Discharge: HOME | End: 2020-10-12
Attending: EMERGENCY MEDICINE
Payer: COMMERCIAL

## 2020-10-12 ENCOUNTER — OFFICE VISIT (OUTPATIENT)
Dept: DERMATOLOGY | Facility: CLINIC | Age: 60
End: 2020-10-12
Payer: COMMERCIAL

## 2020-10-12 ENCOUNTER — OFFICE VISIT (OUTPATIENT)
Dept: ALLERGY | Facility: CLINIC | Age: 60
End: 2020-10-12
Payer: COMMERCIAL

## 2020-10-12 VITALS
HEIGHT: 65 IN | OXYGEN SATURATION: 95 % | TEMPERATURE: 98 F | RESPIRATION RATE: 20 BRPM | DIASTOLIC BLOOD PRESSURE: 80 MMHG | WEIGHT: 209 LBS | BODY MASS INDEX: 34.82 KG/M2 | HEART RATE: 83 BPM | SYSTOLIC BLOOD PRESSURE: 143 MMHG

## 2020-10-12 VITALS — WEIGHT: 209.69 LBS | BODY MASS INDEX: 35.8 KG/M2 | HEIGHT: 64 IN

## 2020-10-12 DIAGNOSIS — J31.0 CHRONIC RHINITIS: ICD-10-CM

## 2020-10-12 DIAGNOSIS — K21.9 GASTROESOPHAGEAL REFLUX DISEASE, UNSPECIFIED WHETHER ESOPHAGITIS PRESENT: ICD-10-CM

## 2020-10-12 DIAGNOSIS — L30.9 DERMATITIS: Primary | ICD-10-CM

## 2020-10-12 DIAGNOSIS — I10 ESSENTIAL HYPERTENSION: ICD-10-CM

## 2020-10-12 DIAGNOSIS — I48.0 PAROXYSMAL ATRIAL FIBRILLATION: ICD-10-CM

## 2020-10-12 DIAGNOSIS — D68.9 ANTICOAGULANT-INDUCED BLEEDING: Primary | ICD-10-CM

## 2020-10-12 DIAGNOSIS — K21.9 LARYNGOPHARYNGEAL REFLUX: ICD-10-CM

## 2020-10-12 DIAGNOSIS — R06.2 WHEEZING: ICD-10-CM

## 2020-10-12 DIAGNOSIS — T45.7X1A ANTICOAGULANT-INDUCED BLEEDING: Primary | ICD-10-CM

## 2020-10-12 DIAGNOSIS — R05.9 COUGH: ICD-10-CM

## 2020-10-12 PROCEDURE — 88312 SPECIAL STAINS GROUP 1: CPT | Mod: 26,,, | Performed by: PATHOLOGY

## 2020-10-12 PROCEDURE — 3008F PR BODY MASS INDEX (BMI) DOCUMENTED: ICD-10-PCS | Mod: CPTII,S$GLB,, | Performed by: ALLERGY & IMMUNOLOGY

## 2020-10-12 PROCEDURE — 3008F BODY MASS INDEX DOCD: CPT | Mod: CPTII,S$GLB,, | Performed by: ALLERGY & IMMUNOLOGY

## 2020-10-12 PROCEDURE — 99999 PR PBB SHADOW E&M-EST. PATIENT-LVL IV: CPT | Mod: PBBFAC,,, | Performed by: DERMATOLOGY

## 2020-10-12 PROCEDURE — 11104 PR PUNCH BIOPSY, SKIN, SINGLE LESION: ICD-10-PCS | Mod: S$GLB,,, | Performed by: DERMATOLOGY

## 2020-10-12 PROCEDURE — 99999 PR PBB SHADOW E&M-EST. PATIENT-LVL V: ICD-10-PCS | Mod: PBBFAC,,, | Performed by: ALLERGY & IMMUNOLOGY

## 2020-10-12 PROCEDURE — 88305 TISSUE EXAM BY PATHOLOGIST: CPT | Performed by: PATHOLOGY

## 2020-10-12 PROCEDURE — 12001 RPR S/N/AX/GEN/TRNK 2.5CM/<: CPT | Mod: ER

## 2020-10-12 PROCEDURE — 99212 OFFICE O/P EST SF 10 MIN: CPT | Mod: 25,S$GLB,, | Performed by: DERMATOLOGY

## 2020-10-12 PROCEDURE — 11104 PUNCH BX SKIN SINGLE LESION: CPT | Mod: S$GLB,,, | Performed by: DERMATOLOGY

## 2020-10-12 PROCEDURE — 88312 PR  SPECIAL STAINS,GROUP I: ICD-10-PCS | Mod: 26,,, | Performed by: PATHOLOGY

## 2020-10-12 PROCEDURE — 99999 PR PBB SHADOW E&M-EST. PATIENT-LVL IV: ICD-10-PCS | Mod: PBBFAC,,, | Performed by: DERMATOLOGY

## 2020-10-12 PROCEDURE — 88312 SPECIAL STAINS GROUP 1: CPT | Performed by: PATHOLOGY

## 2020-10-12 PROCEDURE — 99999 PR PBB SHADOW E&M-EST. PATIENT-LVL V: CPT | Mod: PBBFAC,,, | Performed by: ALLERGY & IMMUNOLOGY

## 2020-10-12 PROCEDURE — 99214 OFFICE O/P EST MOD 30 MIN: CPT | Mod: S$GLB,,, | Performed by: ALLERGY & IMMUNOLOGY

## 2020-10-12 PROCEDURE — 25000003 PHARM REV CODE 250: Mod: ER | Performed by: PHYSICIAN ASSISTANT

## 2020-10-12 PROCEDURE — 88305 TISSUE EXAM BY PATHOLOGIST: ICD-10-PCS | Mod: 26,,, | Performed by: PATHOLOGY

## 2020-10-12 PROCEDURE — 99214 PR OFFICE/OUTPT VISIT, EST, LEVL IV, 30-39 MIN: ICD-10-PCS | Mod: S$GLB,,, | Performed by: ALLERGY & IMMUNOLOGY

## 2020-10-12 PROCEDURE — 99283 EMERGENCY DEPT VISIT LOW MDM: CPT | Mod: 25,ER

## 2020-10-12 PROCEDURE — 99212 PR OFFICE/OUTPT VISIT, EST, LEVL II, 10-19 MIN: ICD-10-PCS | Mod: 25,S$GLB,, | Performed by: DERMATOLOGY

## 2020-10-12 PROCEDURE — 88305 TISSUE EXAM BY PATHOLOGIST: CPT | Mod: 26,,, | Performed by: PATHOLOGY

## 2020-10-12 RX ORDER — SILVER NITRATE 38.21; 12.74 MG/1; MG/1
1 STICK TOPICAL
Status: COMPLETED | OUTPATIENT
Start: 2020-10-12 | End: 2020-10-12

## 2020-10-12 RX ORDER — LIDOCAINE HYDROCHLORIDE AND EPINEPHRINE 10; 10 MG/ML; UG/ML
1 INJECTION, SOLUTION INFILTRATION; PERINEURAL
Status: COMPLETED | OUTPATIENT
Start: 2020-10-12 | End: 2020-10-12

## 2020-10-12 RX ORDER — FLUTICASONE PROPIONATE 50 MCG
2 SPRAY, SUSPENSION (ML) NASAL DAILY
Qty: 16 G | Refills: 5 | Status: SHIPPED | OUTPATIENT
Start: 2020-10-12 | End: 2021-05-27 | Stop reason: SDUPTHER

## 2020-10-12 RX ORDER — ALBUTEROL SULFATE 90 UG/1
2 AEROSOL, METERED RESPIRATORY (INHALATION) EVERY 4 HOURS PRN
Qty: 19 G | Refills: 3 | Status: SHIPPED | OUTPATIENT
Start: 2020-10-12 | End: 2021-04-30

## 2020-10-12 RX ORDER — AZELASTINE 1 MG/ML
1-2 SPRAY, METERED NASAL 2 TIMES DAILY PRN
Qty: 90 ML | Refills: 3 | Status: SHIPPED | OUTPATIENT
Start: 2020-10-12 | End: 2021-10-12

## 2020-10-12 RX ADMIN — SILVER NITRATE APPLICATORS 1 APPLICATOR: 25; 75 STICK TOPICAL at 09:10

## 2020-10-12 RX ADMIN — LIDOCAINE HYDROCHLORIDE AND EPINEPHRINE 1 ML: 10; 10 INJECTION, SOLUTION INFILTRATION; PERINEURAL at 10:10

## 2020-10-12 NOTE — Clinical Note
"Francoise Perkinsdamian Dykes was seen and treated in our emergency department on 10/12/2020.  She may return to work on 10/14/2020.       If you have any questions or concerns, please don't hesitate to call.      Allen Yuan RN    "

## 2020-10-12 NOTE — PROGRESS NOTES
Francoise Dykes returns to clinic today for continued evaluation of chronic rhinitis and asthma.  She is here alone.  She was last seen via a virtual video visit on September 23, 2020.    Since restarting school she has been under lot more stress.  She has had increased rhinorrhea, postnasal drip, frequent throat clearing, a sensation that something is in the back of the throat, sore throat, hoarseness, and a cough with intermittent wheezing particularly at night.    At her last Virtual visit, Augmentin was started for 10 days.  She thinks this may have only mildly helped.    She did think that her symptoms may be due to reflux and she increased her omeprazole to 40 milligrams twice a day.  On this dose she was better controlled.  Dr. Lydia Tyson decreased it at her last visit on March 17, 2020.    She denies any indigestion or heartburn.    She takes Rythmol for atrial fibrillation.    She has he use albuterol with an improvement in her symptoms.  She was using ProAir which caused less side effects.  With the albuterol she has had increased jitteriness.  Yesterday she did have an episode of atrial fibrillation.  She does experience this more with the albuterol.  She has gotten ProAir from [facility] in the past without difficulty.    Last night she used Astelin with some improvement.  She does have Flonase but has had nasal bleeding in the past.    She has a chronic rash on her left lower extremity.  Dr. Self, a dermatologist on the [facility] told her that it was eczema.    [facility] PEQ ALLERGY QUESTIONNAIRE SHORT 10/12/2020   Head or facial pain: -   Facial swelling? No   Sinus pain? No   Sinus pressure? Yes   Ears: No symptoms   Ear discharge? -   Ear pain? -   Hearing loss? -   Nose: -   Nosebleeds? No   Postnasal drip? Yes   Sneezing? Yes   Runny nose? Yes   Congestion? Yes   Throat: No symptoms   Sore throat? -   Trouble swallowing? -   Voice change? -   Eyes: No symptoms   Eye itching? -   Eye redness? -    Eye discharge? -   Eye pain?  -   Light sensitivity / light hurts the eyes? -   Lungs: No symptoms   Cough? -   Wheezing? -   Shortness of breath? -   Apnea? -   Choking? -   Chest tightness? -   Skin: -   Rash? No   Color change of skin? Yes     Physical Examination:  General: Well-developed, well-nourished, no acute distress.  Head: No sinus tenderness.  Eyes: Conjunctivae:  No bulbar or palpebral conjunctival injection.  Ears: EAC's clear.  TM's clear.  No pre-auricular nodes.  Nose: Nasal Mucosa:  Pink.  Septum: No apparent deviation.  Turbinates:  No significant edema.  Polyps/Mass:  None visible.  Teeth/Gums:  No bleeding noted.  Oropharynx: No exudates.  Neck: Supple without thyromegaly. No cervical lymphadenopathy.    Respiratory/Chest: Effort: Good.  Auscultation:  Bilateral expiratory wheezing.  Skin: Good turgor.  No urticaria or angioedema.  Neuro/Psych: Oriented x 3.    Spirometry 08/20/2018:  Normal.    Laboratory 08/20/2018:  ImmunoCAP:  Negative.    Assessment:  1.  Chronic rhinitis, consider allergic.  2.  Chronic conjunctivitis, consider allergic.  3.  Bronchospasm, consider secondary to GERD.  4.  GERD.  5.  LPR.  6.  Dermatitis of uncertain etiology.  7.  Atrial fibrillation on Eliquis and Rythmol.  8.  Nasal bleeding on topical nasal steroids in the past.    Recommendations:  1.  Continue omeprazole 40 milligrams twice a day.  2.  ProAir as needed.  3.  Dermatology evaluation.  4.  Follow symptoms on omeprazole.  5.  Return to clinic in several weeks when school is out.  6.  Consider skin testing in the future.

## 2020-10-12 NOTE — LETTER
October 12, 2020      AMBREEN Long III, MD  [address] [facility]  [facility] [address]           [facility] - Dermatology [address]th [address]  [address]  [place] [address]  Phone: [phone]  Fax: [phone]          Patient: Francoise Dykes   MR Number: [id number]   YOB: 1960   Date of Visit: 10/12/2020       Dear Dr. AMBREEN Long III:    Thank you for referring Francoise Dykes to me for evaluation. Attached you will find relevant portions of my assessment and plan of care.    If you have questions, please do not hesitate to call me. I look forward to following Francoise Dykes along with you.    Sincerely,    Madelaine Flores MD    Enclosure  CC:  No Recipients    If you would like to receive this communication electronically, please contact [email or web address] or [phone] to request more information on [employer] Link access.    For providers and/or their staff who would like to refer a patient to [facility], please contact us through our one-stop-shop provider referral line, [facility], at [phone].    If you feel you have received this communication in error or would no longer like to receive these types of communications, please e-mail [email or web address]

## 2020-10-12 NOTE — PROGRESS NOTES
Subjective:       Patient ID:  Francoise Dykes is a 60 y.o. female who presents for   Chief Complaint   Patient presents with    Eczema     L lower leg      Established patient, presenting for f/u rash. Hx of eczematous dermatitis at bilateral lower legs for the past 10-15 years, treated successfully with topical steroids and dry skin care. Today c/o persistent and enlarging plaque at LLE; present x 1+ month, very itchy, resistant to TAC cream and betamethasone diproprionate cream BID and Cerave Healing Ointment x 1 month (denies any other contactants). No rash at RLE for several months to over a year.     Hx childhood eczema that resolved in teenage years.       Review of Systems   Constitutional: Negative for fever, chills, weight loss, weight gain, fatigue, night sweats and malaise.   Respiratory: Negative for cough and shortness of breath.    Skin: Positive for itching and rash. Negative for daily sunscreen use and activity-related sunscreen use.   Hematologic/Lymphatic: Does not bruise/bleed easily.        Objective:    Physical Exam   Constitutional: She appears well-developed and well-nourished. No distress.   Neurological: She is alert and oriented to person, place, and time. She is not disoriented.   Psychiatric: She has a normal mood and affect. She is not agitated.   Skin:   Areas Examined (abnormalities noted in diagram):   RLE Inspected  LLE Inspection Performed              Diagram Legend     Erythematous scaling macule/papule c/w actinic keratosis       Vascular papule c/w angioma      Pigmented verrucoid papule/plaque c/w seborrheic keratosis      Yellow umbilicated papule c/w sebaceous hyperplasia      Irregularly shaped tan macule c/w lentigo     1-2 mm smooth white papules consistent with Milia      Movable subcutaneous cyst with punctum c/w epidermal inclusion cyst      Subcutaneous movable cyst c/w pilar cyst      Firm pink to brown papule c/w dermatofibroma      Pedunculated fleshy papule(s)  c/w skin tag(s)      Evenly pigmented macule c/w junctional nevus     Mildly variegated pigmented, slightly irregular-bordered macule c/w mildly atypical nevus      Flesh colored to evenly pigmented papule c/w intradermal nevus       Pink pearly papule/plaque c/w basal cell carcinoma      Erythematous hyperkeratotic cursted plaque c/w SCC      Surgical scar with no sign of skin cancer recurrence      Open and closed comedones      Inflammatory papules and pustules      Verrucoid papule consistent consistent with wart     Erythematous eczematous patches and plaques     Dystrophic onycholytic nail with subungual debris c/w onychomycosis     Umbilicated papule    Erythematous-base heme-crusted tan verrucoid plaque consistent with inflamed seborrheic keratosis     Erythematous Silvery Scaling Plaque c/w Psoriasis     See annotation          Assessment / Plan:      Pathology Orders:     Normal Orders This Visit    Specimen to Pathology, Dermatology     Comments:    Number of Specimens:->1  ------------------------->-------------------------  Spec 1 Procedure:->Biopsy  Spec 1 Clinical Impression:->AD/ACD r/o tinea incognito  Spec 1 Source:->left medial lower leg    Questions:    Procedure Type: Dermatology and skin neoplasms    Number of Specimens: 1    ------------------------: -------------------------    Spec 1 Procedure: Biopsy    Spec 1 Clinical Impression: AD/ACD r/o tinea incognito    Spec 1 Source: left medial lower leg        Dermatitis  -     Ambulatory referral/consult to Dermatology  -     Specimen to Pathology, Dermatology    - Punch Biopsy Procedure Note: Discussed procedure with patient/patient's guardian including risks and benefits as well as treatment alternatives. Risks of procedure include pain, bleeding, infection, post-inflammatory pigmentary alteration, scar, recurrence. Patient informed that the purpose of a biopsy is sampling of condition in question rather than removal in entirety; further  treatment may be necessary. Verbal consent obtained. Area to be biopsied marked and cleansed with alcohol. Local anesthesia achieved by injecting approximately 1 cc of 1% lidocaine with epinephrine. One punch biopsy performed using a 4 mm disposable punch; specimen submitted to pathology. Hemostasis and closure achieved with 4-0 Prolene sutures. Petroleum jelly and bandage applied to wound. Patient tolerated procedure well. After-visit wound care instructions reviewed and provided in writing. F/u 14 days for S/R.     Patient to c/w current topical steroids as needed as she gets some relief in itching. Recommend dry skin care and replacing Cerave Healing Ointment with plain vaseline jelly. Further treatment pending biopsy results. Consideration for patch testing in future.          Follow up in about 2 weeks (around 10/26/2020).

## 2020-10-12 NOTE — PATIENT INSTRUCTIONS
"Punch Biopsy Wound Care    Your doctor has performed a punch biopsy today.  A band aid and antibiotic ointment has been placed over the site.  This should remain in place for 24 hours.  It is recommended that you keep the area dry for the first 24 hours.  After 24 hours, you may remove the band aid and wash the area with warm soap and water and apply Vaseline jelly.  Many patients prefer to use Neosporin or Bacitracin ointment.  This is acceptable; however know that you can develop an allergy to this medication even if you have used it safely for years.  It is important to keep the area moist.  Letting it dry out and get air slows healing time, will worsen the scar, and make it more difficult to remove the stitches if they were placed.  Band aid is optional after first 24 hours.      If you notice increasing redness, tenderness, pain, or yellow drainage at the biopsy or surgical site, please notify your doctor.  These are signs of an infection.    If your biopsy/surgical site is bleeding, apply firm pressure for 15 minutes straight.  Repeat for another 15 minutes, if it is still bleeding.   If the surgical site continues to bleed, then please contact your doctor.      For [facility] users:   You will receive a [facility] notification after the pathologist has finished reviewing your biopsy specimen. Pathology results, however, will not be released online so you will see a "no content" message. Once your dermatologist reviews and clinically correlates your biopsy results, you will either receive a letter in the mail with the results of a phone call from your doctor's office if further explanation or treatment is warranted.       [address] [place] [address]/ [phone] FAX/ [email or web address]         "

## 2020-10-13 NOTE — ED PROVIDER NOTES
Encounter Date: 10/12/2020    SCRIBE #1 NOTE: I, Randi Stovall, am scribing for, and in the presence of,  Lucas Gunter PA-C. I have scribed the following portions of the note - Other sections scribed: HPI, ROS, PE.       History     Chief Complaint   Patient presents with    Wound Check     had biopsy of LLE at 3pm today; biopsy site started bleeding about 6pm; on eliquis     Francoisenati Dykes is a 60 y.o. female with a history of atrial fibrillation who presents to the ED for evaluation of bleeding from sutures were placed to the left lower extremity during a biopsy earlier today. Patient states there was no bleeding to the suture site when she left the clinic today. States she noticed blood running down the leg 6 hours prior to arrival and has not been able to stop it. Also reports nausea but denies light-headedness or dizziness. She also reports taking 5 mg eliquis twice a day for a fib.    The history is provided by the patient. No  was used.     Review of patient's allergies indicates:   Allergen Reactions    Adhesive tape-silicones Itching     Manifested in 2015 following AF ablation.     Past Medical History:   Diagnosis Date    Acid reflux     Allergy     seasonal    Atrial fibrillation     Essential hypertension 2017    Pt states that she does not have HTN.      Past Surgical History:   Procedure Laterality Date    CARDIAC ELECTROPHYSIOLOGY STUDY AND ABLATION       SECTION      COLONOSCOPY N/A 2018    Procedure: COLONOSCOPY;  Surgeon: Brodie Lara MD;  Location: [facility] ENDO;  Service: Endoscopy;  Laterality: N/A;  confirmed appt-ss    HYSTERECTOMY      MIGUEL    RADIOFREQUENCY ABLATION Left 2019    Procedure: RADIOFREQUENCY ABLATION, LEFT L2,3,4,5;  Surgeon: Mariusz Jang MD;  Location: [facility] [place], operated by [facility]IN [facility];  Service: Pain Management;  Laterality: Left;  Left RFA L2,3,4,5  1 of 2  *Ok to hold Eliquis, per Dr Meyers    RADIOFREQUENCY ABLATION  Right 3/13/2019    Procedure: RADIOFREQUENCY ABLATION,  Right L2,3,4,5;  Surgeon: Mariusz Jang MD;  Location: [facility];  Service: Pain Management;  Laterality: Right;  Right RFA @ L2,3,4,5  2 of 2     Family History   Problem Relation Age of Onset    Hypertension Mother     Diabetes Mother     Glaucoma Mother     Allergies Mother     Asbestos Father     Obesity Father     Eczema Son     Hypertension Son     Heart disease Maternal Grandmother         chf    Diabetes Maternal Grandfather     Cancer Paternal Grandmother         lung, 2/2 second hand smoke    Glaucoma Paternal Grandfather     Blindness Paternal Grandfather     Melanoma Neg Hx     Psoriasis Neg Hx     Lupus Neg Hx     Acne Neg Hx     Breast cancer Neg Hx     Colon cancer Neg Hx     Ovarian cancer Neg Hx      Social History     Tobacco Use    Smoking status: Never Smoker    Smokeless tobacco: Never Used   Substance Use Topics    Alcohol use: Yes     Comment: rarely, 1 drink/week    Drug use: No     Review of Systems   Constitutional: Negative for fever.   HENT: Negative for sore throat.    Respiratory: Negative for shortness of breath.    Cardiovascular: Negative for chest pain.   Gastrointestinal: Positive for nausea.   Genitourinary: Negative for dysuria.   Musculoskeletal: Negative for back pain.   Skin: Positive for wound (sutures). Negative for rash.   Neurological: Negative for dizziness, weakness and light-headedness.   Hematological: Does not bruise/bleed easily.   All other systems reviewed and are negative.      Physical Exam     Initial Vitals [10/12/20 2051]   BP Pulse Resp Temp SpO2   (!) 143/80 83 20 98.1 °F (36.7 °C) 95 %      MAP       --         Physical Exam    Nursing note and vitals reviewed.  Constitutional: She appears well-developed and well-nourished. No distress.   HENT:   Head: Normocephalic and atraumatic.   Right Ear: External ear normal.   Left Ear: External ear normal.   Eyes: Conjunctivae  are normal.   Neck: Normal range of motion. Neck supple.   Cardiovascular: Normal rate and regular rhythm.   Pulmonary/Chest: Effort normal. No respiratory distress.   Abdominal: Soft. Normal appearance. There is no abdominal tenderness.   Musculoskeletal: Normal range of motion.   Neurological: She is alert and oriented to person, place, and time.   Skin: Skin is warm and dry. No rash noted.   Punch biopsy site to the medial left leg with sutures in place. Oozing blood.   Psychiatric: She has a normal mood and affect. Her behavior is normal.         ED Course   Lac Repair    Date/Time: 10/12/2020 10:08 PM  Performed by: Lucas Gunter PA-C  Authorized by: Penny Motley MD     Consent:     Consent obtained:  Verbal    Consent given by:  Patient  Anesthesia (see MAR for exact dosages):     Anesthesia method:  Local infiltration    Local anesthetic:  Lidocaine 1% WITH epi  Laceration details:     Location:  Leg    Leg location:  L lower leg    Length (cm):  0.5  Repair type:     Repair type:  Simple  Pre-procedure details:     Preparation:  Patient was prepped and draped in usual sterile fashion  Exploration:     Hemostasis achieved with:  Cautery and epinephrine  Treatment:     Area cleansed with:  Betadine    Amount of cleaning:  Standard  Skin repair:     Repair method:  Sutures    Suture size:  4-0    Suture material:  Nylon    Suture technique:  Simple interrupted    Number of sutures:  2  Approximation:     Approximation:  Close  Post-procedure details:     Dressing:  Sterile dressing    Patient tolerance of procedure:  Tolerated well, no immediate complications      Labs Reviewed - No data to display       Imaging Results    None          Medical Decision Making:   History:   Old Medical Records: I decided to obtain old medical records.  ED Management:  60-year-old female on Eliquis with bleeding punch biopsy site.  Bleeding continued despite pressure dressing, also silver nitrate cautery.  Two sutures  placed over original sutures with hemostasis achieved.  Patient is sent home with dressing, return precautions.            Scribe Attestation:   Scribe #1: I performed the above scribed service and the documentation accurately describes the services I performed. I attest to the accuracy of the note.     I, Lucas Gunter, personally performed the services described in this documentation. All medical record entries made by the scribe were at my direction and in my presence.  I have reviewed the chart and agree that the record reflects my personal performance and is accurate and complete                    Clinical Impression:     ICD-10-CM ICD-9-CM   1. Anticoagulant-induced bleeding  T45.7X1A 286.6    D68.9                    1. Anticoagulant-induced bleeding            ED Disposition Condition    Discharge Stable        ED Prescriptions     None        Follow-up Information     Follow up With Specialties Details Why Contact Info    Madelaine Flores MD Dermatology Call  For re-evaluation [address] [facility] [address]  [phone]      [facility] Emergency Department Emergency Medicine Go to  If symptoms worsen [address] [facility] [address] [phone]                                       Lucas Gunter PACristiC  10/12/20 0936

## 2020-10-16 LAB
FINAL PATHOLOGIC DIAGNOSIS: NORMAL
GROSS: NORMAL
MICROSCOPIC EXAM: NORMAL

## 2020-10-16 RX ORDER — TRIAMCINOLONE ACETONIDE 1 MG/G
OINTMENT TOPICAL
Qty: 80 G | Refills: 1 | Status: SHIPPED | OUTPATIENT
Start: 2020-10-16 | End: 2021-10-12

## 2020-10-20 NOTE — INTERVAL H&P NOTE
The patient has been examined and the H&P has been reviewed:    I concur with the findings and no changes have occurred since H&P was written.    Anesthesia/Surgery risks, benefits and alternative options discussed and understood by patient/family.          There are no hospital problems to display for this patient.    
decreased strength/decreased ROM

## 2020-10-23 ENCOUNTER — CLINICAL SUPPORT (OUTPATIENT)
Dept: DERMATOLOGY | Facility: CLINIC | Age: 60
End: 2020-10-23
Payer: COMMERCIAL

## 2020-10-23 DIAGNOSIS — Z48.02 VISIT FOR SUTURE REMOVAL: Primary | ICD-10-CM

## 2020-10-23 PROCEDURE — 99024 PR POST-OP FOLLOW-UP VISIT: ICD-10-PCS | Mod: S$GLB,,, | Performed by: DERMATOLOGY

## 2020-10-23 PROCEDURE — 99999 PR PBB SHADOW E&M-EST. PATIENT-LVL III: CPT | Mod: PBBFAC,,,

## 2020-10-23 PROCEDURE — 99999 PR PBB SHADOW E&M-EST. PATIENT-LVL III: ICD-10-PCS | Mod: PBBFAC,,,

## 2020-10-23 PROCEDURE — 99024 POSTOP FOLLOW-UP VISIT: CPT | Mod: S$GLB,,, | Performed by: DERMATOLOGY

## 2020-10-23 NOTE — PROGRESS NOTES
Suture Removal note:  CC: 60 y.o. female patient is here for suture removal.         HPI: Patient is s/p punch biopsy of from the left medial lower leg on 10/12/2020.  Patient reports no problems.    WOUND PE:  Sutures intact.  Wound healing well.  Good approximation of skin edges.  No signs or symptoms of infection.    IMPRESSION: Skin, left medial lower leg, punch biopsy:  -ALLERGIC URTICARIAL REACTION, see comment  COMMENT: The findings suggest a hypersensitivity reaction. Drug reactions, urticaria, insect bites, and  bullous pemphigoid may, at times, have similar morphologic findings. PAS stain is negative for fungal  organisms. Clinical correlation is recommended.  - margins clear.    PLAN:  Sutures removed today.  Continue wound care.    RTC: PRN

## 2020-10-26 DIAGNOSIS — I48.0 PAROXYSMAL ATRIAL FIBRILLATION: ICD-10-CM

## 2020-10-26 RX ORDER — PROPAFENONE HYDROCHLORIDE 325 MG/1
325 CAPSULE, EXTENDED RELEASE ORAL EVERY 12 HOURS
Qty: 180 CAPSULE | Refills: 3 | Status: SHIPPED | OUTPATIENT
Start: 2020-10-26 | End: 2020-12-14 | Stop reason: SDUPTHER

## 2020-10-27 ENCOUNTER — PATIENT MESSAGE (OUTPATIENT)
Dept: ALLERGY | Facility: CLINIC | Age: 60
End: 2020-10-27

## 2020-10-28 ENCOUNTER — TELEPHONE (OUTPATIENT)
Dept: ALLERGY | Facility: CLINIC | Age: 60
End: 2020-10-28

## 2020-10-28 RX ORDER — PREDNISONE 10 MG/1
TABLET ORAL
Qty: 21 TABLET | Refills: 0 | Status: SHIPPED | OUTPATIENT
Start: 2020-10-28 | End: 2021-02-02

## 2020-10-28 NOTE — TELEPHONE ENCOUNTER
Patient notified Prednisone called in. Patient will touch base with us tomorrow to see how she's feeling.

## 2020-10-28 NOTE — TELEPHONE ENCOUNTER
Phone call to patient. Patient states she is using her albuterol inhaler 4-6x's a day for the past 3 days for mild coughing and wheezing. The cough and wheeze is worse when laying down. Patient states at last visit you told her to call if she had any wheezing and you would call in prednisone. Patient would not be available for a virtual for the next 45mins to an hour. She [place] leaving the airport headed to the store.

## 2020-11-02 ENCOUNTER — HOSPITAL ENCOUNTER (EMERGENCY)
Facility: HOSPITAL | Age: 60
Discharge: HOME | End: 2020-11-03
Attending: EMERGENCY MEDICINE
Payer: COMMERCIAL

## 2020-11-02 DIAGNOSIS — R00.0 TACHYCARDIA: Primary | ICD-10-CM

## 2020-11-02 DIAGNOSIS — E87.6 HYPOKALEMIA: ICD-10-CM

## 2020-11-02 LAB
BUN SERPL-MCNC: 12 MG/DL (ref 6–30)
CHLORIDE SERPL-SCNC: 106 MMOL/L (ref 95–110)
CREAT SERPL-MCNC: 0.7 MG/DL (ref 0.5–1.4)
GLUCOSE SERPL-MCNC: 113 MG/DL (ref 70–110)
HCT VFR BLD CALC: 39 %PCV (ref 36–54)
POC IONIZED CALCIUM: 1.21 MMOL/L (ref 1.06–1.42)
POC TCO2 (MEASURED): 24 MMOL/L (ref 23–29)
POTASSIUM BLD-SCNC: 3.2 MMOL/L (ref 3.5–5.1)
SAMPLE: ABNORMAL
SODIUM BLD-SCNC: 144 MMOL/L (ref 136–145)

## 2020-11-02 PROCEDURE — 99285 EMERGENCY DEPT VISIT HI MDM: CPT | Mod: 25

## 2020-11-02 PROCEDURE — 99284 EMERGENCY DEPT VISIT MOD MDM: CPT | Mod: ,,, | Performed by: PHYSICIAN ASSISTANT

## 2020-11-02 PROCEDURE — 93005 ELECTROCARDIOGRAM TRACING: CPT

## 2020-11-02 PROCEDURE — 80047 BASIC METABLC PNL IONIZED CA: CPT

## 2020-11-02 PROCEDURE — 93010 ELECTROCARDIOGRAM REPORT: CPT | Mod: ,,, | Performed by: INTERNAL MEDICINE

## 2020-11-02 PROCEDURE — 93010 EKG 12-LEAD: ICD-10-PCS | Mod: ,,, | Performed by: INTERNAL MEDICINE

## 2020-11-02 PROCEDURE — 99284 PR EMERGENCY DEPT VISIT,LEVEL IV: ICD-10-PCS | Mod: ,,, | Performed by: PHYSICIAN ASSISTANT

## 2020-11-03 VITALS
DIASTOLIC BLOOD PRESSURE: 67 MMHG | HEART RATE: 90 BPM | RESPIRATION RATE: 20 BRPM | OXYGEN SATURATION: 96 % | WEIGHT: 211 LBS | TEMPERATURE: 98 F | BODY MASS INDEX: 35.16 KG/M2 | SYSTOLIC BLOOD PRESSURE: 149 MMHG | HEIGHT: 65 IN

## 2020-11-03 PROCEDURE — 25000003 PHARM REV CODE 250: Performed by: PHYSICIAN ASSISTANT

## 2020-11-03 RX ADMIN — POTASSIUM BICARBONATE 50 MEQ: 978 TABLET, EFFERVESCENT ORAL at 12:11

## 2020-11-03 NOTE — ED PROVIDER NOTES
"Encounter Date: 2020       History     Chief Complaint   Patient presents with    Tachycardia     pt reports feeling "weird" and like she usually does when in afib. reports resting heart rate of 104 at home. took home dose of medications, including blood thinner. hx of afib      Patient is a 60 year old female with PMHX of Afib on eliquis, HTN, and GERD. She presents to the ED for tachycardia. She reports having a heart rate of 104 bpm at 9:00PM at rest watching TV. Reports associated intermittent episode of lightheadedness. Denies LOC. Also, reports having productive cough with yellow tinged sputum since March. Reports associated wheezing. Reports using inhaler PTA. Denies previous COVID positive test. Reports compliance with eliquis. She denies fever,chills, nausea, vomiting, sob, chest pain, abd pain, dysuria, diarrhea, or constipation. She is a non smoker and reports alcohol use.    The history is provided by the patient and medical records. No  was used.     Review of patient's allergies indicates:   Allergen Reactions    Adhesive tape-silicones Itching     Manifested in 2015 following AF ablation.     Past Medical History:   Diagnosis Date    Acid reflux     Allergy     seasonal    Atrial fibrillation     Essential hypertension 2017    Pt states that she does not have HTN.      Past Surgical History:   Procedure Laterality Date    CARDIAC ELECTROPHYSIOLOGY STUDY AND ABLATION       SECTION      COLONOSCOPY N/A 2018    Procedure: COLONOSCOPY;  Surgeon: Brodie Lara MD;  Location: [facility] ENDO;  Service: Endoscopy;  Laterality: N/A;  confirmed appt-ss    HYSTERECTOMY      MIGUEL    RADIOFREQUENCY ABLATION Left 2019    Procedure: RADIOFREQUENCY ABLATION, LEFT L2,3,4,5;  Surgeon: Mariusz Jang MD;  Location: [facility] PAIN MGT;  Service: Pain Management;  Laterality: Left;  Left RFA L2,3,4,5  1 of 2  *Ok to hold Eliquis, per Dr Meyers    " RADIOFREQUENCY ABLATION Right 3/13/2019    Procedure: RADIOFREQUENCY ABLATION,  Right L2,3,4,5;  Surgeon: Mariusz Jang MD;  Location: [facility];  Service: Pain Management;  Laterality: Right;  Right RFA @ L2,3,4,5  2 of 2     Family History   Problem Relation Age of Onset    Hypertension Mother     Diabetes Mother     Glaucoma Mother     Allergies Mother     Asbestos Father     Obesity Father     Eczema Son     Hypertension Son     Heart disease Maternal Grandmother         chf    Diabetes Maternal Grandfather     Cancer Paternal Grandmother         lung, 2/2 second hand smoke    Glaucoma Paternal Grandfather     Blindness Paternal Grandfather     Melanoma Neg Hx     Psoriasis Neg Hx     Lupus Neg Hx     Acne Neg Hx     Breast cancer Neg Hx     Colon cancer Neg Hx     Ovarian cancer Neg Hx      Social History     Tobacco Use    Smoking status: Never Smoker    Smokeless tobacco: Never Used   Substance Use Topics    Alcohol use: Yes     Comment: rarely, 1 drink/week    Drug use: No     Review of Systems   Constitutional: Negative for fever.   HENT: Negative for sore throat.    Respiratory: Positive for cough and wheezing. Negative for shortness of breath.    Cardiovascular: Positive for palpitations. Negative for chest pain.   Gastrointestinal: Negative for abdominal pain, nausea and vomiting.   Genitourinary: Negative for dysuria.   Musculoskeletal: Negative for back pain.   Skin: Negative for rash.   Neurological: Positive for light-headedness. Negative for weakness.   Hematological: Does not bruise/bleed easily.       Physical Exam     Initial Vitals [11/02/20 2234]   BP Pulse Resp Temp SpO2   (!) 175/89 109 20 98 °F (36.7 °C) 98 %      MAP       --         Physical Exam    Vitals reviewed.  Constitutional: She appears well-developed and well-nourished. She is Obese . No distress.   HENT:   Head: Normocephalic.   Eyes: Conjunctivae are normal.   Neck: Normal range of motion.    Cardiovascular: Normal rate and regular rhythm.   No murmur heard.  Pulmonary/Chest: Breath sounds normal. No respiratory distress. She has no wheezes. She has no rales.   Abdominal: Soft. Bowel sounds are normal. She exhibits no distension. There is no abdominal tenderness.   Musculoskeletal: Normal range of motion.   Neurological: She is alert and oriented to person, place, and time.   Skin: Skin is warm and dry.         ED Course   Procedures  Labs Reviewed   ISTAT PROCEDURE - Abnormal; Notable for the following components:       Result Value    POC Glucose 113 (*)     POC Potassium 3.2 (*)     All other components within normal limits        ECG Results          EKG 12-lead (In process)  Result time 11/03/20 07:12:43    In process by Interface, Lab In [facility] (11/03/20 07:12:43)                 Narrative:    Test Reason : R00.0,    Vent. Rate : 095 BPM     Atrial Rate : 095 BPM     P-R Int : 200 ms          QRS Dur : 082 ms      QT Int : 336 ms       P-R-T Axes : 067 024 032 degrees     QTc Int : 422 ms    Normal sinus rhythm  Cannot rule out Anterior infarct (cited on or before 02-NOV-2020)  Abnormal ECG  When compared with ECG of 02-NOV-2020 22:37,  Minimal criteria for Inferior infarct are no longer Present  Nonspecific T wave abnormality no longer evident in Anterior leads    Referred By: [email or web address]   SELF           Confirmed By:                             Imaging Results          X-Ray Chest AP Portable (Final result)  Result time 11/02/20 23:49:07    Final result by Álvaro Negron MD (11/02/20 23:49:07)                 Impression:      Borderline cardiomegaly.  No evidence of pulmonary edema.      Electronically signed by: Álvaro Negron MD  Date:    11/02/2020  Time:    23:49             Narrative:    EXAMINATION:  XR CHEST AP PORTABLE    CLINICAL HISTORY:  cough;    TECHNIQUE:  Single frontal view of the chest was performed.    COMPARISON:  03/21/2019.    FINDINGS:  Monitoring EKG leads are present.  The  trachea is unremarkable.  The cardiomediastinal is upper limits of normal for size.  The hemidiaphragms are unremarkable.  There is no evidence of free air beneath the hemidiaphragms.  There are no pleural effusions.  There is no evidence of a pneumothorax.  There is no evidence of pneumomediastinum.  No airspace opacity is present.  The osseous structures are unremarkable.                                 Medical Decision Making:   History:   Old Medical Records: I decided to obtain old medical records.  Clinical Tests:   Lab Tests: Ordered and Reviewed  Medical Tests: Ordered and Reviewed       APC / Resident Notes:   Patient is a 60 year old female presents to the ED for emergent evaluation of tachycardia.     Will order istat, ECG, and CXR. Will continue to monitor.     Differential diagnoses include, but are not limited to: cardiac arrhythmia, bronchitis, hypoglycemia, or electrolyte imbalance.     istat found to have hypokalemia 3.2. will replete PO while in ED. CXR found to have Borderline cardiomegaly.  No evidence of pulmonary edema.     Patient reassessed, reports symptoms improved with ED management. I have discussed emergency department findings, and plan with the patient. Will discharge home with F/U with PCP in approximately one week. Additional verbal discharge instructions were given and discussed with the patient. Patient verbalizes understanding of plan and agrees. Return precautions given.    I have discussed and reviewed with my supervising physician.        Clinical Impression:       ICD-10-CM ICD-9-CM   1. Tachycardia  R00.0 785.0   2. Hypokalemia  E87.6 276.8                      Disposition:   Disposition: Discharged  Condition: Stable     ED Disposition Condition    Discharge Stable        ED Prescriptions     None        Follow-up Information     Follow up With Specialties Details Why Contact Jennie So MD Family Medicine Schedule an appointment as soon as possible for a  visit in 1 week  [address] [place] [address]  [phone]                                         Sridevi Jeong PA-C  11/03/20 0738

## 2020-11-03 NOTE — ED TRIAGE NOTES
"Francoise Dykes, a 60 y.o. female presents to the ED w/ complaint of tachycardia. Pt's HR at home was 104. Pt felt "weird" like she does when in afib. She took her nightly home dose of medications prior to coming to hospital.      Triage note:  Chief Complaint   Patient presents with    Tachycardia     pt reports feeling "weird" and like she usually does when in afib. reports resting heart rate of 104 at home. took home dose of medications, including blood thinner. hx of afib      Review of patient's allergies indicates:   Allergen Reactions    Adhesive tape-silicones Itching     Manifested in 12/2015 following AF ablation.     Past Medical History:   Diagnosis Date    Acid reflux     Allergy     seasonal    Atrial fibrillation     Essential hypertension 11/20/2017    Pt states that she does not have HTN.      LOC: The patient is awake, alert, and oriented to place, time, situation. Affect is appropriate.  Speech is appropriate and clear.   APPEARANCE: Patient resting comfortably in no acute distress.  Patient is clean and well groomed.  SKIN: The skin is warm and dry; color consistent with ethnicity.  Patient has normal skin turgor and moist mucus membranes.  Skin intact; no breakdown or bruising noted.   MUSCULOSKELETAL: Patient moving upper and lower extremities without difficulty.  Denies weakness.   RESPIRATORY: Airway is open and patent. Respirations spontaneous, even, easy, and non-labored.  Patient has a normal effort and rate.  No accessory muscle use noted. Reports productive cough with yellow-like mucus.  CARDIAC:  Normal rhythm and rate noted.  No peripheral edema noted. No complaints of chest pain.    ABDOMEN: No distention noted.   NEUROLOGIC: Eyes open spontaneously.  Behavior appropriate to situation.  Follows commands; facial expression symmetrical.  Purposeful motor response noted; normal sensation in all extremities.      "

## 2020-11-03 NOTE — ED NOTES
I-STAT Chem-8+ Results:   Value Reference Range   Sodium 144 136-145 mmol/L   Potassium  3.2 3.5-5.1 mmol/L   Chloride 106  mmol/L   Ionized Calcium 1.21 1.06-1.42 mmol/L   CO2 (measured) 24 23-29 mmol/L   Glucose 113  mg/dL   BUN 12 6-30 mg/dL   Creatinine 0.7 0.5-1.4 mg/dL   Hematocrit 39 36-54%

## 2020-12-09 ENCOUNTER — PATIENT OUTREACH (OUTPATIENT)
Dept: ADMINISTRATIVE | Facility: HOSPITAL | Age: 60
End: 2020-12-09

## 2020-12-10 ENCOUNTER — PATIENT OUTREACH (OUTPATIENT)
Dept: ADMINISTRATIVE | Facility: OTHER | Age: 60
End: 2020-12-10

## 2020-12-10 DIAGNOSIS — I49.8 OTHER SPECIFIED CARDIAC ARRHYTHMIAS: Primary | ICD-10-CM

## 2020-12-10 NOTE — PROGRESS NOTES
Requested updates within Care Everywhere.  Patient's chart was reviewed for overdue VERENA topics.  Immunizations reconciled.

## 2020-12-11 ENCOUNTER — TELEPHONE (OUTPATIENT)
Dept: ELECTROPHYSIOLOGY | Facility: CLINIC | Age: 60
End: 2020-12-11

## 2020-12-11 NOTE — TELEPHONE ENCOUNTER
Called Ms. Donis to remind her of her appt Monday morning with Dr. Meyers. Pt verbalized understanding.

## 2020-12-14 ENCOUNTER — OFFICE VISIT (OUTPATIENT)
Dept: ELECTROPHYSIOLOGY | Facility: CLINIC | Age: 60
End: 2020-12-14
Payer: COMMERCIAL

## 2020-12-14 VITALS
HEIGHT: 64 IN | DIASTOLIC BLOOD PRESSURE: 63 MMHG | HEART RATE: 66 BPM | WEIGHT: 212.06 LBS | BODY MASS INDEX: 36.2 KG/M2 | SYSTOLIC BLOOD PRESSURE: 134 MMHG

## 2020-12-14 DIAGNOSIS — Z79.01 CURRENT USE OF LONG TERM ANTICOAGULATION: ICD-10-CM

## 2020-12-14 DIAGNOSIS — I49.8 OTHER SPECIFIED CARDIAC ARRHYTHMIAS: ICD-10-CM

## 2020-12-14 DIAGNOSIS — Z86.79 S/P ABLATION OF ATRIAL FIBRILLATION: ICD-10-CM

## 2020-12-14 DIAGNOSIS — Z79.899 LONG TERM CURRENT USE OF ANTIARRHYTHMIC DRUG: ICD-10-CM

## 2020-12-14 DIAGNOSIS — Z98.890 S/P ABLATION OF ATRIAL FIBRILLATION: ICD-10-CM

## 2020-12-14 DIAGNOSIS — I10 ESSENTIAL HYPERTENSION: ICD-10-CM

## 2020-12-14 DIAGNOSIS — I48.0 PAROXYSMAL ATRIAL FIBRILLATION: Primary | ICD-10-CM

## 2020-12-14 PROCEDURE — 99999 PR PBB SHADOW E&M-EST. PATIENT-LVL III: CPT | Mod: PBBFAC,,, | Performed by: INTERNAL MEDICINE

## 2020-12-14 PROCEDURE — 3075F PR MOST RECENT SYSTOLIC BLOOD PRESS GE 130-139MM HG: ICD-10-PCS | Mod: CPTII,S$GLB,, | Performed by: INTERNAL MEDICINE

## 2020-12-14 PROCEDURE — 93005 ELECTROCARDIOGRAM TRACING: CPT | Mod: S$GLB,,, | Performed by: INTERNAL MEDICINE

## 2020-12-14 PROCEDURE — 93010 ELECTROCARDIOGRAM REPORT: CPT | Mod: S$GLB,,, | Performed by: INTERNAL MEDICINE

## 2020-12-14 PROCEDURE — 99999 PR PBB SHADOW E&M-EST. PATIENT-LVL III: ICD-10-PCS | Mod: PBBFAC,,, | Performed by: INTERNAL MEDICINE

## 2020-12-14 PROCEDURE — 93010 RHYTHM STRIP: ICD-10-PCS | Mod: S$GLB,,, | Performed by: INTERNAL MEDICINE

## 2020-12-14 PROCEDURE — 3078F PR MOST RECENT DIASTOLIC BLOOD PRESSURE < 80 MM HG: ICD-10-PCS | Mod: CPTII,S$GLB,, | Performed by: INTERNAL MEDICINE

## 2020-12-14 PROCEDURE — 99214 OFFICE O/P EST MOD 30 MIN: CPT | Mod: S$GLB,,, | Performed by: INTERNAL MEDICINE

## 2020-12-14 PROCEDURE — 1126F AMNT PAIN NOTED NONE PRSNT: CPT | Mod: S$GLB,,, | Performed by: INTERNAL MEDICINE

## 2020-12-14 PROCEDURE — 3008F BODY MASS INDEX DOCD: CPT | Mod: CPTII,S$GLB,, | Performed by: INTERNAL MEDICINE

## 2020-12-14 PROCEDURE — 3008F PR BODY MASS INDEX (BMI) DOCUMENTED: ICD-10-PCS | Mod: CPTII,S$GLB,, | Performed by: INTERNAL MEDICINE

## 2020-12-14 PROCEDURE — 1126F PR PAIN SEVERITY QUANTIFIED, NO PAIN PRESENT: ICD-10-PCS | Mod: S$GLB,,, | Performed by: INTERNAL MEDICINE

## 2020-12-14 PROCEDURE — 99214 PR OFFICE/OUTPT VISIT, EST, LEVL IV, 30-39 MIN: ICD-10-PCS | Mod: S$GLB,,, | Performed by: INTERNAL MEDICINE

## 2020-12-14 PROCEDURE — 93005 RHYTHM STRIP: ICD-10-PCS | Mod: S$GLB,,, | Performed by: INTERNAL MEDICINE

## 2020-12-14 PROCEDURE — 3075F SYST BP GE 130 - 139MM HG: CPT | Mod: CPTII,S$GLB,, | Performed by: INTERNAL MEDICINE

## 2020-12-14 PROCEDURE — 3078F DIAST BP <80 MM HG: CPT | Mod: CPTII,S$GLB,, | Performed by: INTERNAL MEDICINE

## 2020-12-14 RX ORDER — DILTIAZEM HYDROCHLORIDE 120 MG/1
120 CAPSULE, EXTENDED RELEASE ORAL DAILY
Qty: 90 CAPSULE | Refills: 3 | Status: SHIPPED | OUTPATIENT
Start: 2020-12-14 | End: 2020-12-14 | Stop reason: SDUPTHER

## 2020-12-14 RX ORDER — PROPAFENONE HYDROCHLORIDE 325 MG/1
325 CAPSULE, EXTENDED RELEASE ORAL EVERY 12 HOURS
Qty: 180 CAPSULE | Refills: 3 | Status: SHIPPED | OUTPATIENT
Start: 2020-12-14 | End: 2020-12-14 | Stop reason: SDUPTHER

## 2020-12-14 RX ORDER — DILTIAZEM HYDROCHLORIDE 120 MG/1
120 CAPSULE, EXTENDED RELEASE ORAL DAILY
Qty: 90 CAPSULE | Refills: 3 | Status: SHIPPED | OUTPATIENT
Start: 2020-12-14 | End: 2021-10-18 | Stop reason: SDUPTHER

## 2020-12-14 RX ORDER — PROPAFENONE HYDROCHLORIDE 325 MG/1
325 CAPSULE, EXTENDED RELEASE ORAL EVERY 12 HOURS
Qty: 180 CAPSULE | Refills: 3 | Status: SHIPPED | OUTPATIENT
Start: 2020-12-14 | End: 2020-12-22

## 2020-12-14 NOTE — PROGRESS NOTES
"Subjective:    Patient ID:  Francoise Dykes is a 60 y.o. female who presents for follow-up of Atrial Fibrillation    HPI  I had the pleasure of seeing Francoise Dykes in electrophysiology clinic today for follow up of her paroxysmal atrial fibrillation. She is a 60 year old  with pAF on propafenone and apixaban s/p PVI (2015, SVC isolation at that time), re-do PVI with re isolation of right veins and ablation of a Sona AT, and HTN. She has been doing generally well. She is under more stress than normal with her job given the ongoing pandemic. She notes that about 2 times per week she will feel her heart "tremor" in her chest. This happens at night when she is laying down to sleep. She notes that it feels like her heart is trying to go out of rhythm but ultimately it does not. She notes that episodes last less than one minute and resolve spontaneously. She has no associated symptoms. She notes that she needs refills on her medications. These episodes do not wake her from sleep or prevent her from falling asleep. She has no effort intolerance.     From Dr Meyers's clinic note 12/2019:  "I had the pleasure of seeing Francoise Dykes in follow-up today for her history of atrial fibrillation and PVI. 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 She presented to [facility] in [place], [place] where an ECG showed atrial fibrillation at a rate of 169 bpm (ECG in EPIC). In 11/2012, Mrs. Dykes had another episode of atrial fibrillation after an argument with her son. She presented to [facility], and once again reverted to sinus rhythm within 30 minutes. She was discharged on Flecainide 50 mg BID, Toprol XL 50 mg QD, and Aspirin.   When I initially saw Ms. Dykes in 1/2014, she admitted to monthly palpitations, which " would last seconds and resolve with coughing. She believed that Flecainide caused her some shortness of breath. At that office visit, I stopped Flecainide and started Rythmol 600 mg PRN palpitations.   At her office visit with me in 7/2015 Ms. Dykes reported episodes of AF every 2-3 months, which would last minutes and resolve with vagal maneuvers. She continued to take Flecainide every night before she went to bed. At that time Flecainide was stopped. Unfortunately in 9/2015, Ms. Dykes presented to [facility] with AF with RVR. She took Rythmol, and reverted to sinus rhythm within 3 hours. Notably, prior to conversion, her AF organized into a regular rhythm (AFL vs SVT--no 12-lead available to review).  In 12/2015, Ms. Dykes underwent a PVI and SVC ablation. She had frequent episodes of sustained AF during the case, which appeared to be arising from a RA source. She was started on Amiodarone that that time. At her follow-up visit in 1/2016, she was doing well, with no episodes of sustained palpitations. Amiodarone was stopped in early 3/2016. In 8/2016, Ms. Dykes was admitted with atypical atrial flutter with RVR. She was cardioverted to sinus rhythm, and started on Rythmol  mg BID.  In 12/2016, Ms. Dykes underwent repeat PVI. The right pulmonary veins were re-isolated, with isolated firing noted from the veins post-isolation. Additionally, a mid-sal AT was targeted and successfully ablated. She was discharged on Rythmol  mg bid.  In 3/2017 Rythmol was stopped. Several weeks later she presented to [facility] with atypical atrial flutter with 2:1 AVB at a rate of 120 bpm. Rythmol was restarted at that time. A 4 week event monitor was ordered, but she could only wear it for 1 week due to skin sensitivity. She had one additional episode of palpitations which lasted for 1 hour, which resolved on its own. In 8/2017, Ms. Dykes presented with atypical atrial flutter with RVR, and underwent SAMMIE/DCCV. Notably, Ms.  "Donis missed her AM dose of Rythmol. She was continued on Rythmol. In 12/2017 and 1/2018, Ms. Dykes had recurrent atypical atrial flutter, spontaneously converting to sinus rhythm. At that time her Rythmol was increased to 325 mg bid. AT her 4/2018 visit Ms. Dykes was doing well, and the plan was to hold the course.  On 3/21/2019, Ms. Dykes presented to [facility] with palpitations and was found to be in atypical atrial flutter with RVR, which terminated after receiving an extra dose of Rythmol. In total the episode lasted 2-3 hours. She had several episodes of palpitations lasting seconds in the weeks prior, which terminated spontaneously. Notably, she took steroids for an Achilles heel injury several days prior to her ED visit.  Today in the office Ms. Dykes is doing well. She has had no recurrent arrhythmia episodes.  My interpretation of today's ECG is sinus rhythm at 63 bpm."  Review of Systems   Constitution: Negative for chills, decreased appetite, diaphoresis, malaise/fatigue and weight loss.   HENT: Negative for congestion, hearing loss, nosebleeds and sore throat.    Eyes: Negative for pain, redness and visual disturbance.   Cardiovascular: Positive for irregular heartbeat and palpitations. Negative for chest pain, dyspnea on exertion, leg swelling, orthopnea and syncope.   Respiratory: Negative for cough, shortness of breath, sleep disturbances due to breathing and wheezing.    Endocrine: Negative for cold intolerance and heat intolerance.   Hematologic/Lymphatic: Negative for bleeding problem. Does not bruise/bleed easily.   Skin: Negative for color change, dry skin, poor wound healing and rash.   Musculoskeletal: Negative for arthritis, back pain, falls, joint pain, muscle weakness and myalgias.   Gastrointestinal: Negative for abdominal pain, change in bowel habit, constipation, diarrhea, hematochezia, melena and vomiting.   Genitourinary: Negative for dysuria, frequency, hematuria, nocturia and urgency. "   Neurological: Negative for difficulty with concentration, disturbances in coordination, dizziness, focal weakness, headaches, light-headedness, numbness and weakness.   Psychiatric/Behavioral: Negative for altered mental status. The patient does not have insomnia.         Objective:    Physical Exam   Constitutional: She is oriented to person, place, and time. She appears well-developed and well-nourished. No distress.   HENT:   Head: Normocephalic and atraumatic.   Eyes: Conjunctivae and EOM are normal. Right eye exhibits no discharge. Left eye exhibits no discharge.   Neck: Neck supple. No JVD present.   Cardiovascular: Normal rate, regular rhythm, normal heart sounds and intact distal pulses.   No murmur heard.  Pulmonary/Chest: Effort normal and breath sounds normal. No respiratory distress.   Abdominal: Soft. She exhibits no distension. There is no abdominal tenderness.   Musculoskeletal: Normal range of motion.         General: No edema.   Neurological: She is alert and oriented to person, place, and time.   Skin: Skin is warm and dry.   Vitals reviewed.    ECG today: sinus rhythm      Assessment:       1. Paroxysmal atrial fibrillation    2. S/P ablation of atrial fibrillation    3. Essential hypertension    4. Current use of long term anticoagulation    5. Long term current use of antiarrhythmic drug         Plan:       I had the pleasure of seeing Francoise Dykes in electrophysiology clinic today for follow up of her paroxysmal atrial fibrillation. She is a 60 year old  with pAF on propafenone and apixaban s/p PVI (2015, SVC isolation at that time), re-do PVI with re isolation of right veins and ablation of a Sona AT, and HTN.       1. pAF  - doing well clinically  - continue propafenone and diltiazem.   - continue apixaban for primary stroke prevention. [id number] is 2 (female, HTN).   - palpitation episodes that she describes today are clinically and historically suggestive of  extrasystoles (either APCs or VPCs) but short salvos of AT are also possible. Reassurance provided. If they become more frequent or more bothersome, will start with zio patch (or other wearable ambulatory ECG monitoring).      Return to clinic in one year or earlier as needed for new or worsening symptoms.

## 2020-12-22 ENCOUNTER — PATIENT MESSAGE (OUTPATIENT)
Dept: ELECTROPHYSIOLOGY | Facility: CLINIC | Age: 60
End: 2020-12-22

## 2020-12-22 ENCOUNTER — OFFICE VISIT (OUTPATIENT)
Dept: FAMILY MEDICINE | Facility: CLINIC | Age: 60
End: 2020-12-22
Payer: COMMERCIAL

## 2020-12-22 VITALS
SYSTOLIC BLOOD PRESSURE: 130 MMHG | OXYGEN SATURATION: 97 % | HEIGHT: 64 IN | TEMPERATURE: 98 F | RESPIRATION RATE: 17 BRPM | HEART RATE: 68 BPM | DIASTOLIC BLOOD PRESSURE: 80 MMHG | WEIGHT: 209.88 LBS | BODY MASS INDEX: 35.83 KG/M2

## 2020-12-22 DIAGNOSIS — J20.9 ACUTE BRONCHITIS, UNSPECIFIED ORGANISM: ICD-10-CM

## 2020-12-22 DIAGNOSIS — Z00.00 ANNUAL PHYSICAL EXAM: Primary | ICD-10-CM

## 2020-12-22 DIAGNOSIS — Z23 NEED FOR TDAP VACCINATION: ICD-10-CM

## 2020-12-22 DIAGNOSIS — I10 ESSENTIAL HYPERTENSION: ICD-10-CM

## 2020-12-22 PROCEDURE — 3079F DIAST BP 80-89 MM HG: CPT | Mod: CPTII,S$GLB,, | Performed by: FAMILY MEDICINE

## 2020-12-22 PROCEDURE — 3075F SYST BP GE 130 - 139MM HG: CPT | Mod: CPTII,S$GLB,, | Performed by: FAMILY MEDICINE

## 2020-12-22 PROCEDURE — 1126F PR PAIN SEVERITY QUANTIFIED, NO PAIN PRESENT: ICD-10-PCS | Mod: S$GLB,,, | Performed by: FAMILY MEDICINE

## 2020-12-22 PROCEDURE — 3079F PR MOST RECENT DIASTOLIC BLOOD PRESSURE 80-89 MM HG: ICD-10-PCS | Mod: CPTII,S$GLB,, | Performed by: FAMILY MEDICINE

## 2020-12-22 PROCEDURE — 3075F PR MOST RECENT SYSTOLIC BLOOD PRESS GE 130-139MM HG: ICD-10-PCS | Mod: CPTII,S$GLB,, | Performed by: FAMILY MEDICINE

## 2020-12-22 PROCEDURE — 99999 PR PBB SHADOW E&M-EST. PATIENT-LVL IV: ICD-10-PCS | Mod: PBBFAC,,, | Performed by: FAMILY MEDICINE

## 2020-12-22 PROCEDURE — 99396 PREV VISIT EST AGE 40-64: CPT | Mod: S$GLB,,, | Performed by: FAMILY MEDICINE

## 2020-12-22 PROCEDURE — 1126F AMNT PAIN NOTED NONE PRSNT: CPT | Mod: S$GLB,,, | Performed by: FAMILY MEDICINE

## 2020-12-22 PROCEDURE — 99396 PR PREVENTIVE VISIT,EST,40-64: ICD-10-PCS | Mod: S$GLB,,, | Performed by: FAMILY MEDICINE

## 2020-12-22 PROCEDURE — 99999 PR PBB SHADOW E&M-EST. PATIENT-LVL IV: CPT | Mod: PBBFAC,,, | Performed by: FAMILY MEDICINE

## 2020-12-22 PROCEDURE — 3008F PR BODY MASS INDEX (BMI) DOCUMENTED: ICD-10-PCS | Mod: CPTII,S$GLB,, | Performed by: FAMILY MEDICINE

## 2020-12-22 PROCEDURE — 3008F BODY MASS INDEX DOCD: CPT | Mod: CPTII,S$GLB,, | Performed by: FAMILY MEDICINE

## 2020-12-22 RX ORDER — METHYLPREDNISOLONE 4 MG/1
TABLET ORAL
Qty: 1 PACKAGE | Refills: 0 | Status: SHIPPED | OUTPATIENT
Start: 2020-12-22 | End: 2021-01-12

## 2020-12-22 RX ORDER — HYDROCHLOROTHIAZIDE 12.5 MG/1
12.5 TABLET ORAL DAILY
Qty: 90 TABLET | Refills: 3 | Status: SHIPPED | OUTPATIENT
Start: 2020-12-22 | End: 2022-01-27 | Stop reason: SDUPTHER

## 2020-12-23 RX ORDER — PROPAFENONE HYDROCHLORIDE 225 MG/1
225 TABLET, FILM COATED ORAL EVERY 8 HOURS
Qty: 270 TABLET | Refills: 11 | Status: SHIPPED | OUTPATIENT
Start: 2020-12-23 | End: 2022-01-11 | Stop reason: SDUPTHER

## 2020-12-31 NOTE — PROGRESS NOTES
Subjective:       Patient ID: Francoise Dykes is a 60 y.o. female.    Chief Complaint: Hypertension and Hyperlipidemia      HPI  52-year-old female presents for annual exam.  States she is doing well overall.  Denies any issues at this time.  States she has slight sinus congestion.  Denies any fever chills.    Review of Systems   Constitutional: Negative.    HENT: Negative.    Respiratory: Negative.    Cardiovascular: Negative.    Gastrointestinal: Negative.    Endocrine: Negative.    Genitourinary: Negative.    Musculoskeletal: Negative.    Neurological: Negative.    Psychiatric/Behavioral: Negative.           Past Medical History:   Diagnosis Date    Acid reflux     Allergy     seasonal    Atrial fibrillation     Essential hypertension 2017    Pt states that she does not have HTN.      Past Surgical History:   Procedure Laterality Date    CARDIAC ELECTROPHYSIOLOGY STUDY AND ABLATION       SECTION      COLONOSCOPY N/A 2018    Procedure: COLONOSCOPY;  Surgeon: Brodie Lara MD;  Location: [facility];  Service: Endoscopy;  Laterality: N/A;  confirmed appt-ss    HYSTERECTOMY      MIGUEL    RADIOFREQUENCY ABLATION Left 2019    Procedure: RADIOFREQUENCY ABLATION, LEFT L2,3,4,5;  Surgeon: Mariusz Jang MD;  Location: [facility];  Service: Pain Management;  Laterality: Left;  Left RFA L2,3,4,5  1 of 2  *Ok to hold [address]    RADIOFREQUENCY ABLATION Right 3/13/2019    Procedure: RADIOFREQUENCY ABLATION,  Right L2,3,4,5;  Surgeon: Mariusz Jang MD;  Location: [facility];  Service: Pain Management;  Laterality: Right;  Right RFA @ L2,3,4,5  2 of 2     Family History   Problem Relation Age of Onset    Hypertension Mother     Diabetes Mother     Glaucoma Mother     Allergies Mother     Asbestos Father     Obesity Father     Eczema Son     Hypertension Son     Heart disease Maternal Grandmother         chf    Diabetes Maternal Grandfather     Cancer  Paternal Grandmother         lung, 2/2 second hand smoke    Glaucoma Paternal Grandfather     Blindness Paternal Grandfather     Melanoma Neg Hx     Psoriasis Neg Hx     Lupus Neg Hx     Acne Neg Hx     Breast cancer Neg Hx     Colon cancer Neg Hx     Ovarian cancer Neg Hx      Social History     Socioeconomic History    Marital status:      Spouse name: Not on file    Number of children: Not on file    Years of education: Not on file    Highest education level: Not on file   Occupational History    Occupation: Teacher     Employer: [employer]    Social Needs    Financial resource strain: Not on file    Food insecurity     Worry: Not on file     Inability: Not on file    Transportation needs     Medical: Not on file     Non-medical: Not on file   Tobacco Use    Smoking status: Never Smoker    Smokeless tobacco: Never Used   Substance and Sexual Activity    Alcohol use: Yes     Comment: rarely, 1 drink/week    Drug use: No    Sexual activity: Yes     Partners: Male   Lifestyle    Physical activity     Days per week: Not on file     Minutes per session: Not on file    Stress: Not on file   Relationships    Social connections     Talks on phone: Not on file     Gets together: Not on file     Attends Restorationism service: Not on file     Active member of club or organization: Not on file     Attends meetings of clubs or organizations: Not on file     Relationship status: Not on file   Other Topics Concern    Are you pregnant or think you may be? No    Breast-feeding No   Social History Narrative    Recently moved back to [facility] to help take care of mom.       Current Outpatient Medications:     albuterol (PROVENTIL/VENTOLIN HFA) 90 mcg/actuation inhaler, Inhale 2 puffs into the lungs every 4 (four) hours as needed for Wheezing., Disp: 18 g, Rfl: 5    apixaban (ELIQUIS) 5 mg Tab, Take 1 tablet (5 mg total) by mouth 2 (two) times daily., Disp: 180 tablet, Rfl: 3    azelastine (ASTELIN)  137 mcg (0.1 %) nasal spray, 1-2 sprays (137-274 mcg total) by Nasal route 2 (two) times daily as needed for Rhinitis., Disp: 90 mL, Rfl: 3    cetirizine (ZYRTEC) 10 MG tablet, Take 10 mg by mouth once daily., Disp: , Rfl:     diltiaZEM (DILACOR XR) 120 MG CDCR, Take 1 capsule (120 mg total) by mouth once daily., Disp: 90 capsule, Rfl: 3    fluticasone propionate (FLONASE) 50 mcg/actuation nasal spray, 1 spray (50 mcg total) by Each Nostril route once daily., Disp: 1 Bottle, Rfl: 0    hydroCHLOROthiazide (HYDRODIURIL) 12.5 MG Tab, Take 1 tablet (12.5 mg total) by mouth once daily., Disp: 90 tablet, Rfl: 3    omeprazole (PRILOSEC) 40 MG capsule, Take 1 capsule (40 mg total) by mouth every morning., Disp: 90 capsule, Rfl: 0    triamcinolone acetonide 0.1% (KENALOG) 0.1 % ointment, AAA bid, Disp: 80 g, Rfl: 1    albuterol (PROVENTIL/VENTOLIN HFA) 90 mcg/actuation inhaler, Inhale 2 puffs into the lungs every 4 (four) hours as needed for Wheezing. (Patient not taking: Reported on 12/14/2020), Disp: 19 g, Rfl: 3    amoxicillin-clavulanate 875-125mg (AUGMENTIN) 875-125 mg per tablet, Take 1 tablet by mouth every 12 (twelve) hours. (Patient not taking: Reported on 12/14/2020), Disp: 20 tablet, Rfl: 0    cetirizine (ZYRTEC) 10 MG tablet, Take 1 tablet (10 mg total) by mouth once daily., Disp: 90 tablet, Rfl: 3    fluticasone propionate (FLONASE) 50 mcg/actuation nasal spray, 2 sprays (100 mcg total) by Each Nostril route once daily. (Patient not taking: Reported on 12/14/2020), Disp: 16 g, Rfl: 5    methylPREDNISolone (MEDROL DOSEPACK) 4 mg tablet, use as directed, Disp: 1 Package, Rfl: 0    predniSONE (DELTASONE) 10 MG tablet, Day 1 60 mg Day 2 50 mg Day 3 40 mg Day 4 30 mg Day 5 20 mg Day 6 10 mg (Patient not taking: Reported on 12/14/2020), Disp: 21 tablet, Rfl: 0    propafenone (RYTHMOL) 225 MG Tab, Take 1 tablet (225 mg total) by mouth every 8 (eight) hours., Disp: 270 tablet, Rfl: 11   Objective:     "  Vitals:    12/22/20 1105   BP: 130/80   BP Location: Right arm   Patient Position: Sitting   BP Method: Large (Manual)   Pulse: 68   Resp: 17   Temp: 98.1 °F (36.7 °C)   TempSrc: Oral   SpO2: 97%   Weight: 95.2 kg (209 lb 14.1 oz)   Height: 5' 4" (1.626 m)       Physical Exam  Constitutional:       General: She is not in acute distress.  HENT:      Head: Normocephalic and atraumatic.   Eyes:      Conjunctiva/sclera: Conjunctivae normal.   Neck:      Musculoskeletal: Neck supple.   Cardiovascular:      Rate and Rhythm: Normal rate and regular rhythm.      Heart sounds: Normal heart sounds. No murmur. No friction rub. No gallop.    Pulmonary:      Effort: Pulmonary effort is normal.      Breath sounds: Examination of the right-middle field reveals wheezing. Examination of the left-middle field reveals wheezing. Examination of the right-lower field reveals wheezing. Examination of the left-lower field reveals wheezing. Wheezing present. No rales.   Skin:     General: Skin is warm and dry.   Neurological:      Mental Status: She is alert and oriented to person, place, and time.   Psychiatric:         Behavior: Behavior normal.         Thought Content: Thought content normal.         Judgment: Judgment normal.            Assessment:       1. Annual physical exam    2. Need for Tdap vaccination    3. Hypertension, uncontrolled    4. Acute bronchitis, unspecified organism        Plan:       Annual physical exam  -     CBC Auto Differential; Future; Expected date: 12/22/2020  -     Comprehensive Metabolic Panel; Future; Expected date: 12/22/2020  -     Hemoglobin A1C; Future; Expected date: 12/22/2020  -     TSH; Future; Expected date: 12/22/2020  -     Lipid Panel; Future; Expected date: 12/22/2020    Need for Tdap vaccination  -     (In Office Administered) Tdap Vaccine    Hypertension, uncontrolled  -     hydroCHLOROthiazide (HYDRODIURIL) 12.5 MG Tab; Take 1 tablet (12.5 mg total) by mouth once daily.  Dispense: 90 " tablet; Refill: 3    Acute bronchitis, unspecified organism  - On PE found to have wheezing. Will give short course of steroids. Advised pt to get labs after 2 weeks  -     methylPREDNISolone (MEDROL DOSEPACK) 4 mg tablet; use as directed  Dispense: 1 Package; Refill: 0                Patient note was created using [employer].  Any errors in syntax or even information may not have been identified and edited on initial review prior to signing this note.

## 2021-02-01 ENCOUNTER — PATIENT MESSAGE (OUTPATIENT)
Dept: FAMILY MEDICINE | Facility: CLINIC | Age: 61
End: 2021-02-01

## 2021-02-02 ENCOUNTER — TELEPHONE (OUTPATIENT)
Dept: FAMILY MEDICINE | Facility: CLINIC | Age: 61
End: 2021-02-02

## 2021-02-02 ENCOUNTER — PATIENT MESSAGE (OUTPATIENT)
Dept: FAMILY MEDICINE | Facility: CLINIC | Age: 61
End: 2021-02-02

## 2021-02-02 DIAGNOSIS — J20.9 ACUTE BRONCHITIS, UNSPECIFIED ORGANISM: Primary | ICD-10-CM

## 2021-02-02 RX ORDER — DOXYCYCLINE 100 MG/1
100 CAPSULE ORAL 2 TIMES DAILY
Qty: 14 CAPSULE | Refills: 0 | Status: SHIPPED | OUTPATIENT
Start: 2021-02-02 | End: 2021-02-09

## 2021-02-02 RX ORDER — PREDNISONE 20 MG/1
40 TABLET ORAL DAILY
Qty: 10 TABLET | Refills: 0 | Status: SHIPPED | OUTPATIENT
Start: 2021-02-02 | End: 2021-02-07

## 2021-02-03 ENCOUNTER — TELEPHONE (OUTPATIENT)
Dept: OTOLARYNGOLOGY | Facility: CLINIC | Age: 61
End: 2021-02-03

## 2021-02-03 RX ORDER — OMEPRAZOLE 40 MG/1
40 CAPSULE, DELAYED RELEASE ORAL EVERY MORNING
Qty: 90 CAPSULE | Refills: 0 | Status: SHIPPED | OUTPATIENT
Start: 2021-02-03 | End: 2021-04-12

## 2021-02-26 ENCOUNTER — TELEPHONE (OUTPATIENT)
Dept: ELECTROPHYSIOLOGY | Facility: CLINIC | Age: 61
End: 2021-02-26

## 2021-02-26 ENCOUNTER — HOSPITAL ENCOUNTER (EMERGENCY)
Facility: HOSPITAL | Age: 61
Discharge: HOME | End: 2021-02-26
Attending: EMERGENCY MEDICINE
Payer: COMMERCIAL

## 2021-02-26 ENCOUNTER — PATIENT MESSAGE (OUTPATIENT)
Dept: FAMILY MEDICINE | Facility: CLINIC | Age: 61
End: 2021-02-26

## 2021-02-26 VITALS
DIASTOLIC BLOOD PRESSURE: 67 MMHG | HEIGHT: 65 IN | SYSTOLIC BLOOD PRESSURE: 156 MMHG | TEMPERATURE: 98 F | OXYGEN SATURATION: 94 % | HEART RATE: 77 BPM | BODY MASS INDEX: 35.16 KG/M2 | RESPIRATION RATE: 17 BRPM | WEIGHT: 211 LBS

## 2021-02-26 DIAGNOSIS — J45.901 MILD ASTHMA WITH EXACERBATION, UNSPECIFIED WHETHER PERSISTENT: ICD-10-CM

## 2021-02-26 DIAGNOSIS — R07.9 CHEST PAIN: Primary | ICD-10-CM

## 2021-02-26 LAB
ALBUMIN SERPL BCP-MCNC: 3.8 G/DL (ref 3.5–5.2)
ALP SERPL-CCNC: 87 U/L (ref 55–135)
ALT SERPL W/O P-5'-P-CCNC: 19 U/L (ref 10–44)
ANION GAP SERPL CALC-SCNC: 9 MMOL/L (ref 8–16)
AST SERPL-CCNC: 19 U/L (ref 10–40)
BASOPHILS # BLD AUTO: 0.07 K/UL (ref 0–0.2)
BASOPHILS NFR BLD: 1.8 % (ref 0–1.9)
BILIRUB SERPL-MCNC: 0.7 MG/DL (ref 0.1–1)
BNP SERPL-MCNC: 15 PG/ML (ref 0–99)
BUN SERPL-MCNC: 11 MG/DL (ref 6–20)
CALCIUM SERPL-MCNC: 8.8 MG/DL (ref 8.7–10.5)
CHLORIDE SERPL-SCNC: 108 MMOL/L (ref 95–110)
CO2 SERPL-SCNC: 27 MMOL/L (ref 23–29)
CREAT SERPL-MCNC: 0.8 MG/DL (ref 0.5–1.4)
CTP QC/QA: YES
DIFFERENTIAL METHOD: ABNORMAL
EOSINOPHIL # BLD AUTO: 0.5 K/UL (ref 0–0.5)
EOSINOPHIL NFR BLD: 14.1 % (ref 0–8)
ERYTHROCYTE [DISTWIDTH] IN BLOOD BY AUTOMATED COUNT: 12.7 % (ref 11.5–14.5)
EST. GFR  (AFRICAN AMERICAN): >60 ML/MIN/1.73 M^2
EST. GFR  (NON AFRICAN AMERICAN): >60 ML/MIN/1.73 M^2
GLUCOSE SERPL-MCNC: 108 MG/DL (ref 70–110)
HCT VFR BLD AUTO: 36.2 % (ref 37–48.5)
HCV AB SERPL QL IA: NEGATIVE
HGB BLD-MCNC: 12.6 G/DL (ref 12–16)
HIV 1+2 AB+HIV1 P24 AG SERPL QL IA: NEGATIVE
IMM GRANULOCYTES # BLD AUTO: 0.02 K/UL (ref 0–0.04)
IMM GRANULOCYTES NFR BLD AUTO: 0.5 % (ref 0–0.5)
LYMPHOCYTES # BLD AUTO: 1 K/UL (ref 1–4.8)
LYMPHOCYTES NFR BLD: 25.9 % (ref 18–48)
MAGNESIUM SERPL-MCNC: 2 MG/DL (ref 1.6–2.6)
MCH RBC QN AUTO: 28.2 PG (ref 27–31)
MCHC RBC AUTO-ENTMCNC: 34.8 G/DL (ref 32–36)
MCV RBC AUTO: 81 FL (ref 82–98)
MONOCYTES # BLD AUTO: 0.4 K/UL (ref 0.3–1)
MONOCYTES NFR BLD: 9.4 % (ref 4–15)
NEUTROPHILS # BLD AUTO: 1.8 K/UL (ref 1.8–7.7)
NEUTROPHILS NFR BLD: 48.3 % (ref 38–73)
NRBC BLD-RTO: 0 /100 WBC
PLATELET # BLD AUTO: 193 K/UL (ref 150–350)
PMV BLD AUTO: 10.6 FL (ref 9.2–12.9)
POTASSIUM SERPL-SCNC: 3.3 MMOL/L (ref 3.5–5.1)
PROT SERPL-MCNC: 7.2 G/DL (ref 6–8.4)
RBC # BLD AUTO: 4.47 M/UL (ref 4–5.4)
SARS-COV-2 RDRP RESP QL NAA+PROBE: NEGATIVE
SODIUM SERPL-SCNC: 144 MMOL/L (ref 136–145)
TROPONIN I SERPL DL<=0.01 NG/ML-MCNC: 0.01 NG/ML (ref 0–0.03)
TROPONIN I SERPL DL<=0.01 NG/ML-MCNC: <0.006 NG/ML (ref 0–0.03)
TSH SERPL DL<=0.005 MIU/L-ACNC: 1.2 UIU/ML (ref 0.4–4)
WBC # BLD AUTO: 3.82 K/UL (ref 3.9–12.7)

## 2021-02-26 PROCEDURE — 85025 COMPLETE CBC W/AUTO DIFF WBC: CPT

## 2021-02-26 PROCEDURE — 99285 EMERGENCY DEPT VISIT HI MDM: CPT | Mod: 25

## 2021-02-26 PROCEDURE — 83880 ASSAY OF NATRIURETIC PEPTIDE: CPT

## 2021-02-26 PROCEDURE — 63600175 PHARM REV CODE 636 W HCPCS: Performed by: PHYSICIAN ASSISTANT

## 2021-02-26 PROCEDURE — 84443 ASSAY THYROID STIM HORMONE: CPT

## 2021-02-26 PROCEDURE — 93010 EKG 12-LEAD: ICD-10-PCS | Mod: ,,, | Performed by: INTERNAL MEDICINE

## 2021-02-26 PROCEDURE — 25000242 PHARM REV CODE 250 ALT 637 W/ HCPCS: Performed by: PHYSICIAN ASSISTANT

## 2021-02-26 PROCEDURE — 25000003 PHARM REV CODE 250: Performed by: PHYSICIAN ASSISTANT

## 2021-02-26 PROCEDURE — 93010 ELECTROCARDIOGRAM REPORT: CPT | Mod: ,,, | Performed by: INTERNAL MEDICINE

## 2021-02-26 PROCEDURE — 84484 ASSAY OF TROPONIN QUANT: CPT

## 2021-02-26 PROCEDURE — U0002 COVID-19 LAB TEST NON-CDC: HCPCS | Performed by: EMERGENCY MEDICINE

## 2021-02-26 PROCEDURE — 94761 N-INVAS EAR/PLS OXIMETRY MLT: CPT

## 2021-02-26 PROCEDURE — 99284 EMERGENCY DEPT VISIT MOD MDM: CPT | Mod: CS,,, | Performed by: PHYSICIAN ASSISTANT

## 2021-02-26 PROCEDURE — 94640 AIRWAY INHALATION TREATMENT: CPT

## 2021-02-26 PROCEDURE — 96374 THER/PROPH/DIAG INJ IV PUSH: CPT

## 2021-02-26 PROCEDURE — 86703 HIV-1/HIV-2 1 RESULT ANTBDY: CPT

## 2021-02-26 PROCEDURE — 96375 TX/PRO/DX INJ NEW DRUG ADDON: CPT

## 2021-02-26 PROCEDURE — 83735 ASSAY OF MAGNESIUM: CPT

## 2021-02-26 PROCEDURE — 80053 COMPREHEN METABOLIC PANEL: CPT

## 2021-02-26 PROCEDURE — 25000003 PHARM REV CODE 250: Performed by: STUDENT IN AN ORGANIZED HEALTH CARE EDUCATION/TRAINING PROGRAM

## 2021-02-26 PROCEDURE — 99284 PR EMERGENCY DEPT VISIT,LEVEL IV: ICD-10-PCS | Mod: CS,,, | Performed by: PHYSICIAN ASSISTANT

## 2021-02-26 PROCEDURE — 93005 ELECTROCARDIOGRAM TRACING: CPT

## 2021-02-26 PROCEDURE — 86803 HEPATITIS C AB TEST: CPT

## 2021-02-26 RX ORDER — IPRATROPIUM BROMIDE AND ALBUTEROL SULFATE 2.5; .5 MG/3ML; MG/3ML
3 SOLUTION RESPIRATORY (INHALATION)
Status: COMPLETED | OUTPATIENT
Start: 2021-02-26 | End: 2021-02-26

## 2021-02-26 RX ORDER — FAMOTIDINE 10 MG/ML
20 INJECTION INTRAVENOUS
Status: COMPLETED | OUTPATIENT
Start: 2021-02-26 | End: 2021-02-26

## 2021-02-26 RX ORDER — DEXAMETHASONE 1 MG/1
8 TABLET ORAL ONCE
Qty: 8 TABLET | Refills: 0 | Status: SHIPPED | OUTPATIENT
Start: 2021-02-27 | End: 2021-02-27

## 2021-02-26 RX ORDER — ONDANSETRON 4 MG/1
4 TABLET, ORALLY DISINTEGRATING ORAL ONCE
Status: COMPLETED | OUTPATIENT
Start: 2021-02-26 | End: 2021-02-26

## 2021-02-26 RX ORDER — METHYLPREDNISOLONE SOD SUCC 125 MG
125 VIAL (EA) INJECTION
Status: COMPLETED | OUTPATIENT
Start: 2021-02-26 | End: 2021-02-26

## 2021-02-26 RX ADMIN — ONDANSETRON 4 MG: 4 TABLET, ORALLY DISINTEGRATING ORAL at 09:02

## 2021-02-26 RX ADMIN — METHYLPREDNISOLONE SODIUM SUCCINATE 125 MG: 125 INJECTION, POWDER, FOR SOLUTION INTRAMUSCULAR; INTRAVENOUS at 05:02

## 2021-02-26 RX ADMIN — FAMOTIDINE 20 MG: 10 INJECTION INTRAVENOUS at 05:02

## 2021-02-26 RX ADMIN — POTASSIUM BICARBONATE 50 MEQ: 978 TABLET, EFFERVESCENT ORAL at 08:02

## 2021-02-26 RX ADMIN — IPRATROPIUM BROMIDE AND ALBUTEROL SULFATE 3 ML: .5; 2.5 SOLUTION RESPIRATORY (INHALATION) at 05:02

## 2021-02-27 ENCOUNTER — PATIENT OUTREACH (OUTPATIENT)
Dept: ADMINISTRATIVE | Facility: OTHER | Age: 61
End: 2021-02-27

## 2021-02-27 DIAGNOSIS — Z12.31 ENCOUNTER FOR SCREENING MAMMOGRAM FOR MALIGNANT NEOPLASM OF BREAST: Primary | ICD-10-CM

## 2021-03-01 ENCOUNTER — OFFICE VISIT (OUTPATIENT)
Dept: ELECTROPHYSIOLOGY | Facility: CLINIC | Age: 61
End: 2021-03-01
Payer: COMMERCIAL

## 2021-03-01 ENCOUNTER — TELEPHONE (OUTPATIENT)
Dept: OTOLARYNGOLOGY | Facility: CLINIC | Age: 61
End: 2021-03-01

## 2021-03-01 ENCOUNTER — OFFICE VISIT (OUTPATIENT)
Dept: ALLERGY | Facility: CLINIC | Age: 61
End: 2021-03-01
Payer: COMMERCIAL

## 2021-03-01 ENCOUNTER — CLINICAL SUPPORT (OUTPATIENT)
Dept: CARDIOLOGY | Facility: HOSPITAL | Age: 61
End: 2021-03-01
Attending: INTERNAL MEDICINE
Payer: COMMERCIAL

## 2021-03-01 VITALS — WEIGHT: 214.75 LBS | BODY MASS INDEX: 36.66 KG/M2 | TEMPERATURE: 98 F | HEIGHT: 64 IN

## 2021-03-01 DIAGNOSIS — I48.0 PAROXYSMAL ATRIAL FIBRILLATION: ICD-10-CM

## 2021-03-01 DIAGNOSIS — Z20.822 ENCOUNTER FOR LABORATORY TESTING FOR COVID-19 VIRUS: ICD-10-CM

## 2021-03-01 DIAGNOSIS — Z98.890 S/P ABLATION OF ATRIAL FIBRILLATION: ICD-10-CM

## 2021-03-01 DIAGNOSIS — J31.0 CHRONIC RHINITIS: ICD-10-CM

## 2021-03-01 DIAGNOSIS — K21.9 LARYNGOPHARYNGEAL REFLUX: ICD-10-CM

## 2021-03-01 DIAGNOSIS — I10 ESSENTIAL HYPERTENSION: ICD-10-CM

## 2021-03-01 DIAGNOSIS — J45.901 ASTHMA WITH ACUTE EXACERBATION, UNSPECIFIED ASTHMA SEVERITY, UNSPECIFIED WHETHER PERSISTENT: Primary | ICD-10-CM

## 2021-03-01 DIAGNOSIS — Z86.79 S/P ABLATION OF ATRIAL FIBRILLATION: ICD-10-CM

## 2021-03-01 DIAGNOSIS — R05.9 COUGH: ICD-10-CM

## 2021-03-01 DIAGNOSIS — Z79.01 CURRENT USE OF LONG TERM ANTICOAGULATION: ICD-10-CM

## 2021-03-01 DIAGNOSIS — I48.0 PAROXYSMAL ATRIAL FIBRILLATION: Primary | ICD-10-CM

## 2021-03-01 DIAGNOSIS — Z79.899 LONG TERM CURRENT USE OF ANTIARRHYTHMIC DRUG: ICD-10-CM

## 2021-03-01 PROCEDURE — 99214 OFFICE O/P EST MOD 30 MIN: CPT | Mod: 95,,, | Performed by: INTERNAL MEDICINE

## 2021-03-01 PROCEDURE — 93243 EXT ECG>48HR<7D SCAN A/R: CPT

## 2021-03-01 PROCEDURE — 3008F BODY MASS INDEX DOCD: CPT | Mod: CPTII,S$GLB,, | Performed by: ALLERGY & IMMUNOLOGY

## 2021-03-01 PROCEDURE — 3008F PR BODY MASS INDEX (BMI) DOCUMENTED: ICD-10-PCS | Mod: CPTII,S$GLB,, | Performed by: ALLERGY & IMMUNOLOGY

## 2021-03-01 PROCEDURE — 99214 OFFICE O/P EST MOD 30 MIN: CPT | Mod: S$GLB,,, | Performed by: ALLERGY & IMMUNOLOGY

## 2021-03-01 PROCEDURE — 1125F AMNT PAIN NOTED PAIN PRSNT: CPT | Mod: S$GLB,,, | Performed by: ALLERGY & IMMUNOLOGY

## 2021-03-01 PROCEDURE — 93242 EXT ECG>48HR<7D RECORDING: CPT

## 2021-03-01 PROCEDURE — 1125F PR PAIN SEVERITY QUANTIFIED, PAIN PRESENT: ICD-10-PCS | Mod: S$GLB,,, | Performed by: ALLERGY & IMMUNOLOGY

## 2021-03-01 PROCEDURE — 99999 PR PBB SHADOW E&M-EST. PATIENT-LVL III: ICD-10-PCS | Mod: PBBFAC,,, | Performed by: ALLERGY & IMMUNOLOGY

## 2021-03-01 PROCEDURE — 99999 PR PBB SHADOW E&M-EST. PATIENT-LVL III: CPT | Mod: PBBFAC,,, | Performed by: ALLERGY & IMMUNOLOGY

## 2021-03-01 PROCEDURE — 93244 CV CARDIAC MONITOR - 3-14 DAY ADULT (CUPID ONLY): ICD-10-PCS | Mod: ,,, | Performed by: INTERNAL MEDICINE

## 2021-03-01 PROCEDURE — 99214 PR OFFICE/OUTPT VISIT, EST, LEVL IV, 30-39 MIN: ICD-10-PCS | Mod: 95,,, | Performed by: INTERNAL MEDICINE

## 2021-03-01 PROCEDURE — 93244 EXT ECG>48HR<7D REV&INTERPJ: CPT | Mod: ,,, | Performed by: INTERNAL MEDICINE

## 2021-03-01 PROCEDURE — 99214 PR OFFICE/OUTPT VISIT, EST, LEVL IV, 30-39 MIN: ICD-10-PCS | Mod: S$GLB,,, | Performed by: ALLERGY & IMMUNOLOGY

## 2021-03-01 RX ORDER — ALBUTEROL SULFATE 0.83 MG/ML
2.5 SOLUTION RESPIRATORY (INHALATION) EVERY 6 HOURS PRN
Qty: 1 BOX | Refills: 5 | Status: SHIPPED | OUTPATIENT
Start: 2021-03-01 | End: 2021-10-12

## 2021-03-01 RX ORDER — PREDNISONE 10 MG/1
TABLET ORAL
Qty: 21 TABLET | Refills: 0 | Status: SHIPPED | OUTPATIENT
Start: 2021-03-01 | End: 2021-03-01

## 2021-03-01 RX ORDER — PREDNISONE 10 MG/1
TABLET ORAL
Qty: 21 TABLET | Refills: 0 | Status: SHIPPED | OUTPATIENT
Start: 2021-03-01 | End: 2021-04-20

## 2021-03-08 ENCOUNTER — PATIENT MESSAGE (OUTPATIENT)
Dept: OTOLARYNGOLOGY | Facility: CLINIC | Age: 61
End: 2021-03-08

## 2021-03-09 ENCOUNTER — PATIENT MESSAGE (OUTPATIENT)
Dept: RESEARCH | Facility: HOSPITAL | Age: 61
End: 2021-03-09

## 2021-03-18 ENCOUNTER — PATIENT MESSAGE (OUTPATIENT)
Dept: RESEARCH | Facility: HOSPITAL | Age: 61
End: 2021-03-18

## 2021-03-26 ENCOUNTER — PATIENT MESSAGE (OUTPATIENT)
Dept: RESEARCH | Facility: HOSPITAL | Age: 61
End: 2021-03-26

## 2021-04-05 ENCOUNTER — PATIENT MESSAGE (OUTPATIENT)
Dept: ADMINISTRATIVE | Facility: HOSPITAL | Age: 61
End: 2021-04-05

## 2021-04-05 ENCOUNTER — HOSPITAL ENCOUNTER (OUTPATIENT)
Dept: RADIOLOGY | Facility: OTHER | Age: 61
Discharge: HOME | End: 2021-04-05
Attending: FAMILY MEDICINE
Payer: COMMERCIAL

## 2021-04-05 DIAGNOSIS — Z12.31 ENCOUNTER FOR SCREENING MAMMOGRAM FOR MALIGNANT NEOPLASM OF BREAST: ICD-10-CM

## 2021-04-05 PROCEDURE — 77067 SCR MAMMO BI INCL CAD: CPT | Mod: 26,,, | Performed by: RADIOLOGY

## 2021-04-05 PROCEDURE — 77063 MAMMO DIGITAL SCREENING BILAT WITH TOMO: ICD-10-PCS | Mod: 26,,, | Performed by: RADIOLOGY

## 2021-04-05 PROCEDURE — 77067 SCR MAMMO BI INCL CAD: CPT | Mod: TC

## 2021-04-05 PROCEDURE — 77067 MAMMO DIGITAL SCREENING BILAT WITH TOMO: ICD-10-PCS | Mod: 26,,, | Performed by: RADIOLOGY

## 2021-04-05 PROCEDURE — 77063 BREAST TOMOSYNTHESIS BI: CPT | Mod: 26,,, | Performed by: RADIOLOGY

## 2021-04-08 ENCOUNTER — PATIENT OUTREACH (OUTPATIENT)
Dept: ADMINISTRATIVE | Facility: OTHER | Age: 61
End: 2021-04-08

## 2021-04-09 ENCOUNTER — LAB VISIT (OUTPATIENT)
Dept: FAMILY MEDICINE | Facility: CLINIC | Age: 61
End: 2021-04-09
Payer: COMMERCIAL

## 2021-04-09 DIAGNOSIS — Z01.812 PRE-PROCEDURE LAB EXAM: ICD-10-CM

## 2021-04-09 DIAGNOSIS — Z01.812 PRE-PROCEDURE LAB EXAM: Primary | ICD-10-CM

## 2021-04-09 PROCEDURE — U0005 INFEC AGEN DETEC AMPLI PROBE: HCPCS | Performed by: ALLERGY & IMMUNOLOGY

## 2021-04-09 PROCEDURE — U0003 INFECTIOUS AGENT DETECTION BY NUCLEIC ACID (DNA OR RNA); SEVERE ACUTE RESPIRATORY SYNDROME CORONAVIRUS 2 (SARS-COV-2) (CORONAVIRUS DISEASE [COVID-19]), AMPLIFIED PROBE TECHNIQUE, MAKING USE OF HIGH THROUGHPUT TECHNOLOGIES AS DESCRIBED BY CMS-2020-01-R: HCPCS | Performed by: ALLERGY & IMMUNOLOGY

## 2021-04-10 LAB — SARS-COV-2 RNA RESP QL NAA+PROBE: NOT DETECTED

## 2021-04-12 ENCOUNTER — OFFICE VISIT (OUTPATIENT)
Dept: OTOLARYNGOLOGY | Facility: CLINIC | Age: 61
End: 2021-04-12
Payer: COMMERCIAL

## 2021-04-12 VITALS
SYSTOLIC BLOOD PRESSURE: 140 MMHG | DIASTOLIC BLOOD PRESSURE: 81 MMHG | HEIGHT: 64 IN | BODY MASS INDEX: 36.1 KG/M2 | WEIGHT: 211.44 LBS | HEART RATE: 67 BPM

## 2021-04-12 DIAGNOSIS — R09.A2 GLOBUS SENSATION: ICD-10-CM

## 2021-04-12 DIAGNOSIS — J31.0 CHRONIC RHINITIS: Chronic | ICD-10-CM

## 2021-04-12 DIAGNOSIS — K21.9 LARYNGOPHARYNGEAL REFLUX (LPR): Primary | Chronic | ICD-10-CM

## 2021-04-12 PROCEDURE — 99999 PR PBB SHADOW E&M-EST. PATIENT-LVL IV: CPT | Mod: PBBFAC,,, | Performed by: OTOLARYNGOLOGY

## 2021-04-12 PROCEDURE — 99999 PR PBB SHADOW E&M-EST. PATIENT-LVL IV: ICD-10-PCS | Mod: PBBFAC,,, | Performed by: OTOLARYNGOLOGY

## 2021-04-12 PROCEDURE — 1126F AMNT PAIN NOTED NONE PRSNT: CPT | Mod: S$GLB,,, | Performed by: OTOLARYNGOLOGY

## 2021-04-12 PROCEDURE — 99214 OFFICE O/P EST MOD 30 MIN: CPT | Mod: 25,S$GLB,, | Performed by: OTOLARYNGOLOGY

## 2021-04-12 PROCEDURE — 31575 LARYNGOSCOPY: ICD-10-PCS | Mod: S$GLB,,, | Performed by: OTOLARYNGOLOGY

## 2021-04-12 PROCEDURE — 3008F BODY MASS INDEX DOCD: CPT | Mod: CPTII,S$GLB,, | Performed by: OTOLARYNGOLOGY

## 2021-04-12 PROCEDURE — 99214 PR OFFICE/OUTPT VISIT, EST, LEVL IV, 30-39 MIN: ICD-10-PCS | Mod: 25,S$GLB,, | Performed by: OTOLARYNGOLOGY

## 2021-04-12 PROCEDURE — 31575 DIAGNOSTIC LARYNGOSCOPY: CPT | Mod: S$GLB,,, | Performed by: OTOLARYNGOLOGY

## 2021-04-12 PROCEDURE — 1126F PR PAIN SEVERITY QUANTIFIED, NO PAIN PRESENT: ICD-10-PCS | Mod: S$GLB,,, | Performed by: OTOLARYNGOLOGY

## 2021-04-12 PROCEDURE — 3008F PR BODY MASS INDEX (BMI) DOCUMENTED: ICD-10-PCS | Mod: CPTII,S$GLB,, | Performed by: OTOLARYNGOLOGY

## 2021-04-12 RX ORDER — OMEPRAZOLE 40 MG/1
40 CAPSULE, DELAYED RELEASE ORAL
Qty: 60 CAPSULE | Refills: 1 | Status: SHIPPED | OUTPATIENT
Start: 2021-04-12 | End: 2021-08-26 | Stop reason: SDUPTHER

## 2021-04-19 ENCOUNTER — PATIENT MESSAGE (OUTPATIENT)
Dept: ALLERGY | Facility: CLINIC | Age: 61
End: 2021-04-19

## 2021-04-20 ENCOUNTER — TELEPHONE (OUTPATIENT)
Dept: ALLERGY | Facility: CLINIC | Age: 61
End: 2021-04-20

## 2021-04-20 ENCOUNTER — OFFICE VISIT (OUTPATIENT)
Dept: ALLERGY | Facility: CLINIC | Age: 61
End: 2021-04-20
Payer: COMMERCIAL

## 2021-04-20 VITALS — BODY MASS INDEX: 36.84 KG/M2 | WEIGHT: 215.81 LBS | HEIGHT: 64 IN

## 2021-04-20 DIAGNOSIS — R05.9 COUGH: ICD-10-CM

## 2021-04-20 DIAGNOSIS — K21.9 GASTROESOPHAGEAL REFLUX DISEASE, UNSPECIFIED WHETHER ESOPHAGITIS PRESENT: ICD-10-CM

## 2021-04-20 DIAGNOSIS — I48.0 PAROXYSMAL ATRIAL FIBRILLATION: ICD-10-CM

## 2021-04-20 DIAGNOSIS — I10 ESSENTIAL HYPERTENSION: ICD-10-CM

## 2021-04-20 DIAGNOSIS — J31.0 CHRONIC RHINITIS: ICD-10-CM

## 2021-04-20 DIAGNOSIS — Z98.890 S/P ABLATION OF ATRIAL FIBRILLATION: ICD-10-CM

## 2021-04-20 DIAGNOSIS — J45.901 ASTHMA WITH ACUTE EXACERBATION, UNSPECIFIED ASTHMA SEVERITY, UNSPECIFIED WHETHER PERSISTENT: Primary | ICD-10-CM

## 2021-04-20 DIAGNOSIS — Z86.79 S/P ABLATION OF ATRIAL FIBRILLATION: ICD-10-CM

## 2021-04-20 DIAGNOSIS — K21.9 LARYNGOPHARYNGEAL REFLUX: ICD-10-CM

## 2021-04-20 PROCEDURE — 99214 OFFICE O/P EST MOD 30 MIN: CPT | Mod: S$GLB,,, | Performed by: ALLERGY & IMMUNOLOGY

## 2021-04-20 PROCEDURE — 3008F PR BODY MASS INDEX (BMI) DOCUMENTED: ICD-10-PCS | Mod: CPTII,S$GLB,, | Performed by: ALLERGY & IMMUNOLOGY

## 2021-04-20 PROCEDURE — 1126F AMNT PAIN NOTED NONE PRSNT: CPT | Mod: S$GLB,,, | Performed by: ALLERGY & IMMUNOLOGY

## 2021-04-20 PROCEDURE — 99999 PR PBB SHADOW E&M-EST. PATIENT-LVL IV: CPT | Mod: PBBFAC,,, | Performed by: ALLERGY & IMMUNOLOGY

## 2021-04-20 PROCEDURE — 99999 PR PBB SHADOW E&M-EST. PATIENT-LVL IV: ICD-10-PCS | Mod: PBBFAC,,, | Performed by: ALLERGY & IMMUNOLOGY

## 2021-04-20 PROCEDURE — 99214 PR OFFICE/OUTPT VISIT, EST, LEVL IV, 30-39 MIN: ICD-10-PCS | Mod: S$GLB,,, | Performed by: ALLERGY & IMMUNOLOGY

## 2021-04-20 PROCEDURE — 1126F PR PAIN SEVERITY QUANTIFIED, NO PAIN PRESENT: ICD-10-PCS | Mod: S$GLB,,, | Performed by: ALLERGY & IMMUNOLOGY

## 2021-04-20 PROCEDURE — 3008F BODY MASS INDEX DOCD: CPT | Mod: CPTII,S$GLB,, | Performed by: ALLERGY & IMMUNOLOGY

## 2021-04-20 RX ORDER — PREDNISONE 10 MG/1
TABLET ORAL
Qty: 21 TABLET | Refills: 0 | Status: SHIPPED | OUTPATIENT
Start: 2021-04-20 | End: 2021-04-20

## 2021-04-20 RX ORDER — PREDNISONE 10 MG/1
TABLET ORAL
Qty: 21 TABLET | Refills: 1 | Status: SHIPPED | OUTPATIENT
Start: 2021-04-20 | End: 2021-06-08 | Stop reason: SDUPTHER

## 2021-04-30 ENCOUNTER — OFFICE VISIT (OUTPATIENT)
Dept: GASTROENTEROLOGY | Facility: CLINIC | Age: 61
End: 2021-04-30
Payer: COMMERCIAL

## 2021-04-30 VITALS
SYSTOLIC BLOOD PRESSURE: 110 MMHG | DIASTOLIC BLOOD PRESSURE: 82 MMHG | WEIGHT: 216.5 LBS | HEIGHT: 64 IN | BODY MASS INDEX: 36.96 KG/M2

## 2021-04-30 DIAGNOSIS — I48.91 ATRIAL FIBRILLATION WITH RVR: ICD-10-CM

## 2021-04-30 DIAGNOSIS — Z51.81 ENCOUNTER FOR MONITORING LONG-TERM PROTON PUMP INHIBITOR THERAPY: Primary | ICD-10-CM

## 2021-04-30 DIAGNOSIS — K21.9 GASTROESOPHAGEAL REFLUX DISEASE, UNSPECIFIED WHETHER ESOPHAGITIS PRESENT: ICD-10-CM

## 2021-04-30 DIAGNOSIS — Z79.899 ENCOUNTER FOR MONITORING LONG-TERM PROTON PUMP INHIBITOR THERAPY: Primary | ICD-10-CM

## 2021-04-30 DIAGNOSIS — D12.6 COLON ADENOMA: ICD-10-CM

## 2021-04-30 PROCEDURE — 99204 OFFICE O/P NEW MOD 45 MIN: CPT | Mod: S$GLB,,, | Performed by: INTERNAL MEDICINE

## 2021-04-30 PROCEDURE — 99999 PR PBB SHADOW E&M-EST. PATIENT-LVL III: CPT | Mod: PBBFAC,,, | Performed by: INTERNAL MEDICINE

## 2021-04-30 PROCEDURE — 3008F PR BODY MASS INDEX (BMI) DOCUMENTED: ICD-10-PCS | Mod: CPTII,S$GLB,, | Performed by: INTERNAL MEDICINE

## 2021-04-30 PROCEDURE — 3008F BODY MASS INDEX DOCD: CPT | Mod: CPTII,S$GLB,, | Performed by: INTERNAL MEDICINE

## 2021-04-30 PROCEDURE — 99999 PR PBB SHADOW E&M-EST. PATIENT-LVL III: ICD-10-PCS | Mod: PBBFAC,,, | Performed by: INTERNAL MEDICINE

## 2021-04-30 PROCEDURE — 1126F AMNT PAIN NOTED NONE PRSNT: CPT | Mod: S$GLB,,, | Performed by: INTERNAL MEDICINE

## 2021-04-30 PROCEDURE — 1126F PR PAIN SEVERITY QUANTIFIED, NO PAIN PRESENT: ICD-10-PCS | Mod: S$GLB,,, | Performed by: INTERNAL MEDICINE

## 2021-04-30 PROCEDURE — 99204 PR OFFICE/OUTPT VISIT, NEW, LEVL IV, 45-59 MIN: ICD-10-PCS | Mod: S$GLB,,, | Performed by: INTERNAL MEDICINE

## 2021-05-07 ENCOUNTER — PATIENT MESSAGE (OUTPATIENT)
Dept: ALLERGY | Facility: CLINIC | Age: 61
End: 2021-05-07

## 2021-05-07 DIAGNOSIS — J45.901 ASTHMA WITH ACUTE EXACERBATION, UNSPECIFIED ASTHMA SEVERITY, UNSPECIFIED WHETHER PERSISTENT: Primary | ICD-10-CM

## 2021-05-07 DIAGNOSIS — Z20.822 ENCOUNTER FOR LABORATORY TESTING FOR COVID-19 VIRUS: ICD-10-CM

## 2021-05-07 RX ORDER — PREDNISONE 10 MG/1
TABLET ORAL
Qty: 21 TABLET | Refills: 0 | Status: SHIPPED | OUTPATIENT
Start: 2021-05-07 | End: 2021-05-27 | Stop reason: SDUPTHER

## 2021-05-11 ENCOUNTER — TELEPHONE (OUTPATIENT)
Dept: ALLERGY | Facility: CLINIC | Age: 61
End: 2021-05-11

## 2021-05-26 ENCOUNTER — PATIENT OUTREACH (OUTPATIENT)
Dept: ADMINISTRATIVE | Facility: OTHER | Age: 61
End: 2021-05-26

## 2021-05-27 ENCOUNTER — OFFICE VISIT (OUTPATIENT)
Dept: ALLERGY | Facility: CLINIC | Age: 61
End: 2021-05-27
Payer: COMMERCIAL

## 2021-05-27 ENCOUNTER — HOSPITAL ENCOUNTER (OUTPATIENT)
Dept: PULMONOLOGY | Facility: CLINIC | Age: 61
Discharge: HOME | End: 2021-05-27
Payer: COMMERCIAL

## 2021-05-27 DIAGNOSIS — K21.9 LARYNGOPHARYNGEAL REFLUX: ICD-10-CM

## 2021-05-27 DIAGNOSIS — M47.816 OSTEOARTHRITIS OF LUMBAR SPINE, UNSPECIFIED SPINAL OSTEOARTHRITIS COMPLICATION STATUS: ICD-10-CM

## 2021-05-27 DIAGNOSIS — Z86.79 S/P ABLATION OF ATRIAL FIBRILLATION: ICD-10-CM

## 2021-05-27 DIAGNOSIS — J31.0 CHRONIC RHINITIS: ICD-10-CM

## 2021-05-27 DIAGNOSIS — Z98.890 S/P ABLATION OF ATRIAL FIBRILLATION: ICD-10-CM

## 2021-05-27 DIAGNOSIS — Z79.01 CURRENT USE OF LONG TERM ANTICOAGULATION: ICD-10-CM

## 2021-05-27 DIAGNOSIS — J45.901 ASTHMA WITH ACUTE EXACERBATION, UNSPECIFIED ASTHMA SEVERITY, UNSPECIFIED WHETHER PERSISTENT: ICD-10-CM

## 2021-05-27 DIAGNOSIS — I48.0 PAROXYSMAL ATRIAL FIBRILLATION: ICD-10-CM

## 2021-05-27 DIAGNOSIS — Z79.899 LONG TERM CURRENT USE OF ANTIARRHYTHMIC DRUG: ICD-10-CM

## 2021-05-27 DIAGNOSIS — R05.9 COUGH: Primary | ICD-10-CM

## 2021-05-27 PROCEDURE — 99214 PR OFFICE/OUTPT VISIT, EST, LEVL IV, 30-39 MIN: ICD-10-PCS | Mod: S$GLB,,, | Performed by: ALLERGY & IMMUNOLOGY

## 2021-05-27 PROCEDURE — 94060 EVALUATION OF WHEEZING: CPT | Mod: S$GLB,,, | Performed by: INTERNAL MEDICINE

## 2021-05-27 PROCEDURE — 94729 DIFFUSING CAPACITY: CPT | Mod: S$GLB,,, | Performed by: INTERNAL MEDICINE

## 2021-05-27 PROCEDURE — 99999 PR PBB SHADOW E&M-EST. PATIENT-LVL III: CPT | Mod: PBBFAC,,, | Performed by: ALLERGY & IMMUNOLOGY

## 2021-05-27 PROCEDURE — 99214 OFFICE O/P EST MOD 30 MIN: CPT | Mod: S$GLB,,, | Performed by: ALLERGY & IMMUNOLOGY

## 2021-05-27 PROCEDURE — 94060 PR EVAL OF BRONCHOSPASM: ICD-10-PCS | Mod: S$GLB,,, | Performed by: INTERNAL MEDICINE

## 2021-05-27 PROCEDURE — 94727 GAS DIL/WSHOT DETER LNG VOL: CPT | Mod: S$GLB,,, | Performed by: INTERNAL MEDICINE

## 2021-05-27 PROCEDURE — 94729 PR C02/MEMBANE DIFFUSE CAPACITY: ICD-10-PCS | Mod: S$GLB,,, | Performed by: INTERNAL MEDICINE

## 2021-05-27 PROCEDURE — 99999 PR PBB SHADOW E&M-EST. PATIENT-LVL III: ICD-10-PCS | Mod: PBBFAC,,, | Performed by: ALLERGY & IMMUNOLOGY

## 2021-05-27 PROCEDURE — 94727 PR PULM FUNCTION TEST BY GAS: ICD-10-PCS | Mod: S$GLB,,, | Performed by: INTERNAL MEDICINE

## 2021-05-27 RX ORDER — OMEPRAZOLE 40 MG/1
40 CAPSULE, DELAYED RELEASE ORAL EVERY MORNING
Qty: 180 CAPSULE | Refills: 3 | Status: SHIPPED | OUTPATIENT
Start: 2021-05-27 | End: 2021-08-05

## 2021-06-03 ENCOUNTER — PATIENT MESSAGE (OUTPATIENT)
Dept: GASTROENTEROLOGY | Facility: CLINIC | Age: 61
End: 2021-06-03

## 2021-06-08 ENCOUNTER — HOSPITAL ENCOUNTER (EMERGENCY)
Facility: HOSPITAL | Age: 61
Discharge: HOME | End: 2021-06-08
Attending: EMERGENCY MEDICINE
Payer: COMMERCIAL

## 2021-06-08 ENCOUNTER — PATIENT MESSAGE (OUTPATIENT)
Dept: ALLERGY | Facility: CLINIC | Age: 61
End: 2021-06-08

## 2021-06-08 VITALS
RESPIRATION RATE: 18 BRPM | DIASTOLIC BLOOD PRESSURE: 75 MMHG | HEART RATE: 88 BPM | BODY MASS INDEX: 36.37 KG/M2 | TEMPERATURE: 99 F | SYSTOLIC BLOOD PRESSURE: 149 MMHG | HEIGHT: 64 IN | WEIGHT: 213 LBS | OXYGEN SATURATION: 100 %

## 2021-06-08 DIAGNOSIS — R05.3 CHRONIC COUGH: ICD-10-CM

## 2021-06-08 DIAGNOSIS — J45.901 MODERATE ASTHMA WITH EXACERBATION, UNSPECIFIED WHETHER PERSISTENT: Primary | ICD-10-CM

## 2021-06-08 PROCEDURE — 93010 ELECTROCARDIOGRAM REPORT: CPT | Mod: ,,, | Performed by: INTERNAL MEDICINE

## 2021-06-08 PROCEDURE — 99285 EMERGENCY DEPT VISIT HI MDM: CPT | Mod: 25,ER

## 2021-06-08 PROCEDURE — 93010 EKG 12-LEAD: ICD-10-PCS | Mod: ,,, | Performed by: INTERNAL MEDICINE

## 2021-06-08 PROCEDURE — 96374 THER/PROPH/DIAG INJ IV PUSH: CPT | Mod: ER

## 2021-06-08 PROCEDURE — 93005 ELECTROCARDIOGRAM TRACING: CPT | Mod: ER

## 2021-06-08 PROCEDURE — 25000242 PHARM REV CODE 250 ALT 637 W/ HCPCS: Mod: ER | Performed by: PHYSICIAN ASSISTANT

## 2021-06-08 PROCEDURE — 63600175 PHARM REV CODE 636 W HCPCS: Mod: ER | Performed by: PHYSICIAN ASSISTANT

## 2021-06-08 RX ORDER — PROMETHAZINE HYDROCHLORIDE AND DEXTROMETHORPHAN HYDROBROMIDE 6.25; 15 MG/5ML; MG/5ML
5 SYRUP ORAL EVERY 4 HOURS PRN
Qty: 180 ML | Refills: 0 | Status: SHIPPED | OUTPATIENT
Start: 2021-06-08 | End: 2021-06-18

## 2021-06-08 RX ORDER — IPRATROPIUM BROMIDE AND ALBUTEROL SULFATE 2.5; .5 MG/3ML; MG/3ML
3 SOLUTION RESPIRATORY (INHALATION)
Status: COMPLETED | OUTPATIENT
Start: 2021-06-08 | End: 2021-06-08

## 2021-06-08 RX ORDER — METHYLPREDNISOLONE SOD SUCC 125 MG
125 VIAL (EA) INJECTION
Status: COMPLETED | OUTPATIENT
Start: 2021-06-08 | End: 2021-06-08

## 2021-06-08 RX ORDER — PREDNISONE 10 MG/1
TABLET ORAL
Qty: 21 TABLET | Refills: 1 | Status: SHIPPED | OUTPATIENT
Start: 2021-06-08 | End: 2021-09-13

## 2021-06-08 RX ADMIN — METHYLPREDNISOLONE SODIUM SUCCINATE 125 MG: 125 INJECTION, POWDER, FOR SOLUTION INTRAMUSCULAR; INTRAVENOUS at 02:06

## 2021-06-08 RX ADMIN — IPRATROPIUM BROMIDE AND ALBUTEROL SULFATE 3 ML: .5; 3 SOLUTION RESPIRATORY (INHALATION) at 02:06

## 2021-06-16 ENCOUNTER — TELEPHONE (OUTPATIENT)
Dept: BARIATRICS | Facility: CLINIC | Age: 61
End: 2021-06-16

## 2021-06-28 ENCOUNTER — TELEPHONE (OUTPATIENT)
Dept: ENDOSCOPY | Facility: HOSPITAL | Age: 61
End: 2021-06-28

## 2021-06-30 ENCOUNTER — DOCUMENTATION ONLY (OUTPATIENT)
Dept: BARIATRICS | Facility: CLINIC | Age: 61
End: 2021-06-30

## 2021-07-01 ENCOUNTER — TELEPHONE (OUTPATIENT)
Dept: BARIATRICS | Facility: CLINIC | Age: 61
End: 2021-07-01

## 2021-07-02 ENCOUNTER — TELEPHONE (OUTPATIENT)
Dept: ENDOSCOPY | Facility: HOSPITAL | Age: 61
End: 2021-07-02

## 2021-07-06 DIAGNOSIS — Z79.899 ENCOUNTER FOR MONITORING LONG-TERM PROTON PUMP INHIBITOR THERAPY: ICD-10-CM

## 2021-07-06 DIAGNOSIS — K21.9 GASTROESOPHAGEAL REFLUX DISEASE, UNSPECIFIED WHETHER ESOPHAGITIS PRESENT: Primary | ICD-10-CM

## 2021-07-06 DIAGNOSIS — Z51.81 ENCOUNTER FOR MONITORING LONG-TERM PROTON PUMP INHIBITOR THERAPY: ICD-10-CM

## 2021-07-28 ENCOUNTER — PATIENT MESSAGE (OUTPATIENT)
Dept: ALLERGY | Facility: CLINIC | Age: 61
End: 2021-07-28

## 2021-07-29 ENCOUNTER — PATIENT MESSAGE (OUTPATIENT)
Dept: ENDOSCOPY | Facility: HOSPITAL | Age: 61
End: 2021-07-29

## 2021-08-02 ENCOUNTER — PATIENT OUTREACH (OUTPATIENT)
Dept: ADMINISTRATIVE | Facility: OTHER | Age: 61
End: 2021-08-02

## 2021-08-02 ENCOUNTER — PATIENT MESSAGE (OUTPATIENT)
Dept: ALLERGY | Facility: CLINIC | Age: 61
End: 2021-08-02

## 2021-08-03 ENCOUNTER — OFFICE VISIT (OUTPATIENT)
Dept: ALLERGY | Facility: CLINIC | Age: 61
End: 2021-08-03
Payer: COMMERCIAL

## 2021-08-03 DIAGNOSIS — R05.9 COUGH: Primary | ICD-10-CM

## 2021-08-03 DIAGNOSIS — J31.0 CHRONIC RHINITIS: ICD-10-CM

## 2021-08-03 DIAGNOSIS — Z86.79 S/P ABLATION OF ATRIAL FIBRILLATION: ICD-10-CM

## 2021-08-03 DIAGNOSIS — I48.0 PAROXYSMAL ATRIAL FIBRILLATION: ICD-10-CM

## 2021-08-03 DIAGNOSIS — K21.9 LARYNGOPHARYNGEAL REFLUX: ICD-10-CM

## 2021-08-03 DIAGNOSIS — I10 ESSENTIAL HYPERTENSION: ICD-10-CM

## 2021-08-03 DIAGNOSIS — Z98.890 S/P ABLATION OF ATRIAL FIBRILLATION: ICD-10-CM

## 2021-08-03 DIAGNOSIS — Z79.899 LONG TERM CURRENT USE OF ANTIARRHYTHMIC DRUG: ICD-10-CM

## 2021-08-03 DIAGNOSIS — J45.901 ASTHMA WITH ACUTE EXACERBATION, UNSPECIFIED ASTHMA SEVERITY, UNSPECIFIED WHETHER PERSISTENT: ICD-10-CM

## 2021-08-03 PROCEDURE — 1159F PR MEDICATION LIST DOCUMENTED IN MEDICAL RECORD: ICD-10-PCS | Mod: CPTII,,, | Performed by: ALLERGY & IMMUNOLOGY

## 2021-08-03 PROCEDURE — 1160F PR REVIEW ALL MEDS BY PRESCRIBER/CLIN PHARMACIST DOCUMENTED: ICD-10-PCS | Mod: CPTII,,, | Performed by: ALLERGY & IMMUNOLOGY

## 2021-08-03 PROCEDURE — 99214 PR OFFICE/OUTPT VISIT, EST, LEVL IV, 30-39 MIN: ICD-10-PCS | Mod: 95,,, | Performed by: ALLERGY & IMMUNOLOGY

## 2021-08-03 PROCEDURE — 1160F RVW MEDS BY RX/DR IN RCRD: CPT | Mod: CPTII,,, | Performed by: ALLERGY & IMMUNOLOGY

## 2021-08-03 PROCEDURE — 1159F MED LIST DOCD IN RCRD: CPT | Mod: CPTII,,, | Performed by: ALLERGY & IMMUNOLOGY

## 2021-08-03 PROCEDURE — 99214 OFFICE O/P EST MOD 30 MIN: CPT | Mod: 95,,, | Performed by: ALLERGY & IMMUNOLOGY

## 2021-08-03 RX ORDER — PREDNISONE 10 MG/1
TABLET ORAL
Qty: 21 TABLET | Refills: 0 | Status: SHIPPED | OUTPATIENT
Start: 2021-08-03 | End: 2021-09-13

## 2021-08-04 ENCOUNTER — OFFICE VISIT (OUTPATIENT)
Dept: BARIATRICS | Facility: CLINIC | Age: 61
End: 2021-08-04
Payer: COMMERCIAL

## 2021-08-04 VITALS
WEIGHT: 213.63 LBS | HEART RATE: 76 BPM | DIASTOLIC BLOOD PRESSURE: 71 MMHG | BODY MASS INDEX: 35.59 KG/M2 | HEIGHT: 65 IN | OXYGEN SATURATION: 94 % | SYSTOLIC BLOOD PRESSURE: 146 MMHG

## 2021-08-04 DIAGNOSIS — Z86.79 S/P ABLATION OF ATRIAL FIBRILLATION: ICD-10-CM

## 2021-08-04 DIAGNOSIS — E66.01 CLASS 2 SEVERE OBESITY WITH BODY MASS INDEX (BMI) OF 35 TO 39.9 WITH SERIOUS COMORBIDITY: Primary | ICD-10-CM

## 2021-08-04 DIAGNOSIS — I10 ESSENTIAL HYPERTENSION: ICD-10-CM

## 2021-08-04 DIAGNOSIS — M51.37 DEGENERATION OF LUMBAR OR LUMBOSACRAL INTERVERTEBRAL DISC: ICD-10-CM

## 2021-08-04 DIAGNOSIS — K21.9 LARYNGOPHARYNGEAL REFLUX: ICD-10-CM

## 2021-08-04 DIAGNOSIS — Z98.890 S/P ABLATION OF ATRIAL FIBRILLATION: ICD-10-CM

## 2021-08-04 PROCEDURE — 99999 PR PBB SHADOW E&M-EST. PATIENT-LVL IV: CPT | Mod: PBBFAC,,, | Performed by: INTERNAL MEDICINE

## 2021-08-04 PROCEDURE — 1126F AMNT PAIN NOTED NONE PRSNT: CPT | Mod: CPTII,S$GLB,, | Performed by: INTERNAL MEDICINE

## 2021-08-04 PROCEDURE — 1160F PR REVIEW ALL MEDS BY PRESCRIBER/CLIN PHARMACIST DOCUMENTED: ICD-10-PCS | Mod: CPTII,S$GLB,, | Performed by: INTERNAL MEDICINE

## 2021-08-04 PROCEDURE — 1159F MED LIST DOCD IN RCRD: CPT | Mod: CPTII,S$GLB,, | Performed by: INTERNAL MEDICINE

## 2021-08-04 PROCEDURE — 1126F PR PAIN SEVERITY QUANTIFIED, NO PAIN PRESENT: ICD-10-PCS | Mod: CPTII,S$GLB,, | Performed by: INTERNAL MEDICINE

## 2021-08-04 PROCEDURE — 99215 PR OFFICE/OUTPT VISIT, EST, LEVL V, 40-54 MIN: ICD-10-PCS | Mod: S$GLB,,, | Performed by: INTERNAL MEDICINE

## 2021-08-04 PROCEDURE — 1160F RVW MEDS BY RX/DR IN RCRD: CPT | Mod: CPTII,S$GLB,, | Performed by: INTERNAL MEDICINE

## 2021-08-04 PROCEDURE — 99215 OFFICE O/P EST HI 40 MIN: CPT | Mod: S$GLB,,, | Performed by: INTERNAL MEDICINE

## 2021-08-04 PROCEDURE — 3077F PR MOST RECENT SYSTOLIC BLOOD PRESSURE >= 140 MM HG: ICD-10-PCS | Mod: CPTII,S$GLB,, | Performed by: INTERNAL MEDICINE

## 2021-08-04 PROCEDURE — 3078F DIAST BP <80 MM HG: CPT | Mod: CPTII,S$GLB,, | Performed by: INTERNAL MEDICINE

## 2021-08-04 PROCEDURE — 1159F PR MEDICATION LIST DOCUMENTED IN MEDICAL RECORD: ICD-10-PCS | Mod: CPTII,S$GLB,, | Performed by: INTERNAL MEDICINE

## 2021-08-04 PROCEDURE — 3008F BODY MASS INDEX DOCD: CPT | Mod: CPTII,S$GLB,, | Performed by: INTERNAL MEDICINE

## 2021-08-04 PROCEDURE — 3078F PR MOST RECENT DIASTOLIC BLOOD PRESSURE < 80 MM HG: ICD-10-PCS | Mod: CPTII,S$GLB,, | Performed by: INTERNAL MEDICINE

## 2021-08-04 PROCEDURE — 99999 PR PBB SHADOW E&M-EST. PATIENT-LVL IV: ICD-10-PCS | Mod: PBBFAC,,, | Performed by: INTERNAL MEDICINE

## 2021-08-04 PROCEDURE — 3077F SYST BP >= 140 MM HG: CPT | Mod: CPTII,S$GLB,, | Performed by: INTERNAL MEDICINE

## 2021-08-04 PROCEDURE — 3008F PR BODY MASS INDEX (BMI) DOCUMENTED: ICD-10-PCS | Mod: CPTII,S$GLB,, | Performed by: INTERNAL MEDICINE

## 2021-08-04 RX ORDER — TOPIRAMATE 25 MG/1
25 TABLET ORAL 2 TIMES DAILY
Qty: 60 TABLET | Refills: 1 | Status: SHIPPED | OUTPATIENT
Start: 2021-08-04 | End: 2021-09-13 | Stop reason: SDUPTHER

## 2021-08-11 ENCOUNTER — PATIENT MESSAGE (OUTPATIENT)
Dept: ALLERGY | Facility: CLINIC | Age: 61
End: 2021-08-11

## 2021-08-11 ENCOUNTER — TELEPHONE (OUTPATIENT)
Dept: FAMILY MEDICINE | Facility: CLINIC | Age: 61
End: 2021-08-11

## 2021-08-13 ENCOUNTER — TELEPHONE (OUTPATIENT)
Dept: ENDOSCOPY | Facility: HOSPITAL | Age: 61
End: 2021-08-13

## 2021-08-16 RX ORDER — PREDNISONE 10 MG/1
TABLET ORAL
Qty: 21 TABLET | Refills: 0 | Status: SHIPPED | OUTPATIENT
Start: 2021-08-16 | End: 2021-08-26 | Stop reason: SDUPTHER

## 2021-08-26 ENCOUNTER — PATIENT MESSAGE (OUTPATIENT)
Dept: ALLERGY | Facility: CLINIC | Age: 61
End: 2021-08-26

## 2021-08-26 RX ORDER — PREDNISONE 10 MG/1
TABLET ORAL
Qty: 21 TABLET | Refills: 0 | Status: SHIPPED | OUTPATIENT
Start: 2021-08-26 | End: 2021-09-13

## 2021-08-26 RX ORDER — OMEPRAZOLE 40 MG/1
40 CAPSULE, DELAYED RELEASE ORAL
Qty: 60 CAPSULE | Refills: 3 | Status: SHIPPED | OUTPATIENT
Start: 2021-08-26 | End: 2021-10-26

## 2021-09-13 ENCOUNTER — OFFICE VISIT (OUTPATIENT)
Dept: BARIATRICS | Facility: CLINIC | Age: 61
End: 2021-09-13
Payer: COMMERCIAL

## 2021-09-13 VITALS
WEIGHT: 209.5 LBS | DIASTOLIC BLOOD PRESSURE: 64 MMHG | BODY MASS INDEX: 34.91 KG/M2 | SYSTOLIC BLOOD PRESSURE: 134 MMHG | HEART RATE: 68 BPM | OXYGEN SATURATION: 95 % | HEIGHT: 65 IN

## 2021-09-13 DIAGNOSIS — K21.9 LARYNGOPHARYNGEAL REFLUX: ICD-10-CM

## 2021-09-13 DIAGNOSIS — E66.01 CLASS 2 SEVERE OBESITY WITH BODY MASS INDEX (BMI) OF 35 TO 39.9 WITH SERIOUS COMORBIDITY: Primary | ICD-10-CM

## 2021-09-13 PROCEDURE — 99999 PR PBB SHADOW E&M-EST. PATIENT-LVL IV: CPT | Mod: PBBFAC,,, | Performed by: INTERNAL MEDICINE

## 2021-09-13 PROCEDURE — 3078F DIAST BP <80 MM HG: CPT | Mod: CPTII,S$GLB,, | Performed by: INTERNAL MEDICINE

## 2021-09-13 PROCEDURE — 1159F PR MEDICATION LIST DOCUMENTED IN MEDICAL RECORD: ICD-10-PCS | Mod: CPTII,S$GLB,, | Performed by: INTERNAL MEDICINE

## 2021-09-13 PROCEDURE — 3008F PR BODY MASS INDEX (BMI) DOCUMENTED: ICD-10-PCS | Mod: CPTII,S$GLB,, | Performed by: INTERNAL MEDICINE

## 2021-09-13 PROCEDURE — 3075F PR MOST RECENT SYSTOLIC BLOOD PRESS GE 130-139MM HG: ICD-10-PCS | Mod: CPTII,S$GLB,, | Performed by: INTERNAL MEDICINE

## 2021-09-13 PROCEDURE — 1160F RVW MEDS BY RX/DR IN RCRD: CPT | Mod: CPTII,S$GLB,, | Performed by: INTERNAL MEDICINE

## 2021-09-13 PROCEDURE — 99999 PR PBB SHADOW E&M-EST. PATIENT-LVL IV: ICD-10-PCS | Mod: PBBFAC,,, | Performed by: INTERNAL MEDICINE

## 2021-09-13 PROCEDURE — 3075F SYST BP GE 130 - 139MM HG: CPT | Mod: CPTII,S$GLB,, | Performed by: INTERNAL MEDICINE

## 2021-09-13 PROCEDURE — 1160F PR REVIEW ALL MEDS BY PRESCRIBER/CLIN PHARMACIST DOCUMENTED: ICD-10-PCS | Mod: CPTII,S$GLB,, | Performed by: INTERNAL MEDICINE

## 2021-09-13 PROCEDURE — 99214 PR OFFICE/OUTPT VISIT, EST, LEVL IV, 30-39 MIN: ICD-10-PCS | Mod: S$GLB,,, | Performed by: INTERNAL MEDICINE

## 2021-09-13 PROCEDURE — 1159F MED LIST DOCD IN RCRD: CPT | Mod: CPTII,S$GLB,, | Performed by: INTERNAL MEDICINE

## 2021-09-13 PROCEDURE — 99214 OFFICE O/P EST MOD 30 MIN: CPT | Mod: S$GLB,,, | Performed by: INTERNAL MEDICINE

## 2021-09-13 PROCEDURE — 3008F BODY MASS INDEX DOCD: CPT | Mod: CPTII,S$GLB,, | Performed by: INTERNAL MEDICINE

## 2021-09-13 PROCEDURE — 3078F PR MOST RECENT DIASTOLIC BLOOD PRESSURE < 80 MM HG: ICD-10-PCS | Mod: CPTII,S$GLB,, | Performed by: INTERNAL MEDICINE

## 2021-09-13 RX ORDER — TOPIRAMATE 25 MG/1
25-50 TABLET ORAL NIGHTLY
Qty: 180 TABLET | Refills: 0 | Status: SHIPPED | OUTPATIENT
Start: 2021-09-13 | End: 2021-12-20 | Stop reason: SDUPTHER

## 2021-09-16 DIAGNOSIS — Z01.818 PRE-OP TESTING: ICD-10-CM

## 2021-09-17 ENCOUNTER — HOSPITAL ENCOUNTER (OUTPATIENT)
Dept: PREADMISSION TESTING | Facility: HOSPITAL | Age: 61
Discharge: HOME | End: 2021-09-17
Attending: INTERNAL MEDICINE
Payer: COMMERCIAL

## 2021-09-17 DIAGNOSIS — Z11.52 ENCOUNTER FOR PREOPERATIVE SCREENING LABORATORY TESTING FOR COVID-19 VIRUS: Primary | ICD-10-CM

## 2021-09-17 DIAGNOSIS — Z01.812 ENCOUNTER FOR PREOPERATIVE SCREENING LABORATORY TESTING FOR COVID-19 VIRUS: Primary | ICD-10-CM

## 2021-09-17 LAB — SARS-COV-2 RDRP RESP QL NAA+PROBE: NEGATIVE

## 2021-09-17 PROCEDURE — U0002 COVID-19 LAB TEST NON-CDC: HCPCS | Performed by: FAMILY MEDICINE

## 2021-09-20 ENCOUNTER — HOSPITAL ENCOUNTER (OUTPATIENT)
Facility: HOSPITAL | Age: 61
Discharge: HOME | End: 2021-09-20
Attending: INTERNAL MEDICINE | Admitting: INTERNAL MEDICINE
Payer: COMMERCIAL

## 2021-09-20 VITALS
WEIGHT: 209 LBS | BODY MASS INDEX: 35.68 KG/M2 | TEMPERATURE: 98 F | RESPIRATION RATE: 15 BRPM | HEART RATE: 66 BPM | SYSTOLIC BLOOD PRESSURE: 155 MMHG | DIASTOLIC BLOOD PRESSURE: 73 MMHG | OXYGEN SATURATION: 100 % | HEIGHT: 64 IN

## 2021-09-20 DIAGNOSIS — K21.9 GERD (GASTROESOPHAGEAL REFLUX DISEASE): ICD-10-CM

## 2021-09-20 PROCEDURE — 25000003 PHARM REV CODE 250: Performed by: INTERNAL MEDICINE

## 2021-09-20 PROCEDURE — 91037 ESOPH IMPED FUNCTION TEST: CPT | Mod: 26,,, | Performed by: INTERNAL MEDICINE

## 2021-09-20 PROCEDURE — 91037 ESOPH IMPED FUNCTION TEST: CPT | Performed by: INTERNAL MEDICINE

## 2021-09-20 PROCEDURE — 91010 ESOPHAGUS MOTILITY STUDY: CPT | Performed by: INTERNAL MEDICINE

## 2021-09-20 PROCEDURE — 91010 ESOPHAGUS MOTILITY STUDY: CPT | Mod: 26,,, | Performed by: INTERNAL MEDICINE

## 2021-09-20 PROCEDURE — 91010 PR ESOPHAGEAL MOTILITY STUDY, MA2METRY: ICD-10-PCS | Mod: 26,,, | Performed by: INTERNAL MEDICINE

## 2021-09-20 PROCEDURE — 91037 PR GERD TST W/ NASAL IMPEDENCE ELECTROD: ICD-10-PCS | Mod: 26,,, | Performed by: INTERNAL MEDICINE

## 2021-09-20 RX ORDER — LIDOCAINE HYDROCHLORIDE 20 MG/ML
JELLY TOPICAL ONCE
Status: COMPLETED | OUTPATIENT
Start: 2021-09-20 | End: 2021-09-20

## 2021-09-20 RX ADMIN — LIDOCAINE HYDROCHLORIDE 10 ML: 20 JELLY TOPICAL at 08:09

## 2021-09-28 ENCOUNTER — PATIENT MESSAGE (OUTPATIENT)
Dept: ALLERGY | Facility: CLINIC | Age: 61
End: 2021-09-28

## 2021-09-28 ENCOUNTER — PATIENT MESSAGE (OUTPATIENT)
Dept: GASTROENTEROLOGY | Facility: CLINIC | Age: 61
End: 2021-09-28

## 2021-10-08 ENCOUNTER — PATIENT MESSAGE (OUTPATIENT)
Dept: OTOLARYNGOLOGY | Facility: CLINIC | Age: 61
End: 2021-10-08

## 2021-10-08 DIAGNOSIS — Z01.818 PRE-PROCEDURAL EXAMINATION: ICD-10-CM

## 2021-10-09 ENCOUNTER — LAB VISIT (OUTPATIENT)
Dept: SPORTS MEDICINE | Facility: CLINIC | Age: 61
End: 2021-10-09
Payer: COMMERCIAL

## 2021-10-09 DIAGNOSIS — Z01.818 PRE-PROCEDURAL EXAMINATION: ICD-10-CM

## 2021-10-09 PROCEDURE — U0005 INFEC AGEN DETEC AMPLI PROBE: HCPCS | Performed by: OTOLARYNGOLOGY

## 2021-10-09 PROCEDURE — U0003 INFECTIOUS AGENT DETECTION BY NUCLEIC ACID (DNA OR RNA); SEVERE ACUTE RESPIRATORY SYNDROME CORONAVIRUS 2 (SARS-COV-2) (CORONAVIRUS DISEASE [COVID-19]), AMPLIFIED PROBE TECHNIQUE, MAKING USE OF HIGH THROUGHPUT TECHNOLOGIES AS DESCRIBED BY CMS-2020-01-R: HCPCS | Performed by: OTOLARYNGOLOGY

## 2021-10-10 LAB
SARS-COV-2 RNA RESP QL NAA+PROBE: NOT DETECTED
SARS-COV-2- CYCLE NUMBER: NORMAL

## 2021-10-12 ENCOUNTER — CLINICAL SUPPORT (OUTPATIENT)
Dept: OTOLARYNGOLOGY | Facility: CLINIC | Age: 61
End: 2021-10-12
Payer: COMMERCIAL

## 2021-10-12 ENCOUNTER — OFFICE VISIT (OUTPATIENT)
Dept: OTOLARYNGOLOGY | Facility: CLINIC | Age: 61
End: 2021-10-12
Payer: COMMERCIAL

## 2021-10-12 ENCOUNTER — OFFICE VISIT (OUTPATIENT)
Dept: GASTROENTEROLOGY | Facility: CLINIC | Age: 61
End: 2021-10-12
Payer: COMMERCIAL

## 2021-10-12 VITALS
WEIGHT: 203.94 LBS | SYSTOLIC BLOOD PRESSURE: 151 MMHG | HEIGHT: 64 IN | BODY MASS INDEX: 34.82 KG/M2 | HEART RATE: 73 BPM | DIASTOLIC BLOOD PRESSURE: 84 MMHG

## 2021-10-12 DIAGNOSIS — J31.0 CHRONIC RHINITIS: ICD-10-CM

## 2021-10-12 DIAGNOSIS — H90.3 SENSORINEURAL HEARING LOSS, BILATERAL: Primary | ICD-10-CM

## 2021-10-12 DIAGNOSIS — R05.9 COUGHING: ICD-10-CM

## 2021-10-12 DIAGNOSIS — K21.9 GASTROESOPHAGEAL REFLUX DISEASE, UNSPECIFIED WHETHER ESOPHAGITIS PRESENT: Primary | ICD-10-CM

## 2021-10-12 DIAGNOSIS — R09.A2 GLOBUS SENSATION: ICD-10-CM

## 2021-10-12 DIAGNOSIS — J45.901 ASTHMA WITH ACUTE EXACERBATION, UNSPECIFIED ASTHMA SEVERITY, UNSPECIFIED WHETHER PERSISTENT: ICD-10-CM

## 2021-10-12 DIAGNOSIS — R06.1 STRIDOR: Primary | ICD-10-CM

## 2021-10-12 DIAGNOSIS — K21.9 LARYNGOPHARYNGEAL REFLUX (LPR): ICD-10-CM

## 2021-10-12 DIAGNOSIS — R06.2 WHEEZING: ICD-10-CM

## 2021-10-12 PROCEDURE — 1159F PR MEDICATION LIST DOCUMENTED IN MEDICAL RECORD: ICD-10-PCS | Mod: CPTII,S$GLB,, | Performed by: OTOLARYNGOLOGY

## 2021-10-12 PROCEDURE — 1160F PR REVIEW ALL MEDS BY PRESCRIBER/CLIN PHARMACIST DOCUMENTED: ICD-10-PCS | Mod: CPTII,S$GLB,, | Performed by: OTOLARYNGOLOGY

## 2021-10-12 PROCEDURE — 1160F RVW MEDS BY RX/DR IN RCRD: CPT | Mod: CPTII,S$GLB,, | Performed by: OTOLARYNGOLOGY

## 2021-10-12 PROCEDURE — 1159F MED LIST DOCD IN RCRD: CPT | Mod: CPTII,S$GLB,, | Performed by: OTOLARYNGOLOGY

## 2021-10-12 PROCEDURE — 3079F DIAST BP 80-89 MM HG: CPT | Mod: CPTII,S$GLB,, | Performed by: OTOLARYNGOLOGY

## 2021-10-12 PROCEDURE — 1160F RVW MEDS BY RX/DR IN RCRD: CPT | Mod: CPTII,95,, | Performed by: INTERNAL MEDICINE

## 2021-10-12 PROCEDURE — 92567 PR TYMPA2METRY: ICD-10-PCS | Mod: S$GLB,,, | Performed by: AUDIOLOGIST

## 2021-10-12 PROCEDURE — 99213 OFFICE O/P EST LOW 20 MIN: CPT | Mod: 95,,, | Performed by: INTERNAL MEDICINE

## 2021-10-12 PROCEDURE — 92567 TYMPANOMETRY: CPT | Mod: S$GLB,,, | Performed by: AUDIOLOGIST

## 2021-10-12 PROCEDURE — 99999 PR PBB SHADOW E&M-EST. PATIENT-LVL IV: CPT | Mod: PBBFAC,,, | Performed by: OTOLARYNGOLOGY

## 2021-10-12 PROCEDURE — 31575 LARYNGOSCOPY: ICD-10-PCS | Mod: S$GLB,,, | Performed by: OTOLARYNGOLOGY

## 2021-10-12 PROCEDURE — 3008F BODY MASS INDEX DOCD: CPT | Mod: CPTII,S$GLB,, | Performed by: OTOLARYNGOLOGY

## 2021-10-12 PROCEDURE — 92557 COMPREHENSIVE HEARING TEST: CPT | Mod: S$GLB,,, | Performed by: AUDIOLOGIST

## 2021-10-12 PROCEDURE — 31575 DIAGNOSTIC LARYNGOSCOPY: CPT | Mod: S$GLB,,, | Performed by: OTOLARYNGOLOGY

## 2021-10-12 PROCEDURE — 99214 OFFICE O/P EST MOD 30 MIN: CPT | Mod: 25,S$GLB,, | Performed by: OTOLARYNGOLOGY

## 2021-10-12 PROCEDURE — 1159F MED LIST DOCD IN RCRD: CPT | Mod: CPTII,95,, | Performed by: INTERNAL MEDICINE

## 2021-10-12 PROCEDURE — 3008F PR BODY MASS INDEX (BMI) DOCUMENTED: ICD-10-PCS | Mod: CPTII,S$GLB,, | Performed by: OTOLARYNGOLOGY

## 2021-10-12 PROCEDURE — 3077F SYST BP >= 140 MM HG: CPT | Mod: CPTII,S$GLB,, | Performed by: OTOLARYNGOLOGY

## 2021-10-12 PROCEDURE — 99214 PR OFFICE/OUTPT VISIT, EST, LEVL IV, 30-39 MIN: ICD-10-PCS | Mod: 25,S$GLB,, | Performed by: OTOLARYNGOLOGY

## 2021-10-12 PROCEDURE — 3079F PR MOST RECENT DIASTOLIC BLOOD PRESSURE 80-89 MM HG: ICD-10-PCS | Mod: CPTII,S$GLB,, | Performed by: OTOLARYNGOLOGY

## 2021-10-12 PROCEDURE — 3077F PR MOST RECENT SYSTOLIC BLOOD PRESSURE >= 140 MM HG: ICD-10-PCS | Mod: CPTII,S$GLB,, | Performed by: OTOLARYNGOLOGY

## 2021-10-12 PROCEDURE — 1159F PR MEDICATION LIST DOCUMENTED IN MEDICAL RECORD: ICD-10-PCS | Mod: CPTII,95,, | Performed by: INTERNAL MEDICINE

## 2021-10-12 PROCEDURE — 99999 PR PBB SHADOW E&M-EST. PATIENT-LVL IV: ICD-10-PCS | Mod: PBBFAC,,, | Performed by: OTOLARYNGOLOGY

## 2021-10-12 PROCEDURE — 1160F PR REVIEW ALL MEDS BY PRESCRIBER/CLIN PHARMACIST DOCUMENTED: ICD-10-PCS | Mod: CPTII,95,, | Performed by: INTERNAL MEDICINE

## 2021-10-12 PROCEDURE — 92557 PR COMPREHENSIVE HEARING TEST: ICD-10-PCS | Mod: S$GLB,,, | Performed by: AUDIOLOGIST

## 2021-10-12 PROCEDURE — 99213 PR OFFICE/OUTPT VISIT, EST, LEVL III, 20-29 MIN: ICD-10-PCS | Mod: 95,,, | Performed by: INTERNAL MEDICINE

## 2021-10-12 RX ORDER — METHYLPREDNISOLONE 4 MG/1
TABLET ORAL
Qty: 1 PACKAGE | Refills: 0 | Status: SHIPPED | OUTPATIENT
Start: 2021-10-12 | End: 2021-11-18

## 2021-10-12 RX ORDER — IPRATROPIUM BROMIDE 21 UG/1
2 SPRAY, METERED NASAL 2 TIMES DAILY
Qty: 30 ML | Refills: 0 | Status: ON HOLD | OUTPATIENT
Start: 2021-10-12 | End: 2022-12-15 | Stop reason: HOSPADM

## 2021-10-12 RX ORDER — CLARITHROMYCIN 500 MG/1
500 TABLET, FILM COATED ORAL EVERY 12 HOURS
Qty: 20 TABLET | Refills: 0 | Status: SHIPPED | OUTPATIENT
Start: 2021-10-12 | End: 2021-10-22

## 2021-10-17 ENCOUNTER — PATIENT MESSAGE (OUTPATIENT)
Dept: ELECTROPHYSIOLOGY | Facility: CLINIC | Age: 61
End: 2021-10-17
Payer: COMMERCIAL

## 2021-10-18 DIAGNOSIS — I48.0 PAROXYSMAL ATRIAL FIBRILLATION: ICD-10-CM

## 2021-10-18 RX ORDER — DILTIAZEM HYDROCHLORIDE 120 MG/1
120 CAPSULE, EXTENDED RELEASE ORAL DAILY
Qty: 90 CAPSULE | Refills: 3 | Status: SHIPPED | OUTPATIENT
Start: 2021-10-18 | End: 2022-01-11 | Stop reason: SDUPTHER

## 2021-10-20 ENCOUNTER — OFFICE VISIT (OUTPATIENT)
Dept: ALLERGY | Facility: CLINIC | Age: 61
End: 2021-10-20
Payer: COMMERCIAL

## 2021-10-20 VITALS — WEIGHT: 208.56 LBS | HEIGHT: 64 IN | BODY MASS INDEX: 35.61 KG/M2

## 2021-10-20 DIAGNOSIS — J45.901 ASTHMA WITH ACUTE EXACERBATION, UNSPECIFIED ASTHMA SEVERITY, UNSPECIFIED WHETHER PERSISTENT: ICD-10-CM

## 2021-10-20 DIAGNOSIS — J31.0 CHRONIC RHINITIS: ICD-10-CM

## 2021-10-20 DIAGNOSIS — I10 ESSENTIAL HYPERTENSION: ICD-10-CM

## 2021-10-20 DIAGNOSIS — R05.9 COUGH: Primary | ICD-10-CM

## 2021-10-20 DIAGNOSIS — K21.9 GASTROESOPHAGEAL REFLUX DISEASE, UNSPECIFIED WHETHER ESOPHAGITIS PRESENT: ICD-10-CM

## 2021-10-20 DIAGNOSIS — I48.0 PAROXYSMAL ATRIAL FIBRILLATION: ICD-10-CM

## 2021-10-20 DIAGNOSIS — K21.9 LARYNGOPHARYNGEAL REFLUX: ICD-10-CM

## 2021-10-20 PROCEDURE — 1159F PR MEDICATION LIST DOCUMENTED IN MEDICAL RECORD: ICD-10-PCS | Mod: CPTII,S$GLB,, | Performed by: ALLERGY & IMMUNOLOGY

## 2021-10-20 PROCEDURE — 3008F PR BODY MASS INDEX (BMI) DOCUMENTED: ICD-10-PCS | Mod: CPTII,S$GLB,, | Performed by: ALLERGY & IMMUNOLOGY

## 2021-10-20 PROCEDURE — 1160F RVW MEDS BY RX/DR IN RCRD: CPT | Mod: CPTII,S$GLB,, | Performed by: ALLERGY & IMMUNOLOGY

## 2021-10-20 PROCEDURE — 99214 OFFICE O/P EST MOD 30 MIN: CPT | Mod: S$GLB,,, | Performed by: ALLERGY & IMMUNOLOGY

## 2021-10-20 PROCEDURE — 99214 PR OFFICE/OUTPT VISIT, EST, LEVL IV, 30-39 MIN: ICD-10-PCS | Mod: S$GLB,,, | Performed by: ALLERGY & IMMUNOLOGY

## 2021-10-20 PROCEDURE — 99999 PR PBB SHADOW E&M-EST. PATIENT-LVL III: CPT | Mod: PBBFAC,,, | Performed by: ALLERGY & IMMUNOLOGY

## 2021-10-20 PROCEDURE — 1159F MED LIST DOCD IN RCRD: CPT | Mod: CPTII,S$GLB,, | Performed by: ALLERGY & IMMUNOLOGY

## 2021-10-20 PROCEDURE — 99999 PR PBB SHADOW E&M-EST. PATIENT-LVL III: ICD-10-PCS | Mod: PBBFAC,,, | Performed by: ALLERGY & IMMUNOLOGY

## 2021-10-20 PROCEDURE — 1160F PR REVIEW ALL MEDS BY PRESCRIBER/CLIN PHARMACIST DOCUMENTED: ICD-10-PCS | Mod: CPTII,S$GLB,, | Performed by: ALLERGY & IMMUNOLOGY

## 2021-10-20 PROCEDURE — 3008F BODY MASS INDEX DOCD: CPT | Mod: CPTII,S$GLB,, | Performed by: ALLERGY & IMMUNOLOGY

## 2021-10-20 RX ORDER — FLUTICASONE FUROATE AND VILANTEROL 200; 25 UG/1; UG/1
1 POWDER RESPIRATORY (INHALATION)
Status: DISCONTINUED | OUTPATIENT
Start: 2021-10-20 | End: 2021-11-02

## 2021-11-01 ENCOUNTER — PATIENT MESSAGE (OUTPATIENT)
Dept: ALLERGY | Facility: CLINIC | Age: 61
End: 2021-11-01
Payer: COMMERCIAL

## 2021-11-02 RX ORDER — FLUTICASONE FUROATE AND VILANTEROL TRIFENATATE 200; 25 UG/1; UG/1
1 POWDER RESPIRATORY (INHALATION) DAILY
Qty: 1 EACH | Refills: 5 | Status: SHIPPED | OUTPATIENT
Start: 2021-11-02 | End: 2022-06-14

## 2021-11-02 RX ORDER — FLUTICASONE PROPIONATE 50 MCG
2 SPRAY, SUSPENSION (ML) NASAL DAILY
Qty: 16 G | Refills: 11 | Status: SHIPPED | OUTPATIENT
Start: 2021-11-02 | End: 2021-12-02

## 2021-11-08 ENCOUNTER — TELEPHONE (OUTPATIENT)
Dept: ENDOSCOPY | Facility: HOSPITAL | Age: 61
End: 2021-11-08
Payer: COMMERCIAL

## 2021-11-08 ENCOUNTER — PATIENT MESSAGE (OUTPATIENT)
Dept: ENDOSCOPY | Facility: HOSPITAL | Age: 61
End: 2021-11-08
Payer: COMMERCIAL

## 2021-11-08 ENCOUNTER — TELEPHONE (OUTPATIENT)
Dept: PULMONOLOGY | Facility: CLINIC | Age: 61
End: 2021-11-08
Payer: COMMERCIAL

## 2021-11-08 DIAGNOSIS — R05.9 COUGH: Primary | ICD-10-CM

## 2021-11-09 ENCOUNTER — PATIENT OUTREACH (OUTPATIENT)
Dept: ADMINISTRATIVE | Facility: OTHER | Age: 61
End: 2021-11-09
Payer: COMMERCIAL

## 2021-11-10 ENCOUNTER — OFFICE VISIT (OUTPATIENT)
Dept: PULMONOLOGY | Facility: CLINIC | Age: 61
End: 2021-11-10
Payer: COMMERCIAL

## 2021-11-10 ENCOUNTER — HOSPITAL ENCOUNTER (OUTPATIENT)
Dept: PULMONOLOGY | Facility: CLINIC | Age: 61
Discharge: HOME | End: 2021-11-10
Payer: COMMERCIAL

## 2021-11-10 ENCOUNTER — TELEPHONE (OUTPATIENT)
Dept: PULMONOLOGY | Facility: CLINIC | Age: 61
End: 2021-11-10
Payer: COMMERCIAL

## 2021-11-10 ENCOUNTER — PATIENT MESSAGE (OUTPATIENT)
Dept: ENDOSCOPY | Facility: HOSPITAL | Age: 61
End: 2021-11-10
Payer: COMMERCIAL

## 2021-11-10 ENCOUNTER — HOSPITAL ENCOUNTER (OUTPATIENT)
Dept: RADIOLOGY | Facility: HOSPITAL | Age: 61
Discharge: HOME | End: 2021-11-10
Attending: INTERNAL MEDICINE
Payer: COMMERCIAL

## 2021-11-10 VITALS
DIASTOLIC BLOOD PRESSURE: 82 MMHG | HEIGHT: 64 IN | HEART RATE: 72 BPM | BODY MASS INDEX: 50.02 KG/M2 | SYSTOLIC BLOOD PRESSURE: 140 MMHG | OXYGEN SATURATION: 93 % | WEIGHT: 293 LBS

## 2021-11-10 DIAGNOSIS — R05.9 COUGHING: ICD-10-CM

## 2021-11-10 DIAGNOSIS — R05.9 COUGH: ICD-10-CM

## 2021-11-10 DIAGNOSIS — R06.2 WHEEZING: ICD-10-CM

## 2021-11-10 DIAGNOSIS — J45.901 ASTHMA WITH ACUTE EXACERBATION, UNSPECIFIED ASTHMA SEVERITY, UNSPECIFIED WHETHER PERSISTENT: ICD-10-CM

## 2021-11-10 LAB
DLCO ADJ PRE: 21.06 ML/(MIN*MMHG) (ref 17.75–29.22)
DLCO SINGLE BREATH LLN: 17.75
DLCO SINGLE BREATH PRE REF: 89.7 %
DLCO SINGLE BREATH REF: 23.49
DLCOC SBVA LLN: 3.17
DLCOC SBVA PRE REF: 118.4 %
DLCOC SBVA REF: 4.61
DLCOC SINGLE BREATH LLN: 17.75
DLCOC SINGLE BREATH PRE REF: 89.7 %
DLCOC SINGLE BREATH REF: 23.49
DLCOCSBVAULN: 6.04
DLCOCSINGLEBREATHULN: 29.22
DLCOSINGLEBREATHULN: 29.22
DLCOVA LLN: 3.17
DLCOVA PRE REF: 118.4 %
DLCOVA PRE: 5.45 ML/(MIN*MMHG*L) (ref 3.17–6.04)
DLCOVA REF: 4.61
DLCOVAULN: 6.04
DLVAADJ PRE: 5.45 ML/(MIN*MMHG*L) (ref 3.17–6.04)
FEF 25 75 LLN: 0.91
FEF 25 75 PRE REF: 36.8 %
FEF 25 75 REF: 2.08
FEV05 LLN: 1.05
FEV05 REF: 1.9
FEV1 FVC LLN: 68
FEV1 FVC PRE REF: 84.9 %
FEV1 FVC REF: 80
FEV1 LLN: 1.64
FEV1 PRE REF: 64.5 %
FEV1 REF: 2.24
FVC LLN: 2.09
FVC PRE REF: 75.6 %
FVC REF: 2.83
IVC PRE: 2.15 L (ref 2.09–3.56)
IVC SINGLE BREATH LLN: 2.09
IVC SINGLE BREATH PRE REF: 75.9 %
IVC SINGLE BREATH REF: 2.83
IVCSINGLEBREATHULN: 3.56
PEF LLN: 3.76
PEF PRE REF: 92.6 %
PEF REF: 5.83
PHYSICIAN COMMENT: ABNORMAL
PRE DLCO: 21.06 ML/(MIN*MMHG) (ref 17.75–29.22)
PRE FEF 25 75: 0.77 L/S (ref 0.91–3.25)
PRE FET 100: 6.38 SEC
PRE FEV05 REF: 62.7 %
PRE FEV1 FVC: 67.68 % (ref 68.17–91.32)
PRE FEV1: 1.45 L (ref 1.64–2.84)
PRE FEV5: 1.19 L (ref 1.05–2.76)
PRE FVC: 2.14 L (ref 2.09–3.56)
PRE PEF: 5.4 L/S (ref 3.76–7.89)
VA PRE: 3.86 L (ref 4.95–4.95)
VA SINGLE BREATH PRE REF: 78.1 %
VA SINGLE BREATH REF: 4.95

## 2021-11-10 PROCEDURE — 1159F MED LIST DOCD IN RCRD: CPT | Mod: CPTII,S$GLB,, | Performed by: INTERNAL MEDICINE

## 2021-11-10 PROCEDURE — 99204 PR OFFICE/OUTPT VISIT, NEW, LEVL IV, 45-59 MIN: ICD-10-PCS | Mod: S$GLB,,, | Performed by: INTERNAL MEDICINE

## 2021-11-10 PROCEDURE — 1159F PR MEDICATION LIST DOCUMENTED IN MEDICAL RECORD: ICD-10-PCS | Mod: CPTII,S$GLB,, | Performed by: INTERNAL MEDICINE

## 2021-11-10 PROCEDURE — 99999 PR PBB SHADOW E&M-EST. PATIENT-LVL IV: CPT | Mod: PBBFAC,,, | Performed by: INTERNAL MEDICINE

## 2021-11-10 PROCEDURE — 99204 OFFICE O/P NEW MOD 45 MIN: CPT | Mod: S$GLB,,, | Performed by: INTERNAL MEDICINE

## 2021-11-10 PROCEDURE — 3077F SYST BP >= 140 MM HG: CPT | Mod: CPTII,S$GLB,, | Performed by: INTERNAL MEDICINE

## 2021-11-10 PROCEDURE — 94010 BREATHING CAPACITY TEST: ICD-10-PCS | Mod: S$GLB,,, | Performed by: INTERNAL MEDICINE

## 2021-11-10 PROCEDURE — 99999 PR PBB SHADOW E&M-EST. PATIENT-LVL IV: ICD-10-PCS | Mod: PBBFAC,,, | Performed by: INTERNAL MEDICINE

## 2021-11-10 PROCEDURE — 71046 X-RAY EXAM CHEST 2 VIEWS: CPT | Mod: 26,,, | Performed by: RADIOLOGY

## 2021-11-10 PROCEDURE — 1160F RVW MEDS BY RX/DR IN RCRD: CPT | Mod: CPTII,S$GLB,, | Performed by: INTERNAL MEDICINE

## 2021-11-10 PROCEDURE — 3079F DIAST BP 80-89 MM HG: CPT | Mod: CPTII,S$GLB,, | Performed by: INTERNAL MEDICINE

## 2021-11-10 PROCEDURE — 71046 X-RAY EXAM CHEST 2 VIEWS: CPT | Mod: TC,FY

## 2021-11-10 PROCEDURE — 3077F PR MOST RECENT SYSTOLIC BLOOD PRESSURE >= 140 MM HG: ICD-10-PCS | Mod: CPTII,S$GLB,, | Performed by: INTERNAL MEDICINE

## 2021-11-10 PROCEDURE — 94010 BREATHING CAPACITY TEST: CPT | Mod: S$GLB,,, | Performed by: INTERNAL MEDICINE

## 2021-11-10 PROCEDURE — 3008F PR BODY MASS INDEX (BMI) DOCUMENTED: ICD-10-PCS | Mod: CPTII,S$GLB,, | Performed by: INTERNAL MEDICINE

## 2021-11-10 PROCEDURE — 94729 PR C02/MEMBANE DIFFUSE CAPACITY: ICD-10-PCS | Mod: S$GLB,,, | Performed by: INTERNAL MEDICINE

## 2021-11-10 PROCEDURE — 1160F PR REVIEW ALL MEDS BY PRESCRIBER/CLIN PHARMACIST DOCUMENTED: ICD-10-PCS | Mod: CPTII,S$GLB,, | Performed by: INTERNAL MEDICINE

## 2021-11-10 PROCEDURE — 94729 DIFFUSING CAPACITY: CPT | Mod: S$GLB,,, | Performed by: INTERNAL MEDICINE

## 2021-11-10 PROCEDURE — 71046 XR CHEST PA AND LATERAL: ICD-10-PCS | Mod: 26,,, | Performed by: RADIOLOGY

## 2021-11-10 PROCEDURE — 3079F PR MOST RECENT DIASTOLIC BLOOD PRESSURE 80-89 MM HG: ICD-10-PCS | Mod: CPTII,S$GLB,, | Performed by: INTERNAL MEDICINE

## 2021-11-10 PROCEDURE — 3008F BODY MASS INDEX DOCD: CPT | Mod: CPTII,S$GLB,, | Performed by: INTERNAL MEDICINE

## 2021-11-10 RX ORDER — ALBUTEROL SULFATE 90 UG/1
2 AEROSOL, METERED RESPIRATORY (INHALATION) EVERY 6 HOURS PRN
Qty: 18 G | Refills: 0 | Status: SHIPPED | OUTPATIENT
Start: 2021-11-10 | End: 2021-11-17 | Stop reason: SDUPTHER

## 2021-11-17 ENCOUNTER — PATIENT MESSAGE (OUTPATIENT)
Dept: ALLERGY | Facility: CLINIC | Age: 61
End: 2021-11-17
Payer: COMMERCIAL

## 2021-11-17 ENCOUNTER — PATIENT MESSAGE (OUTPATIENT)
Dept: ENDOSCOPY | Facility: HOSPITAL | Age: 61
End: 2021-11-17
Payer: COMMERCIAL

## 2021-11-17 RX ORDER — ALBUTEROL SULFATE 90 UG/1
2 AEROSOL, METERED RESPIRATORY (INHALATION) EVERY 6 HOURS PRN
Qty: 18 G | Refills: 0 | Status: SHIPPED | OUTPATIENT
Start: 2021-11-17 | End: 2022-01-27 | Stop reason: SDUPTHER

## 2021-11-18 ENCOUNTER — OFFICE VISIT (OUTPATIENT)
Dept: ALLERGY | Facility: CLINIC | Age: 61
End: 2021-11-18
Payer: COMMERCIAL

## 2021-11-18 DIAGNOSIS — I48.0 PAROXYSMAL ATRIAL FIBRILLATION: ICD-10-CM

## 2021-11-18 DIAGNOSIS — K21.9 GASTROESOPHAGEAL REFLUX DISEASE, UNSPECIFIED WHETHER ESOPHAGITIS PRESENT: ICD-10-CM

## 2021-11-18 DIAGNOSIS — J45.901 ASTHMA WITH ACUTE EXACERBATION, UNSPECIFIED ASTHMA SEVERITY, UNSPECIFIED WHETHER PERSISTENT: Primary | ICD-10-CM

## 2021-11-18 DIAGNOSIS — I10 ESSENTIAL HYPERTENSION: ICD-10-CM

## 2021-11-18 DIAGNOSIS — J31.0 CHRONIC RHINITIS: ICD-10-CM

## 2021-11-18 DIAGNOSIS — K21.9 LARYNGOPHARYNGEAL REFLUX: ICD-10-CM

## 2021-11-18 DIAGNOSIS — Z79.01 CURRENT USE OF LONG TERM ANTICOAGULATION: ICD-10-CM

## 2021-11-18 DIAGNOSIS — Z98.890 S/P ABLATION OF ATRIAL FIBRILLATION: ICD-10-CM

## 2021-11-18 DIAGNOSIS — R05.9 COUGH: ICD-10-CM

## 2021-11-18 DIAGNOSIS — Z86.79 S/P ABLATION OF ATRIAL FIBRILLATION: ICD-10-CM

## 2021-11-18 PROCEDURE — 1160F RVW MEDS BY RX/DR IN RCRD: CPT | Mod: CPTII,S$GLB,, | Performed by: ALLERGY & IMMUNOLOGY

## 2021-11-18 PROCEDURE — 99999 PR PBB SHADOW E&M-EST. PATIENT-LVL III: ICD-10-PCS | Mod: PBBFAC,,, | Performed by: ALLERGY & IMMUNOLOGY

## 2021-11-18 PROCEDURE — 1160F PR REVIEW ALL MEDS BY PRESCRIBER/CLIN PHARMACIST DOCUMENTED: ICD-10-PCS | Mod: CPTII,S$GLB,, | Performed by: ALLERGY & IMMUNOLOGY

## 2021-11-18 PROCEDURE — 1159F MED LIST DOCD IN RCRD: CPT | Mod: CPTII,S$GLB,, | Performed by: ALLERGY & IMMUNOLOGY

## 2021-11-18 PROCEDURE — 1159F PR MEDICATION LIST DOCUMENTED IN MEDICAL RECORD: ICD-10-PCS | Mod: CPTII,S$GLB,, | Performed by: ALLERGY & IMMUNOLOGY

## 2021-11-18 PROCEDURE — 99999 PR PBB SHADOW E&M-EST. PATIENT-LVL III: CPT | Mod: PBBFAC,,, | Performed by: ALLERGY & IMMUNOLOGY

## 2021-11-18 PROCEDURE — 99214 OFFICE O/P EST MOD 30 MIN: CPT | Mod: S$GLB,,, | Performed by: ALLERGY & IMMUNOLOGY

## 2021-11-18 PROCEDURE — 99214 PR OFFICE/OUTPT VISIT, EST, LEVL IV, 30-39 MIN: ICD-10-PCS | Mod: S$GLB,,, | Performed by: ALLERGY & IMMUNOLOGY

## 2021-11-18 RX ORDER — ALBUTEROL SULFATE 0.83 MG/ML
2.5 SOLUTION RESPIRATORY (INHALATION) EVERY 6 HOURS PRN
Qty: 3 ML | Refills: 5 | Status: SHIPPED | OUTPATIENT
Start: 2021-11-18 | End: 2022-10-06 | Stop reason: SDUPTHER

## 2021-11-18 RX ORDER — PREDNISONE 10 MG/1
TABLET ORAL
Qty: 35 TABLET | Refills: 0 | Status: SHIPPED | OUTPATIENT
Start: 2021-11-18 | End: 2022-01-27

## 2021-11-18 RX ORDER — OMALIZUMAB 202.5 MG/1.4ML
300 INJECTION, SOLUTION SUBCUTANEOUS
Qty: 2 EACH | Refills: 13 | OUTPATIENT
Start: 2021-11-18 | End: 2021-11-18

## 2021-11-23 ENCOUNTER — TELEPHONE (OUTPATIENT)
Dept: ALLERGY | Facility: CLINIC | Age: 61
End: 2021-11-23
Payer: COMMERCIAL

## 2021-12-07 ENCOUNTER — TELEPHONE (OUTPATIENT)
Dept: ENDOSCOPY | Facility: HOSPITAL | Age: 61
End: 2021-12-07
Payer: COMMERCIAL

## 2021-12-13 ENCOUNTER — HOSPITAL ENCOUNTER (OUTPATIENT)
Facility: HOSPITAL | Age: 61
Discharge: HOME | End: 2021-12-13
Attending: INTERNAL MEDICINE | Admitting: INTERNAL MEDICINE
Payer: COMMERCIAL

## 2021-12-13 ENCOUNTER — ANESTHESIA EVENT (OUTPATIENT)
Dept: ENDOSCOPY | Facility: HOSPITAL | Age: 61
End: 2021-12-13
Payer: COMMERCIAL

## 2021-12-13 ENCOUNTER — ANESTHESIA (OUTPATIENT)
Dept: ENDOSCOPY | Facility: HOSPITAL | Age: 61
End: 2021-12-13
Payer: COMMERCIAL

## 2021-12-13 VITALS
HEIGHT: 64 IN | SYSTOLIC BLOOD PRESSURE: 118 MMHG | DIASTOLIC BLOOD PRESSURE: 70 MMHG | HEART RATE: 80 BPM | RESPIRATION RATE: 18 BRPM | OXYGEN SATURATION: 99 % | BODY MASS INDEX: 33.63 KG/M2 | WEIGHT: 197 LBS | TEMPERATURE: 98 F

## 2021-12-13 DIAGNOSIS — K21.9 GERD (GASTROESOPHAGEAL REFLUX DISEASE): ICD-10-CM

## 2021-12-13 PROCEDURE — 37000009 HC ANESTHESIA EA ADD 15 MINS: Performed by: INTERNAL MEDICINE

## 2021-12-13 PROCEDURE — 88305 TISSUE EXAM BY PATHOLOGIST: CPT | Performed by: PATHOLOGY

## 2021-12-13 PROCEDURE — 88305 TISSUE EXAM BY PATHOLOGIST: CPT | Mod: 26,,, | Performed by: PATHOLOGY

## 2021-12-13 PROCEDURE — 88342 IMHCHEM/IMCYTCHM 1ST ANTB: CPT | Mod: 26,,, | Performed by: PATHOLOGY

## 2021-12-13 PROCEDURE — 43239 PR EGD, FLEX, W/BIOPSY, SGL/MULTI: ICD-10-PCS | Mod: ,,, | Performed by: INTERNAL MEDICINE

## 2021-12-13 PROCEDURE — 25000003 PHARM REV CODE 250: Performed by: NURSE ANESTHETIST, CERTIFIED REGISTERED

## 2021-12-13 PROCEDURE — 25000003 PHARM REV CODE 250: Performed by: INTERNAL MEDICINE

## 2021-12-13 PROCEDURE — 43239 EGD BIOPSY SINGLE/MULTIPLE: CPT | Mod: ,,, | Performed by: INTERNAL MEDICINE

## 2021-12-13 PROCEDURE — D9220A PRA ANESTHESIA: ICD-10-PCS | Mod: CRNA,,, | Performed by: NURSE ANESTHETIST, CERTIFIED REGISTERED

## 2021-12-13 PROCEDURE — D9220A PRA ANESTHESIA: Mod: CRNA,,, | Performed by: NURSE ANESTHETIST, CERTIFIED REGISTERED

## 2021-12-13 PROCEDURE — 88305 TISSUE EXAM BY PATHOLOGIST: ICD-10-PCS | Mod: 26,,, | Performed by: PATHOLOGY

## 2021-12-13 PROCEDURE — D9220A PRA ANESTHESIA: ICD-10-PCS | Mod: ANES,,, | Performed by: STUDENT IN AN ORGANIZED HEALTH CARE EDUCATION/TRAINING PROGRAM

## 2021-12-13 PROCEDURE — 37000008 HC ANESTHESIA 1ST 15 MINUTES: Performed by: INTERNAL MEDICINE

## 2021-12-13 PROCEDURE — 88342 CHG IMMUNOCYTOCHEMISTRY: ICD-10-PCS | Mod: 26,,, | Performed by: PATHOLOGY

## 2021-12-13 PROCEDURE — 43239 EGD BIOPSY SINGLE/MULTIPLE: CPT | Performed by: INTERNAL MEDICINE

## 2021-12-13 PROCEDURE — 27201012 HC FORCEPS, HOT/COLD, DISP: Performed by: INTERNAL MEDICINE

## 2021-12-13 PROCEDURE — 88342 IMHCHEM/IMCYTCHM 1ST ANTB: CPT | Performed by: PATHOLOGY

## 2021-12-13 PROCEDURE — D9220A PRA ANESTHESIA: Mod: ANES,,, | Performed by: STUDENT IN AN ORGANIZED HEALTH CARE EDUCATION/TRAINING PROGRAM

## 2021-12-13 PROCEDURE — 63600175 PHARM REV CODE 636 W HCPCS: Performed by: NURSE ANESTHETIST, CERTIFIED REGISTERED

## 2021-12-13 RX ORDER — LIDOCAINE HCL/PF 100 MG/5ML
SYRINGE (ML) INTRAVENOUS
Status: DISCONTINUED | OUTPATIENT
Start: 2021-12-13 | End: 2021-12-13

## 2021-12-13 RX ORDER — SODIUM CHLORIDE 0.9 % (FLUSH) 0.9 %
10 SYRINGE (ML) INJECTION
Status: DISCONTINUED | OUTPATIENT
Start: 2021-12-13 | End: 2021-12-13 | Stop reason: HOSPADM

## 2021-12-13 RX ORDER — SODIUM CHLORIDE 9 MG/ML
INJECTION, SOLUTION INTRAVENOUS CONTINUOUS
Status: DISCONTINUED | OUTPATIENT
Start: 2021-12-13 | End: 2021-12-13 | Stop reason: HOSPADM

## 2021-12-13 RX ORDER — PROPOFOL 10 MG/ML
VIAL (ML) INTRAVENOUS
Status: DISCONTINUED | OUTPATIENT
Start: 2021-12-13 | End: 2021-12-13

## 2021-12-13 RX ORDER — PROPOFOL 10 MG/ML
VIAL (ML) INTRAVENOUS CONTINUOUS PRN
Status: DISCONTINUED | OUTPATIENT
Start: 2021-12-13 | End: 2021-12-13

## 2021-12-13 RX ADMIN — PROPOFOL 90 MG: 10 INJECTION, EMULSION INTRAVENOUS at 12:12

## 2021-12-13 RX ADMIN — SODIUM CHLORIDE: 0.9 INJECTION, SOLUTION INTRAVENOUS at 11:12

## 2021-12-13 RX ADMIN — GLYCOPYRROLATE 0.1 MG: 0.2 INJECTION, SOLUTION INTRAMUSCULAR; INTRAVITREAL at 12:12

## 2021-12-13 RX ADMIN — PROPOFOL 200 MCG/KG/MIN: 10 INJECTION, EMULSION INTRAVENOUS at 12:12

## 2021-12-13 RX ADMIN — Medication 100 MG: at 12:12

## 2021-12-20 ENCOUNTER — OFFICE VISIT (OUTPATIENT)
Dept: BARIATRICS | Facility: CLINIC | Age: 61
End: 2021-12-20
Payer: COMMERCIAL

## 2021-12-20 VITALS
DIASTOLIC BLOOD PRESSURE: 66 MMHG | BODY MASS INDEX: 35.5 KG/M2 | OXYGEN SATURATION: 97 % | HEART RATE: 65 BPM | SYSTOLIC BLOOD PRESSURE: 143 MMHG | WEIGHT: 206.81 LBS

## 2021-12-20 DIAGNOSIS — E66.01 CLASS 2 SEVERE OBESITY WITH BODY MASS INDEX (BMI) OF 35 TO 39.9 WITH SERIOUS COMORBIDITY: Primary | ICD-10-CM

## 2021-12-20 DIAGNOSIS — K21.9 LARYNGOPHARYNGEAL REFLUX: ICD-10-CM

## 2021-12-20 LAB
FINAL PATHOLOGIC DIAGNOSIS: NORMAL
Lab: NORMAL

## 2021-12-20 PROCEDURE — 99999 PR PBB SHADOW E&M-EST. PATIENT-LVL IV: ICD-10-PCS | Mod: PBBFAC,,, | Performed by: INTERNAL MEDICINE

## 2021-12-20 PROCEDURE — 99999 PR PBB SHADOW E&M-EST. PATIENT-LVL IV: CPT | Mod: PBBFAC,,, | Performed by: INTERNAL MEDICINE

## 2021-12-20 PROCEDURE — 99212 PR OFFICE/OUTPT VISIT, EST, LEVL II, 10-19 MIN: ICD-10-PCS | Mod: S$GLB,,, | Performed by: INTERNAL MEDICINE

## 2021-12-20 PROCEDURE — 99212 OFFICE O/P EST SF 10 MIN: CPT | Mod: S$GLB,,, | Performed by: INTERNAL MEDICINE

## 2021-12-20 RX ORDER — TOPIRAMATE 25 MG/1
50 TABLET ORAL NIGHTLY
Qty: 180 TABLET | Refills: 0 | Status: SHIPPED | OUTPATIENT
Start: 2021-12-20 | End: 2022-06-07

## 2022-01-11 DIAGNOSIS — I48.0 PAROXYSMAL ATRIAL FIBRILLATION: ICD-10-CM

## 2022-01-11 RX ORDER — PROPAFENONE HYDROCHLORIDE 225 MG/1
225 TABLET, FILM COATED ORAL EVERY 8 HOURS
Qty: 270 TABLET | Refills: 11 | Status: SHIPPED | OUTPATIENT
Start: 2022-01-11 | End: 2022-01-19 | Stop reason: SDUPTHER

## 2022-01-11 RX ORDER — DILTIAZEM HYDROCHLORIDE 120 MG/1
120 CAPSULE, EXTENDED RELEASE ORAL DAILY
Qty: 90 CAPSULE | Refills: 3 | Status: SHIPPED | OUTPATIENT
Start: 2022-01-11 | End: 2022-12-07 | Stop reason: SDUPTHER

## 2022-01-19 ENCOUNTER — PATIENT MESSAGE (OUTPATIENT)
Dept: ELECTROPHYSIOLOGY | Facility: CLINIC | Age: 62
End: 2022-01-19
Payer: COMMERCIAL

## 2022-01-19 DIAGNOSIS — I10 ESSENTIAL HYPERTENSION: ICD-10-CM

## 2022-01-20 RX ORDER — PROPAFENONE HYDROCHLORIDE 225 MG/1
TABLET, FILM COATED ORAL
Qty: 30 TABLET | Refills: 0 | Status: SHIPPED | OUTPATIENT
Start: 2022-01-20 | End: 2022-10-20 | Stop reason: SDUPTHER

## 2022-01-20 NOTE — TELEPHONE ENCOUNTER
No new care gaps identified.  Powered by [employer]. Reference number: [id number].   1/19/2022 7:26:25 PM CST

## 2022-01-25 ENCOUNTER — PATIENT MESSAGE (OUTPATIENT)
Dept: ALLERGY | Facility: CLINIC | Age: 62
End: 2022-01-25
Payer: COMMERCIAL

## 2022-01-25 RX ORDER — HYDROCHLOROTHIAZIDE 12.5 MG/1
TABLET ORAL
Qty: 90 TABLET | Refills: 3 | OUTPATIENT
Start: 2022-01-25

## 2022-01-25 NOTE — TELEPHONE ENCOUNTER
Provider Staff:     Action required for this patient.    Please note Refusal of medication.            Requested Prescriptions     Refused Prescriptions Disp Refills    hydroCHLOROthiazide (HYDRODIURIL) 12.5 MG Tab [Pharmacy Med Name: HYDROCHLOROTHIAZIDE 12.5 MG Tablet] 90 tablet 3     Sig: TAKE 1 TABLET EVERY DAY     Refused By: KEVIN ESCAMILLA     Reason for Refusal: Patient needs an appointment      Thanks!  [facility]   Note composed: 01/25/2022 2:08 PM

## 2022-01-27 ENCOUNTER — OFFICE VISIT (OUTPATIENT)
Dept: FAMILY MEDICINE | Facility: CLINIC | Age: 62
End: 2022-01-27
Payer: COMMERCIAL

## 2022-01-27 VITALS
TEMPERATURE: 98 F | HEIGHT: 64 IN | WEIGHT: 202.63 LBS | SYSTOLIC BLOOD PRESSURE: 136 MMHG | DIASTOLIC BLOOD PRESSURE: 84 MMHG | OXYGEN SATURATION: 95 % | BODY MASS INDEX: 34.59 KG/M2 | HEART RATE: 74 BPM | RESPIRATION RATE: 17 BRPM

## 2022-01-27 DIAGNOSIS — J45.901 ASTHMA WITH ACUTE EXACERBATION, UNSPECIFIED ASTHMA SEVERITY, UNSPECIFIED WHETHER PERSISTENT: Primary | ICD-10-CM

## 2022-01-27 DIAGNOSIS — I10 ESSENTIAL HYPERTENSION: ICD-10-CM

## 2022-01-27 PROCEDURE — 94640 PR INHAL RX, AIRWAY OBST/DX SPUTUM INDUCT: ICD-10-PCS | Mod: S$GLB,,, | Performed by: FAMILY MEDICINE

## 2022-01-27 PROCEDURE — 99214 PR OFFICE/OUTPT VISIT, EST, LEVL IV, 30-39 MIN: ICD-10-PCS | Mod: 25,S$GLB,, | Performed by: FAMILY MEDICINE

## 2022-01-27 PROCEDURE — 99214 OFFICE O/P EST MOD 30 MIN: CPT | Mod: 25,S$GLB,, | Performed by: FAMILY MEDICINE

## 2022-01-27 PROCEDURE — 99999 PR PBB SHADOW E&M-EST. PATIENT-LVL IV: CPT | Mod: PBBFAC,,, | Performed by: FAMILY MEDICINE

## 2022-01-27 PROCEDURE — 1159F PR MEDICATION LIST DOCUMENTED IN MEDICAL RECORD: ICD-10-PCS | Mod: CPTII,S$GLB,, | Performed by: FAMILY MEDICINE

## 2022-01-27 PROCEDURE — 1159F MED LIST DOCD IN RCRD: CPT | Mod: CPTII,S$GLB,, | Performed by: FAMILY MEDICINE

## 2022-01-27 PROCEDURE — 3075F PR MOST RECENT SYSTOLIC BLOOD PRESS GE 130-139MM HG: ICD-10-PCS | Mod: CPTII,S$GLB,, | Performed by: FAMILY MEDICINE

## 2022-01-27 PROCEDURE — 3079F DIAST BP 80-89 MM HG: CPT | Mod: CPTII,S$GLB,, | Performed by: FAMILY MEDICINE

## 2022-01-27 PROCEDURE — 3075F SYST BP GE 130 - 139MM HG: CPT | Mod: CPTII,S$GLB,, | Performed by: FAMILY MEDICINE

## 2022-01-27 PROCEDURE — 94640 AIRWAY INHALATION TREATMENT: CPT | Mod: S$GLB,,, | Performed by: FAMILY MEDICINE

## 2022-01-27 PROCEDURE — 3079F PR MOST RECENT DIASTOLIC BLOOD PRESSURE 80-89 MM HG: ICD-10-PCS | Mod: CPTII,S$GLB,, | Performed by: FAMILY MEDICINE

## 2022-01-27 PROCEDURE — 96372 PR INJECTION,THERAP/PROPH/DIAG2ST, IM OR SUBCUT: ICD-10-PCS | Mod: 59,S$GLB,, | Performed by: FAMILY MEDICINE

## 2022-01-27 PROCEDURE — 99999 PR PBB SHADOW E&M-EST. PATIENT-LVL IV: ICD-10-PCS | Mod: PBBFAC,,, | Performed by: FAMILY MEDICINE

## 2022-01-27 PROCEDURE — 3008F BODY MASS INDEX DOCD: CPT | Mod: CPTII,S$GLB,, | Performed by: FAMILY MEDICINE

## 2022-01-27 PROCEDURE — 3008F PR BODY MASS INDEX (BMI) DOCUMENTED: ICD-10-PCS | Mod: CPTII,S$GLB,, | Performed by: FAMILY MEDICINE

## 2022-01-27 PROCEDURE — 96372 THER/PROPH/DIAG INJ SC/IM: CPT | Mod: 59,S$GLB,, | Performed by: FAMILY MEDICINE

## 2022-01-27 RX ORDER — MONTELUKAST SODIUM 10 MG/1
10 TABLET ORAL NIGHTLY
Qty: 90 TABLET | Refills: 0 | Status: SHIPPED | OUTPATIENT
Start: 2022-01-27 | End: 2022-04-25 | Stop reason: SDUPTHER

## 2022-01-27 RX ORDER — PREDNISONE 20 MG/1
60 TABLET ORAL DAILY
Qty: 15 TABLET | Refills: 0 | Status: SHIPPED | OUTPATIENT
Start: 2022-01-27 | End: 2022-02-01

## 2022-01-27 RX ORDER — METHYLPREDNISOLONE SODIUM SUCCINATE 125 MG/2ML
125 INJECTION INTRAMUSCULAR; INTRAVENOUS
Status: COMPLETED | OUTPATIENT
Start: 2022-01-27 | End: 2022-01-27

## 2022-01-27 RX ORDER — ALBUTEROL SULFATE 90 UG/1
2 AEROSOL, METERED RESPIRATORY (INHALATION) EVERY 6 HOURS PRN
Qty: 18 G | Refills: 11 | Status: SHIPPED | OUTPATIENT
Start: 2022-01-27 | End: 2022-10-06 | Stop reason: SDUPTHER

## 2022-01-27 RX ORDER — IPRATROPIUM BROMIDE AND ALBUTEROL SULFATE 2.5; .5 MG/3ML; MG/3ML
3 SOLUTION RESPIRATORY (INHALATION)
Status: COMPLETED | OUTPATIENT
Start: 2022-01-27 | End: 2022-01-27

## 2022-01-27 RX ORDER — AZITHROMYCIN 250 MG/1
TABLET, FILM COATED ORAL
Qty: 6 TABLET | Refills: 0 | Status: SHIPPED | OUTPATIENT
Start: 2022-01-27 | End: 2022-02-01

## 2022-01-27 RX ORDER — HYDROCHLOROTHIAZIDE 12.5 MG/1
12.5 TABLET ORAL DAILY
Qty: 90 TABLET | Refills: 3 | Status: SHIPPED | OUTPATIENT
Start: 2022-01-27 | End: 2022-10-06 | Stop reason: SDUPTHER

## 2022-01-27 RX ADMIN — IPRATROPIUM BROMIDE AND ALBUTEROL SULFATE 3 ML: 2.5; .5 SOLUTION RESPIRATORY (INHALATION) at 02:01

## 2022-01-27 RX ADMIN — METHYLPREDNISOLONE SODIUM SUCCINATE 125 MG: 125 INJECTION INTRAMUSCULAR; INTRAVENOUS at 02:01

## 2022-01-27 NOTE — PROGRESS NOTES
Health Maintenance Due   Topic Date Due    Pneumococcal Vaccines (Age 0-64) (1 of 2 - PPSV23) Patient states that had the vaccine all ready , [facility] pharmacy     TETANUS VACCINE  Discuss with pcp     Lipid Panel  Order in place

## 2022-01-27 NOTE — PROGRESS NOTES
Pt tolerated nebulizer treatment without difficulty; no adverse reaction noted  Pt tolerated injection of solumedrol 125mg to left dorsalgluteal without difficulty; no adverse reaction noted

## 2022-01-28 ENCOUNTER — TELEPHONE (OUTPATIENT)
Dept: ALLERGY | Facility: CLINIC | Age: 62
End: 2022-01-28
Payer: COMMERCIAL

## 2022-02-01 NOTE — PROGRESS NOTES
Subjective:       Patient ID: Francoise Dykes is a 61 y.o. female.    Chief Complaint: Annual Exam and Medication Refill      HPI  61-year-old female presents for asthma exacerbation.  Patient feels she has been more short of breath for last few months.  Has been on steroids multiple times.  Has not been given antibiotics.  Denies any fever chills.      Review of Systems   Constitutional: Negative.    HENT: Positive for congestion.    Respiratory: Positive for cough, shortness of breath and wheezing.    Cardiovascular: Negative.    Gastrointestinal: Negative.    Endocrine: Negative.    Genitourinary: Negative.    Musculoskeletal: Negative.    Neurological: Negative.    Psychiatric/Behavioral: Negative.           Past Medical History:   Diagnosis Date    Acid reflux     Allergy     seasonal    Asthma with acute exacerbation     Atrial fibrillation     Essential hypertension 2017    Pt states that she does not have HTN.      Past Surgical History:   Procedure Laterality Date    24 HOUR IMPEDANCE PH MONITORING OF ESOPHAGUS IN PATIENT TAKING ACID REDUCING MEDICATIONS N/A 2021    Procedure: IMPEDANCE PH STUDY, ESOPHAGEAL, 24 HOUR, IN PATIENT TAKING ACID REDUCING MEDICATION;  Surgeon: Franky Gomez MD;  Location: [address]);  Service: Endoscopy;  Laterality: N/A;  Patient to do EGD with with esophageal Bravo pH probe on omeprazole 40 mg once daily she needs to take it with a sip of water the day of the case  pt completed COVID vaccine- see Immunization record in     CARDIAC ELECTROPHYSIOLOGY STUDY AND ABLATION       SECTION      COLONOSCOPY N/A 2018    Procedure: COLONOSCOPY;  Surgeon: Brodie Lara MD;  Location: [facility];  Service: Endoscopy;  Laterality: N/A;  confirmed appt-ss    ESOPHAGEAL MANOMETRY WITH MEASUREMENT OF IMPEDANCE N/A 2021    Procedure: MANOMETRY, ESOPHAGUS, WITH IMPEDANCE MEASUREMENT;  Surgeon: Franky Gomez MD;  Location: [facility] ([address] [place] [address]);   Service: Endoscopy;  Laterality: N/A;  Covid test 9/17 [facility] [place]  9/16 pt confirmed appt-KPvt    ESOPHAGOGASTRODUODENOSCOPY N/A 12/13/2021    Procedure: EGD (ESOPHAGOGASTRODUODENOSCOPY);  Surgeon: Alin Camargo MD;  [facility]  Service: Endoscopy;  Laterality: N/A;  Fully vaccinated 12/13/21, ok to hold Eliquis x 2 days per Dr. ANGELLA Meyers, instr portal -ml  12/10/21 LVM to see if patient can come in earlier -ml    HYSTERECTOMY  2000    MIGUEL    RADIOFREQUENCY ABLATION Left 2/27/2019    Procedure: RADIOFREQUENCY ABLATION, LEFT L2,3,4,5;  Surgeon: Mariusz Jang MD;  Location: [facility];  Service: Pain Management;  Laterality: Left;  Left RFA L2,3,4,5  1 of 2  *Ok to hold Eliquis, per Dr Meyers    RADIOFREQUENCY ABLATION Right 3/13/2019    Procedure: RADIOFREQUENCY ABLATION,  Right L2,3,4,5;  Surgeon: Mariusz Jang MD;  Location: [facility];  Service: Pain Management;  Laterality: Right;  Right RFA @ L2,3,4,5  2 of 2     Family History   Problem Relation Age of Onset    Hypertension Mother     Diabetes Mother     Glaucoma Mother     Allergies Mother     Asbestos Father     Obesity Father     Eczema Son     Hypertension Son     Heart disease Maternal Grandmother         chf    Diabetes Maternal Grandfather     Cancer Paternal Grandmother         lung, 2/2 second hand smoke    Glaucoma Paternal Grandfather     Blindness Paternal Grandfather     Melanoma Neg Hx     Psoriasis Neg Hx     Lupus Neg Hx     Acne Neg Hx     Breast cancer Neg Hx     Colon cancer Neg Hx     Ovarian cancer Neg Hx     Esophageal cancer Neg Hx     Cirrhosis Neg Hx     Celiac disease Neg Hx     Colon polyps Neg Hx     Crohn's disease Neg Hx     Liver cancer Neg Hx     Liver disease Neg Hx     Rectal cancer Neg Hx     Stomach cancer Neg Hx     Ulcerative colitis Neg Hx     Pancreatic cancer Neg Hx     Kidney cancer Neg Hx     Bladder Cancer Neg Hx     Uterine cancer Neg Hx       Social History     Socioeconomic History    Marital status:    Occupational History    Occupation: Teacher     Employer: [employer]    Tobacco Use    Smoking status: Never Smoker    Smokeless tobacco: Never Used   Substance and Sexual Activity    Alcohol use: Yes     Comment: rarely, 1 drink/week    Drug use: No    Sexual activity: Yes     Partners: Male   Other Topics Concern    Are you pregnant or think you may be? No    Breast-feeding No   Social History Narrative    Recently moved back to [facility] to help take care of mom.    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       Current Outpatient Medications:     albuterol (PROVENTIL) 2.5 mg /3 mL (0.083 %) nebulizer solution, Take 3 mLs (2.5 mg total) by nebulization every 6 (six) hours as needed for Wheezing., Disp: 3 mL, Rfl: 5    apixaban (ELIQUIS) 5 mg Tab, Take 1 tablet (5 mg total) by mouth 2 (two) times daily., Disp: 180 tablet, Rfl: 3    COVID-19 vacc, mRNA,Pfizer,/PF (PFIZER COVID-19 VACCINE, EUA, IM), , Disp: , Rfl:     diltiaZEM (DILACOR XR) 120 MG CDCR, Take 1 capsule (120 mg total) by mouth once daily., Disp: 90 capsule, Rfl: 3    fluticasone furoate-vilanteroL (BREO ELLIPTA) 200-25 mcg/dose DsDv diskus inhaler, Inhale 1 puff into the lungs once daily. Controller, Disp: 1 each, Rfl: 5    ipratropium (ATROVENT) 21 mcg (0.03 %) nasal spray, 2 sprays by Nasal route 2 (two) times daily., Disp: 30 mL, Rfl: 0    omeprazole (PRILOSEC) 40 MG capsule, TAKE 1 CAPSULE (40 MG TOTAL) BY MOUTH 2 (TWO) TIMES DAILY BEFORE MEALS., Disp: 180 capsule, Rfl: 3    propafenone (RYTHMOL) 225 MG Tab, Take one tablet by mouth every 8 hours., Disp: 30 tablet, Rfl: 0    topiramate (TOPAMAX) 25 MG tablet, Take 2 tablets (50 mg total) by mouth every evening., Disp: 180 tablet, Rfl: 0    albuterol (PROVENTIL/VENTOLIN HFA) 90 mcg/actuation inhaler, Inhale 2 puffs into the lungs every 6 (six) hours as needed for Wheezing or Shortness of Breath. Rescue,  "Disp: 18 g, Rfl: 11    azithromycin (Z-ROMI) 250 MG tablet, Take 2 tablets by mouth on day 1; Take 1 tablet by mouth on days 2-5, Disp: 6 tablet, Rfl: 0    cetirizine (ZYRTEC) 10 MG tablet, Take 10 mg by mouth once daily., Disp: , Rfl:     hydroCHLOROthiazide (HYDRODIURIL) 12.5 MG Tab, Take 1 tablet (12.5 mg total) by mouth once daily., Disp: 90 tablet, Rfl: 3    montelukast (SINGULAIR) 10 mg tablet, Take 1 tablet (10 mg total) by mouth every evening., Disp: 90 tablet, Rfl: 0    predniSONE (DELTASONE) 20 MG tablet, Take 3 tablets (60 mg total) by mouth once daily. for 5 days, Disp: 15 tablet, Rfl: 0   Objective:      Vitals:    01/27/22 1319   BP: 136/84   BP Location: Left arm   Patient Position: Sitting   BP Method: Large (Manual)   Pulse: 74   Resp: 17   Temp: 98.2 °F (36.8 °C)   TempSrc: Oral   SpO2: 95%   Weight: 91.9 kg (202 lb 9.6 oz)   Height: 5' 4" (1.626 m)       Physical Exam  Constitutional:       General: She is not in acute distress.  HENT:      Head: Normocephalic and atraumatic.   Eyes:      Conjunctiva/sclera: Conjunctivae normal.   Cardiovascular:      Rate and Rhythm: Normal rate and regular rhythm.      Heart sounds: Normal heart sounds. No murmur heard.  No friction rub. No gallop.    Pulmonary:      Effort: Respiratory distress (mild) present.      Breath sounds: Wheezing present. No rales.   Musculoskeletal:      Cervical back: Neck supple.   Skin:     General: Skin is warm and dry.   Neurological:      Mental Status: She is alert and oriented to person, place, and time.   Psychiatric:         Mood and Affect: Mood and affect normal.         Behavior: Behavior normal.         Thought Content: Thought content normal.         Judgment: Judgment normal.            Assessment:       1. Asthma with acute exacerbation, unspecified asthma severity, unspecified whether persistent    2. Essential hypertension        Plan:       Asthma with acute exacerbation, unspecified asthma severity, " unspecified whether persistent  -     azithromycin (Z-ROMI) 250 MG tablet; Take 2 tablets by mouth on day 1; Take 1 tablet by mouth on days 2-5  Dispense: 6 tablet; Refill: 0  -     predniSONE (DELTASONE) 20 MG tablet; Take 3 tablets (60 mg total) by mouth once daily. for 5 days  Dispense: 15 tablet; Refill: 0  -     methylPREDNISolone sodium succinate injection 125 mg  -     montelukast (SINGULAIR) 10 mg tablet; Take 1 tablet (10 mg total) by mouth every evening.  Dispense: 90 tablet; Refill: 0  -     albuterol-ipratropium 2.5 mg-0.5 mg/3 mL nebulizer solution 3 mL    Essential hypertension  -     hydroCHLOROthiazide (HYDRODIURIL) 12.5 MG Tab; Take 1 tablet (12.5 mg total) by mouth once daily.  Dispense: 90 tablet; Refill: 3    Other orders  -     albuterol (PROVENTIL/VENTOLIN HFA) 90 mcg/actuation inhaler; Inhale 2 puffs into the lungs every 6 (six) hours as needed for Wheezing or Shortness of Breath. Rescue  Dispense: 18 g; Refill: 11    given neb and steroid injection. Given abx. F/u in 1 week.             Patient note was created using [employer].  Any errors in syntax or even information may not have been identified and edited on initial review prior to signing this note.

## 2022-02-03 ENCOUNTER — OFFICE VISIT (OUTPATIENT)
Dept: FAMILY MEDICINE | Facility: CLINIC | Age: 62
End: 2022-02-03
Payer: COMMERCIAL

## 2022-02-03 VITALS
HEART RATE: 78 BPM | DIASTOLIC BLOOD PRESSURE: 60 MMHG | HEIGHT: 65 IN | BODY MASS INDEX: 33.76 KG/M2 | SYSTOLIC BLOOD PRESSURE: 110 MMHG | OXYGEN SATURATION: 99 % | TEMPERATURE: 98 F | WEIGHT: 202.63 LBS

## 2022-02-03 DIAGNOSIS — I48.0 PAROXYSMAL ATRIAL FIBRILLATION: ICD-10-CM

## 2022-02-03 DIAGNOSIS — J45.901 ASTHMA WITH ACUTE EXACERBATION, UNSPECIFIED ASTHMA SEVERITY, UNSPECIFIED WHETHER PERSISTENT: Primary | ICD-10-CM

## 2022-02-03 PROCEDURE — 3074F SYST BP LT 130 MM HG: CPT | Mod: CPTII,S$GLB,, | Performed by: FAMILY MEDICINE

## 2022-02-03 PROCEDURE — 99214 OFFICE O/P EST MOD 30 MIN: CPT | Mod: S$GLB,,, | Performed by: FAMILY MEDICINE

## 2022-02-03 PROCEDURE — 99214 PR OFFICE/OUTPT VISIT, EST, LEVL IV, 30-39 MIN: ICD-10-PCS | Mod: S$GLB,,, | Performed by: FAMILY MEDICINE

## 2022-02-03 PROCEDURE — 3074F PR MOST RECENT SYSTOLIC BLOOD PRESSURE < 130 MM HG: ICD-10-PCS | Mod: CPTII,S$GLB,, | Performed by: FAMILY MEDICINE

## 2022-02-03 PROCEDURE — 99999 PR PBB SHADOW E&M-EST. PATIENT-LVL IV: ICD-10-PCS | Mod: PBBFAC,,, | Performed by: FAMILY MEDICINE

## 2022-02-03 PROCEDURE — 3008F BODY MASS INDEX DOCD: CPT | Mod: CPTII,S$GLB,, | Performed by: FAMILY MEDICINE

## 2022-02-03 PROCEDURE — 3008F PR BODY MASS INDEX (BMI) DOCUMENTED: ICD-10-PCS | Mod: CPTII,S$GLB,, | Performed by: FAMILY MEDICINE

## 2022-02-03 PROCEDURE — 3078F DIAST BP <80 MM HG: CPT | Mod: CPTII,S$GLB,, | Performed by: FAMILY MEDICINE

## 2022-02-03 PROCEDURE — 1159F PR MEDICATION LIST DOCUMENTED IN MEDICAL RECORD: ICD-10-PCS | Mod: CPTII,S$GLB,, | Performed by: FAMILY MEDICINE

## 2022-02-03 PROCEDURE — 99999 PR PBB SHADOW E&M-EST. PATIENT-LVL IV: CPT | Mod: PBBFAC,,, | Performed by: FAMILY MEDICINE

## 2022-02-03 PROCEDURE — 1159F MED LIST DOCD IN RCRD: CPT | Mod: CPTII,S$GLB,, | Performed by: FAMILY MEDICINE

## 2022-02-03 PROCEDURE — 3078F PR MOST RECENT DIASTOLIC BLOOD PRESSURE < 80 MM HG: ICD-10-PCS | Mod: CPTII,S$GLB,, | Performed by: FAMILY MEDICINE

## 2022-02-03 RX ORDER — PREDNISONE 20 MG/1
TABLET ORAL
Qty: 11 TABLET | Refills: 0 | Status: SHIPPED | OUTPATIENT
Start: 2022-02-03 | End: 2022-02-10

## 2022-02-03 NOTE — PROGRESS NOTES
Subjective:       Patient ID: Francoise Dykes is a 61 y.o. female.    Chief Complaint: Asthma (Follow up )      HPI  61-year-old female presents for asthma follow-up.  Patient feels her asthma has improved greatly.  Still complains of wheezing.  Denies any shortness of breath.      Review of Systems   Constitutional: Negative.    HENT: Negative.    Respiratory: Positive for wheezing.    Cardiovascular: Negative.    Gastrointestinal: Negative.    Endocrine: Negative.    Genitourinary: Negative.    Musculoskeletal: Negative.    Neurological: Negative.    Psychiatric/Behavioral: Negative.           Past Medical History:   Diagnosis Date    Acid reflux     Allergy     seasonal    Asthma with acute exacerbation     Atrial fibrillation     Essential hypertension 2017    Pt states that she does not have HTN.      Past Surgical History:   Procedure Laterality Date    24 HOUR IMPEDANCE PH MONITORING OF ESOPHAGUS IN PATIENT TAKING ACID REDUCING MEDICATIONS N/A 2021    Procedure: IMPEDANCE PH STUDY, ESOPHAGEAL, 24 HOUR, IN PATIENT TAKING ACID REDUCING MEDICATION;  Surgeon: Franky Gomez MD;  Location: [address]);  Service: Endoscopy;  Laterality: N/A;  Patient to do EGD with with esophageal Bravo pH probe on omeprazole 40 mg once daily she needs to take it with a sip of water the day of the case  pt completed COVID vaccine- see Immunization record in     CARDIAC ELECTROPHYSIOLOGY STUDY AND ABLATION       SECTION      COLONOSCOPY N/A 2018    Procedure: COLONOSCOPY;  Surgeon: Brodie Lara MD;  Location: [facility];  Service: Endoscopy;  Laterality: N/A;  confirmed appt-ss    ESOPHAGEAL MANOMETRY WITH MEASUREMENT OF IMPEDANCE N/A 2021    Procedure: MANOMETRY, ESOPHAGUS, WITH IMPEDANCE MEASUREMENT;  Surgeon: Franky Gomez MD;  Location: [address]);  Service: Endoscopy;  Laterality: N/A;  Covid test  [facility]   pt confirmed appt-KPvt     ESOPHAGOGASTRODUODENOSCOPY N/A 12/13/2021    Procedure: EGD (ESOPHAGOGASTRODUODENOSCOPY);  Surgeon: Alin Camargo MD;  Location: [facility] ([address] [place] [address]);  Service: Endoscopy;  Laterality: N/A;  Fully vaccinated 12/13/21, ok to hold Eliquis x 2 days per Dr. ANGELLA Meyers, instr portal -ml  12/10/21 LVM to see if patient can come in earlier -ml    HYSTERECTOMY  2000    MIGUEL    RADIOFREQUENCY ABLATION Left 2/27/2019    Procedure: RADIOFREQUENCY ABLATION, LEFT L2,3,4,5;  Surgeon: Mariusz Jang MD;  Location: [facility];  Service: Pain Management;  Laterality: Left;  Left RFA L2,3,4,5  1 of 2  *Ok to hold Eliquis, per Dr Meyers    RADIOFREQUENCY ABLATION Right 3/13/2019    Procedure: RADIOFREQUENCY ABLATION,  Right L2,3,4,5;  Surgeon: Mariusz Jang MD;  Location: [facility];  Service: Pain Management;  Laterality: Right;  Right RFA @ L2,3,4,5  2 of 2     Family History   Problem Relation Age of Onset    Hypertension Mother     Diabetes Mother     Glaucoma Mother     Allergies Mother     Asbestos Father     Obesity Father     Eczema Son     Hypertension Son     Heart disease Maternal Grandmother         chf    Diabetes Maternal Grandfather     Cancer Paternal Grandmother         lung, 2/2 second hand smoke    Glaucoma Paternal Grandfather     Blindness Paternal Grandfather     Melanoma Neg Hx     Psoriasis Neg Hx     Lupus Neg Hx     Acne Neg Hx     Breast cancer Neg Hx     Colon cancer Neg Hx     Ovarian cancer Neg Hx     Esophageal cancer Neg Hx     Cirrhosis Neg Hx     Celiac disease Neg Hx     Colon polyps Neg Hx     Crohn's disease Neg Hx     Liver cancer Neg Hx     Liver disease Neg Hx     Rectal cancer Neg Hx     Stomach cancer Neg Hx     Ulcerative colitis Neg Hx     Pancreatic cancer Neg Hx     Kidney cancer Neg Hx     Bladder Cancer Neg Hx     Uterine cancer Neg Hx      Social History     Socioeconomic History    Marital status:    Occupational History     Occupation: Teacher     Employer: [employer]    Tobacco Use    Smoking status: Never Smoker    Smokeless tobacco: Never Used   Substance and Sexual Activity    Alcohol use: Yes     Comment: rarely, 1 drink/week    Drug use: No    Sexual activity: Yes     Partners: Male   Other Topics Concern    Are you pregnant or think you may be? No    Breast-feeding No   Social History Narrative    Recently moved back to [facility] to help take care of mom.    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 Rescue, Disp: 18 g, Rfl: 11    apixaban (ELIQUIS) 5 mg Tab, Take 1 tablet (5 mg total) by mouth 2 (two) times daily., Disp: 180 tablet, Rfl: 3    cetirizine (ZYRTEC) 10 MG tablet, Take 10 mg by mouth once daily., Disp: , Rfl:     diltiaZEM (DILACOR XR) 120 MG CDCR, Take 1 capsule (120 mg total) by mouth once daily., Disp: 90 capsule, Rfl: 3    fluticasone furoate-vilanteroL (BREO ELLIPTA) 200-25 mcg/dose DsDv diskus inhaler, Inhale 1 puff into the lungs once daily. Controller, Disp: 1 each, Rfl: 5    hydroCHLOROthiazide (HYDRODIURIL) 12.5 MG Tab, Take 1 tablet (12.5 mg total) by mouth once daily., Disp: 90 tablet, Rfl: 3    ipratropium (ATROVENT) 21 mcg (0.03 %) nasal spray, 2 sprays by Nasal route 2 (two) times daily., Disp: 30 mL, Rfl: 0    montelukast (SINGULAIR) 10 mg tablet, Take 1 tablet (10 mg total) by mouth every evening., Disp: 90 tablet, Rfl: 0    omeprazole (PRILOSEC) 40 MG capsule, TAKE 1 CAPSULE (40 MG TOTAL) BY MOUTH 2 (TWO) TIMES DAILY BEFORE MEALS., Disp: 180 capsule, Rfl: 3    propafenone (RYTHMOL) 225 MG Tab, Take one tablet by  "mouth every 8 hours., Disp: 30 tablet, Rfl: 0    topiramate (TOPAMAX) 25 MG tablet, Take 2 tablets (50 mg total) by mouth every evening., Disp: 180 tablet, Rfl: 0    COVID-19 vacc, mRNA,Pfizer,/PF (PFIZER COVID-19 VACCINE, EUA, IM), , Disp: , Rfl:     predniSONE (DELTASONE) 20 MG tablet, Take 2 tablets (40 mg total) by mouth once daily for 4 days, THEN 1 tablet (20 mg total) once daily for 3 days., Disp: 11 tablet, Rfl: 0   Objective:      Vitals:    02/03/22 1118   BP: 110/60   Pulse: 78   Temp: 98.3 °F (36.8 °C)   TempSrc: Oral   SpO2: 99%   Weight: 91.9 kg (202 lb 9.6 oz)   Height: 5' 5" (1.651 m)       Physical Exam  Constitutional:       General: She is not in acute distress.  HENT:      Head: Normocephalic and atraumatic.   Eyes:      Conjunctiva/sclera: Conjunctivae normal.   Cardiovascular:      Rate and Rhythm: Normal rate and regular rhythm.      Heart sounds: Normal heart sounds. No murmur heard.  No friction rub. No gallop.    Pulmonary:      Effort: Pulmonary effort is normal.      Breath sounds: Wheezing (improved) present. No rales.   Musculoskeletal:      Cervical back: Neck supple.   Skin:     General: Skin is warm and dry.   Neurological:      Mental Status: She is alert and oriented to person, place, and time.   Psychiatric:         Mood and Affect: Mood and affect normal.         Behavior: Behavior normal.         Thought Content: Thought content normal.         Judgment: Judgment normal.            Assessment:       1. Asthma with acute exacerbation, unspecified asthma severity, unspecified whether persistent        Plan:       Asthma with acute exacerbation, unspecified asthma severity, unspecified whether persistent  -     predniSONE (DELTASONE) 20 MG tablet; Take 2 tablets (40 mg total) by mouth once daily for 4 days, THEN 1 tablet (20 mg total) once daily for 3 days.  Dispense: 11 tablet; Refill: 0    will give taper dose of steroids. Asthma has improved. Cont inhaler at least q8hrs for " the next week. Advised pt to f/u w/ pulm after she is at her baseline.            Patient note was created using [employer].  Any errors in syntax or even information may not have been identified and edited on initial review prior to signing this note.

## 2022-03-03 ENCOUNTER — PATIENT MESSAGE (OUTPATIENT)
Dept: FAMILY MEDICINE | Facility: CLINIC | Age: 62
End: 2022-03-03
Payer: COMMERCIAL

## 2022-03-04 ENCOUNTER — OFFICE VISIT (OUTPATIENT)
Dept: FAMILY MEDICINE | Facility: CLINIC | Age: 62
End: 2022-03-04
Payer: COMMERCIAL

## 2022-03-04 VITALS
SYSTOLIC BLOOD PRESSURE: 136 MMHG | RESPIRATION RATE: 17 BRPM | BODY MASS INDEX: 34.68 KG/M2 | HEIGHT: 65 IN | DIASTOLIC BLOOD PRESSURE: 80 MMHG | HEART RATE: 64 BPM | TEMPERATURE: 98 F | OXYGEN SATURATION: 99 % | WEIGHT: 208.13 LBS

## 2022-03-04 DIAGNOSIS — Z00.00 WELL ADULT EXAM: ICD-10-CM

## 2022-03-04 DIAGNOSIS — R30.0 DYSURIA: Primary | ICD-10-CM

## 2022-03-04 DIAGNOSIS — I10 ESSENTIAL HYPERTENSION: ICD-10-CM

## 2022-03-04 DIAGNOSIS — Z23 NEED FOR PNEUMOCOCCAL VACCINATION: ICD-10-CM

## 2022-03-04 PROCEDURE — 99999 PR PBB SHADOW E&M-EST. PATIENT-LVL IV: CPT | Mod: PBBFAC,,, | Performed by: FAMILY MEDICINE

## 2022-03-04 PROCEDURE — 1159F PR MEDICATION LIST DOCUMENTED IN MEDICAL RECORD: ICD-10-PCS | Mod: CPTII,S$GLB,, | Performed by: FAMILY MEDICINE

## 2022-03-04 PROCEDURE — 3079F PR MOST RECENT DIASTOLIC BLOOD PRESSURE 80-89 MM HG: ICD-10-PCS | Mod: CPTII,S$GLB,, | Performed by: FAMILY MEDICINE

## 2022-03-04 PROCEDURE — 1159F MED LIST DOCD IN RCRD: CPT | Mod: CPTII,S$GLB,, | Performed by: FAMILY MEDICINE

## 2022-03-04 PROCEDURE — 99999 PR PBB SHADOW E&M-EST. PATIENT-LVL IV: ICD-10-PCS | Mod: PBBFAC,,, | Performed by: FAMILY MEDICINE

## 2022-03-04 PROCEDURE — 3075F PR MOST RECENT SYSTOLIC BLOOD PRESS GE 130-139MM HG: ICD-10-PCS | Mod: CPTII,S$GLB,, | Performed by: FAMILY MEDICINE

## 2022-03-04 PROCEDURE — 3008F PR BODY MASS INDEX (BMI) DOCUMENTED: ICD-10-PCS | Mod: CPTII,S$GLB,, | Performed by: FAMILY MEDICINE

## 2022-03-04 PROCEDURE — 1160F RVW MEDS BY RX/DR IN RCRD: CPT | Mod: CPTII,S$GLB,, | Performed by: FAMILY MEDICINE

## 2022-03-04 PROCEDURE — 99214 OFFICE O/P EST MOD 30 MIN: CPT | Mod: S$GLB,,, | Performed by: FAMILY MEDICINE

## 2022-03-04 PROCEDURE — 1160F PR REVIEW ALL MEDS BY PRESCRIBER/CLIN PHARMACIST DOCUMENTED: ICD-10-PCS | Mod: CPTII,S$GLB,, | Performed by: FAMILY MEDICINE

## 2022-03-04 PROCEDURE — 3079F DIAST BP 80-89 MM HG: CPT | Mod: CPTII,S$GLB,, | Performed by: FAMILY MEDICINE

## 2022-03-04 PROCEDURE — 3008F BODY MASS INDEX DOCD: CPT | Mod: CPTII,S$GLB,, | Performed by: FAMILY MEDICINE

## 2022-03-04 PROCEDURE — 99214 PR OFFICE/OUTPT VISIT, EST, LEVL IV, 30-39 MIN: ICD-10-PCS | Mod: S$GLB,,, | Performed by: FAMILY MEDICINE

## 2022-03-04 PROCEDURE — 3075F SYST BP GE 130 - 139MM HG: CPT | Mod: CPTII,S$GLB,, | Performed by: FAMILY MEDICINE

## 2022-03-04 NOTE — PROGRESS NOTES
"  Chief Complaint   Patient presents with    Urinary Tract Infection       Francoise Dykes is a 61 y.o. female who presents per chief complain.  Chronic medical issues, if present, have been documented.  Acute medical issues, if present have been documented in the Chief Complaint.     HPI    ROS  Review of Systems   Constitutional: Negative.  Negative for activity change, appetite change, chills, diaphoresis, fatigue, fever and unexpected weight change.   HENT: Negative.  Negative for congestion, ear pain, hearing loss, nosebleeds, postnasal drip, rhinorrhea, sinus pressure, sneezing, sore throat and trouble swallowing.    Eyes: Negative for pain and visual disturbance.   Respiratory: Negative for cough, choking and shortness of breath.    Cardiovascular: Negative for chest pain and leg swelling.   Gastrointestinal: Negative for abdominal pain, constipation, diarrhea, nausea and vomiting.   Genitourinary: Positive for dysuria. Negative for difficulty urinating, frequency and urgency.   Musculoskeletal: Negative.  Negative for arthralgias, back pain, gait problem, joint swelling and myalgias.   Skin: Negative.    Allergic/Immunologic: Negative for environmental allergies and food allergies.   Neurological: Negative.  Negative for dizziness, seizures, syncope, weakness, light-headedness and headaches.   Psychiatric/Behavioral: Negative.  Negative for confusion, decreased concentration, dysphoric mood and sleep disturbance. The patient is not nervous/anxious.      Physical Exam  Vitals:    03/04/22 1144   BP: 136/80   Pulse: 64   Resp: 17   Temp: 97.8 °F (36.6 °C)    Body mass index is 34.63 kg/m².  Weight: 94.4 kg (208 lb 1.8 oz)   Height: 5' 5" (165.1 cm)     Physical Exam  Constitutional:       General: She is not in acute distress.     Appearance: Normal appearance. She is well-developed. She is not ill-appearing, toxic-appearing or diaphoretic.   HENT:      Head: Normocephalic and atraumatic.      Right Ear: " Hearing and external ear normal. No decreased hearing noted.      Left Ear: Hearing and external ear normal. No decreased hearing noted.      Nose: Nose normal. No nasal deformity or rhinorrhea.      Mouth/Throat:      Dentition: Normal dentition. Does not have dentures.      Pharynx: Uvula midline.   Eyes:      General: Lids are normal. No scleral icterus.        Right eye: No foreign body or discharge.         Left eye: No foreign body or discharge.      Conjunctiva/sclera: Conjunctivae normal.      Right eye: No chemosis or exudate.     Left eye: No chemosis or exudate.     Pupils: Pupils are equal, round, and reactive to light.   Cardiovascular:      Rate and Rhythm: Normal rate and regular rhythm.      Heart sounds: Normal heart sounds, S1 normal and S2 normal. No murmur heard.    No friction rub. No gallop.   Pulmonary:      Effort: Pulmonary effort is normal. No accessory muscle usage or respiratory distress.      Breath sounds: Normal breath sounds. No decreased breath sounds, wheezing, rhonchi or rales.   Abdominal:      General: There is no distension.      Palpations: Abdomen is soft. Abdomen is not rigid.      Tenderness: There is no abdominal tenderness. There is no guarding or rebound.   Musculoskeletal:         General: Normal range of motion.      Cervical back: Full passive range of motion without pain, normal range of motion and neck supple.   Skin:     General: Skin is warm and dry.      Findings: No rash.   Neurological:      Mental Status: She is alert and oriented to person, place, and time.      Cranial Nerves: No cranial nerve deficit.      Sensory: No sensory deficit.      Motor: No abnormal muscle tone or seizure activity.      Coordination: Coordination normal.      Gait: Gait normal.   Psychiatric:         Attention and Perception: She is attentive.         Speech: Speech normal.         Behavior: Behavior normal. Behavior is cooperative.         Thought Content: Thought content normal.          Judgment: Judgment normal.       Assessment & Plan    Discussion of plan of care including treatment options regarding health and wellness were reviewed and discussed with patient.  Any changes to medication or treatment plan, as well as any screening blood test, imaging, or referrals to specialist, are documented.  Follow up as indicated.     1. Dysuria  Symptoms have improved; discussed other possibilities of discomfort including diet and supplement intake.    2. Essential hypertension  Patient was counseled and encouraged to maintain a low sodium diet, as well as increasing physical activity.  Recommend random BP checks at home on a regular basis.  Repeat BP at end of visit was not necessary. Will continue medication at this time, and follow up in 3-6 months, or sooner if blood pressure begins to increase.       3. Need for pneumococcal vaccination  Will return at a later date.  - (In Office Administered) Pneumococcal Polysaccharide Vaccine (23 Valent) (SQ/IM); Future    4. Well adult exam  Screening test will be ordered and once results available patient will be notified of results and managed accordingly.   - CBC Auto Differential; Future  - Comprehensive Metabolic Panel; Future  - Vitamin D; Future  - TSH; Future  - T4, Free; Future  - Hemoglobin A1C; Future  - Lipid Panel; Future       Follow up if symptoms worsen or fail to improve.      ACTIVE MEDICAL ISSUES:  Documented in Problem List    PAST MEDICAL HISTORY  Documented    PAST SURGICAL HISTORY:  Documented    SOCIAL HISTORY:  Documented    FAMILY HISTORY:  Documented    ALLERGIES AND MEDICATIONS: updated and reviewed.  Documented    Health Maintenance       Date Due Completion Date    Pneumococcal Vaccines (Age 0-64) (1 of 2 - PPSV23) Never done ---    TETANUS VACCINE Never done ---    Lipid Panel 06/20/2019 6/20/2018    Override on 10/20/2014: Done    Mammogram 04/05/2022 4/5/2021    Colorectal Cancer Screening 07/12/2023 7/12/2018    Override on  11/24/2014: Done

## 2022-03-21 ENCOUNTER — OFFICE VISIT (OUTPATIENT)
Dept: BARIATRICS | Facility: CLINIC | Age: 62
End: 2022-03-21
Payer: COMMERCIAL

## 2022-03-21 VITALS
BODY MASS INDEX: 34.5 KG/M2 | SYSTOLIC BLOOD PRESSURE: 142 MMHG | WEIGHT: 207.31 LBS | OXYGEN SATURATION: 99 % | DIASTOLIC BLOOD PRESSURE: 72 MMHG | HEART RATE: 63 BPM

## 2022-03-21 DIAGNOSIS — E66.9 OBESITY, CLASS I, BMI 30.0-34.9 (SEE ACTUAL BMI): Primary | ICD-10-CM

## 2022-03-21 PROCEDURE — 1160F RVW MEDS BY RX/DR IN RCRD: CPT | Mod: CPTII,S$GLB,, | Performed by: INTERNAL MEDICINE

## 2022-03-21 PROCEDURE — 99213 OFFICE O/P EST LOW 20 MIN: CPT | Mod: S$GLB,,, | Performed by: INTERNAL MEDICINE

## 2022-03-21 PROCEDURE — 3008F BODY MASS INDEX DOCD: CPT | Mod: CPTII,S$GLB,, | Performed by: INTERNAL MEDICINE

## 2022-03-21 PROCEDURE — 99999 PR PBB SHADOW E&M-EST. PATIENT-LVL IV: CPT | Mod: PBBFAC,,, | Performed by: INTERNAL MEDICINE

## 2022-03-21 PROCEDURE — 3008F PR BODY MASS INDEX (BMI) DOCUMENTED: ICD-10-PCS | Mod: CPTII,S$GLB,, | Performed by: INTERNAL MEDICINE

## 2022-03-21 PROCEDURE — 99999 PR PBB SHADOW E&M-EST. PATIENT-LVL IV: ICD-10-PCS | Mod: PBBFAC,,, | Performed by: INTERNAL MEDICINE

## 2022-03-21 PROCEDURE — 1160F PR REVIEW ALL MEDS BY PRESCRIBER/CLIN PHARMACIST DOCUMENTED: ICD-10-PCS | Mod: CPTII,S$GLB,, | Performed by: INTERNAL MEDICINE

## 2022-03-21 PROCEDURE — 1159F MED LIST DOCD IN RCRD: CPT | Mod: CPTII,S$GLB,, | Performed by: INTERNAL MEDICINE

## 2022-03-21 PROCEDURE — 3077F SYST BP >= 140 MM HG: CPT | Mod: CPTII,S$GLB,, | Performed by: INTERNAL MEDICINE

## 2022-03-21 PROCEDURE — 1159F PR MEDICATION LIST DOCUMENTED IN MEDICAL RECORD: ICD-10-PCS | Mod: CPTII,S$GLB,, | Performed by: INTERNAL MEDICINE

## 2022-03-21 PROCEDURE — 3078F DIAST BP <80 MM HG: CPT | Mod: CPTII,S$GLB,, | Performed by: INTERNAL MEDICINE

## 2022-03-21 PROCEDURE — 3077F PR MOST RECENT SYSTOLIC BLOOD PRESSURE >= 140 MM HG: ICD-10-PCS | Mod: CPTII,S$GLB,, | Performed by: INTERNAL MEDICINE

## 2022-03-21 PROCEDURE — 3078F PR MOST RECENT DIASTOLIC BLOOD PRESSURE < 80 MM HG: ICD-10-PCS | Mod: CPTII,S$GLB,, | Performed by: INTERNAL MEDICINE

## 2022-03-21 PROCEDURE — 99213 PR OFFICE/OUTPT VISIT, EST, LEVL III, 20-29 MIN: ICD-10-PCS | Mod: S$GLB,,, | Performed by: INTERNAL MEDICINE

## 2022-03-21 RX ORDER — SEMAGLUTIDE 1.34 MG/ML
0.5 INJECTION, SOLUTION SUBCUTANEOUS
Qty: 3 PEN | Refills: 0 | Status: SHIPPED | OUTPATIENT
Start: 2022-03-21 | End: 2022-06-07 | Stop reason: SDUPTHER

## 2022-03-21 NOTE — PATIENT INSTRUCTIONS
Start Ozempic once a week. Start with 0.25mg once a week x 4 weeks, then 0.5 mg weekly.       Decrease portions as soon as you start Ozempic. Avoid fried, greasy, fatty foods.     Some nausea in the first 2 weeks is not unusual.     If you get pain across the upper abdomen and around to your back, please call the office.       Www.[email or web address] for coupon/videos.           1000 nathan daily from last visit.    Can use a shake 1 meal a day (ex- dinner).       Exercise should be some cardiovascular and some resistance training(see below).      STRENGTHENING  An adequate resistance training program could include the following types of exercise, focusing on the major muscle groups. One can use 5 to 10 pound dumbbells for those exercises that require the use of weight. At home, one can use a household item that provides a comfortable weight, such as a milk carton or beverage container. These exercises can also be performed without weights. Add some type of resistance training 2-3 days a week. These can be body weight exercises, light weight or elastic bands. [employer]marks and [employer] are great sources for free work out plans and videos.       Chest (Bench Press): Hold the weights with your hands. Lying on a flat surface, with knees bent and feet flat, slowly bring the weights to the chest area with palms facing upward. Begin to exhale and press the weights up, fully extending the arms, and keeping them above your eyes. Inhale as you lower them to the starting position and repeat the movement.  Back (Bent-Over Row): Start by placing your feet shoulder-width apart.  a weight with each hand just outside the knees. Keeping the back straight and the knees flexed, slightly bend forward at the waist. Let the arms hang naturally while holding the weights. From this starting position, pull the weights to the lower abdomen, keeping the elbows close to the body. Exhale as you pull. Return to the starting position, inhaling as you  "go.  Arms (Bicep Curls): Hold a weight in each hand, with elbows at your sides, palms facing forward. The back should be straight, the chest flared outward. Begin to bend your right arm up first while exhaling, keeping the elbow totally stationary. Wait until the right arm goes completely down to the fully extended position, and then begin to curl the left arm. Each arm curled completes one full repetition.  Shoulders (Lateral Raises): Place the feet a few inches apart with the knees bent slightly. Keep the back erect as you lean forward slightly. With the weights in front of your thighs and palms facing together, begin to slowly raise them up to the side until parallel with the floor. Lower the weights to your thighs, and repeat.  Legs (Stiff Leg Dead Lift): Start by standing straight, holding the weights close to your sides, nearly touching your thighs. Keep the weights close to the body--this protects the back. The back must stay straight. Bend at the waist as far forward as you comfortably can while keeping your legs straight, and begin to feel the pull in your hamstrings as you lower the weights toward the ground. Slowly return to the starting position, keeping the back straight.  Legs (Dumbbell Lunge): Hold a weight in each hand, arms hanging at your sides. Step out with one leg, keeping the back straight. It is important to step out far enough so that the knee does not extend over the toe. This puts too much stress on the knee. Go down far enough so that the opposite knee nearly touches the ground. Keep this stance and repeat the lunging motion for several repetitions.  Abdominals: Do not perform standard sit-ups as these could hurt the lower back. Rather, focus on the following 2 exercises: (1) Obliques--Lie on your back with the knees flexed in the bent sit-up position. From this position, bring both knees down to the ground. With the back remaining flat, begin to flex your body toward your toes ("crunch"). " Bring your shoulders up off the ground, but go slowly, controlling your momentum. Repeat this for 10 to 15 times. (2) Seated bench kicks/herrera knives--Sit on the end of a bench or chair with the hands placed behind the buttocks. Begin to kick the legs outward with the knees bent slightly--at the same time, lean back to extend the torso. Come back to the beginning position and repeat this motion 10 to 15 times.

## 2022-03-21 NOTE — PROGRESS NOTES
Subjective:       Patient ID: Francoise Dykes is a 61 y.o. female.    Chief Complaint: Follow-up    CC:   Pt here today for follow-up. Has lost 6.3 lbs(-2.4 lbs muscle. -3.2 lbs fat).  Has  progressed to 1000 nathan PB diet, and topiramate 25 mg qhs, with instructions to slowly titrate up to 50 mg as she was having  SE was trouble with word finding.  She does find her appetite is down.  She is now on reg diet. Has been decreasing carbs. Has not been exercising. Her breathing is improving, so she plans to resume.       BP ok today.    New BMR: 1451    New PBF: 46.8%    initial  BMR: 1479    PBF:  47%    Follow-up      Review of Systems      Objective:     BP (!) 142/72 (BP Location: Left arm, Patient Position: Sitting)   Pulse 63   Wt 94 kg (207 lb 4.8 oz)   LMP  (LMP Unknown)   SpO2 99%   BMI 34.50 kg/m²    Physical Exam  Vitals reviewed.   Constitutional:       General: She is not in acute distress.     Appearance: She is well-developed.      Comments: Centrally obese   HENT:      Head: Normocephalic and atraumatic.   Eyes:      General: No scleral icterus.     Pupils: Pupils are equal, round, and reactive to light.   Cardiovascular:      Rate and Rhythm: Normal rate.   Pulmonary:      Effort: Pulmonary effort is normal.   Musculoskeletal:         General: Normal range of motion.      Cervical back: Normal range of motion and neck supple.   Skin:     General: Skin is warm and dry.      Findings: No erythema.   Neurological:      Mental Status: She is alert and oriented to person, place, and time.   Psychiatric:         Behavior: Behavior normal.         Judgment: Judgment normal.         Assessment:       1. Obesity, Class I, BMI 30.0-34.9 (see actual BMI)        Plan:           Francoise was seen today for follow-up.    Diagnoses and all orders for this visit:    Obesity, Class I, BMI 30.0-34.9 (see actual BMI)    Other orders  -     semaglutide (OZEMPIC) 0.25 mg or 0.5 mg(2 mg/1.5 mL) pen injector; Inject 0.5 mg  into the skin every 7 days.           Start Ozempic once a week. Start with 0.25mg once a week x 4 weeks, then 0.5 mg weekly.       Decrease portions as soon as you start Ozempic. Avoid fried, greasy, fatty foods.     Some nausea in the first 2 weeks is not unusual.     If you get pain across the upper abdomen and around to your back, please call the office.       [email or web address] for coupon/videos.           1000 nathan daily from last visit.    Can use a shake 1 meal a day (ex- dinner).     Exercise handouts given.

## 2022-04-05 ENCOUNTER — PATIENT MESSAGE (OUTPATIENT)
Dept: FAMILY MEDICINE | Facility: CLINIC | Age: 62
End: 2022-04-05
Payer: COMMERCIAL

## 2022-04-05 DIAGNOSIS — Z12.31 ENCOUNTER FOR SCREENING MAMMOGRAM FOR BREAST CANCER: Primary | ICD-10-CM

## 2022-04-13 ENCOUNTER — PATIENT MESSAGE (OUTPATIENT)
Dept: FAMILY MEDICINE | Facility: CLINIC | Age: 62
End: 2022-04-13
Payer: COMMERCIAL

## 2022-04-13 DIAGNOSIS — Z12.31 ENCOUNTER FOR SCREENING MAMMOGRAM FOR BREAST CANCER: Primary | ICD-10-CM

## 2022-04-25 ENCOUNTER — PATIENT MESSAGE (OUTPATIENT)
Dept: FAMILY MEDICINE | Facility: CLINIC | Age: 62
End: 2022-04-25
Payer: COMMERCIAL

## 2022-04-25 DIAGNOSIS — J45.901 ASTHMA WITH ACUTE EXACERBATION, UNSPECIFIED ASTHMA SEVERITY, UNSPECIFIED WHETHER PERSISTENT: ICD-10-CM

## 2022-04-25 NOTE — TELEPHONE ENCOUNTER
No new care gaps identified.  Powered by [employer]. Reference number: [id number].   4/25/2022 7:26:02 AM CDT

## 2022-04-26 RX ORDER — MONTELUKAST SODIUM 10 MG/1
10 TABLET ORAL NIGHTLY
Qty: 90 TABLET | Refills: 0 | Status: SHIPPED | OUTPATIENT
Start: 2022-04-26 | End: 2022-07-25

## 2022-05-30 ENCOUNTER — PATIENT MESSAGE (OUTPATIENT)
Dept: ADMINISTRATIVE | Facility: HOSPITAL | Age: 62
End: 2022-05-30
Payer: COMMERCIAL

## 2022-06-07 ENCOUNTER — OFFICE VISIT (OUTPATIENT)
Dept: BARIATRICS | Facility: CLINIC | Age: 62
End: 2022-06-07
Payer: COMMERCIAL

## 2022-06-07 VITALS
OXYGEN SATURATION: 98 % | HEIGHT: 65 IN | HEART RATE: 67 BPM | DIASTOLIC BLOOD PRESSURE: 63 MMHG | SYSTOLIC BLOOD PRESSURE: 132 MMHG | WEIGHT: 195.88 LBS | BODY MASS INDEX: 32.64 KG/M2

## 2022-06-07 DIAGNOSIS — E66.9 OBESITY, CLASS I, BMI 30.0-34.9 (SEE ACTUAL BMI): Primary | ICD-10-CM

## 2022-06-07 PROCEDURE — 99215 PR OFFICE/OUTPT VISIT, EST, LEVL V, 40-54 MIN: ICD-10-PCS | Mod: S$GLB,,, | Performed by: INTERNAL MEDICINE

## 2022-06-07 PROCEDURE — 99999 PR PBB SHADOW E&M-EST. PATIENT-LVL IV: CPT | Mod: PBBFAC,,, | Performed by: INTERNAL MEDICINE

## 2022-06-07 PROCEDURE — 3078F PR MOST RECENT DIASTOLIC BLOOD PRESSURE < 80 MM HG: ICD-10-PCS | Mod: CPTII,S$GLB,, | Performed by: INTERNAL MEDICINE

## 2022-06-07 PROCEDURE — 3008F BODY MASS INDEX DOCD: CPT | Mod: CPTII,S$GLB,, | Performed by: INTERNAL MEDICINE

## 2022-06-07 PROCEDURE — 1160F RVW MEDS BY RX/DR IN RCRD: CPT | Mod: CPTII,S$GLB,, | Performed by: INTERNAL MEDICINE

## 2022-06-07 PROCEDURE — 99215 OFFICE O/P EST HI 40 MIN: CPT | Mod: S$GLB,,, | Performed by: INTERNAL MEDICINE

## 2022-06-07 PROCEDURE — 1159F MED LIST DOCD IN RCRD: CPT | Mod: CPTII,S$GLB,, | Performed by: INTERNAL MEDICINE

## 2022-06-07 PROCEDURE — 1159F PR MEDICATION LIST DOCUMENTED IN MEDICAL RECORD: ICD-10-PCS | Mod: CPTII,S$GLB,, | Performed by: INTERNAL MEDICINE

## 2022-06-07 PROCEDURE — 99999 PR PBB SHADOW E&M-EST. PATIENT-LVL IV: ICD-10-PCS | Mod: PBBFAC,,, | Performed by: INTERNAL MEDICINE

## 2022-06-07 PROCEDURE — 3075F SYST BP GE 130 - 139MM HG: CPT | Mod: CPTII,S$GLB,, | Performed by: INTERNAL MEDICINE

## 2022-06-07 PROCEDURE — 3008F PR BODY MASS INDEX (BMI) DOCUMENTED: ICD-10-PCS | Mod: CPTII,S$GLB,, | Performed by: INTERNAL MEDICINE

## 2022-06-07 PROCEDURE — 3075F PR MOST RECENT SYSTOLIC BLOOD PRESS GE 130-139MM HG: ICD-10-PCS | Mod: CPTII,S$GLB,, | Performed by: INTERNAL MEDICINE

## 2022-06-07 PROCEDURE — 3078F DIAST BP <80 MM HG: CPT | Mod: CPTII,S$GLB,, | Performed by: INTERNAL MEDICINE

## 2022-06-07 PROCEDURE — 1160F PR REVIEW ALL MEDS BY PRESCRIBER/CLIN PHARMACIST DOCUMENTED: ICD-10-PCS | Mod: CPTII,S$GLB,, | Performed by: INTERNAL MEDICINE

## 2022-06-07 RX ORDER — SEMAGLUTIDE 1.34 MG/ML
0.5 INJECTION, SOLUTION SUBCUTANEOUS
Qty: 3 PEN | Refills: 0 | Status: SHIPPED | OUTPATIENT
Start: 2022-06-07 | End: 2022-08-15

## 2022-06-07 NOTE — PROGRESS NOTES
"Subjective:       Patient ID: Francoise Dykes is a 61 y.o. female.    Chief Complaint: Follow-up    CC:   Pt here today for follow-up. Has lost 13.7 lbs(-5.3 lbs muscle. -6.5 lbs fat).  Has  progressed to 1000 nathan PB diet, and ozempic, currently on 0.5 mg weekly.  Denies SE. Does feel her appetite is down.  She does find her appetite is down.  She is now on reg diet. Has been decreasing carbs and eating only one meal a day.  Has been exercising with walking some days. Her breathing is improving, so she plans to resume.     topiramate 25 mg qhs, with instructions to slowly titrate up to 50 mg as she was having  SE was trouble with word finding.  BP ok today.    New BMR: 1402    New PBF: 46.3%    initial  BMR: 1479    PBF:  47%    Follow-up      Review of Systems      Objective:     /63   Pulse 67   Ht 5' 5" (1.651 m)   Wt 88.9 kg (195 lb 14.4 oz)   LMP  (LMP Unknown)   SpO2 98%   BMI 32.60 kg/m²    Physical Exam  Vitals reviewed.   Constitutional:       General: She is not in acute distress.     Appearance: She is well-developed.      Comments: Centrally obese   HENT:      Head: Normocephalic and atraumatic.   Eyes:      General: No scleral icterus.     Pupils: Pupils are equal, round, and reactive to light.   Cardiovascular:      Rate and Rhythm: Normal rate.   Pulmonary:      Effort: Pulmonary effort is normal.   Musculoskeletal:         General: Normal range of motion.      Cervical back: Normal range of motion and neck supple.   Skin:     General: Skin is warm and dry.      Findings: No erythema.   Neurological:      Mental Status: She is alert and oriented to person, place, and time.   Psychiatric:         Behavior: Behavior normal.         Judgment: Judgment normal.         Assessment:       1. Obesity, Class I, BMI 30.0-34.9 (see actual BMI)        Plan:           Francoise was seen today for follow-up.    Diagnoses and all orders for this visit:    Obesity, Class I, BMI 30.0-34.9 (see actual BMI)  -  "    semaglutide (OZEMPIC) 0.25 mg or 0.5 mg(2 mg/1.5 mL) pen injector; Inject 0.5 mg into the skin every 7 days.           Ozempic 0.5 once a week.       Decrease portions as soon as you start Ozempic. Avoid fried, greasy, fatty foods.     Some nausea in the first 2 weeks is not unusual.     If you get pain across the upper abdomen and around to your back, please call the office.       Www.[employer] 3[employer] for coupon/videos.     Add some type of resistance training 2-3 days a week. These can be body weight exercises, light weight or elastic bands. [employer] and [employer] are great sources for free work out plans and videos.         1000 nathan daily from last visit.    Can use a shake 1 meal a day (ex- dinner).     Hurricane tips given.

## 2022-06-07 NOTE — PATIENT INSTRUCTIONS
Ozempic 0.5 once a week.       Decrease portions as soon as you start Ozempic. Avoid fried, greasy, fatty foods.     Some nausea in the first 2 weeks is not unusual.     If you get pain across the upper abdomen and around to your back, please call the office.       Www.[email or web address] for coupon/videos.     Add some type of resistance training 2-3 days a week. These can be body weight exercises, light weight or elastic bands. [employer] and [employer]! are great sources for free work out plans and videos.         1000 nathan daily from last visit.    Can use a shake 1 meal a day (ex- dinner).     Tips for preparing for hurricane season:    If you are on weight loss medications, please take your medication with you in case of evacuation. Injectable medications should be transported with an ice pack if unopened or room temperature if you have started to use them.   Hurricane supplies do not necessarily need to be junk food or high in carbs or sugar. Shelf stable and healthy choices to include in your supplies could include:  Canned/packets of tuna or chicken  Apples, oranges, banana, pears  Beef or turkey jerky  Sugar free pudding  Pickle, olives, dill relish (mix with the tuna or chicken!)  Low sodium or no salt added canned vegetables  If you get bread, get lite bread (40 calories a slice)  0 sugar sports drinks  Water  String cheese will be okay for a few days without refrigeration or in an ice chest.   Peanut or other nut butter.   Parmesan crisps  Pork skins  Protein shakes (20-30g protein, less than 5 g sugar)  Protein bars (15 or more g protein, less than 5 g sugar)  Don't forget disposable forks, spoons, plates in your supplies  If evacuated, manage stress by taking walks, reading or meditating.   If eating out make better choices when possible.   Salads with a lean protein and limited dressing, cheese or other toppings  Grilled chicken sandwich or burger without the bun.   Skip the fries!  Order water or  unsweetened tea instead of soda  Grocery stores with a deli, salad/food bar can be a good quick and affordable option to replace fast food

## 2022-06-29 ENCOUNTER — HOSPITAL ENCOUNTER (OUTPATIENT)
Dept: RADIOLOGY | Facility: OTHER | Age: 62
Discharge: HOME | End: 2022-06-29
Attending: FAMILY MEDICINE
Payer: COMMERCIAL

## 2022-06-29 DIAGNOSIS — Z12.31 ENCOUNTER FOR SCREENING MAMMOGRAM FOR BREAST CANCER: ICD-10-CM

## 2022-06-29 PROCEDURE — 77067 SCR MAMMO BI INCL CAD: CPT | Mod: 26,,, | Performed by: RADIOLOGY

## 2022-06-29 PROCEDURE — 77067 MAMMO DIGITAL SCREENING BILAT WITH TOMO: ICD-10-PCS | Mod: 26,,, | Performed by: RADIOLOGY

## 2022-06-29 PROCEDURE — 77067 SCR MAMMO BI INCL CAD: CPT | Mod: TC

## 2022-06-29 PROCEDURE — 77063 BREAST TOMOSYNTHESIS BI: CPT | Mod: 26,,, | Performed by: RADIOLOGY

## 2022-06-29 PROCEDURE — 77063 MAMMO DIGITAL SCREENING BILAT WITH TOMO: ICD-10-PCS | Mod: 26,,, | Performed by: RADIOLOGY

## 2022-06-29 PROCEDURE — 77063 BREAST TOMOSYNTHESIS BI: CPT | Mod: TC

## 2022-08-03 ENCOUNTER — PATIENT MESSAGE (OUTPATIENT)
Dept: BARIATRICS | Facility: CLINIC | Age: 62
End: 2022-08-03
Payer: COMMERCIAL

## 2022-09-02 ENCOUNTER — CLINICAL SUPPORT (OUTPATIENT)
Dept: OTHER | Facility: CLINIC | Age: 62
End: 2022-09-02
Payer: COMMERCIAL

## 2022-09-02 DIAGNOSIS — Z00.8 ENCOUNTER FOR OTHER GENERAL EXAMINATION: ICD-10-CM

## 2022-09-03 VITALS
BODY MASS INDEX: 33.7 KG/M2 | HEIGHT: 64 IN | DIASTOLIC BLOOD PRESSURE: 70 MMHG | SYSTOLIC BLOOD PRESSURE: 127 MMHG | WEIGHT: 197.38 LBS

## 2022-09-03 LAB
HDLC SERPL-MCNC: 49 MG/DL
POC CHOLESTEROL, LDL (DOCK): 114 MG/DL
POC CHOLESTEROL, TOTAL: 181 MG/DL
POC GLUCOSE, FASTING: 91 MG/DL (ref 60–110)
TRIGL SERPL-MCNC: 97 MG/DL

## 2022-09-26 ENCOUNTER — OFFICE VISIT (OUTPATIENT)
Dept: BARIATRICS | Facility: CLINIC | Age: 62
End: 2022-09-26
Payer: COMMERCIAL

## 2022-09-26 VITALS
SYSTOLIC BLOOD PRESSURE: 135 MMHG | WEIGHT: 194.63 LBS | BODY MASS INDEX: 32.43 KG/M2 | OXYGEN SATURATION: 96 % | DIASTOLIC BLOOD PRESSURE: 64 MMHG | HEIGHT: 65 IN | HEART RATE: 70 BPM

## 2022-09-26 DIAGNOSIS — E66.9 OBESITY, CLASS I, BMI 30.0-34.9 (SEE ACTUAL BMI): ICD-10-CM

## 2022-09-26 DIAGNOSIS — K21.9 LARYNGOPHARYNGEAL REFLUX: ICD-10-CM

## 2022-09-26 PROCEDURE — 3075F PR MOST RECENT SYSTOLIC BLOOD PRESS GE 130-139MM HG: ICD-10-PCS | Mod: CPTII,S$GLB,, | Performed by: INTERNAL MEDICINE

## 2022-09-26 PROCEDURE — 1159F PR MEDICATION LIST DOCUMENTED IN MEDICAL RECORD: ICD-10-PCS | Mod: CPTII,S$GLB,, | Performed by: INTERNAL MEDICINE

## 2022-09-26 PROCEDURE — 3008F BODY MASS INDEX DOCD: CPT | Mod: CPTII,S$GLB,, | Performed by: INTERNAL MEDICINE

## 2022-09-26 PROCEDURE — 99999 PR PBB SHADOW E&M-EST. PATIENT-LVL IV: ICD-10-PCS | Mod: PBBFAC,,, | Performed by: INTERNAL MEDICINE

## 2022-09-26 PROCEDURE — 3075F SYST BP GE 130 - 139MM HG: CPT | Mod: CPTII,S$GLB,, | Performed by: INTERNAL MEDICINE

## 2022-09-26 PROCEDURE — 1159F MED LIST DOCD IN RCRD: CPT | Mod: CPTII,S$GLB,, | Performed by: INTERNAL MEDICINE

## 2022-09-26 PROCEDURE — 3078F PR MOST RECENT DIASTOLIC BLOOD PRESSURE < 80 MM HG: ICD-10-PCS | Mod: CPTII,S$GLB,, | Performed by: INTERNAL MEDICINE

## 2022-09-26 PROCEDURE — 3078F DIAST BP <80 MM HG: CPT | Mod: CPTII,S$GLB,, | Performed by: INTERNAL MEDICINE

## 2022-09-26 PROCEDURE — 3008F PR BODY MASS INDEX (BMI) DOCUMENTED: ICD-10-PCS | Mod: CPTII,S$GLB,, | Performed by: INTERNAL MEDICINE

## 2022-09-26 PROCEDURE — 99213 PR OFFICE/OUTPT VISIT, EST, LEVL III, 20-29 MIN: ICD-10-PCS | Mod: S$GLB,,, | Performed by: INTERNAL MEDICINE

## 2022-09-26 PROCEDURE — 99999 PR PBB SHADOW E&M-EST. PATIENT-LVL IV: CPT | Mod: PBBFAC,,, | Performed by: INTERNAL MEDICINE

## 2022-09-26 PROCEDURE — 99213 OFFICE O/P EST LOW 20 MIN: CPT | Mod: S$GLB,,, | Performed by: INTERNAL MEDICINE

## 2022-09-26 PROCEDURE — 1160F PR REVIEW ALL MEDS BY PRESCRIBER/CLIN PHARMACIST DOCUMENTED: ICD-10-PCS | Mod: CPTII,S$GLB,, | Performed by: INTERNAL MEDICINE

## 2022-09-26 PROCEDURE — 1160F RVW MEDS BY RX/DR IN RCRD: CPT | Mod: CPTII,S$GLB,, | Performed by: INTERNAL MEDICINE

## 2022-09-26 RX ORDER — OMEPRAZOLE 40 MG/1
40 CAPSULE, DELAYED RELEASE ORAL
Qty: 180 CAPSULE | Refills: 3 | Status: SHIPPED | OUTPATIENT
Start: 2022-09-26 | End: 2023-10-10

## 2022-09-26 NOTE — PROGRESS NOTES
"Subjective:       Patient ID: Francoise Dykes is a 62 y.o. female.    Chief Complaint: Follow-up    CC:   Pt here today for follow-up. Has lost 15 lbs(-4 lbs muscle. -10.2 lbs fat).  Has  progressed to 1000 nathan PB diet, and ozempic, currently on 0.5 mg weekly.  Denies SE. Does feel her appetite is down. She is now on reg diet. Has been decreasing carbs and eating only one meal a day.  Has been exercising with walking some days. Is having a cough with mucus that is better after omeprazole, but then gets worse again by the morning.  Her breathing is improving, so she plans to resume.     topiramate 25 mg qhs, with instructions to slowly titrate up to 50 mg as she was having  SE was trouble with word finding.  BP ok today.    New BMR: 1402    New PBF: 46.3%    initial  BMR: 1479    PBF:  47%    Follow-up    Review of Systems      Objective:     /64   Pulse 70   Ht 5' 4.5" (1.638 m)   Wt 88.3 kg (194 lb 9.6 oz)   LMP  (LMP Unknown)   SpO2 96%   BMI 32.89 kg/m²    Physical Exam  Vitals reviewed.   Constitutional:       General: She is not in acute distress.     Appearance: She is well-developed.      Comments: Centrally obese   HENT:      Head: Normocephalic and atraumatic.   Eyes:      General: No scleral icterus.     Pupils: Pupils are equal, round, and reactive to light.   Cardiovascular:      Rate and Rhythm: Normal rate.   Pulmonary:      Effort: Pulmonary effort is normal.   Musculoskeletal:         General: Normal range of motion.      Cervical back: Normal range of motion and neck supple.   Skin:     General: Skin is warm and dry.      Findings: No erythema.   Neurological:      Mental Status: She is alert and oriented to person, place, and time.   Psychiatric:         Behavior: Behavior normal.         Judgment: Judgment normal.       Assessment:       1. Obesity, Class I, BMI 30.0-34.9 (see actual BMI)    2. Laryngopharyngeal reflux          Plan:           Francoise was seen today for " follow-up.    Diagnoses and all orders for this visit:    Obesity, Class I, BMI 30.0-34.9 (see actual BMI)    Laryngopharyngeal reflux    Other orders  -     omeprazole (PRILOSEC) 40 MG capsule; Take 1 capsule (40 mg total) by mouth 2 (two) times daily before meals.  -     semaglutide (OZEMPIC) 1 mg/dose (4 mg/3 mL); Inject 1 mg into the skin every 7 days.           Ozempic 1mg once a week.       Decrease portions as soon as you start Ozempic. Avoid fried, greasy, fatty foods.     Some nausea in the first 2 weeks is not unusual.     If you get pain across the upper abdomen and around to your back, please call the office.       Www.[employer] for coupon/videos.     Add some type of resistance training 2-3 days a week. These can be body weight exercises, light weight or elastic bands. [employer] and [employer] are great sources for free work out plans and videos.         1000 nathan daily planner from first visit.    Can use a shake 1 meal a day (ex- dinner).     Snack ideas given.

## 2022-09-26 NOTE — PATIENT INSTRUCTIONS
Start Ozempic  1mg once a week.       Decrease portions as soon as you start Ozempic. Avoid fried, greasy, fatty foods.     Some nausea in the first 2 weeks is not unusual.     If you get pain across the upper abdomen and around to your back, please call the office.       Www.YellowPepper for coupon/videos.       Take omeprazole twice a day.       Add some type of resistance training 2-3 days a week. These can be body weight exercises, light weight or elastic bands. [employer] and [employer] are great sources for free work out plans and videos.         1000 nathan daily planner from first visit.    Can use a shake 1 meal a day (ex- dinner).       Snack ideas     Savory  Avocado with chili powder, garlic powder and black pepper  String cheese or cheese cubes/slices  Tuna, chicken or egg salad lettuce wraps  Ham/turkey and cheese roll-up  Turkey jerky  Hard boiled egg  Steamed edamame  Olives, pickles (watch sodium)  Baked zucchini chips with salsa  Cottage cheese with tomatoes & dill  Salad with light dressing & veggies  Nuts and Seeds: Pistachios, almonds, cashews, pecans, sunflower seeds, peanuts, walnuts, pumpkin seeds, hazelnuts, brazil nuts (salt free)     Sweet  Plain yogurt: Triple Zero Oikos, Fage or Powerful with fruit, nuts, seeds, cinnamon  Sugar free jello  Sugar free popsicles  Homemade protein shake  Atkins bars/shakes  Premier protein shakes  Power crunch bar  Emerald cocoa dusted almonds (1/4 cup)     Sweet and Savory  Grilled pineapple and ham skewers  Cottage Cheese with fruit, nuts, seeds, cinnamon  Homemade smoothie with spinach, avocado, frozen fruit & unsweetened vanilla almond milk           Peanut Butter/Foristell Butter  Witwith apples, ½ banana, celery, carrots, strawberries        Hummus/Guacamole/Light Cream Cheese/Greek yogurt + Ranch powder mix        cucumber, carrots, celery, bell pepper, tomato, cauliflower, broccoli, snap peas, green        beans, hard boiled egg, turkey pepperoni  slices, salami slices, ham, grilled chicken                           Tips when Cravings Hit:  Drink water or sugar free Crystal Light when a craving begins.  Avoid eating blind: pre-portion foods instead of leaving them in their original package.  Eat without distractions: phone, tv, laptop etc.    Eat slowly, chew foods well (30 times).  Find snacks high in protein to keep you full longer.

## 2022-10-04 NOTE — PROGRESS NOTES
Health Maintenance Due   Topic     Pneumococcal Vaccines (Age 0-64) (1 - PCV) Pt agree to get today      TETANUS VACCINE      COVID-19 Vaccine (4 - Booster for Pfizer series)     Influenza Vaccine (1) Pt received already

## 2022-10-04 NOTE — PATIENT INSTRUCTIONS
Tips to Control Acid Reflux    To control acid reflux, youll need to make some basic diet and lifestyle changes. The simple steps outlined below may be all youll need to ease discomfort.  Watch what you eat  · Avoid fatty foods and spicy foods.  · Eat fewer acidic foods, such as citrus and tomato-based foods. These can increase symptoms.  · Limit drinking alcohol, caffeine, and fizzy beverages. All increase acid reflux.  · Try limiting chocolate, peppermint, and spearmint. These can worsen acid reflux in some people.  Watch when you eat  · Avoid lying down for 3 hours after eating.  · Do not snack before going to bed.  Raise your head  Raising your head and upper body by 4 to 6 inches helps limit reflux when youre lying down. Put blocks under the head of your bed frame to raise it.  Other changes  · Lose weight, if you need to  · Dont exercise near bedtime  · Avoid tight-fitting clothes  · Limit aspirin and ibuprofen  · Stop smoking   Date Last Reviewed: 7/1/2016  © 4525-2468 [employer]. [address] [place] [address], [place] [address]. All rights reserved. This information is not intended as a substitute for professional medical care. Always follow your healthcare professional's instructions.         unable to determine

## 2022-10-06 ENCOUNTER — OFFICE VISIT (OUTPATIENT)
Dept: FAMILY MEDICINE | Facility: CLINIC | Age: 62
End: 2022-10-06
Payer: COMMERCIAL

## 2022-10-06 ENCOUNTER — PATIENT MESSAGE (OUTPATIENT)
Dept: BARIATRICS | Facility: CLINIC | Age: 62
End: 2022-10-06
Payer: COMMERCIAL

## 2022-10-06 VITALS
DIASTOLIC BLOOD PRESSURE: 78 MMHG | BODY MASS INDEX: 32.51 KG/M2 | SYSTOLIC BLOOD PRESSURE: 118 MMHG | HEIGHT: 65 IN | OXYGEN SATURATION: 98 % | HEART RATE: 71 BPM | WEIGHT: 195.13 LBS | TEMPERATURE: 98 F | RESPIRATION RATE: 18 BRPM

## 2022-10-06 DIAGNOSIS — I10 ESSENTIAL HYPERTENSION: ICD-10-CM

## 2022-10-06 DIAGNOSIS — J31.0 CHRONIC RHINITIS: ICD-10-CM

## 2022-10-06 DIAGNOSIS — Z12.11 COLON CANCER SCREENING: ICD-10-CM

## 2022-10-06 DIAGNOSIS — J45.41 MODERATE PERSISTENT ASTHMA WITH EXACERBATION: Primary | ICD-10-CM

## 2022-10-06 DIAGNOSIS — L30.9 ECZEMA, UNSPECIFIED TYPE: ICD-10-CM

## 2022-10-06 PROCEDURE — 99999 PR PBB SHADOW E&M-EST. PATIENT-LVL IV: CPT | Mod: PBBFAC,,, | Performed by: FAMILY MEDICINE

## 2022-10-06 PROCEDURE — 3078F DIAST BP <80 MM HG: CPT | Mod: CPTII,S$GLB,, | Performed by: FAMILY MEDICINE

## 2022-10-06 PROCEDURE — 3074F SYST BP LT 130 MM HG: CPT | Mod: CPTII,S$GLB,, | Performed by: FAMILY MEDICINE

## 2022-10-06 PROCEDURE — 3074F PR MOST RECENT SYSTOLIC BLOOD PRESSURE < 130 MM HG: ICD-10-PCS | Mod: CPTII,S$GLB,, | Performed by: FAMILY MEDICINE

## 2022-10-06 PROCEDURE — 1159F PR MEDICATION LIST DOCUMENTED IN MEDICAL RECORD: ICD-10-PCS | Mod: CPTII,S$GLB,, | Performed by: FAMILY MEDICINE

## 2022-10-06 PROCEDURE — 1159F MED LIST DOCD IN RCRD: CPT | Mod: CPTII,S$GLB,, | Performed by: FAMILY MEDICINE

## 2022-10-06 PROCEDURE — 99214 PR OFFICE/OUTPT VISIT, EST, LEVL IV, 30-39 MIN: ICD-10-PCS | Mod: 25,S$GLB,, | Performed by: FAMILY MEDICINE

## 2022-10-06 PROCEDURE — 96372 PR INJECTION,THERAP/PROPH/DIAG2ST, IM OR SUBCUT: ICD-10-PCS | Mod: S$GLB,,, | Performed by: FAMILY MEDICINE

## 2022-10-06 PROCEDURE — 96372 THER/PROPH/DIAG INJ SC/IM: CPT | Mod: S$GLB,,, | Performed by: FAMILY MEDICINE

## 2022-10-06 PROCEDURE — 99999 PR PBB SHADOW E&M-EST. PATIENT-LVL IV: ICD-10-PCS | Mod: PBBFAC,,, | Performed by: FAMILY MEDICINE

## 2022-10-06 PROCEDURE — 3078F PR MOST RECENT DIASTOLIC BLOOD PRESSURE < 80 MM HG: ICD-10-PCS | Mod: CPTII,S$GLB,, | Performed by: FAMILY MEDICINE

## 2022-10-06 PROCEDURE — 99214 OFFICE O/P EST MOD 30 MIN: CPT | Mod: 25,S$GLB,, | Performed by: FAMILY MEDICINE

## 2022-10-06 RX ORDER — PREDNISONE 20 MG/1
TABLET ORAL
Qty: 15 TABLET | Refills: 0 | Status: SHIPPED | OUTPATIENT
Start: 2022-10-06 | End: 2022-10-13

## 2022-10-06 RX ORDER — ALBUTEROL SULFATE 90 UG/1
2 AEROSOL, METERED RESPIRATORY (INHALATION) EVERY 6 HOURS PRN
Qty: 18 G | Refills: 11 | Status: SHIPPED | OUTPATIENT
Start: 2022-10-06 | End: 2023-07-12 | Stop reason: SDUPTHER

## 2022-10-06 RX ORDER — METHYLPREDNISOLONE SODIUM SUCCINATE 125 MG/2ML
125 INJECTION INTRAMUSCULAR; INTRAVENOUS
Status: COMPLETED | OUTPATIENT
Start: 2022-10-06 | End: 2022-10-06

## 2022-10-06 RX ORDER — ALBUTEROL SULFATE 0.83 MG/ML
2.5 SOLUTION RESPIRATORY (INHALATION) EVERY 6 HOURS PRN
Qty: 3 ML | Refills: 5 | Status: SHIPPED | OUTPATIENT
Start: 2022-10-06 | End: 2023-07-12 | Stop reason: SDUPTHER

## 2022-10-06 RX ORDER — HYDROCHLOROTHIAZIDE 12.5 MG/1
12.5 TABLET ORAL DAILY
Qty: 90 TABLET | Refills: 3 | Status: SHIPPED | OUTPATIENT
Start: 2022-10-06 | End: 2023-10-12

## 2022-10-06 RX ORDER — FLUTICASONE PROPIONATE AND SALMETEROL 250; 50 UG/1; UG/1
1 POWDER RESPIRATORY (INHALATION) 2 TIMES DAILY
Qty: 180 EACH | Refills: 3 | Status: ON HOLD | OUTPATIENT
Start: 2022-10-06 | End: 2022-12-15 | Stop reason: HOSPADM

## 2022-10-06 RX ORDER — AZITHROMYCIN 250 MG/1
TABLET, FILM COATED ORAL
Qty: 6 TABLET | Refills: 0 | Status: SHIPPED | OUTPATIENT
Start: 2022-10-06 | End: 2022-10-11

## 2022-10-06 RX ORDER — TRIAMCINOLONE ACETONIDE 1 MG/G
CREAM TOPICAL 2 TIMES DAILY
Qty: 453.6 G | Refills: 2 | Status: SHIPPED | OUTPATIENT
Start: 2022-10-06

## 2022-10-06 RX ADMIN — METHYLPREDNISOLONE SODIUM SUCCINATE 125 MG: 125 INJECTION INTRAMUSCULAR; INTRAVENOUS at 11:10

## 2022-10-06 NOTE — PROGRESS NOTES
Subjective:       Patient ID: Francoise Dykes is a 62 y.o. female.    Chief Complaint: Asthma      HPI  63 yo female presents for asthma. Endorses congestion for about 10 days. Denies any f/c. Denies any known sick contacts.    Review of Systems   Respiratory:  Positive for shortness of breath and wheezing.         Past Medical History:   Diagnosis Date    Acid reflux     Allergy     seasonal    Asthma with acute exacerbation     Atrial fibrillation     Essential hypertension 2017    Pt states that she does not have HTN.      Past Surgical History:   Procedure Laterality Date    24 HOUR IMPEDANCE PH MONITORING OF ESOPHAGUS IN PATIENT TAKING ACID REDUCING MEDICATIONS N/A 2021    Procedure: IMPEDANCE PH STUDY, ESOPHAGEAL, 24 HOUR, IN PATIENT TAKING ACID REDUCING MEDICATION;  Surgeon: Franky Gomez MD;  Location: [facility] (4TH FLR);  Service: Endoscopy;  Laterality: N/A;  Patient to do EGD with with esophageal Bravo pH probe on omeprazole 40 mg once daily she needs to take it with a sip of water the day of the case  pt completed COVID vaccine- see Immunization record in     CARDIAC ELECTROPHYSIOLOGY STUDY AND ABLATION       SECTION      COLONOSCOPY N/A 2018    Procedure: COLONOSCOPY;  Surgeon: Brodie Lara MD;  Location: [facility];  Service: Endoscopy;  Laterality: N/A;  confirmed appt-ss    ESOPHAGEAL MANOMETRY WITH MEASUREMENT OF IMPEDANCE N/A 2021    Procedure: MANOMETRY, ESOPHAGUS, WITH IMPEDANCE MEASUREMENT;  Surgeon: Franky Gomez MD;  Location: [facility] (4TH FLR);  Service: Endoscopy;  Laterality: N/A;  Covid test  [facility]   pt confirmed appt-KPvt    ESOPHAGOGASTRODUODENOSCOPY N/A 2021    Procedure: EGD (ESOPHAGOGASTRODUODENOSCOPY);  Surgeon: Alin Camargo MD;  Location: [facility] (2ND FLR);  Service: Endoscopy;  Laterality: N/A;  Fully vaccinated 21, ok to hold Eliquis x 2 days per Dr. ANGELLA Meyers, instr portal -ml  12/10/21 LV to see if patient  can come in earlier -ml    HYSTERECTOMY  2000    MIGUEL    RADIOFREQUENCY ABLATION Left 2/27/2019    Procedure: RADIOFREQUENCY ABLATION, LEFT L2,3,4,5;  Surgeon: Mariusz Jang MD;  Location: [facility] PAIN MGT;  Service: Pain Management;  Laterality: Left;  Left RFA L2,3,4,5  1 of 2  *Ok to hold Eliquis, per Dr Meyers    RADIOFREQUENCY ABLATION Right 3/13/2019    Procedure: RADIOFREQUENCY ABLATION,  Right L2,3,4,5;  Surgeon: Mariusz Jang MD;  Location: [facility] PAIN MGT;  Service: Pain Management;  Laterality: Right;  Right RFA @ L2,3,4,5  2 of 2     Family History   Problem Relation Age of Onset    Hypertension Mother     Diabetes Mother     Glaucoma Mother     Allergies Mother     Asbestos Father     Obesity Father     Eczema Son     Hypertension Son     Heart disease Maternal Grandmother         chf    Diabetes Maternal Grandfather     Cancer Paternal Grandmother         lung, 2/2 second hand smoke    Glaucoma Paternal Grandfather     Blindness Paternal Grandfather     Melanoma Neg Hx     Psoriasis Neg Hx     Lupus Neg Hx     Acne Neg Hx     Breast cancer Neg Hx     Colon cancer Neg Hx     Ovarian cancer Neg Hx     Esophageal cancer Neg Hx     Cirrhosis Neg Hx     Celiac disease Neg Hx     Colon polyps Neg Hx     Crohn's disease Neg Hx     Liver cancer Neg Hx     Liver disease Neg Hx     Rectal cancer Neg Hx     Stomach cancer Neg Hx     Ulcerative colitis Neg Hx     Pancreatic cancer Neg Hx     Kidney cancer Neg Hx     Bladder Cancer Neg Hx     Uterine cancer Neg Hx      Social History     Socioeconomic History    Marital status:    Occupational History    Occupation: Teacher     Employer: [employer]    Tobacco Use    Smoking status: Never    Smokeless tobacco: Never   Substance and Sexual Activity    Alcohol use: Yes     Comment: rarely, 1 drink/week    Drug use: No    Sexual activity: Yes     Partners: Male   Other Topics Concern    Are you pregnant or think you may be? No    Breast-feeding No   Social  History Narrative    Recently moved back to [place] to help take care of mom.    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       Current Outpatient Medications:     apixaban (ELIQUIS) 5 mg Tab, Take 1 tablet (5 mg total) by mouth 2 (two) times daily., Disp: 180 tablet, Rfl: 3    BREO ELLIPTA 200-25 mcg/dose DsDv diskus inhaler, INHALE 1 PUFF BY MOUTH ONCE DAILY (CONTROLLER), Disp: 60 each, Rfl: 5    COVID-19 vacc, mRNA,Pfizer,/PF (PFIZER COVID-19 VACCINE, EUA, IM), , Disp: , Rfl:     diltiaZEM (DILACOR XR) 120 MG CDCR, Take 1 capsule (120 mg total) by mouth once daily., Disp: 90 capsule, Rfl: 3    omeprazole (PRILOSEC) 40 MG capsule, Take 1 capsule (40 mg total) by mouth 2 (two) times daily before meals., Disp: 180 capsule, Rfl: 3    propafenone (RYTHMOL) 225 MG Tab, Take one tablet by mouth every 8 hours., Disp: 30 tablet, Rfl: 0    semaglutide (OZEMPIC) 1 mg/dose (4 mg/3 mL), Inject 1 mg into the skin every 7 days., Disp: 3 pen, Rfl: 0    albuterol (PROVENTIL) 2.5 mg /3 mL (0.083 %) nebulizer solution, Take 3 mLs (2.5 mg total) by nebulization every 6 (six) hours as needed for Wheezing., Disp: 3 mL, Rfl: 5    albuterol (PROVENTIL/VENTOLIN HFA) 90 mcg/actuation inhaler, Inhale 2 puffs into the lungs every 6 (six) hours as needed for Wheezing or Shortness of Breath. Rescue, Disp: 18 g, Rfl: 11    cetirizine (ZYRTEC) 10 MG tablet, Take 1 tablet (10 mg total) by mouth once daily., Disp: 90 tablet, Rfl: 3    fluticasone-salmeterol diskus inhaler 250-50 mcg, Inhale 1 puff into the lungs 2 (two) times daily. Controller, Disp: 180 each, Rfl: 3    hydroCHLOROthiazide (HYDRODIURIL) 12.5 MG Tab, Take 1 tablet (12.5 mg total) by mouth once daily., Disp: 90 tablet, Rfl: 3    ipratropium (ATROVENT) 21 mcg (0.03 %) nasal spray, 2 sprays by Nasal route 2 (two) times daily., Disp: 30 mL, Rfl: 0    triamcinolone acetonide 0.1% (KENALOG) 0.1 % cream, Apply topically 2 (two) times daily., Disp: 453.6 g, Rfl: 2   Objective:     "  Vitals:    10/06/22 1033   BP: 118/78   BP Location: Left arm   Patient Position: Sitting   BP Method: Small (Manual)   Pulse: 71   Resp: 18   Temp: 98 °F (36.7 °C)   TempSrc: Oral   SpO2: 98%   Weight: 88.5 kg (195 lb 1.7 oz)   Height: 5' 4.5" (1.638 m)       Physical Exam  Constitutional:       General: She is not in acute distress.  HENT:      Head: Normocephalic and atraumatic.   Eyes:      Conjunctiva/sclera: Conjunctivae normal.   Cardiovascular:      Rate and Rhythm: Normal rate and regular rhythm.      Heart sounds: Normal heart sounds. No murmur heard.    No friction rub. No gallop.   Pulmonary:      Effort: Pulmonary effort is normal.      Breath sounds: Wheezing present. No rales.   Musculoskeletal:      Cervical back: Neck supple.   Skin:     General: Skin is warm and dry.      Findings: Rash present.   Neurological:      Mental Status: She is alert and oriented to person, place, and time.   Psychiatric:         Behavior: Behavior normal.         Thought Content: Thought content normal.         Judgment: Judgment normal.          Assessment:       1. Moderate persistent asthma with exacerbation    2. Essential hypertension    3. Colon cancer screening    4. Eczema, unspecified type    5. Chronic rhinitis        Plan:       Moderate persistent asthma with exacerbation  -     albuterol (PROVENTIL) 2.5 mg /3 mL (0.083 %) nebulizer solution; Take 3 mLs (2.5 mg total) by nebulization every 6 (six) hours as needed for Wheezing.  Dispense: 3 mL; Refill: 5  -     albuterol (PROVENTIL/VENTOLIN HFA) 90 mcg/actuation inhaler; Inhale 2 puffs into the lungs every 6 (six) hours as needed for Wheezing or Shortness of Breath. Rescue  Dispense: 18 g; Refill: 11  -     fluticasone-salmeterol diskus inhaler 250-50 mcg; Inhale 1 puff into the lungs 2 (two) times daily. Controller  Dispense: 180 each; Refill: 3  -     predniSONE (DELTASONE) 20 MG tablet; Take 3 tablets (60 mg total) by mouth once daily for 3 days, THEN 2 " tablets (40 mg total) once daily for 2 days, THEN 1 tablet (20 mg total) once daily for 2 days.  Dispense: 15 tablet; Refill: 0  -     azithromycin (Z-ROMI) 250 MG tablet; Take 2 tablets by mouth on day 1; Take 1 tablet by mouth on days 2-5  Dispense: 6 tablet; Refill: 0  -     methylPREDNISolone sodium succinate injection 125 mg    Essential hypertension  -     hydroCHLOROthiazide (HYDRODIURIL) 12.5 MG Tab; Take 1 tablet (12.5 mg total) by mouth once daily.  Dispense: 90 tablet; Refill: 3    Colon cancer screening  -     Ambulatory referral/consult to Endo Procedure ; Future; Expected date: 10/07/2022    Eczema, unspecified type  -     triamcinolone acetonide 0.1% (KENALOG) 0.1 % cream; Apply topically 2 (two) times daily.  Dispense: 453.6 g; Refill: 2    Chronic rhinitis  -     cetirizine (ZYRTEC) 10 MG tablet; Take 1 tablet (10 mg total) by mouth once daily.  Dispense: 90 tablet; Refill: 3    Will tx due to duration of symptoms. Given abx as well. Cont other meds.        Future Appointments   Date Time Provider Department Center   12/19/2022  9:40 AM Lee Meyers MD [facility]   12/27/2022 11:30 AM Moira Avila MD [facility] [place] [facility]   1/5/2023  8:20 AM Lee Meyers MD [facility]       Patient note was created using [employer].  Any errors in syntax or even information may not have been identified and edited on initial review prior to signing this note.

## 2022-10-06 NOTE — PROGRESS NOTES
Administered Solu-Medrol 125 mg IM to right upper outer quad gluteus.  Patient tolerated injection well, no adverse reactions noted.

## 2022-10-18 RX ORDER — CETIRIZINE HYDROCHLORIDE 10 MG/1
10 TABLET ORAL DAILY
Qty: 90 TABLET | Refills: 3 | Status: SHIPPED | OUTPATIENT
Start: 2022-10-18 | End: 2024-02-28

## 2022-10-21 ENCOUNTER — TELEPHONE (OUTPATIENT)
Dept: ELECTROPHYSIOLOGY | Facility: CLINIC | Age: 62
End: 2022-10-21
Payer: COMMERCIAL

## 2022-10-21 DIAGNOSIS — I49.8 OTHER CARDIAC ARRHYTHMIA: Primary | ICD-10-CM

## 2022-10-21 NOTE — TELEPHONE ENCOUNTER
----- Message from Jeanie Roberson RN sent at 10/21/2022  2:16 PM CDT -----  Regarding: FW: clarify dosage    ----- Message -----  From: Franky Dumont MA  Sent: 10/21/2022  11:56 AM CDT  To: Nanette Ann RN  Subject: FW: clarify dosage                               Good morning,     Please see below. Refill sent was 225 mg, pharmacy wanting to know exact dosage.     Thanks,  Franky Dumont MA     ----- Message -----  From: Sunita Ellis  Sent: 10/21/2022  11:42 AM CDT  To: Mira Howard Staff  Subject: clarify dosage                                   [facility] is calling to get a clear dosages on her propafenone (RYTHMOL) 225 MG Tab. Please call them back @ [phone]. Thanks, Sunita

## 2022-10-31 ENCOUNTER — HOSPITAL ENCOUNTER (EMERGENCY)
Facility: HOSPITAL | Age: 62
Discharge: HOME | End: 2022-10-31
Attending: EMERGENCY MEDICINE
Payer: COMMERCIAL

## 2022-10-31 VITALS
RESPIRATION RATE: 18 BRPM | HEIGHT: 64 IN | WEIGHT: 191 LBS | TEMPERATURE: 98 F | DIASTOLIC BLOOD PRESSURE: 76 MMHG | OXYGEN SATURATION: 98 % | HEART RATE: 81 BPM | BODY MASS INDEX: 32.61 KG/M2 | SYSTOLIC BLOOD PRESSURE: 136 MMHG

## 2022-10-31 DIAGNOSIS — I48.91 ATRIAL FIBRILLATION: ICD-10-CM

## 2022-10-31 DIAGNOSIS — R00.2 PALPITATIONS: Primary | ICD-10-CM

## 2022-10-31 DIAGNOSIS — E87.6 HYPOKALEMIA: ICD-10-CM

## 2022-10-31 LAB
ALBUMIN SERPL BCP-MCNC: 4.1 G/DL (ref 3.5–5.2)
ALP SERPL-CCNC: 73 U/L (ref 55–135)
ALT SERPL W/O P-5'-P-CCNC: 15 U/L (ref 10–44)
ANION GAP SERPL CALC-SCNC: 10 MMOL/L (ref 8–16)
AST SERPL-CCNC: 17 U/L (ref 10–40)
BASOPHILS # BLD AUTO: 0.04 K/UL (ref 0–0.2)
BASOPHILS NFR BLD: 0.8 % (ref 0–1.9)
BILIRUB SERPL-MCNC: 0.9 MG/DL (ref 0.1–1)
BUN SERPL-MCNC: 10 MG/DL (ref 6–20)
CALCIUM SERPL-MCNC: 9.5 MG/DL (ref 8.7–10.5)
CHLORIDE SERPL-SCNC: 107 MMOL/L (ref 95–110)
CO2 SERPL-SCNC: 24 MMOL/L (ref 23–29)
CREAT SERPL-MCNC: 0.8 MG/DL (ref 0.5–1.4)
DIFFERENTIAL METHOD: ABNORMAL
EOSINOPHIL # BLD AUTO: 0.2 K/UL (ref 0–0.5)
EOSINOPHIL NFR BLD: 3.8 % (ref 0–8)
ERYTHROCYTE [DISTWIDTH] IN BLOOD BY AUTOMATED COUNT: 12.2 % (ref 11.5–14.5)
EST. GFR  (NO RACE VARIABLE): >60 ML/MIN/1.73 M^2
GLUCOSE SERPL-MCNC: 95 MG/DL (ref 70–110)
HCT VFR BLD AUTO: 37.3 % (ref 37–48.5)
HCV AB SERPL QL IA: NORMAL
HGB BLD-MCNC: 13.2 G/DL (ref 12–16)
HIV 1+2 AB+HIV1 P24 AG SERPL QL IA: NORMAL
IMM GRANULOCYTES # BLD AUTO: 0.02 K/UL (ref 0–0.04)
IMM GRANULOCYTES NFR BLD AUTO: 0.4 % (ref 0–0.5)
LYMPHOCYTES # BLD AUTO: 1 K/UL (ref 1–4.8)
LYMPHOCYTES NFR BLD: 20.9 % (ref 18–48)
MCH RBC QN AUTO: 28.6 PG (ref 27–31)
MCHC RBC AUTO-ENTMCNC: 35.4 G/DL (ref 32–36)
MCV RBC AUTO: 81 FL (ref 82–98)
MONOCYTES # BLD AUTO: 0.3 K/UL (ref 0.3–1)
MONOCYTES NFR BLD: 6.1 % (ref 4–15)
NEUTROPHILS # BLD AUTO: 3.2 K/UL (ref 1.8–7.7)
NEUTROPHILS NFR BLD: 68 % (ref 38–73)
NRBC BLD-RTO: 0 /100 WBC
PLATELET # BLD AUTO: 216 K/UL (ref 150–450)
PMV BLD AUTO: 10.3 FL (ref 9.2–12.9)
POTASSIUM SERPL-SCNC: 3.3 MMOL/L (ref 3.5–5.1)
PROT SERPL-MCNC: 7.4 G/DL (ref 6–8.4)
RBC # BLD AUTO: 4.62 M/UL (ref 4–5.4)
SODIUM SERPL-SCNC: 141 MMOL/L (ref 136–145)
TROPONIN I SERPL DL<=0.01 NG/ML-MCNC: <0.006 NG/ML (ref 0–0.03)
TSH SERPL DL<=0.005 MIU/L-ACNC: 0.52 UIU/ML (ref 0.4–4)
WBC # BLD AUTO: 4.74 K/UL (ref 3.9–12.7)

## 2022-10-31 PROCEDURE — 87389 HIV-1 AG W/HIV-1&-2 AB AG IA: CPT | Performed by: PHYSICIAN ASSISTANT

## 2022-10-31 PROCEDURE — 99284 PR EMERGENCY DEPT VISIT,LEVEL IV: ICD-10-PCS | Mod: ,,, | Performed by: EMERGENCY MEDICINE

## 2022-10-31 PROCEDURE — 80053 COMPREHEN METABOLIC PANEL: CPT | Performed by: EMERGENCY MEDICINE

## 2022-10-31 PROCEDURE — 93005 ELECTROCARDIOGRAM TRACING: CPT

## 2022-10-31 PROCEDURE — 84443 ASSAY THYROID STIM HORMONE: CPT | Performed by: EMERGENCY MEDICINE

## 2022-10-31 PROCEDURE — 93010 ELECTROCARDIOGRAM REPORT: CPT | Mod: 76,,, | Performed by: INTERNAL MEDICINE

## 2022-10-31 PROCEDURE — 86803 HEPATITIS C AB TEST: CPT | Performed by: PHYSICIAN ASSISTANT

## 2022-10-31 PROCEDURE — 93010 EKG 12-LEAD: ICD-10-PCS | Mod: 76,,, | Performed by: INTERNAL MEDICINE

## 2022-10-31 PROCEDURE — 96361 HYDRATE IV INFUSION ADD-ON: CPT

## 2022-10-31 PROCEDURE — 93010 ELECTROCARDIOGRAM REPORT: CPT | Mod: ,,, | Performed by: INTERNAL MEDICINE

## 2022-10-31 PROCEDURE — 96374 THER/PROPH/DIAG INJ IV PUSH: CPT

## 2022-10-31 PROCEDURE — 99284 EMERGENCY DEPT VISIT MOD MDM: CPT | Mod: 25

## 2022-10-31 PROCEDURE — 84484 ASSAY OF TROPONIN QUANT: CPT | Performed by: EMERGENCY MEDICINE

## 2022-10-31 PROCEDURE — 25000003 PHARM REV CODE 250: Performed by: EMERGENCY MEDICINE

## 2022-10-31 PROCEDURE — 85025 COMPLETE CBC W/AUTO DIFF WBC: CPT | Performed by: EMERGENCY MEDICINE

## 2022-10-31 PROCEDURE — 63600175 PHARM REV CODE 636 W HCPCS: Performed by: EMERGENCY MEDICINE

## 2022-10-31 PROCEDURE — 99284 EMERGENCY DEPT VISIT MOD MDM: CPT | Mod: ,,, | Performed by: EMERGENCY MEDICINE

## 2022-10-31 RX ORDER — POTASSIUM CHLORIDE 20 MEQ/1
20 TABLET, EXTENDED RELEASE ORAL
Status: COMPLETED | OUTPATIENT
Start: 2022-10-31 | End: 2022-10-31

## 2022-10-31 RX ORDER — ONDANSETRON 2 MG/ML
4 INJECTION INTRAMUSCULAR; INTRAVENOUS
Status: COMPLETED | OUTPATIENT
Start: 2022-10-31 | End: 2022-10-31

## 2022-10-31 RX ADMIN — SODIUM CHLORIDE, SODIUM LACTATE, POTASSIUM CHLORIDE, AND CALCIUM CHLORIDE 1000 ML: .6; .31; .03; .02 INJECTION, SOLUTION INTRAVENOUS at 11:10

## 2022-10-31 RX ADMIN — ONDANSETRON 4 MG: 2 INJECTION INTRAMUSCULAR; INTRAVENOUS at 11:10

## 2022-10-31 RX ADMIN — POTASSIUM CHLORIDE EXTENDED-RELEASE 20 MEQ: 1500 TABLET ORAL at 12:10

## 2022-10-31 NOTE — ED NOTES
Pt reports that she woke up with palpitations and fluttering in her chest.  Pt states hx a-fib, took an extra pill at 330a.  Pt states she still has the fluttering sensation and +nausea.   Pt states that she has also had ablasion in the past.  Pt states intermittent SOB.       LOC: The patient is awake, alert and aware of environment with an appropriate affect, the patient is oriented x 3 and speaking appropriately.  APPEARANCE: Patient resting comfortably and in no acute distress, patient is clean and well groomed, patient's clothing is properly fastened.  SKIN: The skin is warm and dry, color consistent with ethnicity, patient has normal skin turgor and moist mucus membranes, skin intact, no breakdown or bruising noted.  MUSCULOSKELETAL: Patient moving all extremities spontaneously, no obvious swelling or deformities noted.  RESPIRATORY: Airway is open and patent, respirations are spontaneous, patient has a normal effort and rate, no accessory muscle use noted.  ABDOMEN: Soft and non tender to palpation, no distention noted.

## 2022-10-31 NOTE — Clinical Note
"Francoise Perkinsdamian Dykes was seen and treated in our emergency department on 10/31/2022.  She may return to work on 11/04/2022.       If you have any questions or concerns, please don't hesitate to call.      INES Almendarez Rn RN    "

## 2022-10-31 NOTE — ED PROVIDER NOTES
Encounter Date: 10/31/2022    SCRIBE #1 NOTE: I, Kaylan Corrales, am scribing for, and in the presence of,  Sudarshan Thomas MD. I have scribed the entire note.     History     Chief Complaint   Patient presents with    Atrial Fibrillation     Patient states that she has been in A-fib since 0300. Has a hx of a-fib and on dilt and propranolol for it.      Time patient was seen by the provider: 10:28 AM      The patient is a 62 y.o. female with co-morbidities including: atrial fibrillation and a PSHx of cardiac electrophysiology study and ablation, radiofrequency ablation who presents to the ED with a complaint of atrial fibrillation with onset 7 hours ago. She is on Eliquis, Propafenone, and Diltiazem. She is not constantly in Afib and occurs when she is stressed which she reports she currently is. Until recently, she has not had an episode in a few years. She has had a few mild episodes in the last few weeks that have resolved with extra Propafenone. This morning, it woke her up in her sleep and it did not improve with an extra Propafenone. At 0630 this morning, she tried to take a shower and felt lightheaded and had to sit down. This lightheadedness has not occurred since her first episode 10 years ago. She also has associated nausea, intermittent SOB, fatigue, and urinary frequency. She took her hydrochlorothiazide this morning. She denies any chest pain.     The history is provided by the patient and medical records. No  was used.   Review of patient's allergies indicates:   Allergen Reactions    Adhesive tape-silicones Itching     Manifested in 12/2015 following AF ablation.     Past Medical History:   Diagnosis Date    Acid reflux     Allergy     seasonal    Asthma with acute exacerbation     Atrial fibrillation     Essential hypertension 11/20/2017    Pt states that she does not have HTN.      Past Surgical History:   Procedure Laterality Date    24 HOUR IMPEDANCE PH MONITORING OF ESOPHAGUS IN  PATIENT TAKING ACID REDUCING MEDICATIONS N/A 2021    Procedure: IMPEDANCE PH STUDY, ESOPHAGEAL, 24 HOUR, IN PATIENT TAKING ACID REDUCING MEDICATION;  Surgeon: Franky Gomez MD;  Location: [facility] (4TH FLR);  Service: Endoscopy;  Laterality: N/A;  Patient to do EGD with with esophageal Bravo pH probe on omeprazole 40 mg once daily she needs to take it with a sip of water the day of the case  pt completed COVID vaccine- see Immunization record in     CARDIAC ELECTROPHYSIOLOGY STUDY AND ABLATION       SECTION      COLONOSCOPY N/A 2018    Procedure: COLONOSCOPY;  Surgeon: Brodie Lara MD;  Location: [facility] ENDO;  Service: Endoscopy;  Laterality: N/A;  confirmed appt-ss    ESOPHAGEAL MANOMETRY WITH MEASUREMENT OF IMPEDANCE N/A 2021    Procedure: MANOMETRY, ESOPHAGUS, WITH IMPEDANCE MEASUREMENT;  Surgeon: Franky Gomez MD;  Location: [facility] (4TH FLR);  Service: Endoscopy;  Laterality: N/A;  Covid test  [facility]   pt confirmed appt-KPvt    ESOPHAGOGASTRODUODENOSCOPY N/A 2021    Procedure: EGD (ESOPHAGOGASTRODUODENOSCOPY);  Surgeon: Alin Camargo MD;  Location: [facility] (2ND FLR);  Service: Endoscopy;  Laterality: N/A;  Fully vaccinated 21, ok to hold Eliquis x 2 days per Dr. ANGELLA Meyers, instr portal -ml  12/10/21 LVM to see if patient can come in earlier -ml    HYSTERECTOMY      MIGUEL    RADIOFREQUENCY ABLATION Left 2019    Procedure: RADIOFREQUENCY ABLATION, LEFT L2,3,4,5;  Surgeon: Mariusz Jang MD;  Location: [facility] PAIN T;  Service: Pain Management;  Laterality: Left;  Left RFA L2,3,4,5  1 of 2  *Ok to hold Eliquis, per Dr Meyers    RADIOFREQUENCY ABLATION Right 3/13/2019    Procedure: RADIOFREQUENCY ABLATION,  Right L2,3,4,5;  Surgeon: Mariusz Jang MD;  Location: [facility] PAIN MGT;  Service: Pain Management;  Laterality: Right;  Right RFA @ L2,3,4,5  2 of 2     Family History   Problem Relation Age of Onset    Hypertension Mother     Diabetes  Mother     Glaucoma Mother     Allergies Mother     Asbestos Father     Obesity Father     Eczema Son     Hypertension Son     Heart disease Maternal Grandmother         chf    Diabetes Maternal Grandfather     Cancer Paternal Grandmother         lung, 2/2 second hand smoke    Glaucoma Paternal Grandfather     Blindness Paternal Grandfather     Melanoma Neg Hx     Psoriasis Neg Hx     Lupus Neg Hx     Acne Neg Hx     Breast cancer Neg Hx     Colon cancer Neg Hx     Ovarian cancer Neg Hx     Esophageal cancer Neg Hx     Cirrhosis Neg Hx     Celiac disease Neg Hx     Colon polyps Neg Hx     Crohn's disease Neg Hx     Liver cancer Neg Hx     Liver disease Neg Hx     Rectal cancer Neg Hx     Stomach cancer Neg Hx     Ulcerative colitis Neg Hx     Pancreatic cancer Neg Hx     Kidney cancer Neg Hx     Bladder Cancer Neg Hx     Uterine cancer Neg Hx      Social History     Tobacco Use    Smoking status: Never    Smokeless tobacco: Never   Substance Use Topics    Alcohol use: Yes     Comment: rarely, 1 drink/week    Drug use: No     Review of Systems   Constitutional:  Positive for fatigue. Negative for fever.   HENT:  Negative for sore throat.    Respiratory:  Positive for shortness of breath.    Cardiovascular:  Positive for palpitations. Negative for chest pain.   Gastrointestinal:  Positive for nausea.   Genitourinary:  Positive for frequency. Negative for dysuria.   Musculoskeletal:  Negative for back pain.   Skin:  Negative for rash.   Neurological:  Positive for light-headedness. Negative for weakness.   Hematological:  Does not bruise/bleed easily.     Physical Exam     Initial Vitals [10/31/22 0945]   BP Pulse Resp Temp SpO2   124/64 81 20 97.7 °F (36.5 °C) 98 %      MAP       --         Physical Exam    Nursing note and vitals reviewed.  Constitutional: She appears well-developed and well-nourished. She is not diaphoretic. No distress.   HENT:   Head: Normocephalic and atraumatic.   Eyes: EOM are normal. Pupils  are equal, round, and reactive to light.   Neck: Neck supple.   Normal range of motion.  Cardiovascular:  Normal rate, regular rhythm, normal heart sounds and intact distal pulses.     Exam reveals no gallop and no friction rub.       No murmur heard.  Pulmonary/Chest: Breath sounds normal. No respiratory distress. She has no wheezes. She has no rhonchi. She has no rales.   Abdominal: Abdomen is soft. She exhibits no distension. There is no abdominal tenderness. There is no rebound and no guarding.   Musculoskeletal:         General: No tenderness or edema. Normal range of motion.      Cervical back: Normal range of motion and neck supple.     Neurological: She is alert and oriented to person, place, and time. She has normal strength.   Skin: Skin is warm. No rash noted.       ED Course   Procedures  Labs Reviewed   CBC W/ AUTO DIFFERENTIAL - Abnormal; Notable for the following components:       Result Value    MCV 81 (*)     All other components within normal limits    Narrative:     Release to patient->Immediate   COMPREHENSIVE METABOLIC PANEL - Abnormal; Notable for the following components:    Potassium 3.3 (*)     All other components within normal limits    Narrative:     Release to patient->Immediate   HIV 1 / 2 ANTIBODY    Narrative:     Release to patient->Immediate   HEPATITIS C ANTIBODY    Narrative:     Release to patient->Immediate   TSH    Narrative:     Release to patient->Immediate   TROPONIN I    Narrative:     Release to patient->Immediate     EKG Readings: (Independently Interpreted)   Initial Reading: No STEMI.   EKG #1: Atrial fibrillation. Normal rate. Left axis deviation. Inferior Q waves.   Repeat EKG:   EKG #2: Sinus arrhythmia with PACs. No obvious acute ischemic changes.  EKG #3: No change from EKG #2. The sinus arrhythmia is less distinct.      Imaging Results    None          Medications   lactated ringers bolus 1,000 mL (0 mLs Intravenous Stopped 10/31/22 1204)   ondansetron injection 4  mg (4 mg Intravenous Given 10/31/22 1104)   potassium chloride SA CR tablet 20 mEq (20 mEq Oral Given 10/31/22 1208)     Medical Decision Making:   History:   Old Medical Records: I decided to obtain old medical records.  Initial Assessment:   This is an emergent evaluation. Patient is appropriately managed and anticoagulated for paroxysmal Afib. Will provide IV fluids and Zofran. Electrolytes will be checked. Repeat EKG will be performed. I will reassess.   Independently Interpreted Test(s):   I have ordered and independently interpreted EKG Reading(s) - see prior notes  Clinical Tests:   Lab Tests: Ordered and Reviewed  Medical Tests: Ordered and Reviewed  ED Management:  11:59 AM  Patient's potassium is 3.3. Oral potassium will be provided. Otherwise, patient's lab studies are within normal limits. She is not in atrial fibrilation. She is resting comfortably at this time. I feel that the patient is clinically stable for discharge.         Scribe Attestation:   Scribe #1: I performed the above scribed service and the documentation accurately describes the services I performed. I attest to the accuracy of the note.                   Clinical Impression:   Final diagnoses:  [I48.91] Atrial fibrillation  [R00.2] Palpitations (Primary)  [E87.6] Hypokalemia        ED Disposition Condition    Discharge Stable          ED Prescriptions    None       Follow-up Information       Follow up With Specialties Details Why Contact Info    Juice So MD Family Medicine In 3 days  [address] [place] [id number] [phone]              I, Dr. Sudarshan Thomas, personally performed the services described in this documentation. All medical record entries made by the scribe were at my direction and in my presence.  I have reviewed the chart and agree that the record reflects my personal performance and is accurate and complete. Sudarshan Thomas MD.  3:58 PM 10/31/2022       Sudarshan Thomas MD  10/31/22 9081

## 2022-11-01 ENCOUNTER — PATIENT MESSAGE (OUTPATIENT)
Dept: ELECTROPHYSIOLOGY | Facility: CLINIC | Age: 62
End: 2022-11-01
Payer: COMMERCIAL

## 2022-11-01 ENCOUNTER — PATIENT MESSAGE (OUTPATIENT)
Dept: FAMILY MEDICINE | Facility: CLINIC | Age: 62
End: 2022-11-01
Payer: COMMERCIAL

## 2022-11-01 ENCOUNTER — TELEPHONE (OUTPATIENT)
Dept: ELECTROPHYSIOLOGY | Facility: CLINIC | Age: 62
End: 2022-11-01
Payer: COMMERCIAL

## 2022-11-01 DIAGNOSIS — I48.4 ATYPICAL ATRIAL FLUTTER: ICD-10-CM

## 2022-11-01 DIAGNOSIS — I48.0 PAROXYSMAL ATRIAL FIBRILLATION: Primary | ICD-10-CM

## 2022-11-01 NOTE — TELEPHONE ENCOUNTER
"Returned call to pt to advise. SAMMIE/DCCV scheduled for 11/04/2022. Pt states she believes she has not missed a dose.      ----- Message from Lee Meyers MD sent at 11/1/2022  3:53 PM CDT -----  Regarding: RE: er visit  She is in atypical flutter. Very subtle but there on ECG.    I think we need to schedule DCCV (no SAMMIE if compliant with AC) and increase propafenone dose post.    GP    ----- Message -----  From: Nanette Ann RN  Sent: 11/1/2022   3:45 PM CDT  To: Lee Meyers MD  Subject: FW: er visit                                     Pt went to the ER yesterday with AFIB and SOB. In the ER 2 EKG"S were done(see epic).  Pt states NSR.  She was d/c'ed to follow up. Pt states she is extremely SOB and has not been able to return to work. Can't walk from house to car , SOB to bad.   ----- Message -----  From: Franky Dumont MA  Sent: 10/31/2022   3:23 PM CDT  To: Nanette Ann RN  Subject: FW: er visit                                     Good afternoon,     Please see below. F/u scheduled in December.     Thanks,  Franky Dumont MA     ----- Message -----  From: Yajaira Orourke  Sent: 10/31/2022   1:45 PM CDT  To: Mira Howard Staff  Subject: er visit                                         Pt states that she was just seen in the ER with AFIB and was told everything was fine but she still isn't feeling good at all.  She can be reached at [phone].    Thank you          "

## 2022-11-01 NOTE — TELEPHONE ENCOUNTER
----- Message from Stacy Hatch sent at 11/1/2022  9:30 AM CDT -----  Type:  Same Day Appointment Request    Caller is requesting a same day appointment.  Caller declined first available   appointment listed below.      Name of Caller: self     When is the first available appointment? 12/14     Symptoms: Severe anxiety, under a lots of stress, states she can't function     Would the patient rather a call back or a response via [facility]? Call back     Best Call Back Number: [phone]    Add info: Does not want to see anyone else.

## 2022-11-02 ENCOUNTER — OFFICE VISIT (OUTPATIENT)
Dept: FAMILY MEDICINE | Facility: CLINIC | Age: 62
End: 2022-11-02
Payer: COMMERCIAL

## 2022-11-02 ENCOUNTER — TELEPHONE (OUTPATIENT)
Dept: ELECTROPHYSIOLOGY | Facility: CLINIC | Age: 62
End: 2022-11-02
Payer: COMMERCIAL

## 2022-11-02 VITALS
TEMPERATURE: 98 F | BODY MASS INDEX: 32.74 KG/M2 | DIASTOLIC BLOOD PRESSURE: 62 MMHG | HEIGHT: 64 IN | WEIGHT: 191.81 LBS | OXYGEN SATURATION: 98 % | RESPIRATION RATE: 17 BRPM | SYSTOLIC BLOOD PRESSURE: 130 MMHG | HEART RATE: 71 BPM

## 2022-11-02 DIAGNOSIS — I48.92 ATRIAL FLUTTER, UNSPECIFIED TYPE: ICD-10-CM

## 2022-11-02 DIAGNOSIS — F41.9 ANXIETY: Primary | ICD-10-CM

## 2022-11-02 PROCEDURE — 99214 PR OFFICE/OUTPT VISIT, EST, LEVL IV, 30-39 MIN: ICD-10-PCS | Mod: S$GLB,,, | Performed by: FAMILY MEDICINE

## 2022-11-02 PROCEDURE — 3078F PR MOST RECENT DIASTOLIC BLOOD PRESSURE < 80 MM HG: ICD-10-PCS | Mod: CPTII,S$GLB,, | Performed by: FAMILY MEDICINE

## 2022-11-02 PROCEDURE — 99999 PR PBB SHADOW E&M-EST. PATIENT-LVL V: CPT | Mod: PBBFAC,,, | Performed by: FAMILY MEDICINE

## 2022-11-02 PROCEDURE — 99999 PR PBB SHADOW E&M-EST. PATIENT-LVL V: ICD-10-PCS | Mod: PBBFAC,,, | Performed by: FAMILY MEDICINE

## 2022-11-02 PROCEDURE — 3075F PR MOST RECENT SYSTOLIC BLOOD PRESS GE 130-139MM HG: ICD-10-PCS | Mod: CPTII,S$GLB,, | Performed by: FAMILY MEDICINE

## 2022-11-02 PROCEDURE — 3078F DIAST BP <80 MM HG: CPT | Mod: CPTII,S$GLB,, | Performed by: FAMILY MEDICINE

## 2022-11-02 PROCEDURE — 3008F PR BODY MASS INDEX (BMI) DOCUMENTED: ICD-10-PCS | Mod: CPTII,S$GLB,, | Performed by: FAMILY MEDICINE

## 2022-11-02 PROCEDURE — 3008F BODY MASS INDEX DOCD: CPT | Mod: CPTII,S$GLB,, | Performed by: FAMILY MEDICINE

## 2022-11-02 PROCEDURE — 1159F MED LIST DOCD IN RCRD: CPT | Mod: CPTII,S$GLB,, | Performed by: FAMILY MEDICINE

## 2022-11-02 PROCEDURE — 99214 OFFICE O/P EST MOD 30 MIN: CPT | Mod: S$GLB,,, | Performed by: FAMILY MEDICINE

## 2022-11-02 PROCEDURE — 3075F SYST BP GE 130 - 139MM HG: CPT | Mod: CPTII,S$GLB,, | Performed by: FAMILY MEDICINE

## 2022-11-02 PROCEDURE — 1159F PR MEDICATION LIST DOCUMENTED IN MEDICAL RECORD: ICD-10-PCS | Mod: CPTII,S$GLB,, | Performed by: FAMILY MEDICINE

## 2022-11-02 NOTE — TELEPHONE ENCOUNTER
Called pt because she scheduled a f/u appointment with Dr. Meyers for 11/7/22 but she is having a DCCV 11/4/22. I informed her I would cancel her appointment and she will follow up with Dr. Meyers 1 month after her cardioversion. Pt verbally acknowledged instructions and thanked me for calling.

## 2022-11-02 NOTE — PROGRESS NOTES
Health Maintenance Due   Topic     Pneumococcal Vaccines (Age 0-64) (1 - PCV) Pt decline      TETANUS VACCINE      Influenza Vaccine (1) Pt recieved

## 2022-11-02 NOTE — PROGRESS NOTES
Subjective:       Patient ID: Francoise Dykes is a 62 y.o. female.    Chief Complaint: Follow-up      Follow-up    62-year-old female presents for ER follow-up.  Went to ED for shortness of breath and palpitations.  Patient labs were within normal limits.  Patient reached out to Cardiology and was advised she had atrial flutter on EKG.  Patient has a follow-up with cardiology in 3 days.  States her palpitations and shortness of breath has improved since she has been at home.      Review of Systems   Constitutional: Negative.    HENT: Negative.     Respiratory: Negative.     Cardiovascular: Negative.    Gastrointestinal: Negative.    Endocrine: Negative.    Genitourinary: Negative.    Musculoskeletal: Negative.    Neurological: Negative.    Psychiatric/Behavioral: Negative.          Past Medical History:   Diagnosis Date    Acid reflux     Allergy     seasonal    Asthma with acute exacerbation     Atrial fibrillation     Essential hypertension 2017    Pt states that she does not have HTN.      Past Surgical History:   Procedure Laterality Date    24 HOUR IMPEDANCE PH MONITORING OF ESOPHAGUS IN PATIENT TAKING ACID REDUCING MEDICATIONS N/A 2021    Procedure: IMPEDANCE PH STUDY, ESOPHAGEAL, 24 HOUR, IN PATIENT TAKING ACID REDUCING MEDICATION;  Surgeon: Franky Gomez MD;  Location: [facility] ([address] [place] [address]);  Service: Endoscopy;  Laterality: N/A;  Patient to do EGD with with esophageal Bravo pH probe on omeprazole 40 mg once daily she needs to take it with a sip of water the day of the case  pt completed COVID vaccine- see Immunization record in     CARDIAC ELECTROPHYSIOLOGY STUDY AND ABLATION       SECTION      COLONOSCOPY N/A 2018    Procedure: COLONOSCOPY;  Surgeon: Brodie Lara MD;  Location: [facility];  Service: Endoscopy;  Laterality: N/A;  confirmed appt-ss    ESOPHAGEAL MANOMETRY WITH MEASUREMENT OF IMPEDANCE N/A 2021    Procedure: MANOMETRY, ESOPHAGUS, WITH IMPEDANCE MEASUREMENT;   Surgeon: Franky Gomez MD;  Location: [facility] ENDO (4TH FLR);  Service: Endoscopy;  Laterality: N/A;  Covid test 9/17 [facility] [place]  9/16 pt confirmed appt-KPvt    ESOPHAGOGASTRODUODENOSCOPY N/A 12/13/2021    Procedure: EGD (ESOPHAGOGASTRODUODENOSCOPY);  Surgeon: Alin Camargo MD;  Location: [facility] ENDO (2ND FLR);  Service: Endoscopy;  Laterality: N/A;  Fully vaccinated 12/13/21, ok to hold Eliquis x 2 days per Dr. ANGELLA Meyers, instr portal -ml  12/10/21 LVM to see if patient can come in earlier -ml    HYSTERECTOMY  2000    MIGUEL    RADIOFREQUENCY ABLATION Left 2/27/2019    Procedure: RADIOFREQUENCY ABLATION, LEFT L2,3,4,5;  Surgeon: Mariusz Jang MD;  Location: [facility] [place], operated [facility];  Service: Pain Management;  Laterality: Left;  Left RFA L2,3,4,5  1 of 2  *Ok to hold Eliquis, per Dr Meyers    RADIOFREQUENCY ABLATION Right 3/13/2019    Procedure: RADIOFREQUENCY ABLATION,  Right L2,3,4,5;  Surgeon: Mariusz Jang MD;  Location: [facility] [place], operated [facility];  Service: Pain Management;  Laterality: Right;  Right RFA @ L2,3,4,5  2 of 2     Family History   Problem Relation Age of Onset    Hypertension Mother     Diabetes Mother     Glaucoma Mother     Allergies Mother     Asbestos Father     Obesity Father     Eczema Son     Hypertension Son     Heart disease Maternal Grandmother         chf    Diabetes Maternal Grandfather     Cancer Paternal Grandmother         lung, 2/2 second hand smoke    Glaucoma Paternal Grandfather     Blindness Paternal Grandfather     Melanoma Neg Hx     Psoriasis Neg Hx     Lupus Neg Hx     Acne Neg Hx     Breast cancer Neg Hx     Colon cancer Neg Hx     Ovarian cancer Neg Hx     Esophageal cancer Neg Hx     Cirrhosis Neg Hx     Celiac disease Neg Hx     Colon polyps Neg Hx     Crohn's disease Neg Hx     Liver cancer Neg Hx     Liver disease Neg Hx     Rectal cancer Neg Hx     Stomach cancer Neg Hx     Ulcerative colitis Neg Hx     Pancreatic cancer Neg Hx     Kidney cancer Neg Hx     Bladder Cancer Neg Hx      Uterine cancer Neg Hx      Social History     Socioeconomic History    Marital status:    Occupational History    Occupation: Teacher     Employer: [employer]    Tobacco Use    Smoking status: Never    Smokeless tobacco: Never   Substance and Sexual Activity    Alcohol use: Yes     Comment: rarely, 1 drink/week    Drug use: No    Sexual activity: Yes     Partners: Male   Other Topics Concern    Are you pregnant or think you may be? No    Breast-feeding No   Social History Narrative    Recently moved back to [facility] to help take care of mom.    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 Rescue, Disp: 18 g, Rfl: 11    apixaban (ELIQUIS) 5 mg Tab, Take 1 tablet (5 mg total) by mouth 2 (two) times daily., Disp: 180 tablet, Rfl: 3    cetirizine (ZYRTEC) 10 MG tablet, Take 1 tablet (10 mg total) by mouth once daily., Disp: 90 tablet, Rfl: 3    COVID-19 vacc, mRNA,Pfizer,/PF (PFIZER COVID-19 VACCINE, EUA, IM), , Disp: , Rfl:     diltiaZEM (DILACOR XR) 120 MG CDCR, Take 1 capsule (120 mg total) by mouth once daily., Disp: 90 capsule, Rfl: 3    hydroCHLOROthiazide (HYDRODIURIL) 12.5 MG Tab, Take 1 tablet (12.5 mg total) by mouth once daily., Disp: 90 tablet, Rfl: 3    omeprazole (PRILOSEC) 40 MG capsule, Take 1 capsule (40 mg total) by mouth 2 (two) times daily before meals., Disp: 180 capsule, Rfl: 3    propafenone (RYTHMOL) 225 MG Tab, Take one tablet by mouth every 8 hours., Disp: 270 tablet, Rfl: 1    semaglutide (OZEMPIC) 1 mg/dose (4 mg/3 mL), Inject 1 mg into the skin every 7 days., Disp: 3 pen, Rfl: 0    BREO ELLIPTA 200-25 mcg/dose DsDv  "diskus inhaler, INHALE 1 PUFF BY MOUTH ONCE DAILY (CONTROLLER), Disp: 60 each, Rfl: 5    fluticasone-salmeterol diskus inhaler 250-50 mcg, Inhale 1 puff into the lungs 2 (two) times daily. Controller, Disp: 180 each, Rfl: 3    ipratropium (ATROVENT) 21 mcg (0.03 %) nasal spray, 2 sprays by Nasal route 2 (two) times daily., Disp: 30 mL, Rfl: 0    propafenone (RYTHMOL) 225 MG Tab, Take one tablet by mouth every 8 hours., Disp: 90 tablet, Rfl: 3    triamcinolone acetonide 0.1% (KENALOG) 0.1 % cream, Apply topically 2 (two) times daily., Disp: 453.6 g, Rfl: 2   Objective:      Vitals:    11/02/22 0903   BP: 130/62   BP Location: Left arm   Patient Position: Sitting   BP Method: Small (Manual)   Pulse: 71   Resp: 17   Temp: 98.2 °F (36.8 °C)   TempSrc: Oral   SpO2: 98%   Weight: 87 kg (191 lb 12.8 oz)   Height: 5' 4" (1.626 m)       Physical Exam  Constitutional:       General: She is not in acute distress.  HENT:      Head: Normocephalic and atraumatic.   Eyes:      Conjunctiva/sclera: Conjunctivae normal.   Cardiovascular:      Rate and Rhythm: Normal rate and regular rhythm.      Heart sounds: Normal heart sounds. No murmur heard.    No friction rub. No gallop.   Pulmonary:      Effort: Pulmonary effort is normal.      Breath sounds: Normal breath sounds. No wheezing or rales.   Musculoskeletal:      Cervical back: Neck supple.   Skin:     General: Skin is warm and dry.   Neurological:      Mental Status: She is alert and oriented to person, place, and time.   Psychiatric:         Behavior: Behavior normal.         Thought Content: Thought content normal.         Judgment: Judgment normal.          Assessment:       1. Anxiety    2. Atrial flutter, unspecified type          Plan:       Anxiety  -     Ambulatory referral/consult to Psychiatry; Future; Expected date: 11/09/2022    Atrial flutter, unspecified type    F/u w/ cards for atrial flutter. Given excuse from work. Referred pt to psych. Hold off sleep meds at " this time until card eval. Advised pt to send message afterwards.        Future Appointments   Date Time Provider Department Center   11/4/2022 11:00 AM 3PREP, [facility] SSCU [facility]   11/4/2022  1:00 PM INPATIENT, [facility] ECHOSTR [facility]   11/11/2022  8:30 AM EKG, APPT [facility] EKG [facility]   12/19/2022  9:00 AM EKG, APPT [facility] EKG [facility]   12/27/2022 11:30 AM Moira Avila MD [facility] BARIAT [facility]   1/5/2023  8:20 AM Lee Meyers MD [facility] ARRHYTH [facility]       Patient note was created using [employer].  Any errors in syntax or even information may not have been identified and edited on initial review prior to signing this note.

## 2022-11-02 NOTE — LETTER
November 2, 2022      [facility]  [address]  [place] [address]  Phone: [phone]  Fax: [phone]       Patient: Francoise Dykes   YOB: 1960  Date of Visit: 11/02/2022    To Whom It May Concern:    Maddie Dykes  was at [facility] on 11/02/2022. The patient may return to work/school on 11/7/2022. If you have any questions or concerns, or if I can be of further assistance, please do not hesitate to contact me.    Sincerely,        Juice So MD

## 2022-11-03 ENCOUNTER — HOSPITAL ENCOUNTER (OUTPATIENT)
Dept: CARDIOLOGY | Facility: CLINIC | Age: 62
Discharge: HOME | End: 2022-11-03
Payer: COMMERCIAL

## 2022-11-03 ENCOUNTER — TELEPHONE (OUTPATIENT)
Dept: ELECTROPHYSIOLOGY | Facility: CLINIC | Age: 62
End: 2022-11-03
Payer: COMMERCIAL

## 2022-11-03 DIAGNOSIS — I48.0 PAROXYSMAL ATRIAL FIBRILLATION: Primary | ICD-10-CM

## 2022-11-03 DIAGNOSIS — I48.0 PAROXYSMAL ATRIAL FIBRILLATION: ICD-10-CM

## 2022-11-03 PROCEDURE — 93010 EKG 12-LEAD: ICD-10-PCS | Mod: S$GLB,,, | Performed by: INTERNAL MEDICINE

## 2022-11-03 PROCEDURE — 93005 ELECTROCARDIOGRAM TRACING: CPT | Mod: S$GLB,,, | Performed by: INTERNAL MEDICINE

## 2022-11-03 PROCEDURE — 93010 ELECTROCARDIOGRAM REPORT: CPT | Mod: S$GLB,,, | Performed by: INTERNAL MEDICINE

## 2022-11-03 PROCEDURE — 93005 EKG 12-LEAD: ICD-10-PCS | Mod: S$GLB,,, | Performed by: INTERNAL MEDICINE

## 2022-11-03 NOTE — TELEPHONE ENCOUNTER
Pt had EKG done today. Dr Meyers reviewed and pt in NSR. SAMMIE/DCCV cancelled for tomm. Pt will follow up on 11/07/2022 per Dr Meyers.

## 2022-11-03 NOTE — TELEPHONE ENCOUNTER
Spoke to Ms Lara     CONFIRMED procedure arrival time on 11/04/2022 at 11 am     Reiterated instructions including:  -Directions to check in desk  -NPO after midnight night prior to procedure  -High importance of HOLDING Ozempic  -Pre-procedure LABS completed and reviewed  -Do not wear mascara day of procedure  Patient verbalized understanding of above and appreciated the call.     Pt states she thinks she is back in rhythm. EKG scheduled for 4:45 today.

## 2022-11-04 ENCOUNTER — PATIENT MESSAGE (OUTPATIENT)
Dept: ELECTROPHYSIOLOGY | Facility: CLINIC | Age: 62
End: 2022-11-04
Payer: COMMERCIAL

## 2022-11-07 ENCOUNTER — OFFICE VISIT (OUTPATIENT)
Dept: ELECTROPHYSIOLOGY | Facility: CLINIC | Age: 62
End: 2022-11-07
Payer: COMMERCIAL

## 2022-11-07 ENCOUNTER — HOSPITAL ENCOUNTER (OUTPATIENT)
Dept: CARDIOLOGY | Facility: CLINIC | Age: 62
Discharge: HOME | End: 2022-11-07
Payer: COMMERCIAL

## 2022-11-07 VITALS
SYSTOLIC BLOOD PRESSURE: 125 MMHG | HEIGHT: 64 IN | HEART RATE: 73 BPM | DIASTOLIC BLOOD PRESSURE: 80 MMHG | BODY MASS INDEX: 33.31 KG/M2 | WEIGHT: 195.13 LBS

## 2022-11-07 DIAGNOSIS — Z98.890 S/P ABLATION OF ATRIAL FIBRILLATION: ICD-10-CM

## 2022-11-07 DIAGNOSIS — I49.8 OTHER CARDIAC ARRHYTHMIA: ICD-10-CM

## 2022-11-07 DIAGNOSIS — Z86.79 S/P ABLATION OF ATRIAL FIBRILLATION: ICD-10-CM

## 2022-11-07 DIAGNOSIS — I48.4 ATYPICAL ATRIAL FLUTTER: Primary | ICD-10-CM

## 2022-11-07 DIAGNOSIS — I48.0 PAROXYSMAL ATRIAL FIBRILLATION: ICD-10-CM

## 2022-11-07 PROCEDURE — 99214 OFFICE O/P EST MOD 30 MIN: CPT | Mod: S$GLB,,, | Performed by: INTERNAL MEDICINE

## 2022-11-07 PROCEDURE — 3008F BODY MASS INDEX DOCD: CPT | Mod: CPTII,S$GLB,, | Performed by: INTERNAL MEDICINE

## 2022-11-07 PROCEDURE — 3008F PR BODY MASS INDEX (BMI) DOCUMENTED: ICD-10-PCS | Mod: CPTII,S$GLB,, | Performed by: INTERNAL MEDICINE

## 2022-11-07 PROCEDURE — 93005 RHYTHM STRIP: ICD-10-PCS | Mod: S$GLB,,, | Performed by: INTERNAL MEDICINE

## 2022-11-07 PROCEDURE — 1160F PR REVIEW ALL MEDS BY PRESCRIBER/CLIN PHARMACIST DOCUMENTED: ICD-10-PCS | Mod: CPTII,S$GLB,, | Performed by: INTERNAL MEDICINE

## 2022-11-07 PROCEDURE — 3074F PR MOST RECENT SYSTOLIC BLOOD PRESSURE < 130 MM HG: ICD-10-PCS | Mod: CPTII,S$GLB,, | Performed by: INTERNAL MEDICINE

## 2022-11-07 PROCEDURE — 3079F DIAST BP 80-89 MM HG: CPT | Mod: CPTII,S$GLB,, | Performed by: INTERNAL MEDICINE

## 2022-11-07 PROCEDURE — 93010 ELECTROCARDIOGRAM REPORT: CPT | Mod: S$GLB,,, | Performed by: INTERNAL MEDICINE

## 2022-11-07 PROCEDURE — 93010 RHYTHM STRIP: ICD-10-PCS | Mod: S$GLB,,, | Performed by: INTERNAL MEDICINE

## 2022-11-07 PROCEDURE — 1159F PR MEDICATION LIST DOCUMENTED IN MEDICAL RECORD: ICD-10-PCS | Mod: CPTII,S$GLB,, | Performed by: INTERNAL MEDICINE

## 2022-11-07 PROCEDURE — 99999 PR PBB SHADOW E&M-EST. PATIENT-LVL IV: CPT | Mod: PBBFAC,,, | Performed by: INTERNAL MEDICINE

## 2022-11-07 PROCEDURE — 3079F PR MOST RECENT DIASTOLIC BLOOD PRESSURE 80-89 MM HG: ICD-10-PCS | Mod: CPTII,S$GLB,, | Performed by: INTERNAL MEDICINE

## 2022-11-07 PROCEDURE — 99999 PR PBB SHADOW E&M-EST. PATIENT-LVL IV: ICD-10-PCS | Mod: PBBFAC,,, | Performed by: INTERNAL MEDICINE

## 2022-11-07 PROCEDURE — 1159F MED LIST DOCD IN RCRD: CPT | Mod: CPTII,S$GLB,, | Performed by: INTERNAL MEDICINE

## 2022-11-07 PROCEDURE — 3074F SYST BP LT 130 MM HG: CPT | Mod: CPTII,S$GLB,, | Performed by: INTERNAL MEDICINE

## 2022-11-07 PROCEDURE — 93005 ELECTROCARDIOGRAM TRACING: CPT | Mod: S$GLB,,, | Performed by: INTERNAL MEDICINE

## 2022-11-07 PROCEDURE — 1160F RVW MEDS BY RX/DR IN RCRD: CPT | Mod: CPTII,S$GLB,, | Performed by: INTERNAL MEDICINE

## 2022-11-07 PROCEDURE — 99214 PR OFFICE/OUTPT VISIT, EST, LEVL IV, 30-39 MIN: ICD-10-PCS | Mod: S$GLB,,, | Performed by: INTERNAL MEDICINE

## 2022-11-07 RX ORDER — PROPAFENONE HYDROCHLORIDE 225 MG/1
TABLET, FILM COATED ORAL
Qty: 270 TABLET | Refills: 1 | Status: SHIPPED | OUTPATIENT
Start: 2022-11-07 | End: 2022-12-27

## 2022-11-07 NOTE — PROGRESS NOTES
Subjective:   HPI         Notes:   I had the pleasure of seeing Francoise Dykes in electrophysiology clinic today for follow up of her paroxysmal atrial fibrillation. She was last seen in EP clinic in 12/2020. She is a 60 year old  who was well until 2/2012 when shewas diagnosed with symptomatic AF. In 11/2012, Mrs. Dykes had another episode of atrial fibrillation after an argument with her son. She presented to [facility], and once again reverted to sinus rhythm within 30 minutes. She was discharged on Flecainide 50 mg BID, Toprol XL 50 mg QD, and Aspirin.     When I initially saw Ms. Dykes in 1/2014, she admitted to monthly palpitations, which would last seconds and resolve with coughing. She believed that Flecainide caused her some shortness of breath. At that office visit, I stopped Flecainide and started Rythmol 600 mg PRN palpitations. Ms. Dykes had increasing breakthrough episodes, and underwent PVI and SVC isolation in 12/2015. She was started on Amiodarone at that time. At her follow-up visit in 1/2016, she was doing well, with no episodes of sustained palpitations. Amiodarone was stopped in early 3/2016. In 8/2016, Ms. Dykes was admitted with atypical atrial flutter with RVR. She was cardioverted to sinus rhythm, and started on Rythmol  mg BID.    In 12/2016, Ms. Dykes underwent repeat PVI. The right pulmonary veins were re-isolated, with isolated firing noted from the veins post-isolation. Additionally, a mid-sal AT was targeted and successfully ablated. She was discharged on Rythmol  mg bid. In 3/2017 Rythmol was stopped. Several weeks later she presented to [facility] with atypical atrial flutter with 2:1 AVB at a rate of 120 bpm. Rythmol was restarted at that time. In 8/2017, Ms. Dykes presented with atypical atrial flutter with RVR, and underwent SAMMIE/DCCV. Notably, Ms. Dykes missed her AM dose of Rythmol. She was continued on Rythmol. In 12/2017 and 1/2018, Ms.  Donis had recurrent atypical atrial flutter, spontaneously converting to sinus rhythm. At that time her Rythmol was increased to 325 mg bid. At her 4/2018 visit Ms. Dykes was doing well, and the plan was to hold the course.    On 3/21/2019, Ms. Dykes presented to [facility] with palpitations and was found to be in atypical atrial flutter with RVR, which terminated after receiving an extra dose of Rythmol. In total the episode lasted 2-3 hours. She had several episodes of palpitations lasting seconds in the weeks prior, which terminated spontaneously. Notably, she took steroids for an Achilles heel injury several days prior to her ED visit.    At her last visit in 12/2020, Ms. Dykes described short-lived episodes of palpitations lasting seconds. The plan at that time was to hold the course. Unfortunately, Rythmol SR is no longer covered by her insurance, so it was switched to Propafenone 225 mg TID. She felt poorly on this medication, so switched back to long-acting propafenone. She has been back on rythmol sr for the past week, She has had a single episode of AF for about 45 minutes, but wants to try short-acting again. She also had a recent hospitalization for tightness in her chest. She was in sinus at that time, and was treated with steroids. She finished her steroid taper two days ago.    Interval History:  Patient went into atrial flutter and then self converted. Patient states she felt awful while in it. She then felt herself go back in on Saturday and currently feels the same.    ECG today shows normal sinus rhythm rate 73bpm    Review of Systems   Constitutional: Negative for chills, decreased appetite, diaphoresis, malaise/fatigue and weight loss.   HENT:  Negative for congestion, hearing loss, nosebleeds and sore throat.    Eyes:  Negative for pain, redness and visual disturbance.   Cardiovascular:  Positive for irregular heartbeat and palpitations. Negative for chest pain, dyspnea on exertion, leg swelling,  orthopnea and syncope.   Respiratory:  Negative for cough, shortness of breath, sleep disturbances due to breathing and wheezing.    Endocrine: Negative for cold intolerance and heat intolerance.   Hematologic/Lymphatic: Negative for bleeding problem. Does not bruise/bleed easily.   Skin:  Negative for color change, dry skin, poor wound healing and rash.   Musculoskeletal:  Negative for arthritis, back pain, falls, joint pain, muscle weakness and myalgias.   Gastrointestinal:  Negative for abdominal pain, change in bowel habit, constipation, diarrhea, hematochezia, melena and vomiting.   Genitourinary:  Negative for dysuria, frequency, hematuria, nocturia and urgency.   Neurological:  Negative for difficulty with concentration, disturbances in coordination, dizziness, focal weakness, headaches, light-headedness, numbness and weakness.   Psychiatric/Behavioral:  Negative for altered mental status. The patient does not have insomnia.       Objective:    Physical Exam  Vitals reviewed.   Constitutional:       General: She is not in acute distress.     Appearance: She is well-developed.   HENT:      Head: Normocephalic and atraumatic.   Eyes:      General:         Right eye: No discharge.         Left eye: No discharge.      Conjunctiva/sclera: Conjunctivae normal.   Neck:      Vascular: No JVD.   Cardiovascular:      Rate and Rhythm: Normal rate and regular rhythm.      Pulses: Intact distal pulses.      Heart sounds: Normal heart sounds. No murmur heard.  Pulmonary:      Effort: Pulmonary effort is normal. No respiratory distress.      Breath sounds: Normal breath sounds.   Abdominal:      General: There is no distension.      Palpations: Abdomen is soft.      Tenderness: There is no abdominal tenderness.   Musculoskeletal:         General: Normal range of motion.      Cervical back: Neck supple.   Skin:     General: Skin is warm and dry.   Neurological:      Mental Status: She is alert and oriented to person, place,  and time.     ECG today: sinus rhythm      Assessment:       1. Atypical atrial flutter    2. Paroxysmal atrial fibrillation           Plan:       In summary, Francoise Dykes is a 60 year old female with a history of PVIx2, who continued to have episodes of atypical flutter following her second ablation. Episodes were largely controlled on Rythmol  mg bid. She is now on short-acting propafenone 225 tid due to insurance. Recommend redo PVI given multiple episodes of recurrence.   Redo PVI  Carto  SAMMIE cancel if sinus

## 2022-12-07 DIAGNOSIS — I48.0 PAROXYSMAL ATRIAL FIBRILLATION: ICD-10-CM

## 2022-12-08 RX ORDER — DILTIAZEM HYDROCHLORIDE 120 MG/1
120 CAPSULE, EXTENDED RELEASE ORAL DAILY
Qty: 90 CAPSULE | Refills: 3 | Status: SHIPPED | OUTPATIENT
Start: 2022-12-08 | End: 2023-12-08 | Stop reason: SDUPTHER

## 2022-12-11 ENCOUNTER — PATIENT MESSAGE (OUTPATIENT)
Dept: ELECTROPHYSIOLOGY | Facility: CLINIC | Age: 62
End: 2022-12-11
Payer: COMMERCIAL

## 2022-12-12 ENCOUNTER — HOSPITAL ENCOUNTER (INPATIENT)
Facility: HOSPITAL | Age: 62
LOS: 1 days | Discharge: HOME | DRG: 309 | End: 2022-12-15
Attending: EMERGENCY MEDICINE | Admitting: STUDENT IN AN ORGANIZED HEALTH CARE EDUCATION/TRAINING PROGRAM
Payer: COMMERCIAL

## 2022-12-12 DIAGNOSIS — I48.91 ATRIAL FIBRILLATION WITH TACHYCARDIC VENTRICULAR RATE: ICD-10-CM

## 2022-12-12 DIAGNOSIS — I48.91 AFIB: ICD-10-CM

## 2022-12-12 DIAGNOSIS — I48.4 ATYPICAL ATRIAL FLUTTER: ICD-10-CM

## 2022-12-12 DIAGNOSIS — L30.9 ECZEMA, UNSPECIFIED TYPE: ICD-10-CM

## 2022-12-12 DIAGNOSIS — R00.0 TACHYCARDIA: ICD-10-CM

## 2022-12-12 DIAGNOSIS — I48.92 ATRIAL FLUTTER: Primary | ICD-10-CM

## 2022-12-12 DIAGNOSIS — I48.91 ATRIAL FIBRILLATION WITH RVR: ICD-10-CM

## 2022-12-12 DIAGNOSIS — R07.9 CHEST PAIN: ICD-10-CM

## 2022-12-12 LAB
ALBUMIN SERPL BCP-MCNC: 4 G/DL (ref 3.5–5.2)
ALP SERPL-CCNC: 85 U/L (ref 55–135)
ALT SERPL W/O P-5'-P-CCNC: 18 U/L (ref 10–44)
ANION GAP SERPL CALC-SCNC: 15 MMOL/L (ref 8–16)
AST SERPL-CCNC: 16 U/L (ref 10–40)
BASOPHILS # BLD AUTO: 0.02 K/UL (ref 0–0.2)
BASOPHILS NFR BLD: 0.3 % (ref 0–1.9)
BILIRUB SERPL-MCNC: 0.6 MG/DL (ref 0.1–1)
BNP SERPL-MCNC: 136 PG/ML (ref 0–99)
BUN SERPL-MCNC: 16 MG/DL (ref 6–20)
CALCIUM SERPL-MCNC: 10.1 MG/DL (ref 8.7–10.5)
CHLORIDE SERPL-SCNC: 108 MMOL/L (ref 95–110)
CO2 SERPL-SCNC: 18 MMOL/L (ref 23–29)
CREAT SERPL-MCNC: 1.1 MG/DL (ref 0.5–1.4)
DIFFERENTIAL METHOD: ABNORMAL
EOSINOPHIL # BLD AUTO: 0 K/UL (ref 0–0.5)
EOSINOPHIL NFR BLD: 0.3 % (ref 0–8)
ERYTHROCYTE [DISTWIDTH] IN BLOOD BY AUTOMATED COUNT: 12.5 % (ref 11.5–14.5)
EST. GFR  (NO RACE VARIABLE): 57.5 ML/MIN/1.73 M^2
GLUCOSE SERPL-MCNC: 185 MG/DL (ref 70–110)
HCT VFR BLD AUTO: 41 % (ref 37–48.5)
HGB BLD-MCNC: 14.8 G/DL (ref 12–16)
IMM GRANULOCYTES # BLD AUTO: 0.05 K/UL (ref 0–0.04)
IMM GRANULOCYTES NFR BLD AUTO: 0.6 % (ref 0–0.5)
LYMPHOCYTES # BLD AUTO: 0.7 K/UL (ref 1–4.8)
LYMPHOCYTES NFR BLD: 8.7 % (ref 18–48)
MAGNESIUM SERPL-MCNC: 2 MG/DL (ref 1.6–2.6)
MCH RBC QN AUTO: 29.3 PG (ref 27–31)
MCHC RBC AUTO-ENTMCNC: 36.1 G/DL (ref 32–36)
MCV RBC AUTO: 81 FL (ref 82–98)
MONOCYTES # BLD AUTO: 0 K/UL (ref 0.3–1)
MONOCYTES NFR BLD: 0.5 % (ref 4–15)
NEUTROPHILS # BLD AUTO: 7 K/UL (ref 1.8–7.7)
NEUTROPHILS NFR BLD: 89.6 % (ref 38–73)
NRBC BLD-RTO: 0 /100 WBC
PLATELET # BLD AUTO: 303 K/UL (ref 150–450)
PMV BLD AUTO: 10.9 FL (ref 9.2–12.9)
POTASSIUM SERPL-SCNC: 3.7 MMOL/L (ref 3.5–5.1)
PROT SERPL-MCNC: 7.8 G/DL (ref 6–8.4)
RBC # BLD AUTO: 5.05 M/UL (ref 4–5.4)
SODIUM SERPL-SCNC: 141 MMOL/L (ref 136–145)
TROPONIN I SERPL DL<=0.01 NG/ML-MCNC: <0.006 NG/ML (ref 0–0.03)
WBC # BLD AUTO: 7.81 K/UL (ref 3.9–12.7)

## 2022-12-12 PROCEDURE — 80053 COMPREHEN METABOLIC PANEL: CPT | Performed by: EMERGENCY MEDICINE

## 2022-12-12 PROCEDURE — 99291 PR CRITICAL CARE, E/M 30-74 MINUTES: ICD-10-PCS | Mod: ,,, | Performed by: EMERGENCY MEDICINE

## 2022-12-12 PROCEDURE — 96374 THER/PROPH/DIAG INJ IV PUSH: CPT

## 2022-12-12 PROCEDURE — 99220 PR INITIAL OBSERVATION CARE,LEVL III: CPT | Mod: ,,, | Performed by: NURSE PRACTITIONER

## 2022-12-12 PROCEDURE — 25000003 PHARM REV CODE 250: Performed by: EMERGENCY MEDICINE

## 2022-12-12 PROCEDURE — 99285 EMERGENCY DEPT VISIT HI MDM: CPT | Mod: 25

## 2022-12-12 PROCEDURE — 94760 N-INVAS EAR/PLS OXIMETRY 1: CPT

## 2022-12-12 PROCEDURE — 84484 ASSAY OF TROPONIN QUANT: CPT | Performed by: EMERGENCY MEDICINE

## 2022-12-12 PROCEDURE — 93005 ELECTROCARDIOGRAM TRACING: CPT

## 2022-12-12 PROCEDURE — 85025 COMPLETE CBC W/AUTO DIFF WBC: CPT | Performed by: EMERGENCY MEDICINE

## 2022-12-12 PROCEDURE — G0378 HOSPITAL OBSERVATION PER HR: HCPCS

## 2022-12-12 PROCEDURE — 99291 CRITICAL CARE FIRST HOUR: CPT | Mod: ,,, | Performed by: EMERGENCY MEDICINE

## 2022-12-12 PROCEDURE — 83880 ASSAY OF NATRIURETIC PEPTIDE: CPT | Performed by: EMERGENCY MEDICINE

## 2022-12-12 PROCEDURE — 99220 PR INITIAL OBSERVATION CARE,LEVL III: ICD-10-PCS | Mod: ,,, | Performed by: NURSE PRACTITIONER

## 2022-12-12 PROCEDURE — 25000003 PHARM REV CODE 250: Performed by: NURSE PRACTITIONER

## 2022-12-12 PROCEDURE — 94761 N-INVAS EAR/PLS OXIMETRY MLT: CPT

## 2022-12-12 PROCEDURE — 93010 ELECTROCARDIOGRAM REPORT: CPT | Mod: 77,,, | Performed by: INTERNAL MEDICINE

## 2022-12-12 PROCEDURE — 93010 EKG 12-LEAD: ICD-10-PCS | Mod: 77,,, | Performed by: INTERNAL MEDICINE

## 2022-12-12 PROCEDURE — 83735 ASSAY OF MAGNESIUM: CPT | Performed by: EMERGENCY MEDICINE

## 2022-12-12 RX ORDER — SODIUM CHLORIDE 0.9 % (FLUSH) 0.9 %
10 SYRINGE (ML) INJECTION
Status: DISCONTINUED | OUTPATIENT
Start: 2022-12-12 | End: 2022-12-15 | Stop reason: HOSPADM

## 2022-12-12 RX ORDER — IBUPROFEN 200 MG
24 TABLET ORAL
Status: DISCONTINUED | OUTPATIENT
Start: 2022-12-12 | End: 2022-12-15 | Stop reason: HOSPADM

## 2022-12-12 RX ORDER — ONDANSETRON 2 MG/ML
4 INJECTION INTRAMUSCULAR; INTRAVENOUS EVERY 8 HOURS PRN
Status: DISCONTINUED | OUTPATIENT
Start: 2022-12-12 | End: 2022-12-15 | Stop reason: HOSPADM

## 2022-12-12 RX ORDER — HYDROCHLOROTHIAZIDE 12.5 MG/1
12.5 TABLET ORAL DAILY
Status: DISCONTINUED | OUTPATIENT
Start: 2022-12-13 | End: 2022-12-13

## 2022-12-12 RX ORDER — PANTOPRAZOLE SODIUM 40 MG/1
40 TABLET, DELAYED RELEASE ORAL DAILY
Status: DISCONTINUED | OUTPATIENT
Start: 2022-12-13 | End: 2022-12-15 | Stop reason: HOSPADM

## 2022-12-12 RX ORDER — SODIUM CHLORIDE 0.9 % (FLUSH) 0.9 %
10 SYRINGE (ML) INJECTION EVERY 12 HOURS PRN
Status: DISCONTINUED | OUTPATIENT
Start: 2022-12-12 | End: 2022-12-13

## 2022-12-12 RX ORDER — IBUPROFEN 200 MG
16 TABLET ORAL
Status: DISCONTINUED | OUTPATIENT
Start: 2022-12-12 | End: 2022-12-15 | Stop reason: HOSPADM

## 2022-12-12 RX ORDER — IPRATROPIUM BROMIDE AND ALBUTEROL SULFATE 2.5; .5 MG/3ML; MG/3ML
3 SOLUTION RESPIRATORY (INHALATION) EVERY 4 HOURS PRN
Status: DISCONTINUED | OUTPATIENT
Start: 2022-12-12 | End: 2022-12-15 | Stop reason: HOSPADM

## 2022-12-12 RX ORDER — MAGNESIUM SULFATE HEPTAHYDRATE 40 MG/ML
2 INJECTION, SOLUTION INTRAVENOUS
Status: DISCONTINUED | OUTPATIENT
Start: 2022-12-12 | End: 2022-12-12

## 2022-12-12 RX ORDER — ACETAMINOPHEN 325 MG/1
650 TABLET ORAL EVERY 4 HOURS PRN
Status: DISCONTINUED | OUTPATIENT
Start: 2022-12-12 | End: 2022-12-15 | Stop reason: HOSPADM

## 2022-12-12 RX ORDER — DILTIAZEM HYDROCHLORIDE 120 MG/1
120 CAPSULE, COATED, EXTENDED RELEASE ORAL DAILY
Status: DISCONTINUED | OUTPATIENT
Start: 2022-12-13 | End: 2022-12-15 | Stop reason: HOSPADM

## 2022-12-12 RX ORDER — DILTIAZEM HYDROCHLORIDE 5 MG/ML
20 INJECTION INTRAVENOUS
Status: COMPLETED | OUTPATIENT
Start: 2022-12-12 | End: 2022-12-12

## 2022-12-12 RX ORDER — FLUTICASONE FUROATE AND VILANTEROL 100; 25 UG/1; UG/1
1 POWDER RESPIRATORY (INHALATION) DAILY
Status: DISCONTINUED | OUTPATIENT
Start: 2022-12-13 | End: 2022-12-12

## 2022-12-12 RX ORDER — TALC
6 POWDER (GRAM) TOPICAL NIGHTLY PRN
Status: DISCONTINUED | OUTPATIENT
Start: 2022-12-12 | End: 2022-12-15 | Stop reason: HOSPADM

## 2022-12-12 RX ORDER — DILTIAZEM HYDROCHLORIDE 5 MG/ML
10 INJECTION INTRAVENOUS
Status: DISCONTINUED | OUTPATIENT
Start: 2022-12-12 | End: 2022-12-15 | Stop reason: HOSPADM

## 2022-12-12 RX ORDER — CETIRIZINE HYDROCHLORIDE 5 MG/1
10 TABLET ORAL DAILY
Status: DISCONTINUED | OUTPATIENT
Start: 2022-12-13 | End: 2022-12-15 | Stop reason: HOSPADM

## 2022-12-12 RX ORDER — NALOXONE HCL 0.4 MG/ML
0.02 VIAL (ML) INJECTION
Status: DISCONTINUED | OUTPATIENT
Start: 2022-12-12 | End: 2022-12-15 | Stop reason: HOSPADM

## 2022-12-12 RX ORDER — DILTIAZEM HYDROCHLORIDE 5 MG/ML
10 INJECTION INTRAVENOUS
Status: DISCONTINUED | OUTPATIENT
Start: 2022-12-12 | End: 2022-12-12

## 2022-12-12 RX ORDER — FLUTICASONE FUROATE AND VILANTEROL 200; 25 UG/1; UG/1
1 POWDER RESPIRATORY (INHALATION) DAILY
Status: DISCONTINUED | OUTPATIENT
Start: 2022-12-13 | End: 2022-12-15 | Stop reason: HOSPADM

## 2022-12-12 RX ORDER — GLUCAGON 1 MG
1 KIT INJECTION
Status: DISCONTINUED | OUTPATIENT
Start: 2022-12-12 | End: 2022-12-15 | Stop reason: HOSPADM

## 2022-12-12 RX ORDER — METOPROLOL TARTRATE 25 MG/1
25 TABLET, FILM COATED ORAL EVERY 6 HOURS
Status: DISCONTINUED | OUTPATIENT
Start: 2022-12-13 | End: 2022-12-12

## 2022-12-12 RX ADMIN — APIXABAN 5 MG: 5 TABLET, FILM COATED ORAL at 10:12

## 2022-12-12 RX ADMIN — DILTIAZEM HYDROCHLORIDE 20 MG: 5 INJECTION INTRAVENOUS at 07:12

## 2022-12-12 NOTE — TELEPHONE ENCOUNTER
Returned call to pt in reference to message sent about being out of rhythm since Friday. Pt reports she is in Urgent Care because she started wheezing.  EKG done. Pt symptomatic with SOB and rapid heart rate. Pt states she is agreeable to have Redo PVI at this time. Advised I would discuss with Dr Meyers if DCCV could be done awaiting PVI in February.

## 2022-12-13 LAB
ANION GAP SERPL CALC-SCNC: 10 MMOL/L (ref 8–16)
ASCENDING AORTA: 2.94 CM
AV INDEX (PROSTH): 0.82
AV MEAN GRADIENT: 4 MMHG
AV PEAK GRADIENT: 7 MMHG
AV VALVE AREA: 2.84 CM2
AV VELOCITY RATIO: 0.7
BASOPHILS # BLD AUTO: 0.01 K/UL (ref 0–0.2)
BASOPHILS NFR BLD: 0.1 % (ref 0–1.9)
BSA FOR ECHO PROCEDURE: 2 M2
BUN SERPL-MCNC: 17 MG/DL (ref 6–20)
CALCIUM SERPL-MCNC: 9.4 MG/DL (ref 8.7–10.5)
CHLORIDE SERPL-SCNC: 108 MMOL/L (ref 95–110)
CO2 SERPL-SCNC: 21 MMOL/L (ref 23–29)
CREAT SERPL-MCNC: 0.8 MG/DL (ref 0.5–1.4)
CV ECHO LV RWT: 0.52 CM
DIFFERENTIAL METHOD: ABNORMAL
DOP CALC AO PEAK VEL: 1.36 M/S
DOP CALC AO VTI: 21.01 CM
DOP CALC LVOT AREA: 3.5 CM2
DOP CALC LVOT DIAMETER: 2.1 CM
DOP CALC LVOT PEAK VEL: 0.95 M/S
DOP CALC LVOT STROKE VOLUME: 59.68 CM3
DOP CALCLVOT PEAK VEL VTI: 17.24 CM
E WAVE DECELERATION TIME: 119.54 MSEC
E/A RATIO: 1
E/E' RATIO: 9.26 M/S
ECHO LV POSTERIOR WALL: 1 CM (ref 0.6–1.1)
EJECTION FRACTION: 65 %
EOSINOPHIL # BLD AUTO: 0 K/UL (ref 0–0.5)
EOSINOPHIL NFR BLD: 0 % (ref 0–8)
ERYTHROCYTE [DISTWIDTH] IN BLOOD BY AUTOMATED COUNT: 12.4 % (ref 11.5–14.5)
EST. GFR  (NO RACE VARIABLE): >60 ML/MIN/1.73 M^2
FRACTIONAL SHORTENING: 30 % (ref 28–44)
GLUCOSE SERPL-MCNC: 131 MG/DL (ref 70–110)
HCT VFR BLD AUTO: 36.9 % (ref 37–48.5)
HGB BLD-MCNC: 13 G/DL (ref 12–16)
IMM GRANULOCYTES # BLD AUTO: 0.06 K/UL (ref 0–0.04)
IMM GRANULOCYTES NFR BLD AUTO: 0.6 % (ref 0–0.5)
INTERVENTRICULAR SEPTUM: 1.02 CM (ref 0.6–1.1)
LA MAJOR: 6.65 CM
LA MINOR: 6.84 CM
LA WIDTH: 3.98 CM
LEFT ATRIUM SIZE: 3.35 CM
LEFT ATRIUM VOLUME INDEX MOD: 40.9 ML/M2
LEFT ATRIUM VOLUME INDEX: 39.4 ML/M2
LEFT ATRIUM VOLUME MOD: 79.34 CM3
LEFT ATRIUM VOLUME: 76.43 CM3
LEFT INTERNAL DIMENSION IN SYSTOLE: 2.71 CM (ref 2.1–4)
LEFT VENTRICLE DIASTOLIC VOLUME INDEX: 33.41 ML/M2
LEFT VENTRICLE DIASTOLIC VOLUME: 64.82 ML
LEFT VENTRICLE MASS INDEX: 63 G/M2
LEFT VENTRICLE SYSTOLIC VOLUME INDEX: 14 ML/M2
LEFT VENTRICLE SYSTOLIC VOLUME: 27.16 ML
LEFT VENTRICULAR INTERNAL DIMENSION IN DIASTOLE: 3.87 CM (ref 3.5–6)
LEFT VENTRICULAR MASS: 122.38 G
LV LATERAL E/E' RATIO: 6.29 M/S
LV SEPTAL E/E' RATIO: 17.6 M/S
LYMPHOCYTES # BLD AUTO: 0.7 K/UL (ref 1–4.8)
LYMPHOCYTES NFR BLD: 7.1 % (ref 18–48)
MAGNESIUM SERPL-MCNC: 1.9 MG/DL (ref 1.6–2.6)
MCH RBC QN AUTO: 29.1 PG (ref 27–31)
MCHC RBC AUTO-ENTMCNC: 35.2 G/DL (ref 32–36)
MCV RBC AUTO: 83 FL (ref 82–98)
MONOCYTES # BLD AUTO: 0.3 K/UL (ref 0.3–1)
MONOCYTES NFR BLD: 2.9 % (ref 4–15)
MV PEAK A VEL: 0.88 M/S
MV PEAK E VEL: 0.88 M/S
MV STENOSIS PRESSURE HALF TIME: 34.67 MS
MV VALVE AREA P 1/2 METHOD: 6.35 CM2
NEUTROPHILS # BLD AUTO: 9 K/UL (ref 1.8–7.7)
NEUTROPHILS NFR BLD: 89.3 % (ref 38–73)
NRBC BLD-RTO: 0 /100 WBC
PISA TR MAX VEL: 2.28 M/S
PLATELET # BLD AUTO: 231 K/UL (ref 150–450)
PMV BLD AUTO: 11.1 FL (ref 9.2–12.9)
POTASSIUM SERPL-SCNC: 3.9 MMOL/L (ref 3.5–5.1)
RA MAJOR: 5.98 CM
RA PRESSURE: 3 MMHG
RA WIDTH: 4.14 CM
RBC # BLD AUTO: 4.46 M/UL (ref 4–5.4)
RIGHT VENTRICULAR END-DIASTOLIC DIMENSION: 3.2 CM
SINUS: 2.69 CM
SODIUM SERPL-SCNC: 139 MMOL/L (ref 136–145)
STJ: 2.73 CM
TDI LATERAL: 0.14 M/S
TDI SEPTAL: 0.05 M/S
TDI: 0.1 M/S
TR MAX PG: 21 MMHG
TRICUSPID ANNULAR PLANE SYSTOLIC EXCURSION: 0.91 CM
TV REST PULMONARY ARTERY PRESSURE: 24 MMHG
WBC # BLD AUTO: 10.04 K/UL (ref 3.9–12.7)

## 2022-12-13 PROCEDURE — 25000242 PHARM REV CODE 250 ALT 637 W/ HCPCS: Performed by: NURSE PRACTITIONER

## 2022-12-13 PROCEDURE — 93010 ELECTROCARDIOGRAM REPORT: CPT | Mod: ,,, | Performed by: INTERNAL MEDICINE

## 2022-12-13 PROCEDURE — 99223 1ST HOSP IP/OBS HIGH 75: CPT | Mod: ,,, | Performed by: INTERNAL MEDICINE

## 2022-12-13 PROCEDURE — G0378 HOSPITAL OBSERVATION PER HR: HCPCS

## 2022-12-13 PROCEDURE — 25000003 PHARM REV CODE 250: Performed by: STUDENT IN AN ORGANIZED HEALTH CARE EDUCATION/TRAINING PROGRAM

## 2022-12-13 PROCEDURE — 25000003 PHARM REV CODE 250: Performed by: NURSE PRACTITIONER

## 2022-12-13 PROCEDURE — 99226 PR SUBSEQUENT OBSERVATION CARE,LEVEL III: ICD-10-PCS | Mod: ,,,

## 2022-12-13 PROCEDURE — 93005 ELECTROCARDIOGRAM TRACING: CPT

## 2022-12-13 PROCEDURE — 85025 COMPLETE CBC W/AUTO DIFF WBC: CPT | Performed by: NURSE PRACTITIONER

## 2022-12-13 PROCEDURE — 63600175 PHARM REV CODE 636 W HCPCS: Performed by: NURSE PRACTITIONER

## 2022-12-13 PROCEDURE — 80048 BASIC METABOLIC PNL TOTAL CA: CPT | Performed by: NURSE PRACTITIONER

## 2022-12-13 PROCEDURE — 93010 EKG 12-LEAD: ICD-10-PCS | Mod: ,,, | Performed by: INTERNAL MEDICINE

## 2022-12-13 PROCEDURE — 83735 ASSAY OF MAGNESIUM: CPT | Performed by: NURSE PRACTITIONER

## 2022-12-13 PROCEDURE — 99223 PR INITIAL HOSPITAL CARE,LEVL III: ICD-10-PCS | Mod: ,,, | Performed by: INTERNAL MEDICINE

## 2022-12-13 PROCEDURE — 93010 ELECTROCARDIOGRAM REPORT: CPT | Mod: 77,,, | Performed by: INTERNAL MEDICINE

## 2022-12-13 PROCEDURE — 93010 EKG 12-LEAD: ICD-10-PCS | Mod: 77,,, | Performed by: INTERNAL MEDICINE

## 2022-12-13 PROCEDURE — 36415 COLL VENOUS BLD VENIPUNCTURE: CPT | Performed by: NURSE PRACTITIONER

## 2022-12-13 PROCEDURE — 99226 PR SUBSEQUENT OBSERVATION CARE,LEVEL III: CPT | Mod: ,,,

## 2022-12-13 RX ORDER — SOTALOL HYDROCHLORIDE 80 MG/1
80 TABLET ORAL 2 TIMES DAILY
Status: DISCONTINUED | OUTPATIENT
Start: 2022-12-13 | End: 2022-12-15 | Stop reason: HOSPADM

## 2022-12-13 RX ORDER — FLUTICASONE PROPIONATE 50 MCG
2 SPRAY, SUSPENSION (ML) NASAL DAILY
Status: DISCONTINUED | OUTPATIENT
Start: 2022-12-13 | End: 2022-12-15 | Stop reason: HOSPADM

## 2022-12-13 RX ORDER — PREDNISONE 20 MG/1
40 TABLET ORAL DAILY
Status: DISCONTINUED | OUTPATIENT
Start: 2022-12-13 | End: 2022-12-15 | Stop reason: HOSPADM

## 2022-12-13 RX ADMIN — SOTALOL HYDROCHLORIDE 80 MG: 80 TABLET ORAL at 08:12

## 2022-12-13 RX ADMIN — DILTIAZEM HYDROCHLORIDE 120 MG: 120 CAPSULE, COATED, EXTENDED RELEASE ORAL at 08:12

## 2022-12-13 RX ADMIN — FLUTICASONE FUROATE AND VILANTEROL TRIFENATATE 1 PUFF: 200; 25 POWDER RESPIRATORY (INHALATION) at 09:12

## 2022-12-13 RX ADMIN — SOTALOL HYDROCHLORIDE 80 MG: 80 TABLET ORAL at 10:12

## 2022-12-13 RX ADMIN — CETIRIZINE HYDROCHLORIDE 10 MG: 5 TABLET, FILM COATED ORAL at 08:12

## 2022-12-13 RX ADMIN — APIXABAN 5 MG: 5 TABLET, FILM COATED ORAL at 08:12

## 2022-12-13 RX ADMIN — POTASSIUM BICARBONATE 25 MEQ: 978 TABLET, EFFERVESCENT ORAL at 12:12

## 2022-12-13 RX ADMIN — FLUTICASONE PROPIONATE 100 MCG: 50 SPRAY, METERED NASAL at 10:12

## 2022-12-13 RX ADMIN — PANTOPRAZOLE SODIUM 40 MG: 40 TABLET, DELAYED RELEASE ORAL at 08:12

## 2022-12-13 RX ADMIN — PREDNISONE 40 MG: 20 TABLET ORAL at 08:12

## 2022-12-13 RX ADMIN — PROPAFENONE HYDROCHLORIDE 225 MG: 150 TABLET, FILM COATED ORAL at 12:12

## 2022-12-13 NOTE — CONSULTS
[facility] ([place] [address])  Cardiac Electrophysiology  Consult Note    Admission Date: 12/12/2022  Code Status: Full Code   Attending Provider: Sondra Mckeon MD  Consulting Provider: Santy Santiago MD  Principal Problem:Atrial flutter    Consults  Subjective:     Chief Complaint:  Dizziness and wheezing    HPI:   Ms. Dykes is a 60 year old female with a history of  PAF/AFL S/P PVIx2, who continued to have episodes of atypical flutter following her second ablation. Pt is a very known pt of Dr. Meyers who presented w/ c/o palpitations found to be in atypical AFL w/ RVR. Initially cardioverted with dilt drip but then return to the ED with palpitations right after she was discharged. EP consulted for atypical AFL.     Pt has noticed some wheezing and coughing as well for which she thinks it might be her asthma acting up as well. Denies syncopal episodes, N/V, chest pain. Pt home medications consist of propafenone 225mg q 8h, dilt 120mg qd and eliquis 5mg BID.       TTE 12/12/2022   The left ventricle is normal in size with concentric remodeling and normal systolic function. The estimated ejection fraction is 65%.   Normal right ventricular size with normal right ventricular systolic function.   Mild left atrial enlargement.   Mild tricuspid regurgitation.   The estimated PA systolic pressure is 24 mmHg.   Normal central venous pressure (3 mmHg).   Trivial circumferential pericardial effusion.   Atrial fibrillation observed.      Past Medical History:   Diagnosis Date    Acid reflux     Allergy     seasonal    Asthma with acute exacerbation     Atrial fibrillation     Essential hypertension 11/20/2017    Pt states that she does not have HTN.        Past Surgical History:   Procedure Laterality Date    24 HOUR IMPEDANCE PH MONITORING OF ESOPHAGUS IN PATIENT TAKING ACID REDUCING MEDICATIONS N/A 9/20/2021    Procedure: IMPEDANCE PH STUDY, ESOPHAGEAL, 24 HOUR, IN PATIENT TAKING ACID  REDUCING MEDICATION;  Surgeon: Franky Gomez MD;  Location: [facility] (4TH FLR);  Service: Endoscopy;  Laterality: N/A;  Patient to do EGD with with esophageal Bravo pH probe on omeprazole 40 mg once daily she needs to take it with a sip of water the day of the case  pt completed COVID vaccine- see Immunization record in     CARDIAC ELECTROPHYSIOLOGY STUDY AND ABLATION       SECTION      COLONOSCOPY N/A 2018    Procedure: COLONOSCOPY;  Surgeon: Brodie Lara MD;  Location: [facility];  Service: Endoscopy;  Laterality: N/A;  confirmed appt-ss    ESOPHAGEAL MANOMETRY WITH MEASUREMENT OF IMPEDANCE N/A 2021    Procedure: MANOMETRY, ESOPHAGUS, WITH IMPEDANCE MEASUREMENT;  Surgeon: Franky Gomez MD;  Location: [facility] (4TH FLR);  Service: Endoscopy;  Laterality: N/A;  Covid test  [facility]   [place] confirmed appt-KPvt    ESOPHAGOGASTRODUODENOSCOPY N/A 2021    Procedure: EGD (ESOPHAGOGASTRODUODENOSCOPY);  Surgeon: Alin Camargo MD;  Location: [facility] (2ND FLR);  Service: Endoscopy;  Laterality: N/A;  Fully vaccinated 21, ok to hold Eliquis x 2 days per Dr. ANGELLA Meyers, instr portal -ml  12/10/21 LVM to see if patient can come in earlier -ml    HYSTERECTOMY  2000    MIGUEL    RADIOFREQUENCY ABLATION Left 2019    Procedure: RADIOFREQUENCY ABLATION, LEFT L2,3,4,5;  Surgeon: Mariusz Jang MD;  Location: [facility];  Service: Pain Management;  Laterality: Left;  Left RFA L2,3,4,5  1 of 2  *Ok to hold Eliquis, per Dr Meyers    RADIOFREQUENCY ABLATION Right 3/13/2019    Procedure: RADIOFREQUENCY ABLATION,  Right L2,3,4,5;  Surgeon: Mariusz Jang MD;  Location: [facility];  Service: Pain Management;  Laterality: Right;  Right RFA @ L2,3,4,5  2 of 2       Review of patient's allergies indicates:   Allergen Reactions    Iodinated contrast media Anaphylaxis     Itchy throat, SOB, hives    Adhesive tape-silicones Itching     Manifested in 2015 following AF  ablation.       No current facility-administered medications on file prior to encounter.     Current Outpatient Medications on File Prior to Encounter   Medication Sig    albuterol (PROVENTIL) 2.5 mg /3 mL (0.083 %) nebulizer solution Take 3 mLs (2.5 mg total) by nebulization every 6 (six) hours as needed for Wheezing.    albuterol (PROVENTIL/VENTOLIN HFA) 90 mcg/actuation inhaler Inhale 2 puffs into the lungs every 6 (six) hours as needed for Wheezing or Shortness of Breath. Rescue    apixaban (ELIQUIS) 5 mg Tab Take 1 tablet (5 mg total) by mouth 2 (two) times daily.    BREO ELLIPTA 200-25 mcg/dose DsDv diskus inhaler INHALE 1 PUFF BY MOUTH ONCE DAILY (CONTROLLER)    cetirizine (ZYRTEC) 10 MG tablet Take 1 tablet (10 mg total) by mouth once daily.    cetirizine (ZYRTEC) 10 MG tablet Take 1 tablet (10 mg total) by mouth once daily. for 7 days    COVID-19 vacc, mRNA,Pfizer,/PF (PFIZER COVID-19 VACCINE, EUA, IM)     diltiaZEM (DILACOR XR) 120 MG CDCR Take 1 capsule (120 mg total) by mouth once daily.    famotidine (PEPCID) 20 MG tablet Take 1 tablet (20 mg total) by mouth 2 (two) times daily. for 7 days    fluticasone-salmeterol diskus inhaler 250-50 mcg Inhale 1 puff into the lungs 2 (two) times daily. Controller (Patient not taking: Reported on 11/7/2022)    hydroCHLOROthiazide (HYDRODIURIL) 12.5 MG Tab Take 1 tablet (12.5 mg total) by mouth once daily.    ipratropium (ATROVENT) 21 mcg (0.03 %) nasal spray 2 sprays by Nasal route 2 (two) times daily. (Patient not taking: Reported on 11/7/2022)    omeprazole (PRILOSEC) 40 MG capsule Take 1 capsule (40 mg total) by mouth 2 (two) times daily before meals.    predniSONE (DELTASONE) 20 MG tablet Take 2 tablets (40 mg total) by mouth once daily. for 4 days    propafenone (RYTHMOL) 225 MG Tab Take one tablet by mouth every 8 hours.    propafenone (RYTHMOL) 225 MG Tab Take one tablet by mouth every 8 hours.    semaglutide (OZEMPIC) 1 mg/dose (4 mg/3 mL)  Inject 1 mg into the skin every 7 days.    triamcinolone acetonide 0.1% (KENALOG) 0.1 % cream Apply topically 2 (two) times daily. (Patient taking differently: Apply topically 2 (two) times daily. PRN)     Family History       Problem Relation (Age of Onset)    Allergies Mother    Asbestos Father    Blindness Paternal Grandfather    Cancer Paternal Grandmother    Diabetes Mother, Maternal Grandfather    Eczema Son    Glaucoma Mother, Paternal Grandfather    Heart disease Maternal Grandmother    Hypertension Mother, Son    Obesity Father          Tobacco Use    Smoking status: Never    Smokeless tobacco: Never   Substance and Sexual Activity    Alcohol use: Yes     Comment: rarely, 1 drink/week    Drug use: No    Sexual activity: Yes     Partners: Male     ROS  HENT:  Positive for congestion and postnasal drip. Negative for rhinorrhea and sore throat.    Eyes:  Negative for photophobia and visual disturbance.   Respiratory:  Positive for cough, shortness of breath (with exertion) and wheezing.    Cardiovascular:  Positive for palpitations. Negative for chest pain and leg swelling.   Gastrointestinal:  Negative for abdominal distention, abdominal pain, diarrhea, nausea and vomiting.   Genitourinary:  Negative for dysuria, frequency and hematuria.   Musculoskeletal:  Negative for back pain, myalgias and neck pain.   Skin:  Negative for color change, pallor, rash and wound.   Neurological:  Positive for light-headedness. Negative for dizziness, syncope, weakness and headaches.   Psychiatric/Behavioral:  Negative for confusion and hallucinations. Denies anxiety    Objective:     Vital Signs (Most Recent):  Temp: 97.9 °F (36.6 °C) (12/13/22 1228)  Pulse: 71 (12/13/22 1228)  Resp: 18 (12/13/22 1228)  BP: (!) 147/74 (12/13/22 1228)  SpO2: 95 % (12/13/22 0925)   Vital Signs (24h Range):  Temp:  [96.9 °F (36.1 °C)-98.3 °F (36.8 °C)] 97.9 °F (36.6 °C)  Pulse:  [] 71  Resp:  [11-22] 18  SpO2:  [88 %-98 %] 95 %  BP:  (100-169)/() 147/74       Weight: 88.5 kg (195 lb)  Body mass index is 33.47 kg/m².    SpO2: 95 %       Physical Exam  Vitals and nursing note reviewed.   Constitutional:       General: She is not in acute distress.     Appearance: She is obese. She is not toxic-appearing or diaphoretic.   HENT:      Head: Normocephalic and atraumatic.      Nose: Nose normal.      Mouth/Throat:      Mouth: Mucous membranes are moist.   Eyes:      Pupils: Pupils are equal, round, and reactive to light.   Cardiovascular:      Rate and Rhythm: Regular rhythm. Tachycardia present.      Pulses: Normal pulses.   Pulmonary:      Effort: Pulmonary effort is normal. No respiratory distress.      Breath sounds: Wheezing (faint expiratory wheezes) present. No rhonchi or rales.      Comments: Currently on room air  Abdominal:      General: Bowel sounds are normal. There is no distension.      Palpations: Abdomen is soft.      Tenderness: There is no abdominal tenderness. There is no guarding.   Musculoskeletal:         General: No swelling, tenderness or deformity. Normal range of motion.      Cervical back: Normal range of motion. No tenderness.   Skin:     General: Skin is warm and dry.      Capillary Refill: Capillary refill takes less than 2 seconds.      Coloration: Skin is not pale.      Findings: No bruising.   Neurological:      General: No focal deficit present.      Mental Status: She is alert.      Sensory: No sensory deficit.      Motor: No weakness.   Psychiatric:         Mood and Affect: Mood normal.         Behavior: Behavior normal.          Significant Labs:   Recent Lab Results         12/13/22  0907   12/13/22  0441   12/12/22  1940        Albumin     4.0       Alkaline Phosphatase     85       ALT     18       ANION GAP   10   15       Ascending aorta 2.94           Ao peak shilpi 1.36           Ao VTI 21.01           AST     16       AV valve area 2.84           AV mean gradient 4           AV index (prosthetic) 0.82            AV peak gradient 7           AV Velocity Ratio 0.70           Baso #   0.01   0.02       Basophil %   0.1   0.3       BILIRUBIN TOTAL     0.6  Comment: For infants and newborns, interpretation of results should be based  on gestational age, weight and in agreement with clinical  observations.    Premature Infant recommended reference ranges:  Up to 24 hours.............<8.0 mg/dL  Up to 48 hours............<12.0 mg/dL  3-5 days..................<15.0 mg/dL  6-29 days.................<15.0 mg/dL         BNP     136  Comment: Values of less than 100 pg/ml are consistent with non-CHF populations.       BSA 2           BUN   17   16       Calcium   9.4   10.1       Chloride   108   108       CO2   21   18       Creatinine   0.8   1.1       Left Ventricle Relative Wall Thickness 0.52           Differential Method   Automated   Automated       E/A ratio 1.00           E/E' ratio 9.26           eGFR   >60.0   57.5       EF 65           Eos #   0.0   0.0       Eosinophil %   0.0   0.3       E wave deceleration time 119.54           FS 30           Glucose   131   185       Gran # (ANC)   9.0   7.0       Gran %   89.3   89.6       HEMATOCRIT   36.9   41.0       HEMOGLOBIN   13.0   14.8       Immature Grans (Abs)   0.06  Comment: Mild elevation in immature granulocytes is non specific and   can be seen in a variety of conditions including stress response,   acute inflammation, trauma and pregnancy. Correlation with other   laboratory and clinical findings is essential.     0.05  Comment: Mild elevation in immature granulocytes is non specific and   can be seen in a variety of conditions including stress response,   acute inflammation, trauma and pregnancy. Correlation with other   laboratory and clinical findings is essential.         Immature Granulocytes   0.6   0.6       IVSd 1.02           LA WIDTH 3.98           Left Atrium Major Axis 6.65           Left Atrium Minor Axis 6.84           LA size 3.35           LA  volume 76.43            79.34           LA vol index 39.4           LA Volume Index (Mod) 40.9           LVOT area 3.5           LV LATERAL E/E' RATIO 6.29           LV SEPTAL E/E' RATIO 17.60           LV EDV BP 64.82           LV Diastolic Volume Index 33.41           LVIDd 3.87           LVIDs 2.71           LV mass 122.38           LV Mass Index 63           LVOT diameter 2.10           LVOT peak shiraz 0.95           LVOT stroke volume 59.68           LVOT peak VTI 17.24           LV ESV BP 27.16           LV Systolic Volume Index 14.0           Lymph #   0.7   0.7       Lymph %   7.1   8.7       Magnesium   1.9   2.0       MCH   29.1   29.3       MCHC   35.2   36.1       MCV   83   81       Mean e' 0.10           Mono #   0.3   0.0       Mono %   2.9   0.5       MPV   11.1   10.9       MV valve area p 1/2 method 6.35           MV Peak A Shiraz 0.88           MV Peak E Shiraz 0.88           MV stenosis pressure 1/2 time 34.67           nRBC   0   0       Platelets   231   303       Potassium   3.9   3.7       PROTEIN TOTAL     7.8       Posterior Wall 1.00           Right Atrial Pressure (from IVC) 3           RA Major Axis 5.98           RA Width 4.14           RBC   4.46   5.05       RDW   12.4   12.5       RVDD 3.20           Sinus 2.69           Sodium   139   141       STJ 2.73           TAPSE 0.91           TDI SEPTAL 0.05           TDI LATERAL 0.14           Triscuspid Valve Regurgitation Peak Gradient 21           TR Max Shiraz 2.28           Troponin I     <0.006  Comment: The reference interval for Troponin I represents the 99th percentile   cutoff   for our facility and is consistent with 3rd generation assay   performance.         TV rest pulmonary artery pressure 24           WBC   10.04   7.81                           Assessment and Plan:     * Atrial flutter  Ms. Dykes is a 60 year old female with a history of  PAF/AFL S/P PVIx2, who continued to have episodes of atypical flutter following her second  ablation who presented to the ED w/ symptomatic atypical AFL.     Recommendations:   -Continue diltiazem 120mg qd.   - hold propafenone and start sotalol 80mg BID.   - Please obtain an ECG 2 hours after each dose of sotalol.   - Cont anticoagulation w/ eliquis    Discussed with Dr. Meyers        Thank you for your consult. I will follow-up with patient. Please contact us if you have any additional questions.    Santy Santiago MD  Cardiac Electrophysiology  [facility] - Intensive Care ([place]

## 2022-12-13 NOTE — ASSESSMENT & PLAN NOTE
Body mass index is 33.49 kg/m². Obesity complicates all aspects of disease management from diagnostic modalities to treatment. Patient currently on ozempic outpatient.

## 2022-12-13 NOTE — PLAN OF CARE
[facility] ([place] [address])  Initial Discharge Assessment       Primary Care Provider: Juice So MD    Admission Diagnosis: Tachycardia [R00.0]  Afib [I48.91]  Chest pain [R07.9]  Atrial fibrillation with RVR [I48.91]  Atrial fibrillation with tachycardic ventricular rate [I48.91]    Admission Date: 12/12/2022  Expected Discharge Date: 12/14/2022         Payor: [employer] / Plan: [employer] EPO OPEN ACCESS / Product Type: PPO /     Extended Emergency Contact Information  Primary Emergency Contact: Roberto Long   [facility]  Home Phone: [phone]  Mobile Phone: [phone]  Relation: Son  Preferred language: English  Secondary Emergency Contact: Ladi Gao   [facility]  Home Phone: [phone]  Mobile Phone: [phone]  Relation: Daughter  Preferred language: English    Discharge Plan A: (P) Home with family  Discharge Plan B: (P) Home Health      [facility] [address]  [place] [address]  [place] [address]  Phone: [phone] Fax: [phone]    [facility] - [facility] [address] [facility]  [address] [facility] [address]  Phone: [phone] Fax: [phone]      Initial Assessment (most recent)       Adult Discharge Assessment - 12/13/22 1550          Discharge Assessment    Assessment Type Discharge Planning Assessment (P)      Confirmed/corrected address, phone number and insurance Yes (P)      Confirmed Demographics Correct on Facesheet (P)      Source of Information patient (P)      Does patient/caregiver understand observation status Yes (P)      Communicated KASSI with patient/caregiver Yes (P)      Reason For Admission Tachycardia (P)      People in Home alone (P)      Facility Arrived From: Home (P)      Do you expect to return to your current living situation? Yes (P)      Do you have help at home or someone to help you manage your care at home? Yes (P)      Who are your caregiver(s)  and their phone number(s)? Juice So [phone] (P)      Prior to hospitilization cognitive status: Alert/Oriented (P)      Current cognitive status: Alert/Oriented (P)      Equipment Currently Used at Home none (P)      Readmission within 30 days? No (P)      Patient currently being followed by outpatient case management? No (P)      Do you currently have service(s) that help you manage your care at home? No (P)      Do you take prescription medications? Yes (P)      Do you have prescription coverage? Yes (P)      Coverage [employer] Managed Medicare (P)      Do you have any problems affording any of your prescribed medications? Yes (P)      How do you get to doctors appointments? car, drives self;family or friend will provide (P)      Are you on dialysis? No (P)      Do you take coumadin? No (P)      Discharge Plan A Home with family (P)      Discharge Plan B Home Health (P)      DME Needed Upon Discharge  none (P)                       Patient [facility] [place]. She reports that she is currently residing in the home alone. She is ambulatory with all ADL's and does not use DME equipment and is not on dialysis. SW will continue to follow up for post acute needs.    PCP  Juice So MD  [phone]    Emergency    Roberto Long, son , [phone]    Clementine Marx LMSW  [facility]   [phone]

## 2022-12-13 NOTE — ASSESSMENT & PLAN NOTE
-Mild exacerbation.   -Afebrile, no leukocytosis.  -CXR with no acute process.  -Started on steroids today, will continue.  -Continue zyrtec and daily inhalers.  -PRN duo nebs.

## 2022-12-13 NOTE — SUBJECTIVE & OBJECTIVE
Past Medical History:   Diagnosis Date    Acid reflux     Allergy     seasonal    Asthma with acute exacerbation     Atrial fibrillation     Essential hypertension 2017    Pt states that she does not have HTN.        Past Surgical History:   Procedure Laterality Date    24 HOUR IMPEDANCE PH MONITORING OF ESOPHAGUS IN PATIENT TAKING ACID REDUCING MEDICATIONS N/A 2021    Procedure: IMPEDANCE PH STUDY, ESOPHAGEAL, 24 HOUR, IN PATIENT TAKING ACID REDUCING MEDICATION;  Surgeon: Franky Gomez MD;  Location: [facility] (4TH FLR);  Service: Endoscopy;  Laterality: N/A;  Patient to do EGD with with esophageal Bravo pH probe on omeprazole 40 mg once daily she needs to take it with a sip of water the day of the case  pt completed COVID vaccine- see Immunization record in     CARDIAC ELECTROPHYSIOLOGY STUDY AND ABLATION       SECTION      COLONOSCOPY N/A 2018    Procedure: COLONOSCOPY;  Surgeon: Brodie Lara MD;  Location: [facility];  Service: Endoscopy;  Laterality: N/A;  confirmed appt-ss    ESOPHAGEAL MANOMETRY WITH MEASUREMENT OF IMPEDANCE N/A 2021    Procedure: MANOMETRY, ESOPHAGUS, WITH IMPEDANCE MEASUREMENT;  Surgeon: Franky Gomez MD;  Location: [facility] (4TH FLR);  Service: Endoscopy;  Laterality: N/A;  Covid test  [facility]   pt confirmed appt-KPvt    ESOPHAGOGASTRODUODENOSCOPY N/A 2021    Procedure: EGD (ESOPHAGOGASTRODUODENOSCOPY);  Surgeon: Alin Camargo MD;  Location: [facility] (2ND FLR);  Service: Endoscopy;  Laterality: N/A;  Fully vaccinated 21, ok to hold Eliquis x 2 days per Dr. ANGELLA Meyers, instr portal -ml  12/10/21 LVM to see if patient can come in earlier -ml    HYSTERECTOMY      MIGUEL    RADIOFREQUENCY ABLATION Left 2019    Procedure: RADIOFREQUENCY ABLATION, LEFT L2,3,4,5;  Surgeon: Mariusz Jang MD;  Location: [facility] [place] [place];  Service: Pain Management;  Laterality: Left;  Left RFA L2,3,4,5  1 of 2  *Ok to hold Eliquis, per Dr Meyers     RADIOFREQUENCY ABLATION Right 3/13/2019    Procedure: RADIOFREQUENCY ABLATION,  Right L2,3,4,5;  Surgeon: Mariusz Jang MD;  Location: [facility];  Service: Pain Management;  Laterality: Right;  Right RFA @ L2,3,4,5  2 of 2       Review of patient's allergies indicates:   Allergen Reactions    Iodinated contrast media Anaphylaxis     Itchy throat, SOB, hives    Adhesive tape-silicones Itching     Manifested in 12/2015 following AF ablation.       No current facility-administered medications on file prior to encounter.     Current Outpatient Medications on File Prior to Encounter   Medication Sig    albuterol (PROVENTIL) 2.5 mg /3 mL (0.083 %) nebulizer solution Take 3 mLs (2.5 mg total) by nebulization every 6 (six) hours as needed for Wheezing.    albuterol (PROVENTIL/VENTOLIN HFA) 90 mcg/actuation inhaler Inhale 2 puffs into the lungs every 6 (six) hours as needed for Wheezing or Shortness of Breath. Rescue    apixaban (ELIQUIS) 5 mg Tab Take 1 tablet (5 mg total) by mouth 2 (two) times daily.    BREO ELLIPTA 200-25 mcg/dose DsDv diskus inhaler INHALE 1 PUFF BY MOUTH ONCE DAILY (CONTROLLER)    cetirizine (ZYRTEC) 10 MG tablet Take 1 tablet (10 mg total) by mouth once daily.    cetirizine (ZYRTEC) 10 MG tablet Take 1 tablet (10 mg total) by mouth once daily. for 7 days    COVID-19 vacc, mRNA,Pfizer,/PF (PFIZER COVID-19 VACCINE, EUA, IM)     diltiaZEM (DILACOR XR) 120 MG CDCR Take 1 capsule (120 mg total) by mouth once daily.    famotidine (PEPCID) 20 MG tablet Take 1 tablet (20 mg total) by mouth 2 (two) times daily. for 7 days    fluticasone-salmeterol diskus inhaler 250-50 mcg Inhale 1 puff into the lungs 2 (two) times daily. Controller (Patient not taking: Reported on 11/7/2022)    hydroCHLOROthiazide (HYDRODIURIL) 12.5 MG Tab Take 1 tablet (12.5 mg total) by mouth once daily.    ipratropium (ATROVENT) 21 mcg (0.03 %) nasal spray 2 sprays by Nasal route 2 (two) times daily. (Patient not taking:  Reported on 11/7/2022)    omeprazole (PRILOSEC) 40 MG capsule Take 1 capsule (40 mg total) by mouth 2 (two) times daily before meals.    predniSONE (DELTASONE) 20 MG tablet Take 2 tablets (40 mg total) by mouth once daily. for 4 days    propafenone (RYTHMOL) 225 MG Tab Take one tablet by mouth every 8 hours.    propafenone (RYTHMOL) 225 MG Tab Take one tablet by mouth every 8 hours.    semaglutide (OZEMPIC) 1 mg/dose (4 mg/3 mL) Inject 1 mg into the skin every 7 days.    triamcinolone acetonide 0.1% (KENALOG) 0.1 % cream Apply topically 2 (two) times daily. (Patient taking differently: Apply topically 2 (two) times daily. PRN)     Family History       Problem Relation (Age of Onset)    Allergies Mother    Asbestos Father    Blindness Paternal Grandfather    Cancer Paternal Grandmother    Diabetes Mother, Maternal Grandfather    Eczema Son    Glaucoma Mother, Paternal Grandfather    Heart disease Maternal Grandmother    Hypertension Mother, Son    Obesity Father          Tobacco Use    Smoking status: Never    Smokeless tobacco: Never   Substance and Sexual Activity    Alcohol use: Yes     Comment: rarely, 1 drink/week    Drug use: No    Sexual activity: Yes     Partners: Male     ROS  HENT:  Positive for congestion and postnasal drip. Negative for rhinorrhea and sore throat.    Eyes:  Negative for photophobia and visual disturbance.   Respiratory:  Positive for cough, shortness of breath (with exertion) and wheezing.    Cardiovascular:  Positive for palpitations. Negative for chest pain and leg swelling.   Gastrointestinal:  Negative for abdominal distention, abdominal pain, diarrhea, nausea and vomiting.   Genitourinary:  Negative for dysuria, frequency and hematuria.   Musculoskeletal:  Negative for back pain, myalgias and neck pain.   Skin:  Negative for color change, pallor, rash and wound.   Neurological:  Positive for light-headedness. Negative for dizziness, syncope, weakness and headaches.    Psychiatric/Behavioral:  Negative for confusion and hallucinations. Denies anxiety    Objective:     Vital Signs (Most Recent):  Temp: 97.9 °F (36.6 °C) (12/13/22 1228)  Pulse: 71 (12/13/22 1228)  Resp: 18 (12/13/22 1228)  BP: (!) 147/74 (12/13/22 1228)  SpO2: 95 % (12/13/22 0925)   Vital Signs (24h Range):  Temp:  [96.9 °F (36.1 °C)-98.3 °F (36.8 °C)] 97.9 °F (36.6 °C)  Pulse:  [] 71  Resp:  [11-22] 18  SpO2:  [88 %-98 %] 95 %  BP: (100-169)/() 147/74       Weight: 88.5 kg (195 lb)  Body mass index is 33.47 kg/m².    SpO2: 95 %       Physical Exam  Vitals and nursing note reviewed.   Constitutional:       General: She is not in acute distress.     Appearance: She is obese. She is not toxic-appearing or diaphoretic.   HENT:      Head: Normocephalic and atraumatic.      Nose: Nose normal.      Mouth/Throat:      Mouth: Mucous membranes are moist.   Eyes:      Pupils: Pupils are equal, round, and reactive to light.   Cardiovascular:      Rate and Rhythm: Regular rhythm. Tachycardia present.      Pulses: Normal pulses.   Pulmonary:      Effort: Pulmonary effort is normal. No respiratory distress.      Breath sounds: Wheezing (faint expiratory wheezes) present. No rhonchi or rales.      Comments: Currently on room air  Abdominal:      General: Bowel sounds are normal. There is no distension.      Palpations: Abdomen is soft.      Tenderness: There is no abdominal tenderness. There is no guarding.   Musculoskeletal:         General: No swelling, tenderness or deformity. Normal range of motion.      Cervical back: Normal range of motion. No tenderness.   Skin:     General: Skin is warm and dry.      Capillary Refill: Capillary refill takes less than 2 seconds.      Coloration: Skin is not pale.      Findings: No bruising.   Neurological:      General: No focal deficit present.      Mental Status: She is alert.      Sensory: No sensory deficit.      Motor: No weakness.   Psychiatric:         Mood and Affect:  Mood normal.         Behavior: Behavior normal.          Significant Labs:   Recent Lab Results         12/13/22  0907   12/13/22  0441   12/12/22  1940        Albumin     4.0       Alkaline Phosphatase     85       ALT     18       ANION GAP   10   15       Ascending aorta 2.94           Ao peak shilpi 1.36           Ao VTI 21.01           AST     16       AV valve area 2.84           AV mean gradient 4           AV index (prosthetic) 0.82           AV peak gradient 7           AV Velocity Ratio 0.70           Baso #   0.01   0.02       Basophil %   0.1   0.3       BILIRUBIN TOTAL     0.6  Comment: For infants and newborns, interpretation of results should be based  on gestational age, weight and in agreement with clinical  observations.    Premature Infant recommended reference ranges:  Up to 24 hours.............<8.0 mg/dL  Up to 48 hours............<12.0 mg/dL  3-5 days..................<15.0 mg/dL  6-29 days.................<15.0 mg/dL         BNP     136  Comment: Values of less than 100 pg/ml are consistent with non-CHF populations.       BSA 2           BUN   17   16       Calcium   9.4   10.1       Chloride   108   108       CO2   21   18       Creatinine   0.8   1.1       Left Ventricle Relative Wall Thickness 0.52           Differential Method   Automated   Automated       E/A ratio 1.00           E/E' ratio 9.26           eGFR   >60.0   57.5       EF 65           Eos #   0.0   0.0       Eosinophil %   0.0   0.3       E wave deceleration time 119.54           FS 30           Glucose   131   185       Gran # (ANC)   9.0   7.0       Gran %   89.3   89.6       HEMATOCRIT   36.9   41.0       HEMOGLOBIN   13.0   14.8       Immature Grans (Abs)   0.06  Comment: Mild elevation in immature granulocytes is non specific and   can be seen in a variety of conditions including stress response,   acute inflammation, trauma and pregnancy. Correlation with other   laboratory and clinical findings is essential.      0.05  Comment: Mild elevation in immature granulocytes is non specific and   can be seen in a variety of conditions including stress response,   acute inflammation, trauma and pregnancy. Correlation with other   laboratory and clinical findings is essential.         Immature Granulocytes   0.6   0.6       IVSd 1.02           LA WIDTH 3.98           Left Atrium Major Axis 6.65           Left Atrium Minor Axis 6.84           LA size 3.35           LA volume 76.43            79.34           LA vol index 39.4           LA Volume Index (Mod) 40.9           LVOT area 3.5           LV LATERAL E/E' RATIO 6.29           LV SEPTAL E/E' RATIO 17.60           LV EDV BP 64.82           LV Diastolic Volume Index 33.41           LVIDd 3.87           LVIDs 2.71           LV mass 122.38           LV Mass Index 63           LVOT diameter 2.10           LVOT peak shiraz 0.95           LVOT stroke volume 59.68           LVOT peak VTI 17.24           LV ESV BP 27.16           LV Systolic Volume Index 14.0           Lymph #   0.7   0.7       Lymph %   7.1   8.7       Magnesium   1.9   2.0       MCH   29.1   29.3       MCHC   35.2   36.1       MCV   83   81       Mean e' 0.10           Mono #   0.3   0.0       Mono %   2.9   0.5       MPV   11.1   10.9       MV valve area p 1/2 method 6.35           MV Peak A Shiraz 0.88           MV Peak E Shiraz 0.88           MV stenosis pressure 1/2 time 34.67           nRBC   0   0       Platelets   231   303       Potassium   3.9   3.7       PROTEIN TOTAL     7.8       Posterior Wall 1.00           Right Atrial Pressure (from IVC) 3           RA Major Axis 5.98           RA Width 4.14           RBC   4.46   5.05       RDW   12.4   12.5       RVDD 3.20           Sinus 2.69           Sodium   139   141       STJ 2.73           TAPSE 0.91           TDI SEPTAL 0.05           TDI LATERAL 0.14           Triscuspid Valve Regurgitation Peak Gradient 21           TR Max Shiraz 2.28           Troponin I      <0.006  Comment: The reference interval for Troponin I represents the 99th percentile   cutoff   for our facility and is consistent with 3rd generation assay   performance.         TV rest pulmonary artery pressure 24           WBC   10.04   7.81

## 2022-12-13 NOTE — ASSESSMENT & PLAN NOTE
Ms. Dykes is a 60 year old female with a history of  PAF/AFL S/P PVIx2, who continued to have episodes of atypical flutter following her second ablation who presented to the ED w/ symptomatic atypical AFL.     Recommendations:   -Continue diltiazem 120mg qd.   - hold propafenone and start sotalol 80mg BID.   - Please obtain an ECG 2 hours after each dose of sotalol.   - Cont anticoagulation w/ eliquis    Discussed with Dr. Meyers

## 2022-12-13 NOTE — SUBJECTIVE & OBJECTIVE
Past Medical History:   Diagnosis Date    Acid reflux     Allergy     seasonal    Asthma with acute exacerbation     Atrial fibrillation     Essential hypertension 2017    Pt states that she does not have HTN.        Past Surgical History:   Procedure Laterality Date    24 HOUR IMPEDANCE PH MONITORING OF ESOPHAGUS IN PATIENT TAKING ACID REDUCING MEDICATIONS N/A 2021    Procedure: IMPEDANCE PH STUDY, ESOPHAGEAL, 24 HOUR, IN PATIENT TAKING ACID REDUCING MEDICATION;  Surgeon: Franky Gomez MD;  Location: [facility] (4TH FLR);  Service: Endoscopy;  Laterality: N/A;  Patient to do EGD with with esophageal Bravo pH probe on omeprazole 40 mg once daily she needs to take it with a sip of water the day of the case  pt completed COVID vaccine- see Immunization record in     CARDIAC ELECTROPHYSIOLOGY STUDY AND ABLATION       SECTION      COLONOSCOPY N/A 2018    Procedure: COLONOSCOPY;  Surgeon: Brodie Lara MD;  Location: [facility];  Service: Endoscopy;  Laterality: N/A;  confirmed appt-ss    ESOPHAGEAL MANOMETRY WITH MEASUREMENT OF IMPEDANCE N/A 2021    Procedure: MANOMETRY, ESOPHAGUS, WITH IMPEDANCE MEASUREMENT;  Surgeon: Franky Gomez MD;  Location: [facility] (4TH FLR);  Service: Endoscopy;  Laterality: N/A;  Covid test  [facility] [place]   pt confirmed appt-KPvt    ESOPHAGOGASTRODUODENOSCOPY N/A 2021    Procedure: EGD (ESOPHAGOGASTRODUODENOSCOPY);  Surgeon: Alin Camargo MD;  Location: [facility] (2ND FLR);  Service: Endoscopy;  Laterality: N/A;  Fully vaccinated 21, ok to hold Eliquis x 2 days per Dr. ANGELLA Meyers, instr portal -ml  12/10/21 LVM to see if patient can come in earlier -ml    HYSTERECTOMY      MIGUEL    RADIOFREQUENCY ABLATION Left 2019    Procedure: RADIOFREQUENCY ABLATION, LEFT L2,3,4,5;  Surgeon: Marisuz Jang MD;  [place] [facility] [place] [facility] [place]  Service: Pain Management;  Laterality: Left;  Left RFA L2,3,4,5  1 of 2  *Ok to hold Eliquis, per Dr Meyers     RADIOFREQUENCY ABLATION Right 3/13/2019    Procedure: RADIOFREQUENCY ABLATION,  Right L2,3,4,5;  Surgeon: Mariusz Jang MD;  Location: [facility];  Service: Pain Management;  Laterality: Right;  Right RFA @ L2,3,4,5  2 of 2       Review of patient's allergies indicates:   Allergen Reactions    Iodinated contrast media Anaphylaxis     Itchy throat, SOB, hives    Adhesive tape-silicones Itching     Manifested in 12/2015 following AF ablation.       Current Facility-Administered Medications on File Prior to Encounter   Medication    [COMPLETED] albuterol-ipratropium 2.5 mg-0.5 mg/3 mL nebulizer solution 3 mL    [COMPLETED] aspirin tablet 325 mg    [COMPLETED] diltiaZEM injection 10 mg    [COMPLETED] diphenhydrAMINE injection 25 mg    [COMPLETED] famotidine (PF) injection 20 mg    [COMPLETED] iohexoL (OMNIPAQUE 350) injection 75 mL    [COMPLETED] methylPREDNISolone sodium succinate injection 125 mg    [DISCONTINUED] diphenhydrAMINE injection 12.5 mg     Current Outpatient Medications on File Prior to Encounter   Medication Sig    albuterol (PROVENTIL) 2.5 mg /3 mL (0.083 %) nebulizer solution Take 3 mLs (2.5 mg total) by nebulization every 6 (six) hours as needed for Wheezing.    albuterol (PROVENTIL/VENTOLIN HFA) 90 mcg/actuation inhaler Inhale 2 puffs into the lungs every 6 (six) hours as needed for Wheezing or Shortness of Breath. Rescue    apixaban (ELIQUIS) 5 mg Tab Take 1 tablet (5 mg total) by mouth 2 (two) times daily.    BREO ELLIPTA 200-25 mcg/dose DsDv diskus inhaler INHALE 1 PUFF BY MOUTH ONCE DAILY (CONTROLLER)    cetirizine (ZYRTEC) 10 MG tablet Take 1 tablet (10 mg total) by mouth once daily.    cetirizine (ZYRTEC) 10 MG tablet Take 1 tablet (10 mg total) by mouth once daily. for 7 days    COVID-19 vacc, mRNA,Pfizer,/PF (PFIZER COVID-19 VACCINE, EUA, IM)     diltiaZEM (DILACOR XR) 120 MG CDCR Take 1 capsule (120 mg total) by mouth once daily.    famotidine (PEPCID) 20 MG tablet Take 1 tablet  (20 mg total) by mouth 2 (two) times daily. for 7 days    fluticasone-salmeterol diskus inhaler 250-50 mcg Inhale 1 puff into the lungs 2 (two) times daily. Controller (Patient not taking: Reported on 11/7/2022)    hydroCHLOROthiazide (HYDRODIURIL) 12.5 MG Tab Take 1 tablet (12.5 mg total) by mouth once daily.    ipratropium (ATROVENT) 21 mcg (0.03 %) nasal spray 2 sprays by Nasal route 2 (two) times daily. (Patient not taking: Reported on 11/7/2022)    omeprazole (PRILOSEC) 40 MG capsule Take 1 capsule (40 mg total) by mouth 2 (two) times daily before meals.    predniSONE (DELTASONE) 20 MG tablet Take 2 tablets (40 mg total) by mouth once daily. for 4 days    propafenone (RYTHMOL) 225 MG Tab Take one tablet by mouth every 8 hours.    propafenone (RYTHMOL) 225 MG Tab Take one tablet by mouth every 8 hours.    semaglutide (OZEMPIC) 1 mg/dose (4 mg/3 mL) Inject 1 mg into the skin every 7 days.    triamcinolone acetonide 0.1% (KENALOG) 0.1 % cream Apply topically 2 (two) times daily. (Patient taking differently: Apply topically 2 (two) times daily. PRN)     Family History       Problem Relation (Age of Onset)    Allergies Mother    Asbestos Father    Blindness Paternal Grandfather    Cancer Paternal Grandmother    Diabetes Mother, Maternal Grandfather    Eczema Son    Glaucoma Mother, Paternal Grandfather    Heart disease Maternal Grandmother    Hypertension Mother, Son    Obesity Father          Tobacco Use    Smoking status: Never    Smokeless tobacco: Never   Substance and Sexual Activity    Alcohol use: Yes     Comment: rarely, 1 drink/week    Drug use: No    Sexual activity: Yes     Partners: Male     Review of Systems   Constitutional:  Negative for appetite change, chills, fatigue and fever.   HENT:  Positive for congestion and postnasal drip. Negative for rhinorrhea and sore throat.    Eyes:  Negative for photophobia and visual disturbance.   Respiratory:  Positive for cough, shortness of breath (with exertion)  and wheezing.    Cardiovascular:  Positive for palpitations. Negative for chest pain and leg swelling.   Gastrointestinal:  Negative for abdominal distention, abdominal pain, diarrhea, nausea and vomiting.   Genitourinary:  Negative for dysuria, frequency and hematuria.   Musculoskeletal:  Negative for back pain, myalgias and neck pain.   Skin:  Negative for color change, pallor, rash and wound.   Neurological:  Positive for light-headedness. Negative for dizziness, syncope, weakness and headaches.   Psychiatric/Behavioral:  Negative for confusion and hallucinations. The patient is not nervous/anxious.    Objective:     Vital Signs (Most Recent):  Temp: 98.3 °F (36.8 °C) (12/12/22 1917)  Pulse: 110 (12/12/22 2132)  Resp: 18 (12/12/22 2132)  BP: (!) 121/59 (12/12/22 2132)  SpO2: 96 % (12/12/22 2132)   Vital Signs (24h Range):  Temp:  [98.1 °F (36.7 °C)-98.3 °F (36.8 °C)] 98.3 °F (36.8 °C)  Pulse:  [] 110  Resp:  [14-22] 18  SpO2:  [93 %-100 %] 96 %  BP: (100-169)/() 121/59        There is no height or weight on file to calculate BMI.    Physical Exam  Vitals and nursing note reviewed.   Constitutional:       General: She is not in acute distress.     Appearance: She is obese. She is not toxic-appearing or diaphoretic.   HENT:      Head: Normocephalic and atraumatic.      Nose: Nose normal.      Mouth/Throat:      Mouth: Mucous membranes are moist.   Eyes:      Pupils: Pupils are equal, round, and reactive to light.   Cardiovascular:      Rate and Rhythm: Regular rhythm. Tachycardia present.      Pulses: Normal pulses.   Pulmonary:      Effort: Pulmonary effort is normal. No respiratory distress.      Breath sounds: Wheezing (faint expiratory wheezes) present. No rhonchi or rales.      Comments: Currently on room air  Abdominal:      General: Bowel sounds are normal. There is no distension.      Palpations: Abdomen is soft.      Tenderness: There is no abdominal tenderness. There is no guarding.  "  Musculoskeletal:         General: No swelling, tenderness or deformity. Normal range of motion.      Cervical back: Normal range of motion. No tenderness.   Skin:     General: Skin is warm and dry.      Capillary Refill: Capillary refill takes less than 2 seconds.      Coloration: Skin is not pale.      Findings: No bruising.   Neurological:      General: No focal deficit present.      Mental Status: She is alert.      Sensory: No sensory deficit.      Motor: No weakness.   Psychiatric:         Mood and Affect: Mood normal.         Behavior: Behavior normal.         CRANIAL NERVES     CN III, IV, VI   Pupils are equal, round, and reactive to light.     Significant Labs: All pertinent labs within the past 24 hours have been reviewed.  CBC:   Recent Labs   Lab 12/12/22 1940   WBC 7.81   HGB 14.8   HCT 41.0        CMP:   Recent Labs   Lab 12/12/22 1940      K 3.7      CO2 18*   *   BUN 16   CREATININE 1.1   CALCIUM 10.1   PROT 7.8   ALBUMIN 4.0   BILITOT 0.6   ALKPHOS 85   AST 16   ALT 18   ANIONGAP 15     Cardiac Markers:   Recent Labs   Lab 12/12/22 1940   *     Magnesium:   Recent Labs   Lab 12/12/22 1940   MG 2.0     Troponin:   Recent Labs   Lab 12/12/22 1940   TROPONINI <0.006     TSH:   Recent Labs   Lab 10/31/22  1052   TSH 0.523       Significant Imaging: I have reviewed all pertinent imaging results/findings within the past 24 hours.  Imaging Results              X-Ray Chest AP Portable (Final result)  Result time 12/12/22 20:43:22      Final result by Miles Garza MD (12/12/22 20:43:22)                   Impression:      No detrimental change when compared with examination performed earlier the same day.      Electronically signed by: Miles Garza MD  Date:    12/12/2022  Time:    20:43               Narrative:    EXAMINATION:  XR CHEST AP PORTABLE    CLINICAL HISTORY:  Provided history is "  Unspecified atrial fibrillation".    TECHNIQUE:  One view of the " chest.    COMPARISON:  12/12/2022.    FINDINGS:  Cardiac wires overlie the chest.  Cardiomediastinal silhouette is mildly enlarged and similar to the prior study.  No confluent area of consolidation.  No sizable pleural effusion.  No pneumothorax.

## 2022-12-13 NOTE — PLAN OF CARE
Patient is alert and oriented x3 denies any pain at this time. Patient lungs are wheezing in anterior and posterior lobes pt states she has asthma and needs a breathing treatment. No signs of rr distress noted. Safety measures remain in place.     Problem: Adult Inpatient Plan of Care  Goal: Plan of Care Review  Outcome: Ongoing, Progressing  Goal: Patient-Specific Goal (Individualized)  Outcome: Ongoing, Progressing  Goal: Absence of Hospital-Acquired Illness or Injury  Outcome: Ongoing, Progressing  Goal: Optimal Comfort and Wellbeing  Outcome: Ongoing, Progressing  Goal: Readiness for Transition of Care  Outcome: Ongoing, Progressing     Problem: Impaired Wound Healing  Goal: Optimal Wound Healing  Outcome: Ongoing, Progressing

## 2022-12-13 NOTE — FIRST PROVIDER EVALUATION
Medical screening examination initiated.  I have conducted a focused provider triage encounter, findings are as follows:    Brief history of present illness:  seen today for A. Fib with RVR, given cardizem  Feels worse than this am, feels hot, dizzy lightheaded, sob.    Hx of A. fib    Has hx eliquis, diltiazem, proprafenone. Took proprafenone x 2 today    Had CTA chest today :1. Bolus timing is inadequate for evaluation of pulmonary thromboembolism.  No large central pulmonary thrombo embolus, distal embolus cannot be excluded on the basis of this exam.  Correlation of these findings with D-dimer and/or lower extremity ultrasound as warranted.  2. No acute cardiopulmonary process.  3. Please see above for additional findings.     Vitals:    12/12/22 1917   BP: (!) 169/131   Pulse: (!) 201   Resp: (!) 22   Temp: 98.3 °F (36.8 °C)   TempSrc: Oral   SpO2: 98%       Pertinent physical exam:  looks tired, dizzy, tachycardic    Brief workup plan:  labs, cxr, ekg, diltiazem 20 mg iv    Preliminary workup initiated; this workup will be continued and followed by the physician or advanced practice provider that is assigned to the patient when roomed.

## 2022-12-13 NOTE — CARE UPDATE
Ms. Dykes is a 61 y/o W w/ PMH AFL/AF who presented w/ c/o palpitations found to be in AF w/ RVR. Initially cardioverted with dilt drip but then return to the ED with palpitations right after she was discharged.     Plan:  -General cardiology initially consulted but will switch consult to EP as pt follows up with Dr. Meyers as outpatient.   -Will discuss with EP team in the am  - Cont to be in AF with rates ranging 102-120.  - Can increase diltiazem to 180mg if BP tolerates and sustaining HR in the 120'S.   -Cont anticoagulation        Santy Alas MD  Cardiology fellow

## 2022-12-13 NOTE — HPI
Francoise Dykes is a 62 y.o. female with a PMHx of asthma, GERD, and AF/AFL who presents to the ED with complaints of intermittent palpitations for the last 3 days, then having shortness of breath on exertion today.  She has history of complicated AFib/flutter, on propafenone and diltiazem, history of cardio conversion x4 and ablation. She is scheduled for another ablation in February. She was seen in the [place] ED today, converted after 1 does of diltiazem 10 mg.  Patient states that her palpitation return right after she was discharged from the ER, reports hot flashes and lightheadedness sensation that she had to stop the car couple times. The patient endorses congestion, post nasal drip, cough, and intermittent wheezing for the last few days. She notes she has been taking zyrtec and has been using her inhalers and duo nebs more frequently. She was prescribed prednisone today. The patient denies any fever, chills, N/V/D, chest pain, or abdominal pain.    In the ED, patient initially tachycardic which improved with cardizem, otherwise VSSAF. CBC with unremarkable. Cr 1.1. . Troponin <0.006. Initial EKG with atrially tachycardia, rate of 192, repeat EKG with SR w/ 1st degree AV block, rate of 96. CXR with no acute process. The patient received IV cardizem 20mg x1. Cardiology was consulted in the ED.

## 2022-12-13 NOTE — H&P
[facility] - Emergency Dept  [facility] Medicine  History & Physical    Patient Name: Francoise Dykes  MRN: [id number]  Patient Class: OP- Observation  Admission Date: 12/12/2022  Attending Physician: Sondra Mckeon MD   Primary Care Provider: Juice So MD         Patient information was obtained from patient, past medical records, and ER records.     Subjective:     Principal Problem:Atrial flutter    Chief Complaint:   Chief Complaint   Patient presents with    Tachycardia     Pt c/o HR at 203 on her apple watch at home. Pt was DC today from this same complaint today. Pt has hx of Afib with RVR and compliant with medications at home. Pt's HR is between 195-205 in triage. Pt is mildly SOB and states she feels worse.         HPI: Francoise Dykes is a 62 y.o. female with a PMHx of asthma, GERD, and AF/AFL who presents to the ED with complaints of intermittent palpitations for the last 3 days, then having shortness of breath on exertion today.  She has history of complicated AFib/flutter, on propafenone and diltiazem, history of cardio conversion x4 and ablation. She is scheduled for another ablation in February. She was seen in the [place] ED today, converted after 1 does of diltiazem 10 mg.  Patient states that her palpitation return right after she was discharged from the ER, reports hot flashes and lightheadedness sensation that she had to stop the car couple times. The patient endorses congestion, post nasal drip, cough, and intermittent wheezing for the last few days. She notes she has been taking zyrtec and has been using her inhalers and duo nebs more frequently. She was prescribed prednisone today. The patient denies any fever, chills, N/V/D, chest pain, or abdominal pain.    In the ED, patient initially tachycardic which improved with cardizem, otherwise VSSAF. CBC with unremarkable. Cr 1.1. . Troponin <0.006. Initial EKG with atrially tachycardia, rate of 192, repeat EKG with SR w/ 1st degree  AV block, rate of 96. CXR with no acute process. The patient received IV cardizem 20mg x1. Cardiology was consulted in the ED.    Past Medical History:   Diagnosis Date    Acid reflux     Allergy     seasonal    Asthma with acute exacerbation     Atrial fibrillation     Essential hypertension 2017    Pt states that she does not have HTN.        Past Surgical History:   Procedure Laterality Date    24 HOUR IMPEDANCE PH MONITORING OF ESOPHAGUS IN PATIENT TAKING ACID REDUCING MEDICATIONS N/A 2021    Procedure: IMPEDANCE PH STUDY, ESOPHAGEAL, 24 HOUR, IN PATIENT TAKING ACID REDUCING MEDICATION;  Surgeon: Franky Gomez MD;  Location: [facility] (4TH FLR);  Service: Endoscopy;  Laterality: N/A;  Patient to do EGD with with esophageal Bravo pH probe on omeprazole 40 mg once daily she needs to take it with a sip of water the day of the case  pt completed COVID vaccine- see Immunization record in     CARDIAC ELECTROPHYSIOLOGY STUDY AND ABLATION       SECTION      COLONOSCOPY N/A 2018    Procedure: COLONOSCOPY;  Surgeon: Brodie Lara MD;  Location: [facility];  Service: Endoscopy;  Laterality: N/A;  confirmed appt-ss    ESOPHAGEAL MANOMETRY WITH MEASUREMENT OF IMPEDANCE N/A 2021    Procedure: MANOMETRY, ESOPHAGUS, WITH IMPEDANCE MEASUREMENT;  Surgeon: Franky Gomez MD;  Location: [facility] (4TH FLR);  Service: Endoscopy;  Laterality: N/A;  Covid test  [facility]   pt confirmed appt-KPvt    ESOPHAGOGASTRODUODENOSCOPY N/A 2021    Procedure: EGD (ESOPHAGOGASTRODUODENOSCOPY);  Surgeon: Alin Camargo MD;  Location: [facility] (2ND FLR);  Service: Endoscopy;  Laterality: N/A;  Fully vaccinated 21, ok to hold Eliquis x 2 days per Dr. ANGELLA Meyers, instr portal -ml  12/10/21 LVM to see if patient can come in earlier -ml    HYSTERECTOMY      MIGUEL    RADIOFREQUENCY ABLATION Left 2019    Procedure: RADIOFREQUENCY ABLATION, LEFT L2,3,4,5;  Surgeon: Mariusz Jang MD;   Location: [facility];  Service: Pain Management;  Laterality: Left;  Left RFA L2,3,4,5  1 of 2  *Ok to hold elgin [address]    RADIOFREQUENCY ABLATION Right 3/13/2019    Procedure: RADIOFREQUENCY ABLATION,  Right L2,3,4,5;  Surgeon: Mariusz Jang MD;  Location: [facility];  Service: Pain Management;  Laterality: Right;  Right RFA @ L2,3,4,5  2 of 2       Review of patient's allergies indicates:   Allergen Reactions    Iodinated contrast media Anaphylaxis     Itchy throat, SOB, hives    Adhesive tape-silicones Itching     Manifested in 12/2015 following AF ablation.       Current Facility-Administered Medications on File Prior to Encounter   Medication    [COMPLETED] albuterol-ipratropium 2.5 mg-0.5 mg/3 mL nebulizer solution 3 mL    [COMPLETED] aspirin tablet 325 mg    [COMPLETED] diltiaZEM injection 10 mg    [COMPLETED] diphenhydrAMINE injection 25 mg    [COMPLETED] famotidine (PF) injection 20 mg    [COMPLETED] iohexoL (OMNIPAQUE 350) injection 75 mL    [COMPLETED] methylPREDNISolone sodium succinate injection 125 mg    [DISCONTINUED] diphenhydrAMINE injection 12.5 mg     Current Outpatient Medications on File Prior to Encounter   Medication Sig    albuterol (PROVENTIL) 2.5 mg /3 mL (0.083 %) nebulizer solution Take 3 mLs (2.5 mg total) by nebulization every 6 (six) hours as needed for Wheezing.    albuterol (PROVENTIL/VENTOLIN HFA) 90 mcg/actuation inhaler Inhale 2 puffs into the lungs every 6 (six) hours as needed for Wheezing or Shortness of Breath. Rescue    apixaban (ELIQUIS) 5 mg Tab Take 1 tablet (5 mg total) by mouth 2 (two) times daily.    BREO ELLIPTA 200-25 mcg/dose DsDv diskus inhaler INHALE 1 PUFF BY MOUTH ONCE DAILY (CONTROLLER)    cetirizine (ZYRTEC) 10 MG tablet Take 1 tablet (10 mg total) by mouth once daily.    cetirizine (ZYRTEC) 10 MG tablet Take 1 tablet (10 mg total) by mouth once daily. for 7 days    COVID-19 vacc, mRNA,Pfizer,/PF (PFIZER COVID-19 VACCINE, EUA, IM)      diltiaZEM (DILACOR XR) 120 MG CDCR Take 1 capsule (120 mg total) by mouth once daily.    famotidine (PEPCID) 20 MG tablet Take 1 tablet (20 mg total) by mouth 2 (two) times daily. for 7 days    fluticasone-salmeterol diskus inhaler 250-50 mcg Inhale 1 puff into the lungs 2 (two) times daily. Controller (Patient not taking: Reported on 11/7/2022)    hydroCHLOROthiazide (HYDRODIURIL) 12.5 MG Tab Take 1 tablet (12.5 mg total) by mouth once daily.    ipratropium (ATROVENT) 21 mcg (0.03 %) nasal spray 2 sprays by Nasal route 2 (two) times daily. (Patient not taking: Reported on 11/7/2022)    omeprazole (PRILOSEC) 40 MG capsule Take 1 capsule (40 mg total) by mouth 2 (two) times daily before meals.    predniSONE (DELTASONE) 20 MG tablet Take 2 tablets (40 mg total) by mouth once daily. for 4 days    propafenone (RYTHMOL) 225 MG Tab Take one tablet by mouth every 8 hours.    propafenone (RYTHMOL) 225 MG Tab Take one tablet by mouth every 8 hours.    semaglutide (OZEMPIC) 1 mg/dose (4 mg/3 mL) Inject 1 mg into the skin every 7 days.    triamcinolone acetonide 0.1% (KENALOG) 0.1 % cream Apply topically 2 (two) times daily. (Patient taking differently: Apply topically 2 (two) times daily. PRN)     Family History       Problem Relation (Age of Onset)    Allergies Mother    Asbestos Father    Blindness Paternal Grandfather    Cancer Paternal Grandmother    Diabetes Mother, Maternal Grandfather    Eczema Son    Glaucoma Mother, Paternal Grandfather    Heart disease Maternal Grandmother    Hypertension Mother, Son    Obesity Father          Tobacco Use    Smoking status: Never    Smokeless tobacco: Never   Substance and Sexual Activity    Alcohol use: Yes     Comment: rarely, 1 drink/week    Drug use: No    Sexual activity: Yes     Partners: Male     Review of Systems   Constitutional:  Negative for appetite change, chills, fatigue and fever.   HENT:  Positive for congestion and postnasal drip. Negative for rhinorrhea and sore  throat.    Eyes:  Negative for photophobia and visual disturbance.   Respiratory:  Positive for cough, shortness of breath (with exertion) and wheezing.    Cardiovascular:  Positive for palpitations. Negative for chest pain and leg swelling.   Gastrointestinal:  Negative for abdominal distention, abdominal pain, diarrhea, nausea and vomiting.   Genitourinary:  Negative for dysuria, frequency and hematuria.   Musculoskeletal:  Negative for back pain, myalgias and neck pain.   Skin:  Negative for color change, pallor, rash and wound.   Neurological:  Positive for light-headedness. Negative for dizziness, syncope, weakness and headaches.   Psychiatric/Behavioral:  Negative for confusion and hallucinations. The patient is not nervous/anxious.    Objective:     Vital Signs (Most Recent):  Temp: 98.3 °F (36.8 °C) (12/12/22 1917)  Pulse: 110 (12/12/22 2132)  Resp: 18 (12/12/22 2132)  BP: (!) 121/59 (12/12/22 2132)  SpO2: 96 % (12/12/22 2132)   Vital Signs (24h Range):  Temp:  [98.1 °F (36.7 °C)-98.3 °F (36.8 °C)] 98.3 °F (36.8 °C)  Pulse:  [] 110  Resp:  [14-22] 18  SpO2:  [93 %-100 %] 96 %  BP: (100-169)/() 121/59        There is no height or weight on file to calculate BMI.    Physical Exam  Vitals and nursing note reviewed.   Constitutional:       General: She is not in acute distress.     Appearance: She is obese. She is not toxic-appearing or diaphoretic.   HENT:      Head: Normocephalic and atraumatic.      Nose: Nose normal.      Mouth/Throat:      Mouth: Mucous membranes are moist.   Eyes:      Pupils: Pupils are equal, round, and reactive to light.   Cardiovascular:      Rate and Rhythm: Regular rhythm. Tachycardia present.      Pulses: Normal pulses.   Pulmonary:      Effort: Pulmonary effort is normal. No respiratory distress.      Breath sounds: Wheezing (faint expiratory wheezes) present. No rhonchi or rales.      Comments: Currently on room air  Abdominal:      General: Bowel sounds are normal.  There is no distension.      Palpations: Abdomen is soft.      Tenderness: There is no abdominal tenderness. There is no guarding.   Musculoskeletal:         General: No swelling, tenderness or deformity. Normal range of motion.      Cervical back: Normal range of motion. No tenderness.   Skin:     General: Skin is warm and dry.      Capillary Refill: Capillary refill takes less than 2 seconds.      Coloration: Skin is not pale.      Findings: No bruising.   Neurological:      General: No focal deficit present.      Mental Status: She is alert.      Sensory: No sensory deficit.      Motor: No weakness.   Psychiatric:         Mood and Affect: Mood normal.         Behavior: Behavior normal.         CRANIAL NERVES     CN III, IV, VI   Pupils are equal, round, and reactive to light.     Significant Labs: All pertinent labs within the past 24 hours have been reviewed.  CBC:   Recent Labs   Lab 12/12/22 1940   WBC 7.81   HGB 14.8   HCT 41.0        CMP:   Recent Labs   Lab 12/12/22 1940      K 3.7      CO2 18*   *   BUN 16   CREATININE 1.1   CALCIUM 10.1   PROT 7.8   ALBUMIN 4.0   BILITOT 0.6   ALKPHOS 85   AST 16   ALT 18   ANIONGAP 15     Cardiac Markers:   Recent Labs   Lab 12/12/22 1940   *     Magnesium:   Recent Labs   Lab 12/12/22 1940   MG 2.0     Troponin:   Recent Labs   Lab 12/12/22 1940   TROPONINI <0.006     TSH:   Recent Labs   Lab 10/31/22  1052   TSH 0.523       Significant Imaging: I have reviewed all pertinent imaging results/findings within the past 24 hours.  Imaging Results              X-Ray Chest AP Portable (Final result)  Result time 12/12/22 20:43:22      Final result by Miles Garza MD (12/12/22 20:43:22)                   Impression:      No detrimental change when compared with examination performed earlier the same day.      Electronically signed by: Miles Garza MD  Date:    12/12/2022  Time:    20:43               Narrative:    EXAMINATION:  XR  "CHEST AP PORTABLE    CLINICAL HISTORY:  Provided history is "  Unspecified atrial fibrillation".    TECHNIQUE:  One view of the chest.    COMPARISON:  12/12/2022.    FINDINGS:  Cardiac wires overlie the chest.  Cardiomediastinal silhouette is mildly enlarged and similar to the prior study.  No confluent area of consolidation.  No sizable pleural effusion.  No pneumothorax.                                        Assessment/Plan:     * Atrial flutter  Paroxysmal atrial fibrillation  Current use of long term anticoagulation  -Patient presented to the a heart rate of 200, initial EKG concerning for atrial tachycardia.  -Converted to SR after receiving IV cardizem 20mg x1.  -Troponin <0.006, .  -Cardiology consulted in the ED with plans for EP to see the patient in the morning.  -Cardiac monitoring.   -Continue diltiazem and propafenone.  -NPO after midnight.  -Continue eliquis.   -ECHO ordered.    BMI 33.0-33.9,adult  Body mass index is 33.47 kg/m². Obesity complicates all aspects of disease management from diagnostic modalities to treatment. Weight loss encouraged and health benefits explained to patient.    GERD (gastroesophageal reflux disease)  -Chronic, controlled.  -Continue PPI.    Mild persistent asthma with acute exacerbation  -Mild exacerbation.   -Afebrile, no leukocytosis.  -CXR with no acute process.  -Started on steroids today, will continue.  -Continue zyrtec and daily inhalers.  -PRN duo nebs.      VTE Risk Mitigation (From admission, onward)           Ordered     apixaban tablet 5 mg  2 times daily         12/12/22 2151     IP VTE HIGH RISK PATIENT  Once         12/12/22 2151     Place sequential compression device  Until discontinued         12/12/22 2151     Reason for No Pharmacological VTE Prophylaxis  Once        Question:  Reasons:  Answer:  Already adequately anticoagulated on oral Anticoagulants    12/12/22 2151                       Cindy Diaz NP  Department of Hospital Medicine "   [facility] - Emergency Dept

## 2022-12-13 NOTE — ASSESSMENT & PLAN NOTE
Current use of long term anticoagulation  Patient with Paroxysmal (<7 days) atrial fibrillation which is controlled currently with Calcium Channel Blocker and rhythmol. Patient is currently in sinus rhythm.[id number] Score: 1.Anticoagulation indicated. Anticoagulation done with eliquis.

## 2022-12-13 NOTE — PLAN OF CARE
Patient is AAO x4, rounding and assessments performed. Patient did not go for EP studies and was started on Sotalol with EKG required q2 hours and performed.

## 2022-12-13 NOTE — ASSESSMENT & PLAN NOTE
Paroxysmal atrial fibrillation  Current use of long term anticoagulation  -Patient presented to the a heart rate of 200, initial EKG concerning for atrial tachycardia.  -Converted to SR after receiving IV cardizem 20mg x1.  -Troponin <0.006, .  -Cardiology consulted in the ED with plans for EP to see the patient in the morning.  -Cardiac monitoring.   -Continue diltiazem and propafenone.  -NPO after midnight.  -Continue eliquis.   -ECHO ordered.

## 2022-12-13 NOTE — ED TRIAGE NOTES
Francoise Dykes, a 62 y.o. female presents to the ED w/ complaint of heart palpitations. States was seen earlier today for same complaints. Pt states racing heart went away temporarily but came back as she was getting in her car to go home. Denies CP at this time. Hx of afib w/ RVR.     Triage note:  Chief Complaint   Patient presents with    Tachycardia     Pt c/o HR at 203 on her apple watch at home. Pt was DC today from this same complaint today. Pt has hx of Afib with RVR and compliant with medications at home. Pt's HR is between 195-205 in triage. Pt is mildly SOB and states she feels worse.      Review of patient's allergies indicates:   Allergen Reactions    Iodinated contrast media Anaphylaxis     Itchy throat, SOB, hives    Adhesive tape-silicones Itching     Manifested in 12/2015 following AF ablation.     Past Medical History:   Diagnosis Date    Acid reflux     Allergy     seasonal    Asthma with acute exacerbation     Atrial fibrillation     Essential hypertension 11/20/2017    Pt states that she does not have HTN.

## 2022-12-13 NOTE — HPI
Ms. Dykes is a 60 year old female with a history of  PAF/AFL S/P PVIx2, who continued to have episodes of atypical flutter following her second ablation. Pt is a very known pt of Dr. Meyers who presented w/ c/o palpitations found to be in atypical AFL w/ RVR. Initially cardioverted with dilt drip but then return to the ED with palpitations right after she was discharged. EP consulted for atypical AFL.     Pt has noticed some wheezing and coughing as well for which she thinks it might be her asthma acting up as well. Denies syncopal episodes, N/V, chest pain. Pt home medications consist of propafenone 225mg q 8h, dilt 120mg qd and eliquis 5mg BID.       TTE 12/12/2022  The left ventricle is normal in size with concentric remodeling and normal systolic function. The estimated ejection fraction is 65%.  Normal right ventricular size with normal right ventricular systolic function.  Mild left atrial enlargement.  Mild tricuspid regurgitation.  The estimated PA systolic pressure is 24 mmHg.  Normal central venous pressure (3 mmHg).  Trivial circumferential pericardial effusion.  Atrial fibrillation observed.

## 2022-12-13 NOTE — PLAN OF CARE
[facility] - Intensive Care ([place] [address])  Initial Discharge Assessment       Primary Care Provider: Juice So MD    Admission Diagnosis: Tachycardia [R00.0]  Afib [I48.91]  Chest pain [R07.9]  Atrial fibrillation with RVR [I48.91]  Atrial fibrillation with tachycardic ventricular rate [I48.91]    Admission Date: 12/12/2022  Expected Discharge Date: 12/14/2022         Payor: [employer] / Plan: [employer] EPO OPEN ACCESS / Product Type: PPO /     Extended Emergency Contact Information  Primary Emergency Contact: Roberto Long   [facility]  Home Phone: [phone]  Mobile Phone: [phone]  Relation: Son  Preferred language: English  Secondary Emergency Contact: Ladi Gao   [facility]  Home Phone: [phone]  Mobile Phone: [phone]  Relation: Daughter  Preferred language: English    Discharge Plan A: (P) Home with family  Discharge Plan B: (P) Home Health      [facility] [address] [address]  [place] [address]  Phone: [phone] Fax: [phone]    [facility] - [facility] [address] [facility]  [address] [facility] [address]  Phone: [phone] Fax: [phone]      Initial Assessment (most recent)       Adult Discharge Assessment - 12/13/22 1550          Discharge Assessment    Assessment Type Discharge Planning Assessment (P)      Confirmed/corrected address, phone number and insurance Yes (P)      Confirmed Demographics Correct on Facesheet (P)      Source of Information patient (P)      Does patient/caregiver understand observation status Yes (P)      Communicated KASSI with patient/caregiver Yes (P)      Reason For Admission Tachycardia (P)      People in Home alone (P)      Facility Arrived From: Home (P)      Do you expect to return to your current living situation? Yes (P)      Do you have help at home or someone to help you manage your care at home? Yes (P)      Who are your caregiver(s)  and their phone number(s)? Juice So [phone] (P)      Prior to hospitilization cognitive status: Alert/Oriented (P)      Current cognitive status: Alert/Oriented (P)      Equipment Currently Used at Home none (P)      Readmission within 30 days? No (P)      Patient currently being followed by outpatient case management? No (P)      Do you currently have service(s) that help you manage your care at home? No (P)      Do you take prescription medications? Yes (P)      Do you have prescription coverage? Yes (P)      Coverage [employer] Managed Medicare (P)      Do you have any problems affording any of your prescribed medications? Yes (P)      How do you get to doctors appointments? car, drives self;family or friend will provide (P)      Are you on dialysis? No (P)      Do you take coumadin? No (P)      Discharge Plan A Home with family (P)      Discharge Plan B Home Health (P)      DME Needed Upon Discharge  none (P)                           Clementine Marx LMSW  [facility]   [phone]

## 2022-12-13 NOTE — ASSESSMENT & PLAN NOTE
Body mass index is 33.47 kg/m². Obesity complicates all aspects of disease management from diagnostic modalities to treatment. Weight loss encouraged and health benefits explained to patient.

## 2022-12-13 NOTE — PLAN OF CARE
Qtc post sotalol dose measured at 437 msec. Ok to give evening dose of sotalol.     Trevor Howe MD  [facility]  Cardiovascular Disease, PGY-V

## 2022-12-13 NOTE — ED PROVIDER NOTES
Encounter Date: 12/12/2022       History     Chief Complaint   Patient presents with    Tachycardia     Pt c/o HR at 203 on her apple watch at home. Pt was DC today from this same complaint today. Pt has hx of Afib with RVR and compliant with medications at home. Pt's HR is between 195-205 in triage. Pt is mildly SOB and states she feels worse.      HPI  62-year-old female with history of AFib, asthma, presents to the ED for heart palpitations.  Patient states that she had intermittent palpitations for the last 3 days, then having shortness of breath on exertion today.  She has history of complicated AFib, on propafenone and diltiazem, history of cardio conversion x4, and ablation.  She is scheduled for another ablation in February.  She was seen in the [facility] ED today, converted after 1 does of diltiazem 10 mg.  Patient states that her palpitation return right after she was discharged from the ER, reports hot flashes and lightheadedness sensation that she had to stop the car couple times.  Patient denies recent illness, no fever or chill, no chest pain or shortness of breath at this encounter.  Review of patient's allergies indicates:   Allergen Reactions    Iodinated contrast media Anaphylaxis     Itchy throat, SOB, hives    Adhesive tape-silicones Itching     Manifested in 12/2015 following AF ablation.     Past Medical History:   Diagnosis Date    Acid reflux     Allergy     seasonal    Asthma with acute exacerbation     Atrial fibrillation     Essential hypertension 11/20/2017    Pt states that she does not have HTN.      Past Surgical History:   Procedure Laterality Date    24 HOUR IMPEDANCE PH MONITORING OF ESOPHAGUS IN PATIENT TAKING ACID REDUCING MEDICATIONS N/A 9/20/2021    Procedure: IMPEDANCE PH STUDY, ESOPHAGEAL, 24 HOUR, IN PATIENT TAKING ACID REDUCING MEDICATION;  Surgeon: Franky Gomez MD;  [facility] ([address] [place] [address]);  Service: Endoscopy;  Laterality: N/A;  Patient to do EGD with with  esophageal Bravo pH probe on omeprazole 40 mg once daily she needs to take it with a sip of water the day of the case  pt completed COVID vaccine- see Immunization record in     CARDIAC ELECTROPHYSIOLOGY STUDY AND ABLATION       SECTION      COLONOSCOPY N/A 2018    Procedure: COLONOSCOPY;  Surgeon: Brodie Lara MD;  Location: [facility];  Service: Endoscopy;  Laterality: N/A;  confirmed appt-ss    ESOPHAGEAL MANOMETRY WITH MEASUREMENT OF IMPEDANCE N/A 2021    Procedure: MANOMETRY, ESOPHAGUS, WITH IMPEDANCE MEASUREMENT;  Surgeon: Franky Gomez MD;  Location: [facility] (4TH FLR);  Service: Endoscopy;  Laterality: N/A;  Covid test  [facility]   pt confirmed appt-KPvt    ESOPHAGOGASTRODUODENOSCOPY N/A 2021    Procedure: EGD (ESOPHAGOGASTRODUODENOSCOPY);  Surgeon: Alin Camargo MD;  Location: [facility] (2ND FLR);  Service: Endoscopy;  Laterality: N/A;  Fully vaccinated 21, ok to hold Eliquis x 2 days per Dr. ANGELLA Meyers, instr portal -ml  12/10/21 LVM to see if patient can come in earlier -ml    HYSTERECTOMY      MIGUEL    RADIOFREQUENCY ABLATION Left 2019    Procedure: RADIOFREQUENCY ABLATION, LEFT L2,3,4,5;  Surgeon: Mariusz Jang MD;  Location: [facility];  Service: Pain Management;  Laterality: Left;  Left RFA L2,3,4,5  1 of 2  *Ok to hold Eliquis, per Dr Meyers    RADIOFREQUENCY ABLATION Right 3/13/2019    Procedure: RADIOFREQUENCY ABLATION,  Right L2,3,4,5;  Surgeon: Mariusz Jang MD;  Location: [facility];  Service: Pain Management;  Laterality: Right;  Right RFA @ L2,3,4,5  2 of 2     Family History   Problem Relation Age of Onset    Hypertension Mother     Diabetes Mother     Glaucoma Mother     Allergies Mother     Asbestos Father     Obesity Father     Eczema Son     Hypertension Son     Heart disease Maternal Grandmother         chf    Diabetes Maternal Grandfather     Cancer Paternal Grandmother         lung, 2/2 second hand smoke    Glaucoma  Paternal Grandfather     Blindness Paternal Grandfather     Melanoma Neg Hx     Psoriasis Neg Hx     Lupus Neg Hx     Acne Neg Hx     Breast cancer Neg Hx     Colon cancer Neg Hx     Ovarian cancer Neg Hx     Esophageal cancer Neg Hx     Cirrhosis Neg Hx     Celiac disease Neg Hx     Colon polyps Neg Hx     Crohn's disease Neg Hx     Liver cancer Neg Hx     Liver disease Neg Hx     Rectal cancer Neg Hx     Stomach cancer Neg Hx     Ulcerative colitis Neg Hx     Pancreatic cancer Neg Hx     Kidney cancer Neg Hx     Bladder Cancer Neg Hx     Uterine cancer Neg Hx      Social History     Tobacco Use    Smoking status: Never    Smokeless tobacco: Never   Substance Use Topics    Alcohol use: Yes     Comment: rarely, 1 drink/week    Drug use: No     Review of Systems   Constitutional:  Negative for chills and fever.   HENT:  Negative for congestion and sore throat.    Eyes:  Negative for pain and visual disturbance.   Respiratory:  Positive for shortness of breath. Negative for cough.    Cardiovascular:  Positive for palpitations. Negative for chest pain and leg swelling.   Gastrointestinal:  Negative for abdominal pain, nausea and vomiting.   Genitourinary:  Negative for dysuria and flank pain.   Musculoskeletal:  Negative for back pain and neck pain.   Skin:  Negative for rash.   Neurological:  Negative for weakness, numbness and headaches.   Psychiatric/Behavioral:  Negative for behavioral problems and confusion.      Physical Exam     Initial Vitals [12/12/22 1917]   BP Pulse Resp Temp SpO2   (!) 169/131 (!) 201 (!) 22 98.3 °F (36.8 °C) 98 %      MAP       --         Physical Exam    Nursing note and vitals reviewed.  Constitutional: She appears well-developed. No distress.   HENT:   Head: Normocephalic and atraumatic.   Mouth/Throat: Oropharynx is clear and moist.   Eyes: Conjunctivae and EOM are normal.   Neck: No JVD present.   Normal range of motion.  Cardiovascular:  Normal heart sounds and intact distal pulses.            Tachycardia 190-200   Pulmonary/Chest: Breath sounds normal. No respiratory distress.   Abdominal: Abdomen is soft. She exhibits no distension. There is no abdominal tenderness.   Musculoskeletal:         General: No tenderness or edema. Normal range of motion.      Cervical back: Normal range of motion.     Neurological: She is alert and oriented to person, place, and time. She has normal strength.   Skin: Skin is warm and dry. Capillary refill takes less than 2 seconds.       ED Course   Procedures  Labs Reviewed   CBC W/ AUTO DIFFERENTIAL - Abnormal; Notable for the following components:       Result Value    MCV 81 (*)     MCHC 36.1 (*)     Immature Granulocytes 0.6 (*)     Immature Grans (Abs) 0.05 (*)     Lymph # 0.7 (*)     Mono # 0.0 (*)     Gran % 89.6 (*)     Lymph % 8.7 (*)     Mono % 0.5 (*)     All other components within normal limits   COMPREHENSIVE METABOLIC PANEL - Abnormal; Notable for the following components:    CO2 18 (*)     Glucose 185 (*)     eGFR 57.5 (*)     All other components within normal limits   B-TYPE NATRIURETIC PEPTIDE - Abnormal; Notable for the following components:     (*)     All other components within normal limits   TROPONIN I   MAGNESIUM        ECG Results              EKG 12-lead (Edited Result - FINAL)  Result time 12/13/22 14:46:25      Edited Result - FINAL by Interface, Lab In [facility] (12/13/22 14:46:25)                   Narrative:    Test Reason : R07.9,    Vent. Rate : 096 BPM     Atrial Rate : 096 BPM     P-R Int : 252 ms          QRS Dur : 082 ms      QT Int : 356 ms       P-R-T Axes : 078 026 048 degrees     QTc Int : 449 ms    Probably A flutter  Low anterior forces and R wave progression- Cannot rule out Anterior  infarct , new vs lead placement  Abnormal ECG  When compared with ECG of 12-DEC-2022 19:36,  [place] interval has increased  Vent. rate has decreased BY  96 BPM  Acute Anterior infarct is now possible  Reconfirmed by Jay FLORES MD ([id number]) on  "12/13/2022 2:46:21 PM    Referred By: LEXIS   SELF           Confirmed By:Jay FLORES MD                                     Repeat EKG 12-lead (Final result)  Result time 12/13/22 08:47:06      Final result by Interface, Lab In [facility] (12/13/22 08:47:06)                   Narrative:    Test Reason : R00.0,    Vent. Rate : 192 BPM     Atrial Rate : 192 BPM     P-R Int : 000 ms          QRS Dur : 076 ms      QT Int : 238 ms       P-R-T Axes : 000 058 130 degrees     QTc Int : 425 ms    Supraventricular tachycardia  ST and T wave abnormality, consider inferior ischemia  Abnormal ECG  When compared with ECG of 12-DEC-2022 12:45,  Significant changes have occurred  Confirmed by Jay FLORES MD ([id number]) on 12/13/2022 8:47:00 AM    Referred By: LEXIS   SELF           Confirmed By:Jay FLORES MD                                  Imaging Results              X-Ray Chest AP Portable (Final result)  Result time 12/12/22 20:43:22      Final result by Miles Garza MD (12/12/22 20:43:22)                   Impression:      No detrimental change when compared with examination performed earlier the same day.      Electronically signed by: Miles Garza MD  Date:    12/12/2022  Time:    20:43               Narrative:    EXAMINATION:  XR CHEST AP PORTABLE    CLINICAL HISTORY:  Provided history is "  Unspecified atrial fibrillation".    TECHNIQUE:  One view of the chest.    COMPARISON:  12/12/2022.    FINDINGS:  Cardiac wires overlie the chest.  Cardiomediastinal silhouette is mildly enlarged and similar to the prior study.  No confluent area of consolidation.  No sizable pleural effusion.  No pneumothorax.                                       Medications   sodium chloride 0.9% flush 10 mL (has no administration in time range)   melatonin tablet 6 mg (has no administration in time range)   pantoprazole EC tablet 40 mg (40 mg Oral Given 12/13/22 0814)   cetirizine tablet 10 mg (10 mg Oral Given 12/13/22 0813)   fluticasone " furoate-vilanteroL 200-25 mcg/dose diskus inhaler 1 puff (1 puff Inhalation Given 12/13/22 0925)   apixaban tablet 5 mg (5 mg Oral Given 12/13/22 0814)   albuterol-ipratropium 2.5 mg-0.5 mg/3 mL nebulizer solution 3 mL (has no administration in time range)   naloxone 0.4 mg/mL injection 0.02 mg (has no administration in time range)   glucose chewable tablet 16 g (has no administration in time range)   glucose chewable tablet 24 g (has no administration in time range)   glucagon (human recombinant) injection 1 mg (has no administration in time range)   dextrose 10% bolus 125 mL (has no administration in time range)   dextrose 10% bolus 250 mL (has no administration in time range)   acetaminophen tablet 650 mg (has no administration in time range)   ondansetron injection 4 mg (has no administration in time range)   diltiaZEM injection 10 mg (has no administration in time range)   diltiaZEM 24 hr capsule 120 mg (120 mg Oral Given 12/13/22 0813)   fluticasone propionate 50 mcg/actuation nasal spray 100 mcg (100 mcg Each Nostril Given 12/13/22 1048)   predniSONE tablet 40 mg (40 mg Oral Given 12/13/22 0813)   sotaloL tablet 80 mg (80 mg Oral Given 12/13/22 1038)   diltiaZEM injection 20 mg (20 mg Intravenous Given 12/12/22 1938)   potassium bicarbonate disintegrating tablet 25 mEq (25 mEq Oral Given 12/13/22 0034)     Medical Decision Making:   History:   Old Medical Records: I decided to obtain old medical records.  Initial Assessment:   62-year-old female with history of AFib, asthma, presents to the ED for heart palpitations.   Differential Diagnosis:    AFib with RVR, a flutter, SVT, ACS, heart failure.  PE has been rule out by CTA from outside hospital  Clinical Tests:   Lab Tests: Ordered and Reviewed  Radiological Study: Reviewed  Medical Tests: Ordered and Reviewed          Attending Attestation:         Attending Critical Care:   Critical Care Times:   Direct Patient Care (initial evaluation, reassessments, and  time considering the case)................................................................10 minutes.   Additional History from reviewing old medical records or taking additional history from the family, EMS, PCP, etc.......................10 minutes.   Ordering, Reviewing, and Interpreting Diagnostic Studies...............................................................................................................5 minutes.   Documentation..................................................................................................................................................................................5 minutes.   Consultation with other Physicians. .................................................................................................................................................5 minutes.   ==============================================================  Total Critical Care Time - exclusive of procedural time: 35 minutes.  ==============================================================  Critical care was necessary to treat or prevent imminent or life-threatening deterioration of the following conditions: cardiac arrhythmia.   Critical care was time spent personally by me on the following activities: obtaining history from patient or relative, examination of patient, review of x-rays / CT sent with the patient, review of old charts, ordering lab, x-rays, and/or EKG, development of treatment plan with patient or relative, ordering and performing treatments and interventions, evaluation of patient's response to treatment, discussion with consultants, interpretation of cardiac measurements and re-evaluation of patient's conition.   Critical Care Condition: potentially life-threatening       Attending ED Notes:   Attending Note:  Physician Attestation Statement: I have personally seen and examined this patient.   As the supervising MD I agree with the above history.   As the supervising MD I  agree with the above PE. As the supervising MD I agree with the above treatment, course, plan, and disposition.   I have performed a substantive portion of the history, exam, management, and assessment of response to management.   I have independently reviewed all labs, imaging, and EKGs/procedures    MDM:  I patient presented from outside facility after recurrence of atrial fibrillation with RVR after discharge.  Rate control achieved with IV diltiazem.  Patient had recurrence of atrial fibrillation with labile heart rate.  However, patient did not have prolonged enough RVR to receive medication.  Troponin negative.  BNP only mildly elevated.  Patient remains without significant respiratory symptoms or any chest pain.  Continue plan for admission for evaluation by EP service for possible ablation    See ED course for additional HPI, ROS, PE, lab, imaging, consultant discussion, procedure interpretation, and Medical Decision Making.        ED Course as of 12/13/22 1652   Mon Dec 12, 2022   2100 Consulted Cardiology, recommend admit to Hospital Medicine for EP follow-up [NC]   2235 Patient had recurrence of atrial fibrillation with labile heart rate.  However, patient did not have prolonged enough RVR to receive medication.  Troponin negative.  BNP only mildly elevated.  Patient remains without significant respiratory symptoms or any chest pain.  Continue plan for admission for evaluation by EP service for possible ablation. [DS]      ED Course User Index  [DS] Marcial Miramontes MD  [NC] Charli Nunez MD                 Clinical Impression:   Final diagnoses:  [R07.9] Chest pain  [I48.91] Atrial fibrillation with tachycardic ventricular rate  [R00.0] Tachycardia  [I48.91] Afib        ED Disposition Condition    Observation Stable                Charli Nunez MD  Resident  12/12/22 2301       Marcial Miramontes MD  12/13/22 1662

## 2022-12-14 ENCOUNTER — TELEPHONE (OUTPATIENT)
Dept: ELECTROPHYSIOLOGY | Facility: CLINIC | Age: 62
End: 2022-12-14
Payer: COMMERCIAL

## 2022-12-14 DIAGNOSIS — I48.0 PAROXYSMAL ATRIAL FIBRILLATION: Primary | ICD-10-CM

## 2022-12-14 DIAGNOSIS — Z01.812 ENCOUNTER FOR PRE-OPERATIVE LABORATORY TESTING: ICD-10-CM

## 2022-12-14 LAB
ANION GAP SERPL CALC-SCNC: 8 MMOL/L (ref 8–16)
BASOPHILS # BLD AUTO: 0.01 K/UL (ref 0–0.2)
BASOPHILS NFR BLD: 0.1 % (ref 0–1.9)
BUN SERPL-MCNC: 25 MG/DL (ref 6–20)
CALCIUM SERPL-MCNC: 9 MG/DL (ref 8.7–10.5)
CHLORIDE SERPL-SCNC: 105 MMOL/L (ref 95–110)
CO2 SERPL-SCNC: 24 MMOL/L (ref 23–29)
CREAT SERPL-MCNC: 0.8 MG/DL (ref 0.5–1.4)
DIFFERENTIAL METHOD: ABNORMAL
EOSINOPHIL # BLD AUTO: 0 K/UL (ref 0–0.5)
EOSINOPHIL NFR BLD: 0.3 % (ref 0–8)
ERYTHROCYTE [DISTWIDTH] IN BLOOD BY AUTOMATED COUNT: 12.6 % (ref 11.5–14.5)
EST. GFR  (NO RACE VARIABLE): >60 ML/MIN/1.73 M^2
GLUCOSE SERPL-MCNC: 100 MG/DL (ref 70–110)
HCT VFR BLD AUTO: 33 % (ref 37–48.5)
HGB BLD-MCNC: 11.3 G/DL (ref 12–16)
IMM GRANULOCYTES # BLD AUTO: 0.06 K/UL (ref 0–0.04)
IMM GRANULOCYTES NFR BLD AUTO: 0.7 % (ref 0–0.5)
LYMPHOCYTES # BLD AUTO: 1.4 K/UL (ref 1–4.8)
LYMPHOCYTES NFR BLD: 15.9 % (ref 18–48)
MAGNESIUM SERPL-MCNC: 2.1 MG/DL (ref 1.6–2.6)
MCH RBC QN AUTO: 28.9 PG (ref 27–31)
MCHC RBC AUTO-ENTMCNC: 34.2 G/DL (ref 32–36)
MCV RBC AUTO: 84 FL (ref 82–98)
MONOCYTES # BLD AUTO: 0.6 K/UL (ref 0.3–1)
MONOCYTES NFR BLD: 6.2 % (ref 4–15)
NEUTROPHILS # BLD AUTO: 6.8 K/UL (ref 1.8–7.7)
NEUTROPHILS NFR BLD: 76.8 % (ref 38–73)
NRBC BLD-RTO: 0 /100 WBC
PLATELET # BLD AUTO: 208 K/UL (ref 150–450)
PMV BLD AUTO: 11.8 FL (ref 9.2–12.9)
POTASSIUM SERPL-SCNC: 3.6 MMOL/L (ref 3.5–5.1)
RBC # BLD AUTO: 3.91 M/UL (ref 4–5.4)
SODIUM SERPL-SCNC: 137 MMOL/L (ref 136–145)
WBC # BLD AUTO: 8.88 K/UL (ref 3.9–12.7)

## 2022-12-14 PROCEDURE — 25000003 PHARM REV CODE 250: Performed by: NURSE PRACTITIONER

## 2022-12-14 PROCEDURE — 63600175 PHARM REV CODE 636 W HCPCS: Performed by: NURSE PRACTITIONER

## 2022-12-14 PROCEDURE — 20600001 HC STEP DOWN PRIVATE ROOM

## 2022-12-14 PROCEDURE — 25000003 PHARM REV CODE 250: Performed by: STUDENT IN AN ORGANIZED HEALTH CARE EDUCATION/TRAINING PROGRAM

## 2022-12-14 PROCEDURE — 99233 SBSQ HOSP IP/OBS HIGH 50: CPT | Mod: ,,,

## 2022-12-14 PROCEDURE — 85025 COMPLETE CBC W/AUTO DIFF WBC: CPT | Performed by: NURSE PRACTITIONER

## 2022-12-14 PROCEDURE — 80048 BASIC METABOLIC PNL TOTAL CA: CPT | Performed by: NURSE PRACTITIONER

## 2022-12-14 PROCEDURE — 83735 ASSAY OF MAGNESIUM: CPT | Performed by: NURSE PRACTITIONER

## 2022-12-14 PROCEDURE — 36415 COLL VENOUS BLD VENIPUNCTURE: CPT | Performed by: NURSE PRACTITIONER

## 2022-12-14 PROCEDURE — 99233 PR SUBSEQUENT HOSPITAL CARE,LEVL III: ICD-10-PCS | Mod: ,,,

## 2022-12-14 RX ORDER — MUPIROCIN 20 MG/G
OINTMENT TOPICAL 2 TIMES DAILY
Status: CANCELLED | OUTPATIENT
Start: 2022-12-14 | End: 2022-12-19

## 2022-12-14 RX ADMIN — PREDNISONE 40 MG: 20 TABLET ORAL at 08:12

## 2022-12-14 RX ADMIN — PANTOPRAZOLE SODIUM 40 MG: 40 TABLET, DELAYED RELEASE ORAL at 08:12

## 2022-12-14 RX ADMIN — APIXABAN 5 MG: 5 TABLET, FILM COATED ORAL at 08:12

## 2022-12-14 RX ADMIN — FLUTICASONE FUROATE AND VILANTEROL TRIFENATATE 1 PUFF: 200; 25 POWDER RESPIRATORY (INHALATION) at 08:12

## 2022-12-14 RX ADMIN — SOTALOL HYDROCHLORIDE 80 MG: 80 TABLET ORAL at 08:12

## 2022-12-14 RX ADMIN — CETIRIZINE HYDROCHLORIDE 10 MG: 5 TABLET, FILM COATED ORAL at 08:12

## 2022-12-14 RX ADMIN — SOTALOL HYDROCHLORIDE 80 MG: 80 TABLET ORAL at 09:12

## 2022-12-14 RX ADMIN — FLUTICASONE PROPIONATE 100 MCG: 50 SPRAY, METERED NASAL at 08:12

## 2022-12-14 RX ADMIN — DILTIAZEM HYDROCHLORIDE 120 MG: 120 CAPSULE, COATED, EXTENDED RELEASE ORAL at 08:12

## 2022-12-14 NOTE — HOSPITAL COURSE
Patient placed in observation for recurrent atrial arrhythmias, refractory to current propafenone. EP was consulted. Transitioned from propafenone to sotalol 80 mg BID 12/13. Was maintained in NSR so no need for cardioversion. Monitoring for another day on sotalol per EP. EKG in NSR with rate control. EP cleared patient for discharge home. Will continue sotalol BID. Referral for outpatient EP follow-up placed. Vitals and labs remained stable. All questions answered at beside with verbal understanding. Return precautions given. Patient is stable for discharge home.

## 2022-12-14 NOTE — ASSESSMENT & PLAN NOTE
Ms. Dykes is a 60 year old female with a history of  PAF/AFL S/P PVIx2, who continued to have episodes of atypical flutter following her second ablation who presented to the ED w/ symptomatic atypical AFL. Now with recurrent atrial arrhythmias refractory to propafenone.    Recommendations:   - Back in SR, no need for cardioversion.  -Continue diltiazem 120mg qd.   - continue sotalol 80mg BID. We would like to monitor for at least 1 more day.   - Continue obtaining ECG 2 hours after each dose of sotalol.   - Cont anticoagulation w/ eliquis  - If patient develops any symptoms of bradycardia as dizziness, lightheadedness and so please let us know.     Discussed with Dr. Meyesr

## 2022-12-14 NOTE — SUBJECTIVE & OBJECTIVE
Interval History: Pt started on sotalol yesterday morning. EKG and telemetry reviewed. She is back in SR. Pt is worried as she is not used to seeing her HR at 60 and below. Denies dizziness, lightheadedness, SOB or any other complaints. Echo was repeated with normal LVEF.       Objective:     Vital Signs (Most Recent):  Temp: 97.7 °F (36.5 °C) (12/14/22 0758)  Pulse: (!) 59 (12/14/22 0853)  Resp: 16 (12/14/22 0853)  BP: 120/61 (12/14/22 0851)  SpO2: 96 % (12/14/22 0758)   Vital Signs (24h Range):  Temp:  [97.7 °F (36.5 °C)-98.2 °F (36.8 °C)] 97.7 °F (36.5 °C)  Pulse:  [59-76] 59  Resp:  [15-20] 16  SpO2:  [90 %-97 %] 96 %  BP: (112-147)/(56-74) 120/61     Weight: 88.5 kg (195 lb)  Body mass index is 33.47 kg/m².     SpO2: 96 %       Physical Exam  Vitals and nursing note reviewed.   Constitutional:       General: She is not in acute distress.     Appearance: She is obese. She is not toxic-appearing or diaphoretic.   HENT:      Head: Normocephalic and atraumatic.      Nose: Nose normal.      Mouth/Throat:      Mouth: Mucous membranes are moist.   Eyes:      Pupils: Pupils are equal, round, and reactive to light.   Cardiovascular:      Rate and Rhythm: Sinus bradycardia present.      Pulses: Normal pulses.   Pulmonary:      Effort: Pulmonary effort is normal. No respiratory distress.      Breath sounds: No rhonchi or rales.      Comments: Currently on room air  Abdominal:      General: Bowel sounds are normal. There is no distension.      Palpations: Abdomen is soft.      Tenderness: There is no abdominal tenderness. There is no guarding.   Musculoskeletal:         General: No swelling, tenderness or deformity. Normal range of motion.      Cervical back: Normal range of motion. No tenderness.   Skin:     General: Skin is warm and dry.      Capillary Refill: Capillary refill takes less than 2 seconds.      Coloration: Skin is not pale.      Findings: No bruising.   Neurological:      General: No focal deficit present.       Mental Status: She is alert.      Sensory: No sensory deficit.      Motor: No weakness.   Psychiatric:         Mood and Affect: Mood normal.         Behavior: Behavior normal.       Significant Labs:   Recent Lab Results         12/14/22  0414        ANION GAP 8       Baso # 0.01       Basophil % 0.1       BUN 25       Calcium 9.0       Chloride 105       CO2 24       Creatinine 0.8       Differential Method Automated       eGFR >60.0       Eos # 0.0       Eosinophil % 0.3       Glucose 100       Gran # (ANC) 6.8       Gran % 76.8       HEMATOCRIT 33.0       HEMOGLOBIN 11.3       Immature Grans (Abs) 0.06  Comment: Mild elevation in immature granulocytes is non specific and   can be seen in a variety of conditions including stress response,   acute inflammation, trauma and pregnancy. Correlation with other   laboratory and clinical findings is essential.         Immature Granulocytes 0.7       Lymph # 1.4       Lymph % 15.9       Magnesium 2.1       MCH 28.9       MCHC 34.2       MCV 84       Mono # 0.6       Mono % 6.2       MPV 11.8       nRBC 0       Platelets 208       Potassium 3.6       RBC 3.91       RDW 12.6       Sodium 137       WBC 8.88               Significant Imaging:   TTE 12/13/2022  The left ventricle is normal in size with concentric remodeling and normal systolic function. The estimated ejection fraction is 65%.  Normal right ventricular size with normal right ventricular systolic function.  Mild left atrial enlargement.  Mild tricuspid regurgitation.  The estimated PA systolic pressure is 24 mmHg.  Normal central venous pressure (3 mmHg).  Trivial circumferential pericardial effusion.  Atrial fibrillation observed.

## 2022-12-14 NOTE — ASSESSMENT & PLAN NOTE
Paroxysmal atrial fibrillation  Current use of long term anticoagulation  -Patient presented to the a heart rate of 200, initial EKG concerning for atrial tachycardia.  -Converted to SR after receiving IV cardizem 20mg x1.  -Troponin <0.006, .  -EP consulted, appreciate recs.  -Cardiac monitoring.   -ECHO 12/13 reviewed.  -Continue diltiazem 120mg.   -Discontinued propafenone 12/13 per EP.  -Started sotalol 80 mg BID 12/13- continue.  -EKG every 2 hours after sotalol.   -Continue eliquis.   -Monitor on sotalol 80 mg BID for at least one more day per EP.  -if patient develops symptoms symptomatic bradycardia, alert EP.  -No need for cardioversion as patient is back in SR.

## 2022-12-14 NOTE — ASSESSMENT & PLAN NOTE
Paroxysmal atrial fibrillation  Current use of long term anticoagulation  -Patient presented to the a heart rate of 200, initial EKG concerning for atrial tachycardia.  -Converted to SR after receiving IV cardizem 20mg x1.  -Troponin <0.006, .  -Cardiology consulted in the ED with plans for EP to see the patient in the morning.  -Cardiac monitoring.   -Continued diltiazem 120mg. DC propafenone 12/13 per EP.  -NPO after midnight.  -Continue eliquis.   -ECHO ordered (see above)    EP consulted, appreciate recs below  -Continue diltiazem 120mg qd.   - hold propafenone and start sotalol 80mg BID.   - Please obtain an ECG 2 hours after each dose of sotalol.   - Cont anticoagulation w/ eliquis

## 2022-12-14 NOTE — SUBJECTIVE & OBJECTIVE
Interval History: Patient seen at bedside. No complaints this afternoon except for some wheezing. Discussed use of scheduled controller and rescue inhaler prn. She had not had rescue inhaler that is ordered prn for wheezing at the time I saw her.     States she was seen by Dr. Meyers and understands plan to stop Rythmol and transition to sotalol with possible cardioversion tomorrow.     Review of Systems   Constitutional:  Negative for chills and fever.   HENT:  Positive for congestion.    Respiratory:  Positive for cough, shortness of breath and wheezing. Negative for chest tightness.    Cardiovascular:  Positive for palpitations (resolved). Negative for chest pain and leg swelling.   Gastrointestinal:  Negative for abdominal pain and nausea.   Neurological:  Negative for dizziness and weakness.   Objective:     Vital Signs (Most Recent):  Temp: 98.2 °F (36.8 °C) (12/13/22 1931)  Pulse: 73 (12/13/22 2029)  Resp: 18 (12/13/22 1931)  BP: (!) 119/57 (12/13/22 2054)  SpO2: 97 % (12/13/22 1931)   Vital Signs (24h Range):  Temp:  [96.9 °F (36.1 °C)-98.3 °F (36.8 °C)] 98.2 °F (36.8 °C)  Pulse:  [] 73  Resp:  [11-20] 18  SpO2:  [88 %-98 %] 97 %  BP: (109-147)/(56-74) 119/57     Weight: 88.5 kg (195 lb)  Body mass index is 33.47 kg/m².    Intake/Output Summary (Last 24 hours) at 12/13/2022 2228  Last data filed at 12/13/2022 1142  Gross per 24 hour   Intake 100 ml   Output --   Net 100 ml      Physical Exam  Vitals and nursing note reviewed.   Constitutional:       General: She is not in acute distress.     Appearance: She is well-developed. She is obese.   Eyes:      Pupils: Pupils are equal, round, and reactive to light.   Cardiovascular:      Rate and Rhythm: Regular rhythm. Tachycardia present.   Pulmonary:      Effort: Pulmonary effort is normal.      Breath sounds: Wheezing present.   Abdominal:      Palpations: Abdomen is soft.      Tenderness: There is no abdominal tenderness.   Musculoskeletal:          General: No tenderness.   Skin:     General: Skin is warm and dry.   Neurological:      Mental Status: She is alert and oriented to person, place, and time.   Psychiatric:         Behavior: Behavior normal.       Significant Labs: All pertinent labs within the past 24 hours have been reviewed.  BMP:   Recent Labs   Lab 12/13/22 0441   *      K 3.9      CO2 21*   BUN 17   CREATININE 0.8   CALCIUM 9.4   MG 1.9     CBC:   Recent Labs   Lab 12/12/22 1940 12/13/22 0441   WBC 7.81 10.04   HGB 14.8 13.0   HCT 41.0 36.9*    231     CMP:   Recent Labs   Lab 12/12/22 1940 12/13/22 0441    139   K 3.7 3.9    108   CO2 18* 21*   * 131*   BUN 16 17   CREATININE 1.1 0.8   CALCIUM 10.1 9.4   PROT 7.8  --    ALBUMIN 4.0  --    BILITOT 0.6  --    ALKPHOS 85  --    AST 16  --    ALT 18  --    ANIONGAP 15 10     Cardiac Markers:   Recent Labs   Lab 12/12/22 1940   *     Magnesium:   Recent Labs   Lab 12/12/22 1940 12/13/22 0441   MG 2.0 1.9     Troponin:   Recent Labs   Lab 12/12/22 1940   TROPONINI <0.006     TSH:   Recent Labs   Lab 10/31/22  1052   TSH 0.523       Significant Imaging: I have reviewed all pertinent imaging results/findings within the past 24 hours.  Echo  · The left ventricle is normal in size with concentric remodeling and   normal systolic function. The estimated ejection fraction is 65%.  · Normal right ventricular size with normal right ventricular systolic   function.  · Mild left atrial enlargement.  · Mild tricuspid regurgitation.  · The estimated PA systolic pressure is 24 mmHg.  · Normal central venous pressure (3 mmHg).  · Trivial circumferential pericardial effusion.  · Atrial fibrillation observed.

## 2022-12-14 NOTE — PROGRESS NOTES
[facility] ([address] Medicine  Progress Note    Patient Name: Francoise Dykes  MRN: [id number]  Patient Class: OP- Observation   Admission Date: 12/12/2022  Length of Stay: 0 days  Attending Physician: Sondra Mckeon MD  Primary Care Provider: Juice So MD        Subjective:     Principal Problem:Atrial flutter        HPI:  Francoise Dykes is a 62 y.o. female with a PMHx of asthma, GERD, and AF/AFL who presents to the ED with complaints of intermittent palpitations for the last 3 days, then having shortness of breath on exertion today.  She has history of complicated AFib/flutter, on propafenone and diltiazem, history of cardio conversion x4 and ablation. She is scheduled for another ablation in February. She was seen in the [place] ED today, converted after 1 does of diltiazem 10 mg.  Patient states that her palpitation return right after she was discharged from the ER, reports hot flashes and lightheadedness sensation that she had to stop the car couple times. The patient endorses congestion, post nasal drip, cough, and intermittent wheezing for the last few days. She notes she has been taking zyrtec and has been using her inhalers and duo nebs more frequently. She was prescribed prednisone today. The patient denies any fever, chills, N/V/D, chest pain, or abdominal pain.    In the ED, patient initially tachycardic which improved with cardizem, otherwise VSSAF. CBC with unremarkable. Cr 1.1. . Troponin <0.006. Initial EKG with atrially tachycardia, rate of 192, repeat EKG with SR w/ 1st degree AV block, rate of 96. CXR with no acute process. The patient received IV cardizem 20mg x1. Cardiology was consulted in the ED.      Overview/Hospital Course:  Patient placed in observation for recurrent atrial arrhythmias, refractory to current propafenone. EP was consulted. Transitioned to sotalol 12/13. Possible DCCV 12/14.      Interval History: Patient seen at bedside. No  complaints this afternoon except for some wheezing. Discussed use of scheduled controller and rescue inhaler prn. She had not had rescue inhaler that is ordered prn for wheezing at the time I saw her.     States she was seen by Dr. Meyers and understands plan to stop Rythmol and transition to sotalol with possible cardioversion tomorrow.     Review of Systems   Constitutional:  Negative for chills and fever.   HENT:  Positive for congestion.    Respiratory:  Positive for cough, shortness of breath and wheezing. Negative for chest tightness.    Cardiovascular:  Positive for palpitations (resolved). Negative for chest pain and leg swelling.   Gastrointestinal:  Negative for abdominal pain and nausea.   Neurological:  Negative for dizziness and weakness.   Objective:     Vital Signs (Most Recent):  Temp: 98.2 °F (36.8 °C) (12/13/22 1931)  Pulse: 73 (12/13/22 2029)  Resp: 18 (12/13/22 1931)  BP: (!) 119/57 (12/13/22 2054)  SpO2: 97 % (12/13/22 1931)   Vital Signs (24h Range):  Temp:  [96.9 °F (36.1 °C)-98.3 °F (36.8 °C)] 98.2 °F (36.8 °C)  Pulse:  [] 73  Resp:  [11-20] 18  SpO2:  [88 %-98 %] 97 %  BP: (109-147)/(56-74) 119/57     Weight: 88.5 kg (195 lb)  Body mass index is 33.47 kg/m².    Intake/Output Summary (Last 24 hours) at 12/13/2022 2228  Last data filed at 12/13/2022 1142  Gross per 24 hour   Intake 100 ml   Output --   Net 100 ml      Physical Exam  Vitals and nursing note reviewed.   Constitutional:       General: She is not in acute distress.     Appearance: She is well-developed. She is obese.   Eyes:      Pupils: Pupils are equal, round, and reactive to light.   Cardiovascular:      Rate and Rhythm: Regular rhythm. Tachycardia present.   Pulmonary:      Effort: Pulmonary effort is normal.      Breath sounds: Wheezing present.   Abdominal:      Palpations: Abdomen is soft.      Tenderness: There is no abdominal tenderness.   Musculoskeletal:         General: No tenderness.   Skin:     General: Skin is  warm and dry.   Neurological:      Mental Status: She is alert and oriented to person, place, and time.   Psychiatric:         Behavior: Behavior normal.       Significant Labs: All pertinent labs within the past 24 hours have been reviewed.  BMP:   Recent Labs   Lab 12/13/22 0441   *      K 3.9      CO2 21*   BUN 17   CREATININE 0.8   CALCIUM 9.4   MG 1.9     CBC:   Recent Labs   Lab 12/12/22 1940 12/13/22 0441   WBC 7.81 10.04   HGB 14.8 13.0   HCT 41.0 36.9*    231     CMP:   Recent Labs   Lab 12/12/22 1940 12/13/22 0441    139   K 3.7 3.9    108   CO2 18* 21*   * 131*   BUN 16 17   CREATININE 1.1 0.8   CALCIUM 10.1 9.4   PROT 7.8  --    ALBUMIN 4.0  --    BILITOT 0.6  --    ALKPHOS 85  --    AST 16  --    ALT 18  --    ANIONGAP 15 10     Cardiac Markers:   Recent Labs   Lab 12/12/22 1940   *     Magnesium:   Recent Labs   Lab 12/12/22 1940 12/13/22 0441   MG 2.0 1.9     Troponin:   Recent Labs   Lab 12/12/22 1940   TROPONINI <0.006     TSH:   Recent Labs   Lab 10/31/22  1052   TSH 0.523       Significant Imaging: I have reviewed all pertinent imaging results/findings within the past 24 hours.  Echo  · The left ventricle is normal in size with concentric remodeling and   normal systolic function. The estimated ejection fraction is 65%.  · Normal right ventricular size with normal right ventricular systolic   function.  · Mild left atrial enlargement.  · Mild tricuspid regurgitation.  · The estimated PA systolic pressure is 24 mmHg.  · Normal central venous pressure (3 mmHg).  · Trivial circumferential pericardial effusion.  · Atrial fibrillation observed.            Assessment/Plan:      * Atrial flutter  Paroxysmal atrial fibrillation  Current use of long term anticoagulation  -Patient presented to the a heart rate of 200, initial EKG concerning for atrial tachycardia.  -Converted to SR after receiving IV cardizem 20mg x1.  -Troponin <0.006, BNP  136.  -Cardiology consulted in the ED with plans for EP to see the patient in the morning.  -Cardiac monitoring.   -Continued diltiazem 120mg. DC propafenone 12/13 per EP.  -NPO after midnight.  -Continue eliquis.   -ECHO ordered (see above)    EP consulted, appreciate recs below  -Continue diltiazem 120mg qd.   - hold propafenone and start sotalol 80mg BID.   - Please obtain an ECG 2 hours after each dose of sotalol.   - Cont anticoagulation w/ eliquis    BMI 33.0-33.9,adult  Body mass index is 33.47 kg/m². Obesity complicates all aspects of disease management from diagnostic modalities to treatment. Weight loss encouraged and health benefits explained to patient.    GERD (gastroesophageal reflux disease)  -Chronic, controlled.  -Continue PPI.    Mild persistent asthma with acute exacerbation  -Mild exacerbation.   -Afebrile, no leukocytosis.  -CXR with no acute process.  -Started on steroids today, will continue.  -Continue zyrtec and daily inhalers.  -PRN duo nebs.      VTE Risk Mitigation (From admission, onward)         Ordered     apixaban tablet 5 mg  2 times daily         12/12/22 2151     IP VTE HIGH RISK PATIENT  Once         12/12/22 2151     Place sequential compression device  Until discontinued         12/12/22 2151     Reason for No Pharmacological VTE Prophylaxis  Once        Question:  Reasons:  Answer:  Already adequately anticoagulated on oral Anticoagulants    12/12/22 2151                Discharge Planning   KASSI: 12/14/2022     Code Status: Full Code   Is the patient medically ready for discharge?: No    Reason for patient still in hospital (select all that apply): Patient trending condition, Treatment and Consult recommendations  Discharge Plan A: Home with family                  Vernell Prabhakar PA-C  Department of Hospital Medicine   [facility] ([place]-)

## 2022-12-14 NOTE — PROGRESS NOTES
[facility] ([facility]  Cardiac Electrophysiology  Progress Note    Admission Date: 12/12/2022  Code Status: Full Code   Attending Physician: Georges Burdick MD   Expected Discharge Date: 12/15/2022  Principal Problem:Atrial flutter    Subjective:     Interval History: Pt started on sotalol yesterday morning. EKG and telemetry reviewed. She is back in SR. Pt is worried as she is not used to seeing her HR at 60 and below. Denies dizziness, lightheadedness, SOB or any other complaints. Echo was repeated with normal LVEF.       Objective:     Vital Signs (Most Recent):  Temp: 97.7 °F (36.5 °C) (12/14/22 0758)  Pulse: (!) 59 (12/14/22 0853)  Resp: 16 (12/14/22 0853)  BP: 120/61 (12/14/22 0851)  SpO2: 96 % (12/14/22 0758)   Vital Signs (24h Range):  Temp:  [97.7 °F (36.5 °C)-98.2 °F (36.8 °C)] 97.7 °F (36.5 °C)  Pulse:  [59-76] 59  Resp:  [15-20] 16  SpO2:  [90 %-97 %] 96 %  BP: (112-147)/(56-74) 120/61     Weight: 88.5 kg (195 lb)  Body mass index is 33.47 kg/m².     SpO2: 96 %       Physical Exam  Vitals and nursing note reviewed.   Constitutional:       General: She is not in acute distress.     Appearance: She is obese. She is not toxic-appearing or diaphoretic.   HENT:      Head: Normocephalic and atraumatic.      Nose: Nose normal.      Mouth/Throat:      Mouth: Mucous membranes are moist.   Eyes:      Pupils: Pupils are equal, round, and reactive to light.   Cardiovascular:      Rate and Rhythm: Sinus bradycardia present.      Pulses: Normal pulses.   Pulmonary:      Effort: Pulmonary effort is normal. No respiratory distress.      Breath sounds: No rhonchi or rales.      Comments: Currently on room air  Abdominal:      General: Bowel sounds are normal. There is no distension.      Palpations: Abdomen is soft.      Tenderness: There is no abdominal tenderness. There is no guarding.   Musculoskeletal:         General: No swelling, tenderness or deformity. Normal range of motion.      Cervical  back: Normal range of motion. No tenderness.   Skin:     General: Skin is warm and dry.      Capillary Refill: Capillary refill takes less than 2 seconds.      Coloration: Skin is not pale.      Findings: No bruising.   Neurological:      General: No focal deficit present.      Mental Status: She is alert.      Sensory: No sensory deficit.      Motor: No weakness.   Psychiatric:         Mood and Affect: Mood normal.         Behavior: Behavior normal.       Significant Labs:   Recent Lab Results         12/14/22  0414        ANION GAP 8       Baso # 0.01       Basophil % 0.1       BUN 25       Calcium 9.0       Chloride 105       CO2 24       Creatinine 0.8       Differential Method Automated       eGFR >60.0       Eos # 0.0       Eosinophil % 0.3       Glucose 100       Gran # (ANC) 6.8       Gran % 76.8       HEMATOCRIT 33.0       HEMOGLOBIN 11.3       Immature Grans (Abs) 0.06  Comment: Mild elevation in immature granulocytes is non specific and   can be seen in a variety of conditions including stress response,   acute inflammation, trauma and pregnancy. Correlation with other   laboratory and clinical findings is essential.         Immature Granulocytes 0.7       Lymph # 1.4       Lymph % 15.9       Magnesium 2.1       MCH 28.9       MCHC 34.2       MCV 84       Mono # 0.6       Mono % 6.2       MPV 11.8       nRBC 0       Platelets 208       Potassium 3.6       RBC 3.91       RDW 12.6       Sodium 137       WBC 8.88               Significant Imaging:   TTE 12/13/2022   The left ventricle is normal in size with concentric remodeling and normal systolic function. The estimated ejection fraction is 65%.   Normal right ventricular size with normal right ventricular systolic function.   Mild left atrial enlargement.   Mild tricuspid regurgitation.   The estimated PA systolic pressure is 24 mmHg.   Normal central venous pressure (3 mmHg).   Trivial circumferential pericardial effusion.   Atrial fibrillation  observed.       Assessment and Plan:     * Atrial flutter  Ms. Dykes is a 60 year old female with a history of  PAF/AFL S/P PVIx2, who continued to have episodes of atypical flutter following her second ablation who presented to the ED w/ symptomatic atypical AFL. Now with recurrent atrial arrhythmias refractory to propafenone.    Recommendations:   - Back in SR, no need for cardioversion.  -Continue diltiazem 120mg qd.   - continue sotalol 80mg BID. We would like to monitor for at least 1 more day.   - Continue obtaining ECG 2 hours after each dose of sotalol.   - Cont anticoagulation w/ eliquis  - If patient develops any symptoms of bradycardia as dizziness, lightheadedness and so please let us know.     Discussed with Dr. Mira Santiago MD  Cardiac Electrophysiology  [facility] - Intensive Care ([place])

## 2022-12-15 VITALS
WEIGHT: 195 LBS | OXYGEN SATURATION: 95 % | HEIGHT: 64 IN | DIASTOLIC BLOOD PRESSURE: 65 MMHG | RESPIRATION RATE: 18 BRPM | HEART RATE: 61 BPM | SYSTOLIC BLOOD PRESSURE: 128 MMHG | BODY MASS INDEX: 33.29 KG/M2 | TEMPERATURE: 98 F

## 2022-12-15 LAB
ANION GAP SERPL CALC-SCNC: 9 MMOL/L (ref 8–16)
BASOPHILS # BLD AUTO: 0.03 K/UL (ref 0–0.2)
BASOPHILS NFR BLD: 0.4 % (ref 0–1.9)
BUN SERPL-MCNC: 22 MG/DL (ref 6–20)
CALCIUM SERPL-MCNC: 8.9 MG/DL (ref 8.7–10.5)
CHLORIDE SERPL-SCNC: 108 MMOL/L (ref 95–110)
CO2 SERPL-SCNC: 22 MMOL/L (ref 23–29)
CREAT SERPL-MCNC: 0.8 MG/DL (ref 0.5–1.4)
DIFFERENTIAL METHOD: ABNORMAL
EOSINOPHIL # BLD AUTO: 0 K/UL (ref 0–0.5)
EOSINOPHIL NFR BLD: 0.6 % (ref 0–8)
ERYTHROCYTE [DISTWIDTH] IN BLOOD BY AUTOMATED COUNT: 12.5 % (ref 11.5–14.5)
EST. GFR  (NO RACE VARIABLE): >60 ML/MIN/1.73 M^2
GLUCOSE SERPL-MCNC: 88 MG/DL (ref 70–110)
HCT VFR BLD AUTO: 33.3 % (ref 37–48.5)
HGB BLD-MCNC: 11.1 G/DL (ref 12–16)
IMM GRANULOCYTES # BLD AUTO: 0.05 K/UL (ref 0–0.04)
IMM GRANULOCYTES NFR BLD AUTO: 0.7 % (ref 0–0.5)
LYMPHOCYTES # BLD AUTO: 1.6 K/UL (ref 1–4.8)
LYMPHOCYTES NFR BLD: 22.7 % (ref 18–48)
MAGNESIUM SERPL-MCNC: 2.1 MG/DL (ref 1.6–2.6)
MCH RBC QN AUTO: 29.1 PG (ref 27–31)
MCHC RBC AUTO-ENTMCNC: 33.3 G/DL (ref 32–36)
MCV RBC AUTO: 87 FL (ref 82–98)
MONOCYTES # BLD AUTO: 0.5 K/UL (ref 0.3–1)
MONOCYTES NFR BLD: 6.9 % (ref 4–15)
NEUTROPHILS # BLD AUTO: 4.8 K/UL (ref 1.8–7.7)
NEUTROPHILS NFR BLD: 68.7 % (ref 38–73)
NRBC BLD-RTO: 0 /100 WBC
PLATELET # BLD AUTO: 188 K/UL (ref 150–450)
PMV BLD AUTO: 11.2 FL (ref 9.2–12.9)
POTASSIUM SERPL-SCNC: 4 MMOL/L (ref 3.5–5.1)
RBC # BLD AUTO: 3.81 M/UL (ref 4–5.4)
SODIUM SERPL-SCNC: 139 MMOL/L (ref 136–145)
WBC # BLD AUTO: 6.95 K/UL (ref 3.9–12.7)

## 2022-12-15 PROCEDURE — 93010 EKG 12-LEAD: ICD-10-PCS | Mod: ,,, | Performed by: INTERNAL MEDICINE

## 2022-12-15 PROCEDURE — 94640 AIRWAY INHALATION TREATMENT: CPT

## 2022-12-15 PROCEDURE — 93005 ELECTROCARDIOGRAM TRACING: CPT

## 2022-12-15 PROCEDURE — 83735 ASSAY OF MAGNESIUM: CPT | Performed by: NURSE PRACTITIONER

## 2022-12-15 PROCEDURE — 63600175 PHARM REV CODE 636 W HCPCS: Performed by: NURSE PRACTITIONER

## 2022-12-15 PROCEDURE — 80048 BASIC METABOLIC PNL TOTAL CA: CPT | Performed by: NURSE PRACTITIONER

## 2022-12-15 PROCEDURE — 25000003 PHARM REV CODE 250: Performed by: NURSE PRACTITIONER

## 2022-12-15 PROCEDURE — 99239 PR HOSPITAL DISCHARGE DAY,>30 MIN: ICD-10-PCS | Mod: ,,,

## 2022-12-15 PROCEDURE — 94761 N-INVAS EAR/PLS OXIMETRY MLT: CPT

## 2022-12-15 PROCEDURE — 85025 COMPLETE CBC W/AUTO DIFF WBC: CPT | Performed by: NURSE PRACTITIONER

## 2022-12-15 PROCEDURE — 25000003 PHARM REV CODE 250: Performed by: STUDENT IN AN ORGANIZED HEALTH CARE EDUCATION/TRAINING PROGRAM

## 2022-12-15 PROCEDURE — 93010 ELECTROCARDIOGRAM REPORT: CPT | Mod: ,,, | Performed by: INTERNAL MEDICINE

## 2022-12-15 PROCEDURE — 99239 HOSP IP/OBS DSCHRG MGMT >30: CPT | Mod: ,,,

## 2022-12-15 PROCEDURE — 36415 COLL VENOUS BLD VENIPUNCTURE: CPT | Performed by: NURSE PRACTITIONER

## 2022-12-15 RX ORDER — SOTALOL HYDROCHLORIDE 80 MG/1
80 TABLET ORAL 2 TIMES DAILY
Qty: 60 TABLET | Refills: 11 | Status: SHIPPED | OUTPATIENT
Start: 2022-12-15 | End: 2023-05-08 | Stop reason: SDUPTHER

## 2022-12-15 RX ADMIN — CETIRIZINE HYDROCHLORIDE 10 MG: 5 TABLET, FILM COATED ORAL at 09:12

## 2022-12-15 RX ADMIN — PREDNISONE 40 MG: 20 TABLET ORAL at 09:12

## 2022-12-15 RX ADMIN — APIXABAN 5 MG: 5 TABLET, FILM COATED ORAL at 09:12

## 2022-12-15 RX ADMIN — SOTALOL HYDROCHLORIDE 80 MG: 80 TABLET ORAL at 09:12

## 2022-12-15 RX ADMIN — FLUTICASONE PROPIONATE 100 MCG: 50 SPRAY, METERED NASAL at 09:12

## 2022-12-15 RX ADMIN — FLUTICASONE FUROATE AND VILANTEROL TRIFENATATE 1 PUFF: 200; 25 POWDER RESPIRATORY (INHALATION) at 09:12

## 2022-12-15 RX ADMIN — DILTIAZEM HYDROCHLORIDE 120 MG: 120 CAPSULE, COATED, EXTENDED RELEASE ORAL at 09:12

## 2022-12-15 RX ADMIN — PANTOPRAZOLE SODIUM 40 MG: 40 TABLET, DELAYED RELEASE ORAL at 09:12

## 2022-12-15 NOTE — PLAN OF CARE
Problem: Adult Inpatient Plan of Care  Goal: Plan of Care Review  Outcome: Ongoing, Progressing  Goal: Patient-Specific Goal (Individualized)  Outcome: Ongoing, Progressing  Goal: Absence of Hospital-Acquired Illness or Injury  Outcome: Ongoing, Progressing  Goal: Optimal Comfort and Wellbeing  Outcome: Ongoing, Progressing  Goal: Readiness for Transition of Care  Outcome: Ongoing, Progressing     Problem: Dysrhythmia  Goal: Normalized Cardiac Rhythm  Outcome: Ongoing, Progressing

## 2022-12-15 NOTE — ASSESSMENT & PLAN NOTE
Ms. Dykes is a 60 year old female with a history of  PAF/AFL S/P PVIx2, who continued to have episodes of atypical flutter following her second ablation who presented to the ED w/ symptomatic atypical AFL. Now with recurrent atrial arrhythmias refractory to propafenone.    Recommendations:   - Back in SR on 12/13  -Continue diltiazem 120mg qd.   - continue sotalol 80mg BID. Awaiting repeat ECG at 11am  - Continue obtaining ECG 2 hours after each dose of sotalol.   - Cont anticoagulation w/ eliquis  - If patient develops any symptoms of bradycardia as dizziness, lightheadedness and so please let us know.     Discussed with Dr. Meyers

## 2022-12-15 NOTE — PROGRESS NOTES
[facility] - [facility] ([place] [address])  [facility]  Progress Note    Patient Name: Francoise Dykes  MRN: [id number]  Patient Class: IP- Inpatient   Admission Date: 12/12/2022  Length of Stay: 0 days  Attending Physician: Sondra Mckeon MD  Primary Care Provider: Juice So MD        Subjective:     Principal Problem:Atrial flutter        HPI:  Francoise Dykes is a 62 y.o. female with a PMHx of asthma, GERD, and AF/AFL who presents to the ED with complaints of intermittent palpitations for the last 3 days, then having shortness of breath on exertion today.  She has history of complicated AFib/flutter, on propafenone and diltiazem, history of cardio conversion x4 and ablation. She is scheduled for another ablation in February. She was seen in the [place] ED today, converted after 1 does of diltiazem 10 mg.  Patient states that her palpitation return right after she was discharged from the ER, reports hot flashes and lightheadedness sensation that she had to stop the car couple times. The patient endorses congestion, post nasal drip, cough, and intermittent wheezing for the last few days. She notes she has been taking zyrtec and has been using her inhalers and duo nebs more frequently. She was prescribed prednisone today. The patient denies any fever, chills, N/V/D, chest pain, or abdominal pain.    In the ED, patient initially tachycardic which improved with cardizem, otherwise VSSAF. CBC with unremarkable. Cr 1.1. . Troponin <0.006. Initial EKG with atrially tachycardia, rate of 192, repeat EKG with SR w/ 1st degree AV block, rate of 96. CXR with no acute process. The patient received IV cardizem 20mg x1. Cardiology was consulted in the ED.      Overview/Hospital Course:  Patient placed in observation for recurrent atrial arrhythmias, refractory to current propafenone. EP was consulted. Transitioned from propafenone to sotalol 80 mg BID 12/13. Was maintained in NSR so no need for  cardioversion. Monitoring for another day on sotalol per EP. Plan to discharge home once medically ready. Follow up with EP.      Interval History: Patient seen at bedside this morning. SUNG. She expressed that she was worried about her heart rate being low on the sotalol. She states she is not getting dizzy or lightheaded but feels like it is hard to sleep/rest because she keeps watching her heart rate on the monitor. Explained that EP is aware of her concern and that they would like to watch how she dose on this does but that she should notify us if she gets dizzy/lightheaded. Otherwise no complaints. Wheezing at baseline.    Review of Systems   Constitutional:  Negative for chills and fever.   HENT:  Positive for congestion.    Respiratory:  Positive for cough and wheezing. Negative for chest tightness.    Cardiovascular:  Positive for palpitations (resolved). Negative for chest pain and leg swelling.   Gastrointestinal:  Negative for abdominal pain and nausea.   Neurological:  Negative for dizziness and weakness.   Psychiatric/Behavioral:  The patient is nervous/anxious.    Objective:     Vital Signs (Most Recent):  Temp: 97.9 °F (36.6 °C) (12/14/22 1122)  Pulse: 72 (12/14/22 1539)  Resp: 16 (12/14/22 1122)  BP: 135/64 (12/14/22 1122)  SpO2: 97 % (12/14/22 1122) Vital Signs (24h Range):  Temp:  [97.7 °F (36.5 °C)-98.2 °F (36.8 °C)] 97.9 °F (36.6 °C)  Pulse:  [59-73] 72  Resp:  [15-19] 16  SpO2:  [90 %-97 %] 97 %  BP: (112-135)/(56-64) 135/64     Weight: 88.5 kg (195 lb)  Body mass index is 33.47 kg/m².  No intake or output data in the 24 hours ending 12/14/22 1822   Physical Exam  Vitals and nursing note reviewed.   Constitutional:       General: She is not in acute distress.     Appearance: She is well-developed. She is obese.   Eyes:      Pupils: Pupils are equal, round, and reactive to light.   Cardiovascular:      Rate and Rhythm: Normal rate and regular rhythm.   Pulmonary:      Effort: Pulmonary effort is  normal.      Breath sounds: Wheezing present.   Abdominal:      Palpations: Abdomen is soft.      Tenderness: There is no abdominal tenderness.   Musculoskeletal:         General: No tenderness.   Skin:     General: Skin is warm and dry.   Neurological:      Mental Status: She is alert and oriented to person, place, and time.   Psychiatric:         Behavior: Behavior normal.       Significant Labs: All pertinent labs within the past 24 hours have been reviewed.  BMP:   Recent Labs   Lab 12/14/22  0414         K 3.6      CO2 24   BUN 25*   CREATININE 0.8   CALCIUM 9.0   MG 2.1     CBC:   Recent Labs   Lab 12/12/22 1940 12/13/22 0441 12/14/22  0414   WBC 7.81 10.04 8.88   HGB 14.8 13.0 11.3*   HCT 41.0 36.9* 33.0*    231 208     CMP:   Recent Labs   Lab 12/12/22 1940 12/13/22 0441 12/14/22  0414    139 137   K 3.7 3.9 3.6    108 105   CO2 18* 21* 24   * 131* 100   BUN 16 17 25*   CREATININE 1.1 0.8 0.8   CALCIUM 10.1 9.4 9.0   PROT 7.8  --   --    ALBUMIN 4.0  --   --    BILITOT 0.6  --   --    ALKPHOS 85  --   --    AST 16  --   --    ALT 18  --   --    ANIONGAP 15 10 8     Magnesium:   Recent Labs   Lab 12/12/22 1940 12/13/22 0441 12/14/22  0414   MG 2.0 1.9 2.1       Significant Imaging: I have reviewed all pertinent imaging results/findings within the past 24 hours.      Assessment/Plan:      * Atrial flutter  Paroxysmal atrial fibrillation  Current use of long term anticoagulation  -Patient presented to the a heart rate of 200, initial EKG concerning for atrial tachycardia.  -Converted to SR after receiving IV cardizem 20mg x1.  -Troponin <0.006, .  -EP consulted, appreciate recs.  -Cardiac monitoring.   -ECHO 12/13 reviewed.  -Continue diltiazem 120mg.   -Discontinued propafenone 12/13 per EP.  -Started sotalol 80 mg BID 12/13- continue.  -EKG every 2 hours after sotalol.   -Continue eliquis.   -Monitor on sotalol 80 mg BID for at least one more day per  EP.  -if patient develops symptoms symptomatic bradycardia, alert EP.  -No need for cardioversion as patient is back in SR.    BMI 33.0-33.9,adult  Body mass index is 33.47 kg/m². Obesity complicates all aspects of disease management from diagnostic modalities to treatment. Weight loss encouraged and health benefits explained to patient.    GERD (gastroesophageal reflux disease)  -Chronic, controlled.  -Continue PPI.    Mild persistent asthma with acute exacerbation  -Mild exacerbation.   -Afebrile, no leukocytosis.  -CXR with no acute process.  -Started on steroids today, will continue.  -Continue zyrtec and daily inhalers.  -PRN duo nebs.      VTE Risk Mitigation (From admission, onward)         Ordered     apixaban tablet 5 mg  2 times daily         12/12/22 2151     IP VTE HIGH RISK PATIENT  Once         12/12/22 2151     Place sequential compression device  Until discontinued         12/12/22 2151     Reason for No Pharmacological VTE Prophylaxis  Once        Question:  Reasons:  Answer:  Already adequately anticoagulated on oral Anticoagulants    12/12/22 2151                Discharge Planning   KASSI: 12/15/2022     Code Status: Full Code   Is the patient medically ready for discharge?: No    Reason for patient still in hospital (select all that apply): Patient trending condition, Treatment and Consult recommendations  Discharge Plan A: Home with family                  Vernell Prabhakar PA-C  Department of Hospital Medicine   [facility] - Intensive Care ([place])

## 2022-12-15 NOTE — PLAN OF CARE
12/15/2022      Francoise Dykes  [address] [place] [address]          Hospital Medicine Dept.  [facility] [address] [place] [address]  [phone] after hours  [phone] fax Francoise Dykes has been hospitalized at the [facility] since 12/12/2022.  Please excuse the patient from duties.  Patient may return on 12/19.  No restrictions.     Please contact me if you have any questions.                  __________________________  Carol Hough PA-C  12/15/2022

## 2022-12-15 NOTE — PROGRESS NOTES
[facility] - [facility] ([facility]-)  Cardiac Electrophysiology  Progress Note    Admission Date: 12/12/2022  Code Status: Full Code   Attending Physician: Yuli Purdy MD   Expected Discharge Date: 12/15/2022  Principal Problem:Atrial flutter    Subjective:     Interval History: Continues to be in SR with rates 60's. Asymptomatic. Awaiting repeat ECG before deciding for discharge.     Objective:     Vital Signs (Most Recent):  Temp: 97.7 °F (36.5 °C) (12/15/22 0810)  Pulse: 70 (12/15/22 0907)  Resp: 18 (12/15/22 0907)  BP: 124/64 (12/15/22 0906)  SpO2: 95 % (12/15/22 0810)   Vital Signs (24h Range):  Temp:  [97.7 °F (36.5 °C)-98.9 °F (37.2 °C)] 97.7 °F (36.5 °C)  Pulse:  [61-73] 70  Resp:  [16-20] 18  SpO2:  [93 %-97 %] 95 %  BP: (111-141)/(56-68) 124/64     Weight: 88.5 kg (195 lb)  Body mass index is 33.47 kg/m².     SpO2: 95 %       Physical Exam  Vitals and nursing note reviewed.   Constitutional:       General: She is not in acute distress.     Appearance: She is obese. She is not toxic-appearing or diaphoretic.   HENT:      Head: Normocephalic and atraumatic.      Nose: Nose normal.      Mouth/Throat:      Mouth: Mucous membranes are moist.   Eyes:      Pupils: Pupils are equal, round, and reactive to light.   Cardiovascular:      Rate and Rhythm: Sinus bradycardia present.      Pulses: Normal pulses.   Pulmonary:      Effort: Pulmonary effort is normal. No respiratory distress.      Breath sounds: No rhonchi or rales.      Comments: Currently on room air  Abdominal:      General: Bowel sounds are normal. There is no distension.      Palpations: Abdomen is soft.      Tenderness: There is no abdominal tenderness. There is no guarding.   Musculoskeletal:         General: No swelling, tenderness or deformity. Normal range of motion.      Cervical back: Normal range of motion. No tenderness.   Skin:     General: Skin is warm and dry.      Capillary Refill: Capillary refill takes less than 2 seconds.       Coloration: Skin is not pale.      Findings: No bruising.   Neurological:      General: No focal deficit present.      Mental Status: She is alert.      Sensory: No sensory deficit.      Motor: No weakness.   Psychiatric:         Mood and Affect: Mood normal.         Behavior: Behavior normal.       Significant Labs:   Recent Lab Results         12/15/22  0408        ANION GAP 9       Baso # 0.03       Basophil % 0.4       BUN 22       Calcium 8.9       Chloride 108       CO2 22       Creatinine 0.8       Differential Method Automated       eGFR >60.0       Eos # 0.0       Eosinophil % 0.6       Glucose 88       Gran # (ANC) 4.8       Gran % 68.7       HEMATOCRIT 33.3       HEMOGLOBIN 11.1       Immature Grans (Abs) 0.05  Comment: Mild elevation in immature granulocytes is non specific and   can be seen in a variety of conditions including stress response,   acute inflammation, trauma and pregnancy. Correlation with other   laboratory and clinical findings is essential.         Immature Granulocytes 0.7       Lymph # 1.6       Lymph % 22.7       Magnesium 2.1       MCH 29.1       MCHC 33.3       MCV 87       Mono # 0.5       Mono % 6.9       MPV 11.2       nRBC 0       Platelets 188       Potassium 4.0       RBC 3.81       RDW 12.5       Sodium 139       WBC 6.95               Significant Imaging:   TTE 12/13/2022  The left ventricle is normal in size with concentric remodeling and normal systolic function. The estimated ejection fraction is 65%.  Normal right ventricular size with normal right ventricular systolic function.  Mild left atrial enlargement.  Mild tricuspid regurgitation.  The estimated PA systolic pressure is 24 mmHg.  Normal central venous pressure (3 mmHg).  Trivial circumferential pericardial effusion.  Atrial fibrillation observed.         Assessment and Plan:     * Atrial flutter  Ms. Dykes is a 60 year old female with a history of  PAF/AFL S/P PVIx2, who continued to have episodes of atypical  flutter following her second ablation who presented to the ED w/ symptomatic atypical AFL. Now with recurrent atrial arrhythmias refractory to propafenone.    Recommendations:   - Back in SR on 12/13  - Repeat ECG today 11:30am Qtc 488. Pt can be discharged on  diltiazem 120mg qd and sotalol 80mg BID.   - Cont anticoagulation w/ eliquis  -  Can f/u with Dr. Meyers and general cardiology as outpatient.  -Primary team has been updated.     Discussed with Dr. Mira Santiago MD  Cardiac Electrophysiology  [facility] - Intensive Care ([place]-[facility])

## 2022-12-15 NOTE — SUBJECTIVE & OBJECTIVE
Interval History: Continues to be in SR with rates 60's. Asymptomatic. Awaiting repeat ECG before deciding for discharge.     Objective:     Vital Signs (Most Recent):  Temp: 97.7 °F (36.5 °C) (12/15/22 0810)  Pulse: 70 (12/15/22 0907)  Resp: 18 (12/15/22 0907)  BP: 124/64 (12/15/22 0906)  SpO2: 95 % (12/15/22 0810)   Vital Signs (24h Range):  Temp:  [97.7 °F (36.5 °C)-98.9 °F (37.2 °C)] 97.7 °F (36.5 °C)  Pulse:  [61-73] 70  Resp:  [16-20] 18  SpO2:  [93 %-97 %] 95 %  BP: (111-141)/(56-68) 124/64     Weight: 88.5 kg (195 lb)  Body mass index is 33.47 kg/m².     SpO2: 95 %       Physical Exam  Vitals and nursing note reviewed.   Constitutional:       General: She is not in acute distress.     Appearance: She is obese. She is not toxic-appearing or diaphoretic.   HENT:      Head: Normocephalic and atraumatic.      Nose: Nose normal.      Mouth/Throat:      Mouth: Mucous membranes are moist.   Eyes:      Pupils: Pupils are equal, round, and reactive to light.   Cardiovascular:      Rate and Rhythm: Sinus bradycardia present.      Pulses: Normal pulses.   Pulmonary:      Effort: Pulmonary effort is normal. No respiratory distress.      Breath sounds: No rhonchi or rales.      Comments: Currently on room air  Abdominal:      General: Bowel sounds are normal. There is no distension.      Palpations: Abdomen is soft.      Tenderness: There is no abdominal tenderness. There is no guarding.   Musculoskeletal:         General: No swelling, tenderness or deformity. Normal range of motion.      Cervical back: Normal range of motion. No tenderness.   Skin:     General: Skin is warm and dry.      Capillary Refill: Capillary refill takes less than 2 seconds.      Coloration: Skin is not pale.      Findings: No bruising.   Neurological:      General: No focal deficit present.      Mental Status: She is alert.      Sensory: No sensory deficit.      Motor: No weakness.   Psychiatric:         Mood and Affect: Mood normal.          Behavior: Behavior normal.       Significant Labs:   Recent Lab Results         12/15/22  0408        ANION GAP 9       Baso # 0.03       Basophil % 0.4       BUN 22       Calcium 8.9       Chloride 108       CO2 22       Creatinine 0.8       Differential Method Automated       eGFR >60.0       Eos # 0.0       Eosinophil % 0.6       Glucose 88       Gran # (ANC) 4.8       Gran % 68.7       HEMATOCRIT 33.3       HEMOGLOBIN 11.1       Immature Grans (Abs) 0.05  Comment: Mild elevation in immature granulocytes is non specific and   can be seen in a variety of conditions including stress response,   acute inflammation, trauma and pregnancy. Correlation with other   laboratory and clinical findings is essential.         Immature Granulocytes 0.7       Lymph # 1.6       Lymph % 22.7       Magnesium 2.1       MCH 29.1       MCHC 33.3       MCV 87       Mono # 0.5       Mono % 6.9       MPV 11.2       nRBC 0       Platelets 188       Potassium 4.0       RBC 3.81       RDW 12.5       Sodium 139       WBC 6.95               Significant Imaging:   TTE 12/13/2022  The left ventricle is normal in size with concentric remodeling and normal systolic function. The estimated ejection fraction is 65%.  Normal right ventricular size with normal right ventricular systolic function.  Mild left atrial enlargement.  Mild tricuspid regurgitation.  The estimated PA systolic pressure is 24 mmHg.  Normal central venous pressure (3 mmHg).  Trivial circumferential pericardial effusion.  Atrial fibrillation observed.

## 2022-12-15 NOTE — SUBJECTIVE & OBJECTIVE
Interval History: Patient seen at bedside this morning. SUNG. She expressed that she was worried about her heart rate being low on the sotalol. She states she is not getting dizzy or lightheaded but feels like it is hard to sleep/rest because she keeps watching her heart rate on the monitor. Explained that EP is aware of her concern and that they would like to watch how she dose on this does but that she should notify us if she gets dizzy/lightheaded. Otherwise no complaints. Wheezing at baseline.    Review of Systems   Constitutional:  Negative for chills and fever.   HENT:  Positive for congestion.    Respiratory:  Positive for cough and wheezing. Negative for chest tightness.    Cardiovascular:  Positive for palpitations (resolved). Negative for chest pain and leg swelling.   Gastrointestinal:  Negative for abdominal pain and nausea.   Neurological:  Negative for dizziness and weakness.   Psychiatric/Behavioral:  The patient is nervous/anxious.    Objective:     Vital Signs (Most Recent):  Temp: 97.9 °F (36.6 °C) (12/14/22 1122)  Pulse: 72 (12/14/22 1539)  Resp: 16 (12/14/22 1122)  BP: 135/64 (12/14/22 1122)  SpO2: 97 % (12/14/22 1122) Vital Signs (24h Range):  Temp:  [97.7 °F (36.5 °C)-98.2 °F (36.8 °C)] 97.9 °F (36.6 °C)  Pulse:  [59-73] 72  Resp:  [15-19] 16  SpO2:  [90 %-97 %] 97 %  BP: (112-135)/(56-64) 135/64     Weight: 88.5 kg (195 lb)  Body mass index is 33.47 kg/m².  No intake or output data in the 24 hours ending 12/14/22 1822   Physical Exam  Vitals and nursing note reviewed.   Constitutional:       General: She is not in acute distress.     Appearance: She is well-developed. She is obese.   Eyes:      Pupils: Pupils are equal, round, and reactive to light.   Cardiovascular:      Rate and Rhythm: Normal rate and regular rhythm.   Pulmonary:      Effort: Pulmonary effort is normal.      Breath sounds: Wheezing present.   Abdominal:      Palpations: Abdomen is soft.      Tenderness: There is no  abdominal tenderness.   Musculoskeletal:         General: No tenderness.   Skin:     General: Skin is warm and dry.   Neurological:      Mental Status: She is alert and oriented to person, place, and time.   Psychiatric:         Behavior: Behavior normal.       Significant Labs: All pertinent labs within the past 24 hours have been reviewed.  BMP:   Recent Labs   Lab 12/14/22 0414         K 3.6      CO2 24   BUN 25*   CREATININE 0.8   CALCIUM 9.0   MG 2.1     CBC:   Recent Labs   Lab 12/12/22 1940 12/13/22 0441 12/14/22 0414   WBC 7.81 10.04 8.88   HGB 14.8 13.0 11.3*   HCT 41.0 36.9* 33.0*    231 208     CMP:   Recent Labs   Lab 12/12/22 1940 12/13/22 0441 12/14/22 0414    139 137   K 3.7 3.9 3.6    108 105   CO2 18* 21* 24   * 131* 100   BUN 16 17 25*   CREATININE 1.1 0.8 0.8   CALCIUM 10.1 9.4 9.0   PROT 7.8  --   --    ALBUMIN 4.0  --   --    BILITOT 0.6  --   --    ALKPHOS 85  --   --    AST 16  --   --    ALT 18  --   --    ANIONGAP 15 10 8     Magnesium:   Recent Labs   Lab 12/12/22 1940 12/13/22 0441 12/14/22 0414   MG 2.0 1.9 2.1       Significant Imaging: I have reviewed all pertinent imaging results/findings within the past 24 hours.

## 2022-12-17 NOTE — ASSESSMENT & PLAN NOTE
Patient with Paroxysmal (<7 days) atrial fibrillation which is controlled currently with Beta Blocker and CCB. Patient is currently in sinus rhythm.[id number] Score: 1. Anticoagulation indicated. Anticoagulation done with Eliquis.

## 2022-12-17 NOTE — DISCHARGE SUMMARY
[facility] - [facility] ([place] [address])  [facility] Medicine  Discharge Summary      Patient Name: Francoise Dykes  MRN: [id number]  KARRI: [id number]  Patient Class: IP- Inpatient  Admission Date: 12/12/2022  Hospital Length of Stay: 1 days  Discharge Date and Time:  12/17/2022 5:04 PM  Attending Physician: Leeanna att. providers found   Discharging Provider: Carol Hough PA-C  Primary Care Provider: Juice So MD  [facility] Medicine Team: [facility] MED F Carol Hough PA-C  Primary Care Team: [facility] MED     HPI:   Francoise Dykes is a 62 y.o. female with a PMHx of asthma, GERD, and AF/AFL who presents to the ED with complaints of intermittent palpitations for the last 3 days, then having shortness of breath on exertion today.  She has history of complicated AFib/flutter, on propafenone and diltiazem, history of cardio conversion x4 and ablation. She is scheduled for another ablation in February. She was seen in the [place] ED today, converted after 1 does of diltiazem 10 mg.  Patient states that her palpitation return right after she was discharged from the ER, reports hot flashes and lightheadedness sensation that she had to stop the car couple times. The patient endorses congestion, post nasal drip, cough, and intermittent wheezing for the last few days. She notes she has been taking zyrtec and has been using her inhalers and duo nebs more frequently. She was prescribed prednisone today. The patient denies any fever, chills, N/V/D, chest pain, or abdominal pain.    In the ED, patient initially tachycardic which improved with cardizem, otherwise VSSAF. CBC with unremarkable. Cr 1.1. . Troponin <0.006. Initial EKG with atrially tachycardia, rate of 192, repeat EKG with SR w/ 1st degree AV block, rate of 96. CXR with no acute process. The patient received IV cardizem 20mg x1. Cardiology was consulted in the ED.      * No surgery found *      Hospital Course:   Patient placed in observation for  recurrent atrial arrhythmias, refractory to current propafenone. EP was consulted. Transitioned from propafenone to sotalol 80 mg BID 12/13. Was maintained in NSR so no need for cardioversion. Monitoring for another day on sotalol per EP. EKG in NSR with rate control. EP cleared patient for discharge home. Will continue sotalol BID. Referral for outpatient EP follow-up placed. Vitals and labs remained stable. All questions answered at beside with verbal understanding. Return precautions given. Patient is stable for discharge home.        Goals of Care Treatment Preferences:  Code Status: Full Code      Consults:     * Atrial flutter  Paroxysmal atrial fibrillation  Current use of long term anticoagulation  -Patient presented to the a heart rate of 200, initial EKG concerning for atrial tachycardia.  -Converted to SR after receiving IV cardizem 20mg x1.  -Troponin <0.006, .  -EP consulted, appreciate recs.  -Cardiac monitoring.   -ECHO 12/13 reviewed.  -Continue diltiazem 120mg.   -Discontinued propafenone 12/13 per EP.  -Started sotalol 80 mg BID 12/13- continue.  -EKG every 2 hours after sotalol.   -Continue eliquis.   -Monitor on sotalol 80 mg BID for at least one more day per EP.  -if patient develops symptoms symptomatic bradycardia, alert EP.  -No need for cardioversion as patient is back in SR.  - EP consulted, appreciate recs  - Back in SR on 12/13  - Repeat ECG today 11:30am Qtc 488. Pt can be discharged on  diltiazem 120mg qd and sotalol 80mg BID.   - Cont anticoagulation w/ eliquis  -  Can f/u with Dr. Meyers and general cardiology as outpatient.  -Primary team has been updated.    BMI 33.0-33.9,adult  Body mass index is 33.47 kg/m². Obesity complicates all aspects of disease management from diagnostic modalities to treatment. Weight loss encouraged and health benefits explained to patient.    GERD (gastroesophageal reflux disease)  -Chronic, controlled.  -Continue PPI.    Mild persistent asthma with  acute exacerbation  -Mild exacerbation.   -Afebrile, no leukocytosis.  -CXR with no acute process.  -Started on steroids today, will continue.  -Continue zyrtec and daily inhalers.  -PRN duo nebs.    Current use of long term anticoagulation        Paroxysmal atrial fibrillation  Patient with Paroxysmal (<7 days) atrial fibrillation which is controlled currently with Beta Blocker and CCB. Patient is currently in sinus rhythm.[id number] Score: 1. Anticoagulation indicated. Anticoagulation done with Eliquis.          Final Active Diagnoses:    Diagnosis Date Noted POA    PRINCIPAL PROBLEM:  Atrial flutter [I48.92] 12/26/2017 Yes    BMI 33.0-33.9,adult [Z68.33] 12/12/2022 Not Applicable    GERD (gastroesophageal reflux disease) [K21.9] 09/20/2021 Yes    Mild persistent asthma with acute exacerbation [J45.31]  Yes    Current use of long term anticoagulation [Z79.01] 04/12/2017 Not Applicable    Paroxysmal atrial fibrillation [I48.0] 01/09/2014 Yes      Problems Resolved During this Admission:       Discharged Condition: stable    Disposition: Home or Self Care    Follow Up:    Patient Instructions:      Diet Cardiac     Notify your health care provider if you experience any of the following:  temperature >100.4     Notify your health care provider if you experience any of the following:  persistent nausea and vomiting or diarrhea     Notify your health care provider if you experience any of the following:  persistent dizziness, light-headedness, or visual disturbances     Notify your health care provider if you experience any of the following:  increased confusion or weakness     Activity as tolerated       Significant Diagnostic Studies: Labs: BMP: No results for input(s): GLU, NA, K, CL, CO2, BUN, CREATININE, CALCIUM, MG in the last 48 hours. and CBC No results for input(s): WBC, HGB, HCT, PLT in the last 48 hours.    Pending Diagnostic Studies:     Procedure Component Value Units Date/Time    EKG 12-lead  [[id number]]     Order Status: Sent Lab Status: No result     EKG 12-lead [[id number]]     Order Status: Sent Lab Status: No result          Medications:  Reconciled Home Medications:      Medication List      START taking these medications    sotaloL 80 MG tablet  Commonly known as: BETAPACE  Take 1 tablet (80 mg total) by mouth 2 (two) times daily.        CONTINUE taking these medications    * albuterol 2.5 mg /3 mL (0.083 %) nebulizer solution  Commonly known as: PROVENTIL  Take 3 mLs (2.5 mg total) by nebulization every 6 (six) hours as needed for Wheezing.     * albuterol 90 mcg/actuation inhaler  Commonly known as: PROVENTIL/VENTOLIN HFA  Inhale 2 puffs into the lungs every 6 (six) hours as needed for Wheezing or Shortness of Breath. Rescue     apixaban 5 mg Tab  Commonly known as: ELIQUIS  Take 1 tablet (5 mg total) by mouth 2 (two) times daily.     BREO ELLIPTA 200-25 mcg/dose Dsdv diskus inhaler  Generic drug: fluticasone furoate-vilanteroL  INHALE 1 PUFF BY MOUTH ONCE DAILY (CONTROLLER)     * cetirizine 10 MG tablet  Commonly known as: ZYRTEC  Take 1 tablet (10 mg total) by mouth once daily.     * cetirizine 10 MG tablet  Commonly known as: ZYRTEC  Take 1 tablet (10 mg total) by mouth once daily. for 7 days     diltiaZEM 120 MG Cdcr  Commonly known as: DILACOR XR  Take 1 capsule (120 mg total) by mouth once daily.     famotidine 20 MG tablet  Commonly known as: PEPCID  Take 1 tablet (20 mg total) by mouth 2 (two) times daily. for 7 days     hydroCHLOROthiazide 12.5 MG Tab  Commonly known as: HYDRODIURIL  Take 1 tablet (12.5 mg total) by mouth once daily.     omeprazole 40 MG capsule  Commonly known as: PRILOSEC  Take 1 capsule (40 mg total) by mouth 2 (two) times daily before meals.     PFIZER COVID-19 VACCINE (EUA) IM     * propafenone 225 MG Tab  Commonly known as: RYTHMOL  Take one tablet by mouth every 8 hours.     * propafenone 225 MG Tab  Commonly known as: RYTHMOL  Take one tablet by mouth every 8  hours.     semaglutide 1 mg/dose (4 mg/3 mL)  Commonly known as: OZEMPIC  Inject 1 mg into the skin every 7 days.         * This list has 6 medication(s) that are the same as other medications prescribed for you. Read the directions carefully, and ask your doctor or other care provider to review them with you.            STOP taking these medications    fluticasone-salmeterol 250-50 mcg/dose 250-50 mcg/dose diskus inhaler  Commonly known as: ADVAIR     ipratropium 21 mcg (0.03 %) nasal spray  Commonly known as: ATROVENT        ASK your doctor about these medications    predniSONE 20 MG tablet  Commonly known as: DELTASONE  Take 2 tablets (40 mg total) by mouth once daily. for 4 days  Ask about: Should I take this medication?     triamcinolone acetonide 0.1% 0.1 % cream  Commonly known as: KENALOG  Apply topically 2 (two) times daily.            Indwelling Lines/Drains at time of discharge:   Lines/Drains/Airways     None                 Time spent on the discharge of patient: 36 minutes         Carol Hough PA-C  Department of Hospital Medicine  [facility] ([place]

## 2022-12-17 NOTE — ASSESSMENT & PLAN NOTE
Paroxysmal atrial fibrillation  Current use of long term anticoagulation  -Patient presented to the a heart rate of 200, initial EKG concerning for atrial tachycardia.  -Converted to SR after receiving IV cardizem 20mg x1.  -Troponin <0.006, .  -EP consulted, appreciate recs.  -Cardiac monitoring.   -ECHO 12/13 reviewed.  -Continue diltiazem 120mg.   -Discontinued propafenone 12/13 per EP.  -Started sotalol 80 mg BID 12/13- continue.  -EKG every 2 hours after sotalol.   -Continue eliquis.   -Monitor on sotalol 80 mg BID for at least one more day per EP.  -if patient develops symptoms symptomatic bradycardia, alert EP.  -No need for cardioversion as patient is back in SR.  - EP consulted, appreciate recs  - Back in SR on 12/13  - Repeat ECG today 11:30am Qtc 488. Pt can be discharged on  diltiazem 120mg qd and sotalol 80mg BID.   - Cont anticoagulation w/ eliquis  -  Can f/u with Dr. Meyers and general cardiology as outpatient.  -Primary team has been updated.

## 2022-12-27 ENCOUNTER — OFFICE VISIT (OUTPATIENT)
Dept: BARIATRICS | Facility: CLINIC | Age: 62
End: 2022-12-27
Payer: COMMERCIAL

## 2022-12-27 VITALS
WEIGHT: 198.81 LBS | DIASTOLIC BLOOD PRESSURE: 60 MMHG | SYSTOLIC BLOOD PRESSURE: 124 MMHG | BODY MASS INDEX: 34.12 KG/M2 | OXYGEN SATURATION: 98 % | HEART RATE: 60 BPM

## 2022-12-27 DIAGNOSIS — E66.9 OBESITY, CLASS I, BMI 30.0-34.9 (SEE ACTUAL BMI): Primary | ICD-10-CM

## 2022-12-27 DIAGNOSIS — I48.0 PAROXYSMAL ATRIAL FIBRILLATION: ICD-10-CM

## 2022-12-27 PROCEDURE — 1159F PR MEDICATION LIST DOCUMENTED IN MEDICAL RECORD: ICD-10-PCS | Mod: CPTII,S$GLB,, | Performed by: INTERNAL MEDICINE

## 2022-12-27 PROCEDURE — 99999 PR PBB SHADOW E&M-EST. PATIENT-LVL IV: ICD-10-PCS | Mod: PBBFAC,,, | Performed by: INTERNAL MEDICINE

## 2022-12-27 PROCEDURE — 1160F PR REVIEW ALL MEDS BY PRESCRIBER/CLIN PHARMACIST DOCUMENTED: ICD-10-PCS | Mod: CPTII,S$GLB,, | Performed by: INTERNAL MEDICINE

## 2022-12-27 PROCEDURE — 3078F PR MOST RECENT DIASTOLIC BLOOD PRESSURE < 80 MM HG: ICD-10-PCS | Mod: CPTII,S$GLB,, | Performed by: INTERNAL MEDICINE

## 2022-12-27 PROCEDURE — 1111F PR DISCHARGE MEDS RECONCILED W/ CURRENT OUTPATIENT MED LIST: ICD-10-PCS | Mod: CPTII,S$GLB,, | Performed by: INTERNAL MEDICINE

## 2022-12-27 PROCEDURE — 3074F SYST BP LT 130 MM HG: CPT | Mod: CPTII,S$GLB,, | Performed by: INTERNAL MEDICINE

## 2022-12-27 PROCEDURE — 1160F RVW MEDS BY RX/DR IN RCRD: CPT | Mod: CPTII,S$GLB,, | Performed by: INTERNAL MEDICINE

## 2022-12-27 PROCEDURE — 3074F PR MOST RECENT SYSTOLIC BLOOD PRESSURE < 130 MM HG: ICD-10-PCS | Mod: CPTII,S$GLB,, | Performed by: INTERNAL MEDICINE

## 2022-12-27 PROCEDURE — 99999 PR PBB SHADOW E&M-EST. PATIENT-LVL IV: CPT | Mod: PBBFAC,,, | Performed by: INTERNAL MEDICINE

## 2022-12-27 PROCEDURE — 3008F BODY MASS INDEX DOCD: CPT | Mod: CPTII,S$GLB,, | Performed by: INTERNAL MEDICINE

## 2022-12-27 PROCEDURE — 1111F DSCHRG MED/CURRENT MED MERGE: CPT | Mod: CPTII,S$GLB,, | Performed by: INTERNAL MEDICINE

## 2022-12-27 PROCEDURE — 3008F PR BODY MASS INDEX (BMI) DOCUMENTED: ICD-10-PCS | Mod: CPTII,S$GLB,, | Performed by: INTERNAL MEDICINE

## 2022-12-27 PROCEDURE — 99214 PR OFFICE/OUTPT VISIT, EST, LEVL IV, 30-39 MIN: ICD-10-PCS | Mod: S$GLB,,, | Performed by: INTERNAL MEDICINE

## 2022-12-27 PROCEDURE — 1159F MED LIST DOCD IN RCRD: CPT | Mod: CPTII,S$GLB,, | Performed by: INTERNAL MEDICINE

## 2022-12-27 PROCEDURE — 3078F DIAST BP <80 MM HG: CPT | Mod: CPTII,S$GLB,, | Performed by: INTERNAL MEDICINE

## 2022-12-27 PROCEDURE — 99214 OFFICE O/P EST MOD 30 MIN: CPT | Mod: S$GLB,,, | Performed by: INTERNAL MEDICINE

## 2022-12-27 NOTE — PROGRESS NOTES
Subjective:       Patient ID: Francoise Dykes is a 62 y.o. female.    Chief Complaint: Follow-up      CC:   Pt here today for follow-up. Has lost 14.8 lbs(-0.6 lbs muscle. -11.5 lbs fat).  Has  progressed to 1000 nathan PB diet, and ozempic, currently on 1mg weekly.  Has been off of it x 2-3 weeks. Denies SE. Does feel her appetite is down when she is taking it at She is now on reg diet. Has been decreasing carbs and eating only one meal a day.  Has been exercising with walking some days. Is having a cough with mucus that is better after omeprazole, but then gets worse again by the morning.  Her breathing is improving, so she plans to resume.   Was hospitalized for 1 week for afib.  Has started sotalol for Afib. Will get ablation Feb 3, 2023.     topiramate 25 mg qhs, with instructions to slowly titrate up to 50 mg as she was having  SE was trouble with word finding.  BP ok today.    New BMR: 1479    New PBF: 43.1%    initial  BMR: 1479    PBF:  47%    Follow-up    Review of Systems      Objective:     /60   Pulse 60   Wt 90.2 kg (198 lb 12.8 oz)   LMP  (LMP Unknown)   SpO2 98%   BMI 34.12 kg/m²    Physical Exam  Vitals reviewed.   Constitutional:       General: She is not in acute distress.     Appearance: She is well-developed.      Comments: Centrally obese   HENT:      Head: Normocephalic and atraumatic.   Eyes:      General: No scleral icterus.     Pupils: Pupils are equal, round, and reactive to light.   Cardiovascular:      Rate and Rhythm: Normal rate.   Pulmonary:      Effort: Pulmonary effort is normal.   Musculoskeletal:         General: Normal range of motion.      Cervical back: Normal range of motion and neck supple.   Skin:     General: Skin is warm and dry.      Findings: No erythema.   Neurological:      Mental Status: She is alert and oriented to person, place, and time.   Psychiatric:         Behavior: Behavior normal.         Judgment: Judgment normal.       Assessment:       1.  Obesity, Class I, BMI 30.0-34.9 (see actual BMI)    2. Paroxysmal atrial fibrillation            Plan:           Francoise was seen today for follow-up.    Diagnoses and all orders for this visit:    Obesity, Class I, BMI 30.0-34.9 (see actual BMI)    Paroxysmal atrial fibrillation          Can stay on Semaglutide perioperatively.      Resume 1 mg Ozempic for now.     Pt will let us know as of Jan 1 if Wegovy is covered, or  Increase to     Ozempic 2mg once a week.       Decrease portions as soon as you start Ozempic. Avoid fried, greasy, fatty foods.     Some nausea in the first 2 weeks is not unusual.     If you get pain across the upper abdomen and around to your back, please call the office.       [email or web address] for coupon/videos.     Add some type of resistance training 2-3 days a week. These can be body weight exercises, light weight or elastic bands. [employer] and [employer] are great sources for free work out plans and videos.         1000 nathan daily planner from first visit.    Can use a shake 1 meal a day (ex- dinner).     Healthy all day tips  given.

## 2022-12-27 NOTE — PATIENT INSTRUCTIONS
Resume 1 mg Ozempic for now.       After Jan 1, Check on your insurance's formulary (drug plan) web site to see if Wegovy (semaglutide 2.4 mg) or Saxenda (liraglutide 3 mg ) are covered.       Add some type of resistance training 2-3 days a week. These can be body weight exercises, light weight or elastic bands. [employer] and [employer] are great sources for free work out plans and videos.         1000 nathan daily planner from first visit.    Can use a shake 1 meal a day (ex- dinner).     Eating well to be healthy and lose weight does not have to be hard. It also does not have to be time consuming or expensive. There a lots of ways you can work in healthy choices into your day. Many of these are easy, quick and even family friendly!    Homemade hazelnut au lait  Brew your favorite brand of hazelnut flavored coffee ([employer] makes a good one). Microwave 1/2 cup of milk that fits your eating plan (whole, skim or sugar-free almond milk can all work). Add half to 1 oz sugar free hazelnut syrup.     Quick and easy breakfast  1-2 boiled eggs or mini-frittatas with a tangerine. The boiled eggs and mini-frittatas can both be made ahead and last for up to 4 days in the refrigerator. Bonus if you portion them out in ready to go containers or zipper bags.     Breakfast Egg Muffins with Tello and Spinach  Makes 12 muffins  Ingredients    6 eggs  ¼ cup milk  ¼ teaspoon salt  2 cups grated cheddar cheese  3/4 cup spinach, cooked and drained (about 8 oz fresh spinach)  6 tello slices, cooked, drained of fat, and chopped  1/2 cup grated Parmesan cheese (optional)    Instructions      Preheat oven to 350 degrees. Use a regular 12-cup muffin pan. Spray the muffin pan with non-stick cooking spray.  In a large bowl, beat eggs until smooth. Add milk, salt, Cheddar cheese and mix. Stir spinach, cooked tello into the egg mixture. Ladle the egg mixture into greased muffin cups ¾ full.  Top each muffin cup with grated Parmesan cheese.  Bake  for 25 minutes. Remove from the oven, let the muffins cool for 30 minutes before removing them from the pan.      Be a brown bagger! When you make dinner, plan for an extra helping. When you serve your plate for dinner, serve an additional helping into a container that you can take with you the next day. If you don't have a refrigerator available during the day, an insulated lunch bag and ice packs will help you safely store you lunch.     Cold Brewed Iced tea. Fill a pitcher with 64 oz filtered water. Add either 4 regular tea bags of your choice or a large iced tea bag. Refrigerate over night then remove the tea bags. The tea will not be bitter and is super flavorful. Get creative! Try combinations like green tea and hibiscus tea or black tea with lemon zinger. Add orange or lemon slices for even more flavor.     Snack wisely. Protein filled snacks will fill you up, allowing you to get by with fewer calories. String cheese, pork skins (chicharrones), turkey pepperoni, or celery with cream cheese will all fit the bill.       Ditch the take out. Turkey tacos (with or without a low carb tortilla), burgers (without the bun), or fun stir fries are all quick and easy. The whole family will be happy, and you can save calories and money.      Orange Chicken Stir emerson with asparagus   Makes 6 servings  Ingredients:    1.5 lbs boneless skinless chicken breast/tenders, diced into 1-inch pieces  1 Tbsp extra virgin olive or avocado oil, divided  2 lb asparagus, end portions trimmed and remainder diced into 1 1/2-inch pieces  1 small yellow onion, sliced into thin strips  8 oz button mushrooms, sliced  1 Tbsp peeled and finely grated fresh nadja  4 cloves garlic, minced  1/2 cup low-sodium chicken broth  Juice of 2 fresh oranges  2 Tbsp low sodium soy sauce  2 Tbsp cornstarch  Sea salt and freshly ground black pepper    Directions:    In a 12-inch non-stick wok, heat 1/2 oil over moderately high heat. Once oil is hot, add diced  chicken and season lightly with salt and pepper. Sauté until cooked through, tossing occasionally, about 5-6 minutes.  Place chicken on a large plate and set aside. Return wok, reduce to medium-high heat, add remaining oil.  Once oil is hot, add asparagus, yellow onion and mushrooms, and sauté until tender-crisp, about 4 - 5 minutes, adding in garlic and nadja during the last 1 minute of sautéing.  Meanwhile, in a mixing bowl whisk together chicken broth, orange juice, soy sauce and cornstarch until well blended.  Pour chicken broth mixture into skillet with veggies, season with salt and pepper to taste, and bring mixture to a light boil, stirring constantly. Allow mixture to gently boil, stirring constantly, until thickened, about 1 minute.  Toss chicken into mixture and serve immediately over cauliflower rice or Shirataki noodles.      Skinny Chicken Tortilla Soup  Makes 7 servings    2 teaspoons olive oil  1 cup onion, chopped (about 1 small)  2 cups celery, sliced (about 4 medium stalks)  4 garlic cloves, minced  4 medium tomatoes, chopped  2 cups water  4 cups low-sodium organic chicken broth  3 cups chopped and/or shredded rotisserie chicken, skinless  2 cups sliced carrots (about 3 medium)  1 teaspoon dried oregano leaves  2 teaspoons chili powder  1 teaspoon garlic powder  2 teaspoons cumin  ½ teaspoon cayenne pepper (add less or omit, if you don't want a spicy soup)  ½ teaspoon sea salt + more to taste  ½ teaspoon pepper + more to taste    Directions:   Put all ingredients into a large crock pot. Cook on low for 5-6 hours.     Optional garnish with chopped avocado, chopped fresh cilantro, crumbled Cotija cheese, sour cream, Greek yogurt, your favorite hot sauce.           Vegan Avocado Banana Chocolate Pudding  Makes 4 servings  Ingredients    1 1/2 ripe avocados  2 ripe bananas  6 tbsp raw cacao powder or unsweetened cocoa powder  2-3 tbsp maple syrup (or calorie free sweetener)  1/4 cup almond  milk  Instructions    Blend everything together in a  until the consistency is smooth and velvety. Taste and see if more sweetener is needed and stir to make sure everything is evenly mixed. Blend a second time if needed.  Top with banana slices, raw cacao nibs, almond butter, or any other toppings and enjoy!

## 2022-12-30 ENCOUNTER — PATIENT MESSAGE (OUTPATIENT)
Dept: ELECTROPHYSIOLOGY | Facility: CLINIC | Age: 62
End: 2022-12-30
Payer: COMMERCIAL

## 2023-01-03 ENCOUNTER — PATIENT MESSAGE (OUTPATIENT)
Dept: ELECTROPHYSIOLOGY | Facility: CLINIC | Age: 63
End: 2023-01-03
Payer: COMMERCIAL

## 2023-01-09 ENCOUNTER — PATIENT MESSAGE (OUTPATIENT)
Dept: MEDSURG UNIT | Facility: HOSPITAL | Age: 63
End: 2023-01-09
Payer: COMMERCIAL

## 2023-01-11 ENCOUNTER — TELEPHONE (OUTPATIENT)
Dept: ELECTROPHYSIOLOGY | Facility: CLINIC | Age: 63
End: 2023-01-11
Payer: COMMERCIAL

## 2023-01-11 NOTE — TELEPHONE ENCOUNTER
Letter for disability emailed to [email or web address] per pt's request.

## 2023-01-12 ENCOUNTER — PATIENT MESSAGE (OUTPATIENT)
Dept: FAMILY MEDICINE | Facility: CLINIC | Age: 63
End: 2023-01-12
Payer: COMMERCIAL

## 2023-01-12 DIAGNOSIS — U07.1 COVID: ICD-10-CM

## 2023-01-12 DIAGNOSIS — U07.1 COVID-19: Primary | ICD-10-CM

## 2023-01-13 ENCOUNTER — TELEPHONE (OUTPATIENT)
Dept: FAMILY MEDICINE | Facility: CLINIC | Age: 63
End: 2023-01-13
Payer: COMMERCIAL

## 2023-01-13 DIAGNOSIS — U07.1 COVID-19: Primary | ICD-10-CM

## 2023-01-30 ENCOUNTER — PATIENT MESSAGE (OUTPATIENT)
Dept: MEDSURG UNIT | Facility: HOSPITAL | Age: 63
End: 2023-01-30
Payer: COMMERCIAL

## 2023-01-30 ENCOUNTER — LAB VISIT (OUTPATIENT)
Dept: LAB | Facility: HOSPITAL | Age: 63
End: 2023-01-30
Attending: INTERNAL MEDICINE
Payer: COMMERCIAL

## 2023-01-30 DIAGNOSIS — Z01.812 ENCOUNTER FOR PRE-OPERATIVE LABORATORY TESTING: ICD-10-CM

## 2023-01-30 DIAGNOSIS — I48.0 PAROXYSMAL ATRIAL FIBRILLATION: ICD-10-CM

## 2023-01-30 LAB
ANION GAP SERPL CALC-SCNC: 11 MMOL/L (ref 8–16)
APTT BLDCRRT: 36.7 SEC (ref 21–32)
BASOPHILS # BLD AUTO: 0.07 K/UL (ref 0–0.2)
BASOPHILS NFR BLD: 1.1 % (ref 0–1.9)
BUN SERPL-MCNC: 12 MG/DL (ref 6–20)
CALCIUM SERPL-MCNC: 9.8 MG/DL (ref 8.7–10.5)
CHLORIDE SERPL-SCNC: 104 MMOL/L (ref 95–110)
CO2 SERPL-SCNC: 25 MMOL/L (ref 23–29)
CREAT SERPL-MCNC: 0.9 MG/DL (ref 0.5–1.4)
DIFFERENTIAL METHOD: ABNORMAL
EOSINOPHIL # BLD AUTO: 0.6 K/UL (ref 0–0.5)
EOSINOPHIL NFR BLD: 8.5 % (ref 0–8)
ERYTHROCYTE [DISTWIDTH] IN BLOOD BY AUTOMATED COUNT: 12.1 % (ref 11.5–14.5)
EST. GFR  (NO RACE VARIABLE): >60 ML/MIN/1.73 M^2
GLUCOSE SERPL-MCNC: 87 MG/DL (ref 70–110)
HCT VFR BLD AUTO: 38.3 % (ref 37–48.5)
HGB BLD-MCNC: 12.8 G/DL (ref 12–16)
IMM GRANULOCYTES # BLD AUTO: 0.03 K/UL (ref 0–0.04)
IMM GRANULOCYTES NFR BLD AUTO: 0.5 % (ref 0–0.5)
INR PPP: 1 (ref 0.8–1.2)
LYMPHOCYTES # BLD AUTO: 1.2 K/UL (ref 1–4.8)
LYMPHOCYTES NFR BLD: 17.8 % (ref 18–48)
MCH RBC QN AUTO: 28.3 PG (ref 27–31)
MCHC RBC AUTO-ENTMCNC: 33.4 G/DL (ref 32–36)
MCV RBC AUTO: 85 FL (ref 82–98)
MONOCYTES # BLD AUTO: 0.5 K/UL (ref 0.3–1)
MONOCYTES NFR BLD: 8.4 % (ref 4–15)
NEUTROPHILS # BLD AUTO: 4.1 K/UL (ref 1.8–7.7)
NEUTROPHILS NFR BLD: 63.7 % (ref 38–73)
NRBC BLD-RTO: 0 /100 WBC
PLATELET # BLD AUTO: 201 K/UL (ref 150–450)
PMV BLD AUTO: 11.6 FL (ref 9.2–12.9)
POTASSIUM SERPL-SCNC: 4 MMOL/L (ref 3.5–5.1)
PROTHROMBIN TIME: 10.9 SEC (ref 9–12.5)
RBC # BLD AUTO: 4.53 M/UL (ref 4–5.4)
SODIUM SERPL-SCNC: 140 MMOL/L (ref 136–145)
WBC # BLD AUTO: 6.45 K/UL (ref 3.9–12.7)

## 2023-01-30 PROCEDURE — 85730 THROMBOPLASTIN TIME PARTIAL: CPT | Performed by: INTERNAL MEDICINE

## 2023-01-30 PROCEDURE — 85610 PROTHROMBIN TIME: CPT | Performed by: INTERNAL MEDICINE

## 2023-01-30 PROCEDURE — 80048 BASIC METABOLIC PNL TOTAL CA: CPT | Performed by: INTERNAL MEDICINE

## 2023-01-30 PROCEDURE — 36415 COLL VENOUS BLD VENIPUNCTURE: CPT | Mod: PO | Performed by: INTERNAL MEDICINE

## 2023-01-30 PROCEDURE — 85025 COMPLETE CBC W/AUTO DIFF WBC: CPT | Performed by: INTERNAL MEDICINE

## 2023-02-02 ENCOUNTER — TELEPHONE (OUTPATIENT)
Dept: ELECTROPHYSIOLOGY | Facility: CLINIC | Age: 63
End: 2023-02-02
Payer: COMMERCIAL

## 2023-02-02 NOTE — TELEPHONE ENCOUNTER
Spoke to Ms Dykes    CONFIRMED procedure arrival time on 2/02/2023 at 5:15 am    Reiterated instructions including:  -Directions to check in desk  -NPO after midnight night prior to procedure  -High importance of HOLDING ELIQUIS in the morning.  -Confirmed last dose of SOTALOL was 1/28/2023  -Pre-procedure LABS completed and reviewed.  -Confirmed no fever, cough, or shortness of breath in the past 30 days  -Confirmed no redness, rash, irritation, or yeast infection to groin area.   -Do not wear mascara day of procedure    Patient verbalized understanding of above and appreciated the call.

## 2023-02-03 ENCOUNTER — ANESTHESIA EVENT (OUTPATIENT)
Dept: MEDSURG UNIT | Facility: HOSPITAL | Age: 63
End: 2023-02-03
Payer: COMMERCIAL

## 2023-02-03 ENCOUNTER — HOSPITAL ENCOUNTER (OUTPATIENT)
Facility: HOSPITAL | Age: 63
Discharge: HOME | End: 2023-02-03
Attending: INTERNAL MEDICINE | Admitting: INTERNAL MEDICINE
Payer: COMMERCIAL

## 2023-02-03 ENCOUNTER — ANESTHESIA (OUTPATIENT)
Dept: MEDSURG UNIT | Facility: HOSPITAL | Age: 63
End: 2023-02-03
Payer: COMMERCIAL

## 2023-02-03 VITALS
DIASTOLIC BLOOD PRESSURE: 60 MMHG | TEMPERATURE: 98 F | OXYGEN SATURATION: 99 % | WEIGHT: 197 LBS | RESPIRATION RATE: 16 BRPM | HEIGHT: 64 IN | BODY MASS INDEX: 33.63 KG/M2 | SYSTOLIC BLOOD PRESSURE: 129 MMHG | HEART RATE: 94 BPM

## 2023-02-03 DIAGNOSIS — I48.91 ATRIAL FIBRILLATION: ICD-10-CM

## 2023-02-03 DIAGNOSIS — I48.0 PAF (PAROXYSMAL ATRIAL FIBRILLATION): ICD-10-CM

## 2023-02-03 DIAGNOSIS — Z86.79 S/P RF ABLATION OPERATION FOR ARRHYTHMIA: ICD-10-CM

## 2023-02-03 DIAGNOSIS — I49.9 ARRHYTHMIA: ICD-10-CM

## 2023-02-03 DIAGNOSIS — Z98.890 S/P RF ABLATION OPERATION FOR ARRHYTHMIA: ICD-10-CM

## 2023-02-03 LAB
POC ACTIVATED CLOTTING TIME K: 149 SEC (ref 74–137)
POC ACTIVATED CLOTTING TIME K: 167 SEC (ref 74–137)
POC ACTIVATED CLOTTING TIME K: 323 SEC (ref 74–137)
POC ACTIVATED CLOTTING TIME K: 341 SEC (ref 74–137)
POC ACTIVATED CLOTTING TIME K: 353 SEC (ref 74–137)
SAMPLE: ABNORMAL

## 2023-02-03 PROCEDURE — 93656 PR ELECTROPHYS EVAL, COMPREHEN, W/ABLATION OF ATRIAL FIBR: ICD-10-PCS | Mod: ,,, | Performed by: INTERNAL MEDICINE

## 2023-02-03 PROCEDURE — 93623 PRGRMD STIMJ&PACG IV RX NFS: CPT | Performed by: INTERNAL MEDICINE

## 2023-02-03 PROCEDURE — 37000009 HC ANESTHESIA EA ADD 15 MINS: Performed by: INTERNAL MEDICINE

## 2023-02-03 PROCEDURE — 93656 COMPRE EP EVAL ABLTJ ATR FIB: CPT | Mod: ,,, | Performed by: INTERNAL MEDICINE

## 2023-02-03 PROCEDURE — 25000003 PHARM REV CODE 250: Performed by: REGISTERED NURSE

## 2023-02-03 PROCEDURE — 93005 ELECTROCARDIOGRAM TRACING: CPT

## 2023-02-03 PROCEDURE — 36620 ARTERIAL: ICD-10-PCS | Mod: 59,,, | Performed by: ANESTHESIOLOGY

## 2023-02-03 PROCEDURE — D9220A PRA ANESTHESIA: ICD-10-PCS | Mod: ANES,,, | Performed by: ANESTHESIOLOGY

## 2023-02-03 PROCEDURE — C1766 INTRO/SHEATH,STRBLE,NON-PEEL: HCPCS | Performed by: INTERNAL MEDICINE

## 2023-02-03 PROCEDURE — 25000003 PHARM REV CODE 250: Performed by: INTERNAL MEDICINE

## 2023-02-03 PROCEDURE — C1753 CATH, INTRAVAS ULTRASOUND: HCPCS | Performed by: INTERNAL MEDICINE

## 2023-02-03 PROCEDURE — 93655 ICAR CATH ABLTJ DSCRT ARRHYT: CPT | Mod: ,,, | Performed by: INTERNAL MEDICINE

## 2023-02-03 PROCEDURE — 27201423 OPTIME MED/SURG SUP & DEVICES STERILE SUPPLY: Performed by: INTERNAL MEDICINE

## 2023-02-03 PROCEDURE — 36620 INSERTION CATHETER ARTERY: CPT | Mod: 59,,, | Performed by: ANESTHESIOLOGY

## 2023-02-03 PROCEDURE — 93657 TX L/R ATRIAL FIB ADDL: CPT | Performed by: INTERNAL MEDICINE

## 2023-02-03 PROCEDURE — 93656 COMPRE EP EVAL ABLTJ ATR FIB: CPT | Performed by: INTERNAL MEDICINE

## 2023-02-03 PROCEDURE — 37000008 HC ANESTHESIA 1ST 15 MINUTES: Performed by: INTERNAL MEDICINE

## 2023-02-03 PROCEDURE — C1730 CATH, EP, 19 OR FEW ELECT: HCPCS | Performed by: INTERNAL MEDICINE

## 2023-02-03 PROCEDURE — 63600175 PHARM REV CODE 636 W HCPCS: Performed by: INTERNAL MEDICINE

## 2023-02-03 PROCEDURE — 93655 ICAR CATH ABLTJ DSCRT ARRHYT: CPT | Performed by: INTERNAL MEDICINE

## 2023-02-03 PROCEDURE — 93623 PR STIM/PACING HEART POST IV DRUG INFU: ICD-10-PCS | Mod: 26,,, | Performed by: INTERNAL MEDICINE

## 2023-02-03 PROCEDURE — 27201037 HC PRESSURE MONITORING SET UP

## 2023-02-03 PROCEDURE — C1894 INTRO/SHEATH, NON-LASER: HCPCS | Performed by: INTERNAL MEDICINE

## 2023-02-03 PROCEDURE — 93657 TX L/R ATRIAL FIB ADDL: CPT | Mod: ,,, | Performed by: INTERNAL MEDICINE

## 2023-02-03 PROCEDURE — C1731 CATH, EP, 20 OR MORE ELEC: HCPCS | Performed by: INTERNAL MEDICINE

## 2023-02-03 PROCEDURE — D9220A PRA ANESTHESIA: Mod: ANES,,, | Performed by: ANESTHESIOLOGY

## 2023-02-03 PROCEDURE — 93005 ELECTROCARDIOGRAM TRACING: CPT | Mod: 59

## 2023-02-03 PROCEDURE — 63600175 PHARM REV CODE 636 W HCPCS: Performed by: REGISTERED NURSE

## 2023-02-03 PROCEDURE — 93010 EKG 12-LEAD: ICD-10-PCS | Mod: ,,, | Performed by: INTERNAL MEDICINE

## 2023-02-03 PROCEDURE — 93010 ELECTROCARDIOGRAM REPORT: CPT | Mod: ,,, | Performed by: INTERNAL MEDICINE

## 2023-02-03 PROCEDURE — D9220A PRA ANESTHESIA: ICD-10-PCS | Mod: CRNA,,, | Performed by: REGISTERED NURSE

## 2023-02-03 PROCEDURE — D9220A PRA ANESTHESIA: Mod: CRNA,,, | Performed by: REGISTERED NURSE

## 2023-02-03 PROCEDURE — 93623 PRGRMD STIMJ&PACG IV RX NFS: CPT | Mod: 26,,, | Performed by: INTERNAL MEDICINE

## 2023-02-03 PROCEDURE — 93657 PR TX L/R ATRIAL FIB ADDL: ICD-10-PCS | Mod: ,,, | Performed by: INTERNAL MEDICINE

## 2023-02-03 PROCEDURE — C1732 CATH, EP, DIAG/ABL, 3D/VECT: HCPCS | Performed by: INTERNAL MEDICINE

## 2023-02-03 PROCEDURE — 93655 PR ABLATE ARRHYTHMIA ADD ON: ICD-10-PCS | Mod: ,,, | Performed by: INTERNAL MEDICINE

## 2023-02-03 RX ORDER — LIDOCAINE HYDROCHLORIDE 20 MG/ML
INJECTION INTRAVENOUS
Status: DISCONTINUED | OUTPATIENT
Start: 2023-02-03 | End: 2023-02-03

## 2023-02-03 RX ORDER — LIDOCAINE HYDROCHLORIDE 10 MG/ML
INJECTION INFILTRATION; PERINEURAL
Status: DISCONTINUED | OUTPATIENT
Start: 2023-02-03 | End: 2023-02-03 | Stop reason: HOSPADM

## 2023-02-03 RX ORDER — PROTAMINE SULFATE 10 MG/ML
INJECTION, SOLUTION INTRAVENOUS
Status: DISCONTINUED | OUTPATIENT
Start: 2023-02-03 | End: 2023-02-03

## 2023-02-03 RX ORDER — PHENYLEPHRINE HYDROCHLORIDE 10 MG/ML
INJECTION INTRAVENOUS
Status: DISCONTINUED | OUTPATIENT
Start: 2023-02-03 | End: 2023-02-03

## 2023-02-03 RX ORDER — PROPOFOL 10 MG/ML
VIAL (ML) INTRAVENOUS
Status: DISCONTINUED | OUTPATIENT
Start: 2023-02-03 | End: 2023-02-03

## 2023-02-03 RX ORDER — ISOPROTERENOL HYDROCHLORIDE 0.2 MG/ML
INJECTION, SOLUTION INTRAVENOUS CONTINUOUS PRN
Status: DISCONTINUED | OUTPATIENT
Start: 2023-02-03 | End: 2023-02-03

## 2023-02-03 RX ORDER — SODIUM CHLORIDE 0.9 % (FLUSH) 0.9 %
3 SYRINGE (ML) INJECTION
Status: DISCONTINUED | OUTPATIENT
Start: 2023-02-03 | End: 2023-02-03 | Stop reason: HOSPADM

## 2023-02-03 RX ORDER — ONDANSETRON 2 MG/ML
INJECTION INTRAMUSCULAR; INTRAVENOUS
Status: DISCONTINUED | OUTPATIENT
Start: 2023-02-03 | End: 2023-02-03

## 2023-02-03 RX ORDER — DEXAMETHASONE SODIUM PHOSPHATE 4 MG/ML
INJECTION, SOLUTION INTRA-ARTICULAR; INTRALESIONAL; INTRAMUSCULAR; INTRAVENOUS; SOFT TISSUE
Status: DISCONTINUED | OUTPATIENT
Start: 2023-02-03 | End: 2023-02-03

## 2023-02-03 RX ORDER — HEPARIN SODIUM 10000 [USP'U]/100ML
INJECTION, SOLUTION INTRAVENOUS CONTINUOUS PRN
Status: DISCONTINUED | OUTPATIENT
Start: 2023-02-03 | End: 2023-02-03

## 2023-02-03 RX ORDER — HEPARIN SOD,PORCINE/0.9 % NACL 1000/500ML
INTRAVENOUS SOLUTION INTRAVENOUS
Status: DISCONTINUED | OUTPATIENT
Start: 2023-02-03 | End: 2023-02-03 | Stop reason: HOSPADM

## 2023-02-03 RX ORDER — ACETAMINOPHEN 325 MG/1
650 TABLET ORAL EVERY 4 HOURS PRN
Status: DISCONTINUED | OUTPATIENT
Start: 2023-02-03 | End: 2023-02-03 | Stop reason: HOSPADM

## 2023-02-03 RX ORDER — DEXMEDETOMIDINE HYDROCHLORIDE 100 UG/ML
INJECTION, SOLUTION INTRAVENOUS
Status: DISCONTINUED | OUTPATIENT
Start: 2023-02-03 | End: 2023-02-03

## 2023-02-03 RX ORDER — HEPARIN SODIUM 1000 [USP'U]/ML
INJECTION, SOLUTION INTRAVENOUS; SUBCUTANEOUS
Status: DISCONTINUED | OUTPATIENT
Start: 2023-02-03 | End: 2023-02-03

## 2023-02-03 RX ORDER — FENTANYL CITRATE 50 UG/ML
INJECTION, SOLUTION INTRAMUSCULAR; INTRAVENOUS
Status: DISCONTINUED | OUTPATIENT
Start: 2023-02-03 | End: 2023-02-03

## 2023-02-03 RX ORDER — SUCCINYLCHOLINE CHLORIDE 20 MG/ML
INJECTION INTRAMUSCULAR; INTRAVENOUS
Status: DISCONTINUED | OUTPATIENT
Start: 2023-02-03 | End: 2023-02-03

## 2023-02-03 RX ORDER — MIDAZOLAM HYDROCHLORIDE 1 MG/ML
INJECTION, SOLUTION INTRAMUSCULAR; INTRAVENOUS
Status: DISCONTINUED | OUTPATIENT
Start: 2023-02-03 | End: 2023-02-03

## 2023-02-03 RX ORDER — PROPOFOL 10 MG/ML
INJECTION, EMULSION INTRAVENOUS CONTINUOUS PRN
Status: DISCONTINUED | OUTPATIENT
Start: 2023-02-03 | End: 2023-02-03

## 2023-02-03 RX ORDER — ROCURONIUM BROMIDE 10 MG/ML
INJECTION, SOLUTION INTRAVENOUS
Status: DISCONTINUED | OUTPATIENT
Start: 2023-02-03 | End: 2023-02-03

## 2023-02-03 RX ADMIN — DEXAMETHASONE SODIUM PHOSPHATE 4 MG: 4 INJECTION, SOLUTION INTRAMUSCULAR; INTRAVENOUS at 08:02

## 2023-02-03 RX ADMIN — HEPARIN SODIUM 14000 UNITS: 1000 INJECTION, SOLUTION INTRAVENOUS; SUBCUTANEOUS at 08:02

## 2023-02-03 RX ADMIN — HEPARIN SODIUM 2700 UNITS: 1000 INJECTION, SOLUTION INTRAVENOUS; SUBCUTANEOUS at 08:02

## 2023-02-03 RX ADMIN — PROPOFOL 100 MG: 10 INJECTION, EMULSION INTRAVENOUS at 08:02

## 2023-02-03 RX ADMIN — ONDANSETRON 4 MG: 2 INJECTION INTRAMUSCULAR; INTRAVENOUS at 10:02

## 2023-02-03 RX ADMIN — HEPARIN SODIUM 2700 UNITS: 1000 INJECTION, SOLUTION INTRAVENOUS; SUBCUTANEOUS at 09:02

## 2023-02-03 RX ADMIN — PROPOFOL 50 MG: 10 INJECTION, EMULSION INTRAVENOUS at 08:02

## 2023-02-03 RX ADMIN — SODIUM CHLORIDE: 9 INJECTION, SOLUTION INTRAVENOUS at 07:02

## 2023-02-03 RX ADMIN — PHENYLEPHRINE HYDROCHLORIDE 50 MCG: 10 INJECTION INTRAVENOUS at 08:02

## 2023-02-03 RX ADMIN — ACETAMINOPHEN 650 MG: 500 TABLET ORAL at 01:02

## 2023-02-03 RX ADMIN — FENTANYL CITRATE 100 MCG: 50 INJECTION, SOLUTION INTRAMUSCULAR; INTRAVENOUS at 07:02

## 2023-02-03 RX ADMIN — SODIUM CHLORIDE, SODIUM GLUCONATE, SODIUM ACETATE, POTASSIUM CHLORIDE, MAGNESIUM CHLORIDE, SODIUM PHOSPHATE, DIBASIC, AND POTASSIUM PHOSPHATE: .53; .5; .37; .037; .03; .012; .00082 INJECTION, SOLUTION INTRAVENOUS at 07:02

## 2023-02-03 RX ADMIN — PROPOFOL 50 MG: 10 INJECTION, EMULSION INTRAVENOUS at 07:02

## 2023-02-03 RX ADMIN — PROTAMINE SULFATE 70 MG: 10 INJECTION, SOLUTION INTRAVENOUS at 10:02

## 2023-02-03 RX ADMIN — FENTANYL CITRATE 100 MCG: 50 INJECTION, SOLUTION INTRAMUSCULAR; INTRAVENOUS at 08:02

## 2023-02-03 RX ADMIN — PHENYLEPHRINE HYDROCHLORIDE 50 MCG: 10 INJECTION INTRAVENOUS at 07:02

## 2023-02-03 RX ADMIN — LIDOCAINE HYDROCHLORIDE 100 MG: 20 INJECTION INTRAVENOUS at 07:02

## 2023-02-03 RX ADMIN — MIDAZOLAM HYDROCHLORIDE 2 MG: 1 INJECTION, SOLUTION INTRAMUSCULAR; INTRAVENOUS at 07:02

## 2023-02-03 RX ADMIN — ROCURONIUM BROMIDE 5 MG: 10 INJECTION INTRAVENOUS at 07:02

## 2023-02-03 RX ADMIN — ISOPROTERENOL HYDROCHLORIDE 0.5 MCG/MIN: 0.2 INJECTION, SOLUTION INTRAMUSCULAR; INTRAVENOUS at 09:02

## 2023-02-03 RX ADMIN — SUCCINYLCHOLINE CHLORIDE 200 MG: 20 INJECTION, SOLUTION INTRAMUSCULAR; INTRAVENOUS at 07:02

## 2023-02-03 RX ADMIN — DEXMEDETOMIDINE HYDROCHLORIDE 12 MCG: 100 INJECTION, SOLUTION INTRAVENOUS at 10:02

## 2023-02-03 RX ADMIN — SODIUM CHLORIDE 0.5 MCG/KG/MIN: 9 INJECTION, SOLUTION INTRAVENOUS at 07:02

## 2023-02-03 RX ADMIN — PROPOFOL 50 MCG/KG/MIN: 10 INJECTION, EMULSION INTRAVENOUS at 08:02

## 2023-02-03 RX ADMIN — PROPOFOL 200 MG: 10 INJECTION, EMULSION INTRAVENOUS at 07:02

## 2023-02-03 RX ADMIN — HEPARIN SODIUM 12 UNITS/KG/HR: 10000 INJECTION, SOLUTION INTRAVENOUS at 08:02

## 2023-02-03 NOTE — PLAN OF CARE
Patient arrived to room. PIV placed. Admit assessment completed. Plan of care discussed with patient. Cousin at bedside. Nurse call bell within reach. Will monitor

## 2023-02-03 NOTE — ANESTHESIA POSTPROCEDURE EVALUATION
Anesthesia Post Evaluation    Patient: Francoise Dykes    Procedure(s) Performed: Procedure(s) (LRB):  ABLATION, ARRHYTHMOGENIC FOCUS, FOR ATRIAL FIBRILLATION (N/A)  Ablation, Atrial Flutter, Typical    Final Anesthesia Type: general      Patient location during evaluation: PACU  Patient participation: Yes- Able to Participate  Level of consciousness: awake and alert  Post-procedure vital signs: reviewed and stable  Pain management: adequate  Airway patency: patent    PONV status at discharge: No PONV  Anesthetic complications: no      Cardiovascular status: blood pressure returned to baseline  Respiratory status: unassisted  Hydration status: euvolemic  Follow-up not needed.          Vitals Value Taken Time   /58 02/03/23 1216   Temp 36.8 °C (98.2 °F) 02/03/23 1115   Pulse 87 02/03/23 1224   Resp 11 02/03/23 1224   SpO2 92 % 02/03/23 1224   Vitals shown include unvalidated device data.      No case tracking events are documented in the log.      Pain/Theo Score: Theo Score: 9 (2/3/2023 11:30 AM)

## 2023-02-03 NOTE — PROGRESS NOTES
Bilat groin sutures removed per order. R side very tender, Dr Aleman at the bedside to evaluate. No new orders. Dressings applied. Angel catheter removed. Clear yellow urine emptied. Patient tolerated well.

## 2023-02-03 NOTE — HPI
Ms. Francoise Dykes is a 63 yo F with pmhx s/f pAF s/p PVI and SVC iso in 2015 followed by atypical flutter in 2016, repeat PVI 12/2016 (right PVs reisolated) and RA mid CT AT who presents for repeat atypical AFlutter ablation. Pt doing well today without complaints. She reports compliance with her medications including sotalol (last dose 1/29/23) and eliquis (last dose 2/2/23 evening).     ECG today shows NSR.    Following history obtained from most recent EP clinic encounter with Dr. Meyers in 11/2022.    I had the pleasure of seeing Francoise Dykes in electrophysiology clinic today for follow up of her paroxysmal atrial fibrillation. She was last seen in EP clinic in 12/2020. She is a 60 year old  who was well until 2/2012 when shewas diagnosed with symptomatic AF. In 11/2012, Mrs. Dykes had another episode of atrial fibrillation after an argument with her son. She presented to [facility], and once again reverted to sinus rhythm within 30 minutes. She was discharged on Flecainide 50 mg BID, Toprol XL 50 mg QD, and Aspirin.      When I initially saw Ms. Dykes in 1/2014, she admitted to monthly palpitations, which would last seconds and resolve with coughing. She believed that Flecainide caused her some shortness of breath. At that office visit, I stopped Flecainide and started Rythmol 600 mg PRN palpitations. Ms. Dykes had increasing breakthrough episodes, and underwent PVI and SVC isolation in 12/2015. She was started on Amiodarone at that time. At her follow-up visit in 1/2016, she was doing well, with no episodes of sustained palpitations. Amiodarone was stopped in early 3/2016. In 8/2016, Ms. Dykes was admitted with atypical atrial flutter with RVR. She was cardioverted to sinus rhythm, and started on Rythmol  mg BID.     In 12/2016, Ms. Dykes underwent repeat PVI. The right pulmonary veins were re-isolated, with isolated firing noted from the veins post-isolation. Additionally, a  mid-sal AT was targeted and successfully ablated. She was discharged on Rythmol  mg bid. In 3/2017 Rythmol was stopped. Several weeks later she presented to [facility] with atypical atrial flutter with 2:1 AVB at a rate of 120 bpm. Rythmol was restarted at that time. In 8/2017, Ms. Dykes presented with atypical atrial flutter with RVR, and underwent SAMMIE/DCCV. Notably, Ms. Dykes missed her AM dose of Rythmol. She was continued on Rythmol. In 12/2017 and 1/2018, Ms. Dykes had recurrent atypical atrial flutter, spontaneously converting to sinus rhythm. At that time her Rythmol was increased to 325 mg bid. At her 4/2018 visit Ms. Dykes was doing well, and the plan was to hold the course.     On 3/21/2019, Ms. Dkyes presented to [facility] with palpitations and was found to be in atypical atrial flutter with RVR, which terminated after receiving an extra dose of Rythmol. In total the episode lasted 2-3 hours. She had several episodes of palpitations lasting seconds in the weeks prior, which terminated spontaneously. Notably, she took steroids for an Achilles heel injury several days prior to her ED visit.     At her last visit in 12/2020, Ms. Dykes described short-lived episodes of palpitations lasting seconds. The plan at that time was to hold the course. Unfortunately, Rythmol SR is no longer covered by her insurance, so it was switched to Propafenone 225 mg TID. She felt poorly on this medication, so switched back to long-acting propafenone. She has been back on rythmol sr for the past week, She has had a single episode of AF for about 45 minutes, but wants to try short-acting again. She also had a recent hospitalization for tightness in her chest. She was in sinus at that time, and was treated with steroids. She finished her steroid taper two days ago.     Interval History:  Patient went into atrial flutter and then self converted. Patient states she felt awful while in it. She then felt herself go back in on  Saturday and currently feels the same.    ECG today shows normal sinus rhythm rate 73bpm

## 2023-02-03 NOTE — H&P
[facility] - Short Stay Cardiac Unit  Cardiac Electrophysiology  History and Physical     Admission Date: 2/3/2023  Code Status: Prior   Attending Provider: Lee Meyers MD   Principal Problem:<principal problem not specified>    Subjective:     Chief Complaint:  Elective redo PVI/atypical flutter ablation     HPI:  Ms. Francoise Dykes is a 61 yo F with pmhx s/f pAF s/p PVI and SVC iso in 2015 followed by atypical flutter in 2016, repeat PVI 12/2016 (right PVs reisolated) and RA mid CT AT who presents for repeat atypical AFlutter ablation. Pt doing well today without complaints. She reports compliance with her medications including sotalol (last dose 1/29/23) and eliquis (last dose 2/2/23 evening).     ECG today shows NSR.    Following history obtained from most recent EP clinic encounter with Dr. Meyers in 11/2022.    I had the pleasure of seeing Francoise Dykes in electrophysiology clinic today for follow up of her paroxysmal atrial fibrillation. She was last seen in EP clinic in 12/2020. She is a 60 year old  who was well until 2/2012 when shewas diagnosed with symptomatic AF. In 11/2012, Mrs. Dykes had another episode of atrial fibrillation after an argument with her son. She presented to [facility], and once again reverted to sinus rhythm within 30 minutes. She was discharged on Flecainide 50 mg BID, Toprol XL 50 mg QD, and Aspirin.      When I initially saw Ms. Dykes in 1/2014, she admitted to monthly palpitations, which would last seconds and resolve with coughing. She believed that Flecainide caused her some shortness of breath. At that office visit, I stopped Flecainide and started Rythmol 600 mg PRN palpitations. Ms. Dykes had increasing breakthrough episodes, and underwent PVI and SVC isolation in 12/2015. She was started on Amiodarone at that time. At her follow-up visit in 1/2016, she was doing well, with no episodes of sustained palpitations. Amiodarone was stopped in early  3/2016. In 8/2016, Ms. Dykes was admitted with atypical atrial flutter with RVR. She was cardioverted to sinus rhythm, and started on Rythmol  mg BID.     In 12/2016, Ms. Dykes underwent repeat PVI. The right pulmonary veins were re-isolated, with isolated firing noted from the veins post-isolation. Additionally, a mid-sal AT was targeted and successfully ablated. She was discharged on Rythmol  mg bid. In 3/2017 Rythmol was stopped. Several weeks later she presented to [facility] with atypical atrial flutter with 2:1 AVB at a rate of 120 bpm. Rythmol was restarted at that time. In 8/2017, Ms. Dykes presented with atypical atrial flutter with RVR, and underwent SAMMIE/DCCV. Notably, Ms. Dykes missed her AM dose of Rythmol. She was continued on Rythmol. In 12/2017 and 1/2018, Ms. Dykes had recurrent atypical atrial flutter, spontaneously converting to sinus rhythm. At that time her Rythmol was increased to 325 mg bid. At her 4/2018 visit Ms. Dykes was doing well, and the plan was to hold the course.     On 3/21/2019, Ms. Dykes presented to [facility] with palpitations and was found to be in atypical atrial flutter with RVR, which terminated after receiving an extra dose of Rythmol. In total the episode lasted 2-3 hours. She had several episodes of palpitations lasting seconds in the weeks prior, which terminated spontaneously. Notably, she took steroids for an Achilles heel injury several days prior to her ED visit.     At her last visit in 12/2020, Ms. Dykes described short-lived episodes of palpitations lasting seconds. The plan at that time was to hold the course. Unfortunately, Rythmol SR is no longer covered by her insurance, so it was switched to Propafenone 225 mg TID. She felt poorly on this medication, so switched back to long-acting propafenone. She has been back on rythmol sr for the past week, She has had a single episode of AF for about 45 minutes, but wants to try short-acting again. She also had a  recent hospitalization for tightness in her chest. She was in sinus at that time, and was treated with steroids. She finished her steroid taper two days ago.     Interval History:  Patient went into atrial flutter and then self converted. Patient states she felt awful while in it. She then felt herself go back in on Saturday and currently feels the same.    ECG today shows normal sinus rhythm rate 73bpm          Past Medical History:   Diagnosis Date    Acid reflux     Allergy     seasonal    Asthma with acute exacerbation     Atrial fibrillation     Essential hypertension 2017    Pt states that she does not have HTN.        Past Surgical History:   Procedure Laterality Date    24 HOUR IMPEDANCE PH MONITORING OF ESOPHAGUS IN PATIENT TAKING ACID REDUCING MEDICATIONS N/A 2021    Procedure: IMPEDANCE PH STUDY, ESOPHAGEAL, 24 HOUR, IN PATIENT TAKING ACID REDUCING MEDICATION;  Surgeon: Franky Gomez MD;  Location: [facility] (4TH FLR);  Service: Endoscopy;  Laterality: N/A;  Patient to do EGD with with esophageal Bravo pH probe on omeprazole 40 mg once daily she needs to take it with a sip of water the day of the case  pt completed COVID vaccine- see Immunization record in     CARDIAC ELECTROPHYSIOLOGY STUDY AND ABLATION       SECTION      COLONOSCOPY N/A 2018    Procedure: COLONOSCOPY;  Surgeon: rBodie Lara MD;  Location: [facility];  Service: Endoscopy;  Laterality: N/A;  confirmed appt-ss    ESOPHAGEAL MANOMETRY WITH MEASUREMENT OF IMPEDANCE N/A 2021    Procedure: MANOMETRY, ESOPHAGUS, WITH IMPEDANCE MEASUREMENT;  Surgeon: Franky Gomez MD;  Location: [facility] (4TH FLR);  Service: Endoscopy;  Laterality: N/A;  Covid test  [facility]   pt confirmed appt-KPvt    ESOPHAGOGASTRODUODENOSCOPY N/A 2021    Procedure: EGD (ESOPHAGOGASTRODUODENOSCOPY);  Surgeon: Alin Camargo MD;  Location: [facility] (2ND FLR);  Service: Endoscopy;  Laterality: N/A;  Fully  vaccinated 12/13/21, ok to hold Eliquis x 2 days per Dr. ANGELLA Meyers, instr portal -ml  12/10/21 LVM to see if patient can come in earlier -ml    HYSTERECTOMY  2000    MIGUEL    RADIOFREQUENCY ABLATION Left 2/27/2019    Procedure: RADIOFREQUENCY ABLATION, LEFT L2,3,4,5;  Surgeon: Mariusz Jang MD;  Location: [facility] [place] operated [facility];  Service: Pain Management;  Laterality: Left;  Left RFA L2,3,4,5  1 of 2  *Ok to hold Eliquis, per Dr Meyers    RADIOFREQUENCY ABLATION Right 3/13/2019    Procedure: RADIOFREQUENCY ABLATION,  Right L2,3,4,5;  Surgeon: Mariusz aJng MD;  Location: [facility] [place], [facility];  Service: Pain Management;  Laterality: Right;  Right RFA @ L2,3,4,5  2 of 2       Review of patient's allergies indicates:   Allergen Reactions    Iodinated contrast media Anaphylaxis     Itchy throat, SOB, hives    Adhesive tape-silicones Itching     Manifested in 12/2015 following AF ablation.       No current facility-administered medications on file prior to encounter.     Current Outpatient Medications on File Prior to Encounter   Medication Sig    albuterol (PROVENTIL/VENTOLIN HFA) 90 mcg/actuation inhaler Inhale 2 puffs into the lungs every 6 (six) hours as needed for Wheezing or Shortness of Breath. Rescue    apixaban (ELIQUIS) 5 mg Tab Take 1 tablet (5 mg total) by mouth 2 (two) times daily.    BREO ELLIPTA 200-25 mcg/dose DsDv diskus inhaler INHALE 1 PUFF BY MOUTH ONCE DAILY (CONTROLLER)    cetirizine (ZYRTEC) 10 MG tablet Take 1 tablet (10 mg total) by mouth once daily.    diltiaZEM (DILACOR XR) 120 MG CDCR Take 1 capsule (120 mg total) by mouth once daily.    hydroCHLOROthiazide (HYDRODIURIL) 12.5 MG Tab Take 1 tablet (12.5 mg total) by mouth once daily.    omeprazole (PRILOSEC) 40 MG capsule Take 1 capsule (40 mg total) by mouth 2 (two) times daily before meals.    triamcinolone acetonide 0.1% (KENALOG) 0.1 % cream Apply topically 2 (two) times daily. (Patient taking differently: Apply topically 2 (two) times  daily. PRN)    albuterol (PROVENTIL) 2.5 mg /3 mL (0.083 %) nebulizer solution Take 3 mLs (2.5 mg total) by nebulization every 6 (six) hours as needed for Wheezing.    COVID-19 vacc, mRNA,Pfizer,/PF (PFIZER COVID-19 VACCINE, EUA, IM)     famotidine (PEPCID) 20 MG tablet Take 1 tablet (20 mg total) by mouth 2 (two) times daily. for 7 days    sotaloL (BETAPACE) 80 MG tablet Take 1 tablet (80 mg total) by mouth 2 (two) times daily.     Family History       Problem Relation (Age of Onset)    Allergies Mother    Asbestos Father    Blindness Paternal Grandfather    Cancer Paternal Grandmother    Diabetes Mother, Maternal Grandfather    Eczema Son    Glaucoma Mother, Paternal Grandfather    Heart disease Maternal Grandmother    Hypertension Mother, Son    Obesity Father          Tobacco Use    Smoking status: Never    Smokeless tobacco: Never   Substance and Sexual Activity    Alcohol use: Yes     Comment: rarely, 1 drink/week    Drug use: No    Sexual activity: Yes     Partners: Male     Review of Systems   Constitutional: Negative for chills, decreased appetite and fever.   HENT:  Negative for congestion and sore throat.    Eyes:  Negative for blurred vision and discharge.   Cardiovascular:  Negative for chest pain, claudication, cyanosis, dyspnea on exertion and leg swelling.   Respiratory:  Negative for cough, hemoptysis and shortness of breath.    Endocrine: Negative for cold intolerance and heat intolerance.   Skin:  Negative for color change.   Musculoskeletal:  Negative for muscle weakness and myalgias.   Gastrointestinal:  Negative for bloating and abdominal pain.   Neurological:  Negative for dizziness, focal weakness and weakness.   Psychiatric/Behavioral:  Negative for altered mental status and depression.    Objective:     Vital Signs (Most Recent):  Temp: 98.2 °F (36.8 °C) (02/03/23 0558)  Pulse: 78 (02/03/23 0558)  Resp: 20 (02/03/23 0558)  BP: 126/67 (02/03/23 0600)  SpO2: 97 % (02/03/23 0558)  Vital Signs (24h Range):  Temp:  [98.2 °F (36.8 °C)] 98.2 °F (36.8 °C)  Pulse:  [78] 78  Resp:  [20] 20  SpO2:  [97 %] 97 %  BP: (124-126)/(60-67) 126/67       Weight: 89.4 kg (197 lb)  Body mass index is 33.81 kg/m².    SpO2: 97 %       Physical Exam  Constitutional:       Appearance: She is well-developed.   HENT:      Head: Normocephalic and atraumatic.      Right Ear: External ear normal.      Left Ear: External ear normal.   Eyes:      Conjunctiva/sclera: Conjunctivae normal.   Cardiovascular:      Rate and Rhythm: Normal rate and regular rhythm.      Pulses: Intact distal pulses.           Radial pulses are 2+ on the right side and 2+ on the left side.      Heart sounds: Normal heart sounds.   Pulmonary:      Effort: Pulmonary effort is normal. No respiratory distress.      Breath sounds: Normal breath sounds. No wheezing.   Abdominal:      General: Bowel sounds are normal. There is no distension.      Palpations: Abdomen is soft.      Tenderness: There is no abdominal tenderness.   Musculoskeletal:         General: Normal range of motion.      Cervical back: Normal range of motion and neck supple.   Skin:     General: Skin is warm and dry.   Neurological:      Mental Status: She is alert and oriented to person, place, and time.   Psychiatric:         Mood and Affect: Mood normal.         Behavior: Behavior normal.       Significant Labs: CMP: No results for input(s): NA, K, CL, CO2, GLU, BUN, CREATININE, CALCIUM, PROT, ALBUMIN, BILITOT, ALKPHOS, AST, ALT, ANIONGAP, ESTGFRAFRICA, EGFRNONAA in the last 48 hours., CBC: No results for input(s): WBC, HGB, HCT, PLT in the last 48 hours., and Troponin No results for input(s): TROPONINI in the last 48 hours.    Significant Imaging: EKG: NSR    Assessment and Plan:     Atypical atrial flutter  Francoise Dykes is a 60 year old female with a history of PVIx2, who continued to have episodes of atypical flutter following her second ablation. Episodes were largely  controlled on Rythmol  mg bid. She was switched to short-acting propafenone 225 tid and now on sotalol.    Recommend redo PVI given multiple episodes of recurrence.   Redo PVI  Carto          Monica Aleman MD  Cardiac Electrophysiology  [facility] - Short Stay Cardiac Unit

## 2023-02-03 NOTE — SUBJECTIVE & OBJECTIVE
Past Medical History:   Diagnosis Date    Acid reflux     Allergy     seasonal    Asthma with acute exacerbation     Atrial fibrillation     Essential hypertension 2017    Pt states that she does not have HTN.        Past Surgical History:   Procedure Laterality Date    24 HOUR IMPEDANCE PH MONITORING OF ESOPHAGUS IN PATIENT TAKING ACID REDUCING MEDICATIONS N/A 2021    Procedure: IMPEDANCE PH STUDY, ESOPHAGEAL, 24 HOUR, IN PATIENT TAKING ACID REDUCING MEDICATION;  Surgeon: Franky Gomez MD;  Location: [facility] (4TH FLR);  Service: Endoscopy;  Laterality: N/A;  Patient to do EGD with with esophageal Bravo pH probe on omeprazole 40 mg once daily she needs to take it with a sip of water the day of the case  pt completed COVID vaccine- see Immunization record in     CARDIAC ELECTROPHYSIOLOGY STUDY AND ABLATION       SECTION      COLONOSCOPY N/A 2018    Procedure: COLONOSCOPY;  Surgeon: Brodie Lara MD;  Location: [facility];  Service: Endoscopy;  Laterality: N/A;  confirmed appt-ss    ESOPHAGEAL MANOMETRY WITH MEASUREMENT OF IMPEDANCE N/A 2021    Procedure: MANOMETRY, ESOPHAGUS, WITH IMPEDANCE MEASUREMENT;  Surgeon: Franky Gomez MD;  Location: [facility] (4TH FLR);  Service: Endoscopy;  Laterality: N/A;  Covid test  [facility]   pt confirmed appt-KPvt    ESOPHAGOGASTRODUODENOSCOPY N/A 2021    Procedure: EGD (ESOPHAGOGASTRODUODENOSCOPY);  Surgeon: Alin Camargo MD;  Location: [facility] (2ND FLR);  Service: Endoscopy;  Laterality: N/A;  Fully vaccinated 21, ok to hold Eliquis x 2 days per Dr. ANGELLA Meyers, instr portal -ml  12/10/21 LVM to see if patient can come in earlier -ml    HYSTERECTOMY      MIGUEL    RADIOFREQUENCY ABLATION Left 2019    Procedure: RADIOFREQUENCY ABLATION, LEFT L2,3,4,5;  Surgeon: Mariusz Jang MD;  [facility]: [facility];  Service: Pain Management;  Laterality: Left;  Left RFA L2,3,4,5  1 of 2  *Ok to hold Eliquis, per Dr Meyers     RADIOFREQUENCY ABLATION Right 3/13/2019    Procedure: RADIOFREQUENCY ABLATION,  Right L2,3,4,5;  Surgeon: Mariusz Jang MD;  Location: [facility];  Service: Pain Management;  Laterality: Right;  Right RFA @ L2,3,4,5  2 of 2       Review of patient's allergies indicates:   Allergen Reactions    Iodinated contrast media Anaphylaxis     Itchy throat, SOB, hives    Adhesive tape-silicones Itching     Manifested in 12/2015 following AF ablation.       No current facility-administered medications on file prior to encounter.     Current Outpatient Medications on File Prior to Encounter   Medication Sig    albuterol (PROVENTIL/VENTOLIN HFA) 90 mcg/actuation inhaler Inhale 2 puffs into the lungs every 6 (six) hours as needed for Wheezing or Shortness of Breath. Rescue    apixaban (ELIQUIS) 5 mg Tab Take 1 tablet (5 mg total) by mouth 2 (two) times daily.    BREO ELLIPTA 200-25 mcg/dose DsDv diskus inhaler INHALE 1 PUFF BY MOUTH ONCE DAILY (CONTROLLER)    cetirizine (ZYRTEC) 10 MG tablet Take 1 tablet (10 mg total) by mouth once daily.    diltiaZEM (DILACOR XR) 120 MG CDCR Take 1 capsule (120 mg total) by mouth once daily.    hydroCHLOROthiazide (HYDRODIURIL) 12.5 MG Tab Take 1 tablet (12.5 mg total) by mouth once daily.    omeprazole (PRILOSEC) 40 MG capsule Take 1 capsule (40 mg total) by mouth 2 (two) times daily before meals.    triamcinolone acetonide 0.1% (KENALOG) 0.1 % cream Apply topically 2 (two) times daily. (Patient taking differently: Apply topically 2 (two) times daily. PRN)    albuterol (PROVENTIL) 2.5 mg /3 mL (0.083 %) nebulizer solution Take 3 mLs (2.5 mg total) by nebulization every 6 (six) hours as needed for Wheezing.    COVID-19 vacc, mRNA,Pfizer,/PF (PFIZER COVID-19 VACCINE, EUA, IM)     famotidine (PEPCID) 20 MG tablet Take 1 tablet (20 mg total) by mouth 2 (two) times daily. for 7 days    sotaloL (BETAPACE) 80 MG tablet Take 1 tablet (80 mg total) by mouth 2 (two) times daily.     Family  History       Problem Relation (Age of Onset)    Allergies Mother    Asbestos Father    Blindness Paternal Grandfather    Cancer Paternal Grandmother    Diabetes Mother, Maternal Grandfather    Eczema Son    Glaucoma Mother, Paternal Grandfather    Heart disease Maternal Grandmother    Hypertension Mother, Son    Obesity Father          Tobacco Use    Smoking status: Never    Smokeless tobacco: Never   Substance and Sexual Activity    Alcohol use: Yes     Comment: rarely, 1 drink/week    Drug use: No    Sexual activity: Yes     Partners: Male     Review of Systems   Constitutional: Negative for chills, decreased appetite and fever.   HENT:  Negative for congestion and sore throat.    Eyes:  Negative for blurred vision and discharge.   Cardiovascular:  Negative for chest pain, claudication, cyanosis, dyspnea on exertion and leg swelling.   Respiratory:  Negative for cough, hemoptysis and shortness of breath.    Endocrine: Negative for cold intolerance and heat intolerance.   Skin:  Negative for color change.   Musculoskeletal:  Negative for muscle weakness and myalgias.   Gastrointestinal:  Negative for bloating and abdominal pain.   Neurological:  Negative for dizziness, focal weakness and weakness.   Psychiatric/Behavioral:  Negative for altered mental status and depression.    Objective:     Vital Signs (Most Recent):  Temp: 98.2 °F (36.8 °C) (02/03/23 0558)  Pulse: 78 (02/03/23 0558)  Resp: 20 (02/03/23 0558)  BP: 126/67 (02/03/23 0600)  SpO2: 97 % (02/03/23 0558) Vital Signs (24h Range):  Temp:  [98.2 °F (36.8 °C)] 98.2 °F (36.8 °C)  Pulse:  [78] 78  Resp:  [20] 20  SpO2:  [97 %] 97 %  BP: (124-126)/(60-67) 126/67       Weight: 89.4 kg (197 lb)  Body mass index is 33.81 kg/m².    SpO2: 97 %       Physical Exam  Constitutional:       Appearance: She is well-developed.   HENT:      Head: Normocephalic and atraumatic.      Right Ear: External ear normal.      Left Ear: External ear normal.   Eyes:       Conjunctiva/sclera: Conjunctivae normal.   Cardiovascular:      Rate and Rhythm: Normal rate and regular rhythm.      Pulses: Intact distal pulses.           Radial pulses are 2+ on the right side and 2+ on the left side.      Heart sounds: Normal heart sounds.   Pulmonary:      Effort: Pulmonary effort is normal. No respiratory distress.      Breath sounds: Normal breath sounds. No wheezing.   Abdominal:      General: Bowel sounds are normal. There is no distension.      Palpations: Abdomen is soft.      Tenderness: There is no abdominal tenderness.   Musculoskeletal:         General: Normal range of motion.      Cervical back: Normal range of motion and neck supple.   Skin:     General: Skin is warm and dry.   Neurological:      Mental Status: She is alert and oriented to person, place, and time.   Psychiatric:         Mood and Affect: Mood normal.         Behavior: Behavior normal.       Significant Labs: CMP: No results for input(s): NA, K, CL, CO2, GLU, BUN, CREATININE, CALCIUM, PROT, ALBUMIN, BILITOT, ALKPHOS, AST, ALT, ANIONGAP, ESTGFRAFRICA, EGFRNONAA in the last 48 hours., CBC: No results for input(s): WBC, HGB, HCT, PLT in the last 48 hours., and Troponin No results for input(s): TROPONINI in the last 48 hours.    Significant Imaging: EKG: NSR

## 2023-02-03 NOTE — PROGRESS NOTES
Report received from  PACU. Patient s/p PVI. Bilat groin sites with sutures intact. Tenderness noted to right groin. + pulses. Angel catheter draining clear, yellow urine. Connected to cont tele monitoring. Vss. Family at bedside. Call light in reach.

## 2023-02-03 NOTE — BRIEF OP NOTE
: Dr. Meyers  Date of procedure: 02/03/2023   Post-operative Diagnosis: AF, AT    Procedure Performed: Reisolation of the PVs (RSPV had re-connected), posterior wall isolation. CTI RFA, AT ablated from mid sal. AT ablated outside RSPV    Description of Procedure: The patient was brought to the EP lab in the fasting state. Prepped and draped in sterile fashion. Safety timeout was performed. Sedation administered by anesthesia staff. Ultrasound guided venous access of the bilateral femoral veins was performed. ICE and CS catheters placed via left femoral vein access. Pre procedure ICE survey showed no significant pericardial effusion. Long sheaths via right femoral venous access. HALO catheter via right femoral vein. Heparin bolus and continuous infusion per protocol. CTI RFA with bidirectional block. Two transseptal punctures performed using combination of fluoroscopic and ICE guidance. Map created. RFA to re-isolate RSPV. Other PVs were silent at baseline. AT ablated from outside RSPV. AT ablated near the mid CT side in lateral RA. ICE confirmed no significant pericardial effusion.     EBL: <10 mL    Specimens Removed: None  Complications: no immediate    Adil MD Ash  Cardiovascular Disease Fellow PGY VI  Pager [phone]

## 2023-02-03 NOTE — TRANSFER OF CARE
"Anesthesia Transfer of Care Note    Patient: Francoise Dykes    Procedure(s) Performed: Procedure(s) (LRB):  ABLATION, ARRHYTHMOGENIC FOCUS, FOR ATRIAL FIBRILLATION (N/A)  Ablation, Atrial Flutter, Typical    Patient location: PACU    Anesthesia Type: general    Transport from OR: Transported from OR on 6-10 L/min O2 by face mask with adequate spontaneous ventilation    Post pain: adequate analgesia    Post assessment: no apparent anesthetic complications and tolerated procedure well    Post vital signs: stable    Level of consciousness: sedated    Nausea/Vomiting: no nausea/vomiting    Complications: none    Transfer of care protocol was followedComments: Nurse at bedside, VSS, spont reg resp noted      Last vitals:   Visit Vitals  /67 (BP Location: Right arm, Patient Position: Lying)   Pulse 78   Temp 36.8 °C (98.2 °F) (Temporal)   Resp 20   Ht 5' 4" (1.626 m)   Wt 89.4 kg (197 lb)   LMP  (LMP Unknown)   SpO2 97%   Breastfeeding No   BMI 33.81 kg/m²     "

## 2023-02-03 NOTE — PROGRESS NOTES
Patient ambulated in mahajan with RN and family member. Patient did well. Bilat groin sites remained cdi, soft. Patient voided without difficulty.

## 2023-02-03 NOTE — ANESTHESIA PREPROCEDURE EVALUATION
02/03/2023  Francoise Dykes is a 62 y.o., female.      Pre-op Assessment    I have reviewed the Patient Summary Reports.       I have reviewed the Medications.     Review of Systems  Anesthesia Hx:  History of prior surgery of interest to airway management or planning:  Denies Personal Hx of Anesthesia complications.   Cardiovascular:   Hypertension Dysrhythmias atrial fibrillation NL BiV Fxn   Pulmonary:   Asthma    Hepatic/GI:   GERD    Musculoskeletal:   Arthritis   Spine Disorders:        Physical Exam  General: Well nourished    Airway:  Mallampati: III / II  Mouth Opening: Normal  TM Distance: Normal  Tongue: Normal  Neck ROM: Normal ROM    Dental:  Intact    Chest/Lungs:  Normal Respiratory Rate    Heart:  Rate: Normal        Anesthesia Plan  Type of Anesthesia, risks & benefits discussed:    Anesthesia Type: Gen ETT  Intra-op Monitoring Plan: Standard ASA Monitors and Art Line  Post Op Pain Control Plan: multimodal analgesia  Induction:  IV  Airway Plan: Direct  Informed Consent: Informed consent signed with the Patient and all parties understand the risks and agree with anesthesia plan.  All questions answered.   ASA Score: 3  Day of Surgery Review of History & Physical: H&P Update referred to the surgeon/provider.  Anesthesia Plan Notes: NPO confirmed.  No history of anesthesia problems.     Ready For Surgery From Anesthesia Perspective.     .

## 2023-02-03 NOTE — PROGRESS NOTES
Patient discharged per MD orders. Instructions given on medications, wound care, activity, signs of infection, when to call MD, and follow up appointments. Pt verbalized understanding.  Patient AAOx3, VSS, no c/o pain or discomfort at this time. Telemetry and PIV removed. Patient left unit via wheelchair with transport and family.

## 2023-02-03 NOTE — DISCHARGE SUMMARY
[facility] - Short Stay Cardiac Unit  Cardiac Electrophysiology  Discharge Summary      Patient Name: Francoise Dykes  MRN: [id number]  Admission Date: 2/3/2023  Hospital Length of Stay: 0 days  Discharge Date and Time:  02/03/2023 3:42 PM  Attending Physician: Lee Meyers MD    Discharging Provider: Monica Aleman MD  Primary Care Physician: Juice So MD    HPI:   Ms. Francoise Dykes is a 63 yo F with pmhx s/f pAF s/p PVI and SVC iso in 2015 followed by atypical flutter in 2016, repeat PVI 12/2016 (right PVs reisolated) and RA mid CT AT who presents for repeat atypical AFlutter ablation. Pt doing well today without complaints. She reports compliance with her medications including sotalol (last dose 1/29/23) and eliquis (last dose 2/2/23 evening).     ECG today shows NSR.    Following history obtained from most recent EP clinic encounter with Dr. Meyers in 11/2022.    I had the pleasure of seeing Francoise Dykes in electrophysiology clinic today for follow up of her paroxysmal atrial fibrillation. She was last seen in EP clinic in 12/2020. She is a 60 year old  who was well until 2/2012 when shewas diagnosed with symptomatic AF. In 11/2012, Mrs. Dykes had another episode of atrial fibrillation after an argument with her son. She presented to [facility], and once again reverted to sinus rhythm within 30 minutes. She was discharged on Flecainide 50 mg BID, Toprol XL 50 mg QD, and Aspirin.      When I initially saw Ms. Dykes in 1/2014, she admitted to monthly palpitations, which would last seconds and resolve with coughing. She believed that Flecainide caused her some shortness of breath. At that office visit, I stopped Flecainide and started Rythmol 600 mg PRN palpitations. Ms. Dykes had increasing breakthrough episodes, and underwent PVI and SVC isolation in 12/2015. She was started on Amiodarone at that time. At her follow-up visit in 1/2016, she was doing well, with no episodes of  sustained palpitations. Amiodarone was stopped in early 3/2016. In 8/2016, Ms. Dykes was admitted with atypical atrial flutter with RVR. She was cardioverted to sinus rhythm, and started on Rythmol  mg BID.     In 12/2016, Ms. Dykes underwent repeat PVI. The right pulmonary veins were re-isolated, with isolated firing noted from the veins post-isolation. Additionally, a mid-sal AT was targeted and successfully ablated. She was discharged on Rythmol  mg bid. In 3/2017 Rythmol was stopped. Several weeks later she presented to [facility] [place] [facility] with atypical atrial flutter with 2:1 AVB at a rate of 120 bpm. Rythmol was restarted at that time. In 8/2017, Ms. Dykes presented with atypical atrial flutter with RVR, and underwent SAMMIE/DCCV. Notably, Ms. Dykes missed her AM dose of Rythmol. She was continued on Rythmol. In 12/2017 and 1/2018, Ms. Dykes had recurrent atypical atrial flutter, spontaneously converting to sinus rhythm. At that time her Rythmol was increased to 325 mg bid. At her 4/2018 visit Ms. Dykes was doing well, and the plan was to hold the course.     On 3/21/2019, Ms. Dykes presented to [facility] with palpitations and was found to be in atypical atrial flutter with RVR, which terminated after receiving an extra dose of Rythmol. In total the episode lasted 2-3 hours. She had several episodes of palpitations lasting seconds in the weeks prior, which terminated spontaneously. Notably, she took steroids for an Achilles heel injury several days prior to her ED visit.     At her last visit in 12/2020, Ms. Dykes described short-lived episodes of palpitations lasting seconds. The plan at that time was to hold the course. Unfortunately, Rythmol SR is no longer covered by her insurance, so it was switched to Propafenone 225 mg TID. She felt poorly on this medication, so switched back to long-acting propafenone. She has been back on rythmol sr for the past week, She has had a single episode of AF for about 45  minutes, but wants to try short-acting again. She also had a recent hospitalization for tightness in her chest. She was in sinus at that time, and was treated with steroids. She finished her steroid taper two days ago.     Interval History:  Patient went into atrial flutter and then self converted. Patient states she felt awful while in it. She then felt herself go back in on Saturday and currently feels the same.    ECG today shows normal sinus rhythm rate 73bpm          Procedure(s) (LRB):  ABLATION, ARRHYTHMOGENIC FOCUS, FOR ATRIAL FIBRILLATION (N/A)  Ablation, Atrial Flutter, Typical     Indwelling Lines/Drains at time of discharge:  Lines/Drains/Airways     Drain  Duration                Urethral Catheter 02/03/23 0735 Non-latex;Straight-tip 16 Fr. <1 day                Hospital Course:  No notes on file    Goals of Care Treatment Preferences:  Code Status: Full Code        Pending Diagnostic Studies:     None          Final Active Diagnoses:    Diagnosis Date Noted POA    Atypical atrial flutter [I48.4] 11/20/2017 Yes      Problems Resolved During this Admission:     No new Assessment & Plan notes have been filed under this hospital service since the last note was generated.  Service: Arrhythmia      Discharged Condition: good    Disposition:  stable    Follow Up: Dr. Meyers 3 months    Patient Instructions:   Do not strain or lift anything greater than 5 lb for 1 week.  Do not drive or operate any dangerous machinery for 24 hours.   Clean the area with mild soap and water and then cover it with a bandage.   Once the skin has healed, bathing in a tub or swimming is allowed.   Inspect the groin site daily and report to the physician any swelling at the site that   cannot be controlled with manual pressure for 10 minutes, unusual pain at the   access site or affected extremity, unusual swelling at the access site, or signs or   symptoms of infection such as redness, pain, or fever.   Call 911 if you have:    Bleeding from the puncture site that you cannot stop by doing the following:   Relax and lie down right away. Keep your leg flat and apply firm pressure to the site using your fingers and a gauze pad. Keep the pressure on for 20 minutes. Continue this until the bleeding stops. This may take awhile. When bleeding stops, cover the site with a sterile bandage and keep your leg still as much as possible.    Resume home medications.    If you feel sharp chest pain associated with deep breaths in the next 1-3 days, take ibuprofen/alleve/advil/motrin: 600 mg every 8 hours as needed with food.    Medications:  Reconciled Home Medications:      Medication List      CONTINUE taking these medications    * albuterol 2.5 mg /3 mL (0.083 %) nebulizer solution  Commonly known as: PROVENTIL  Take 3 mLs (2.5 mg total) by nebulization every 6 (six) hours as needed for Wheezing.     * albuterol 90 mcg/actuation inhaler  Commonly known as: PROVENTIL/VENTOLIN HFA  Inhale 2 puffs into the lungs every 6 (six) hours as needed for Wheezing or Shortness of Breath. Rescue     apixaban 5 mg Tab  Commonly known as: ELIQUIS  Take 1 tablet (5 mg total) by mouth 2 (two) times daily.     BREO ELLIPTA 200-25 mcg/dose Dsdv diskus inhaler  Generic drug: fluticasone furoate-vilanteroL  INHALE 1 PUFF BY MOUTH ONCE DAILY (CONTROLLER)     cetirizine 10 MG tablet  Commonly known as: ZYRTEC  Take 1 tablet (10 mg total) by mouth once daily.     diltiaZEM 120 MG Cdcr  Commonly known as: DILACOR XR  Take 1 capsule (120 mg total) by mouth once daily.     hydroCHLOROthiazide 12.5 MG Tab  Commonly known as: HYDRODIURIL  Take 1 tablet (12.5 mg total) by mouth once daily.     omeprazole 40 MG capsule  Commonly known as: PRILOSEC  Take 1 capsule (40 mg total) by mouth 2 (two) times daily before meals.     PFIZER COVID-19 VACCINE (EUA) IM     sotaloL 80 MG tablet  Commonly known as: BETAPACE  Take 1 tablet (80 mg total) by mouth 2 (two) times daily.         * This  list has 2 medication(s) that are the same as other medications prescribed for you. Read the directions carefully, and ask your doctor or other care provider to review them with you.            ASK your doctor about these medications    famotidine 20 MG tablet  Commonly known as: PEPCID  Take 1 tablet (20 mg total) by mouth 2 (two) times daily. for 7 days     triamcinolone acetonide 0.1% 0.1 % cream  Commonly known as: KENALOG  Apply topically 2 (two) times daily.            Time spent on the discharge of patient: 20 minutes    Monica Aleman MD  Cardiac Electrophysiology  [facility] - Short Stay Cardiac Unit

## 2023-02-03 NOTE — ANESTHESIA PROCEDURE NOTES
Intubation    Date/Time: 2/3/2023 7:27 AM  Performed by: Rebecca Berg CRNA  Authorized by: Todd Broderick MD     Intubation:     Induction:  Intravenous    Intubated:  Postinduction    Mask Ventilation:  Easy mask    Attempts:  1    Attempted By:  Student (Michael)    Method of Intubation:  Video laryngoscopy    Blade:  Castaneda 3    Laryngeal View Grade: Grade I - full view of cords      Difficult Airway Encountered?: No      Complications:  None    Airway Device:  Oral endotracheal tube    Airway Device Size:  7.5    Style/Cuff Inflation:  Cuffed (inflated to minimal occlusive pressure)    Tube secured:  21    Secured at:  The lips    Placement Verified By:  Capnometry    Complicating Factors:  None    Findings Post-Intubation:  BS equal bilateral and atraumatic/condition of teeth unchanged  Notes:      Head and neutral throughout

## 2023-02-03 NOTE — ANESTHESIA PROCEDURE NOTES
Arterial    Diagnosis: afib    Patient location during procedure: done in OR    Staffing  Authorizing Provider: Todd Broderick MD  Performing Provider: Todd Broderick MD    Anesthesiologist was present at the time of the procedure.    Preanesthetic Checklist  Completed: patient identified, IV checked, site marked, risks and benefits discussed, surgical consent, monitors and equipment checked, pre-op evaluation, timeout performed and anesthesia consent givenArterial  Skin Prep: chlorhexidine gluconate  Local Infiltration: none  Orientation: left  Location: radial    Catheter Size: 20 G  Catheter placement by Anatomical landmarks. Heme positive aspiration all ports. Insertion Attempts: 1  Assessment  Dressing: secured with tape and tegaderm  Patient: Tolerated well

## 2023-02-03 NOTE — DISCHARGE INSTRUCTIONS
Do not strain or lift anything greater than 5 lb for 1 week.  Do not drive or operate any dangerous machinery for 24 hours.   Clean the area with mild soap and water and then cover it with a bandage.   Once the skin has healed, bathing in a tub or swimming is allowed.   Inspect the groin site daily and report to the physician any swelling at the site that   cannot be controlled with manual pressure for 10 minutes, unusual pain at the   access site or affected extremity, unusual swelling at the access site, or signs or   symptoms of infection such as redness, pain, or fever.   Call 911 if you have:   Bleeding from the puncture site that you cannot stop by doing the following:   Relax and lie down right away. Keep your leg flat and apply firm pressure to the site using your fingers and a gauze pad. Keep the pressure on for 20 minutes. Continue this until the bleeding stops. This may take awhile. When bleeding stops, cover the site with a sterile bandage and keep your leg still as much as possible.    Resume home medications.  If you feel sharp chest pain associated with deep breaths in the next 1-3 days, take ibuprofen/alleve/advil/motrin: 600 mg every 8 hours as needed with food.

## 2023-02-03 NOTE — ASSESSMENT & PLAN NOTE
Francoise Dykes is a 60 year old female with a history of PVIx2, who continued to have episodes of atypical flutter following her second ablation. Episodes were largely controlled on Rythmol  mg bid. She was switched to short-acting propafenone 225 tid and now on sotalol.    Recommend redo PVI given multiple episodes of recurrence.   Redo PVI  Carto

## 2023-02-22 ENCOUNTER — PATIENT MESSAGE (OUTPATIENT)
Dept: BARIATRICS | Facility: CLINIC | Age: 63
End: 2023-02-22
Payer: COMMERCIAL

## 2023-03-08 ENCOUNTER — OFFICE VISIT (OUTPATIENT)
Dept: ALLERGY | Facility: CLINIC | Age: 63
End: 2023-03-08
Payer: COMMERCIAL

## 2023-03-08 VITALS
HEART RATE: 78 BPM | DIASTOLIC BLOOD PRESSURE: 65 MMHG | BODY MASS INDEX: 34.89 KG/M2 | OXYGEN SATURATION: 93 % | SYSTOLIC BLOOD PRESSURE: 125 MMHG | WEIGHT: 203.25 LBS

## 2023-03-08 DIAGNOSIS — J45.31 MILD PERSISTENT ASTHMA WITH ACUTE EXACERBATION: Primary | ICD-10-CM

## 2023-03-08 DIAGNOSIS — I48.4 ATYPICAL ATRIAL FLUTTER: ICD-10-CM

## 2023-03-08 DIAGNOSIS — R05.9 COUGH, UNSPECIFIED TYPE: ICD-10-CM

## 2023-03-08 DIAGNOSIS — U07.1 COVID: ICD-10-CM

## 2023-03-08 DIAGNOSIS — I10 ESSENTIAL HYPERTENSION: ICD-10-CM

## 2023-03-08 DIAGNOSIS — Z98.890 S/P ABLATION OF ATRIAL FIBRILLATION: ICD-10-CM

## 2023-03-08 DIAGNOSIS — J31.0 CHRONIC RHINITIS: ICD-10-CM

## 2023-03-08 DIAGNOSIS — I48.92 ATRIAL FLUTTER, UNSPECIFIED TYPE: ICD-10-CM

## 2023-03-08 DIAGNOSIS — Z86.79 S/P ABLATION OF ATRIAL FIBRILLATION: ICD-10-CM

## 2023-03-08 PROCEDURE — 1160F PR REVIEW ALL MEDS BY PRESCRIBER/CLIN PHARMACIST DOCUMENTED: ICD-10-PCS | Mod: CPTII,S$GLB,, | Performed by: ALLERGY & IMMUNOLOGY

## 2023-03-08 PROCEDURE — 3008F PR BODY MASS INDEX (BMI) DOCUMENTED: ICD-10-PCS | Mod: CPTII,S$GLB,, | Performed by: ALLERGY & IMMUNOLOGY

## 2023-03-08 PROCEDURE — 99999 PR PBB SHADOW E&M-EST. PATIENT-LVL III: CPT | Mod: PBBFAC,,, | Performed by: ALLERGY & IMMUNOLOGY

## 2023-03-08 PROCEDURE — 3074F SYST BP LT 130 MM HG: CPT | Mod: CPTII,S$GLB,, | Performed by: ALLERGY & IMMUNOLOGY

## 2023-03-08 PROCEDURE — 99214 OFFICE O/P EST MOD 30 MIN: CPT | Mod: S$GLB,,, | Performed by: ALLERGY & IMMUNOLOGY

## 2023-03-08 PROCEDURE — 3074F PR MOST RECENT SYSTOLIC BLOOD PRESSURE < 130 MM HG: ICD-10-PCS | Mod: CPTII,S$GLB,, | Performed by: ALLERGY & IMMUNOLOGY

## 2023-03-08 PROCEDURE — 3078F DIAST BP <80 MM HG: CPT | Mod: CPTII,S$GLB,, | Performed by: ALLERGY & IMMUNOLOGY

## 2023-03-08 PROCEDURE — 1159F PR MEDICATION LIST DOCUMENTED IN MEDICAL RECORD: ICD-10-PCS | Mod: CPTII,S$GLB,, | Performed by: ALLERGY & IMMUNOLOGY

## 2023-03-08 PROCEDURE — 99214 PR OFFICE/OUTPT VISIT, EST, LEVL IV, 30-39 MIN: ICD-10-PCS | Mod: S$GLB,,, | Performed by: ALLERGY & IMMUNOLOGY

## 2023-03-08 PROCEDURE — 1159F MED LIST DOCD IN RCRD: CPT | Mod: CPTII,S$GLB,, | Performed by: ALLERGY & IMMUNOLOGY

## 2023-03-08 PROCEDURE — 1160F RVW MEDS BY RX/DR IN RCRD: CPT | Mod: CPTII,S$GLB,, | Performed by: ALLERGY & IMMUNOLOGY

## 2023-03-08 PROCEDURE — 3008F BODY MASS INDEX DOCD: CPT | Mod: CPTII,S$GLB,, | Performed by: ALLERGY & IMMUNOLOGY

## 2023-03-08 PROCEDURE — 3078F PR MOST RECENT DIASTOLIC BLOOD PRESSURE < 80 MM HG: ICD-10-PCS | Mod: CPTII,S$GLB,, | Performed by: ALLERGY & IMMUNOLOGY

## 2023-03-08 PROCEDURE — 99999 PR PBB SHADOW E&M-EST. PATIENT-LVL III: ICD-10-PCS | Mod: PBBFAC,,, | Performed by: ALLERGY & IMMUNOLOGY

## 2023-03-08 RX ORDER — FLUTICASONE FUROATE AND VILANTEROL 200; 25 UG/1; UG/1
1 POWDER RESPIRATORY (INHALATION) DAILY
Qty: 60 EACH | Refills: 5 | Status: SHIPPED | OUTPATIENT
Start: 2023-03-08 | End: 2023-06-29 | Stop reason: SDUPTHER

## 2023-03-08 RX ORDER — FLUTICASONE PROPIONATE AND SALMETEROL 100; 50 UG/1; UG/1
1 POWDER RESPIRATORY (INHALATION) 2 TIMES DAILY
COMMUNITY
End: 2023-03-08 | Stop reason: ALTCHOICE

## 2023-03-08 RX ORDER — PREDNISONE 10 MG/1
TABLET ORAL
Qty: 21 TABLET | Refills: 0 | Status: SHIPPED | OUTPATIENT
Start: 2023-03-08 | End: 2023-03-21

## 2023-03-08 NOTE — PROGRESS NOTES
Francoise Dykes returns to clinic today for continued evaluation of chronic rhinitis, conjunctivitis, and asthma. She is here alone. She was last seen November 18, 2021.    Last year she had three or four episodes of atrial fibrillation with rapid ventricular response.  She was in the ICU for about five days in early December.    She underwent another ablation on February 3,  2023.  This was her third one.    Since then she has been well-controlled. She has not been having any palpitations.  Her last EKG showed that she was in sinus rhythm.    She is now taking Eliquis, diltiazem, and sotalol.  She was taking Rythmol.  She was having hair loss and this was discontinued.     She does have a rash underneath EKG leads.  She has had a rash on the left anterior chest when she thought was due to Breo.  This was discontinued and she was started on Advair 100-50 twice a day.  The rash has persisted.     She continues to have a rash on both lower extremities that is hyperpigmented.  This was biopsied in she was told that it was eczema.    She does have triamcinolone to use.  She usually does this once a day rather than twice a day.    When she takes oral steroids this rash does clear.     She did have a rash in the flexural creases of her arms in childhood.    She continues to take omeprazole 40 milligrams twice a day.  Her LPR and reflux is controlled on this dose.     She sometimes gets phlegm in her throat particularly in the morning.  This has increased in the past week.     About 10 days ago she started needing her albuterol MDI.     She has had increased rhinitis with sinus pressure, sneezing, clear rhinorrhea, nasal congestion, postnasal drip, and cough.    She continued to have increased wheezing and shortness of breath. She started using her nebulizer this weekend.  That has helped.     She continues to take Zyrtec. She is not taking Flonase.    She had COVID-19 in December 2022. She has lost her sense of taste and  smell.    [facility] PEQ ALLERGY QUESTIONNAIRE SHORT 3/8/2023   Head or facial pain: -   Facial swelling? No   Sinus pain? Yes   Sinus pressure? Yes   Ears: -   Ear discharge? No   Ear pain? Yes   Hearing loss? No   Nose: -   Nosebleeds? Yes   Postnasal drip? Yes   Sneezing? Yes   Runny nose? Yes   Congestion? Yes   Throat: -   Sore throat? No   Trouble swallowing? No   Voice change? Yes   Eyes: -   Eye itching? Yes   Eye redness? No   Eye discharge? No   Eye pain?  No   Light sensitivity / light hurts the eyes? No   Lungs: -   Cough? Yes   Wheezing? Yes   Shortness of breath? Yes   Apnea? No   Choking? No   Chest tightness? Yes   Skin: -   Rash? Yes   Color change of skin? Yes     Physical Examination:  General: Well-developed, well-nourished, no acute distress.  Head: No sinus tenderness.  Eyes: Conjunctivae:  No bulbar or palpebral conjunctival injection.  Ears: EAC's clear.  TM's clear.  No pre-auricular nodes.  Nose: Nasal Mucosa:  Pink.  Septum: No apparent deviation.  Turbinates:  No significant edema.  Polyps/Mass:  None visible.  Teeth/Gums:  No bleeding noted.  Oropharynx: No exudates.  Neck: Supple without thyromegaly. No cervical lymphadenopathy.    Extremities:  Hyperpigmented lower extremity rash bilaterally below knees.  Respiratory/Chest: Effort: Good.  Auscultation:  Bilateral expiratory wheezing.  Skin: Good turgor.  No urticaria or angioedema.  There is a 5 centimeter area of erythema on the left anterior chest.  Neuro/Psych: Oriented x 3.    Spirometry 08/20/2018:  Normal.    Laboratory 08/20/2018:  ImmunoCAP:  Negative.    Spirometry 05/27/2021:  Spirometry is normal. Spirometry remains unimproved following bronchodilator. Lung volume determination is normal. DLCO is normal.    Laboratory 11/10/2021:   IgE level:  40.   WBC 5360 with 11.9% eosinophils.    Assessment:  1.  Chronic rhinitis, consider allergic, not controlled, consider viral URI.  2.  Chronic conjunctivitis, consider allergic.  3.  Asthma  exacerbation possibly secondary to URI.  4.  GERD.  5.  LPR.  6.  Contact dermatitis nickel.  7.  Probable contact dermatitis to glue on EKG leads.  8.  Atrial fibrillation on Eliquis, diltiazem, and sotalol.  9.  Lower extremity hyperpigmentation of uncertain etiology, consider venous insufficiency.    Recommendations:  1. Prednisone taper.  2. Continue albuterol MDI and nebulizer if needed.   3. Continue Zyrtec.   4. Continue omeprazole 40 milligrams twice a day.   5. Consider restarting Flonase.   6. Discontinue Advair.   7. Breo daily.  8. Consider skin testing off beta blockade in the future.  9. Return to clinic if symptoms are not well controlled.

## 2023-03-14 ENCOUNTER — PATIENT MESSAGE (OUTPATIENT)
Dept: BARIATRICS | Facility: CLINIC | Age: 63
End: 2023-03-14
Payer: COMMERCIAL

## 2023-03-21 ENCOUNTER — OFFICE VISIT (OUTPATIENT)
Dept: FAMILY MEDICINE | Facility: CLINIC | Age: 63
End: 2023-03-21
Payer: COMMERCIAL

## 2023-03-21 VITALS
HEART RATE: 81 BPM | HEIGHT: 64 IN | RESPIRATION RATE: 18 BRPM | SYSTOLIC BLOOD PRESSURE: 112 MMHG | OXYGEN SATURATION: 98 % | WEIGHT: 198.63 LBS | BODY MASS INDEX: 33.91 KG/M2 | DIASTOLIC BLOOD PRESSURE: 64 MMHG | TEMPERATURE: 100 F

## 2023-03-21 DIAGNOSIS — J45.41 MODERATE PERSISTENT ASTHMA WITH EXACERBATION: Primary | ICD-10-CM

## 2023-03-21 PROCEDURE — 3078F DIAST BP <80 MM HG: CPT | Mod: CPTII,S$GLB,, | Performed by: FAMILY MEDICINE

## 2023-03-21 PROCEDURE — 99999 PR PBB SHADOW E&M-EST. PATIENT-LVL IV: CPT | Mod: PBBFAC,,, | Performed by: FAMILY MEDICINE

## 2023-03-21 PROCEDURE — 3078F PR MOST RECENT DIASTOLIC BLOOD PRESSURE < 80 MM HG: ICD-10-PCS | Mod: CPTII,S$GLB,, | Performed by: FAMILY MEDICINE

## 2023-03-21 PROCEDURE — 3008F PR BODY MASS INDEX (BMI) DOCUMENTED: ICD-10-PCS | Mod: CPTII,S$GLB,, | Performed by: FAMILY MEDICINE

## 2023-03-21 PROCEDURE — 99214 OFFICE O/P EST MOD 30 MIN: CPT | Mod: 25,S$GLB,, | Performed by: FAMILY MEDICINE

## 2023-03-21 PROCEDURE — 3074F SYST BP LT 130 MM HG: CPT | Mod: CPTII,S$GLB,, | Performed by: FAMILY MEDICINE

## 2023-03-21 PROCEDURE — 96372 PR INJECTION,THERAP/PROPH/DIAG2ST, IM OR SUBCUT: ICD-10-PCS | Mod: S$GLB,,, | Performed by: FAMILY MEDICINE

## 2023-03-21 PROCEDURE — 99999 PR PBB SHADOW E&M-EST. PATIENT-LVL IV: ICD-10-PCS | Mod: PBBFAC,,, | Performed by: FAMILY MEDICINE

## 2023-03-21 PROCEDURE — 3074F PR MOST RECENT SYSTOLIC BLOOD PRESSURE < 130 MM HG: ICD-10-PCS | Mod: CPTII,S$GLB,, | Performed by: FAMILY MEDICINE

## 2023-03-21 PROCEDURE — 96372 THER/PROPH/DIAG INJ SC/IM: CPT | Mod: S$GLB,,, | Performed by: FAMILY MEDICINE

## 2023-03-21 PROCEDURE — 99214 PR OFFICE/OUTPT VISIT, EST, LEVL IV, 30-39 MIN: ICD-10-PCS | Mod: 25,S$GLB,, | Performed by: FAMILY MEDICINE

## 2023-03-21 PROCEDURE — 1159F MED LIST DOCD IN RCRD: CPT | Mod: CPTII,S$GLB,, | Performed by: FAMILY MEDICINE

## 2023-03-21 PROCEDURE — 3008F BODY MASS INDEX DOCD: CPT | Mod: CPTII,S$GLB,, | Performed by: FAMILY MEDICINE

## 2023-03-21 PROCEDURE — 1159F PR MEDICATION LIST DOCUMENTED IN MEDICAL RECORD: ICD-10-PCS | Mod: CPTII,S$GLB,, | Performed by: FAMILY MEDICINE

## 2023-03-21 RX ORDER — METHYLPREDNISOLONE SODIUM SUCCINATE 125 MG/2ML
125 INJECTION INTRAMUSCULAR; INTRAVENOUS
Status: COMPLETED | OUTPATIENT
Start: 2023-03-21 | End: 2023-03-21

## 2023-03-21 RX ORDER — PROMETHAZINE HYDROCHLORIDE AND DEXTROMETHORPHAN HYDROBROMIDE 6.25; 15 MG/5ML; MG/5ML
5 SYRUP ORAL NIGHTLY PRN
Qty: 118 ML | Refills: 0 | Status: SHIPPED | OUTPATIENT
Start: 2023-03-21 | End: 2023-03-31

## 2023-03-21 RX ORDER — PREDNISONE 20 MG/1
60 TABLET ORAL DAILY
Qty: 15 TABLET | Refills: 0 | Status: SHIPPED | OUTPATIENT
Start: 2023-03-21 | End: 2023-03-26

## 2023-03-21 RX ORDER — DOXYCYCLINE 100 MG/1
100 CAPSULE ORAL 2 TIMES DAILY
Qty: 14 CAPSULE | Refills: 0 | Status: SHIPPED | OUTPATIENT
Start: 2023-03-21 | End: 2023-03-28

## 2023-03-21 RX ORDER — ONDANSETRON 4 MG/1
4 TABLET, ORALLY DISINTEGRATING ORAL EVERY 8 HOURS PRN
Qty: 25 TABLET | Refills: 0 | Status: SHIPPED | OUTPATIENT
Start: 2023-03-21 | End: 2024-02-28

## 2023-03-21 RX ORDER — BNT162B2 ORIGINAL AND OMICRON BA.4/BA.5 .1125; .1125 MG/2.25ML; MG/2.25ML
INJECTION, SUSPENSION INTRAMUSCULAR
COMMUNITY
Start: 2022-10-17 | End: 2023-03-21 | Stop reason: ALTCHOICE

## 2023-03-21 RX ORDER — AZITHROMYCIN 250 MG/1
TABLET, FILM COATED ORAL
Qty: 6 TABLET | Refills: 0 | Status: SHIPPED | OUTPATIENT
Start: 2023-03-21 | End: 2023-03-21

## 2023-03-21 RX ORDER — FLUTICASONE PROPIONATE 50 MCG
1 SPRAY, SUSPENSION (ML) NASAL DAILY PRN
Qty: 16 G | Refills: 2 | Status: SHIPPED | OUTPATIENT
Start: 2023-03-21 | End: 2023-07-12 | Stop reason: SDUPTHER

## 2023-03-21 RX ADMIN — METHYLPREDNISOLONE SODIUM SUCCINATE 125 MG: 125 INJECTION INTRAMUSCULAR; INTRAVENOUS at 11:03

## 2023-03-21 NOTE — PROGRESS NOTES
Subjective:       Patient ID: Francoise Dykes is a 62 y.o. female.    Chief Complaint: Nausea, Fatigue, Otalgia (left), and Sore Throat      Nausea  Associated symptoms include congestion, fatigue, nausea and a sore throat.   Fatigue  Associated symptoms include congestion, fatigue, nausea and a sore throat.   Otalgia   Associated symptoms include a sore throat.   Sore Throat   Associated symptoms include congestion and ear pain.   63 yo female presents for ear pain. Started about 1 week ago. Endorses having nausea, fatigue, and sore throat. Denies any fever but had chills. Neg covid test.    Review of Systems   Constitutional:  Positive for fatigue.   HENT:  Positive for congestion, ear pain and sore throat.    Respiratory:  Positive for choking.    Cardiovascular: Negative.    Gastrointestinal:  Positive for nausea.   Endocrine: Negative.    Genitourinary: Negative.    Musculoskeletal: Negative.    Neurological:  Positive for dizziness.   Psychiatric/Behavioral: Negative.          Past Medical History:   Diagnosis Date    Acid reflux     Allergy     seasonal    Asthma with acute exacerbation     Atrial fibrillation     Essential hypertension 11/20/2017    Pt states that she does not have HTN.      Past Surgical History:   Procedure Laterality Date    24 HOUR IMPEDANCE PH MONITORING OF ESOPHAGUS IN PATIENT TAKING ACID REDUCING MEDICATIONS N/A 09/20/2021    Procedure: IMPEDANCE PH STUDY, ESOPHAGEAL, 24 HOUR, IN PATIENT TAKING ACID REDUCING MEDICATION;  Surgeon: Franky Gomez MD;  Location: [facility] ([address] [place] [address]);  Service: Endoscopy;  Laterality: N/A;  Patient to do EGD with with esophageal Bravo pH probe on omeprazole 40 mg once daily she needs to take it with a sip of water the day of the case  pt completed COVID vaccine- see Immunization record in     ABLATION OF ARRHYTHMOGENIC FOCUS FOR ATRIAL FIBRILLATION N/A 02/03/2023    Procedure: ABLATION, ARRHYTHMOGENIC FOCUS, FOR ATRIAL FIBRILLATION;  Surgeon: Lee YBARRA  MD Mira;  Location: [facility];  Service: Cardiology;  Laterality: N/A;  AF, SAMMIE( Cx if SR), Redo PVI, RFA, CARTO, GEN, GP, 3 PREP    ABLATION, ATRIAL FLUTTER, TYPICAL  2023    Procedure: Ablation, Atrial Flutter, Typical;  Surgeon: Lee Meyers MD;  Location: [facility] EP [facility];  Service: Cardiology;;    ABLATION, ATRIAL TACHYCARDIA  2023    Procedure: Ablation, Atrial Tachycardia;  Surgeon: Lee Meyers MD;  Location: [facility];  Service: Cardiology;;    CARDIAC ELECTROPHYSIOLOGY STUDY AND ABLATION      CARDIAC ELECTROPHYSIOLOGY STUDY AND ABLATION       SECTION      COLONOSCOPY N/A 2018    Procedure: COLONOSCOPY;  Surgeon: Brodie Lara MD;  Location: [facility];  Service: Endoscopy;  Laterality: N/A;  confirmed appt-ss    ESOPHAGEAL MANOMETRY WITH MEASUREMENT OF IMPEDANCE N/A 2021    Procedure: MANOMETRY, ESOPHAGUS, WITH IMPEDANCE MEASUREMENT;  Surgeon: Franky Gomez MD;  Location: [facility] (4TH FLR);  Service: Endoscopy;  Laterality: N/A;  Covid test  [facility]   pt confirmed appt-KPvt    ESOPHAGOGASTRODUODENOSCOPY N/A 2021    Procedure: EGD (ESOPHAGOGASTRODUODENOSCOPY);  Surgeon: Alin Camargo MD;  Location: [facility] (2ND FLR);  Service: Endoscopy;  Laterality: N/A;  Fully vaccinated 21, ok to hold Eliquis x 2 days per Dr. ANGELLA Meyers, instr portal -ml  12/10/21 LVM to see if patient can come in earlier -ml    HYSTERECTOMY      MIGUEL    RADIOFREQUENCY ABLATION Left 2019    Procedure: RADIOFREQUENCY ABLATION, LEFT L2,3,4,5;  Surgeon: Mariusz Jang MD;  Location: [facility];  Service: Pain Management;  Laterality: Left;  Left RFA L2,3,4,5  1 of 2  *Ok to hold Eliquis, per Dr Meyers    RADIOFREQUENCY ABLATION Right 2019    Procedure: RADIOFREQUENCY ABLATION,  Right L2,3,4,5;  Surgeon: Mariusz Jang MD;  Location: [facility];  Service: Pain Management;  Laterality: Right;  Right RFA @ L2,3,4,5  2 of 2     Family History    Problem Relation Age of Onset    Hypertension Mother     Diabetes Mother     Glaucoma Mother     Allergies Mother     Asbestos Father     Obesity Father     Eczema Son     Hypertension Son     Heart disease Maternal Grandmother         chf    Diabetes Maternal Grandfather     Cancer Paternal Grandmother         lung, 2/2 second hand smoke    Glaucoma Paternal Grandfather     Blindness Paternal Grandfather     Melanoma Neg Hx     Psoriasis Neg Hx     Lupus Neg Hx     Acne Neg Hx     Breast cancer Neg Hx     Colon cancer Neg Hx     Ovarian cancer Neg Hx     Esophageal cancer Neg Hx     Cirrhosis Neg Hx     Celiac disease Neg Hx     Colon polyps Neg Hx     Crohn's disease Neg Hx     Liver cancer Neg Hx     Liver disease Neg Hx     Rectal cancer Neg Hx     Stomach cancer Neg Hx     Ulcerative colitis Neg Hx     Pancreatic cancer Neg Hx     Kidney cancer Neg Hx     Bladder Cancer Neg Hx     Uterine cancer Neg Hx      Social History     Socioeconomic History    Marital status:    Occupational History    Occupation: Teacher     Employer: [employer]    Tobacco Use    Smoking status: Never    Smokeless tobacco: Never   Substance and Sexual Activity    Alcohol use: Yes     Comment: rarely, 1 drink/week    Drug use: No    Sexual activity: Yes     Partners: Male   Other Topics Concern    Are you pregnant or think you may be? No    Breast-feeding No   Social History Narrative    Recently moved back to [facility] to help take care of mom.    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 Rescue, Disp: 18 g, Rfl: 11    apixaban (ELIQUIS) 5 mg Tab, Take 1  tablet (5 mg total) by mouth 2 (two) times daily., Disp: 180 tablet, Rfl: 3    cetirizine (ZYRTEC) 10 MG tablet, Take 1 tablet (10 mg total) by mouth once daily., Disp: 90 tablet, Rfl: 3    diltiaZEM (DILACOR XR) 120 MG CDCR, Take 1 capsule (120 mg total) by mouth once daily., Disp: 90 capsule, Rfl: 3    famotidine (PEPCID) 20 MG tablet, Take 1 tablet (20 mg total) by mouth 2 (two) times daily. for 7 days, Disp: 14 tablet, Rfl: 0    fluticasone furoate-vilanteroL (BREO ELLIPTA) 200-25 mcg/dose DsDv diskus inhaler, Inhale 1 puff into the lungs once daily. Controller, Disp: 60 each, Rfl: 5    hydroCHLOROthiazide (HYDRODIURIL) 12.5 MG Tab, Take 1 tablet (12.5 mg total) by mouth once daily., Disp: 90 tablet, Rfl: 3    omeprazole (PRILOSEC) 40 MG capsule, Take 1 capsule (40 mg total) by mouth 2 (two) times daily before meals., Disp: 180 capsule, Rfl: 3    sotaloL (BETAPACE) 80 MG tablet, Take 1 tablet (80 mg total) by mouth 2 (two) times daily., Disp: 60 tablet, Rfl: 11    triamcinolone acetonide 0.1% (KENALOG) 0.1 % cream, Apply topically 2 (two) times daily. (Patient taking differently: Apply topically 2 (two) times daily. PRN), Disp: 453.6 g, Rfl: 2    doxycycline (VIBRAMYCIN) 100 MG Cap, Take 1 capsule (100 mg total) by mouth 2 (two) times daily. for 7 days, Disp: 14 capsule, Rfl: 0    fluticasone propionate (FLONASE) 50 mcg/actuation nasal spray, 1 spray (50 mcg total) by Each Nostril route daily as needed for Rhinitis., Disp: 16 g, Rfl: 2    ondansetron (ZOFRAN-ODT) 4 MG TbDL, Take 1 tablet (4 mg total) by mouth every 8 (eight) hours as needed., Disp: 25 tablet, Rfl: 0    predniSONE (DELTASONE) 20 MG tablet, Take 3 tablets (60 mg total) by mouth once daily. for 5 days, Disp: 15 tablet, Rfl: 0    promethazine-dextromethorphan (PROMETHAZINE-DM) 6.25-15 mg/5 mL Syrp, Take 5 mLs by mouth nightly as needed., Disp: 118 mL, Rfl: 0    Current Facility-Administered Medications:     methylPREDNISolone sodium succinate  "injection 125 mg, 125 mg, Intramuscular, 1 time in Clinic/HOD, Juice So MD   Objective:      Vitals:    03/21/23 1113   BP: 112/64   BP Location: Left arm   Patient Position: Sitting   BP Method: Small (Manual)   Pulse: 81   Resp: 18   Temp: 100 °F (37.8 °C)   TempSrc: Oral   SpO2: 98%   Weight: 90.1 kg (198 lb 10.2 oz)   Height: 5' 4" (1.626 m)       Physical Exam  Constitutional:       General: She is not in acute distress.  HENT:      Head: Normocephalic and atraumatic.   Eyes:      Conjunctiva/sclera: Conjunctivae normal.   Cardiovascular:      Rate and Rhythm: Normal rate and regular rhythm.      Heart sounds: Normal heart sounds. No murmur heard.    No friction rub. No gallop.   Pulmonary:      Effort: Pulmonary effort is normal.      Breath sounds: Wheezing and rhonchi present. No rales.   Musculoskeletal:      Cervical back: Neck supple.   Skin:     General: Skin is warm and dry.   Neurological:      Mental Status: She is alert and oriented to person, place, and time.   Psychiatric:         Behavior: Behavior normal.         Thought Content: Thought content normal.         Judgment: Judgment normal.          Assessment:       1. Moderate persistent asthma with exacerbation        Plan:       Moderate persistent asthma with exacerbation  -     predniSONE (DELTASONE) 20 MG tablet; Take 3 tablets (60 mg total) by mouth once daily. for 5 days  Dispense: 15 tablet; Refill: 0  -     Discontinue: azithromycin (Z-ROMI) 250 MG tablet; Take 2 tablets by mouth on day 1; Take 1 tablet by mouth on days 2-5  Dispense: 6 tablet; Refill: 0  -     methylPREDNISolone sodium succinate injection 125 mg  -     ondansetron (ZOFRAN-ODT) 4 MG TbDL; Take 1 tablet (4 mg total) by mouth every 8 (eight) hours as needed.  Dispense: 25 tablet; Refill: 0  -     promethazine-dextromethorphan (PROMETHAZINE-DM) 6.25-15 mg/5 mL Syrp; Take 5 mLs by mouth nightly as needed.  Dispense: 118 mL; Refill: 0  -     fluticasone propionate " (FLONASE) 50 mcg/actuation nasal spray; 1 spray (50 mcg total) by Each Nostril route daily as needed for Rhinitis.  Dispense: 16 g; Refill: 2  -     doxycycline (VIBRAMYCIN) 100 MG Cap; Take 1 capsule (100 mg total) by mouth 2 (two) times daily. for 7 days  Dispense: 14 capsule; Refill: 0              Future Appointments   Date Time Provider Department Center   4/6/2023  9:00 AM Moira Avila MD [facility] BARIAT [facility]   5/8/2023 10:15 AM EKG, APPT [facility] EKG [facility]   5/8/2023 10:40 AM Lee Meyers MD [facility]       Patient note was created using [employer].  Any errors in syntax or even information may not have been identified and edited on initial review prior to signing this note.

## 2023-03-21 NOTE — PROGRESS NOTES
Health Maintenance Due   Topic     Pneumococcal Vaccines (Age 0-64) (1 - PCV) Pt decline    TETANUS VACCINE  Consult pcp    Hemoglobin A1c (Diabetic Prevention Screening)  Consult pcp    Influenza Vaccine (1) Pt decline

## 2023-03-21 NOTE — LETTER
March 21, 2023      [facility]  [address]  [place] [address]  Phone: [phone]  Fax: [phone]       Patient: Francoise Dykes   YOB: 1960  Date of Visit: 03/21/2023    To Whom It May Concern:    Maddie Dykes  was at [facility] on 03/21/2023. The patient may return to work/school on 3/24/23 with no restrictions. If you have any questions or concerns, or if I can be of further assistance, please do not hesitate to contact me.    Sincerely,        Juice So MD

## 2023-04-06 ENCOUNTER — OFFICE VISIT (OUTPATIENT)
Dept: BARIATRICS | Facility: CLINIC | Age: 63
End: 2023-04-06
Payer: COMMERCIAL

## 2023-04-06 VITALS
BODY MASS INDEX: 35.17 KG/M2 | WEIGHT: 206 LBS | HEIGHT: 64 IN | HEART RATE: 68 BPM | SYSTOLIC BLOOD PRESSURE: 116 MMHG | DIASTOLIC BLOOD PRESSURE: 70 MMHG | OXYGEN SATURATION: 96 %

## 2023-04-06 DIAGNOSIS — E66.01 CLASS 2 SEVERE OBESITY WITH BODY MASS INDEX (BMI) OF 35 TO 39.9 WITH SERIOUS COMORBIDITY: Primary | ICD-10-CM

## 2023-04-06 PROCEDURE — 99213 OFFICE O/P EST LOW 20 MIN: CPT | Mod: S$GLB,,, | Performed by: INTERNAL MEDICINE

## 2023-04-06 PROCEDURE — 3078F PR MOST RECENT DIASTOLIC BLOOD PRESSURE < 80 MM HG: ICD-10-PCS | Mod: CPTII,S$GLB,, | Performed by: INTERNAL MEDICINE

## 2023-04-06 PROCEDURE — 99999 PR PBB SHADOW E&M-EST. PATIENT-LVL V: ICD-10-PCS | Mod: PBBFAC,,, | Performed by: INTERNAL MEDICINE

## 2023-04-06 PROCEDURE — 1159F MED LIST DOCD IN RCRD: CPT | Mod: CPTII,S$GLB,, | Performed by: INTERNAL MEDICINE

## 2023-04-06 PROCEDURE — 3074F SYST BP LT 130 MM HG: CPT | Mod: CPTII,S$GLB,, | Performed by: INTERNAL MEDICINE

## 2023-04-06 PROCEDURE — 99999 PR PBB SHADOW E&M-EST. PATIENT-LVL V: CPT | Mod: PBBFAC,,, | Performed by: INTERNAL MEDICINE

## 2023-04-06 PROCEDURE — 3078F DIAST BP <80 MM HG: CPT | Mod: CPTII,S$GLB,, | Performed by: INTERNAL MEDICINE

## 2023-04-06 PROCEDURE — 3074F PR MOST RECENT SYSTOLIC BLOOD PRESSURE < 130 MM HG: ICD-10-PCS | Mod: CPTII,S$GLB,, | Performed by: INTERNAL MEDICINE

## 2023-04-06 PROCEDURE — 99213 PR OFFICE/OUTPT VISIT, EST, LEVL III, 20-29 MIN: ICD-10-PCS | Mod: S$GLB,,, | Performed by: INTERNAL MEDICINE

## 2023-04-06 PROCEDURE — 1160F RVW MEDS BY RX/DR IN RCRD: CPT | Mod: CPTII,S$GLB,, | Performed by: INTERNAL MEDICINE

## 2023-04-06 PROCEDURE — 1160F PR REVIEW ALL MEDS BY PRESCRIBER/CLIN PHARMACIST DOCUMENTED: ICD-10-PCS | Mod: CPTII,S$GLB,, | Performed by: INTERNAL MEDICINE

## 2023-04-06 PROCEDURE — 3008F BODY MASS INDEX DOCD: CPT | Mod: CPTII,S$GLB,, | Performed by: INTERNAL MEDICINE

## 2023-04-06 PROCEDURE — 3008F PR BODY MASS INDEX (BMI) DOCUMENTED: ICD-10-PCS | Mod: CPTII,S$GLB,, | Performed by: INTERNAL MEDICINE

## 2023-04-06 PROCEDURE — 1159F PR MEDICATION LIST DOCUMENTED IN MEDICAL RECORD: ICD-10-PCS | Mod: CPTII,S$GLB,, | Performed by: INTERNAL MEDICINE

## 2023-04-06 RX ORDER — TOPIRAMATE 25 MG/1
25-50 TABLET ORAL NIGHTLY
Qty: 180 TABLET | Refills: 1 | Status: SHIPPED | OUTPATIENT
Start: 2023-04-06 | End: 2023-07-27 | Stop reason: SDUPTHER

## 2023-04-06 NOTE — PATIENT INSTRUCTIONS
Patient was informed that topiramate is used for migraine prevention and seizures. Weight loss is a common side effect that is well documented. S/he understands this. S/he was informed of the potential side effects such as serious and possibly fatal rash in which case the medication should be discontinued immediately. Paresthesias, forgetfulness, fatigue, kidney stones, GI symptoms, and changes in lab values such as electrolytes, blood counts and kidney function.    Start topiramate 1 tab in the evening for 1-2 weeks, can 2 tabs when/if needed.         Add some type of resistance training 2-3 days a week. These can be body weight exercises, light weight or elastic bands. [employer] and [employer] are great sources for free work out plans and videos.         1000 nathan daily planner from first visit.    Can use a shake 1 meal a day (ex- dinner).     January 28, 2019  Hot Spinach and Artichoke Dip (Recipe)  BY GARRISON ANTONIO RD, BIRD    This creamy spinach dip can double as a protein-rich, gluten-free side dish, game day appetizer or parade route snack.  Calories 85 calories    Fat 3 grams saturated fat    Prep Time 5 minutes  Cook Time10 minutes  Yield Serves 5-10 people  Ingredients  1/2 cup onion, finely chopped  3 (10 ounce) packs of frozen chopped spinach, thawed and squeezed dry  1 (8 ounce) package reduced-fat cream cheese  1 (8 ounce) carton fat-free plain Greek yogurt  1/2 cup Parmesan cheese, grated  1 (14 ounce) can artichoke hearts  1/4 teaspoon salt (optional)  1/4 teaspoon black pepper  1/8 teaspoon crushed red pepper flakes  Instructions  Lightly coat a skillet with cooking spray.  Cook and stir onion over medium heat until transparent (about 5 minutes).  Add spinach. Cook until thoroughly heated (about 1-2 minutes).  Reduce heat and add cream cheese. Stir until melted and smooth.  Stir in Greek yogurt, Parmesan cheese and artichokes. Remove from heat.  Season with salt (optional) and black and red  pepper.  Serve with raw vegetables.                          January 31, 2019  Pizza Cups (Recipe)    Trying to eat better but craving pizza? These protein-packed pizza cups will do the trick.    Calories 65 calories per cup  Fat 2.7 grams per cup  Prep Time5 minutes  Cook Time 25 minutes  Yield 12 pizza cups  Ingredients  1 ½ cups liquid egg whites  2 large eggs  1 cup fat free or low moisture shredded mozzarella cheese  37 turkey pepperonis (25 chopped and 12 sliced)  ¼ cup Parmesan cheese  ¼ tsp oregano  ¼ tsp black pepper  Instructions  Preheat oven to 350 degrees Fahrenheit.  Chop up 25 turkey pepperonis into small pieces. In a bowl, combine all ingredients except the remaining 12 sliced turkey pepperoni.  Spray a muffin pan with nonstick spray.  Place a whole sliced turkey pepperoni in the bottom of each of the 12 muffin molds.  Pour or spoon in the mixture in your bowl into each muffin mold evenly ¾ full.  Bake in the oven for 20-25 minutes. Place a toothpick in the center of the pizza cup to ensure all liquid egg has cooked. For a crispier pizza cup, leave in the oven a little longer.  Let cool for a few minutes before serving. Enjoy!

## 2023-04-06 NOTE — PROGRESS NOTES
"Subjective:       Patient ID: Francoise Dykes is a 62 y.o. female.    Chief Complaint: Follow-up      CC:   Pt here today for follow-up. Has lost 7.6 lbs(-2 lbs muscle. -3 lbs fat).  Has  progressed to 1000 nathan PB diet, and ozempic, currently on 1mg weekly.  Has been off of it x1 weeks. Denies SE. She did have covid recently and feels she has been struggling since. Her taste and smell have been effected. Has also moved recently.     topiramate 25 mg qhs, with instructions to slowly titrate up to 50 mg as she was having  SE was trouble with word finding.  BP ok today.    New BMR: 1466    New PBF: 45.7%    initial  BMR: 1479    PBF:  47%    Follow-up    Review of Systems      Objective:     /70 (BP Location: Left arm, Patient Position: Sitting, BP Method: Large (Manual))   Pulse 68   Ht 5' 4" (1.626 m)   Wt 93.4 kg (206 lb)   LMP  (LMP Unknown)   SpO2 96%   BMI 35.36 kg/m²    Physical Exam  Vitals reviewed.   Constitutional:       General: She is not in acute distress.     Appearance: She is well-developed.      Comments: Centrally obese   HENT:      Head: Normocephalic and atraumatic.   Eyes:      General: No scleral icterus.     Pupils: Pupils are equal, round, and reactive to light.   Cardiovascular:      Rate and Rhythm: Normal rate.   Pulmonary:      Effort: Pulmonary effort is normal.   Musculoskeletal:         General: Normal range of motion.      Cervical back: Normal range of motion and neck supple.   Skin:     General: Skin is warm and dry.      Findings: No erythema.   Neurological:      Mental Status: She is alert and oriented to person, place, and time.   Psychiatric:         Behavior: Behavior normal.         Judgment: Judgment normal.       Assessment:       1. Class 2 severe obesity with body mass index (BMI) of 35 to 39.9 with serious comorbidity              Plan:           Francoise was seen today for follow-up.    Diagnoses and all orders for this visit:    Class 2 severe obesity with " body mass index (BMI) of 35 to 39.9 with serious comorbidity    Other orders  -     topiramate (TOPAMAX) 25 MG tablet; Take 1-2 tablets (25-50 mg total) by mouth every evening.            Patient was informed that topiramate is used for migraine prevention and seizures. Weight loss is a common side effect that is well documented. S/he understands this. S/he was informed of the potential side effects such as serious and possibly fatal rash in which case the medication should be discontinued immediately. Paresthesias, forgetfulness, fatigue, kidney stones, GI symptoms, and changes in lab values such as electrolytes, blood counts and kidney function.    Start topiramate 1 tab in the evening for 1-2 weeks, can 2 tabs when/if needed.         Add some type of resistance training 2-3 days a week. These can be body weight exercises, light weight or elastic bands. [employer] and [employer] are great sources for free work out plans and videos.         1000 nathan daily planner from first visit.    Can use a shake 1 meal a day (ex- dinner).     Nancy garcia recipes given.

## 2023-05-04 NOTE — PROGRESS NOTES
Subjective:   HPI     I had the pleasure of seeing Francoise Dykes in electrophysiology clinic today for follow up of her paroxysmal atrial fibrillation. She is a 62 year old  who was well until 2/2012 when shewas diagnosed with symptomatic AF. In 11/2012, Mrs. Dykes had another episode of atrial fibrillation after an argument with her son. She presented to [facility], and once again reverted to sinus rhythm within 30 minutes. She was discharged on Flecainide 50 mg BID, Toprol XL 50 mg QD, and Aspirin.     When I initially saw Ms. Dykes in 1/2014, she admitted to monthly palpitations, which would last seconds and resolve with coughing. She believed that Flecainide caused her some shortness of breath. At that office visit, I stopped Flecainide and started Rythmol 600 mg PRN palpitations. Ms. Dykes had increasing breakthrough episodes, and underwent PVI and SVC isolation in 12/2015. She was started on Amiodarone at that time. At her follow-up visit in 1/2016, she was doing well, with no episodes of sustained palpitations. Amiodarone was stopped in early 3/2016. In 8/2016, Ms. Dykes was admitted with atypical atrial flutter with RVR. She was cardioverted to sinus rhythm, and started on Rythmol  mg BID.    In 12/2016, Ms. Dykes underwent repeat PVI. The right pulmonary veins were re-isolated, with isolated firing noted from the veins post-isolation. Additionally, a mid-sal AT was targeted and successfully ablated. She was discharged on Rythmol  mg bid. In 3/2017 Rythmol was stopped. Several weeks later she presented to [facility] with atypical atrial flutter with 2:1 AVB at a rate of 120 bpm. Rythmol was restarted at that time. In 8/2017, Ms. Dykes presented with atypical atrial flutter with RVR, and underwent SAMMIE/DCCV. Notably, Ms. Dykes missed her AM dose of Rythmol. She was continued on Rythmol. In 12/2017 and 1/2018, Ms. Dykes had recurrent atypical atrial flutter, spontaneously  converting to sinus rhythm. At that time her Rythmol was increased to 325 mg bid. At her 4/2018 visit Ms. Dykes was doing well, and the plan was to hold the course.    On 3/21/2019, Ms. Dykes presented to [facility] with palpitations and was found to be in atypical atrial flutter with RVR, which terminated after receiving an extra dose of Rythmol. In total the episode lasted 2-3 hours. She had several episodes of palpitations lasting seconds in the weeks prior, which terminated spontaneously. Notably, she took steroids for an Achilles heel injury several days prior to her ED visit.    Over the past few years Ms. Dykes had multiple episodes of recurrent atypical flutter. On 2/3/2023 Ms. Dykes underwent repeat PVI (reisolation of RSPV, CTI-line, LA posterior wall isolation, ablation of high posterior free wall RA AT). Discharged on sotalol 80 mg bid.    Ms. Dykes states she has had no recurrent arrhythmias since ablation. No side-effects on sotalol.    My interpretation of today's ECG is sinus bradycardia at 59 bpm with  ms.    Review of Systems   Constitutional: Negative for chills, decreased appetite, diaphoresis, malaise/fatigue and weight loss.   HENT:  Negative for congestion, hearing loss, nosebleeds and sore throat.    Eyes:  Negative for pain, redness and visual disturbance.   Cardiovascular:  Negative for chest pain, dyspnea on exertion, irregular heartbeat, leg swelling, orthopnea, palpitations and syncope.   Respiratory:  Negative for cough, shortness of breath, sleep disturbances due to breathing and wheezing.    Endocrine: Negative for cold intolerance and heat intolerance.   Hematologic/Lymphatic: Negative for bleeding problem. Does not bruise/bleed easily.   Skin:  Negative for color change, dry skin, poor wound healing and rash.   Musculoskeletal:  Negative for arthritis, back pain, falls, joint pain, muscle weakness and myalgias.   Gastrointestinal:  Negative for abdominal pain, change in bowel  habit, constipation, diarrhea, hematochezia, melena and vomiting.   Genitourinary:  Negative for dysuria, frequency, hematuria, nocturia and urgency.   Neurological:  Negative for difficulty with concentration, disturbances in coordination, dizziness, focal weakness, headaches, light-headedness, numbness and weakness.   Psychiatric/Behavioral:  Negative for altered mental status. The patient does not have insomnia.       Objective:    Physical Exam  Vitals reviewed.   Constitutional:       General: She is not in acute distress.     Appearance: She is well-developed.   HENT:      Head: Normocephalic and atraumatic.   Eyes:      General:         Right eye: No discharge.         Left eye: No discharge.      Conjunctiva/sclera: Conjunctivae normal.   Neck:      Vascular: No JVD.   Cardiovascular:      Rate and Rhythm: Normal rate and regular rhythm.      Pulses: Intact distal pulses.      Heart sounds: Normal heart sounds. No murmur heard.  Pulmonary:      Effort: Pulmonary effort is normal. No respiratory distress.      Breath sounds: Normal breath sounds.   Abdominal:      General: There is no distension.      Palpations: Abdomen is soft.      Tenderness: There is no abdominal tenderness.   Musculoskeletal:         General: Normal range of motion.      Cervical back: Neck supple.   Skin:     General: Skin is warm and dry.   Neurological:      Mental Status: She is alert and oriented to person, place, and time.         Assessment:       1. Paroxysmal atrial fibrillation    2. Long term current use of antiarrhythmic drug    3. S/P ablation of atrial fibrillation    4. Atypical atrial flutter    5. Essential hypertension           Plan:       In summary, Francoise Dykes is a 62 year old female with a history of PVIx2, who continued to have episodes of atypical flutter following her second ablation in 12/2016. Now status-post re-do PVI/PWI/CTI-line/sal AT ablation. Remains on sotalol 80 mg bid.    Plan is to hold the  course, follow-up 6 months.    Thank you for allowing me to participate in the care of this patient. Please do not hesitate to call me with any questions or concerns.

## 2023-05-08 ENCOUNTER — OFFICE VISIT (OUTPATIENT)
Dept: ELECTROPHYSIOLOGY | Facility: CLINIC | Age: 63
End: 2023-05-08
Payer: COMMERCIAL

## 2023-05-08 ENCOUNTER — HOSPITAL ENCOUNTER (OUTPATIENT)
Dept: CARDIOLOGY | Facility: CLINIC | Age: 63
Discharge: HOME | End: 2023-05-08
Payer: COMMERCIAL

## 2023-05-08 VITALS
DIASTOLIC BLOOD PRESSURE: 80 MMHG | HEART RATE: 59 BPM | SYSTOLIC BLOOD PRESSURE: 125 MMHG | WEIGHT: 210.13 LBS | BODY MASS INDEX: 35.87 KG/M2 | HEIGHT: 64 IN

## 2023-05-08 DIAGNOSIS — I10 ESSENTIAL HYPERTENSION: ICD-10-CM

## 2023-05-08 DIAGNOSIS — Z79.899 LONG TERM CURRENT USE OF ANTIARRHYTHMIC DRUG: ICD-10-CM

## 2023-05-08 DIAGNOSIS — Z98.890 S/P ABLATION OF ATRIAL FIBRILLATION: ICD-10-CM

## 2023-05-08 DIAGNOSIS — I48.0 PAROXYSMAL ATRIAL FIBRILLATION: Primary | ICD-10-CM

## 2023-05-08 DIAGNOSIS — I48.4 ATYPICAL ATRIAL FLUTTER: ICD-10-CM

## 2023-05-08 DIAGNOSIS — I49.8 OTHER CARDIAC ARRHYTHMIA: ICD-10-CM

## 2023-05-08 DIAGNOSIS — Z86.79 S/P ABLATION OF ATRIAL FIBRILLATION: ICD-10-CM

## 2023-05-08 PROCEDURE — 93005 RHYTHM STRIP: ICD-10-PCS | Mod: S$GLB,,, | Performed by: INTERNAL MEDICINE

## 2023-05-08 PROCEDURE — 99214 OFFICE O/P EST MOD 30 MIN: CPT | Mod: S$GLB,,, | Performed by: INTERNAL MEDICINE

## 2023-05-08 PROCEDURE — 3074F SYST BP LT 130 MM HG: CPT | Mod: CPTII,S$GLB,, | Performed by: INTERNAL MEDICINE

## 2023-05-08 PROCEDURE — 93005 ELECTROCARDIOGRAM TRACING: CPT | Mod: S$GLB,,, | Performed by: INTERNAL MEDICINE

## 2023-05-08 PROCEDURE — 3074F PR MOST RECENT SYSTOLIC BLOOD PRESSURE < 130 MM HG: ICD-10-PCS | Mod: CPTII,S$GLB,, | Performed by: INTERNAL MEDICINE

## 2023-05-08 PROCEDURE — 3008F PR BODY MASS INDEX (BMI) DOCUMENTED: ICD-10-PCS | Mod: CPTII,S$GLB,, | Performed by: INTERNAL MEDICINE

## 2023-05-08 PROCEDURE — 99214 PR OFFICE/OUTPT VISIT, EST, LEVL IV, 30-39 MIN: ICD-10-PCS | Mod: S$GLB,,, | Performed by: INTERNAL MEDICINE

## 2023-05-08 PROCEDURE — 93010 RHYTHM STRIP: ICD-10-PCS | Mod: S$GLB,,, | Performed by: INTERNAL MEDICINE

## 2023-05-08 PROCEDURE — 3008F BODY MASS INDEX DOCD: CPT | Mod: CPTII,S$GLB,, | Performed by: INTERNAL MEDICINE

## 2023-05-08 PROCEDURE — 1159F MED LIST DOCD IN RCRD: CPT | Mod: CPTII,S$GLB,, | Performed by: INTERNAL MEDICINE

## 2023-05-08 PROCEDURE — 1159F PR MEDICATION LIST DOCUMENTED IN MEDICAL RECORD: ICD-10-PCS | Mod: CPTII,S$GLB,, | Performed by: INTERNAL MEDICINE

## 2023-05-08 PROCEDURE — 93010 ELECTROCARDIOGRAM REPORT: CPT | Mod: S$GLB,,, | Performed by: INTERNAL MEDICINE

## 2023-05-08 PROCEDURE — 99999 PR PBB SHADOW E&M-EST. PATIENT-LVL III: CPT | Mod: PBBFAC,,, | Performed by: INTERNAL MEDICINE

## 2023-05-08 PROCEDURE — 3079F DIAST BP 80-89 MM HG: CPT | Mod: CPTII,S$GLB,, | Performed by: INTERNAL MEDICINE

## 2023-05-08 PROCEDURE — 99999 PR PBB SHADOW E&M-EST. PATIENT-LVL III: ICD-10-PCS | Mod: PBBFAC,,, | Performed by: INTERNAL MEDICINE

## 2023-05-08 PROCEDURE — 3079F PR MOST RECENT DIASTOLIC BLOOD PRESSURE 80-89 MM HG: ICD-10-PCS | Mod: CPTII,S$GLB,, | Performed by: INTERNAL MEDICINE

## 2023-05-08 RX ORDER — SOTALOL HYDROCHLORIDE 80 MG/1
80 TABLET ORAL 2 TIMES DAILY
Qty: 180 TABLET | Refills: 3 | Status: SHIPPED | OUTPATIENT
Start: 2023-05-08 | End: 2024-05-07

## 2023-05-22 ENCOUNTER — PATIENT MESSAGE (OUTPATIENT)
Dept: FAMILY MEDICINE | Facility: CLINIC | Age: 63
End: 2023-05-22
Payer: COMMERCIAL

## 2023-05-22 DIAGNOSIS — J45.41 MODERATE PERSISTENT ASTHMA WITH EXACERBATION: Primary | ICD-10-CM

## 2023-05-22 NOTE — TELEPHONE ENCOUNTER
Pt was offered appt at another location but she declined; states she would prefer to see Dr So, I informed her you were not in the office today and we would get back to her tomorrow if you want to override her on your schedule

## 2023-05-23 RX ORDER — PREDNISONE 20 MG/1
60 TABLET ORAL DAILY
Qty: 15 TABLET | Refills: 0 | Status: SHIPPED | OUTPATIENT
Start: 2023-05-23 | End: 2023-05-28

## 2023-05-24 ENCOUNTER — OFFICE VISIT (OUTPATIENT)
Dept: FAMILY MEDICINE | Facility: CLINIC | Age: 63
End: 2023-05-24
Payer: COMMERCIAL

## 2023-05-24 VITALS
HEART RATE: 69 BPM | OXYGEN SATURATION: 95 % | TEMPERATURE: 98 F | SYSTOLIC BLOOD PRESSURE: 122 MMHG | DIASTOLIC BLOOD PRESSURE: 70 MMHG | RESPIRATION RATE: 18 BRPM | WEIGHT: 205.94 LBS | BODY MASS INDEX: 35.16 KG/M2 | HEIGHT: 64 IN

## 2023-05-24 DIAGNOSIS — J45.41 MODERATE PERSISTENT ASTHMA WITH EXACERBATION: Primary | ICD-10-CM

## 2023-05-24 PROCEDURE — 3078F PR MOST RECENT DIASTOLIC BLOOD PRESSURE < 80 MM HG: ICD-10-PCS | Mod: CPTII,S$GLB,, | Performed by: FAMILY MEDICINE

## 2023-05-24 PROCEDURE — 99999 PR PBB SHADOW E&M-EST. PATIENT-LVL V: CPT | Mod: PBBFAC,,, | Performed by: FAMILY MEDICINE

## 2023-05-24 PROCEDURE — 99214 PR OFFICE/OUTPT VISIT, EST, LEVL IV, 30-39 MIN: ICD-10-PCS | Mod: 25,S$GLB,, | Performed by: FAMILY MEDICINE

## 2023-05-24 PROCEDURE — 3078F DIAST BP <80 MM HG: CPT | Mod: CPTII,S$GLB,, | Performed by: FAMILY MEDICINE

## 2023-05-24 PROCEDURE — 3074F PR MOST RECENT SYSTOLIC BLOOD PRESSURE < 130 MM HG: ICD-10-PCS | Mod: CPTII,S$GLB,, | Performed by: FAMILY MEDICINE

## 2023-05-24 PROCEDURE — 96372 PR INJECTION,THERAP/PROPH/DIAG2ST, IM OR SUBCUT: ICD-10-PCS | Mod: S$GLB,,, | Performed by: FAMILY MEDICINE

## 2023-05-24 PROCEDURE — 1159F PR MEDICATION LIST DOCUMENTED IN MEDICAL RECORD: ICD-10-PCS | Mod: CPTII,S$GLB,, | Performed by: FAMILY MEDICINE

## 2023-05-24 PROCEDURE — 3008F PR BODY MASS INDEX (BMI) DOCUMENTED: ICD-10-PCS | Mod: CPTII,S$GLB,, | Performed by: FAMILY MEDICINE

## 2023-05-24 PROCEDURE — 1159F MED LIST DOCD IN RCRD: CPT | Mod: CPTII,S$GLB,, | Performed by: FAMILY MEDICINE

## 2023-05-24 PROCEDURE — 96372 THER/PROPH/DIAG INJ SC/IM: CPT | Mod: S$GLB,,, | Performed by: FAMILY MEDICINE

## 2023-05-24 PROCEDURE — 99999 PR PBB SHADOW E&M-EST. PATIENT-LVL V: ICD-10-PCS | Mod: PBBFAC,,, | Performed by: FAMILY MEDICINE

## 2023-05-24 PROCEDURE — 3074F SYST BP LT 130 MM HG: CPT | Mod: CPTII,S$GLB,, | Performed by: FAMILY MEDICINE

## 2023-05-24 PROCEDURE — 3008F BODY MASS INDEX DOCD: CPT | Mod: CPTII,S$GLB,, | Performed by: FAMILY MEDICINE

## 2023-05-24 PROCEDURE — 99214 OFFICE O/P EST MOD 30 MIN: CPT | Mod: 25,S$GLB,, | Performed by: FAMILY MEDICINE

## 2023-05-24 RX ORDER — PROMETHAZINE HYDROCHLORIDE AND DEXTROMETHORPHAN HYDROBROMIDE 6.25; 15 MG/5ML; MG/5ML
5 SYRUP ORAL NIGHTLY PRN
Qty: 118 ML | Refills: 0 | Status: SHIPPED | OUTPATIENT
Start: 2023-05-24 | End: 2023-06-03

## 2023-05-24 RX ORDER — METHYLPREDNISOLONE SODIUM SUCCINATE 125 MG/2ML
125 INJECTION INTRAMUSCULAR; INTRAVENOUS
Status: COMPLETED | OUTPATIENT
Start: 2023-05-24 | End: 2023-05-24

## 2023-05-24 RX ORDER — MONTELUKAST SODIUM 10 MG/1
10 TABLET ORAL NIGHTLY
Qty: 90 TABLET | Refills: 3 | Status: SHIPPED | OUTPATIENT
Start: 2023-05-24 | End: 2023-06-23

## 2023-05-24 RX ORDER — AMOXICILLIN AND CLAVULANATE POTASSIUM 875; 125 MG/1; MG/1
1 TABLET, FILM COATED ORAL EVERY 12 HOURS
Qty: 14 TABLET | Refills: 0 | Status: SHIPPED | OUTPATIENT
Start: 2023-05-24 | End: 2023-05-31

## 2023-05-24 RX ORDER — FLUCONAZOLE 150 MG/1
150 TABLET ORAL DAILY
Qty: 1 TABLET | Refills: 0 | Status: SHIPPED | OUTPATIENT
Start: 2023-05-24 | End: 2023-05-25

## 2023-05-24 RX ORDER — AZITHROMYCIN 250 MG/1
TABLET, FILM COATED ORAL
Qty: 6 TABLET | Refills: 0 | Status: SHIPPED | OUTPATIENT
Start: 2023-05-24 | End: 2023-05-24

## 2023-05-24 RX ADMIN — METHYLPREDNISOLONE SODIUM SUCCINATE 125 MG: 125 INJECTION INTRAMUSCULAR; INTRAVENOUS at 11:05

## 2023-05-24 NOTE — PROGRESS NOTES
Health Maintenance Due   Topic     Pneumococcal Vaccines (Age 0-64) (1 - PCV)     TETANUS VACCINE  Consult pcp    Hemoglobin A1c (Diabetic Prevention Screening)  Consult pcp    Mammogram  Pending order    Colorectal Cancer Screening  Pending order

## 2023-05-25 NOTE — PROGRESS NOTES
Subjective:       Patient ID: Francoise Dykes is a 62 y.o. female.    Chief Complaint: Asthma      Asthma  She complains of shortness of breath and wheezing. Her past medical history is significant for asthma.   62-year-old female presents for asthma follow-up.  Feels she has worsening shortness of breath and wheezing.  Started about a week ago.  Has not started steroid that was sent yesterday.  Denies any fever chills.  Patient feels she is allergic something that triggers her asthma.  Last flare up was about 2 months ago.        Review of Systems   Constitutional: Negative.    HENT: Negative.     Respiratory:  Positive for shortness of breath and wheezing.    Cardiovascular: Negative.    Gastrointestinal: Negative.    Endocrine: Negative.    Genitourinary: Negative.    Musculoskeletal: Negative.    Neurological: Negative.    Psychiatric/Behavioral: Negative.          Past Medical History:   Diagnosis Date    Acid reflux     Allergy     seasonal    Asthma with acute exacerbation     Atrial fibrillation     Essential hypertension 11/20/2017    Pt states that she does not have HTN.      Past Surgical History:   Procedure Laterality Date    24 HOUR IMPEDANCE PH MONITORING OF ESOPHAGUS IN PATIENT TAKING ACID REDUCING MEDICATIONS N/A 09/20/2021    Procedure: IMPEDANCE PH STUDY, ESOPHAGEAL, 24 HOUR, IN PATIENT TAKING ACID REDUCING MEDICATION;  Surgeon: Franky Gomez MD;  Location: [facility] ENDO ([address] [place] [address]);  Service: Endoscopy;  Laterality: N/A;  Patient to do EGD with with esophageal Bravo pH probe on omeprazole 40 mg once daily she needs to take it with a sip of water the day of the case  pt completed COVID vaccine- see Immunization record in     ABLATION OF ARRHYTHMOGENIC FOCUS FOR ATRIAL FIBRILLATION N/A 02/03/2023    Procedure: ABLATION, ARRHYTHMOGENIC FOCUS, FOR ATRIAL FIBRILLATION;  Surgeon: Lee Meyers MD;  Location: [facility] EP LAB;  Service: Cardiology;  Laterality: N/A;  AF, SAMMIE( Cx if SR), Redo PVI, RFA,  CARTO, GEN, GP, 3 PREP    ABLATION, ATRIAL FLUTTER, TYPICAL  2023    Procedure: Ablation, Atrial Flutter, Typical;  Surgeon: Lee Meyers MD;  Location: [facility] EP LAB;  Service: Cardiology;;    ABLATION, ATRIAL TACHYCARDIA  2023    Procedure: Ablation, Atrial Tachycardia;  Surgeon: Lee Meyers MD;  Location: [facility] EP LAB;  Service: Cardiology;;    CARDIAC ELECTROPHYSIOLOGY STUDY AND ABLATION      CARDIAC ELECTROPHYSIOLOGY STUDY AND ABLATION       SECTION      COLONOSCOPY N/A 2018    Procedure: COLONOSCOPY;  Surgeon: Brodie Lara MD;  Location: [facility] ENDO;  Service: Endoscopy;  Laterality: N/A;  confirmed appt-ss    ESOPHAGEAL MANOMETRY WITH MEASUREMENT OF IMPEDANCE N/A 2021    Procedure: MANOMETRY, ESOPHAGUS, WITH IMPEDANCE MEASUREMENT;  Surgeon: Franky Gomez MD;  Location: [facility] (4TH FLR);  Service: Endoscopy;  Laterality: N/A;  Covid test  [place]   pt confirmed appt-KPvt    ESOPHAGOGASTRODUODENOSCOPY N/A 2021    Procedure: EGD (ESOPHAGOGASTRODUODENOSCOPY);  Surgeon: Alin Camargo MD;  Location: [facility] (2ND FLR);  Service: Endoscopy;  Laterality: N/A;  Fully vaccinated 21, ok to hold Eliquis x 2 days per Dr. ANGELLA Meyers, instr portal -ml  12/10/21 LVM to see if patient can come in earlier -ml    HYSTERECTOMY      MIGUEL    RADIOFREQUENCY ABLATION Left 2019    Procedure: RADIOFREQUENCY ABLATION, LEFT L2,3,4,5;  Surgeon: Mariusz Jang MD;  Location: [facility] PAIN MGT;  Service: Pain Management;  Laterality: Left;  Left RFA L2,3,4,5  1 of 2  *Ok to hold Eliquis, per Dr Meyers    RADIOFREQUENCY ABLATION Right 2019    Procedure: RADIOFREQUENCY ABLATION,  Right L2,3,4,5;  Surgeon: Mariusz Jang MD;  Location: [facility] PAIN MGT;  Service: Pain Management;  Laterality: Right;  Right RFA @ L2,3,4,5  2 of 2     Family History   Problem Relation Age of Onset    Hypertension Mother     Diabetes Mother     Glaucoma Mother     Allergies  Mother     Asbestos Father     Obesity Father     Eczema Son     Hypertension Son     Heart disease Maternal Grandmother         chf    Diabetes Maternal Grandfather     Cancer Paternal Grandmother         lung, 2/2 second hand smoke    Glaucoma Paternal Grandfather     Blindness Paternal Grandfather     Melanoma Neg Hx     Psoriasis Neg Hx     Lupus Neg Hx     Acne Neg Hx     Breast cancer Neg Hx     Colon cancer Neg Hx     Ovarian cancer Neg Hx     Esophageal cancer Neg Hx     Cirrhosis Neg Hx     Celiac disease Neg Hx     Colon polyps Neg Hx     Crohn's disease Neg Hx     Liver cancer Neg Hx     Liver disease Neg Hx     Rectal cancer Neg Hx     Stomach cancer Neg Hx     Ulcerative colitis Neg Hx     Pancreatic cancer Neg Hx     Kidney cancer Neg Hx     Bladder Cancer Neg Hx     Uterine cancer Neg Hx      Social History     Socioeconomic History    Marital status:    Occupational History    Occupation: Teacher     Employer: [employer]    Tobacco Use    Smoking status: Never    Smokeless tobacco: Never   Substance and Sexual Activity    Alcohol use: Yes     Comment: rarely, 1 drink/week    Drug use: No    Sexual activity: Yes     Partners: Male   Other Topics Concern    Are you pregnant or think you may be? No    Breast-feeding No   Social History Narrative    Recently moved back to [facility] to help take care of mom.    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 Rescue, Disp: 18 g, Rfl: 11    apixaban (ELIQUIS) 5 mg Tab, Take 1 tablet (5 mg total) by mouth 2 (two) times daily., Disp: 180 tablet, Rfl: 3    cetirizine (ZYRTEC) 10 MG tablet,  Take 1 tablet (10 mg total) by mouth once daily., Disp: 90 tablet, Rfl: 3    diltiaZEM (DILACOR XR) 120 MG CDCR, Take 1 capsule (120 mg total) by mouth once daily., Disp: 90 capsule, Rfl: 3    famotidine (PEPCID) 20 MG tablet, Take 1 tablet (20 mg total) by mouth 2 (two) times daily. for 7 days, Disp: 14 tablet, Rfl: 0    fluticasone furoate-vilanteroL (BREO ELLIPTA) 200-25 mcg/dose DsDv diskus inhaler, Inhale 1 puff into the lungs once daily. Controller, Disp: 60 each, Rfl: 5    fluticasone propionate (FLONASE) 50 mcg/actuation nasal spray, 1 spray (50 mcg total) by Each Nostril route daily as needed for Rhinitis., Disp: 16 g, Rfl: 2    hydroCHLOROthiazide (HYDRODIURIL) 12.5 MG Tab, Take 1 tablet (12.5 mg total) by mouth once daily., Disp: 90 tablet, Rfl: 3    omeprazole (PRILOSEC) 40 MG capsule, Take 1 capsule (40 mg total) by mouth 2 (two) times daily before meals., Disp: 180 capsule, Rfl: 3    ondansetron (ZOFRAN-ODT) 4 MG TbDL, Take 1 tablet (4 mg total) by mouth every 8 (eight) hours as needed., Disp: 25 tablet, Rfl: 0    predniSONE (DELTASONE) 20 MG tablet, Take 3 tablets (60 mg total) by mouth once daily. for 5 days, Disp: 15 tablet, Rfl: 0    sotaloL (BETAPACE) 80 MG tablet, Take 1 tablet (80 mg total) by mouth 2 (two) times daily., Disp: 180 tablet, Rfl: 3    topiramate (TOPAMAX) 25 MG tablet, Take 1-2 tablets (25-50 mg total) by mouth every evening., Disp: 180 tablet, Rfl: 1    triamcinolone acetonide 0.1% (KENALOG) 0.1 % cream, Apply topically 2 (two) times daily. (Patient taking differently: Apply topically 2 (two) times daily. PRN), Disp: 453.6 g, Rfl: 2    amoxicillin-clavulanate 875-125mg (AUGMENTIN) 875-125 mg per tablet, Take 1 tablet by mouth every 12 (twelve) hours. for 7 days, Disp: 14 tablet, Rfl: 0    fluconazole (DIFLUCAN) 150 MG Tab, Take 1 tablet (150 mg total) by mouth once daily. for 1 day, Disp: 1 tablet, Rfl: 0    montelukast (SINGULAIR) 10 mg tablet, Take 1 tablet (10 mg total) by  "mouth every evening., Disp: 90 tablet, Rfl: 3    promethazine-dextromethorphan (PROMETHAZINE-DM) 6.25-15 mg/5 mL Syrp, Take 5 mLs by mouth nightly as needed., Disp: 118 mL, Rfl: 0  No current facility-administered medications for this visit.   Objective:      Vitals:    05/24/23 1039   BP: 122/70   BP Location: Left arm   Patient Position: Sitting   BP Method: Small (Manual)   Pulse: 69   Resp: 18   Temp: 98.1 °F (36.7 °C)   TempSrc: Oral   SpO2: 95%   Weight: 93.4 kg (205 lb 14.6 oz)   Height: 5' 4" (1.626 m)       Physical Exam  Constitutional:       General: She is not in acute distress.  HENT:      Head: Normocephalic and atraumatic.   Eyes:      Conjunctiva/sclera: Conjunctivae normal.   Cardiovascular:      Rate and Rhythm: Normal rate and regular rhythm.      Heart sounds: Normal heart sounds. No murmur heard.    No friction rub. No gallop.   Pulmonary:      Breath sounds: Wheezing and rhonchi present. No rales.   Musculoskeletal:      Cervical back: Neck supple.   Skin:     General: Skin is warm and dry.   Neurological:      Mental Status: She is alert and oriented to person, place, and time.   Psychiatric:         Behavior: Behavior normal.         Thought Content: Thought content normal.         Judgment: Judgment normal.          Assessment:       1. Moderate persistent asthma with exacerbation        Plan:       Moderate persistent asthma with exacerbation  -     Discontinue: azithromycin (Z-ROMI) 250 MG tablet; Take 2 tablets by mouth on day 1; Take 1 tablet by mouth on days 2-5  Dispense: 6 tablet; Refill: 0  -     methylPREDNISolone sodium succinate injection 125 mg  -     promethazine-dextromethorphan (PROMETHAZINE-DM) 6.25-15 mg/5 mL Syrp; Take 5 mLs by mouth nightly as needed.  Dispense: 118 mL; Refill: 0  -     Ambulatory referral/consult to Pulmonology; Future; Expected date: 05/31/2023  -     amoxicillin-clavulanate 875-125mg (AUGMENTIN) 875-125 mg per tablet; Take 1 tablet by mouth every 12 " (twelve) hours. for 7 days  Dispense: 14 tablet; Refill: 0  -     montelukast (SINGULAIR) 10 mg tablet; Take 1 tablet (10 mg total) by mouth every evening.  Dispense: 90 tablet; Refill: 3    Other orders  -     fluconazole (DIFLUCAN) 150 MG Tab; Take 1 tablet (150 mg total) by mouth once daily. for 1 day  Dispense: 1 tablet; Refill: 0      Advised pt to f/u w/ allergy        Future Appointments   Date Time Provider Department Center   7/12/2023  2:30 PM Sally Wolfe MD [facility]   7/27/2023  8:10 AM Moira Avila MD [facility]       Patient note was created using [employer].  Any errors in syntax or even information may not have been identified and edited on initial review prior to signing this note.

## 2023-06-12 NOTE — TELEPHONE ENCOUNTER
----- Message from Hailee Barton sent at 11/9/2017 10:40 AM CST -----  Contact: [facility]  Pharmacy  Prior Authorization needed for DYMISTA 137-50 mcg/spray Spry nassal spray. Send to [facility]   [address] [place] [address].  ABDULLAHI MORA [address]  Fax [phone]    Contact pt at [phone]    Thanks-  
Called pt to inform of below; no answer; LM to call office  
Prior authorization completed in covermymeds; awaiting response from insurance  
Prior authorization for dymista nasal spray denied; need to know why flunisolide or ipratropium bromide nasal sprays have not worked or would have bad side effects; fax received from [employer] placed on your desk; can call [phone] for appeal  
Pt informed of below; she would like the 2 separate nasal sprays sent in instead  
[de-identified] : 86M, RHD, PMHX of HLD, Thyroid Disease presents with bilateral thumb cmc pain since April 2022, without injury/trauma. L > R. Admits to having dupytrens contracture of the right hand (no surgery).Admits to seeing Dr. Torres and referred for definitive care. Admits to pain, difficulty with certain ROM. \par \par 06/12/23: f/u bilateral thumb pain onset 1 month ago with NKI. Reports reduced ROM due to pain, such as buttoning his shirt. Patient states that he received CSI on 12/28/22 in bilateral thumb with relief for the past 6 months. Patient doing HEP with no relief. Denies numbness/tingling.

## 2023-06-29 ENCOUNTER — PATIENT MESSAGE (OUTPATIENT)
Dept: ALLERGY | Facility: CLINIC | Age: 63
End: 2023-06-29
Payer: COMMERCIAL

## 2023-06-29 DIAGNOSIS — J45.20 ASTHMA IN ADULT, MILD INTERMITTENT, UNCOMPLICATED: Primary | ICD-10-CM

## 2023-06-29 RX ORDER — FLUTICASONE FUROATE AND VILANTEROL 200; 25 UG/1; UG/1
1 POWDER RESPIRATORY (INHALATION) DAILY
Qty: 60 EACH | Refills: 5 | Status: SHIPPED | OUTPATIENT
Start: 2023-06-29 | End: 2023-07-12

## 2023-07-12 ENCOUNTER — OFFICE VISIT (OUTPATIENT)
Dept: PULMONOLOGY | Facility: CLINIC | Age: 63
End: 2023-07-12
Payer: COMMERCIAL

## 2023-07-12 ENCOUNTER — LAB VISIT (OUTPATIENT)
Dept: LAB | Facility: HOSPITAL | Age: 63
End: 2023-07-12
Attending: INTERNAL MEDICINE
Payer: COMMERCIAL

## 2023-07-12 VITALS
HEART RATE: 76 BPM | HEIGHT: 64 IN | BODY MASS INDEX: 35.9 KG/M2 | WEIGHT: 210.31 LBS | OXYGEN SATURATION: 95 % | DIASTOLIC BLOOD PRESSURE: 76 MMHG | SYSTOLIC BLOOD PRESSURE: 130 MMHG

## 2023-07-12 DIAGNOSIS — J45.51 SEVERE PERSISTENT ASTHMA WITH EXACERBATION: ICD-10-CM

## 2023-07-12 DIAGNOSIS — J45.41 MODERATE PERSISTENT ASTHMA WITH EXACERBATION: ICD-10-CM

## 2023-07-12 LAB
BASOPHILS # BLD AUTO: 0.1 K/UL (ref 0–0.2)
BASOPHILS NFR BLD: 1.7 % (ref 0–1.9)
DIFFERENTIAL METHOD: ABNORMAL
EOSINOPHIL # BLD AUTO: 1 K/UL (ref 0–0.5)
EOSINOPHIL NFR BLD: 17.2 % (ref 0–8)
ERYTHROCYTE [DISTWIDTH] IN BLOOD BY AUTOMATED COUNT: 12.9 % (ref 11.5–14.5)
HCT VFR BLD AUTO: 39.8 % (ref 37–48.5)
HGB BLD-MCNC: 13.8 G/DL (ref 12–16)
IGE SERPL-ACNC: 80 IU/ML (ref 0–100)
IMM GRANULOCYTES # BLD AUTO: 0.05 K/UL (ref 0–0.04)
IMM GRANULOCYTES NFR BLD AUTO: 0.8 % (ref 0–0.5)
LYMPHOCYTES # BLD AUTO: 1.4 K/UL (ref 1–4.8)
LYMPHOCYTES NFR BLD: 23.3 % (ref 18–48)
MCH RBC QN AUTO: 28.4 PG (ref 27–31)
MCHC RBC AUTO-ENTMCNC: 34.7 G/DL (ref 32–36)
MCV RBC AUTO: 82 FL (ref 82–98)
MONOCYTES # BLD AUTO: 0.5 K/UL (ref 0.3–1)
MONOCYTES NFR BLD: 8 % (ref 4–15)
NEUTROPHILS # BLD AUTO: 2.9 K/UL (ref 1.8–7.7)
NEUTROPHILS NFR BLD: 49 % (ref 38–73)
NRBC BLD-RTO: 0 /100 WBC
PLATELET # BLD AUTO: 235 K/UL (ref 150–450)
PMV BLD AUTO: 11.1 FL (ref 9.2–12.9)
RBC # BLD AUTO: 4.86 M/UL (ref 4–5.4)
WBC # BLD AUTO: 6 K/UL (ref 3.9–12.7)

## 2023-07-12 PROCEDURE — 99214 OFFICE O/P EST MOD 30 MIN: CPT | Mod: S$GLB,,, | Performed by: INTERNAL MEDICINE

## 2023-07-12 PROCEDURE — 3008F PR BODY MASS INDEX (BMI) DOCUMENTED: ICD-10-PCS | Mod: CPTII,S$GLB,, | Performed by: INTERNAL MEDICINE

## 2023-07-12 PROCEDURE — 1160F RVW MEDS BY RX/DR IN RCRD: CPT | Mod: CPTII,S$GLB,, | Performed by: INTERNAL MEDICINE

## 2023-07-12 PROCEDURE — 3075F PR MOST RECENT SYSTOLIC BLOOD PRESS GE 130-139MM HG: ICD-10-PCS | Mod: CPTII,S$GLB,, | Performed by: INTERNAL MEDICINE

## 2023-07-12 PROCEDURE — 85025 COMPLETE CBC W/AUTO DIFF WBC: CPT | Performed by: INTERNAL MEDICINE

## 2023-07-12 PROCEDURE — 1159F MED LIST DOCD IN RCRD: CPT | Mod: CPTII,S$GLB,, | Performed by: INTERNAL MEDICINE

## 2023-07-12 PROCEDURE — 3075F SYST BP GE 130 - 139MM HG: CPT | Mod: CPTII,S$GLB,, | Performed by: INTERNAL MEDICINE

## 2023-07-12 PROCEDURE — 3078F PR MOST RECENT DIASTOLIC BLOOD PRESSURE < 80 MM HG: ICD-10-PCS | Mod: CPTII,S$GLB,, | Performed by: INTERNAL MEDICINE

## 2023-07-12 PROCEDURE — 99214 PR OFFICE/OUTPT VISIT, EST, LEVL IV, 30-39 MIN: ICD-10-PCS | Mod: S$GLB,,, | Performed by: INTERNAL MEDICINE

## 2023-07-12 PROCEDURE — 1159F PR MEDICATION LIST DOCUMENTED IN MEDICAL RECORD: ICD-10-PCS | Mod: CPTII,S$GLB,, | Performed by: INTERNAL MEDICINE

## 2023-07-12 PROCEDURE — 99999 PR PBB SHADOW E&M-EST. PATIENT-LVL V: ICD-10-PCS | Mod: PBBFAC,,, | Performed by: INTERNAL MEDICINE

## 2023-07-12 PROCEDURE — 36415 COLL VENOUS BLD VENIPUNCTURE: CPT | Performed by: INTERNAL MEDICINE

## 2023-07-12 PROCEDURE — 99999 PR PBB SHADOW E&M-EST. PATIENT-LVL V: CPT | Mod: PBBFAC,,, | Performed by: INTERNAL MEDICINE

## 2023-07-12 PROCEDURE — 3078F DIAST BP <80 MM HG: CPT | Mod: CPTII,S$GLB,, | Performed by: INTERNAL MEDICINE

## 2023-07-12 PROCEDURE — 3008F BODY MASS INDEX DOCD: CPT | Mod: CPTII,S$GLB,, | Performed by: INTERNAL MEDICINE

## 2023-07-12 PROCEDURE — 82785 ASSAY OF IGE: CPT | Performed by: INTERNAL MEDICINE

## 2023-07-12 PROCEDURE — 1160F PR REVIEW ALL MEDS BY PRESCRIBER/CLIN PHARMACIST DOCUMENTED: ICD-10-PCS | Mod: CPTII,S$GLB,, | Performed by: INTERNAL MEDICINE

## 2023-07-12 RX ORDER — ALBUTEROL SULFATE 0.83 MG/ML
2.5 SOLUTION RESPIRATORY (INHALATION) EVERY 6 HOURS PRN
Qty: 3 ML | Refills: 5 | Status: SHIPPED | OUTPATIENT
Start: 2023-07-12

## 2023-07-12 RX ORDER — FLUTICASONE FUROATE, UMECLIDINIUM BROMIDE AND VILANTEROL TRIFENATATE 200; 62.5; 25 UG/1; UG/1; UG/1
1 POWDER RESPIRATORY (INHALATION) DAILY
Qty: 3 EACH | Refills: 4 | Status: SHIPPED | OUTPATIENT
Start: 2023-07-12 | End: 2023-11-01

## 2023-07-12 RX ORDER — ALBUTEROL SULFATE 90 UG/1
2 AEROSOL, METERED RESPIRATORY (INHALATION) EVERY 6 HOURS PRN
Qty: 18 G | Refills: 11 | Status: SHIPPED | OUTPATIENT
Start: 2023-07-12 | End: 2023-07-27

## 2023-07-12 RX ORDER — AZELASTINE 1 MG/ML
1 SPRAY, METERED NASAL 2 TIMES DAILY
Qty: 90 ML | Refills: 3 | Status: SHIPPED | OUTPATIENT
Start: 2023-07-12 | End: 2024-07-11

## 2023-07-12 RX ORDER — FLUTICASONE PROPIONATE 50 MCG
1 SPRAY, SUSPENSION (ML) NASAL DAILY PRN
Qty: 48 G | Refills: 3 | Status: SHIPPED | OUTPATIENT
Start: 2023-07-12 | End: 2023-11-01

## 2023-07-12 NOTE — PROGRESS NOTES
Subjective:       Patient ID: Francoise Dykes is a 62 y.o. female.    Chief Complaint: No chief complaint on file.    HPI:   Francoise Dykes is a 62 y.o. female who presents to establish care for asthma.     Last seen by Dr. Clayton in 2021.    She is currently taking Breo, albuterol, and nebs.   +sinus issues: taking singulair, flonase and astelin.    Diagnosed with asthma as an adult.   For the last two years her asthma has been uncontrolled.   Using her rescue inhaler 4x/day  At least 5 courses of steroids in the last few months.   Symptoms rome with prednisone.    Never smoker.  No known family history  Works at a school.     Review of Systems   HENT:  Positive for postnasal drip and congestion.    Respiratory:  Positive for cough, sputum production, wheezing and asthma nighttime symptoms. Negative for chest tightness.    Skin:  Positive for rash.   Gastrointestinal:  Positive for acid reflux.       Social History     Tobacco Use    Smoking status: Never    Smokeless tobacco: Never   Substance Use Topics    Alcohol use: Yes     Comment: rarely, 1 drink/week       Review of patient's allergies indicates:   Allergen Reactions    Iodinated contrast media Anaphylaxis     Itchy throat, SOB, hives    Adhesive tape-silicones Itching     Manifested in 12/2015 following AF ablation.     Past Medical History:   Diagnosis Date    Acid reflux     Allergy     seasonal    Asthma with acute exacerbation     Atrial fibrillation     Essential hypertension 11/20/2017    Pt states that she does not have HTN.      Past Surgical History:   Procedure Laterality Date    24 HOUR IMPEDANCE PH MONITORING OF ESOPHAGUS IN PATIENT TAKING ACID REDUCING MEDICATIONS N/A 09/20/2021    Procedure: IMPEDANCE PH STUDY, ESOPHAGEAL, 24 HOUR, IN PATIENT TAKING ACID REDUCING MEDICATION;  Surgeon: Franky Gomez MD;  Location: [facility] ([address] [place] [address];  Service: Endoscopy;  Laterality: N/A;  Patient to do EGD with with esophageal Bravo pH probe on  omeprazole 40 mg once daily she needs to take it with a sip of water the day of the case  pt completed COVID vaccine- see Immunization record in     ABLATION OF ARRHYTHMOGENIC FOCUS FOR ATRIAL FIBRILLATION N/A 2023    Procedure: ABLATION, ARRHYTHMOGENIC FOCUS, FOR ATRIAL FIBRILLATION;  Surgeon: Lee Meyers MD;  Location: [facility] EP LAB;  Service: Cardiology;  Laterality: N/A;  AF, SAMMIE( Cx if SR), Redo PVI, RFA, CARTO, GEN, GP, 3 PREP    ABLATION, ATRIAL FLUTTER, TYPICAL  2023    Procedure: Ablation, Atrial Flutter, Typical;  Surgeon: Lee Meyers MD;  Location: [facility] EP LAB;  Service: Cardiology;;    ABLATION, ATRIAL TACHYCARDIA  2023    Procedure: Ablation, Atrial Tachycardia;  Surgeon: Lee Meyers MD;  Location: [facility] EP LAB;  Service: Cardiology;;    CARDIAC ELECTROPHYSIOLOGY STUDY AND ABLATION      CARDIAC ELECTROPHYSIOLOGY STUDY AND ABLATION       SECTION      COLONOSCOPY N/A 2018    Procedure: COLONOSCOPY;  Surgeon: Brodie Lara MD;  Location: [facility];  Service: Endoscopy;  Laterality: N/A;  confirmed appt-ss    ESOPHAGEAL MANOMETRY WITH MEASUREMENT OF IMPEDANCE N/A 2021    Procedure: MANOMETRY, ESOPHAGUS, WITH IMPEDANCE MEASUREMENT;  Surgeon: Franky Gomez MD;  Location: [facility] (4TH FLR);  Service: Endoscopy;  Laterality: N/A;  [place]  [facility]   [place]    ESOPHAGOGASTRODUODENOSCOPY N/A 2021    Procedure: EGD (ESOPHAGOGASTRODUODENOSCOPY);  Surgeon: Alin Camargo MD;  Location: [facility] (2ND FLR);  Service: Endoscopy;  Laterality: N/A;  Fully vaccinated 21, ok to hold Eliquis x 2 days per Dr. ANGELLA Meyers, instr portal -ml  12/10/21 LVM to see if patient can come in earlier -ml    HYSTERECTOMY      MIGUEL    RADIOFREQUENCY ABLATION Left 2019    Procedure: RADIOFREQUENCY ABLATION, LEFT L2,3,4,5;  Surgeon: Mariusz Jang MD;  Location: [facility] PAIN MGT;  Service: Pain Management;  Laterality: Left;  Left RFA  L2,3,4,5  1 of 2  *Ok to hold Eliquis, per Dr Meyers    RADIOFREQUENCY ABLATION Right 03/13/2019    Procedure: RADIOFREQUENCY ABLATION,  Right L2,3,4,5;  Surgeon: Mariusz Jang MD;  Location: [facility];  Service: Pain Management;  Laterality: Right;  Right RFA @ L2,3,4,5  2 of 2     Current Outpatient Medications on File Prior to Visit   Medication Sig    albuterol (PROVENTIL) 2.5 mg /3 mL (0.083 %) nebulizer solution Take 3 mLs (2.5 mg total) by nebulization every 6 (six) hours as needed for Wheezing.    albuterol (PROVENTIL/VENTOLIN HFA) 90 mcg/actuation inhaler Inhale 2 puffs into the lungs every 6 (six) hours as needed for Wheezing or Shortness of Breath. Rescue    apixaban (ELIQUIS) 5 mg Tab Take 1 tablet (5 mg total) by mouth 2 (two) times daily.    cetirizine (ZYRTEC) 10 MG tablet Take 1 tablet (10 mg total) by mouth once daily.    diltiaZEM (DILACOR XR) 120 MG CDCR Take 1 capsule (120 mg total) by mouth once daily.    famotidine (PEPCID) 20 MG tablet Take 1 tablet (20 mg total) by mouth 2 (two) times daily. for 7 days    fluticasone furoate-vilanteroL (BREO ELLIPTA) 200-25 mcg/dose DsDv diskus inhaler Inhale 1 puff into the lungs once daily. Controller    fluticasone propionate (FLONASE) 50 mcg/actuation nasal spray 1 spray (50 mcg total) by Each Nostril route daily as needed for Rhinitis.    hydroCHLOROthiazide (HYDRODIURIL) 12.5 MG Tab Take 1 tablet (12.5 mg total) by mouth once daily.    omeprazole (PRILOSEC) 40 MG capsule Take 1 capsule (40 mg total) by mouth 2 (two) times daily before meals.    ondansetron (ZOFRAN-ODT) 4 MG TbDL Take 1 tablet (4 mg total) by mouth every 8 (eight) hours as needed.    sotaloL (BETAPACE) 80 MG tablet Take 1 tablet (80 mg total) by mouth 2 (two) times daily.    topiramate (TOPAMAX) 25 MG tablet Take 1-2 tablets (25-50 mg total) by mouth every evening.    triamcinolone acetonide 0.1% (KENALOG) 0.1 % cream Apply topically 2 (two) times daily. (Patient taking  "differently: Apply topically 2 (two) times daily. PRN)     No current facility-administered medications on file prior to visit.       Objective:      Vitals:    07/12/23 1450   Pulse: 76   SpO2: 95%   Weight: 95.4 kg (210 lb 5.1 oz)   Height: 5' 4" (1.626 m)     Physical Exam   Constitutional: She is oriented to person, place, and time. She appears well-developed and well-nourished. She is obese.   HENT:   Mouth/Throat: Mallampati Score: IV.   Cardiovascular: Normal rate and normal heart sounds.   No murmur heard.  Pulmonary/Chest: Normal expansion and hyperinflation. She has wheezes. She has rhonchi.   Musculoskeletal:         General: No edema.   Neurological: She is alert and oriented to person, place, and time.   Skin: Skin is warm and dry.   Psychiatric: She has a normal mood and affect. Her behavior is normal.   Personal Diagnostic Review    No flowsheet data found.      Electrophysiology Procedure  1) History of PVI, now with recurrent atypical flutter.  2) LSPV, LIPV, and RIPV found to be isolated from original procedure.  3) Status-post RSPV reisolation, with entrance/exit block confirmed   (73509).  4) Status-post CTI-line, with bidirectional block confirmed ([id number]).  5) Status-post LA posterior wall isolation, with entrance/exit block   confirmed ([id number]).  6) Status-post high posterior free wall RA AT ablation (58303).  7) Isuprel infusion.    I certify that I was present for the critical steps of the procedure   including the diagnostic, surgical and/or interventional portions.     Procedure Log documented by Documenter: Hector Devlin RN and   verified by Lee Meyers MD.    Date: 2/3/2023  Time: 10:29 AM      Assessment:     Orders Placed This Encounter   Procedures    CBC auto differential     Standing Status:   Future     Number of Occurrences:   1     Standing Expiration Date:   9/9/2024    IGE     Standing Status:   Future     Number of Occurrences:   1     Standing Expiration Date:   " 9/9/2024     1. Severe persistent asthma with exacerbation        Plan:         Problem List Items Addressed This Visit          Pulmonary    Severe persistent asthma with exacerbation    Current Assessment & Plan     Start Trelegy   Continue albuterol nebs and hfa prn  Check CBC and IgE to see if she may be eligible for 'biologics'  Continue treatment of chronic rhinitis.         Relevant Medications    fluticasone-umeclidin-vilanter (TRELEGY ELLIPTA) 200-62.5-25 mcg inhaler    fluticasone propionate (FLONASE) 50 mcg/actuation nasal spray    albuterol (PROVENTIL/VENTOLIN HFA) 90 mcg/actuation inhaler    albuterol (PROVENTIL) 2.5 mg /3 mL (0.083 %) nebulizer solution    Other Relevant Orders    CBC auto differential    IGE

## 2023-07-12 NOTE — ASSESSMENT & PLAN NOTE
· Start Trelegy   · Continue albuterol nebs and hfa prn  · Check CBC and IgE to see if she may be eligible for 'biologics'  · Continue treatment of chronic rhinitis.

## 2023-07-13 DIAGNOSIS — J82.83 EOSINOPHILIC ASTHMA: Primary | ICD-10-CM

## 2023-07-13 RX ORDER — BENRALIZUMAB 30 MG/ML
30 INJECTION, SOLUTION SUBCUTANEOUS SEE ADMIN INSTRUCTIONS
Qty: 1 ML | Refills: 2 | Status: ACTIVE | OUTPATIENT
Start: 2023-07-13 | End: 2023-11-01

## 2023-07-13 RX ORDER — BENRALIZUMAB 30 MG/ML
30 INJECTION, SOLUTION SUBCUTANEOUS SEE ADMIN INSTRUCTIONS
Qty: 1 ML | Refills: 11 | Status: ACTIVE | OUTPATIENT
Start: 2023-07-13

## 2023-07-13 NOTE — PROGRESS NOTES
Patient has severe eosinophilic asthma.   Eos=1000  Patient has required multiple courses of steroids for treatment of asthma exacerbations in the last year.    Will prescribe Fasenra to optimize asthma control

## 2023-07-18 ENCOUNTER — TELEPHONE (OUTPATIENT)
Dept: PHARMACY | Facility: CLINIC | Age: 63
End: 2023-07-18
Payer: COMMERCIAL

## 2023-07-18 NOTE — TELEPHONE ENCOUNTER
Pao, this is Liam Young, clinical pharmacist with [facility] that is part of your care team.  We have begun working on your prescription that your doctor has sent us. Our next steps include:     Working with your insurance company to obtain approval for your medication  Working with you to ensure your medication is affordable     We will be calling you along the way with updates on your medication but if you have any concerns or receive information that you would like to discuss please reach us at [phone].    Welcome call outcome: Patient/caregiver reached

## 2023-07-19 ENCOUNTER — SPECIALTY PHARMACY (OUTPATIENT)
Dept: PHARMACY | Facility: CLINIC | Age: 63
End: 2023-07-19
Payer: COMMERCIAL

## 2023-07-21 ENCOUNTER — SPECIALTY PHARMACY (OUTPATIENT)
Dept: PHARMACY | Facility: CLINIC | Age: 63
End: 2023-07-21
Payer: COMMERCIAL

## 2023-07-21 DIAGNOSIS — J45.51 SEVERE PERSISTENT ASTHMA WITH EXACERBATION: Primary | ICD-10-CM

## 2023-07-21 NOTE — TELEPHONE ENCOUNTER
Incoming call for Fasenra initial. [facility] not available at this time, but patient is ok with call back Monday. 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 Routing for awareness.

## 2023-07-24 NOTE — TELEPHONE ENCOUNTER
Specialty Pharmacy - Initial Clinical Assessment    Specialty Medication Orders Linked to Encounter      Flowsheet Row Most Recent Value   Medication #1 benralizumab (FASENRA PEN) 30 mg/mL AtIn (Order#[id number], Rx#[id number])          Patient Diagnosis   J45.51 - Severe persistent asthma with exacerbation    Subjective    Francoise Dykes is a 62 y.o. female, who is followed by the specialty pharmacy service for management and education.    Recent Encounters       Date Type Provider Description    07/21/2023 Specialty Pharmacy Jenniffer Tompkins, Deysi Initial Clinical Assessment    07/19/2023 Specialty Pharmacy Liam Young, Deysi Referral Authorization            Current Outpatient Medications   Medication Sig    albuterol (PROVENTIL) 2.5 mg /3 mL (0.083 %) nebulizer solution Take 3 mLs (2.5 mg total) by nebulization every 6 (six) hours as needed for Wheezing.    albuterol (PROVENTIL/VENTOLIN HFA) 90 mcg/actuation inhaler Inhale 2 puffs into the lungs every 6 (six) hours as needed for Wheezing or Shortness of Breath. Rescue    apixaban (ELIQUIS) 5 mg Tab Take 1 tablet (5 mg total) by mouth 2 (two) times daily.    azelastine (ASTELIN) 137 mcg (0.1 %) nasal spray 1 spray (137 mcg total) by Nasal route 2 (two) times daily.    benralizumab (FASENRA PEN) 30 mg/mL AtIn Inject 30 mg into the skin As instructed. Every 28 days for the first three doses    benralizumab (FASENRA PEN) 30 mg/mL AtIn Inject 30 mg into the skin As instructed. Every 8 weeks    cetirizine (ZYRTEC) 10 MG tablet Take 1 tablet (10 mg total) by mouth once daily.    diltiaZEM (DILACOR XR) 120 MG CDCR Take 1 capsule (120 mg total) by mouth once daily.    famotidine (PEPCID) 20 MG tablet Take 1 tablet (20 mg total) by mouth 2 (two) times daily. for 7 days    fluticasone propionate (FLONASE) 50 mcg/actuation nasal spray 1 spray (50 mcg total) by Each Nostril route daily as needed for Rhinitis.    fluticasone-umeclidin-vilanter (TRELEGY ELLIPTA) 200-62.5-25  mcg inhaler Inhale 1 puff into the lungs once daily.    hydroCHLOROthiazide (HYDRODIURIL) 12.5 MG Tab Take 1 tablet (12.5 mg total) by mouth once daily.    omeprazole (PRILOSEC) 40 MG capsule Take 1 capsule (40 mg total) by mouth 2 (two) times daily before meals.    ondansetron (ZOFRAN-ODT) 4 MG TbDL Take 1 tablet (4 mg total) by mouth every 8 (eight) hours as needed.    sotaloL (BETAPACE) 80 MG tablet Take 1 tablet (80 mg total) by mouth 2 (two) times daily.    topiramate (TOPAMAX) 25 MG tablet Take 1-2 tablets (25-50 mg total) by mouth every evening.    triamcinolone acetonide 0.1% (KENALOG) 0.1 % cream Apply topically 2 (two) times daily. (Patient taking differently: Apply topically 2 (two) times daily. PRN)   Last reviewed on 7/12/2023  3:02 PM by Sally Wolfe MD    Review of patient's allergies indicates:   Allergen Reactions    Iodinated contrast media Anaphylaxis     Itchy throat, SOB, hives    Adhesive tape-silicones Itching     Manifested in 12/2015 following AF ablation.   Last reviewed on  7/12/2023 3:02 PM by Sarah Wolfe    Drug Interactions    Drug interactions evaluated: yes  Clinically relevant drug interactions identified: no  Provided the patient with educational material regarding drug interactions: not applicable         Adverse Effects    *All other systems reviewed and are negative       Assessment Questions - Documented Responses      Flowsheet Row Most Recent Value   Assessment    Medication Reconciliation completed for patient No   During the past 4 weeks, has patient missed any activities due to condition or medication? No   During the past 4 weeks, did patient have any of the following urgent care visits? None   Goals of Therapy Status Discussed (new start)   Status of the patients ability to self-administer: Is Able   All education points have been covered with patient? Yes, supplemental printed education provided   Welcome packet contents reviewed and discussed with patient?  "Yes   Assesment completed? Yes   Plan Therapy being initiated   Do you need to open a clinical intervention (i-vent)? No   Do you want to schedule first shipment? Yes   Medication #1 Assessment Info    Patient status New medication, New to [facility]   Is this medication appropriate for the patient? Yes   Is this medication effective? Yes          Refill Questions - Documented Responses      Flowsheet Row Most Recent Value   Patient Availability and HIPAA Verification    Does patient want to proceed with activity? Yes   HIPAA/medical authority confirmed? Yes   Relationship to patient of person spoken to? Self   Refill Screening Questions    When does the patient need to receive the medication? 07/26/23   Refill Delivery Questions    How will the patient receive the medication? MEDRx   When does the patient need to receive the medication? 07/26/23   Shipping Address Home   Address in [facility] confirmed and updated if neccessary? Yes   Expected Copay ($) 0   Is the patient able to afford the medication copay? Yes   Payment Method zero copay   Days supply of Refill 28   Supplies needed? No supplies needed   Refill activity completed? Yes   Refill activity plan Refill scheduled   Shipment/Pickup Date: 07/25/23            Objective    She has a past medical history of Acid reflux, Allergy, Asthma with acute exacerbation, Atrial fibrillation, and Essential hypertension (11/20/2017).    Tried/failed medications: Maximized on inhaled controller medications    BP Readings from Last 4 Encounters:   07/12/23 130/76   05/24/23 122/70   05/08/23 125/80   04/06/23 116/70     Ht Readings from Last 4 Encounters:   07/12/23 5' 4" (1.626 m)   05/24/23 5' 4" (1.626 m)   05/08/23 5' 4" (1.626 m)   04/06/23 5' 4" (1.626 m)     Wt Readings from Last 4 Encounters:   07/12/23 95.4 kg (210 lb 5.1 oz)   05/24/23 93.4 kg (205 lb 14.6 oz)   05/08/23 95.3 kg (210 lb 1.6 oz)   04/06/23 93.4 kg (206 lb)       The goals of prescribed drug " therapy management include:  Supporting patient to meet the prescriber's medical treatment objectives  Improving or maintaining quality of life  Maintaining optimal therapy adherence  Minimizing and managing side effects      Goals of Therapy Status: Discussed (new start)    Assessment/Plan  Patient plans to start therapy on 07/26/23      Indication, dosage, appropriateness, effectiveness, safety and convenience of her specialty medication(s) were reviewed today.     Patient Education   Patient received education on the following:   Expectations and possible outcomes of therapy  Proper use, timely administration, and missed dose management  Duration of therapy  Side effects, including prevention, minimization, and management  Contraindications and safety precautions  New or changed medications, including prescribe and over the counter medications and supplements  Reviews recommended vaccinations, as appropriate  Storage, safe handling, and disposal        Tasks added this encounter   No tasks added.   Tasks due within next 3 months   7/24/2023 - Initial Clinical Assessment/Patient Education (1 Time Occurence)  7/19/2023 - Set up Initial Fill     Liam Young, PharmD  [facility]  [address] [place], [place] [address]  [facility] [address]  Phone: [phone]  Fax: [phone]

## 2023-07-27 ENCOUNTER — OFFICE VISIT (OUTPATIENT)
Dept: BARIATRICS | Facility: CLINIC | Age: 63
End: 2023-07-27
Payer: COMMERCIAL

## 2023-07-27 VITALS
BODY MASS INDEX: 36.11 KG/M2 | SYSTOLIC BLOOD PRESSURE: 124 MMHG | HEART RATE: 70 BPM | WEIGHT: 211.5 LBS | HEIGHT: 64 IN | DIASTOLIC BLOOD PRESSURE: 66 MMHG | OXYGEN SATURATION: 93 %

## 2023-07-27 DIAGNOSIS — E66.01 CLASS 2 SEVERE OBESITY WITH BODY MASS INDEX (BMI) OF 35 TO 39.9 WITH SERIOUS COMORBIDITY: Primary | ICD-10-CM

## 2023-07-27 PROCEDURE — 99999 PR PBB SHADOW E&M-EST. PATIENT-LVL V: CPT | Mod: PBBFAC,,, | Performed by: INTERNAL MEDICINE

## 2023-07-27 PROCEDURE — 3008F PR BODY MASS INDEX (BMI) DOCUMENTED: ICD-10-PCS | Mod: CPTII,S$GLB,, | Performed by: INTERNAL MEDICINE

## 2023-07-27 PROCEDURE — 1159F MED LIST DOCD IN RCRD: CPT | Mod: CPTII,S$GLB,, | Performed by: INTERNAL MEDICINE

## 2023-07-27 PROCEDURE — 1160F PR REVIEW ALL MEDS BY PRESCRIBER/CLIN PHARMACIST DOCUMENTED: ICD-10-PCS | Mod: CPTII,S$GLB,, | Performed by: INTERNAL MEDICINE

## 2023-07-27 PROCEDURE — 99999 PR PBB SHADOW E&M-EST. PATIENT-LVL V: ICD-10-PCS | Mod: PBBFAC,,, | Performed by: INTERNAL MEDICINE

## 2023-07-27 PROCEDURE — 3074F PR MOST RECENT SYSTOLIC BLOOD PRESSURE < 130 MM HG: ICD-10-PCS | Mod: CPTII,S$GLB,, | Performed by: INTERNAL MEDICINE

## 2023-07-27 PROCEDURE — 1159F PR MEDICATION LIST DOCUMENTED IN MEDICAL RECORD: ICD-10-PCS | Mod: CPTII,S$GLB,, | Performed by: INTERNAL MEDICINE

## 2023-07-27 PROCEDURE — 3074F SYST BP LT 130 MM HG: CPT | Mod: CPTII,S$GLB,, | Performed by: INTERNAL MEDICINE

## 2023-07-27 PROCEDURE — 3008F BODY MASS INDEX DOCD: CPT | Mod: CPTII,S$GLB,, | Performed by: INTERNAL MEDICINE

## 2023-07-27 PROCEDURE — 3078F PR MOST RECENT DIASTOLIC BLOOD PRESSURE < 80 MM HG: ICD-10-PCS | Mod: CPTII,S$GLB,, | Performed by: INTERNAL MEDICINE

## 2023-07-27 PROCEDURE — 99214 OFFICE O/P EST MOD 30 MIN: CPT | Mod: S$GLB,,, | Performed by: INTERNAL MEDICINE

## 2023-07-27 PROCEDURE — 3078F DIAST BP <80 MM HG: CPT | Mod: CPTII,S$GLB,, | Performed by: INTERNAL MEDICINE

## 2023-07-27 PROCEDURE — 1160F RVW MEDS BY RX/DR IN RCRD: CPT | Mod: CPTII,S$GLB,, | Performed by: INTERNAL MEDICINE

## 2023-07-27 PROCEDURE — 99214 PR OFFICE/OUTPT VISIT, EST, LEVL IV, 30-39 MIN: ICD-10-PCS | Mod: S$GLB,,, | Performed by: INTERNAL MEDICINE

## 2023-07-27 RX ORDER — TOPIRAMATE 25 MG/1
25-50 TABLET ORAL NIGHTLY
Qty: 180 TABLET | Refills: 1 | Status: SHIPPED | OUTPATIENT
Start: 2023-07-27 | End: 2023-11-01 | Stop reason: SDUPTHER

## 2023-07-27 NOTE — PATIENT INSTRUCTIONS
Patient was informed that topiramate is used for migraine prevention and seizures. Weight loss is a common side effect that is well documented. S/he understands this. S/he was informed of the potential side effects such as serious and possibly fatal rash in which case the medication should be discontinued immediately. Paresthesias, forgetfulness, fatigue, kidney stones, GI symptoms, and changes in lab values such as electrolytes, blood counts and kidney function.    Start topiramate 1 tab in the evening.    Add some type of resistance training 2-3 days a week. These can be body weight exercises, light weight or elastic bands. [employer] and [employer] are great sources for free work out plans and videos.         1000 nathan daily planner from first visit.    Can use a shake 1 meal a day (ex- dinner).     Tips for preparing for hurricane season:    If you are on weight loss medications, please take your medication with you in case of evacuation. Injectable medications should be transported with an ice pack if unopened or room temperature if you have started to use them.   Hurricane supplies do not necessarily need to be junk food or high in carbs or sugar. Shelf stable and healthy choices to include in your supplies could include:  Canned/packets of tuna or chicken  Apples, oranges, banana, pears  Beef or turkey jerky  Sugar free pudding  Pickle, olives, dill relish (mix with the tuna or chicken!)  Low sodium or no salt added canned vegetables  If you get bread, get lite bread (40 calories a slice)  0 sugar sports drinks  Water  String cheese will be okay for a few days without refrigeration or in an ice chest.   Peanut or other nut butter.   Parmesan crisps  Pork skins  Protein shakes (20-30g protein, less than 5 g sugar)  Protein bars (15 or more g protein, less than 5 g sugar)  Don't forget disposable forks, spoons, plates in your supplies  If evacuated, manage stress by taking walks, reading or meditating.   If  eating out make better choices when possible.   Salads with a lean protein and limited dressing, cheese or other toppings  Grilled chicken sandwich or burger without the bun.   Skip the fries!  Order water or unsweetened tea instead of soda  Grocery stores with a deli, salad/food bar can be a good quick and affordable option to replace fast food

## 2023-07-27 NOTE — PROGRESS NOTES
"Subjective:       Patient ID: Francoise Dykes is a 62 y.o. female.    Chief Complaint: Follow-up      CC:   Pt here today for follow-up. Has lost 2.1 lbs(-4 lbs muscle. +4.7 lbs fat).  Has  progressed to 1000 nathan PB diet, and topiramate 25 mg qhs, with instructions to slowly titrate up to 50 mg as she was having  SE was trouble with word finding. She got up to the 50 mg at night but states she was getting numbness and weakness in her hands after about a month so she stopped it. States she was eating one meal a day and just fruit. Her appetite was down. Has not been exercising in past month.   She did have covid and feels she has been struggling since. Her taste and smell have been effected.   BP ok today.  Prev med hx:  ozempic, currently on 1mg weekly.  No longer covered. Denies SE.   New BMR: 1425    New PBF: 49.1%    initial  BMR: 1479    PBF:  47%    Follow-up    Review of Systems      Objective:     /66   Pulse 70   Ht 5' 4" (1.626 m)   Wt 95.9 kg (211 lb 8 oz)   LMP  (LMP Unknown)   SpO2 (!) 93%   BMI 36.30 kg/m²    Physical Exam  Vitals reviewed.   Constitutional:       General: She is not in acute distress.     Appearance: She is well-developed.      Comments: Centrally obese   HENT:      Head: Normocephalic and atraumatic.   Eyes:      General: No scleral icterus.     Pupils: Pupils are equal, round, and reactive to light.   Cardiovascular:      Rate and Rhythm: Normal rate.   Pulmonary:      Effort: Pulmonary effort is normal.   Musculoskeletal:         General: Normal range of motion.      Cervical back: Normal range of motion and neck supple.   Skin:     General: Skin is warm and dry.      Findings: No erythema.   Neurological:      Mental Status: She is alert and oriented to person, place, and time.   Psychiatric:         Behavior: Behavior normal.         Judgment: Judgment normal.       Assessment:       1. Class 2 severe obesity with body mass index (BMI) of 35 to 39.9 with serious " comorbidity                Plan:           Francoise was seen today for follow-up.    Diagnoses and all orders for this visit:    Class 2 severe obesity with body mass index (BMI) of 35 to 39.9 with serious comorbidity    Other orders  -     topiramate (TOPAMAX) 25 MG tablet; Take 1-2 tablets (25-50 mg total) by mouth every evening.              Patient was informed that topiramate is used for migraine prevention and seizures. Weight loss is a common side effect that is well documented. S/he understands this. S/he was informed of the potential side effects such as serious and possibly fatal rash in which case the medication should be discontinued immediately. Paresthesias, forgetfulness, fatigue, kidney stones, GI symptoms, and changes in lab values such as electrolytes, blood counts and kidney function.    Start topiramate 1 tab in the evening.    Add some type of resistance training 2-3 days a week. These can be body weight exercises, light weight or elastic bands. [employer] and [employer] are great sources for free work out plans and videos.         1000 nathan daily planner from first visit.    Can use a shake 1 meal a day (ex- dinner).     Hurricane tips given.

## 2023-08-14 ENCOUNTER — PATIENT MESSAGE (OUTPATIENT)
Dept: FAMILY MEDICINE | Facility: CLINIC | Age: 63
End: 2023-08-14
Payer: COMMERCIAL

## 2023-08-14 DIAGNOSIS — L30.9 ECZEMA, UNSPECIFIED TYPE: Primary | ICD-10-CM

## 2023-08-14 DIAGNOSIS — Z12.31 ENCOUNTER FOR SCREENING MAMMOGRAM FOR BREAST CANCER: Primary | ICD-10-CM

## 2023-08-15 ENCOUNTER — SPECIALTY PHARMACY (OUTPATIENT)
Dept: PHARMACY | Facility: CLINIC | Age: 63
End: 2023-08-15
Payer: COMMERCIAL

## 2023-08-15 ENCOUNTER — PATIENT MESSAGE (OUTPATIENT)
Dept: BARIATRICS | Facility: CLINIC | Age: 63
End: 2023-08-15
Payer: COMMERCIAL

## 2023-08-15 NOTE — TELEPHONE ENCOUNTER
Specialty Pharmacy - Refill Coordination    Specialty Medication Orders Linked to Encounter      Flowsheet Row Most Recent Value   Medication #1 benralizumab (FASENRA PEN) 30 mg/mL AtIn (Order#[id number], Rx#[id number])            Refill Questions - Documented Responses      Flowsheet Row Most Recent Value   Patient Availability and HIPAA Verification    Does patient want to proceed with activity? Yes   HIPAA/medical authority confirmed? Yes   Relationship to patient of person spoken to? Self   Refill Screening Questions    Changes to allergies? No   Changes to medications? No   New conditions since last clinic visit? No   Unplanned office visit, urgent care, ED, or hospital admission in the last 4 weeks? No   How does patient/caregiver feel medication is working? Good   Financial problems or insurance changes? No   How many doses of your specialty medications were missed in the last 4 weeks? 0   Would patient like to speak to a pharmacist? No   When does the patient need to receive the medication? 08/22/23   Refill Delivery Questions    How will the patient receive the medication? MEDRx   When does the patient need to receive the medication? 08/22/23   Shipping Address Home   Address in [facility] confirmed and updated if neccessary? Yes   Expected Copay ($) 0   Is the patient able to afford the medication copay? Yes   Payment Method zero copay   Days supply of Refill 28   Supplies needed? No supplies needed   Refill activity completed? Yes   Refill activity plan Refill scheduled   Shipment/Pickup Date: 08/17/23            Current Outpatient Medications   Medication Sig    albuterol (PROVENTIL) 2.5 mg /3 mL (0.083 %) nebulizer solution Take 3 mLs (2.5 mg total) by nebulization every 6 (six) hours as needed for Wheezing.    apixaban (ELIQUIS) 5 mg Tab Take 1 tablet (5 mg total) by mouth 2 (two) times daily.    azelastine (ASTELIN) 137 mcg (0.1 %) nasal spray 1 spray (137 mcg total) by Nasal route 2 (two)  times daily.    benralizumab (FASENRA PEN) 30 mg/mL AtIn Inject 30 mg into the skin As instructed. Every 28 days for the first three doses    benralizumab (FASENRA PEN) 30 mg/mL AtIn Inject 30 mg into the skin As instructed. Every 8 weeks    cetirizine (ZYRTEC) 10 MG tablet Take 1 tablet (10 mg total) by mouth once daily.    diltiaZEM (DILACOR XR) 120 MG CDCR Take 1 capsule (120 mg total) by mouth once daily.    famotidine (PEPCID) 20 MG tablet Take 1 tablet (20 mg total) by mouth 2 (two) times daily. for 7 days    fluticasone propionate (FLONASE) 50 mcg/actuation nasal spray 1 spray (50 mcg total) by Each Nostril route daily as needed for Rhinitis.    fluticasone-umeclidin-vilanter (TRELEGY ELLIPTA) 200-62.5-25 mcg inhaler Inhale 1 puff into the lungs once daily.    hydroCHLOROthiazide (HYDRODIURIL) 12.5 MG Tab Take 1 tablet (12.5 mg total) by mouth once daily.    omeprazole (PRILOSEC) 40 MG capsule Take 1 capsule (40 mg total) by mouth 2 (two) times daily before meals.    ondansetron (ZOFRAN-ODT) 4 MG TbDL Take 1 tablet (4 mg total) by mouth every 8 (eight) hours as needed.    sotaloL (BETAPACE) 80 MG tablet Take 1 tablet (80 mg total) by mouth 2 (two) times daily.    topiramate (TOPAMAX) 25 MG tablet Take 1-2 tablets (25-50 mg total) by mouth every evening.    triamcinolone acetonide 0.1% (KENALOG) 0.1 % cream Apply topically 2 (two) times daily. (Patient taking differently: Apply topically 2 (two) times daily. PRN)   Last reviewed on 7/27/2023  8:19 AM by Moira Avila MD    Review of patient's allergies indicates:   Allergen Reactions    Iodinated contrast media Anaphylaxis     Itchy throat, SOB, hives    Adhesive tape-silicones Itching     Manifested in 12/2015 following AF ablation.    Last reviewed on  7/27/2023 8:19 AM by Moira Austin      Tasks added this encounter   No tasks added.   Tasks due within next 3 months   8/15/2023 - Refill Coordination Outreach (1 time occurrence)     Brit  [facility]  [address]  [facility] [address]  Phone: [phone]  Fax: [phone]

## 2023-08-15 NOTE — TELEPHONE ENCOUNTER
Outgoing call to pt regarding refill for fasenra; pt states she has eczema on lower legs that's really itchy and would like to speak with her rph about it; she also states the injection made her really cold right after administering it; transferring pt to [facility]

## 2023-08-17 RX ORDER — METHYLPREDNISOLONE 4 MG/1
TABLET ORAL
Qty: 1 EACH | Refills: 0 | Status: SHIPPED | OUTPATIENT
Start: 2023-08-17 | End: 2023-09-07

## 2023-09-13 ENCOUNTER — HOSPITAL ENCOUNTER (OUTPATIENT)
Dept: RADIOLOGY | Facility: OTHER | Age: 63
Discharge: HOME | End: 2023-09-13
Attending: FAMILY MEDICINE
Payer: COMMERCIAL

## 2023-09-13 DIAGNOSIS — Z12.31 ENCOUNTER FOR SCREENING MAMMOGRAM FOR BREAST CANCER: ICD-10-CM

## 2023-09-13 PROCEDURE — 77067 SCR MAMMO BI INCL CAD: CPT | Mod: 26,,, | Performed by: RADIOLOGY

## 2023-09-13 PROCEDURE — 77067 SCR MAMMO BI INCL CAD: CPT | Mod: TC

## 2023-09-13 PROCEDURE — 77063 BREAST TOMOSYNTHESIS BI: CPT | Mod: 26,,, | Performed by: RADIOLOGY

## 2023-09-13 PROCEDURE — 77063 MAMMO DIGITAL SCREENING BILAT WITH TOMO: ICD-10-PCS | Mod: 26,,, | Performed by: RADIOLOGY

## 2023-09-13 PROCEDURE — 77067 MAMMO DIGITAL SCREENING BILAT WITH TOMO: ICD-10-PCS | Mod: 26,,, | Performed by: RADIOLOGY

## 2023-09-27 ENCOUNTER — PATIENT MESSAGE (OUTPATIENT)
Dept: FAMILY MEDICINE | Facility: CLINIC | Age: 63
End: 2023-09-27
Payer: COMMERCIAL

## 2023-09-27 ENCOUNTER — TELEPHONE (OUTPATIENT)
Dept: GASTROENTEROLOGY | Facility: CLINIC | Age: 63
End: 2023-09-27
Payer: COMMERCIAL

## 2023-09-27 ENCOUNTER — TELEPHONE (OUTPATIENT)
Dept: FAMILY MEDICINE | Facility: CLINIC | Age: 63
End: 2023-09-27
Payer: COMMERCIAL

## 2023-09-27 DIAGNOSIS — L30.9 ECZEMA, UNSPECIFIED TYPE: Primary | ICD-10-CM

## 2023-09-27 DIAGNOSIS — Z12.11 COLON CANCER SCREENING: Primary | ICD-10-CM

## 2023-09-27 NOTE — TELEPHONE ENCOUNTER
----- Message from Zohra Vega MA sent at 9/27/2023  2:35 PM CDT -----  Regarding: Colonoscopy Needed  Good afternoon. Patient had a previous colonoscopy by you guys and needs to schedule another. Can someone please reach out to her and get her situated?    Thank you,  Zohra Vega

## 2023-09-29 ENCOUNTER — TELEPHONE (OUTPATIENT)
Dept: FAMILY MEDICINE | Facility: CLINIC | Age: 63
End: 2023-09-29

## 2023-09-29 RX ORDER — PREDNISONE 10 MG/1
10 TABLET ORAL DAILY
Qty: 7 TABLET | Refills: 0 | Status: SHIPPED | OUTPATIENT
Start: 2023-09-29 | End: 2023-10-17 | Stop reason: SDUPTHER

## 2023-09-29 NOTE — TELEPHONE ENCOUNTER
----- Message from Sridevi Persaud sent at 9/29/2023  4:27 PM CDT -----  Regarding: Return Call  .Type:  Patient Returning Call    Who Called: Self     Who Left Message for Patient: JABARI    Does the patient know what this is regarding?: no     Would the patient rather a call back or a response via [facility]? Call     Best Call Back Number: [phone]

## 2023-09-29 NOTE — TELEPHONE ENCOUNTER
Called patient to schedule office visit.  No answer, left message for patient to call office.     Message sent via [email or web address]

## 2023-10-06 ENCOUNTER — CLINICAL SUPPORT (OUTPATIENT)
Dept: OTHER | Facility: CLINIC | Age: 63
End: 2023-10-06
Payer: COMMERCIAL

## 2023-10-06 DIAGNOSIS — Z00.8 ENCOUNTER FOR OTHER GENERAL EXAMINATION: ICD-10-CM

## 2023-10-09 ENCOUNTER — NURSE TRIAGE (OUTPATIENT)
Dept: ADMINISTRATIVE | Facility: CLINIC | Age: 63
End: 2023-10-09
Payer: COMMERCIAL

## 2023-10-09 ENCOUNTER — HOSPITAL ENCOUNTER (EMERGENCY)
Facility: HOSPITAL | Age: 63
Discharge: HOME | End: 2023-10-09
Attending: EMERGENCY MEDICINE
Payer: COMMERCIAL

## 2023-10-09 VITALS
HEART RATE: 61 BPM | BODY MASS INDEX: 37.08 KG/M2 | DIASTOLIC BLOOD PRESSURE: 83 MMHG | RESPIRATION RATE: 18 BRPM | TEMPERATURE: 98 F | OXYGEN SATURATION: 98 % | WEIGHT: 216 LBS | SYSTOLIC BLOOD PRESSURE: 167 MMHG

## 2023-10-09 DIAGNOSIS — R51.9 SCALP PAIN: Primary | ICD-10-CM

## 2023-10-09 DIAGNOSIS — I10 ESSENTIAL HYPERTENSION: ICD-10-CM

## 2023-10-09 PROCEDURE — 99283 EMERGENCY DEPT VISIT LOW MDM: CPT | Mod: ER

## 2023-10-09 RX ORDER — DOXYCYCLINE 100 MG/1
100 CAPSULE ORAL 2 TIMES DAILY
Qty: 20 CAPSULE | Refills: 0 | Status: SHIPPED | OUTPATIENT
Start: 2023-10-09 | End: 2023-10-19

## 2023-10-09 RX ORDER — DICLOFENAC SODIUM 10 MG/G
2 GEL TOPICAL 4 TIMES DAILY PRN
Qty: 200 G | Refills: 0 | Status: SHIPPED | OUTPATIENT
Start: 2023-10-09

## 2023-10-09 NOTE — TELEPHONE ENCOUNTER
No care due was identified.  [facility] Embedded Care Due Messages. Reference number: [id number].   10/09/2023 10:55:01 AM CDT

## 2023-10-09 NOTE — TELEPHONE ENCOUNTER
"C/o L side headache intermittently that radiates from L side head, ear, & L throat since last week. Describes as "shooting pain" sometimes, does not have earache but ear discomfort. Woke her up at 4am. Then happened again while driving grandson to school. Currently rates 7-8/10.    Dispo-UC now or office with approval, discussed no availabilities noted in clinic to consider possible approval for clinic apt now, location given for  [facility] near her. VU on callback symptoms once seen.     Reason for Disposition   SEVERE headache, sudden-onset (i.e., reaching maximum intensity within seconds to 1 hour)    Additional Information   Negative: Difficult to awaken or acting confused (e.g., disoriented, slurred speech)   Negative: Weakness of the face, arm or leg on one side of the body and new-onset   Negative: Numbness of the face, arm or leg on one side of the body and new-onset   Negative: Loss of speech or garbled speech and new-onset   Negative: Passed out (i.e., fainted, collapsed and was not responding)   Negative: Sounds like a life-threatening emergency to the triager   Negative: Unable to walk without falling   Negative: Stiff neck (can't touch chin to chest)   Negative: Possibility of carbon monoxide exposure   Negative: SEVERE headache, states 'worst headache' of life    Protocols used: Headache-A-OH    "

## 2023-10-09 NOTE — DISCHARGE INSTRUCTIONS

## 2023-10-09 NOTE — ED PROVIDER NOTES
Encounter Date: 10/9/2023       History     Chief Complaint   Patient presents with    Headache     Pt presents to the ED with complete of L sided headache a top the L ear onset Thursday, denies vision changes. Pt has been taking Motrin and tylenol with no relief, nothing OTC today.     63 y.o. female Past Medical History:  No date: Acid reflux  No date: Allergy      Comment:  seasonal  No date: Asthma with acute exacerbation  No date: Atrial fibrillation  11/20/2017: Essential hypertension      Comment:  Pt states that she does not have HTN.      Here for evaluation of pain to L scalp. Pt notes a small dime sized area which intermittently hurts her. Notes that it is worth with palpation. No falls/trauma/injuries. No dental pain/ear pain or systemic symptoms.      Review of patient's allergies indicates:   Allergen Reactions    Iodinated contrast media Anaphylaxis     Itchy throat, SOB, hives    Adhesive tape-silicones Itching     Manifested in 12/2015 following AF ablation.     Past Medical History:   Diagnosis Date    Acid reflux     Allergy     seasonal    Asthma with acute exacerbation     Atrial fibrillation     Essential hypertension 11/20/2017    Pt states that she does not have HTN.      Past Surgical History:   Procedure Laterality Date    24 HOUR IMPEDANCE PH MONITORING OF ESOPHAGUS IN PATIENT TAKING ACID REDUCING MEDICATIONS N/A 09/20/2021    Procedure: IMPEDANCE PH STUDY, ESOPHAGEAL, 24 HOUR, IN PATIENT TAKING ACID REDUCING MEDICATION;  Surgeon: Franky Gomez MD;  Location: [facility] ([address] [place] [address]);  Service: Endoscopy;  Laterality: N/A;  Patient to do EGD with with esophageal Bravo pH probe on omeprazole 40 mg once daily she needs to take it with a sip of water the day of the case  pt completed COVID vaccine- see Immunization record in     ABLATION OF ARRHYTHMOGENIC FOCUS FOR ATRIAL FIBRILLATION N/A 02/03/2023    Procedure: ABLATION, ARRHYTHMOGENIC FOCUS, FOR ATRIAL FIBRILLATION;  Surgeon: Lee Meyers,  MD;  Location: [facility];  Service: Cardiology;  Laterality: N/A;  AF, SAMMIE( Cx if SR), Redo PVI, RFA, CARTO, GEN, GP, 3 PREP    ABLATION, ATRIAL FLUTTER, TYPICAL  2023    Procedure: Ablation, Atrial Flutter, Typical;  Surgeon: Lee Meyers MD;  Location: [facility] EP [facility];  Service: Cardiology;;    ABLATION, ATRIAL TACHYCARDIA  2023    Procedure: Ablation, Atrial Tachycardia;  Surgeon: Lee Meyers MD;  Location: [facility];  Service: Cardiology;;    CARDIAC ELECTROPHYSIOLOGY STUDY AND ABLATION      CARDIAC ELECTROPHYSIOLOGY STUDY AND ABLATION       SECTION      COLONOSCOPY N/A 2018    Procedure: COLONOSCOPY;  Surgeon: Brodie Lara MD;  Location: [facility];  Service: Endoscopy;  Laterality: N/A;  confirmed appt-ss    ESOPHAGEAL MANOMETRY WITH MEASUREMENT OF IMPEDANCE N/A 2021    Procedure: MANOMETRY, ESOPHAGUS, WITH IMPEDANCE MEASUREMENT;  Surgeon: Franky Gomez MD;  Location: [facility] (4TH FLR);  Service: Endoscopy;  Laterality: N/A;  Covid test  [facility]   pt confirmed appt-KPvt    ESOPHAGOGASTRODUODENOSCOPY N/A 2021    Procedure: EGD (ESOPHAGOGASTRODUODENOSCOPY);  Surgeon: Alin Camargo MD;  Location: [facility] (2ND FLR);  Service: Endoscopy;  Laterality: N/A;  Fully vaccinated 21, ok to hold Eliquis x 2 days per Dr. ANGELLA Meyers, instr portal -ml  12/10/21 LVM to see if patient can come in earlier -ml    HYSTERECTOMY      MIGUEL    RADIOFREQUENCY ABLATION Left 2019    Procedure: RADIOFREQUENCY ABLATION, LEFT L2,3,4,5;  Surgeon: Mariusz Jang MD;  Location: [facility];  Service: Pain Management;  Laterality: Left;  Left RFA L2,3,4,5  1 of 2  *Ok to hold Eliquis, per Dr Meyers    RADIOFREQUENCY ABLATION Right 2019    Procedure: RADIOFREQUENCY ABLATION,  Right L2,3,4,5;  Surgeon: Mariusz Jang MD;  Location: [facility];  Service: Pain Management;  Laterality: Right;  Right RFA @ L2,3,4,5  2 of 2     Family History   Problem  Relation Age of Onset    Hypertension Mother     Diabetes Mother     Glaucoma Mother     Allergies Mother     Asbestos Father     Obesity Father     Eczema Son     Hypertension Son     Heart disease Maternal Grandmother         chf    Diabetes Maternal Grandfather     Cancer Paternal Grandmother         lung, 2/2 second hand smoke    Glaucoma Paternal Grandfather     Blindness Paternal Grandfather     Melanoma Neg Hx     Psoriasis Neg Hx     Lupus Neg Hx     Acne Neg Hx     Breast cancer Neg Hx     Colon cancer Neg Hx     Ovarian cancer Neg Hx     Esophageal cancer Neg Hx     Cirrhosis Neg Hx     Celiac disease Neg Hx     Colon polyps Neg Hx     Crohn's disease Neg Hx     Liver cancer Neg Hx     Liver disease Neg Hx     Rectal cancer Neg Hx     Stomach cancer Neg Hx     Ulcerative colitis Neg Hx     Pancreatic cancer Neg Hx     Kidney cancer Neg Hx     Bladder Cancer Neg Hx     Uterine cancer Neg Hx      Social History     Tobacco Use    Smoking status: Never    Smokeless tobacco: Never   Substance Use Topics    Alcohol use: Yes     Comment: rarely, 1 drink/week    Drug use: No     Review of Systems   All other systems reviewed and are negative.      Physical Exam     Initial Vitals [10/09/23 0927]   BP Pulse Resp Temp SpO2   (!) 167/83 61 18 98.2 °F (36.8 °C) 98 %      MAP       --         Physical Exam    Nursing note and vitals reviewed.  Constitutional: She appears well-developed and well-nourished.   HENT:   Head: Normocephalic and atraumatic.   Eyes: Conjunctivae and EOM are normal. Pupils are equal, round, and reactive to light.   Neck:   Normal range of motion.  Cardiovascular:  Normal rate.           Pulmonary/Chest: No respiratory distress.   Abdominal: She exhibits no distension.   Musculoskeletal:         General: Normal range of motion.      Cervical back: Normal range of motion.     Neurological: She is alert. No cranial nerve deficit. GCS score is 15. GCS eye subscore is 4. GCS verbal subscore is 5.  GCS motor subscore is 6.   Skin: Skin is warm and dry.   Psychiatric: She has a normal mood and affect. Thought content normal.     Pt has a small area above L ear which is tender, slightly swollen.   Bl tm's clear   No ant/post cervical lymphs    ED Course   Procedures.  MEDICAL DECISION MAKING    After review of the patient's physical exam, ED testing, and history/symptoms, relevant labs, imaging, available outside records  a wide differential was considered including but not limited to: infectious, traumatic, vascular, toxicological , metabolic, malignant, ischemic, embolic, psychological, genetic, iatrogenic, idiopathic, medication reaction, environmental exposure, substance dependence/intoxication/withdrawal, electrolyte abnormality, blood dyscrasia, autoimmune and other etiologies.        labs/imaging/interventions include:       Medications - No data to display  Labs Reviewed - No data to display   No orders to display       Labs Reviewed - No data to display       Imaging Results    None          Medications - No data to display  Medical Decision Making  Risk  Prescription drug management.                L;ymph node vs early infection. Will do  trial of doxy and topical nsaids.     I have independently evaluated and interpreted all available labs and imaging to the extent of the scope of my practice.  The suspected diagnosis, treatment and plan were discussed with the patient. All questions or concerns have been addressed.    Note was created using voice recognition software. Note may have occasional typographical or grammatical errors , garbled syntax, and other bizarre constructions that may not have been identified and edited despite good mj initial review prior to signing.             Clinical Impression:   Final diagnoses:  [R51.9] Scalp pain (Primary)        ED Disposition Condition    Discharge Stable          ED Prescriptions       Medication Sig Dispense Start Date End Date Auth. Provider     doxycycline (VIBRAMYCIN) 100 MG Cap Take 1 capsule (100 mg total) by mouth 2 (two) times daily. for 10 days 20 capsule 10/9/2023 10/19/2023 Farhat Madsen MD    diclofenac sodium (VOLTAREN) 1 % Gel Apply 2 g topically 4 (four) times daily as needed (pain). 200 g 10/9/2023 -- Farhat Madsen MD          Follow-up Information       Follow up With Specialties Details Why Contact Info    Juice So MD Family Medicine   [address] [place] [address]  [phone]               Farhat Madsen MD  10/09/23 9415

## 2023-10-09 NOTE — TELEPHONE ENCOUNTER
Last Office Visit Info:   The patient's last visit with Juice So MD was on 5/24/2023.    The patient's last visit in current department was on 5/24/2023.        Last CBC Results:   Lab Results   Component Value Date    WBC 6.00 07/12/2023    HGB 13.8 07/12/2023    HCT 39.8 07/12/2023     07/12/2023       Last CMP Results  Lab Results   Component Value Date     01/30/2023    K 4.0 01/30/2023     01/30/2023    CO2 25 01/30/2023    BUN 12 01/30/2023    CREATININE 0.9 01/30/2023    CALCIUM 9.8 01/30/2023    ALBUMIN 4.0 12/12/2022    AST 16 12/12/2022    ALT 18 12/12/2022       Last Lipids  Lab Results   Component Value Date    CHOL 189 06/20/2018    TRIG 152 (H) 06/20/2018    HDL 47 06/20/2018    LDLCALC 111.6 06/20/2018       Last A1C  Lab Results   Component Value Date    HGBA1C 4.5 06/20/2018       Last TSH  Lab Results   Component Value Date    TSH 0.523 10/31/2022             Current Med Refills  Medication List with Changes/Refills   Current Medications    ALBUTEROL (PROVENTIL) 2.5 MG /3 ML (0.083 %) NEBULIZER SOLUTION    Take 3 mLs (2.5 mg total) by nebulization every 6 (six) hours as needed for Wheezing.       Start Date: 7/12/2023 End Date: --    APIXABAN (ELIQUIS) 5 MG TAB    Take 1 tablet (5 mg total) by mouth 2 (two) times daily.       Start Date: 2/23/2023 End Date: --    AZELASTINE (ASTELIN) 137 MCG (0.1 %) NASAL SPRAY    1 spray (137 mcg total) by Nasal route 2 (two) times daily.       Start Date: 7/12/2023 End Date: 7/11/2024    BENRALIZUMAB (FASENRA PEN) 30 MG/ML ATIN    Inject 30 mg into the skin As instructed. Every 28 days for the first three doses       Start Date: 7/13/2023 End Date: --    BENRALIZUMAB (FASENRA PEN) 30 MG/ML ATIN    Inject 30 mg into the skin As instructed. Every 8 weeks       Start Date: 7/13/2023 End Date: --    CETIRIZINE (ZYRTEC) 10 MG TABLET    Take 1 tablet (10 mg total) by mouth once daily.       Start Date: 10/18/2022End Date: --    DICLOFENAC  SODIUM (VOLTAREN) 1 % GEL    Apply 2 g topically 4 (four) times daily as needed (pain).       Start Date: 10/9/2023 End Date: --    DILTIAZEM (DILACOR XR) 120 MG CDCR    Take 1 capsule (120 mg total) by mouth once daily.       Start Date: 12/8/2022 End Date: 12/8/2023    DOXYCYCLINE (VIBRAMYCIN) 100 MG CAP    Take 1 capsule (100 mg total) by mouth 2 (two) times daily. for 10 days       Start Date: 10/9/2023 End Date: 10/19/2023    FAMOTIDINE (PEPCID) 20 MG TABLET    Take 1 tablet (20 mg total) by mouth 2 (two) times daily. for 7 days       Start Date: 12/12/2022End Date: 7/27/2023    FLUTICASONE PROPIONATE (FLONASE) 50 MCG/ACTUATION NASAL SPRAY    1 spray (50 mcg total) by Each Nostril route daily as needed for Rhinitis.       Start Date: 7/12/2023 End Date: --    FLUTICASONE-UMECLIDIN-VILANTER (TRELEGY ELLIPTA) 200-62.5-25 MCG INHALER    Inhale 1 puff into the lungs once daily.       Start Date: 7/12/2023 End Date: --    HYDROCHLOROTHIAZIDE (HYDRODIURIL) 12.5 MG TAB    Take 1 tablet (12.5 mg total) by mouth once daily.       Start Date: 10/6/2022 End Date: --    OMEPRAZOLE (PRILOSEC) 40 MG CAPSULE    Take 1 capsule (40 mg total) by mouth 2 (two) times daily before meals.       Start Date: 9/26/2022 End Date: 9/26/2023    ONDANSETRON (ZOFRAN-ODT) 4 MG TBDL    Take 1 tablet (4 mg total) by mouth every 8 (eight) hours as needed.       Start Date: 3/21/2023 End Date: --    PREDNISONE (DELTASONE) 10 MG TABLET    Take 1 tablet (10 mg total) by mouth once daily.       Start Date: 9/29/2023 End Date: --    SOTALOL (BETAPACE) 80 MG TABLET    Take 1 tablet (80 mg total) by mouth 2 (two) times daily.       Start Date: 5/8/2023  End Date: 5/7/2024    TOPIRAMATE (TOPAMAX) 25 MG TABLET    Take 1-2 tablets (25-50 mg total) by mouth every evening.       Start Date: 7/27/2023 End Date: 7/26/2024    TRIAMCINOLONE ACETONIDE 0.1% (KENALOG) 0.1 % CREAM    Apply topically 2 (two) times daily.       Start Date: 10/6/2022 End Date: --        Order(s) placed per written order guidelines: none    Please advise.

## 2023-10-10 VITALS
SYSTOLIC BLOOD PRESSURE: 136 MMHG | HEIGHT: 65 IN | WEIGHT: 213 LBS | BODY MASS INDEX: 35.49 KG/M2 | DIASTOLIC BLOOD PRESSURE: 82 MMHG

## 2023-10-10 LAB
GLUCOSE SERPL-MCNC: 90 MG/DL (ref 60–140)
HDLC SERPL-MCNC: 43 MG/DL
POC CHOLESTEROL, LDL (DOCK): 124 MG/DL
POC CHOLESTEROL, TOTAL: 188 MG/DL
TRIGL SERPL-MCNC: 115 MG/DL

## 2023-10-10 RX ORDER — OMEPRAZOLE 40 MG/1
CAPSULE, DELAYED RELEASE ORAL
Qty: 180 CAPSULE | Refills: 3 | Status: SHIPPED | OUTPATIENT
Start: 2023-10-10

## 2023-10-12 RX ORDER — HYDROCHLOROTHIAZIDE 12.5 MG/1
12.5 TABLET ORAL
Qty: 60 TABLET | Refills: 0 | Status: SHIPPED | OUTPATIENT
Start: 2023-10-12 | End: 2023-12-11

## 2023-10-17 ENCOUNTER — OFFICE VISIT (OUTPATIENT)
Dept: FAMILY MEDICINE | Facility: CLINIC | Age: 63
End: 2023-10-17
Payer: COMMERCIAL

## 2023-10-17 VITALS
OXYGEN SATURATION: 97 % | DIASTOLIC BLOOD PRESSURE: 76 MMHG | BODY MASS INDEX: 35.93 KG/M2 | HEIGHT: 65 IN | WEIGHT: 215.63 LBS | HEART RATE: 61 BPM | TEMPERATURE: 99 F | RESPIRATION RATE: 16 BRPM | SYSTOLIC BLOOD PRESSURE: 128 MMHG

## 2023-10-17 DIAGNOSIS — L08.9: ICD-10-CM

## 2023-10-17 DIAGNOSIS — T78.40XD ALLERGIC REACTION, SUBSEQUENT ENCOUNTER: Primary | ICD-10-CM

## 2023-10-17 DIAGNOSIS — J30.2 SEASONAL ALLERGIC RHINITIS, UNSPECIFIED TRIGGER: ICD-10-CM

## 2023-10-17 DIAGNOSIS — L30.9 ECZEMA, UNSPECIFIED TYPE: ICD-10-CM

## 2023-10-17 DIAGNOSIS — S00.01XD: ICD-10-CM

## 2023-10-17 PROCEDURE — 96372 THER/PROPH/DIAG INJ SC/IM: CPT | Mod: S$GLB,,, | Performed by: FAMILY MEDICINE

## 2023-10-17 PROCEDURE — 99999 PR PBB SHADOW E&M-EST. PATIENT-LVL III: CPT | Mod: PBBFAC,,, | Performed by: FAMILY MEDICINE

## 2023-10-17 PROCEDURE — 3078F DIAST BP <80 MM HG: CPT | Mod: CPTII,S$GLB,, | Performed by: FAMILY MEDICINE

## 2023-10-17 PROCEDURE — 96372 PR INJECTION,THERAP/PROPH/DIAG2ST, IM OR SUBCUT: ICD-10-PCS | Mod: S$GLB,,, | Performed by: FAMILY MEDICINE

## 2023-10-17 PROCEDURE — 99214 OFFICE O/P EST MOD 30 MIN: CPT | Mod: 25,S$GLB,, | Performed by: FAMILY MEDICINE

## 2023-10-17 PROCEDURE — 3008F PR BODY MASS INDEX (BMI) DOCUMENTED: ICD-10-PCS | Mod: CPTII,S$GLB,, | Performed by: FAMILY MEDICINE

## 2023-10-17 PROCEDURE — 99214 PR OFFICE/OUTPT VISIT, EST, LEVL IV, 30-39 MIN: ICD-10-PCS | Mod: 25,S$GLB,, | Performed by: FAMILY MEDICINE

## 2023-10-17 PROCEDURE — 3074F PR MOST RECENT SYSTOLIC BLOOD PRESSURE < 130 MM HG: ICD-10-PCS | Mod: CPTII,S$GLB,, | Performed by: FAMILY MEDICINE

## 2023-10-17 PROCEDURE — 3074F SYST BP LT 130 MM HG: CPT | Mod: CPTII,S$GLB,, | Performed by: FAMILY MEDICINE

## 2023-10-17 PROCEDURE — 99999 PR PBB SHADOW E&M-EST. PATIENT-LVL III: ICD-10-PCS | Mod: PBBFAC,,, | Performed by: FAMILY MEDICINE

## 2023-10-17 PROCEDURE — 3008F BODY MASS INDEX DOCD: CPT | Mod: CPTII,S$GLB,, | Performed by: FAMILY MEDICINE

## 2023-10-17 PROCEDURE — 3078F PR MOST RECENT DIASTOLIC BLOOD PRESSURE < 80 MM HG: ICD-10-PCS | Mod: CPTII,S$GLB,, | Performed by: FAMILY MEDICINE

## 2023-10-17 RX ORDER — METHYLPREDNISOLONE SODIUM SUCCINATE 125 MG/2ML
125 INJECTION INTRAMUSCULAR; INTRAVENOUS
Status: COMPLETED | OUTPATIENT
Start: 2023-10-17 | End: 2023-10-17

## 2023-10-17 RX ORDER — MUPIROCIN 20 MG/G
OINTMENT TOPICAL 3 TIMES DAILY
Qty: 30 G | Refills: 2 | Status: SHIPPED | OUTPATIENT
Start: 2023-10-17

## 2023-10-17 RX ORDER — PREDNISONE 10 MG/1
TABLET ORAL
Qty: 21 TABLET | Refills: 0 | Status: SHIPPED | OUTPATIENT
Start: 2023-10-17 | End: 2023-11-01 | Stop reason: ALTCHOICE

## 2023-10-17 RX ORDER — ALBUTEROL SULFATE 90 UG/1
1 AEROSOL, METERED RESPIRATORY (INHALATION) EVERY 6 HOURS PRN
COMMUNITY
Start: 2023-08-01

## 2023-10-17 RX ORDER — MONTELUKAST SODIUM 10 MG/1
10 TABLET ORAL DAILY
COMMUNITY
Start: 2023-08-17

## 2023-10-17 RX ORDER — CLOBETASOL PROPIONATE 0.5 MG/G
OINTMENT TOPICAL 2 TIMES DAILY
Qty: 60 G | Refills: 2 | Status: SHIPPED | OUTPATIENT
Start: 2023-10-17

## 2023-10-17 RX ADMIN — METHYLPREDNISOLONE SODIUM SUCCINATE 125 MG: 125 INJECTION INTRAMUSCULAR; INTRAVENOUS at 08:10

## 2023-10-17 NOTE — PROGRESS NOTES
Health Maintenance Due   Topic     Pneumococcal Vaccines (Age 0-64) (1 - PCV) Pt received    TETANUS VACCINE  Pt received    Hemoglobin A1c (Diabetic Prevention Screening)  Consult pcp    Colorectal Cancer Screening  Information received last visit    Influenza Vaccine (1) Pt received    COVID-19 Vaccine (7 - 2023-24 season) Not offered at this Facility

## 2023-10-18 NOTE — PROGRESS NOTES
Subjective:       Patient ID: Francoise Dykes is a 63 y.o. female.    Chief Complaint: eczma      HPI    63-year-old female presents for rash.  States rashes occurred multiple areas for few weeks.  Was initially given steroids for 1 week which she felt helped but symptoms returned.  Endorses pruritus.  Patient is unsure of any new changes to her diet or detergent.    Review of Systems   Constitutional: Negative.    HENT: Negative.     Respiratory: Negative.     Cardiovascular: Negative.    Gastrointestinal: Negative.    Endocrine: Negative.    Genitourinary: Negative.    Musculoskeletal: Negative.    Neurological: Negative.    Psychiatric/Behavioral: Negative.            Past Medical History:   Diagnosis Date    Acid reflux     Allergy     seasonal    Asthma with acute exacerbation     Atrial fibrillation     Essential hypertension 11/20/2017    Pt states that she does not have HTN.      Past Surgical History:   Procedure Laterality Date    24 HOUR IMPEDANCE PH MONITORING OF ESOPHAGUS IN PATIENT TAKING ACID REDUCING MEDICATIONS N/A 09/20/2021    Procedure: IMPEDANCE PH STUDY, ESOPHAGEAL, 24 HOUR, IN PATIENT TAKING ACID REDUCING MEDICATION;  Surgeon: Franky Gomez MD;  Location: [facility] ([address] [place] [address]);  Service: Endoscopy;  Laterality: N/A;  Patient to do EGD with with esophageal Bravo pH probe on omeprazole 40 mg once daily she needs to take it with a sip of water the day of the case  pt completed COVID vaccine- see Immunization record in     ABLATION OF ARRHYTHMOGENIC FOCUS FOR ATRIAL FIBRILLATION N/A 02/03/2023    Procedure: ABLATION, ARRHYTHMOGENIC FOCUS, FOR ATRIAL FIBRILLATION;  Surgeon: Lee Meyers MD;  Location: [facility] EP LAB;  Service: Cardiology;  Laterality: N/A;  AF, SAMMIE( Cx if SR), Redo PVI, RFA, CARTO, GEN, GP, 3 PREP    ABLATION, ATRIAL FLUTTER, TYPICAL  02/03/2023    Procedure: Ablation, Atrial Flutter, Typical;  Surgeon: Lee Meyers MD;  Location: [facility] EP LAB;  Service: Cardiology;;     ABLATION, ATRIAL TACHYCARDIA  2023    Procedure: Ablation, Atrial Tachycardia;  Surgeon: Lee Meyers MD;  Location: [facility] EP LAB;  Service: Cardiology;;    CARDIAC ELECTROPHYSIOLOGY STUDY AND ABLATION      CARDIAC ELECTROPHYSIOLOGY STUDY AND ABLATION       SECTION      COLONOSCOPY N/A 2018    Procedure: COLONOSCOPY;  Surgeon: Brodie Lara MD;  Location: [facility] ENDO;  Service: Endoscopy;  Laterality: N/A;  confirmed appt-ss    ESOPHAGEAL MANOMETRY WITH MEASUREMENT OF IMPEDANCE N/A 2021    Procedure: MANOMETRY, ESOPHAGUS, WITH IMPEDANCE MEASUREMENT;  Surgeon: Franky Gomez MD;  Location: [facility] ENDO (4TH FLR);  Service: Endoscopy;  Laterality: N/A;  Covid test  [place]   pt confirmed appt-KPvt    ESOPHAGOGASTRODUODENOSCOPY N/A 2021    Procedure: EGD (ESOPHAGOGASTRODUODENOSCOPY);  Surgeon: Alin Camargo MD;  Location: [facility] ENDO (2ND FLR);  Service: Endoscopy;  Laterality: N/A;  Fully vaccinated 21, ok to hold Eliquis x 2 days per Dr. ANGELLA Meyers, instr portal -ml  12/10/21 LVM to see if patient can come in earlier -ml    HYSTERECTOMY      MIGUEL    RADIOFREQUENCY ABLATION Left 2019    Procedure: RADIOFREQUENCY ABLATION, LEFT L2,3,4,5;  Surgeon: Mariusz Jang MD;  Location: [facility];  Service: Pain Management;  Laterality: Left;  Left RFA L2,3,4,5  1 of 2  *Ok to hold Eliquis, per Dr Meyers    RADIOFREQUENCY ABLATION Right 2019    Procedure: RADIOFREQUENCY ABLATION,  Right L2,3,4,5;  Surgeon: Mariusz Jang MD;  Location: [facility];  Service: Pain Management;  Laterality: Right;  Right RFA @ L2,3,4,5  2 of 2     Family History   Problem Relation Age of Onset    Hypertension Mother     Diabetes Mother     Glaucoma Mother     Allergies Mother     Asbestos Father     Obesity Father     Eczema Son     Hypertension Son     Heart disease Maternal Grandmother         chf    Diabetes Maternal Grandfather     Cancer Paternal Grandmother          lung, 2/2 second hand smoke    Glaucoma Paternal Grandfather     Blindness Paternal Grandfather     Melanoma Neg Hx     Psoriasis Neg Hx     Lupus Neg Hx     Acne Neg Hx     Breast cancer Neg Hx     Colon cancer Neg Hx     Ovarian cancer Neg Hx     Esophageal cancer Neg Hx     Cirrhosis Neg Hx     Celiac disease Neg Hx     Colon polyps Neg Hx     Crohn's disease Neg Hx     Liver cancer Neg Hx     Liver disease Neg Hx     Rectal cancer Neg Hx     Stomach cancer Neg Hx     Ulcerative colitis Neg Hx     Pancreatic cancer Neg Hx     Kidney cancer Neg Hx     Bladder Cancer Neg Hx     Uterine cancer Neg Hx      Social History     Socioeconomic History    Marital status:    Occupational History    Occupation: Teacher     Employer: [employer]    Tobacco Use    Smoking status: Never    Smokeless tobacco: Never   Substance and Sexual Activity    Alcohol use: Yes     Comment: rarely, 1 drink/week    Drug use: No    Sexual activity: Yes     Partners: Male   Other Topics Concern    Are you pregnant or think you may be? No    Breast-feeding No   Social History Narrative    Recently moved back to [facility] to help take care of mom.    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       Current Outpatient Medications:     albuterol (PROVENTIL) 2.5 mg /3 mL (0.083 %) nebulizer solution, Take 3 mLs (2.5 mg total) by nebulization every 6 (six) hours as needed for Wheezing., Disp: 3 mL, Rfl: 5    albuterol (PROVENTIL/VENTOLIN HFA) 90 mcg/actuation inhaler, Inhale into the lungs., Disp: , Rfl:     apixaban (ELIQUIS) 5 mg Tab, Take 1 tablet (5 mg total) by mouth 2 (two) times daily., Disp: 180 tablet, Rfl: 3    azelastine (ASTELIN) 137 mcg (0.1 %) nasal spray, 1 spray (137 mcg total) by Nasal route 2 (two) times daily., Disp: 90 mL, Rfl: 3    benralizumab (FASENRA PEN) 30 mg/mL AtIn, Inject 30 mg into the skin As instructed. Every 28 days for the first three doses, Disp: 1 mL, Rfl: 2    diclofenac sodium (VOLTAREN) 1 % Gel, Apply 2 g  topically 4 (four) times daily as needed (pain)., Disp: 200 g, Rfl: 0    diltiaZEM (DILACOR XR) 120 MG CDCR, Take 1 capsule (120 mg total) by mouth once daily., Disp: 90 capsule, Rfl: 3    doxycycline (VIBRAMYCIN) 100 MG Cap, Take 1 capsule (100 mg total) by mouth 2 (two) times daily. for 10 days, Disp: 20 capsule, Rfl: 0    famotidine (PEPCID) 20 MG tablet, Take 1 tablet (20 mg total) by mouth 2 (two) times daily. for 7 days, Disp: 14 tablet, Rfl: 0    fluticasone propionate (FLONASE) 50 mcg/actuation nasal spray, 1 spray (50 mcg total) by Each Nostril route daily as needed for Rhinitis., Disp: 48 g, Rfl: 3    hydroCHLOROthiazide (HYDRODIURIL) 12.5 MG Tab, Take 1 tablet by mouth once daily, Disp: 60 tablet, Rfl: 0    montelukast (SINGULAIR) 10 mg tablet, , Disp: , Rfl:     omeprazole (PRILOSEC) 40 MG capsule, TAKE 1 CAPSULE TWICE DAILY BEFORE MEALS, Disp: 180 capsule, Rfl: 3    ondansetron (ZOFRAN-ODT) 4 MG TbDL, Take 1 tablet (4 mg total) by mouth every 8 (eight) hours as needed., Disp: 25 tablet, Rfl: 0    sotaloL (BETAPACE) 80 MG tablet, Take 1 tablet (80 mg total) by mouth 2 (two) times daily., Disp: 180 tablet, Rfl: 3    topiramate (TOPAMAX) 25 MG tablet, Take 1-2 tablets (25-50 mg total) by mouth every evening., Disp: 180 tablet, Rfl: 1    triamcinolone acetonide 0.1% (KENALOG) 0.1 % cream, Apply topically 2 (two) times daily. (Patient taking differently: Apply topically 2 (two) times daily. PRN), Disp: 453.6 g, Rfl: 2    benralizumab (FASENRA PEN) 30 mg/mL AtIn, Inject 30 mg into the skin As instructed. Every 8 weeks (Patient not taking: Reported on 10/17/2023), Disp: 1 mL, Rfl: 11    cetirizine (ZYRTEC) 10 MG tablet, Take 1 tablet (10 mg total) by mouth once daily. (Patient not taking: Reported on 10/17/2023), Disp: 90 tablet, Rfl: 3    clobetasol 0.05% (TEMOVATE) 0.05 % Oint, Apply topically 2 (two) times daily., Disp: 60 g, Rfl: 2    fluticasone-umeclidin-vilanter (TRELEGY ELLIPTA) 200-62.5-25 mcg  "inhaler, Inhale 1 puff into the lungs once daily. (Patient not taking: Reported on 10/17/2023), Disp: 3 each, Rfl: 4    mupirocin (BACTROBAN) 2 % ointment, Apply topically 3 (three) times daily., Disp: 30 g, Rfl: 2    predniSONE (DELTASONE) 10 MG tablet, Take 2 tablets (20 mg total) by mouth once daily for 7 days, THEN 1 tablet (10 mg total) once daily for 7 days., Disp: 21 tablet, Rfl: 0  No current facility-administered medications for this visit.   Objective:      Vitals:    10/17/23 0722   BP: 128/76   BP Location: Right arm   Patient Position: Sitting   BP Method: Small (Manual)   Pulse: 61   Resp: 16   Temp: 98.5 °F (36.9 °C)   TempSrc: Oral   SpO2: 97%   Weight: 97.8 kg (215 lb 9.8 oz)   Height: 5' 4.5" (1.638 m)       Physical Exam  Constitutional:       General: She is not in acute distress.     Appearance: She is not diaphoretic.   HENT:      Head: Normocephalic and atraumatic.   Eyes:      Conjunctiva/sclera: Conjunctivae normal.   Pulmonary:      Effort: Pulmonary effort is normal.   Musculoskeletal:      Cervical back: Neck supple.   Skin:     Findings: Rash (erythematous rash in b/l arms, chest, breasts, b/l LEs) present.   Neurological:      Mental Status: She is alert and oriented to person, place, and time.   Psychiatric:         Behavior: Behavior normal.         Thought Content: Thought content normal.         Judgment: Judgment normal.            Assessment:       1. Allergic reaction, subsequent encounter    2. Eczema, unspecified type    3. Seasonal allergic rhinitis, unspecified trigger    4. Abrasion of scalp with infection, subsequent encounter        Plan:       Allergic reaction, subsequent encounter  -     clobetasol 0.05% (TEMOVATE) 0.05 % Oint; Apply topically 2 (two) times daily.  Dispense: 60 g; Refill: 2  -     methylPREDNISolone sodium succinate injection 125 mg  -     predniSONE (DELTASONE) 10 MG tablet; Take 2 tablets (20 mg total) by mouth once daily for 7 days, THEN 1 tablet (10 " mg total) once daily for 7 days.  Dispense: 21 tablet; Refill: 0  -     Ambulatory referral/consult to Dermatology; Future; Expected date: 10/24/2023  -     mupirocin (BACTROBAN) 2 % ointment; Apply topically 3 (three) times daily.  Dispense: 30 g; Refill: 2    Eczema, unspecified type  -     clobetasol 0.05% (TEMOVATE) 0.05 % Oint; Apply topically 2 (two) times daily.  Dispense: 60 g; Refill: 2  -     methylPREDNISolone sodium succinate injection 125 mg  -     predniSONE (DELTASONE) 10 MG tablet; Take 2 tablets (20 mg total) by mouth once daily for 7 days, THEN 1 tablet (10 mg total) once daily for 7 days.  Dispense: 21 tablet; Refill: 0  -     Ambulatory referral/consult to Dermatology; Future; Expected date: 10/24/2023  -     mupirocin (BACTROBAN) 2 % ointment; Apply topically 3 (three) times daily.  Dispense: 30 g; Refill: 2    Seasonal allergic rhinitis, unspecified trigger    Abrasion of scalp with infection, subsequent encounter  -     mupirocin (BACTROBAN) 2 % ointment; Apply topically 3 (three) times daily.  Dispense: 30 g; Refill: 2      Has areas where patient has scratch marks.  Went to ED and was given doxycycline for scalp infection.  Will give Bactroban along with clobetasol.  We will do a longer steroid taper.  Place referral to Dermatology.          Future Appointments   Date Time Provider Department Center   10/27/2023  8:00 AM Juice So MD [facility] MED [place]   11/1/2023  8:10 AM Moira Avila MD [facility] [place]   1/22/2024  2:30 PM Gale Cruz MD [facility] [place]       Patient note was created using [employer].  Any errors in syntax or even information may not have been identified and edited on initial review prior to signing this note.

## 2023-10-27 ENCOUNTER — OFFICE VISIT (OUTPATIENT)
Dept: FAMILY MEDICINE | Facility: CLINIC | Age: 63
End: 2023-10-27
Payer: COMMERCIAL

## 2023-10-27 VITALS
SYSTOLIC BLOOD PRESSURE: 118 MMHG | BODY MASS INDEX: 36.84 KG/M2 | RESPIRATION RATE: 16 BRPM | OXYGEN SATURATION: 98 % | DIASTOLIC BLOOD PRESSURE: 78 MMHG | WEIGHT: 215.81 LBS | HEIGHT: 64 IN | HEART RATE: 62 BPM | TEMPERATURE: 98 F

## 2023-10-27 DIAGNOSIS — T78.40XD ALLERGIC REACTION, SUBSEQUENT ENCOUNTER: Primary | ICD-10-CM

## 2023-10-27 DIAGNOSIS — Z12.12 ENCOUNTER FOR COLORECTAL CANCER SCREENING: ICD-10-CM

## 2023-10-27 DIAGNOSIS — L30.9 ECZEMA, UNSPECIFIED TYPE: ICD-10-CM

## 2023-10-27 DIAGNOSIS — Z12.11 ENCOUNTER FOR COLORECTAL CANCER SCREENING: ICD-10-CM

## 2023-10-27 PROCEDURE — 99214 OFFICE O/P EST MOD 30 MIN: CPT | Mod: S$GLB,,, | Performed by: FAMILY MEDICINE

## 2023-10-27 PROCEDURE — 3074F PR MOST RECENT SYSTOLIC BLOOD PRESSURE < 130 MM HG: ICD-10-PCS | Mod: CPTII,S$GLB,, | Performed by: FAMILY MEDICINE

## 2023-10-27 PROCEDURE — 3008F BODY MASS INDEX DOCD: CPT | Mod: CPTII,S$GLB,, | Performed by: FAMILY MEDICINE

## 2023-10-27 PROCEDURE — 99999 PR PBB SHADOW E&M-EST. PATIENT-LVL V: ICD-10-PCS | Mod: PBBFAC,,, | Performed by: FAMILY MEDICINE

## 2023-10-27 PROCEDURE — 3078F DIAST BP <80 MM HG: CPT | Mod: CPTII,S$GLB,, | Performed by: FAMILY MEDICINE

## 2023-10-27 PROCEDURE — 1159F MED LIST DOCD IN RCRD: CPT | Mod: CPTII,S$GLB,, | Performed by: FAMILY MEDICINE

## 2023-10-27 PROCEDURE — 3074F SYST BP LT 130 MM HG: CPT | Mod: CPTII,S$GLB,, | Performed by: FAMILY MEDICINE

## 2023-10-27 PROCEDURE — 1159F PR MEDICATION LIST DOCUMENTED IN MEDICAL RECORD: ICD-10-PCS | Mod: CPTII,S$GLB,, | Performed by: FAMILY MEDICINE

## 2023-10-27 PROCEDURE — 99214 PR OFFICE/OUTPT VISIT, EST, LEVL IV, 30-39 MIN: ICD-10-PCS | Mod: S$GLB,,, | Performed by: FAMILY MEDICINE

## 2023-10-27 PROCEDURE — 3078F PR MOST RECENT DIASTOLIC BLOOD PRESSURE < 80 MM HG: ICD-10-PCS | Mod: CPTII,S$GLB,, | Performed by: FAMILY MEDICINE

## 2023-10-27 PROCEDURE — 3008F PR BODY MASS INDEX (BMI) DOCUMENTED: ICD-10-PCS | Mod: CPTII,S$GLB,, | Performed by: FAMILY MEDICINE

## 2023-10-27 PROCEDURE — 99999 PR PBB SHADOW E&M-EST. PATIENT-LVL V: CPT | Mod: PBBFAC,,, | Performed by: FAMILY MEDICINE

## 2023-10-27 NOTE — PROGRESS NOTES
Subjective:       Patient ID: Francoise Dykes is a 63 y.o. female.    Chief Complaint: Follow-up      Follow-up      63-year-old female presents for allergy follow-up.  States medication and ointment helped greatly.  States the itching does occur occasionally but his help with the ointment.  Denies any new areas.  Denies any other symptoms at this time.  Review of Systems   Constitutional: Negative.    HENT: Negative.     Respiratory: Negative.     Cardiovascular: Negative.    Gastrointestinal: Negative.    Endocrine: Negative.    Genitourinary: Negative.    Musculoskeletal: Negative.    Neurological: Negative.    Psychiatric/Behavioral: Negative.            Past Medical History:   Diagnosis Date    Acid reflux     Allergy     seasonal    Asthma with acute exacerbation     Atrial fibrillation     Essential hypertension 11/20/2017    Pt states that she does not have HTN.      Past Surgical History:   Procedure Laterality Date    24 HOUR IMPEDANCE PH MONITORING OF ESOPHAGUS IN PATIENT TAKING ACID REDUCING MEDICATIONS N/A 09/20/2021    Procedure: IMPEDANCE PH STUDY, ESOPHAGEAL, 24 HOUR, IN PATIENT TAKING ACID REDUCING MEDICATION;  Surgeon: Franky Gomez MD;  Location: [address]);  Service: Endoscopy;  Laterality: N/A;  Patient to do EGD with with esophageal Bravo pH probe on omeprazole 40 mg once daily she needs to take it with a sip of water the day of the case  pt completed COVID vaccine- see Immunization record in     ABLATION OF ARRHYTHMOGENIC FOCUS FOR ATRIAL FIBRILLATION N/A 02/03/2023    Procedure: ABLATION, ARRHYTHMOGENIC FOCUS, FOR ATRIAL FIBRILLATION;  Surgeon: Lee Meyers MD;  Location: [facility] EP LAB;  Service: Cardiology;  Laterality: N/A;  AF, SAMMIE( Cx if SR), Redo PVI, RFA, CARTO, GEN, GP, 3 PREP    ABLATION, ATRIAL FLUTTER, TYPICAL  02/03/2023    Procedure: Ablation, Atrial Flutter, Typical;  Surgeon: Lee Meyers MD;  Location: [facility] EP LAB;  Service: Cardiology;;    ABLATION, ATRIAL  TACHYCARDIA  2023    Procedure: Ablation, Atrial Tachycardia;  Surgeon: Lee Meyers MD;  Location: [facility] EP LAB;  Service: Cardiology;;    CARDIAC ELECTROPHYSIOLOGY STUDY AND ABLATION      CARDIAC ELECTROPHYSIOLOGY STUDY AND ABLATION       SECTION      COLONOSCOPY N/A 2018    Procedure: COLONOSCOPY;  Surgeon: Brodie Lara MD;  Location: [facility] ENDO;  Service: Endoscopy;  Laterality: N/A;  confirmed appt-ss    ESOPHAGEAL MANOMETRY WITH MEASUREMENT OF IMPEDANCE N/A 2021    Procedure: MANOMETRY, ESOPHAGUS, WITH IMPEDANCE MEASUREMENT;  Surgeon: Franky Gomez MD;  Location: [facility] ENDO (4TH FLR);  Service: Endoscopy;  Laterality: N/A;  Covid test  [place]   pt confirmed appt-KPvt    ESOPHAGOGASTRODUODENOSCOPY N/A 2021    Procedure: EGD (ESOPHAGOGASTRODUODENOSCOPY);  Surgeon: Alin Camargo MD;  Location: [facility] ENDO (2ND FLR);  Service: Endoscopy;  Laterality: N/A;  Fully vaccinated 21, ok to hold Eliquis x 2 days per Dr. ANGELLA Meyers, instr portal -ml  12/10/21 LVM to see if patient can come in earlier -ml    HYSTERECTOMY      MIGUEL    RADIOFREQUENCY ABLATION Left 2019    Procedure: RADIOFREQUENCY ABLATION, LEFT L2,3,4,5;  Surgeon: Mariusz Jang MD;  Location: [facility] PAIN T;  Service: Pain Management;  Laterality: Left;  Left RFA L2,3,4,5  1 of 2  *Ok to hold Eliquis, per Dr Meyers    RADIOFREQUENCY ABLATION Right 2019    Procedure: RADIOFREQUENCY ABLATION,  Right L2,3,4,5;  Surgeon: Mariusz Jang MD;  Location: [facility];  Service: Pain Management;  Laterality: Right;  Right RFA @ L2,3,4,5  2 of 2     Family History   Problem Relation Age of Onset    Hypertension Mother     Diabetes Mother     Glaucoma Mother     Allergies Mother     Asbestos Father     Obesity Father     Eczema Son     Hypertension Son     Heart disease Maternal Grandmother         chf    Diabetes Maternal Grandfather     Cancer Paternal Grandmother         lung, 2/2 second  hand smoke    Glaucoma Paternal Grandfather     Blindness Paternal Grandfather     Melanoma Neg Hx     Psoriasis Neg Hx     Lupus Neg Hx     Acne Neg Hx     Breast cancer Neg Hx     Colon cancer Neg Hx     Ovarian cancer Neg Hx     Esophageal cancer Neg Hx     Cirrhosis Neg Hx     Celiac disease Neg Hx     Colon polyps Neg Hx     Crohn's disease Neg Hx     Liver cancer Neg Hx     Liver disease Neg Hx     Rectal cancer Neg Hx     Stomach cancer Neg Hx     Ulcerative colitis Neg Hx     Pancreatic cancer Neg Hx     Kidney cancer Neg Hx     Bladder Cancer Neg Hx     Uterine cancer Neg Hx      Social History     Socioeconomic History    Marital status:    Occupational History    Occupation: Teacher     Employer: [employer]    Tobacco Use    Smoking status: Never    Smokeless tobacco: Never   Substance and Sexual Activity    Alcohol use: Yes     Comment: rarely, 1 drink/week    Drug use: No    Sexual activity: Yes     Partners: Male   Other Topics Concern    Are you pregnant or think you may be? No    Breast-feeding No   Social History Narrative    Recently moved back to [facility] to help take care of mom.    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       Current Outpatient Medications:     albuterol (PROVENTIL) 2.5 mg /3 mL (0.083 %) nebulizer solution, Take 3 mLs (2.5 mg total) by nebulization every 6 (six) hours as needed for Wheezing., Disp: 3 mL, Rfl: 5    albuterol (PROVENTIL/VENTOLIN HFA) 90 mcg/actuation inhaler, Inhale into the lungs., Disp: , Rfl:     apixaban (ELIQUIS) 5 mg Tab, Take 1 tablet (5 mg total) by mouth 2 (two) times daily., Disp: 180 tablet, Rfl: 3    azelastine (ASTELIN) 137 mcg (0.1 %) nasal spray, 1 spray (137 mcg total) by Nasal route 2 (two) times daily., Disp: 90 mL, Rfl: 3    benralizumab (FASENRA PEN) 30 mg/mL AtIn, Inject 30 mg into the skin As instructed. Every 28 days for the first three doses, Disp: 1 mL, Rfl: 2    clobetasol 0.05% (TEMOVATE) 0.05 % Oint, Apply topically 2 (two)  times daily., Disp: 60 g, Rfl: 2    diclofenac sodium (VOLTAREN) 1 % Gel, Apply 2 g topically 4 (four) times daily as needed (pain)., Disp: 200 g, Rfl: 0    diltiaZEM (DILACOR XR) 120 MG CDCR, Take 1 capsule (120 mg total) by mouth once daily., Disp: 90 capsule, Rfl: 3    famotidine (PEPCID) 20 MG tablet, Take 1 tablet (20 mg total) by mouth 2 (two) times daily. for 7 days, Disp: 14 tablet, Rfl: 0    fluticasone propionate (FLONASE) 50 mcg/actuation nasal spray, 1 spray (50 mcg total) by Each Nostril route daily as needed for Rhinitis., Disp: 48 g, Rfl: 3    hydroCHLOROthiazide (HYDRODIURIL) 12.5 MG Tab, Take 1 tablet by mouth once daily, Disp: 60 tablet, Rfl: 0    montelukast (SINGULAIR) 10 mg tablet, , Disp: , Rfl:     mupirocin (BACTROBAN) 2 % ointment, Apply topically 3 (three) times daily., Disp: 30 g, Rfl: 2    omeprazole (PRILOSEC) 40 MG capsule, TAKE 1 CAPSULE TWICE DAILY BEFORE MEALS, Disp: 180 capsule, Rfl: 3    ondansetron (ZOFRAN-ODT) 4 MG TbDL, Take 1 tablet (4 mg total) by mouth every 8 (eight) hours as needed., Disp: 25 tablet, Rfl: 0    predniSONE (DELTASONE) 10 MG tablet, Take 2 tablets (20 mg total) by mouth once daily for 7 days, THEN 1 tablet (10 mg total) once daily for 7 days., Disp: 21 tablet, Rfl: 0    sotaloL (BETAPACE) 80 MG tablet, Take 1 tablet (80 mg total) by mouth 2 (two) times daily., Disp: 180 tablet, Rfl: 3    topiramate (TOPAMAX) 25 MG tablet, Take 1-2 tablets (25-50 mg total) by mouth every evening., Disp: 180 tablet, Rfl: 1    benralizumab (FASENRA PEN) 30 mg/mL AtIn, Inject 30 mg into the skin As instructed. Every 8 weeks (Patient not taking: Reported on 10/17/2023), Disp: 1 mL, Rfl: 11    cetirizine (ZYRTEC) 10 MG tablet, Take 1 tablet (10 mg total) by mouth once daily. (Patient not taking: Reported on 10/17/2023), Disp: 90 tablet, Rfl: 3    fluticasone-umeclidin-vilanter (TRELEGY ELLIPTA) 200-62.5-25 mcg inhaler, Inhale 1 puff into the lungs once daily. (Patient not taking:  "Reported on 10/17/2023), Disp: 3 each, Rfl: 4    triamcinolone acetonide 0.1% (KENALOG) 0.1 % cream, Apply topically 2 (two) times daily. (Patient not taking: Reported on 10/27/2023), Disp: 453.6 g, Rfl: 2   Objective:      Vitals:    10/27/23 0756   BP: 118/78   BP Location: Right arm   Patient Position: Sitting   BP Method: Large (Manual)   Pulse: 62   Resp: 16   Temp: 97.9 °F (36.6 °C)   TempSrc: Oral   SpO2: 98%   Weight: 97.9 kg (215 lb 13.3 oz)   Height: 5' 4" (1.626 m)       Physical Exam  Constitutional:       General: She is not in acute distress.     Appearance: She is not diaphoretic.   HENT:      Head: Normocephalic and atraumatic.   Eyes:      Conjunctiva/sclera: Conjunctivae normal.   Pulmonary:      Effort: Pulmonary effort is normal.   Musculoskeletal:      Cervical back: Neck supple.   Skin:     Findings: No rash.   Neurological:      Mental Status: She is alert and oriented to person, place, and time.   Psychiatric:         Behavior: Behavior normal.         Thought Content: Thought content normal.         Judgment: Judgment normal.            Assessment:       1. Allergic reaction, subsequent encounter    2. Encounter for colorectal cancer screening    3. Eczema, unspecified type        Plan:       Allergic reaction, subsequent encounter  -     Ambulatory referral/consult to Allergy; Future; Expected date: 11/03/2023    Encounter for colorectal cancer screening  -     Ambulatory referral/consult to Endo Procedure ; Future; Expected date: 10/28/2023    Eczema, unspecified type  -     Ambulatory referral/consult to Allergy; Future; Expected date: 11/03/2023    Hives have improved greatly.  Patient still has itching on occasion.  Did put referral to Allergy.  Continue p.r.n. use of steroid ointment.          Future Appointments   Date Time Provider Department Center   11/1/2023  7:30 AM Everett Chandler DPM [facility] [place]   11/1/2023  8:10 AM Moira Avila MD [facility] [place] "   1/22/2024  2:30 PM Gale Cruz MD [facility] DERM [address]   1/31/2024 10:00 AM Juice So MD [facility] [place]       Patient note was created using [employer].  Any errors in syntax or even information may not have been identified and edited on initial review prior to signing this note.

## 2023-10-27 NOTE — PROGRESS NOTES
Health Maintenance Due   Topic     Pneumococcal Vaccines (Age 0-64) (1 - PCV)     TETANUS VACCINE      Hemoglobin A1c (Diabetic Prevention Screening)  Consult pcp    Colorectal Cancer Screening  Information received.      Influenza Vaccine (1)     COVID-19 Vaccine (7 - 2023-24 season) Not offered at this Facility

## 2023-10-30 NOTE — PROGRESS NOTES
ALLERGY & IMMUNOLOGY CLINIC   HISTORY OF PRESENT ILLNESS   Referral from: Dr. Juice So  CC:   Chief Complaint   Patient presents with    Dermatitis       HPI: Francoise Dykes is a 63 y.o. female    Asthma for years well controlled on fasenra every 2 months, seen by pulmonary Dr. Sylvester who started this medication. Happy off steroids for asthma.    Had atopic dermatitis as a child and went away (points to fossa as hotspots.  Then in 40s-50s (just like her asthma) symptoms started to come back.   Triamcinolone with relief by dermatology until this summer when breasts and tops of buttocks flared and itched with no relief.   2 weeks ago Dr. So PCP gave clobetasol and mupirocen with relief.  However now with leg itching and changes in color of skin (darkening large patch on bilateral lower legs)    Having bad eczema breakouts can't sleep she is itching so bad.  Saw dermatology who gave steroids prednisone, flared when stopped, restarted steroids (longer course and bigger dose).  Using topical ointments.  On breast, neckline, arm, and both of legs.   Very dark, very itchy, not gone.)  Had biopsy of this rash 2 years ago which came back normal  Eczema flared this summer with the heat this summer.     Spared back, stomach.  Was on top of buttocks, top of breasts, legs, and one arm.  Rash was happening on legs and arm.     Otherwise is allergy to the leads (contact dermatitis)    And once when had IV contrast had reaction entire body was itching very badly and SOB with coughing and wheezing.     Drug Allergies:   Review of patient's allergies indicates:   Allergen Reactions    Iodinated contrast media Anaphylaxis     Itchy throat, SOB, hives    Adhesive tape-silicones Itching     Manifested in 12/2015 following AF ablation.         MEDICAL HISTORY   MedHx:   Patient Active Problem List   Diagnosis    Paroxysmal atrial fibrillation    Degeneration of lumbar or lumbosacral intervertebral disc    Sacroiliac  joint pain    Long term current use of antiarrhythmic drug    Severe obesity (BMI 35.0-35.9 with comorbidity)    S/P ablation of atrial fibrillation    Current use of long term anticoagulation    Thyroid nodule    Chronic pain    Atypical atrial flutter    Essential hypertension    Atrial flutter    Spondylosis without myelopathy    Osteoarthritis of lumbar spine    Chronic rhinitis    Laryngopharyngeal reflux    GERD (gastroesophageal reflux disease)    BMI 33.0-33.9,adult    COVID    Severe persistent asthma with exacerbation       SurgHx:  Past Surgical History:   Procedure Laterality Date    24 HOUR IMPEDANCE PH MONITORING OF ESOPHAGUS IN PATIENT TAKING ACID REDUCING MEDICATIONS N/A 2021    Procedure: IMPEDANCE PH STUDY, ESOPHAGEAL, 24 HOUR, IN PATIENT TAKING ACID REDUCING MEDICATION;  Surgeon: Franky Gomez MD;  Location: [facility] ENDO ([address] [place] [address]);  Service: Endoscopy;  Laterality: N/A;  Patient to do EGD with with esophageal Bravo pH probe on omeprazole 40 mg once daily she needs to take it with a sip of water the day of the case  pt completed COVID vaccine- see Immunization record in     ABLATION OF ARRHYTHMOGENIC FOCUS FOR ATRIAL FIBRILLATION N/A 2023    Procedure: ABLATION, ARRHYTHMOGENIC FOCUS, FOR ATRIAL FIBRILLATION;  Surgeon: Lee Meyers MD;  Location: [facility] EP LAB;  Service: Cardiology;  Laterality: N/A;  AF, SAMMIE( Cx if SR), Redo PVI, RFA, CARTO, GEN, GP, 3 PREP    ABLATION, ATRIAL FLUTTER, TYPICAL  2023    Procedure: Ablation, Atrial Flutter, Typical;  Surgeon: Lee Meyers MD;  Location: [facility] EP LAB;  Service: Cardiology;;    ABLATION, ATRIAL TACHYCARDIA  2023    Procedure: Ablation, Atrial Tachycardia;  Surgeon: Lee Meyers MD;  Location: [facility] EP LAB;  Service: Cardiology;;    CARDIAC ELECTROPHYSIOLOGY STUDY AND ABLATION      CARDIAC ELECTROPHYSIOLOGY STUDY AND ABLATION       SECTION      COLONOSCOPY N/A 2018    Procedure: COLONOSCOPY;  Surgeon: Brodie REAVES  MD Jane;  Location: [facility] ENDO;  Service: Endoscopy;  Laterality: N/A;  confirmed appt-ss    ESOPHAGEAL MANOMETRY WITH MEASUREMENT OF IMPEDANCE N/A 09/20/2021    Procedure: MANOMETRY, ESOPHAGUS, WITH IMPEDANCE MEASUREMENT;  Surgeon: Franky Gomez MD;  Location: [facility] ENDO (4TH FLR);  Service: Endoscopy;  Laterality: N/A;  Covid test 9/17 [place]  9/16 pt confirmed appt-KPvt    ESOPHAGOGASTRODUODENOSCOPY N/A 12/13/2021    Procedure: EGD (ESOPHAGOGASTRODUODENOSCOPY);  Surgeon: Alin Camargo MD;  Location: [facility] ENDO (2ND FLR);  Service: Endoscopy;  Laterality: N/A;  Fully vaccinated 12/13/21, ok to hold Eliquis x 2 days per Dr. ANGELLA Meyers, instr portal -ml  12/10/21 LVM to see if patient can come in earlier -ml    HYSTERECTOMY  2000    MIGUEL    RADIOFREQUENCY ABLATION Left 02/27/2019    Procedure: RADIOFREQUENCY ABLATION, LEFT L2,3,4,5;  Surgeon: Mariusz Jang MD;  Location: [facility];  Service: Pain Management;  Laterality: Left;  Left RFA L2,3,4,5  1 of 2  *Ok to hold Eliquis, per Dr Meyers    RADIOFREQUENCY ABLATION Right 03/13/2019    Procedure: RADIOFREQUENCY ABLATION,  Right L2,3,4,5;  Surgeon: Mariusz Jang MD;  Location: [facility];  Service: Pain Management;  Laterality: Right;  Right RFA @ L2,3,4,5  2 of 2       Medications:   Current Outpatient Medications on File Prior to Visit   Medication Sig Dispense Refill    albuterol (PROVENTIL) 2.5 mg /3 mL (0.083 %) nebulizer solution Take 3 mLs (2.5 mg total) by nebulization every 6 (six) hours as needed for Wheezing. 3 mL 5    albuterol (PROVENTIL/VENTOLIN HFA) 90 mcg/actuation inhaler Inhale into the lungs.      apixaban (ELIQUIS) 5 mg Tab Take 1 tablet (5 mg total) by mouth 2 (two) times daily. 180 tablet 3    azelastine (ASTELIN) 137 mcg (0.1 %) nasal spray 1 spray (137 mcg total) by Nasal route 2 (two) times daily. 90 mL 3    benralizumab (FASENRA PEN) 30 mg/mL AtIn Inject 30 mg into the skin As instructed. Every 8 weeks 1 mL 11     cetirizine (ZYRTEC) 10 MG tablet Take 1 tablet (10 mg total) by mouth once daily. 90 tablet 3    clobetasol 0.05% (TEMOVATE) 0.05 % Oint Apply topically 2 (two) times daily. 60 g 2    diclofenac sodium (VOLTAREN) 1 % Gel Apply 2 g topically 4 (four) times daily as needed (pain). 200 g 0    diltiaZEM (DILACOR XR) 120 MG CDCR Take 1 capsule (120 mg total) by mouth once daily. 90 capsule 3    famotidine (PEPCID) 20 MG tablet Take 1 tablet (20 mg total) by mouth 2 (two) times daily. for 7 days 14 tablet 0    hydroCHLOROthiazide (HYDRODIURIL) 12.5 MG Tab Take 1 tablet by mouth once daily 60 tablet 0    montelukast (SINGULAIR) 10 mg tablet       mupirocin (BACTROBAN) 2 % ointment Apply topically 3 (three) times daily. 30 g 2    naltrexone (DEPADE) 50 mg tablet Take 25 mg by mouth once daily. 45 tablet 1    omeprazole (PRILOSEC) 40 MG capsule TAKE 1 CAPSULE TWICE DAILY BEFORE MEALS 180 capsule 3    ondansetron (ZOFRAN-ODT) 4 MG TbDL Take 1 tablet (4 mg total) by mouth every 8 (eight) hours as needed. 25 tablet 0    sotaloL (BETAPACE) 80 MG tablet Take 1 tablet (80 mg total) by mouth 2 (two) times daily. 180 tablet 3    topiramate (TOPAMAX) 25 MG tablet Take 1-2 tablets (25-50 mg total) by mouth every evening. 180 tablet 1    triamcinolone acetonide 0.1% (KENALOG) 0.1 % cream Apply topically 2 (two) times daily. 453.6 g 2    [DISCONTINUED] benralizumab (FASENRA PEN) 30 mg/mL AtIn Inject 30 mg into the skin As instructed. Every 28 days for the first three doses 1 mL 2    [DISCONTINUED] fluticasone propionate (FLONASE) 50 mcg/actuation nasal spray 1 spray (50 mcg total) by Each Nostril route daily as needed for Rhinitis. 48 g 3    [DISCONTINUED] fluticasone-umeclidin-vilanter (TRELEGY ELLIPTA) 200-62.5-25 mcg inhaler Inhale 1 puff into the lungs once daily. (Patient not taking: Reported on 10/17/2023) 3 each 4    [DISCONTINUED] predniSONE (DELTASONE) 10 MG tablet Take 2 tablets (20 mg total) by mouth once daily for 7 days,  "THEN 1 tablet (10 mg total) once daily for 7 days. 21 tablet 0    [DISCONTINUED] topiramate (TOPAMAX) 25 MG tablet Take 1-2 tablets (25-50 mg total) by mouth every evening. 180 tablet 1     No current facility-administered medications on file prior to visit.      PHYSICAL EXAM   VS: Ht 5' 4" (1.626 m)   Wt 99 kg (218 lb 4.1 oz)   LMP  (LMP Unknown)   BMI 37.46 kg/m²   GENERAL: NAD, well nourished, well appearing  EYES: no conjunctival injection, no discharge, no infraorbital shiners  EARS: external auditory canals normal B/L, TM normal B/L  NOSE: NT pink 2+ B/L, no polyps  ORAL: MMM, no ulcers, no thrush, no cobblestoning  LUNGS: CTAB, no w/r/c, no increased WOB  DERM: +flat large patches symmetric b/l lower legs of slightly thickened hyperpigemented skin (in areas where previously were very itchy and red)   ASSESSMENT & PLAN     Francoise Dykes is a 63 y.o. female with     Atopic dermatitis  - Bimodal: had in childhood, and recurred in 40s  - On clobetasol with improvement but persistence of itch which is disruptive to sleep  - Has had biopsy with dermatology 2 years ago, today discussed CTCL which can present like eczema in an adult and can take multiple biopsies to diagnose, however reassuring that her rashes appear in both sun exposed and nonexposed areas so not consistent with this  - She would be a good candidate for Dupilumab (600mg LD, 300mg MD every 2 weeks) to help control both her asthma and atopic dermatitis  - Discussed continuing Fasenra and trying another topical (Elidel) vs. Changing over to Dupiluamb. Patient would like to try the first option and return in 6 weeks, if not controlled at that time   - Start: pimecrolimus (ELIDEL) 1 % cream; Apply topically 2 (two) times daily.  Dispense: 60 g; Refill: 6    Asthma  - Bimodal: had in childhood, and recurred in 40s  - Finally well controlled since starting Fasenra prescribed by Dr. Sylvester, who helped get her asthma under control after multiple " "rounds of steroids  - She adores Dr. Liam Young from [facility] and is very grateful for all his help helping her to get better! She always looks forward to his calls.    Contact dermatitis: to deodorant and adhesive  - Can try Native deoderant, which some patients have told me they don't react to  - If reaction occurs immediately wipe off with soap and water, can use topical corticosteroids daily until reaction ebbs  - Avoid adhesives, can also use topical steroids if reaction/exposure occurs    Contrast reaction in 2022 to Omnipaque 350  - Reaction to IV contrast (itching, tingling, SOB/wheezing and hives) relief with IV bendaryl, steroids, and pepcid and a neb treatment with improvement of her symptoms. Did not have premeds.   - Consistent with anaphylaxis vs. Direct mast cell activation ("allergic like")  - Offered skin testing now vs. Premedications in future, would like to try premeds if needs contrast in reaction  - Premeds in future prior to contrast if needed (benadryl, prednisone, pepcid)     Follow up: 6 weeks      "

## 2023-10-31 ENCOUNTER — TELEPHONE (OUTPATIENT)
Dept: PODIATRY | Facility: CLINIC | Age: 63
End: 2023-10-31
Payer: COMMERCIAL

## 2023-10-31 NOTE — TELEPHONE ENCOUNTER
Spoke with patient in regards to cancelling/rescheduling her appointment on 10/31/2023 with Dr. Chandler(Podiatry), she was very upset due to call the day before appt., I offered other locations with aanother date and time but she was looking to be seen on tomorrow due to her had taken off and had other appt's. here at [facility], she asked if there was am availability on 11/3/2023 at another location but only pm appts, she stated that wouldn't work for her. I apologized for any inconvenience

## 2023-11-01 ENCOUNTER — OFFICE VISIT (OUTPATIENT)
Dept: ALLERGY | Facility: CLINIC | Age: 63
End: 2023-11-01
Payer: COMMERCIAL

## 2023-11-01 ENCOUNTER — OFFICE VISIT (OUTPATIENT)
Dept: BARIATRICS | Facility: CLINIC | Age: 63
End: 2023-11-01
Payer: COMMERCIAL

## 2023-11-01 VITALS — WEIGHT: 218.25 LBS | BODY MASS INDEX: 37.26 KG/M2 | HEIGHT: 64 IN

## 2023-11-01 VITALS
SYSTOLIC BLOOD PRESSURE: 122 MMHG | OXYGEN SATURATION: 99 % | DIASTOLIC BLOOD PRESSURE: 61 MMHG | HEART RATE: 60 BPM | BODY MASS INDEX: 36.98 KG/M2 | HEIGHT: 64 IN | WEIGHT: 216.63 LBS

## 2023-11-01 DIAGNOSIS — T78.40XD ALLERGIC REACTION, SUBSEQUENT ENCOUNTER: ICD-10-CM

## 2023-11-01 DIAGNOSIS — L20.9 ATOPIC DERMATITIS, UNSPECIFIED TYPE: ICD-10-CM

## 2023-11-01 DIAGNOSIS — T50.8X5D ADVERSE EFFECT OF CONTRAST MEDIA, SUBSEQUENT ENCOUNTER: ICD-10-CM

## 2023-11-01 DIAGNOSIS — J45.909 ASTHMA, UNSPECIFIED ASTHMA SEVERITY, UNSPECIFIED WHETHER COMPLICATED, UNSPECIFIED WHETHER PERSISTENT: ICD-10-CM

## 2023-11-01 DIAGNOSIS — E66.01 CLASS 2 SEVERE OBESITY WITH BODY MASS INDEX (BMI) OF 35 TO 39.9 WITH SERIOUS COMORBIDITY: Primary | ICD-10-CM

## 2023-11-01 DIAGNOSIS — L30.9 ECZEMA, UNSPECIFIED TYPE: ICD-10-CM

## 2023-11-01 DIAGNOSIS — L25.9 CONTACT DERMATITIS, UNSPECIFIED CONTACT DERMATITIS TYPE, UNSPECIFIED TRIGGER: Primary | ICD-10-CM

## 2023-11-01 PROCEDURE — 3008F PR BODY MASS INDEX (BMI) DOCUMENTED: ICD-10-PCS | Mod: CPTII,S$GLB,, | Performed by: STUDENT IN AN ORGANIZED HEALTH CARE EDUCATION/TRAINING PROGRAM

## 2023-11-01 PROCEDURE — 99214 OFFICE O/P EST MOD 30 MIN: CPT | Mod: S$GLB,,, | Performed by: STUDENT IN AN ORGANIZED HEALTH CARE EDUCATION/TRAINING PROGRAM

## 2023-11-01 PROCEDURE — 1159F PR MEDICATION LIST DOCUMENTED IN MEDICAL RECORD: ICD-10-PCS | Mod: CPTII,S$GLB,, | Performed by: STUDENT IN AN ORGANIZED HEALTH CARE EDUCATION/TRAINING PROGRAM

## 2023-11-01 PROCEDURE — 3074F PR MOST RECENT SYSTOLIC BLOOD PRESSURE < 130 MM HG: ICD-10-PCS | Mod: CPTII,S$GLB,, | Performed by: INTERNAL MEDICINE

## 2023-11-01 PROCEDURE — 99214 PR OFFICE/OUTPT VISIT, EST, LEVL IV, 30-39 MIN: ICD-10-PCS | Mod: S$GLB,,, | Performed by: INTERNAL MEDICINE

## 2023-11-01 PROCEDURE — 99999 PR PBB SHADOW E&M-EST. PATIENT-LVL V: CPT | Mod: PBBFAC,,, | Performed by: INTERNAL MEDICINE

## 2023-11-01 PROCEDURE — 99214 OFFICE O/P EST MOD 30 MIN: CPT | Mod: S$GLB,,, | Performed by: INTERNAL MEDICINE

## 2023-11-01 PROCEDURE — 3074F SYST BP LT 130 MM HG: CPT | Mod: CPTII,S$GLB,, | Performed by: INTERNAL MEDICINE

## 2023-11-01 PROCEDURE — 99999 PR PBB SHADOW E&M-EST. PATIENT-LVL IV: CPT | Mod: PBBFAC,,, | Performed by: STUDENT IN AN ORGANIZED HEALTH CARE EDUCATION/TRAINING PROGRAM

## 2023-11-01 PROCEDURE — 99214 PR OFFICE/OUTPT VISIT, EST, LEVL IV, 30-39 MIN: ICD-10-PCS | Mod: S$GLB,,, | Performed by: STUDENT IN AN ORGANIZED HEALTH CARE EDUCATION/TRAINING PROGRAM

## 2023-11-01 PROCEDURE — 3078F DIAST BP <80 MM HG: CPT | Mod: CPTII,S$GLB,, | Performed by: INTERNAL MEDICINE

## 2023-11-01 PROCEDURE — 3008F PR BODY MASS INDEX (BMI) DOCUMENTED: ICD-10-PCS | Mod: CPTII,S$GLB,, | Performed by: INTERNAL MEDICINE

## 2023-11-01 PROCEDURE — 3078F PR MOST RECENT DIASTOLIC BLOOD PRESSURE < 80 MM HG: ICD-10-PCS | Mod: CPTII,S$GLB,, | Performed by: INTERNAL MEDICINE

## 2023-11-01 PROCEDURE — 99999 PR PBB SHADOW E&M-EST. PATIENT-LVL V: ICD-10-PCS | Mod: PBBFAC,,, | Performed by: INTERNAL MEDICINE

## 2023-11-01 PROCEDURE — 1159F MED LIST DOCD IN RCRD: CPT | Mod: CPTII,S$GLB,, | Performed by: STUDENT IN AN ORGANIZED HEALTH CARE EDUCATION/TRAINING PROGRAM

## 2023-11-01 PROCEDURE — 99999 PR PBB SHADOW E&M-EST. PATIENT-LVL IV: ICD-10-PCS | Mod: PBBFAC,,, | Performed by: STUDENT IN AN ORGANIZED HEALTH CARE EDUCATION/TRAINING PROGRAM

## 2023-11-01 PROCEDURE — 3008F BODY MASS INDEX DOCD: CPT | Mod: CPTII,S$GLB,, | Performed by: STUDENT IN AN ORGANIZED HEALTH CARE EDUCATION/TRAINING PROGRAM

## 2023-11-01 PROCEDURE — 3008F BODY MASS INDEX DOCD: CPT | Mod: CPTII,S$GLB,, | Performed by: INTERNAL MEDICINE

## 2023-11-01 RX ORDER — PIMECROLIMUS 10 MG/G
CREAM TOPICAL 2 TIMES DAILY
Qty: 60 G | Refills: 6 | Status: SHIPPED | OUTPATIENT
Start: 2023-11-01

## 2023-11-01 RX ORDER — TOPIRAMATE 25 MG/1
25-50 TABLET ORAL NIGHTLY
Qty: 180 TABLET | Refills: 1 | Status: SHIPPED | OUTPATIENT
Start: 2023-11-01 | End: 2024-02-15 | Stop reason: SDUPTHER

## 2023-11-01 RX ORDER — NALTREXONE HYDROCHLORIDE 50 MG/1
25 TABLET, FILM COATED ORAL DAILY
Qty: 45 TABLET | Refills: 1 | Status: SHIPPED | OUTPATIENT
Start: 2023-11-01 | End: 2024-02-15 | Stop reason: SDUPTHER

## 2023-11-01 NOTE — PATIENT INSTRUCTIONS
Check on your insurance's formulary (drug plan) web site to see if Wegovy (semaglutide 2.4 mg) or Saxenda (liraglutide 3 mg ) are covered. You may also go to https://www.Rain/wegovy/cost-navigator.html to check coverage, but that information may not be as accurate.         Patient was informed that topiramate is used for migraine prevention and seizures. Weight loss is a common side effect that is well documented. S/he understands this. S/he was informed of the potential side effects such as serious and possibly fatal rash in which case the medication should be discontinued immediately. Paresthesias, forgetfulness, fatigue, kidney stones, GI symptoms, and changes in lab values such as electrolytes, blood counts and kidney function.    Start topiramate 1 tab in the evening.    1/2 tab naltrexone in the evening.       Add some type of resistance training 2-3 days a week. These can be body weight exercises, light weight or elastic bands. [employer] and [employer] are great sources for free work out plans and videos.         1000 nathan daily planner from first visit.    Can use a shake 1 meal a day (ex- dinner).       Sample Weight Training Plan ( adapted from [facility]):    1.Goblet Squat  How to:    Stand with feet hip-width apart and hold a weight vertically in front of chest, elbows pointing toward the floor.  Push hips back and bend knees to lower into a squat.  Drive through heels to stand back up to starting position. That's 1 rep. Complete three to five reps with a heavy weight.      2.Bent-Over Row  How to:    With a dumbbell in each hand and feet under hips, hinge at hips with knees slightly bent and arms just in front of legs.  Drive one elbow back toward hips, feeling shoulder blades squeeze together, pulling weight toward side body.  Slowly lower weight back down, then repeat with other arm. That's 1 rep. Complete three to five reps with a medium-heavy weight.        3.Lateral Lunge  How  to:    Holding a weight at chest or dumbbells at each side, stand up straight with feet hip-width apart.  Take a large step to the right, sit hips back, and lower down until right knee is nearly parallel with the floor. Your left leg should be straight.  Return to start. That's 1 rep. Complete 10 reps on each side.      4.Chest Press  How to:    Lie flat on back, or on a bench, with feet flat on the ground. With a dumbbell in each hand, extend arms directly over shoulders, palms facing toward feet.  Squeeze shoulder blades together and slowly bend elbows, lowering the weights out to the side, parallel with shoulders, until elbows form 90-degree angles.  Slowly drive the dumbbells back up to start, squeezing shoulder blades the entire time. Thats 1 rep. Complete three to five reps with a medium-heavy weight.      5.Split Stance Shoulder Press    How to:    Grab a pair of dumbbells or a resistance band. Stagger stance into a wide step, one foot forward and one back with hips squared, and hold the weights or band just above shoulders, elbows close to sides.  Leaning forward ever so slightly, bend both knees, and press through front heel while simultaneously lifting the weights or band to the brendan, keeping elbows forward and arms in line with your ears.  Lower weights or band back to shoulders. That's 1 rep. Do 10 reps, alternating side for each set.        6.Alternating Reverse Lunge To Bicep Curl  How to:    Grab a pair of dumbbells and hold them at arm's length next to sides, palms facing each other. Stand tall with feet hip-width apart.  Step backward with right leg and lower body until front knee is bent 90 degrees. At the same time as you lunge, curl both dumbbells up to shoulders.  Lower the dumbbells as you return to the starting position. Step back with the other leg and repeat.That's 1 rep. Complete 12 reps with a medium weight.

## 2023-11-01 NOTE — PROGRESS NOTES
"Subjective:       Patient ID: Francoise Dykes is a 63 y.o. female.    Chief Complaint: Follow-up      CC:   Pt here today for follow-up. Has lost 2.1 lbs(-4 lbs muscle. +4.7 lbs fat).  Has  progressed to 1000 nathan PB diet, and topiramate 25 mg qhs, with instructions to slowly titrate up to 50 mg as she was having  SE was trouble with word finding. She got up to the 50 mg at night but states she was getting numbness and weakness in her hands after about a month so she stopped it. States she was eating one meal a day and just fruit. Has been cutting back on snack foods. Her appetite was down. Has not been exercising consistently.      She did have covid and feels she has been struggling since. 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     BP ok today.    Prev med hx:  ozempic, currently on 1mg weekly.  No longer covered. Denies SE.       New BMR: 1425    New PBF: 49.1%    initial  BMR: 1479    PBF:  47%    Follow-up    Review of Systems      Objective:     /61   Pulse 60   Ht 5' 4" (1.626 m)   Wt 98.2 kg (216 lb 9.6 oz)   LMP  (LMP Unknown)   SpO2 99%   BMI 37.18 kg/m²    Physical Exam  Vitals reviewed.   Constitutional:       General: She is not in acute distress.     Appearance: She is well-developed.      Comments: Centrally obese   HENT:      Head: Normocephalic and atraumatic.   Eyes:      General: No scleral icterus.     Pupils: Pupils are equal, round, and reactive to light.   Cardiovascular:      Rate and Rhythm: Normal rate.   Pulmonary:      Effort: Pulmonary effort is normal.   Musculoskeletal:         General: Normal range of motion.      Cervical back: Normal range of motion and neck supple.   Skin:     General: Skin is warm and dry.      Findings: No erythema.   Neurological:      Mental Status: She is alert and oriented to person, place, and time.   Psychiatric:         Behavior: Behavior normal.         Judgment: Judgment normal.       Assessment:       1. Class 2 severe " obesity with body mass index (BMI) of 35 to 39.9 with serious comorbidity                  Plan:           Francoise was seen today for follow-up.    Diagnoses and all orders for this visit:    Class 2 severe obesity with body mass index (BMI) of 35 to 39.9 with serious comorbidity    Other orders  -     topiramate (TOPAMAX) 25 MG tablet; Take 1-2 tablets (25-50 mg total) by mouth every evening.  -     naltrexone (DEPADE) 50 mg tablet; Take 25 mg by mouth once daily.                Patient was informed that topiramate is used for migraine prevention and seizures. Weight loss is a common side effect that is well documented. S/he understands this. S/he was informed of the potential side effects such as serious and possibly fatal rash in which case the medication should be discontinued immediately. Paresthesias, forgetfulness, fatigue, kidney stones, GI symptoms, and changes in lab values such as electrolytes, blood counts and kidney function.    Start topiramate 1 tab in the evening.    1/2 tab naltrexone in the evening.       Add some type of resistance training 2-3 days a week. These can be body weight exercises, light weight or elastic bands. [employer] and [employer] are great sources for free work out plans and videos.         1000 nathan daily planner from first visit.    Can use a shake 1 meal a day (ex- dinner).      Weight training handout given.

## 2023-11-03 ENCOUNTER — OFFICE VISIT (OUTPATIENT)
Dept: PODIATRY | Facility: CLINIC | Age: 63
End: 2023-11-03
Payer: COMMERCIAL

## 2023-11-03 VITALS
DIASTOLIC BLOOD PRESSURE: 77 MMHG | BODY MASS INDEX: 37.26 KG/M2 | HEART RATE: 71 BPM | WEIGHT: 218.25 LBS | SYSTOLIC BLOOD PRESSURE: 123 MMHG | HEIGHT: 64 IN

## 2023-11-03 DIAGNOSIS — M79.675 TOE PAIN, LEFT: ICD-10-CM

## 2023-11-03 DIAGNOSIS — L03.032 PARONYCHIA, TOE, LEFT: ICD-10-CM

## 2023-11-03 DIAGNOSIS — L60.0 INGROWN NAIL: Primary | ICD-10-CM

## 2023-11-03 PROCEDURE — 3074F PR MOST RECENT SYSTOLIC BLOOD PRESSURE < 130 MM HG: ICD-10-PCS | Mod: CPTII,S$GLB,, | Performed by: PODIATRIST

## 2023-11-03 PROCEDURE — 99999 PR PBB SHADOW E&M-EST. PATIENT-LVL IV: CPT | Mod: PBBFAC,,, | Performed by: PODIATRIST

## 2023-11-03 PROCEDURE — 3008F PR BODY MASS INDEX (BMI) DOCUMENTED: ICD-10-PCS | Mod: CPTII,S$GLB,, | Performed by: PODIATRIST

## 2023-11-03 PROCEDURE — 3078F PR MOST RECENT DIASTOLIC BLOOD PRESSURE < 80 MM HG: ICD-10-PCS | Mod: CPTII,S$GLB,, | Performed by: PODIATRIST

## 2023-11-03 PROCEDURE — 1160F RVW MEDS BY RX/DR IN RCRD: CPT | Mod: CPTII,S$GLB,, | Performed by: PODIATRIST

## 2023-11-03 PROCEDURE — 1159F PR MEDICATION LIST DOCUMENTED IN MEDICAL RECORD: ICD-10-PCS | Mod: CPTII,S$GLB,, | Performed by: PODIATRIST

## 2023-11-03 PROCEDURE — 1159F MED LIST DOCD IN RCRD: CPT | Mod: CPTII,S$GLB,, | Performed by: PODIATRIST

## 2023-11-03 PROCEDURE — 99999 PR PBB SHADOW E&M-EST. PATIENT-LVL IV: ICD-10-PCS | Mod: PBBFAC,,, | Performed by: PODIATRIST

## 2023-11-03 PROCEDURE — 99203 PR OFFICE/OUTPT VISIT, NEW, LEVL III, 30-44 MIN: ICD-10-PCS | Mod: S$GLB,,, | Performed by: PODIATRIST

## 2023-11-03 PROCEDURE — 3008F BODY MASS INDEX DOCD: CPT | Mod: CPTII,S$GLB,, | Performed by: PODIATRIST

## 2023-11-03 PROCEDURE — 3078F DIAST BP <80 MM HG: CPT | Mod: CPTII,S$GLB,, | Performed by: PODIATRIST

## 2023-11-03 PROCEDURE — 1160F PR REVIEW ALL MEDS BY PRESCRIBER/CLIN PHARMACIST DOCUMENTED: ICD-10-PCS | Mod: CPTII,S$GLB,, | Performed by: PODIATRIST

## 2023-11-03 PROCEDURE — 3074F SYST BP LT 130 MM HG: CPT | Mod: CPTII,S$GLB,, | Performed by: PODIATRIST

## 2023-11-03 PROCEDURE — 99203 OFFICE O/P NEW LOW 30 MIN: CPT | Mod: S$GLB,,, | Performed by: PODIATRIST

## 2023-11-03 RX ORDER — TOBRAMYCIN 3 MG/ML
SOLUTION/ DROPS OPHTHALMIC
Qty: 5 ML | Refills: 3 | Status: SHIPPED | OUTPATIENT
Start: 2023-11-03 | End: 2024-02-28

## 2023-11-30 ENCOUNTER — CLINICAL SUPPORT (OUTPATIENT)
Dept: ENDOSCOPY | Facility: HOSPITAL | Age: 63
End: 2023-11-30
Attending: FAMILY MEDICINE
Payer: COMMERCIAL

## 2023-11-30 ENCOUNTER — TELEPHONE (OUTPATIENT)
Dept: ENDOSCOPY | Facility: HOSPITAL | Age: 63
End: 2023-11-30

## 2023-11-30 VITALS — HEIGHT: 64 IN | BODY MASS INDEX: 37.22 KG/M2 | WEIGHT: 218 LBS

## 2023-11-30 DIAGNOSIS — Z12.12 ENCOUNTER FOR COLORECTAL CANCER SCREENING: ICD-10-CM

## 2023-11-30 DIAGNOSIS — Z12.11 ENCOUNTER FOR COLORECTAL CANCER SCREENING: ICD-10-CM

## 2023-11-30 DIAGNOSIS — Z12.11 SPECIAL SCREENING FOR MALIGNANT NEOPLASMS, COLON: Primary | ICD-10-CM

## 2023-11-30 NOTE — TELEPHONE ENCOUNTER
Spoke to patient to schedule procedure(s) Colonoscopy       Physician to perform procedure(s) Dr. JEAN PAUL Lovell  Date of Procedure (s) 3/4/24  Arrival Time 9:15 AM  Time of Procedure(s) 10:15 AM   Location of Procedure(s) [address]  Type of Rx Prep sent to patient: PEG  Instructions provided to patient via [facility]    Patient was informed on the following information and verbalized understanding. Screening questionnaire reviewed with patient and complete. If procedure requires anesthesia, a responsible adult needs to be present to accompany the patient home, patient cannot drive after receiving anesthesia. Appointment details are tentative, especially check-in time. Patient will receive a prep-op call 7 days prior to confirm check-in time for procedure. If applicable the patient should contact their pharmacy to verify Rx for procedure prep is ready for pick-up. Patient was advised to call the scheduling department at [phone] if pharmacy states no Rx is available. Patient was advised to call the endoscopy scheduling department if any questions or concerns arise.      [facility] Scheduling Department

## 2023-11-30 NOTE — TELEPHONE ENCOUNTER
----- Message from Davion Young RN sent at 11/30/2023 11:23 AM CST -----  Regarding: 3/4 BT  The patient is currently under an internal cardiologist Dr. Lee murphy and requires a blood thinner Eliquis (apixaban) for their upcoming scheduled Colonoscopy on 3/4/24.

## 2023-12-08 DIAGNOSIS — I48.0 PAROXYSMAL ATRIAL FIBRILLATION: ICD-10-CM

## 2023-12-08 RX ORDER — DILTIAZEM HYDROCHLORIDE 120 MG/1
120 CAPSULE, EXTENDED RELEASE ORAL DAILY
Qty: 90 CAPSULE | Refills: 1 | Status: SHIPPED | OUTPATIENT
Start: 2023-12-08 | End: 2024-01-27 | Stop reason: SDUPTHER

## 2023-12-10 DIAGNOSIS — I10 ESSENTIAL HYPERTENSION: ICD-10-CM

## 2023-12-10 NOTE — TELEPHONE ENCOUNTER
Care Due:                  Date            Visit Type   Department     Provider  --------------------------------------------------------------------------------                                EP -                              PRIMARY      [facility]  Last Visit: 10-      CARE ([facility])   MEDICINE       [facility]  [place]                              EP -                              PRIMARY      [facility]  Next Visit: 01-      CARE ([facility])   MEDICINE       [facility]                                                            Last  Test          Frequency    Reason                     Performed    Due Date  --------------------------------------------------------------------------------    CMP.........  12 months..  hydroCHLOROthiazide......  12- 01-    [facility] Embedded Care Due Messages. Reference number: [id number].   12/10/2023 11:19:04 AM CST

## 2023-12-11 RX ORDER — HYDROCHLOROTHIAZIDE 12.5 MG/1
12.5 TABLET ORAL
Qty: 90 TABLET | Refills: 0 | Status: SHIPPED | OUTPATIENT
Start: 2023-12-11 | End: 2024-03-05 | Stop reason: SDUPTHER

## 2023-12-11 NOTE — TELEPHONE ENCOUNTER
Provider Staff:  Action required for this patient    Requires labs      Please see care gap opportunities below in Care Due Message.    Thanks!  [facility]     Appointments      Date Provider   Last Visit   10/27/2023 Juice So MD   Next Visit   1/31/2024 Juice So MD     Refill Decision Note   Francoise Dykes  is requesting a refill authorization.  Brief Assessment and Rationale for Refill:  Approve     Medication Therapy Plan:         Comments:     Note composed:12:34 PM 12/11/2023

## 2024-01-22 ENCOUNTER — OFFICE VISIT (OUTPATIENT)
Dept: DERMATOLOGY | Facility: CLINIC | Age: 64
End: 2024-01-22
Payer: COMMERCIAL

## 2024-01-22 DIAGNOSIS — L81.4 LENTIGINES: ICD-10-CM

## 2024-01-22 DIAGNOSIS — L30.9 DERMATITIS, UNSPECIFIED: Primary | ICD-10-CM

## 2024-01-22 DIAGNOSIS — L82.1 DERMATOSIS PAPULOSA NIGRA: ICD-10-CM

## 2024-01-22 DIAGNOSIS — L81.1 MELASMA: ICD-10-CM

## 2024-01-22 PROCEDURE — 99999 PR PBB SHADOW E&M-EST. PATIENT-LVL III: CPT | Mod: PBBFAC,,, | Performed by: STUDENT IN AN ORGANIZED HEALTH CARE EDUCATION/TRAINING PROGRAM

## 2024-01-22 PROCEDURE — 1160F RVW MEDS BY RX/DR IN RCRD: CPT | Mod: CPTII,S$GLB,, | Performed by: STUDENT IN AN ORGANIZED HEALTH CARE EDUCATION/TRAINING PROGRAM

## 2024-01-22 PROCEDURE — 99204 OFFICE O/P NEW MOD 45 MIN: CPT | Mod: S$GLB,,, | Performed by: STUDENT IN AN ORGANIZED HEALTH CARE EDUCATION/TRAINING PROGRAM

## 2024-01-22 PROCEDURE — 1159F MED LIST DOCD IN RCRD: CPT | Mod: CPTII,S$GLB,, | Performed by: STUDENT IN AN ORGANIZED HEALTH CARE EDUCATION/TRAINING PROGRAM

## 2024-01-22 RX ORDER — HYDROQUINONE 40 MG/G
CREAM TOPICAL
Qty: 30 G | Refills: 1 | Status: SHIPPED | OUTPATIENT
Start: 2024-01-22

## 2024-01-22 RX ORDER — CLOBETASOL PROPIONATE 0.5 MG/G
OINTMENT TOPICAL
Qty: 60 G | Refills: 1 | Status: SHIPPED | OUTPATIENT
Start: 2024-01-22 | End: 2024-02-09 | Stop reason: SDUPTHER

## 2024-01-22 NOTE — PROGRESS NOTES
Subjective:      Patient ID:  Francoise Dykes is a 63 y.o. female who presents for   Chief Complaint   Patient presents with    Rash     Both legs , L arm,     Skin Discoloration     face     63 y.o. female presents today for a rash.    LV with Dr. Flores 10/2020    1. Rash  Location:  Both legs, L arm   Duration: yrs   Symptoms: Itching, discoloration   Current treatments: pimecrolimus 1% cream, mupirocin 2% cream    Prior treatments tried: TAC ointment  Other pertinent history:    10/2020 skin biopsy  Skin, left medial lower leg, punch biopsy:   -ALLERGIC URTICARIAL REACTION, see comment   COMMENT:  The findings suggest a hypersensitivity reaction.  Drug reactions,   urticaria, insect bites, and bullous pemphigoid may, at times, have similar   morphologic findings.  PAS stain is negative for fungal organisms.  Clinical   correlation is recommended.     2. Hyperpigmentation  Location: Face  Duration: yrs   Symptoms: discoloration   Relieving factors/Previous treatments:   No current treatment            On benralizumab (Fasenra) for asthma     Review of Systems   Constitutional:  Negative for fever, chills and fatigue.   Respiratory:  Negative for cough.    Skin:  Negative for itching and rash.   Allergic/Immunologic: Positive for environmental allergies (trees, grass).       Objective:   Physical Exam   Skin:   Areas Examined (abnormalities noted in diagram):   Head / Face Inspection Performed  LUE Inspection Performed  RLE Inspected  LLE Inspection Performed       Diagram Legend     Erythematous scaling macule/papule c/w actinic keratosis       Vascular papule c/w angioma      Pigmented verrucoid papule/plaque c/w seborrheic keratosis      Yellow umbilicated papule c/w sebaceous hyperplasia      Irregularly shaped tan macule c/w lentigo     1-2 mm smooth white papules consistent with Milia      Movable subcutaneous cyst with punctum c/w epidermal inclusion cyst      Subcutaneous movable cyst c/w pilar cyst       Firm pink to brown papule c/w dermatofibroma      Pedunculated fleshy papule(s) c/w skin tag(s)      Evenly pigmented macule c/w junctional nevus     Mildly variegated pigmented, slightly irregular-bordered macule c/w mildly atypical nevus      Flesh colored to evenly pigmented papule c/w intradermal nevus       Pink pearly papule/plaque c/w basal cell carcinoma      Erythematous hyperkeratotic cursted plaque c/w SCC      Surgical scar with no sign of skin cancer recurrence      Open and closed comedones      Inflammatory papules and pustules      Verrucoid papule consistent consistent with wart     Erythematous eczematous patches and plaques     Dystrophic onycholytic nail with subungual debris c/w onychomycosis     Umbilicated papule    Erythematous-base heme-crusted tan verrucoid plaque consistent with inflamed seborrheic keratosis     Erythematous Silvery Scaling Plaque c/w Psoriasis     See annotation                      Assessment / Plan:      Dermatitis, unspecified  Status: chronic condition flaring and/or not at treatment goal  Erythematous, slightly edematous non-scaly plaques on L forearm and BL legs (photos above)  Has been biopsied prior, favored dermal hypersensitivity rxn  It will go away for a while then return  Discussed gentle skin care- Free and clear detergent, moisturizing cream every day, fragrance free soap  Offered repeat bx v empiric tx, she prefers empiric tx  Start clobetasol ointment bid to AA x 2 weeks on, 1 week off  D/c elidil and mupirocin     Melasma, lentigines, dermatosis papulosa nigra  On face pt bothered by hyperpigmentation- reviewed she has various areas of hyperpigmentation as above. Some are UV-induced and others are inherited (DPN)  Removal of DPN discussed cost $300 up to 15  Mild melasma predominantly on R cheek- treatment as below  Reviewed importance of wearing tinted sunscreen SPF 30+ every day    Status: chronic condition flaring and/or not at treatment  goal    - Counseled this is acquired disorder of hyperpigmentation often on sun-exposed areas of face. It is a chronic problem that can flare with sun exposure, pregnancy, OCP, other triggers  - The most important thing is to always wear sunscreen every day SPF 30+ mineral sunscreen and beneficial if it also contains iron oxide (tinted) for ex: Elta MD UV tinted as this protects against visible light. Sun protective clothing (UPF 50) and wide-brimmed hats also beneficial. Taking polypodium leucotomas (Heliocare) supplements are also beneficial.    Plan:  - Start Vitamin C serum qAM ex: (SkinCeuticals CE Ferulic ($$$), Vanicream Vitamin C serum ($))  - Every day wear tinted sunscreen qAM. Apply after Vitamin C. Reapply every 2 hours when outdoors.   - Start triple cream nightly thin layer to affected area- SM52 hydroquinone 4.5%/ tretinoin 0.025% / fluocinolone 0.01% / niacinamide 4% cream - 30 g tube 2 refills sent from online compounding pharmacy (text or email). Use this medication for 3 months, then take 1 month break before restarting. Stop when clear.    - Hydroquinone can cause paradoxical hyperpigmentation (worsening) if used for extended periods. Do not use more than 3 months at a time.    - Tretinoin can cause skin irritation/dryness. Use moisturizer and hold or discontinue if severe irritation occurs    RTC 3 months, sooner prn

## 2024-01-22 NOTE — PATIENT INSTRUCTIONS
Melasma  - Counseled this is acquired disorder of hyperpigmentation often on sun-exposed areas of face. It is a chronic problem that can flare with sun exposure, pregnancy, OCP, other triggers  - The most important thing is to always wear sunscreen every day SPF 30+ mineral sunscreen and beneficial if it also contains iron oxide (tinted) for ex: Elta MD UV tinted as this protects against visible light. Sun protective clothing (UPF 50) and wide-brimmed hats also beneficial. Taking polypodium leucotomas (Heliocare) supplements are also beneficial.    Plan:  - Start Vitamin C serum qAM ex: (SkinCeuticals CE Ferulic ($$$), Vanicream Vitamin C serum ($))  - Every day wear tinted sunscreen qAM. Apply after Vitamin C. Reapply every 2 hours when outdoors.   - Start triple cream qhs- SM52 hydroquinone 4.5%/ tretinoin 0.025% / fluocinolone 0.01% / niacinamide 4% cream - 30 g tube 2 refills sent from online compounding pharmacy (text or email). Use this medication for 3 months, then take 1 month break before restarting. Stop when clear.    - Hydroquinone can cause paradoxical hyperpigmentation (worsening) if used for extended periods. Do not use more than 3 months at a time.    - Tretinoin can cause skin irritation/dryness. Use moisturizer and hold or discontinue if severe irritation occurs    Other future treatment considerations:   Chemical peels  Azelaic acid  Kojic acid  Cysteamine   PDL for vascular melasma  Topical TXA  Oral tranexamic acid for severe cases- 250 mg tid x 12 wks- risk of thrombosis

## 2024-01-27 DIAGNOSIS — I48.0 PAROXYSMAL ATRIAL FIBRILLATION: ICD-10-CM

## 2024-01-29 RX ORDER — DILTIAZEM HYDROCHLORIDE 120 MG/1
120 CAPSULE, EXTENDED RELEASE ORAL DAILY
Qty: 90 CAPSULE | Refills: 0 | Status: SHIPPED | OUTPATIENT
Start: 2024-01-29 | End: 2024-04-08

## 2024-01-30 ENCOUNTER — TELEPHONE (OUTPATIENT)
Dept: DERMATOLOGY | Facility: CLINIC | Age: 64
End: 2024-01-30
Payer: COMMERCIAL

## 2024-01-30 NOTE — TELEPHONE ENCOUNTER
"Called and spoke to pt regarding PA for medication. Informed pt that we do not do PA's but we can send the medication to an [facility] who will. She stated that [facility] called and said that the prescription was ready. Informed pt to let us know if the medication is too expensive and we can send it over to the [facility] [place] [address] [facility] [address] [place] [address]. She agreed and thanked me for calling.     ----- Message from Alex Hannon MA sent at 1/26/2024  4:42 PM CST -----  Regarding: PA  Please Advise   ----- Message -----  From: Zohra Hood  Sent: 1/26/2024   2:45 PM CST  To: Anthony Beasley Staff    Consult/Advisory:      Name Of Caller: [facility]        What is the nature of the call?:  [facility] state that PA is needed for Rx:; clobetasol 0.05% (TEMOVATE) 0.05 % Oint . Please call         [id number] [address] [place] - [address]  [address]  [place] [address]  Phone: [phone] Fax: [phone]           Additional Notes:  "Thank you for all that you do for our patients"            "

## 2024-02-05 ENCOUNTER — TELEPHONE (OUTPATIENT)
Dept: ENDOSCOPY | Facility: HOSPITAL | Age: 64
End: 2024-02-05
Payer: COMMERCIAL

## 2024-02-08 ENCOUNTER — PATIENT MESSAGE (OUTPATIENT)
Dept: DERMATOLOGY | Facility: CLINIC | Age: 64
End: 2024-02-08
Payer: COMMERCIAL

## 2024-02-08 DIAGNOSIS — I10 ESSENTIAL HYPERTENSION: ICD-10-CM

## 2024-02-09 DIAGNOSIS — L30.9 DERMATITIS, UNSPECIFIED: ICD-10-CM

## 2024-02-12 RX ORDER — CLOBETASOL PROPIONATE 0.5 MG/G
OINTMENT TOPICAL
Qty: 60 G | Refills: 1 | Status: SHIPPED | OUTPATIENT
Start: 2024-02-12

## 2024-02-15 ENCOUNTER — OFFICE VISIT (OUTPATIENT)
Dept: BARIATRICS | Facility: CLINIC | Age: 64
End: 2024-02-15
Payer: COMMERCIAL

## 2024-02-15 VITALS
SYSTOLIC BLOOD PRESSURE: 124 MMHG | HEIGHT: 65 IN | OXYGEN SATURATION: 97 % | BODY MASS INDEX: 35.88 KG/M2 | DIASTOLIC BLOOD PRESSURE: 78 MMHG | WEIGHT: 215.38 LBS | HEART RATE: 65 BPM

## 2024-02-15 DIAGNOSIS — E66.01 CLASS 2 SEVERE OBESITY WITH BODY MASS INDEX (BMI) OF 35 TO 39.9 WITH SERIOUS COMORBIDITY: Primary | ICD-10-CM

## 2024-02-15 PROCEDURE — 1160F RVW MEDS BY RX/DR IN RCRD: CPT | Mod: CPTII,S$GLB,, | Performed by: INTERNAL MEDICINE

## 2024-02-15 PROCEDURE — 99214 OFFICE O/P EST MOD 30 MIN: CPT | Mod: S$GLB,,, | Performed by: INTERNAL MEDICINE

## 2024-02-15 PROCEDURE — 3078F DIAST BP <80 MM HG: CPT | Mod: CPTII,S$GLB,, | Performed by: INTERNAL MEDICINE

## 2024-02-15 PROCEDURE — 1159F MED LIST DOCD IN RCRD: CPT | Mod: CPTII,S$GLB,, | Performed by: INTERNAL MEDICINE

## 2024-02-15 PROCEDURE — 3074F SYST BP LT 130 MM HG: CPT | Mod: CPTII,S$GLB,, | Performed by: INTERNAL MEDICINE

## 2024-02-15 PROCEDURE — 3008F BODY MASS INDEX DOCD: CPT | Mod: CPTII,S$GLB,, | Performed by: INTERNAL MEDICINE

## 2024-02-15 PROCEDURE — 99999 PR PBB SHADOW E&M-EST. PATIENT-LVL V: CPT | Mod: PBBFAC,,, | Performed by: INTERNAL MEDICINE

## 2024-02-15 RX ORDER — NALTREXONE HYDROCHLORIDE 50 MG/1
25 TABLET, FILM COATED ORAL DAILY
Qty: 45 TABLET | Refills: 1 | Status: SHIPPED | OUTPATIENT
Start: 2024-02-15

## 2024-02-15 RX ORDER — TOPIRAMATE 25 MG/1
25-50 TABLET ORAL NIGHTLY
Qty: 180 TABLET | Refills: 1 | Status: SHIPPED | OUTPATIENT
Start: 2024-02-15 | End: 2024-02-28

## 2024-02-15 NOTE — PATIENT INSTRUCTIONS
Patient was informed that topiramate is used for migraine prevention and seizures. Weight loss is a common side effect that is well documented. S/he understands this. S/he was informed of the potential side effects such as serious and possibly fatal rash in which case the medication should be discontinued immediately. Paresthesias, forgetfulness, fatigue, kidney stones, GI symptoms, and changes in lab values such as electrolytes, blood counts and kidney function.    Start topiramate 1 tab in the evening.    1/2 tab naltrexone in the evening.       Add some type of resistance training 2-3 days a week. These can be body weight exercises, light weight or elastic bands. [employer] and [employer] are great sources for free work out plans and videos.         1000 nathan daily planner from first visit.    Can use a shake 1 meal a day (ex- dinner).     Snack ideas     Savory  Avocado with chili powder, garlic powder and black pepper  String cheese or cheese cubes/slices  Tuna, chicken or egg salad lettuce wraps  Ham/turkey and cheese roll-up  Turkey jerky  Hard boiled egg  Steamed edamame  Olives, pickles (watch sodium)  Baked zucchini chips with salsa  Cottage cheese with tomatoes & dill  Salad with light dressing & veggies  Nuts and Seeds: Pistachios, almonds, cashews, pecans, sunflower seeds, peanuts, walnuts, pumpkin seeds, hazelnuts, brazil nuts (salt free)     Sweet  Plain yogurt: Triple Zero Oikos, Fage or Powerful with fruit, nuts, seeds, cinnamon  Sugar free jello  Sugar free popsicles  Homemade protein shake  Atkins bars/shakes  Premier protein shakes  Power crunch bar  Emerald cocoa dusted almonds (1/4 cup)     Sweet and Savory  Grilled pineapple and ham skewers  Cottage Cheese with fruit, nuts, seeds, cinnamon  Homemade smoothie with spinach, avocado, frozen fruit & unsweetened vanilla almond milk           Peanut Butter/[place] Butter  Witwith apples, ½ banana, celery, carrots, strawberries         Hummus/Guacamole/Light Cream Cheese/Greek yogurt + Ranch powder mix        cucumber, carrots, celery, bell pepper, tomato, cauliflower, broccoli, snap peas, green        beans, hard boiled egg, turkey pepperoni slices, salami slices, ham, grilled chicken                           Tips when Cravings Hit:  Drink water or sugar free Crystal Light when a craving begins.  Avoid eating blind: pre-portion foods instead of leaving them in their original package.  Eat without distractions: phone, tv, laptop etc.    Eat slowly, chew foods well (30 times).  Find snacks high in protein to keep you full longer.

## 2024-02-15 NOTE — PROGRESS NOTES
"Subjective:       Patient ID: Francoise Dykes is a 63 y.o. female.    Chief Complaint: Follow-up      CC:   Pt here today for follow-up. Has lost 3.3 lbs(-1.1 lbs muscle. -1,4 lbs fat).  Has  progressed to 1000 nathan PB diet, and topiramate 25 mg qhs and added a half tab naltrexone last OV.  States she is bringing her lunch. Has been cutting back on snack foods. Her appetite was down. Has not been exercising consistently. Her endurance is improving.         BP ok today.    Prev med hx:  ozempic, currently on 1mg weekly.  No longer covered. Denies SE.   Topiramate with instructions to slowly titrate up to 50 mg as she was having  SE was trouble with word finding. She got up to the 50 mg at night but states she was getting numbness and weakness in her hands after about a month  so she stopped it    New BMR: 1476    New PBF: 47.6%    initial  BMR: 1479    PBF:  47%    Follow-up      Review of Systems      Objective:     /78 (BP Location: Left arm, Patient Position: Sitting, BP Method: Large (Manual))   Pulse 65   Ht 5' 4.5" (1.638 m)   Wt 97.7 kg (215 lb 6.4 oz)   LMP  (LMP Unknown)   SpO2 97%   BMI 36.40 kg/m²    Physical Exam  Vitals reviewed.   Constitutional:       General: She is not in acute distress.     Appearance: She is well-developed.      Comments: Centrally obese   HENT:      Head: Normocephalic and atraumatic.   Eyes:      General: No scleral icterus.     Pupils: Pupils are equal, round, and reactive to light.   Cardiovascular:      Rate and Rhythm: Normal rate.   Pulmonary:      Effort: Pulmonary effort is normal.   Musculoskeletal:         General: Normal range of motion.      Cervical back: Normal range of motion and neck supple.   Skin:     General: Skin is warm and dry.      Findings: No erythema.   Neurological:      Mental Status: She is alert and oriented to person, place, and time.   Psychiatric:         Behavior: Behavior normal.         Judgment: Judgment normal.         Assessment: "       1. Class 2 severe obesity with body mass index (BMI) of 35 to 39.9 with serious comorbidity                    Plan:           Francoise was seen today for follow-up.    Diagnoses and all orders for this visit:    Class 2 severe obesity with body mass index (BMI) of 35 to 39.9 with serious comorbidity    Other orders  -     topiramate (TOPAMAX) 25 MG tablet; Take 1-2 tablets (25-50 mg total) by mouth every evening.  -     naltrexone (DEPADE) 50 mg tablet; Take 25 mg by mouth once daily.                  Patient was informed that topiramate is used for migraine prevention and seizures. Weight loss is a common side effect that is well documented. S/he understands this. S/he was informed of the potential side effects such as serious and possibly fatal rash in which case the medication should be discontinued immediately. Paresthesias, forgetfulness, fatigue, kidney stones, GI symptoms, and changes in lab values such as electrolytes, blood counts and kidney function.    Start topiramate 1 tab in the evening.    1/2 tab naltrexone in the evening.       Add some type of resistance training 2-3 days a week. These can be body weight exercises, light weight or elastic bands. [employer] and [employer] are great sources for free work out plans and videos.         1000 nathan daily planner from first visit.    Can use a shake 1 meal a day (ex- dinner).     Snack ideas given.

## 2024-02-28 ENCOUNTER — TELEPHONE (OUTPATIENT)
Dept: OPHTHALMOLOGY | Facility: CLINIC | Age: 64
End: 2024-02-28
Payer: COMMERCIAL

## 2024-02-28 ENCOUNTER — OFFICE VISIT (OUTPATIENT)
Dept: OPTOMETRY | Facility: CLINIC | Age: 64
End: 2024-02-28
Payer: COMMERCIAL

## 2024-02-28 DIAGNOSIS — H43.392 FLOATER, VITREOUS, LEFT: ICD-10-CM

## 2024-02-28 DIAGNOSIS — H47.093 OPTIC NERVE ASYMMETRY, BILATERAL: ICD-10-CM

## 2024-02-28 DIAGNOSIS — H25.13 NS (NUCLEAR SCLEROSIS), BILATERAL: ICD-10-CM

## 2024-02-28 DIAGNOSIS — H02.60 XANTHELASMA: ICD-10-CM

## 2024-02-28 DIAGNOSIS — H40.059 OCULAR HYPERTENSION, UNSPECIFIED LATERALITY: Primary | ICD-10-CM

## 2024-02-28 DIAGNOSIS — Z83.511 FAMILY HISTORY OF GLAUCOMA: ICD-10-CM

## 2024-02-28 PROCEDURE — 99999 PR PBB SHADOW E&M-EST. PATIENT-LVL III: CPT | Mod: PBBFAC,,, | Performed by: OPTOMETRIST

## 2024-02-28 PROCEDURE — 1160F RVW MEDS BY RX/DR IN RCRD: CPT | Mod: CPTII,S$GLB,, | Performed by: OPTOMETRIST

## 2024-02-28 PROCEDURE — 99204 OFFICE O/P NEW MOD 45 MIN: CPT | Mod: S$GLB,,, | Performed by: OPTOMETRIST

## 2024-02-28 PROCEDURE — 76514 ECHO EXAM OF EYE THICKNESS: CPT | Mod: S$GLB,,, | Performed by: OPTOMETRIST

## 2024-02-28 PROCEDURE — 1159F MED LIST DOCD IN RCRD: CPT | Mod: CPTII,S$GLB,, | Performed by: OPTOMETRIST

## 2024-02-28 PROCEDURE — 92133 CPTRZD OPH DX IMG PST SGM ON: CPT | Mod: S$GLB,,, | Performed by: OPTOMETRIST

## 2024-02-28 NOTE — TELEPHONE ENCOUNTER
----- Message from Nathaly Ritchie, OD sent at 2/28/2024 12:36 PM CST -----  Please call sister Sally Dianajosee at [phone] to schedule cat eval

## 2024-02-28 NOTE — PROGRESS NOTES
HPI    63yoF states she saw an optometrist at [facility] last August and is   being referred for a glaucoma screening.  Pt states she does have a strong   family hx of glaucoma (mother, paternal aunt and grandfather). Pt does   have floaters OS but denies any flashes.     Last edited by Alissa Navarro MA on 2/28/2024 11:02 AM.            Assessment /Plan     For exam results, see Encounter Report.        NS (nuclear sclerosis), bilateral   Mild, monitor    Floater, vitreous, left   Stable,monitor    Xanthelasma  Per Ulysses: On exam, the patient has yellowish colored macules in the bilateral tear troughs.    She also has 1 macule at the medial aspect of the right upper eyelid.    These findings are consistent with xanthelasma.    She does have a history of hyperlipidemia.    Pt reports lipid panel was WNL    Ocular hypertension, unspecified laterality  Optic nerve asymmetry, bilateral   OS>OD   Cupping OS  Family history of glaucoma: mother, aunt, GF   OCT optic nerve today: OD WNL, OS thin IT   PACHY 580/577   IOP 24/22       RTC 1 month for GONIO/HVF 24-2/ ?photos?/ IOP

## 2024-03-03 ENCOUNTER — ANESTHESIA EVENT (OUTPATIENT)
Dept: ENDOSCOPY | Facility: HOSPITAL | Age: 64
End: 2024-03-03
Payer: COMMERCIAL

## 2024-03-04 ENCOUNTER — TELEPHONE (OUTPATIENT)
Dept: DERMATOLOGY | Facility: CLINIC | Age: 64
End: 2024-03-04
Payer: COMMERCIAL

## 2024-03-04 ENCOUNTER — ANESTHESIA (OUTPATIENT)
Dept: ENDOSCOPY | Facility: HOSPITAL | Age: 64
End: 2024-03-04
Payer: COMMERCIAL

## 2024-03-04 ENCOUNTER — HOSPITAL ENCOUNTER (OUTPATIENT)
Facility: HOSPITAL | Age: 64
Discharge: HOME | End: 2024-03-04
Attending: STUDENT IN AN ORGANIZED HEALTH CARE EDUCATION/TRAINING PROGRAM | Admitting: STUDENT IN AN ORGANIZED HEALTH CARE EDUCATION/TRAINING PROGRAM
Payer: COMMERCIAL

## 2024-03-04 VITALS
OXYGEN SATURATION: 96 % | RESPIRATION RATE: 20 BRPM | TEMPERATURE: 98 F | HEART RATE: 57 BPM | DIASTOLIC BLOOD PRESSURE: 69 MMHG | SYSTOLIC BLOOD PRESSURE: 130 MMHG

## 2024-03-04 DIAGNOSIS — Z86.010 HISTORY OF COLONIC POLYPS: ICD-10-CM

## 2024-03-04 PROCEDURE — D9220A PRA ANESTHESIA: Mod: CRNA,,, | Performed by: NURSE ANESTHETIST, CERTIFIED REGISTERED

## 2024-03-04 PROCEDURE — 45385 COLONOSCOPY W/LESION REMOVAL: CPT | Mod: PT | Performed by: STUDENT IN AN ORGANIZED HEALTH CARE EDUCATION/TRAINING PROGRAM

## 2024-03-04 PROCEDURE — 37000008 HC ANESTHESIA 1ST 15 MINUTES: Performed by: STUDENT IN AN ORGANIZED HEALTH CARE EDUCATION/TRAINING PROGRAM

## 2024-03-04 PROCEDURE — 27201089 HC SNARE, DISP (ANY): Performed by: STUDENT IN AN ORGANIZED HEALTH CARE EDUCATION/TRAINING PROGRAM

## 2024-03-04 PROCEDURE — 88305 TISSUE EXAM BY PATHOLOGIST: CPT | Mod: 26,,, | Performed by: PATHOLOGY

## 2024-03-04 PROCEDURE — 88305 TISSUE EXAM BY PATHOLOGIST: CPT | Performed by: PATHOLOGY

## 2024-03-04 PROCEDURE — 25000003 PHARM REV CODE 250: Performed by: NURSE ANESTHETIST, CERTIFIED REGISTERED

## 2024-03-04 PROCEDURE — D9220A PRA ANESTHESIA: Mod: ANES,,, | Performed by: ANESTHESIOLOGY

## 2024-03-04 PROCEDURE — 63600175 PHARM REV CODE 636 W HCPCS: Performed by: NURSE ANESTHETIST, CERTIFIED REGISTERED

## 2024-03-04 PROCEDURE — 37000009 HC ANESTHESIA EA ADD 15 MINS: Performed by: STUDENT IN AN ORGANIZED HEALTH CARE EDUCATION/TRAINING PROGRAM

## 2024-03-04 PROCEDURE — 45385 COLONOSCOPY W/LESION REMOVAL: CPT | Mod: 33,,, | Performed by: STUDENT IN AN ORGANIZED HEALTH CARE EDUCATION/TRAINING PROGRAM

## 2024-03-04 RX ORDER — PROPOFOL 10 MG/ML
VIAL (ML) INTRAVENOUS
Status: DISCONTINUED | OUTPATIENT
Start: 2024-03-04 | End: 2024-03-04

## 2024-03-04 RX ORDER — LIDOCAINE HYDROCHLORIDE 20 MG/ML
INJECTION INTRAVENOUS
Status: DISCONTINUED | OUTPATIENT
Start: 2024-03-04 | End: 2024-03-04

## 2024-03-04 RX ORDER — SODIUM CHLORIDE 9 MG/ML
INJECTION, SOLUTION INTRAVENOUS CONTINUOUS
Status: DISCONTINUED | OUTPATIENT
Start: 2024-03-04 | End: 2024-03-04 | Stop reason: HOSPADM

## 2024-03-04 RX ORDER — LIDOCAINE HYDROCHLORIDE 10 MG/ML
1 INJECTION, SOLUTION EPIDURAL; INFILTRATION; INTRACAUDAL; PERINEURAL ONCE
Status: DISCONTINUED | OUTPATIENT
Start: 2024-03-04 | End: 2024-03-04 | Stop reason: HOSPADM

## 2024-03-04 RX ADMIN — PROPOFOL 30 MG: 10 INJECTION, EMULSION INTRAVENOUS at 09:03

## 2024-03-04 RX ADMIN — LIDOCAINE HYDROCHLORIDE 100 MG: 20 INJECTION, SOLUTION INTRAVENOUS at 09:03

## 2024-03-04 RX ADMIN — PROPOFOL 40 MG: 10 INJECTION, EMULSION INTRAVENOUS at 10:03

## 2024-03-04 RX ADMIN — PROPOFOL 70 MG: 10 INJECTION, EMULSION INTRAVENOUS at 09:03

## 2024-03-04 RX ADMIN — SODIUM CHLORIDE: 0.9 INJECTION, SOLUTION INTRAVENOUS at 09:03

## 2024-03-04 RX ADMIN — PROPOFOL 40 MG: 10 INJECTION, EMULSION INTRAVENOUS at 09:03

## 2024-03-04 NOTE — OR NURSING
PROCEDURE AND RECOVERY COMPLETED. DR GONZALEZ DISCUSSED FINDINGS WITH PATIENT AND SON; UNDERSTANDING VERBALIZED; ASSISTED UP WITHOUT UNTOWARD EFFECTS; PATIENT READY FOR DISCHARGE.

## 2024-03-04 NOTE — ANESTHESIA PREPROCEDURE EVALUATION
03/04/2024  Francoise Dykes is a 63 y.o., female.      Pre-op Assessment    I have reviewed the Patient Summary Reports.     I have reviewed the Nursing Notes. I have reviewed the NPO Status.   I have reviewed the Medications.     Review of Systems  Anesthesia Hx:  No problems with previous Anesthesia             Denies Family Hx of Anesthesia complications.    Denies Personal Hx of Anesthesia complications.                    Social:  Social Alcohol Use, Non-Smoker       Cardiovascular:     Hypertension    Dysrhythmias           S/P Ablation                         Pulmonary:    Asthma                    Hepatic/GI:     GERD             Musculoskeletal:            Lumbar Spine Disorders, Lumbar Disc Disease   Endocrine:        Obesity / BMI > 30      Physical Exam  General: Well nourished, Alert, Oriented and Cooperative    Airway:  Mallampati: II   Mouth Opening: Normal  TM Distance: Normal  Tongue: Normal    Dental:  Caps / Implants        Anesthesia Plan  Type of Anesthesia, risks & benefits discussed:    Anesthesia Type: Gen Natural Airway  Intra-op Monitoring Plan: Standard ASA Monitors  Induction:  IV  Informed Consent: Informed consent signed with the Patient and all parties understand the risks and agree with anesthesia plan.  All questions answered. Patient consented to blood products? No  ASA Score: 2    Ready For Surgery From Anesthesia Perspective.     .

## 2024-03-04 NOTE — ANESTHESIA POSTPROCEDURE EVALUATION
Anesthesia Post Evaluation    Patient: Francoise Dykes    Procedure(s) Performed: Procedure(s) (LRB):  COLONOSCOPY (N/A)    Final Anesthesia Type: general      Patient location during evaluation: GI PACU  Patient participation: Yes- Able to Participate  Level of consciousness: awake and alert  Post-procedure vital signs: reviewed and stable  Airway patency: patent    PONV status at discharge: No PONV  Anesthetic complications: no      Cardiovascular status: blood pressure returned to baseline and hemodynamically stable  Respiratory status: unassisted, spontaneous ventilation and room air  Hydration status: euvolemic  Follow-up not needed.              Vitals Value Taken Time   /61 03/04/24 1010   Temp 36.8 °C (98.2 °F) 03/04/24 1010   Pulse 66 03/04/24 1010   Resp 19 03/04/24 1010   SpO2 96 % 03/04/24 1010         No case tracking events are documented in the log.      Pain/Theo Score: Theo Score: 10 (3/4/2024 10:25 AM)

## 2024-03-04 NOTE — H&P
Short Stay Endoscopy History and Physical    PCP - Juice So MD    Procedure - Colonoscopy  ASA - per anesthesia  Mallampati - per anesthesia  Plan of anesthesia - MAC    HPI:  This is a 63 y.o. female here for evaluation of : personal history of colon polyps    ROS:  Constitutional: No fevers, chills  CV: No chest pain  Pulm: No cough  Ophtho: No vision changes  GI: see HPI  Derm: No rash    Medical History:  has a past medical history of Acid reflux, Allergy, Asthma with acute exacerbation, Atrial fibrillation, and Essential hypertension (2017).    Surgical History:  has a past surgical history that includes Hysterectomy ();  section; Cardiac electrophysiology study and ablation; Colonoscopy (N/A, 2018); Radiofrequency ablation (Left, 2019); Radiofrequency ablation (Right, 2019); Esophageal manometry with measurement of impedance (N/A, 2021); 24 hour impedance pH monitoring of esophagus in patient taking acid reducing medications (N/A, 2021); Esophagogastroduodenoscopy (N/A, 2021); Ablation of arrhythmogenic focus for atrial fibrillation (N/A, 2023); ablation, atrial flutter, typical (2023); ablation, atrial tachycardia (2023); and Cardiac electrophysiology study and ablation.    Family History: family history includes Allergies in her mother; Asbestos in her father; Blindness in her paternal grandfather; Cancer in her paternal grandmother; Diabetes in her maternal grandfather and mother; Eczema in her son; Glaucoma in her mother, paternal aunt, and paternal grandfather; Heart disease in her maternal grandmother; Hypertension in her mother and son; Obesity in her father.. Otherwise no colon cancer, inflammatory bowel disease, or GI malignancies.    Social History:  reports that she has never smoked. She has never used smokeless tobacco. She reports current alcohol use. She reports that she does not use drugs.    Review of  patient's allergies indicates:   Allergen Reactions    Iodinated contrast media Anaphylaxis, Hives, Shortness Of Breath and Itching    Adhesive tape-silicones Itching     Manifested in 12/2015 following AF ablation.       Medications:   Medications Prior to Admission   Medication Sig Dispense Refill Last Dose    albuterol (PROVENTIL) 2.5 mg /3 mL (0.083 %) nebulizer solution Take 3 mLs (2.5 mg total) by nebulization every 6 (six) hours as needed for Wheezing. 3 mL 5 Past Week    albuterol (PROVENTIL/VENTOLIN HFA) 90 mcg/actuation inhaler Inhale 1 puff into the lungs every 6 (six) hours as needed.   Past Month    apixaban (ELIQUIS) 5 mg Tab Take 1 tablet (5 mg total) by mouth 2 (two) times daily. 180 tablet 0 Past Week    azelastine (ASTELIN) 137 mcg (0.1 %) nasal spray 1 spray (137 mcg total) by Nasal route 2 (two) times daily. 90 mL 3 Past Week    benralizumab (FASENRA PEN) 30 mg/mL AtIn Inject 30 mg into the skin As instructed. Every 8 weeks 1 mL 11 Past Month    diclofenac sodium (VOLTAREN) 1 % Gel Apply 2 g topically 4 (four) times daily as needed (pain). 200 g 0 Past Month    diltiaZEM (DILACOR XR) 120 MG CDCR Take 1 capsule (120 mg total) by mouth once daily. 90 capsule 0 3/4/2024    hydroCHLOROthiazide (HYDRODIURIL) 12.5 MG Tab Take 1 tablet by mouth once daily 90 tablet 0 3/4/2024    montelukast (SINGULAIR) 10 mg tablet Take 10 mg by mouth once daily.   Past Month    naltrexone (DEPADE) 50 mg tablet Take 25 mg by mouth once daily. 45 tablet 1 3/3/2024    omeprazole (PRILOSEC) 40 MG capsule TAKE 1 CAPSULE TWICE DAILY BEFORE MEALS 180 capsule 3 3/3/2024    pimecrolimus (ELIDEL) 1 % cream Apply topically 2 (two) times daily. 60 g 6 Past Week    sotaloL (BETAPACE) 80 MG tablet Take 1 tablet (80 mg total) by mouth 2 (two) times daily. 180 tablet 3 3/4/2024    triamcinolone acetonide 0.1% (KENALOG) 0.1 % cream Apply topically 2 (two) times daily. 453.6 g 2 Past Month    clobetasol 0.05% (TEMOVATE) 0.05 % Oint  Apply topically 2 (two) times daily. 60 g 2 More than a month    clobetasol 0.05% (TEMOVATE) 0.05 % Oint Apply twice daily to affected area 60 g 1     famotidine (PEPCID) 20 MG tablet Take 1 tablet (20 mg total) by mouth 2 (two) times daily. for 7 days 14 tablet 0     hydroquinone 4 % Crea Apply pea-sized amount nightly for 3 months. Take 1 month off and restart as needed. 30 g 1 More than a month    mupirocin (BACTROBAN) 2 % ointment Apply topically 3 (three) times daily. 30 g 2 More than a month         Vital Signs:   Vitals:    03/04/24 0907   BP: (!) 143/72   Pulse: 62   Resp: 20   Temp: 99 °F (37.2 °C)       General Appearance: Well appearing in no acute distress  Eyes:    No scleral icterus  ENT: atraumatic  Abdomen: Soft, nondistended  Extremities: no tenderness  Skin: normal color    Labs:  Lab Results   Component Value Date    WBC 6.00 07/12/2023    HGB 13.8 07/12/2023    HCT 39.8 07/12/2023     07/12/2023    CHOL 189 06/20/2018    TRIG 152 (H) 06/20/2018    HDL 47 06/20/2018    ALT 18 12/12/2022    AST 16 12/12/2022     01/30/2023    K 4.0 01/30/2023     01/30/2023    CREATININE 0.9 01/30/2023    BUN 12 01/30/2023    CO2 25 01/30/2023    TSH 0.523 10/31/2022    INR 1.0 01/30/2023    HGBA1C 4.5 06/20/2018       I have explained the risks and benefits of endoscopy procedures to the patient/their POA including but not limited to bleeding, perforation, infection, and death.  The patient/their POA was asked if they understand and allowed to ask any further questions to their satisfaction.    Frances Lovell MD

## 2024-03-04 NOTE — TELEPHONE ENCOUNTER
At this time this nurse writer calls pt as provider out of clinic at original appointment time on 04/26/24 at 2:30 PM;  nurse reschedules pt per pt request at 1:30 PM appointment on 05/01/24; pt thanks nurse and call completed.

## 2024-03-04 NOTE — TRANSFER OF CARE
Anesthesia Transfer of Care Note    Patient: Francoise Dykes    Procedure(s) Performed: Procedure(s) (LRB):  COLONOSCOPY (N/A)    Patient location: GI    Anesthesia Type: general    Transport from OR: Transported from OR on room air with adequate spontaneous ventilation    Post pain: adequate analgesia    Post assessment: no apparent anesthetic complications and tolerated procedure well    Post vital signs: stable    Level of consciousness: lethargic and responds to stimulation    Nausea/Vomiting: no nausea/vomiting    Complications: none    Transfer of care protocol was followed      Last vitals: Visit Vitals  /61 (BP Location: Left forearm, Patient Position: Lying)   Pulse 66   Temp 36.8 °C (98.2 °F) (Oral)   Resp 19   LMP  (LMP Unknown)   SpO2 96%   Breastfeeding No

## 2024-03-04 NOTE — PROVATION PATIENT INSTRUCTIONS
Discharge Summary/Instructions after an Endoscopic Procedure  Patient Name: Francoise Dykes  Patient MRN: [id number]  Patient YOB: 1960 Monday, March 4, 2024  Frances Lovell MD  Dear patient,  As a result of recent federal legislation (The Federal Cures Act), you may   receive lab or pathology results from your procedure in your [facility]   account before your physician is able to contact you. Your physician or   their representative will relay the results to you with their   recommendations at their soonest availability.  Thank you,  RESTRICTIONS:  During your procedure today, you received medications for sedation.  These   medications may affect your judgment, balance and coordination.  Therefore,   for 24 hours, you have the following restrictions:   - DO NOT drive a car, operate machinery, make legal/financial decisions,   sign important papers or drink alcohol.    ACTIVITY:  Today: no heavy lifting, straining or running due to procedural   sedation/anesthesia.  The following day: return to full activity including work.  DIET:  Eat and drink normally unless instructed otherwise.     TREATMENT FOR COMMON SIDE EFFECTS:  - Mild abdominal pain, nausea, belching, bloating or excessive gas:  rest,   eat lightly and use a heating pad.  - Sore Throat: treat with throat lozenges and/or gargle with warm salt   water.  - Because air was used during the procedure, expelling large amounts of air   from your rectum or belching is normal.  - If a bowel prep was taken, you may not have a bowel movement for 1-3 days.    This is normal.  SYMPTOMS TO WATCH FOR AND REPORT TO YOUR PHYSICIAN:  1. Abdominal pain or bloating, other than gas cramps.  2. Chest pain.  3. Back pain.  4. Signs of infection such as: chills or fever occurring within 24 hours   after the procedure.  5. Rectal bleeding, which would show as bright red, maroon, or black stools.   (A tablespoon of blood from the rectum is not serious, especially if    hemorrhoids are present.)  6. Vomiting.  7. Weakness or dizziness.  GO DIRECTLY TO THE NEAREST EMERGENCY ROOM IF YOU HAVE ANY OF THE FOLLOWING:      Difficulty breathing              Chills and/or fever over 101 F   Persistent vomiting and/or vomiting blood   Severe abdominal pain   Severe chest pain   Black, tarry stools   Bleeding- more than one tablespoon   Any other symptom or condition that you feel may need urgent attention  Your doctor recommends these additional instructions:  If any biopsies were taken, your doctors clinic will contact you in 1 to 2   weeks with any results.  - Patient has a contact number available for emergencies.  The signs and   symptoms of potential delayed complications were discussed with the   patient.  Return to normal activities tomorrow.  Written discharge   instructions were provided to the patient.   - Discharge patient to home.   - Resume previous diet.   - Continue present medications.   - Await pathology results.   - Repeat colonoscopy in 7 years for surveillance.  For questions, problems or results please call your physician - Frances Lovell MD at Work:  [phone].  [facility] can be reached at [phone]     IF A COMPLICATION OR EMERGENCY SITUATION ARISES AND YOU ARE UNABLE TO REACH   YOUR PHYSICIAN - GO DIRECTLY TO THE EMERGENCY ROOM.  MD Frances Quintero MD  3/4/2024 10:48:15 AM  This report has been verified and signed electronically.  Dear patient,  As a result of recent federal legislation (The Federal Cures Act), you may   receive lab or pathology results from your procedure in your [facility]   account before your physician is able to contact you. Your physician or   their representative will relay the results to you with their   recommendations at their soonest availability.  Thank you,  PROVATION

## 2024-03-05 DIAGNOSIS — I10 ESSENTIAL HYPERTENSION: ICD-10-CM

## 2024-03-05 LAB
FINAL PATHOLOGIC DIAGNOSIS: NORMAL
GROSS: NORMAL
Lab: NORMAL

## 2024-03-05 RX ORDER — HYDROCHLOROTHIAZIDE 12.5 MG/1
12.5 TABLET ORAL
Qty: 90 TABLET | Refills: 0 | Status: SHIPPED | OUTPATIENT
Start: 2024-03-05 | End: 2024-06-05

## 2024-03-05 NOTE — TELEPHONE ENCOUNTER
Care Due:                  Date            Visit Type   Department     Provider  --------------------------------------------------------------------------------                                EP -                              PRIMARY      [facility]  Last Visit: 10-      CARE ([facility])   MEDICINE       Juice So  Next Visit: None Scheduled  None         None Found                                                            Last  Test          Frequency    Reason                     Performed    Due Date  --------------------------------------------------------------------------------    CMP.........  12 months..  hydroCHLOROthiazide......  12- 01-    Health [facility] Embedded Care Due Messages. Reference number: [id number].   3/05/2024 9:20:39 AM CST

## 2024-03-05 NOTE — TELEPHONE ENCOUNTER
Refill Routing Note   Medication(s) are not appropriate for processing by [facility] for the following reason(s):        Required labs outdated    [facility] action(s):  Defer     Requires labs : Yes             Appointments  past 12m or future 3m with PCP    Date Provider   Last Visit   10/27/2023 Juice So MD   Next Visit   Visit date not found Juice So MD   ED visits in past 90 days: 0        Note composed:12:30 PM 03/05/2024

## 2024-03-26 NOTE — PROGRESS NOTES
Subjective:   HPI     I had the pleasure of seeing Francoise Dykes in electrophysiology clinic today for follow up of her paroxysmal atrial fibrillation. Last seen 5/2023. She is a 63 year old  who was well until 2/2012 when shewas diagnosed with symptomatic AF. In 11/2012, Mrs. Dykes had another episode of atrial fibrillation after an argument with her son. She presented to [facility], and once again reverted to sinus rhythm within 30 minutes. She was discharged on Flecainide 50 mg BID, Toprol XL 50 mg QD, and Aspirin.     When I initially saw Ms. Dykes in 1/2014, she admitted to monthly palpitations, which would last seconds and resolve with coughing. She believed that Flecainide caused her some shortness of breath. At that office visit, I stopped Flecainide and started Rythmol 600 mg PRN palpitations. Ms. Dykes had increasing breakthrough episodes, and underwent PVI and SVC isolation in 12/2015. She was started on Amiodarone at that time. At her follow-up visit in 1/2016, she was doing well, with no episodes of sustained palpitations. Amiodarone was stopped in early 3/2016. In 8/2016, Ms. Dykes was admitted with atypical atrial flutter with RVR. She was cardioverted to sinus rhythm, and started on Rythmol  mg BID.    In 12/2016, Ms. Dykes underwent repeat PVI. The right pulmonary veins were re-isolated, with isolated firing noted from the veins post-isolation. Additionally, a mid-sal AT was targeted and successfully ablated. She was discharged on Rythmol  mg bid. In 3/2017 Rythmol was stopped. Several weeks later she presented to [facility] with atypical atrial flutter with 2:1 AVB at a rate of 120 bpm. Rythmol was restarted at that time. In 8/2017, Ms. Dykes presented with atypical atrial flutter with RVR, and underwent SAMMIE/DCCV. Notably, Ms. Dykes missed her AM dose of Rythmol. She was continued on Rythmol. In 12/2017 and 1/2018, Ms. Dykes had recurrent atypical atrial  flutter, spontaneously converting to sinus rhythm. At that time her Rythmol was increased to 325 mg bid. At her 4/2018 visit Ms. Dykes was doing well, and the plan was to hold the course.    On 3/21/2019, Ms. Dykes [place] [facility] [place]ations and was found to be in atypical atrial flutter with RVR, which terminated after receiving an extra dose of Rythmol. In total the episode lasted 2-3 hours. She had several episodes of palpitations lasting seconds in the weeks prior, which terminated spontaneously. Notably, she took steroids for an Achilles heel injury several days prior to her ED visit.    Over the past few years Ms. Dykes had multiple episodes of recurrent atypical flutter. On 2/3/2023 Ms. Dykes underwent repeat PVI (reisolation of RSPV, CTI-line, LA posterior wall isolation, ablation of high posterior free wall RA AT). Discharged on sotalol 80 mg bid.    At 5/2023 visit pt denied recurrent arrhythmias since ablation. No bleeding on AC. No side-effects on sotalol. Plan to hold the course.    Today in the office pt states she has short-lived episodes of palpitations lasting several seconds, otherwise doing well except fatigue.    My interpretation of today's ECG is sinus bradycardia at 58 bpm with  ms.    Review of Systems   Constitutional: Positive for malaise/fatigue. Negative for chills, decreased appetite, diaphoresis and weight loss.   HENT:  Negative for congestion, hearing loss, nosebleeds and sore throat.    Eyes:  Negative for pain, redness and visual disturbance.   Cardiovascular:  Negative for chest pain, dyspnea on exertion, irregular heartbeat, leg swelling, orthopnea, palpitations and syncope.   Respiratory:  Negative for cough, shortness of breath, sleep disturbances due to breathing and wheezing.    Endocrine: Negative for cold intolerance and heat intolerance.   Hematologic/Lymphatic: Negative for bleeding problem. Does not bruise/bleed easily.   Skin:  Negative for color  change, dry skin, poor wound healing and rash.   Musculoskeletal:  Negative for arthritis, back pain, falls, joint pain, muscle weakness and myalgias.   Gastrointestinal:  Negative for abdominal pain, change in bowel habit, constipation, diarrhea, hematochezia, melena and vomiting.   Genitourinary:  Negative for dysuria, frequency, hematuria, nocturia and urgency.   Neurological:  Negative for difficulty with concentration, disturbances in coordination, dizziness, focal weakness, headaches, light-headedness, numbness and weakness.   Psychiatric/Behavioral:  Negative for altered mental status. The patient does not have insomnia.         Objective:    Physical Exam  Vitals reviewed.   Constitutional:       General: She is not in acute distress.     Appearance: She is well-developed.   HENT:      Head: Normocephalic and atraumatic.   Eyes:      General:         Right eye: No discharge.         Left eye: No discharge.      Conjunctiva/sclera: Conjunctivae normal.   Neck:      Vascular: No JVD.   Cardiovascular:      Rate and Rhythm: Normal rate and regular rhythm.      Pulses: Intact distal pulses.      Heart sounds: Normal heart sounds. No murmur heard.  Pulmonary:      Effort: Pulmonary effort is normal. No respiratory distress.      Breath sounds: Normal breath sounds.   Abdominal:      General: There is no distension.      Palpations: Abdomen is soft.      Tenderness: There is no abdominal tenderness.   Musculoskeletal:         General: Normal range of motion.      Cervical back: Neck supple.   Skin:     General: Skin is warm and dry.   Neurological:      Mental Status: She is alert and oriented to person, place, and time.           Assessment:       1. Paroxysmal atrial fibrillation    2. Long term current use of antiarrhythmic drug    3. S/P ablation of atrial fibrillation    4. Atypical atrial flutter    5. Essential hypertension           Plan:       In summary, Francoise Dykes is a 63 year old female with a  history of PVIx2, who continued to have episodes of atypical flutter following her second ablation in 12/2016. Now status-post re-do PVI/PWI/CTI-line/sal AT ablation. Remains on sotalol 80 mg bid. A bit fatigued.    Plan is to stop diltiazem, follow-up 12 months.    Thank you for allowing me to participate in the care of this patient. Please do not hesitate to call me with any questions or concerns.

## 2024-04-02 ENCOUNTER — TELEPHONE (OUTPATIENT)
Dept: BARIATRICS | Facility: CLINIC | Age: 64
End: 2024-04-02
Payer: COMMERCIAL

## 2024-04-04 ENCOUNTER — TELEPHONE (OUTPATIENT)
Dept: OPHTHALMOLOGY | Facility: CLINIC | Age: 64
End: 2024-04-04
Payer: COMMERCIAL

## 2024-04-04 ENCOUNTER — CLINICAL SUPPORT (OUTPATIENT)
Dept: OPHTHALMOLOGY | Facility: CLINIC | Age: 64
End: 2024-04-04
Payer: COMMERCIAL

## 2024-04-04 ENCOUNTER — OFFICE VISIT (OUTPATIENT)
Dept: OPTOMETRY | Facility: CLINIC | Age: 64
End: 2024-04-04
Payer: COMMERCIAL

## 2024-04-04 DIAGNOSIS — H47.093 OPTIC NERVE ASYMMETRY, BILATERAL: ICD-10-CM

## 2024-04-04 DIAGNOSIS — H40.059 OCULAR HYPERTENSION, UNSPECIFIED LATERALITY: Primary | ICD-10-CM

## 2024-04-04 DIAGNOSIS — Z83.511 FAMILY HISTORY OF GLAUCOMA: ICD-10-CM

## 2024-04-04 PROCEDURE — 99999 PR PBB SHADOW E&M-EST. PATIENT-LVL III: CPT | Mod: PBBFAC,,, | Performed by: OPTOMETRIST

## 2024-04-04 PROCEDURE — 92020 GONIOSCOPY: CPT | Mod: S$GLB,,, | Performed by: OPTOMETRIST

## 2024-04-04 PROCEDURE — 1159F MED LIST DOCD IN RCRD: CPT | Mod: CPTII,S$GLB,, | Performed by: OPTOMETRIST

## 2024-04-04 PROCEDURE — 1160F RVW MEDS BY RX/DR IN RCRD: CPT | Mod: CPTII,S$GLB,, | Performed by: OPTOMETRIST

## 2024-04-04 PROCEDURE — 99212 OFFICE O/P EST SF 10 MIN: CPT | Mod: S$GLB,,, | Performed by: OPTOMETRIST

## 2024-04-04 PROCEDURE — 92083 EXTENDED VISUAL FIELD XM: CPT | Mod: S$GLB,,, | Performed by: OPTOMETRIST

## 2024-04-04 NOTE — PROGRESS NOTES
HPI    DLS: 2/28/2024 - Dr. Ritchie   HVF/OCT/IOP/Gonio     Pt states that her vision has been stable since her last visit.    GTTS: none  Last edited by Eloisa Aldrich MA on 4/4/2024  8:29 AM.            Assessment /Plan     For exam results, see Encounter Report.    Ocular hypertension, unspecified laterality  Optic nerve asymmetry, bilateral   OS>OD              Cupping OS  Family history of glaucoma: mother, aunt, GF                            OCT optic nerve today: OD WNL, OS thin IT   HVF low reliability: OD WNL , OS overall depression/ paracentral defects              PACHY 580/577              IOP 24/22 last visit, 22/19 today    Gonio: narrow OU              Consult Felipa for narrow angle eval

## 2024-04-04 NOTE — PROGRESS NOTES
HVF/OCT rel/fix poor OU coop good OU/ chart checked for latex allergy, pirate patch used/ 1.75 OD -3.00 + 0.50 x 003 OS -BJ

## 2024-04-08 ENCOUNTER — HOSPITAL ENCOUNTER (OUTPATIENT)
Dept: CARDIOLOGY | Facility: CLINIC | Age: 64
Discharge: HOME | End: 2024-04-08
Payer: COMMERCIAL

## 2024-04-08 ENCOUNTER — OFFICE VISIT (OUTPATIENT)
Dept: ELECTROPHYSIOLOGY | Facility: CLINIC | Age: 64
End: 2024-04-08
Payer: COMMERCIAL

## 2024-04-08 VITALS
HEART RATE: 59 BPM | BODY MASS INDEX: 36.69 KG/M2 | WEIGHT: 220.25 LBS | SYSTOLIC BLOOD PRESSURE: 129 MMHG | HEIGHT: 65 IN | DIASTOLIC BLOOD PRESSURE: 61 MMHG

## 2024-04-08 DIAGNOSIS — Z98.890 S/P ABLATION OF ATRIAL FIBRILLATION: ICD-10-CM

## 2024-04-08 DIAGNOSIS — Z79.899 LONG TERM CURRENT USE OF ANTIARRHYTHMIC DRUG: ICD-10-CM

## 2024-04-08 DIAGNOSIS — I48.4 ATYPICAL ATRIAL FLUTTER: ICD-10-CM

## 2024-04-08 DIAGNOSIS — I10 ESSENTIAL HYPERTENSION: ICD-10-CM

## 2024-04-08 DIAGNOSIS — I49.8 OTHER CARDIAC ARRHYTHMIA: ICD-10-CM

## 2024-04-08 DIAGNOSIS — I48.0 PAROXYSMAL ATRIAL FIBRILLATION: Primary | ICD-10-CM

## 2024-04-08 DIAGNOSIS — Z86.79 S/P ABLATION OF ATRIAL FIBRILLATION: ICD-10-CM

## 2024-04-08 LAB
OHS QRS DURATION: 92 MS
OHS QTC CALCULATION: 431 MS

## 2024-04-08 PROCEDURE — 1160F RVW MEDS BY RX/DR IN RCRD: CPT | Mod: CPTII,S$GLB,, | Performed by: INTERNAL MEDICINE

## 2024-04-08 PROCEDURE — 99214 OFFICE O/P EST MOD 30 MIN: CPT | Mod: S$GLB,,, | Performed by: INTERNAL MEDICINE

## 2024-04-08 PROCEDURE — 99999 PR PBB SHADOW E&M-EST. PATIENT-LVL III: CPT | Mod: PBBFAC,,, | Performed by: INTERNAL MEDICINE

## 2024-04-08 PROCEDURE — 1159F MED LIST DOCD IN RCRD: CPT | Mod: CPTII,S$GLB,, | Performed by: INTERNAL MEDICINE

## 2024-04-08 PROCEDURE — 93005 ELECTROCARDIOGRAM TRACING: CPT | Mod: S$GLB,,, | Performed by: INTERNAL MEDICINE

## 2024-04-08 PROCEDURE — 93010 ELECTROCARDIOGRAM REPORT: CPT | Mod: S$GLB,,, | Performed by: INTERNAL MEDICINE

## 2024-04-08 PROCEDURE — 3078F DIAST BP <80 MM HG: CPT | Mod: CPTII,S$GLB,, | Performed by: INTERNAL MEDICINE

## 2024-04-08 PROCEDURE — 3074F SYST BP LT 130 MM HG: CPT | Mod: CPTII,S$GLB,, | Performed by: INTERNAL MEDICINE

## 2024-04-08 PROCEDURE — 3008F BODY MASS INDEX DOCD: CPT | Mod: CPTII,S$GLB,, | Performed by: INTERNAL MEDICINE

## 2024-04-08 RX ORDER — SOTALOL HYDROCHLORIDE 80 MG/1
80 TABLET ORAL 2 TIMES DAILY
Qty: 180 TABLET | Refills: 3 | Status: SHIPPED | OUTPATIENT
Start: 2024-04-08 | End: 2025-04-08

## 2024-05-27 ENCOUNTER — OFFICE VISIT (OUTPATIENT)
Dept: DERMATOLOGY | Facility: CLINIC | Age: 64
End: 2024-05-27
Payer: COMMERCIAL

## 2024-05-27 DIAGNOSIS — L30.9 DERMATITIS, UNSPECIFIED: ICD-10-CM

## 2024-05-27 DIAGNOSIS — L72.0 MILIA: ICD-10-CM

## 2024-05-27 DIAGNOSIS — L81.1 MELASMA: Primary | ICD-10-CM

## 2024-05-27 PROCEDURE — 1160F RVW MEDS BY RX/DR IN RCRD: CPT | Mod: CPTII,S$GLB,, | Performed by: STUDENT IN AN ORGANIZED HEALTH CARE EDUCATION/TRAINING PROGRAM

## 2024-05-27 PROCEDURE — 99214 OFFICE O/P EST MOD 30 MIN: CPT | Mod: S$GLB,,, | Performed by: STUDENT IN AN ORGANIZED HEALTH CARE EDUCATION/TRAINING PROGRAM

## 2024-05-27 PROCEDURE — 99999 PR PBB SHADOW E&M-EST. PATIENT-LVL IV: CPT | Mod: PBBFAC,,, | Performed by: STUDENT IN AN ORGANIZED HEALTH CARE EDUCATION/TRAINING PROGRAM

## 2024-05-27 PROCEDURE — 1159F MED LIST DOCD IN RCRD: CPT | Mod: CPTII,S$GLB,, | Performed by: STUDENT IN AN ORGANIZED HEALTH CARE EDUCATION/TRAINING PROGRAM

## 2024-05-27 RX ORDER — CLOBETASOL PROPIONATE 0.5 MG/G
OINTMENT TOPICAL
Qty: 60 G | Refills: 2 | Status: SHIPPED | OUTPATIENT
Start: 2024-05-27

## 2024-05-27 RX ORDER — AZELAIC ACID 0.15 G/G
GEL TOPICAL
Qty: 50 G | Refills: 2 | Status: SHIPPED | OUTPATIENT
Start: 2024-05-27

## 2024-05-27 NOTE — PROGRESS NOTES
Subjective:      Patient ID:  Francoise Dykes is a 63 y.o. female who presents for   Chief Complaint   Patient presents with    Lesion     Left side of face     Eczema     Flare up      Pt presents today 5/27/2024  LV with me 1-    1. Skin lesions  Patient with new area of concern:   Location: 2 lesions on left side of face around lip, new, noticed within the past week  Previous treatments: none     2. Eczema f/u  Flaring up on legs- flares in extreme heat and cold  Present many years  Current tx: elidil cream bid (helps)  Prior tx: TAC ointment    10/2020 skin biopsy  Skin, left medial lower leg, punch biopsy:   -ALLERGIC URTICARIAL REACTION, see comment   COMMENT:  The findings suggest a hypersensitivity reaction.  Drug reactions,   urticaria, insect bites, and bullous pemphigoid may, at times, have similar   morphologic findings.  PAS stain is negative for fungal organisms.  Clinical   correlation is recommended.     3. Melasma  Using Elta MD sunscreen and compound hydroquinone cream (not using every day) - helping but still wishes for more even skin tone           Review of Systems   Skin:  Positive for daily sunscreen use (face) and activity-related sunscreen use. Negative for recent sunburn.   Hematologic/Lymphatic: Bruises/bleeds easily (eliquis 2x/day).       Objective:   Physical Exam   Skin:   Areas Examined (abnormalities noted in diagram):   Head / Face Inspection Performed  RLE Inspected  LLE Inspection Performed                 Diagram Legend     Erythematous scaling macule/papule c/w actinic keratosis       Vascular papule c/w angioma      Pigmented verrucoid papule/plaque c/w seborrheic keratosis      Yellow umbilicated papule c/w sebaceous hyperplasia      Irregularly shaped tan macule c/w lentigo     1-2 mm smooth white papules consistent with Milia      Movable subcutaneous cyst with punctum c/w epidermal inclusion cyst      Subcutaneous movable cyst c/w pilar cyst      Firm pink to  brown papule c/w dermatofibroma      Pedunculated fleshy papule(s) c/w skin tag(s)      Evenly pigmented macule c/w junctional nevus     Mildly variegated pigmented, slightly irregular-bordered macule c/w mildly atypical nevus      Flesh colored to evenly pigmented papule c/w intradermal nevus       Pink pearly papule/plaque c/w basal cell carcinoma      Erythematous hyperkeratotic cursted plaque c/w SCC      Surgical scar with no sign of skin cancer recurrence      Open and closed comedones      Inflammatory papules and pustules      Verrucoid papule consistent consistent with wart     Erythematous eczematous patches and plaques     Dystrophic onycholytic nail with subungual debris c/w onychomycosis     Umbilicated papule    Erythematous-base heme-crusted tan verrucoid plaque consistent with inflamed seborrheic keratosis     Erythematous Silvery Scaling Plaque c/w Psoriasis     See annotation      Assessment / Plan:      Melasma  (> 1 yr)  Pt has solar lentigo (dark brown) R cheek and also scattered DPNs, background tan hyperpigmented patch c/w melasma  To AA of melasma- continue hydroquinone triple cream Skin Medicinals nightly for max 3 months at time, then take 1 month break  Start azealaic acid 15% gel twice daily  Continue Elta MD sunscreen but recommend to switch to tinted- so Elta MD UV Elements or Elta MD UV clear tinted  Trial of light cryotherapy 2-3 seconds of  prominent solar lentigo v macular SK R cheek, reviewed may get darker first, may leave behind discoloration and pt gave verbal consent    Dermatitis, unspecified  Status: chronic condition flaring and/or not at treatment goal (> 1 yr)  BL shins with erythematous and hyperpigmented patches to thin indurated plaques- no scale  Pt reports waxing and waning for years and hx AD  1+ pitting edema of BL legs  Biopsied in 2020- see results in HPI  Offered repeat biopsy today- pt had to go to ED from biopsy bleeding issues with prior punch biopsy of leg in  2020 so declines today  Ddx eczematous dermatitis v early lipodermatosclerosus due to LE edema   Start clobetasol ointment bid when flaring  Continue Aquaphor to legs every night as currently doing    Milia  Deeper milia near L oral commisure  Would need numbing for removal and may leave scar  Recommend trial of retinoid- apply adapelene gel OTC nightly    RTC 3 months melasma f/u and leg rash f/u

## 2024-05-27 NOTE — PATIENT INSTRUCTIONS
Elta MD UV Elements and Elta MD UV clear tinted  [email or web address]    For milia bumps on face  Adapelene gel- topical retinoid- Differin $10-20 over the counter at drug stores

## 2024-05-29 ENCOUNTER — PATIENT MESSAGE (OUTPATIENT)
Dept: BARIATRICS | Facility: CLINIC | Age: 64
End: 2024-05-29
Payer: COMMERCIAL

## 2024-06-01 DIAGNOSIS — I10 ESSENTIAL HYPERTENSION: ICD-10-CM

## 2024-06-01 NOTE — TELEPHONE ENCOUNTER
Care Due:                  Date            Visit Type   Department     Provider  --------------------------------------------------------------------------------                                EP -                              PRIMARY      [facility]  Last Visit: 10-      CARE ([facility])   MEDICINE       Juice So  Next Visit: None Scheduled  None         None Found                                                            Last  Test          Frequency    Reason                     Performed    Due Date  --------------------------------------------------------------------------------    CMP.........  12 months..  hydroCHLOROthiazide......  12- 01-    Health [facility] Embedded Care Due Messages. Reference number: [id number].   6/01/2024 9:17:30 AM CDT

## 2024-06-02 NOTE — TELEPHONE ENCOUNTER
Refill Routing Note   Medication(s) are not appropriate for processing by [facility] for the following reason(s):        Required labs outdated    [facility] action(s):  Defer     Requires labs : Yes             Appointments  past 12m or future 3m with PCP    Date Provider   Last Visit   10/27/2023 Juice So MD   Next Visit   Visit date not found Juice So MD   ED visits in past 90 days: 0        Note composed:3:12 AM 06/02/2024

## 2024-06-05 RX ORDER — HYDROCHLOROTHIAZIDE 12.5 MG/1
12.5 TABLET ORAL
Qty: 90 TABLET | Refills: 0 | Status: SHIPPED | OUTPATIENT
Start: 2024-06-05

## 2024-07-03 NOTE — TELEPHONE ENCOUNTER
No care due was identified.  Health [facility] Embedded Care Due Messages. Reference number: [id number].   7/03/2024 12:41:07 PM CDT

## 2024-07-03 NOTE — TELEPHONE ENCOUNTER
Refill Routing Note   Medication(s) are not appropriate for processing by [facility] for the following reason(s):        No active prescription written by provider    [facility] action(s):  Defer        Medication Therapy Plan: listed as  Historical Provider on med list      Appointments  past 12m or future 3m with PCP    Date Provider   Last Visit   10/27/2023 Juice So MD   Next Visit   7/11/2024 Juice So MD   ED visits in past 90 days: 0        Note composed:4:44 PM 07/03/2024

## 2024-07-10 RX ORDER — MONTELUKAST SODIUM 10 MG/1
10 TABLET ORAL DAILY
Qty: 90 TABLET | Refills: 1 | Status: SHIPPED | OUTPATIENT
Start: 2024-07-10

## 2024-07-31 DIAGNOSIS — J82.83 EOSINOPHILIC ASTHMA: ICD-10-CM

## 2024-07-31 RX ORDER — BENRALIZUMAB 30 MG/ML
30 INJECTION, SOLUTION SUBCUTANEOUS SEE ADMIN INSTRUCTIONS
Qty: 1 ML | Refills: 11 | OUTPATIENT
Start: 2024-07-31

## 2024-08-29 DIAGNOSIS — I10 ESSENTIAL HYPERTENSION: ICD-10-CM

## 2024-08-29 NOTE — TELEPHONE ENCOUNTER
Care Due:                  Date            Visit Type   Department     Provider  --------------------------------------------------------------------------------                                EP -                              PRIMARY      [facility]  Last Visit: 10-      CARE ([facility])   MEDICINE       Juice So  Next Visit: None Scheduled  None         None Found                                                            Last  Test          Frequency    Reason                     Performed    Due Date  --------------------------------------------------------------------------------    CMP.........  12 months..  hydroCHLOROthiazide......  12- 01-    [facility] Embedded Care Due Messages. Reference number: [id number].   8/29/2024 11:03:20 AM CDT

## 2024-08-30 ENCOUNTER — OFFICE VISIT (OUTPATIENT)
Dept: DERMATOLOGY | Facility: CLINIC | Age: 64
End: 2024-08-30
Payer: COMMERCIAL

## 2024-08-30 DIAGNOSIS — I87.2 STASIS DERMATITIS: ICD-10-CM

## 2024-08-30 DIAGNOSIS — L30.9 DERMATITIS, UNSPECIFIED: Primary | ICD-10-CM

## 2024-08-30 PROCEDURE — 99999 PR PBB SHADOW E&M-EST. PATIENT-LVL III: CPT | Mod: PBBFAC,,, | Performed by: STUDENT IN AN ORGANIZED HEALTH CARE EDUCATION/TRAINING PROGRAM

## 2024-08-30 RX ORDER — HYDROCHLOROTHIAZIDE 12.5 MG/1
12.5 TABLET ORAL
Qty: 90 TABLET | Refills: 0 | Status: SHIPPED | OUTPATIENT
Start: 2024-08-30

## 2024-08-30 NOTE — Clinical Note
Hi Dr. Sylvester, Sorry to forward this to you but I could not find a staff pool to send to. Ms. Dykes is out of her Fasenra injections and needs appointment/refills (they won't approve until she gets in). She is having trouble getting in so if you wouldn't mind forwarding this message to staff. Thanks, Gale Cruz

## 2024-08-30 NOTE — PROGRESS NOTES
Subjective:      Patient ID:  Francoise Dykes is a 64 y.o. female who presents for   Chief Complaint   Patient presents with    Skin Discoloration     Melasma f/u    Dermatitis     F/u - bilateral legs     Pt presents today for rash follow up and skin lesions on face  LV with me 5/27/24    Interval 8/30/24:  1. Dermatitis follow up  Location: legs  Duration: years  Pt has noticed improvement, R leg slightly flaring today  Using clobetasol oint as needed for flares for max 2 weeks at a time  Aquaphor nightly  Not wearing compression stockings or elevating legs. Works as   Prior tx: TAC ointment, elidil cream  Have offered repeat biopsy - pt had very bad experience with prior biopsy and declines    PMHx: eosinophilic asthma on F[place]ra, BMI 35, GERD, a flutter and a fib    10/2022 skin biopsy  Skin, left medial lower leg, punch biopsy:   -ALLERGIC URTICARIAL REACTION, see comment   COMMENT:  The findings suggest a hypersensitivity reaction.  Drug reactions, urticaria, insect bites, and bullous pemphigoid may, at times, have similar morphologic findings.  PAS stain is negative for fungal organisms.  Clinical   correlation is recommended.       Past dermatologic history not addressed today:   #Melasma           Review of Systems    Objective:   Physical Exam   Skin:   Areas Examined (abnormalities noted in diagram):   RLE Inspected  LLE Inspection Performed                 Diagram Legend     Erythematous scaling macule/papule c/w actinic keratosis       Vascular papule c/w angioma      Pigmented verrucoid papule/plaque c/w seborrheic keratosis      Yellow umbilicated papule c/w sebaceous hyperplasia      Irregularly shaped tan macule c/w lentigo     1-2 mm smooth white papules consistent with Milia      Movable subcutaneous cyst with punctum c/w epidermal inclusion cyst      Subcutaneous movable cyst c/w pilar cyst      Firm pink to brown papule c/w dermatofibroma      Pedunculated fleshy papule(s) c/w  skin tag(s)      Evenly pigmented macule c/w junctional nevus     Mildly variegated pigmented, slightly irregular-bordered macule c/w mildly atypical nevus      Flesh colored to evenly pigmented papule c/w intradermal nevus       Pink pearly papule/plaque c/w basal cell carcinoma      Erythematous hyperkeratotic cursted plaque c/w SCC      Surgical scar with no sign of skin cancer recurrence      Open and closed comedones      Inflammatory papules and pustules      Verrucoid papule consistent consistent with wart     Erythematous eczematous patches and plaques     Dystrophic onycholytic nail with subungual debris c/w onychomycosis     Umbilicated papule    Erythematous-base heme-crusted tan verrucoid plaque consistent with inflamed seborrheic keratosis     Erythematous Silvery Scaling Plaque c/w Psoriasis     See annotation        Assessment / Plan:        Dermatitis, unspecified  Stasis dermatitis   Status: chronic condition flaring and/or not at treatment goal (> 1 yr)  R > L with erythematous non-scaly thin plaque (no scale but currently treating with topical steroids)  Pt reports waxing and waning for years and hx childhood AD  1+ pitting edema of BL legs- does not wear compression  Biopsied in 2020- hypersensitivity reaction  Offered repeat biopsy to help determine etiology of current rash- pt had to go to ED from biopsy bleeding issues with prior punch biopsy of leg in 2020 so declines today  Ddx eczematous dermatitis/stasis dermatitis v early lipodermatosclerosus due to LE edema v other  Recommend leg elevation throughout day at least 30 mins 5 times per day  Recommend compression stockings at least 20 mmHg or at the least compression socks  Start clobetasol ointment bid when flaring 2 weeks on, 1 week off  Continue Aquaphor to legs every night as currently doing    Audrey  Will schedule for cosmetic extraction. $75 to remove 2 on chin quoted    RTC for cosmetic visit

## 2024-08-30 NOTE — TELEPHONE ENCOUNTER
Refill Routing Note   Medication(s) are not appropriate for processing by [facility] for the following reason(s):        Required labs outdated    [facility] action(s):  Defer   Requires labs : Yes             Appointments  past 12m or future 3m with PCP    Date Provider   Last Visit   10/27/2023 Juice So MD   Next Visit   Visit date not found Juice So MD   ED visits in past 90 days: 0        Note composed:10:14 PM 08/29/2024

## 2024-09-13 ENCOUNTER — TELEPHONE (OUTPATIENT)
Dept: PULMONOLOGY | Facility: CLINIC | Age: 64
End: 2024-09-13
Payer: MEDICARE

## 2024-09-13 NOTE — TELEPHONE ENCOUNTER
----- Message from Anne Tracey sent at 9/13/2024  3:55 PM CDT -----  Regarding: Appt  Contact: [phone]  Pt calling to schedule appt, asthma. Previous pt of Dr. Wolfe. Updated insurance in system. Attempted to schedule. Please call [phone]

## 2024-09-13 NOTE — TELEPHONE ENCOUNTER
Call was returned to patient in regards to scheduling an appointment. I informed patient that due to her insurance I was unable to schedule her appointment. Call was transfer to my supervisor.

## 2024-09-18 ENCOUNTER — TELEPHONE (OUTPATIENT)
Dept: DERMATOLOGY | Facility: CLINIC | Age: 64
End: 2024-09-18
Payer: MEDICARE

## 2024-09-19 ENCOUNTER — PROCEDURE VISIT (OUTPATIENT)
Dept: DERMATOLOGY | Facility: CLINIC | Age: 64
End: 2024-09-19
Payer: MEDICARE

## 2024-09-19 DIAGNOSIS — Z41.1 ELECTIVE PROCEDURE FOR UNACCEPTABLE COSMETIC APPEARANCE: Primary | ICD-10-CM

## 2024-09-19 PROCEDURE — 99499 UNLISTED E&M SERVICE: CPT | Mod: S$GLB,,, | Performed by: STUDENT IN AN ORGANIZED HEALTH CARE EDUCATION/TRAINING PROGRAM

## 2024-09-19 NOTE — PROGRESS NOTES
Cosmetic visit    Milia extraction x 2 on L chin  Reviewed risk scar    Cleansed area. Knicked with #11 blade and removed milia with comedone extractor  Aluminum chloride hemostasis  Vaseline applied    No complications and pt tolerated procedure well    Cost: $75- pt paid at time of visit

## 2024-09-25 ENCOUNTER — PATIENT MESSAGE (OUTPATIENT)
Dept: FAMILY MEDICINE | Facility: CLINIC | Age: 64
End: 2024-09-25
Payer: MEDICARE

## 2024-09-25 ENCOUNTER — TELEPHONE (OUTPATIENT)
Dept: PULMONOLOGY | Facility: CLINIC | Age: 64
End: 2024-09-25
Payer: MEDICARE

## 2024-09-25 NOTE — TELEPHONE ENCOUNTER
----- Message from Kasandra Giron sent at 9/25/2024 11:44 AM CDT -----  Type :  Needs Medical Advice    Who Called: pt  Symptoms (please be specific): f/u   How long has patient had these symptoms:  f/u  Pharmacy name and phone #:  no  Would the patient rather a call back or a response via [facility]? call  Best Call Back Number:[phone]  Additional Information:  appt

## 2024-09-25 NOTE — TELEPHONE ENCOUNTER
Spoke with patient, informed her that I have received her message. Pt states that she starting to wheeze and needs appointment. I verbalized to pt that I understand and advised pt that we do not have any available appointments at this current time here at [facility]. Pt verbalized that she understand and states that she wishes to go to the [place] [facility] being that she lives on [facility] [address]. I verbalized to pt that I understand and advised her that I will forward her message to [facility] Staff advising staff to contact and schedule pt appointment. Pt verbalized that she understand.

## 2024-09-27 ENCOUNTER — OFFICE VISIT (OUTPATIENT)
Dept: FAMILY MEDICINE | Facility: CLINIC | Age: 64
End: 2024-09-27
Payer: MEDICARE

## 2024-09-27 DIAGNOSIS — Z12.31 ENCOUNTER FOR SCREENING MAMMOGRAM FOR BREAST CANCER: ICD-10-CM

## 2024-09-27 DIAGNOSIS — J82.83 EOSINOPHILIC ASTHMA: Primary | ICD-10-CM

## 2024-09-27 DIAGNOSIS — I10 ESSENTIAL HYPERTENSION: ICD-10-CM

## 2024-09-27 RX ORDER — BENRALIZUMAB 30 MG/ML
30 INJECTION, SOLUTION SUBCUTANEOUS SEE ADMIN INSTRUCTIONS
Qty: 1 ML | Refills: 1 | Status: SHIPPED | OUTPATIENT
Start: 2024-09-27

## 2024-09-27 RX ORDER — HYDROCHLOROTHIAZIDE 12.5 MG/1
12.5 TABLET ORAL DAILY
Qty: 90 TABLET | Refills: 0 | Status: SHIPPED | OUTPATIENT
Start: 2024-09-27

## 2024-09-27 NOTE — PROGRESS NOTES
The patient location is: [place]  The chief complaint leading to consultation is: asthma  Visit type: audiovisual  Total time spent with patient: 5 mins  Each patient to whom he or she provides medical services by telemedicine is:  (1) informed of the relationship between the physician and patient and the respective role of any other health care provider with respect to management of the patient; and (2) notified that he or she may decline to receive medical services by telemedicine and may withdraw from such care at any time.      Subjective:       Patient ID: Francoise Dykes is a 64 y.o. female.    Chief Complaint: Medication Refill (Asthma pen)      Medication Refill    [age] year female presents for asthma.  States she is running out of her injection.  States she was unable to follow with pulmonology.  States she has a follow-up next month.  Would like refill on hydrochlorothiazide as well.  Denies any other issues.    Review of Systems   Constitutional: Negative.    HENT: Negative.     Respiratory: Negative.     Cardiovascular: Negative.    Gastrointestinal: Negative.    Endocrine: Negative.    Genitourinary: Negative.    Musculoskeletal: Negative.    Neurological: Negative.    Psychiatric/Behavioral: Negative.            Past Medical History:   Diagnosis Date    Acid reflux     Allergy     seasonal    Asthma with acute exacerbation     Atrial fibrillation     Essential hypertension 11/20/2017    Pt states that she does not have HTN.      Past Surgical History:   Procedure Laterality Date    24 HOUR IMPEDANCE PH MONITORING OF ESOPHAGUS IN PATIENT TAKING ACID REDUCING MEDICATIONS N/A 09/20/2021    Procedure: IMPEDANCE PH STUDY, ESOPHAGEAL, 24 HOUR, IN PATIENT TAKING ACID REDUCING MEDICATION;  Surgeon: Franky Gomez MD;  Location: [facility] ([address] [place] [address]);  Service: Endoscopy;  Laterality: N/A;  Patient to do EGD with with esophageal Bravo pH probe on omeprazole 40 mg once daily she needs to take it with a sip of  water the day of the case  pt completed COVID vaccine- see Immunization record in     ABLATION OF ARRHYTHMOGENIC FOCUS FOR ATRIAL FIBRILLATION N/A 2023    Procedure: ABLATION, ARRHYTHMOGENIC FOCUS, FOR ATRIAL FIBRILLATION;  Surgeon: Lee Meyers MD;  Location: [facility] EP LAB;  Service: Cardiology;  Laterality: N/A;  AF, SAMMIE( Cx if SR), Redo PVI, RFA, CARTO, GEN, GP, 3 PREP    ABLATION, ATRIAL FLUTTER, TYPICAL  2023    Procedure: Ablation, Atrial Flutter, Typical;  Surgeon: Lee Meyers MD;  Location: [facility] EP LAB;  Service: Cardiology;;    ABLATION, ATRIAL TACHYCARDIA  2023    Procedure: Ablation, Atrial Tachycardia;  Surgeon: Lee Meyers MD;  Location: [facility] EP LAB;  Service: Cardiology;;    CARDIAC ELECTROPHYSIOLOGY STUDY AND ABLATION      CARDIAC ELECTROPHYSIOLOGY STUDY AND ABLATION       SECTION      COLONOSCOPY N/A 2018    Procedure: COLONOSCOPY;  Surgeon: Brodie Lara MD;  Location: [facility];  Service: Endoscopy;  Laterality: N/A;  confirmed appt-ss    COLONOSCOPY N/A 3/4/2024    Procedure: COLONOSCOPY;  Surgeon: Frances Lovell MD;  Location: [facility] ENDO;  Service: Endoscopy;  Laterality: N/A;  polyps noted on procedure report 2018 ref by Juice So MD, PEG, instr. to portal, Eliquis approval hold pending-  ok to hold Eliquis 2 days per Dr Meyers-GT  - pc complete. DBM    ESOPHAGEAL MANOMETRY WITH MEASUREMENT OF IMPEDANCE N/A 2021    Procedure: MANOMETRY, ESOPHAGUS, WITH IMPEDANCE MEASUREMENT;  Surgeon: Franky Gomez MD;  Location: [facility] (4TH FLR);  Service: Endoscopy;  Laterality: N/A;  Covid test  [facility]   pt confirmed [id number]    ESOPHAGOGASTRODUODENOSCOPY N/A 2021    Procedure: EGD (ESOPHAGOGASTRODUODENOSCOPY);  Surgeon: Alin Camargo MD;  Location: [facility] (2ND FLR);  Service: Endoscopy;  Laterality: N/A;  Fully vaccinated 21, ok to hold Eliquis x 2 days per Dr. ANGELLA Meyers, instr portal -ml  12/10/21  LVM to see if patient can come in earlier -ml    HYSTERECTOMY  2000    MIGUEL    RADIOFREQUENCY ABLATION Left 02/27/2019    Procedure: RADIOFREQUENCY ABLATION, LEFT L2,3,4,5;  Surgeon: Mariusz Jang MD;  Location: [facility] PAIN MGT;  Service: Pain Management;  Laterality: Left;  Left RFA L2,3,4,5  1 of 2  *Ok to hold Nate, elgin Meyers    RADIOFREQUENCY ABLATION Right 03/13/2019    Procedure: RADIOFREQUENCY ABLATION,  Right L2,3,4,5;  Surgeon: Mariusz Jang MD;  Location: [facility];  Service: Pain Management;  Laterality: Right;  Right RFA @ L2,3,4,5  2 of 2     Family History   Problem Relation Name Age of Onset    Glaucoma Mother      Hypertension Mother      Diabetes Mother      Allergies Mother      Asbestos Father      Obesity Father      Glaucoma Paternal Aunt      Heart disease Maternal Grandmother          chf    Diabetes Maternal Grandfather      Cancer Paternal Grandmother          lung, 2/2 second hand smoke    Glaucoma Paternal Grandfather      Blindness Paternal Grandfather      Eczema Son      Hypertension Son      Melanoma Neg Hx      Psoriasis Neg Hx      Lupus Neg Hx      Acne Neg Hx      Breast cancer Neg Hx      Colon cancer Neg Hx      Ovarian cancer Neg Hx      Esophageal cancer Neg Hx      Cirrhosis Neg Hx      Celiac disease Neg Hx      Colon polyps Neg Hx      Crohn's disease Neg Hx      Liver cancer Neg Hx      Liver disease Neg Hx      Rectal cancer Neg Hx      Stomach cancer Neg Hx      Ulcerative colitis Neg Hx      Pancreatic cancer Neg Hx      Kidney cancer Neg Hx      Bladder Cancer Neg Hx      Uterine cancer Neg Hx      Amblyopia Neg Hx      Macular degeneration Neg Hx      Retinal detachment Neg Hx      Strabismus Neg Hx       Social History     Socioeconomic History    Marital status:    Occupational History    Occupation: Teacher     Employer: [employer]    Tobacco Use    Smoking status: Never    Smokeless tobacco: Never   Substance and Sexual Activity    Alcohol  use: Yes     Comment: rarely, 1 drink/week    Drug use: No    Sexual activity: Yes     Partners: Male   Other Topics Concern    Are you pregnant or think you may be? No    Breast-feeding No   Social History Narrative    Recently moved back to [facility] to help take care of mom.    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     Social Determinants of Health     Financial Resource Strain: Medium Risk (9/27/2024)    Overall Financial Resource Strain (CARDIA)     Difficulty of Paying Living Expenses: Somewhat hard   Food Insecurity: Food Insecurity Present (9/27/2024)    Hunger Vital Sign     Worried About Running Out of Food in the Last Year: Sometimes true     Ran Out of Food in the Last Year: Sometimes true   Transportation Needs: Patient Declined (4/8/2024)    PRAPARE - Transportation     Lack of Transportation (Medical): Patient declined     Lack of Transportation (Non-Medical): Patient declined   Physical Activity: Unknown (9/27/2024)    Exercise Vital Sign     Days of Exercise per Week: 3 days   Stress: No Stress Concern Present (9/27/2024)    [employer] - Occupational Stress Questionnaire     Feeling of Stress : Not at all   Housing Stability: Unknown (9/27/2024)    Housing Stability Vital Sign     Unable to Pay for Housing in the Last Year: No       Current Outpatient Medications:     albuterol (PROVENTIL) 2.5 mg /3 mL (0.083 %) nebulizer solution, Take 3 mLs (2.5 mg total) by nebulization every 6 (six) hours as needed for Wheezing., Disp: 3 mL, Rfl: 5    apixaban (ELIQUIS) 5 mg Tab, Take 1 tablet (5 mg total) by mouth 2 (two) times daily., Disp: 180 tablet, Rfl: 3    azelaic acid (AZELEX) 15 % gel, Apply to affected area of face twice daily, Disp: 50 g, Rfl: 2    azelastine (ASTELIN) 137 mcg (0.1 %) nasal spray, 1 spray (137 mcg total) by Nasal route 2 (two) times daily., Disp: 90 mL, Rfl: 3    cetirizine (ZYRTEC) 10 MG tablet, Take 10 mg by mouth., Disp: , Rfl:     clobetasol 0.05%  (TEMOVATE) 0.05 % Oint, Apply topically 2 (two) times daily. (Patient taking differently: Apply topically 2 (two) times daily. prn), Disp: 60 g, Rfl: 2    clobetasol 0.05% (TEMOVATE) 0.05 % Oint, Apply twice daily Monday-Friday, weekends off, Disp: 60 g, Rfl: 2    diclofenac sodium (VOLTAREN) 1 % Gel, Apply 2 g topically 4 (four) times daily as needed (pain)., Disp: 200 g, Rfl: 0    famotidine (PEPCID) 20 MG tablet, Take 1 tablet (20 mg total) by mouth 2 (two) times daily. for 7 days, Disp: 14 tablet, Rfl: 0    hydroquinone 4 % Crea, Apply pea-sized amount nightly for 3 months. Take 1 month off and restart as needed., Disp: 30 g, Rfl: 1    montelukast (SINGULAIR) 10 mg tablet, Take 1 tablet (10 mg total) by mouth once daily., Disp: 90 tablet, Rfl: 1    mupirocin (BACTROBAN) 2 % ointment, Apply topically 3 (three) times daily. (Patient taking differently: Apply topically 3 (three) times daily. prn), Disp: 30 g, Rfl: 2    naltrexone (DEPADE) 50 mg tablet, Take 25 mg by mouth once daily., Disp: 45 tablet, Rfl: 1    omeprazole (PRILOSEC) 40 MG capsule, TAKE 1 CAPSULE TWICE DAILY BEFORE MEALS, Disp: 180 capsule, Rfl: 3    pimecrolimus (ELIDEL) 1 % cream, Apply topically 2 (two) times daily., Disp: 60 g, Rfl: 6    sotaloL (BETAPACE) 80 MG tablet, Take 1 tablet (80 mg total) by mouth 2 (two) times daily., Disp: 180 tablet, Rfl: 3    triamcinolone acetonide 0.1% (KENALOG) 0.1 % cream, Apply topically 2 (two) times daily. (Patient taking differently: Apply topically 2 (two) times daily. prn), Disp: 453.6 g, Rfl: 2    benralizumab (FASENRA PEN) 30 mg/mL AtIn, Inject 30 mg into the skin As instructed. Every 8 weeks, Disp: 1 mL, Rfl: 1    hydroCHLOROthiazide (HYDRODIURIL) 12.5 MG Tab, Take 1 tablet (12.5 mg total) by mouth once daily., Disp: 90 tablet, Rfl: 0   Objective:      There were no vitals filed for this visit.    Physical Exam  Constitutional:       General: She is not in acute distress.     Appearance: She is not  diaphoretic.   HENT:      Head: Normocephalic and atraumatic.   Eyes:      Conjunctiva/sclera: Conjunctivae normal.   Pulmonary:      Effort: Pulmonary effort is normal.   Musculoskeletal:         General: Normal range of motion.      Cervical back: Neck supple.   Skin:     Findings: No rash.   Neurological:      Mental Status: She is alert and oriented to person, place, and time.   Psychiatric:         Behavior: Behavior normal.         Thought Content: Thought content normal.         Judgment: Judgment normal.            Assessment:       1. Eosinophilic asthma    2. Encounter for screening mammogram for breast cancer    3. Essential hypertension        Plan:       Eosinophilic asthma  -     benralizumab (FASENRA PEN) 30 mg/mL AtIn; Inject 30 mg into the skin As instructed. Every 8 weeks  Dispense: 1 mL; Refill: 1    Encounter for screening mammogram for breast cancer  -     Mammo Digital Screening Bilat; Future; Expected date: 09/27/2024    Essential hypertension  -     hydroCHLOROthiazide (HYDRODIURIL) 12.5 MG Tab; Take 1 tablet (12.5 mg total) by mouth once daily.  Dispense: 90 tablet; Refill: 0    Refilled medications.  Patient has follow-up with pulmonology next month  Future Appointments   Date Time Provider Department Center   10/24/2024  8:00 AM Dipak Fernandez MD [facility]                   Patient note was created using Linkdex.  Any errors in syntax or even information may not have been identified and edited on initial review prior to signing this note.

## 2024-10-24 ENCOUNTER — OFFICE VISIT (OUTPATIENT)
Dept: PULMONOLOGY | Facility: CLINIC | Age: 64
End: 2024-10-24
Payer: COMMERCIAL

## 2024-10-24 VITALS
OXYGEN SATURATION: 97 % | HEART RATE: 62 BPM | WEIGHT: 216.19 LBS | SYSTOLIC BLOOD PRESSURE: 139 MMHG | HEIGHT: 64 IN | DIASTOLIC BLOOD PRESSURE: 79 MMHG | BODY MASS INDEX: 36.91 KG/M2

## 2024-10-24 DIAGNOSIS — J45.51 SEVERE PERSISTENT ASTHMA WITH EXACERBATION: ICD-10-CM

## 2024-10-24 DIAGNOSIS — G47.33 OSA (OBSTRUCTIVE SLEEP APNEA): ICD-10-CM

## 2024-10-24 DIAGNOSIS — J82.83 EOSINOPHILIC ASTHMA: ICD-10-CM

## 2024-10-24 DIAGNOSIS — E66.01 SEVERE OBESITY (BMI 35.0-35.9 WITH COMORBIDITY): ICD-10-CM

## 2024-10-24 DIAGNOSIS — K21.9 GASTROESOPHAGEAL REFLUX DISEASE, UNSPECIFIED WHETHER ESOPHAGITIS PRESENT: Primary | ICD-10-CM

## 2024-10-24 PROCEDURE — 99999 PR PBB SHADOW E&M-EST. PATIENT-LVL IV: CPT | Mod: PBBFAC,,, | Performed by: INTERNAL MEDICINE

## 2024-10-24 RX ORDER — BENRALIZUMAB 30 MG/ML
30 INJECTION, SOLUTION SUBCUTANEOUS SEE ADMIN INSTRUCTIONS
Qty: 1 ML | Refills: 1 | Status: ACTIVE | OUTPATIENT
Start: 2024-10-24

## 2024-10-24 RX ORDER — BENRALIZUMAB 30 MG/ML
30 INJECTION, SOLUTION SUBCUTANEOUS SEE ADMIN INSTRUCTIONS
Qty: 1 ML | Refills: 1 | Status: SHIPPED | OUTPATIENT
Start: 2024-10-24 | End: 2024-10-24

## 2024-10-24 NOTE — PROGRESS NOTES
Francoise Dykes  was seen as a new patient to me for the evaluation of  asthma.    CHIEF COMPLAINT:  Wheezing      HISTORY OF PRESENT ILLNESS: Francoise Dykes is a 64 y.o. female  has a past medical history of Acid reflux, Allergy, Asthma with acute exacerbation, Atrial fibrillation, and Essential hypertension (11/20/2017).  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  Diagnosed with asthma 2023. Patient was started on Fasenra in addition to breo, singulair, and albuterol.  Asthma has been well control with initiation of Fasenra in 2023.  Eczema also improved with Fasenra.  No longer taking singulair or breo.  Only used albuterol about 4 times over past months.  No nasal congestion.  No coughing or wheezing.      Additional Pulmonary History:   Occupational/Environmental Exposures:  .   Exposure to Animals/Pets:  denied  Foreign Travel History:  vacation to [place]  History of exposures to TB:  denied    Family History of Lung Cancer:  denied   Tobacco:  denied  Childhood history of Lung Disease:  denied  Chest surgery or trauma:  denied      PAST MEDICAL HISTORY:    Active Ambulatory Problems     Diagnosis Date Noted    Paroxysmal atrial fibrillation 01/09/2014    Degeneration of lumbar or lumbosacral intervertebral disc 01/09/2015    Sacroiliac joint pain 01/09/2015    Long term current use of antiarrhythmic drug 07/14/2015    Severe obesity (BMI 35.0-35.9 with comorbidity) 01/12/2017    S/P ablation of atrial fibrillation 01/30/2017    Current use of long term anticoagulation 04/12/2017    Thyroid nodule 06/29/2017    Chronic pain 07/21/2017    Atypical atrial flutter 11/20/2017    Essential hypertension 11/20/2017    Atrial flutter 12/26/2017    Spondylosis without myelopathy 02/17/2019    Osteoarthritis of lumbar spine 02/27/2019    Chronic rhinitis 05/01/2020    Laryngopharyngeal reflux 08/28/2020    GERD (gastroesophageal reflux disease) 09/20/2021    BMI 33.0-33.9,adult  12/12/2022    COVID 01/12/2023    Severe persistent asthma with exacerbation 07/12/2023    KYLEE (obstructive sleep apnea) 10/25/2024     Resolved Ambulatory Problems     Diagnosis Date Noted    Screening for colon cancer 11/24/2014    Lumbago 01/09/2015    Acquired spondylolisthesis 01/09/2015    Thoracic or lumbosacral neuritis or radiculitis, unspecified 01/09/2015    Palpitations 07/14/2015    Atrial fibrillation with RVR 09/26/2015    PAF (paroxysmal atrial fibrillation) 12/07/2015    Typical atrial flutter 08/06/2016    Atypical atrial flutter 08/19/2016    Atrial flutter, paroxysmal 11/07/2016    Atrial tachycardia 12/17/2016    Atrial flutter 03/18/2017    Hypokalemia 03/19/2017    Atrial flutter 08/21/2017    Atrial flutter with rapid ventricular response 01/26/2018    Cough     Osteoarthritis of spine 02/17/2019    Mild persistent asthma with acute exacerbation      Past Medical History:   Diagnosis Date    Acid reflux     Allergy     Asthma with acute exacerbation     Atrial fibrillation                 PAST SURGICAL HISTORY:    Past Surgical History:   Procedure Laterality Date    24 HOUR IMPEDANCE PH MONITORING OF ESOPHAGUS IN PATIENT TAKING ACID REDUCING MEDICATIONS N/A 09/20/2021    Procedure: IMPEDANCE PH STUDY, ESOPHAGEAL, 24 HOUR, IN PATIENT TAKING ACID REDUCING MEDICATION;  Surgeon: Franky Gomez MD;  Location: [facility] ENDO [address]);  Service: Endoscopy;  Laterality: N/A;  Patient to do EGD with with esophageal Bravo pH probe on omeprazole 40 mg once daily she needs to take it with a sip of water the day of the case  pt completed COVID vaccine- see Immunization record in     ABLATION OF ARRHYTHMOGENIC FOCUS FOR ATRIAL FIBRILLATION N/A 02/03/2023    Procedure: ABLATION, ARRHYTHMOGENIC FOCUS, FOR ATRIAL FIBRILLATION;  Surgeon: Lee Meyers MD;  Location: [facility] EP LAB;  Service: Cardiology;  Laterality: N/A;  AF, SAMMIE( Cx if SR), Redo PVI, RFA, CARTO, GEN, GP, 3 PREP    ABLATION, ATRIAL FLUTTER,  TYPICAL  2023    Procedure: Ablation, Atrial Flutter, Typical;  Surgeon: Lee Meyers MD;  Location: [facility] EP LAB;  Service: Cardiology;;    ABLATION, ATRIAL TACHYCARDIA  2023    Procedure: Ablation, Atrial Tachycardia;  Surgeon: Lee Meyers MD;  Location: [facility] EP LAB;  Service: Cardiology;;    CARDIAC ELECTROPHYSIOLOGY STUDY AND ABLATION      CARDIAC ELECTROPHYSIOLOGY STUDY AND ABLATION       SECTION      COLONOSCOPY N/A 2018    Procedure: COLONOSCOPY;  Surgeon: Brodie Lara MD;  Location: [facility] ENDO;  Service: Endoscopy;  Laterality: N/A;  confirmed appt-ss    COLONOSCOPY N/A 3/4/2024    Procedure: COLONOSCOPY;  Surgeon: Frances Lovell MD;  Location: [facility];  Service: Endoscopy;  Laterality: N/A;  polyps noted on procedure report 2018 ref by Juice So MD, PEG, instr. to portal, Eliquis approval hold pending-  ok to hold Eliquis 2 days per Dr Meyers-  -  complete. [facility]    ESOPHAGEAL MANOMETRY WITH MEASUREMENT OF IMPEDANCE N/A 2021    Procedure: MANOMETRY, ESOPHAGUS, WITH IMPEDANCE MEASUREMENT;  Surgeon: Franky Gomez MD;  [facility] ([facility]);  Service: Endoscopy;  Laterality: N/A;  Covid test  [facility]   pt confirmed appt-KP[facility]    ESOPHAGOGASTRODUODENOSCOPY N/A 2021    Procedure: EGD (ESOPHAGOGASTRODUODENOSCOPY);  Surgeon: Alin Camargo MD;  Location: [facility] (2ND FLR);  Service: Endoscopy;  Laterality: N/A;  Fully vaccinated 21, ok to hold Eliquis x 2 days per Dr. ANGELLA Meyers, instr portal -  12/10/21 LVM to see if patient can come in earlier -ml    HYSTERECTOMY      MIGUEL    RADIOFREQUENCY ABLATION Left 2019    Procedure: RADIOFREQUENCY ABLATION, LEFT L2,3,4,5;  Surgeon: Mariusz Jang MD;  Location: [facility] PAIN MGT;  Service: Pain Management;  Laterality: Left;  Left RFA L2,3,4,5  1 of 2  *Ok to hold Eliquis, per Dr Meyers    RADIOFREQUENCY ABLATION Right 2019    Procedure:  RADIOFREQUENCY ABLATION,  Right L2,3,4,5;  Surgeon: Mariusz Jang MD;  Location: [facility];  Service: Pain Management;  Laterality: Right;  Right RFA @ L2,3,4,5  2 of 2         FAMILY HISTORY:                Family History   Problem Relation Name Age of Onset    Glaucoma Mother      Hypertension Mother      Diabetes Mother      Allergies Mother      Asbestos Father      Obesity Father      Glaucoma Paternal Aunt      Heart disease Maternal Grandmother          chf    Diabetes Maternal Grandfather      Cancer Paternal Grandmother          lung, 2/2 second hand smoke    Glaucoma Paternal Grandfather      Blindness Paternal Grandfather      Eczema Son      Hypertension Son      Melanoma Neg Hx      Psoriasis Neg Hx      Lupus Neg Hx      Acne Neg Hx      Breast cancer Neg Hx      Colon cancer Neg Hx      Ovarian cancer Neg Hx      Esophageal cancer Neg Hx      Cirrhosis Neg Hx      Celiac disease Neg Hx      Colon polyps Neg Hx      Crohn's disease Neg Hx      Liver cancer Neg Hx      Liver disease Neg Hx      Rectal cancer Neg Hx      Stomach cancer Neg Hx      Ulcerative colitis Neg Hx      Pancreatic cancer Neg Hx      Kidney cancer Neg Hx      Bladder Cancer Neg Hx      Uterine cancer Neg Hx      Amblyopia Neg Hx      Macular degeneration Neg Hx      Retinal detachment Neg Hx      Strabismus Neg Hx         SOCIAL HISTORY:          Tobacco:   Social History     Tobacco Use   Smoking Status Never   Smokeless Tobacco Never     alcohol use:    Social History     Substance and Sexual Activity   Alcohol Use Yes    Comment: rarely, 1 drink/week                   ALLERGIES:    Review of patient's allergies indicates:   Allergen Reactions    Iodinated contrast media Anaphylaxis, Hives, Shortness Of Breath and Itching    Adhesive tape-silicones Itching     Manifested in 12/2015 following AF ablation.       CURRENT MEDICATIONS:    Current Outpatient Medications   Medication Sig Dispense Refill    albuterol  (PROVENTIL) 2.5 mg /3 mL (0.083 %) nebulizer solution Take 3 mLs (2.5 mg total) by nebulization every 6 (six) hours as needed for Wheezing. 3 mL 5    apixaban (ELIQUIS) 5 mg Tab Take 1 tablet (5 mg total) by mouth 2 (two) times daily. 180 tablet 3    cetirizine (ZYRTEC) 10 MG tablet Take 10 mg by mouth once daily.      clobetasol 0.05% (TEMOVATE) 0.05 % Oint Apply topically 2 (two) times daily. 60 g 2    clobetasol 0.05% (TEMOVATE) 0.05 % Oint Apply twice daily Monday-Friday, weekends off 60 g 2    diclofenac sodium (VOLTAREN) 1 % Gel Apply 2 g topically 4 (four) times daily as needed (pain). 200 g 0    hydroCHLOROthiazide (HYDRODIURIL) 12.5 MG Tab Take 1 tablet (12.5 mg total) by mouth once daily. 90 tablet 0    hydroquinone 4 % Crea Apply pea-sized amount nightly for 3 months. Take 1 month off and restart as needed. 30 g 1    mupirocin (BACTROBAN) 2 % ointment Apply topically 3 (three) times daily. (Patient taking differently: Apply topically 3 (three) times daily. prn) 30 g 2    omeprazole (PRILOSEC) 40 MG capsule TAKE 1 CAPSULE TWICE DAILY BEFORE MEALS 180 capsule 3    pimecrolimus (ELIDEL) 1 % cream Apply topically 2 (two) times daily. 60 g 6    sotaloL (BETAPACE) 80 MG tablet Take 1 tablet (80 mg total) by mouth 2 (two) times daily. 180 tablet 3    triamcinolone acetonide 0.1% (KENALOG) 0.1 % cream Apply topically 2 (two) times daily. (Patient taking differently: Apply topically 2 (two) times daily. prn) 453.6 g 2    azelastine (ASTELIN) 137 mcg (0.1 %) nasal spray 1 spray (137 mcg total) by Nasal route 2 (two) times daily. 90 mL 3    benralizumab (FASENRA PEN) 30 mg/mL AtIn Inject 30 mg into the skin every 8 weeks 1 mL 1     No current facility-administered medications for this visit.                  REVIEW OF SYSTEMS:     Pulmonary related symptoms as per HPI.  Gen:  no weight loss, no fever, no night sweat  HEENT:  no visual changes, no sore throat, no hearing loss  CV:  No chest pain, no orthopnea, no  "PND  GI:  no melena, no hematochezia, no diarhea, no constipation.  :  no dysuria, no hematuria, no hesistancy, no dribbling  Neuro:  no syncope, no vertigo, no tinitus  Psych:  No homocide or suicide ideation; no depression.  Endocrine:  No heat or cold intolerance.  Sleep:  Not sure is she snores;  sleep alone; no witnessed apnea.  Rested upon awake.  Sleep through the night  Otherwise, a balance of systems reviewed is negative.          PHYSICAL EXAM:  Vitals:    10/24/24 0752   BP: 139/79   Pulse: 62   SpO2: 97%   Weight: 98.1 kg (216 lb 2.6 oz)   Height: 5' 4" (1.626 m)   PainSc: 0-No pain     Body mass index is 37.1 kg/m².     GENERAL:  well develop; no apparent distress  HEENT:  no nasal congestion; no discharge noted; class 4 modified mallampatti.   NECK:  supple; no palpable masses.  CARDIO: regular rate and rhythm  PULM:  clear to auscultation bilaterally; no intercostals retractions; no accessory muscle usage   ABDOMEN:  soft nontender/nondistended.  +bowel sound  EXTREMITIES no cce  NEURO:  CN II-XII intact.  5/5 motor in all extremities.  sensation grossly intact   to light touch.  PSYCH:  normal affect.  Alert and oriented x 4    LABS  Pulmonary Functions Testing Results(personally reviewed):    PFT 10/10/21 Ratio of 68%; FVC 2.14 L (76%); FEV1 1.19 L (63%); TLC n/a L (n/a%); dlco 21 (90%)   ABG (personally reviewed):  none  CXR (personally reviewed):  12/12/22 no consolidation or effusion  CT CHEST(personally reviewed):  12/12/22 no central PE.  Bronchial thickening at bases bilaterally    Echo 12/12/22  The left ventricle is normal in size with concentric remodeling and normal systolic function. The estimated ejection fraction is 65%.  Normal right ventricular size with normal right ventricular systolic function.  Mild left atrial enlargement.  Mild tricuspid regurgitation.  The estimated PA systolic pressure is 24 mmHg.  Normal central venous pressure (3 mmHg).  Trivial circumferential pericardial " effusion.  Atrial fibrillation observed.       ASSESSMENT/PLAN  Problem List Items Addressed This Visit       GERD (gastroesophageal reflux disease) - Primary    Overview     Controlled with prilosec         KYLEE (obstructive sleep apnea)    Current Assessment & Plan     The patient symptomatically has ?snoring with findings of elevated bmi, high grade Mallampati, paf. This warrants further investigation for possible obstructive sleep apnea.  Patient will be contacted after sleep study is done.            Relevant Orders    Home Sleep Study    Severe obesity (BMI 35.0-35.9 with comorbidity)    Overview     Started on ozempic but does not have coverage         Current Assessment & Plan     Follow up with Dr. Avila.         Severe persistent asthma with exacerbation    Overview     No longer on ics since she was started on Fasenra          Other Visit Diagnoses       Eosinophilic asthma        Relevant Medications    benralizumab (FASENRA PEN) 30 mg/mL AtIn                  Patient will No follow-ups on file.     40 minutes of total time spent on the encounter, which includes face to face time and non-face to face time preparing to see the patient (eg, review of tests), Obtaining and/or reviewing separately obtained history, documenting clinical information in the electronic or other health record, independently interpreting results (not separately reported) and communicating results to the patient/family/caregiver, or Care coordination (not separately reported).

## 2024-10-25 PROBLEM — G47.33 OSA (OBSTRUCTIVE SLEEP APNEA): Status: ACTIVE | Noted: 2024-10-25

## 2024-10-25 NOTE — ASSESSMENT & PLAN NOTE
The patient symptomatically has ?snoring with findings of elevated bmi, high grade Mallampati, paf. This warrants further investigation for possible obstructive sleep apnea.  Patient will be contacted after sleep study is done.

## 2024-10-30 DIAGNOSIS — J82.83 EOSINOPHILIC ASTHMA: ICD-10-CM

## 2024-10-31 RX ORDER — BENRALIZUMAB 30 MG/ML
30 INJECTION, SOLUTION SUBCUTANEOUS SEE ADMIN INSTRUCTIONS
Qty: 1 ML | Refills: 1 | Status: SHIPPED | OUTPATIENT
Start: 2024-10-31

## 2024-11-04 ENCOUNTER — CLINICAL SUPPORT (OUTPATIENT)
Dept: OTHER | Facility: CLINIC | Age: 64
End: 2024-11-04

## 2024-11-04 DIAGNOSIS — Z00.8 ENCOUNTER FOR OTHER GENERAL EXAMINATION: ICD-10-CM

## 2024-11-05 ENCOUNTER — HOSPITAL ENCOUNTER (OUTPATIENT)
Dept: RADIOLOGY | Facility: HOSPITAL | Age: 64
Discharge: HOME | End: 2024-11-05
Attending: FAMILY MEDICINE
Payer: COMMERCIAL

## 2024-11-05 ENCOUNTER — TELEPHONE (OUTPATIENT)
Dept: FAMILY MEDICINE | Facility: CLINIC | Age: 64
End: 2024-11-05
Payer: COMMERCIAL

## 2024-11-05 VITALS
DIASTOLIC BLOOD PRESSURE: 63 MMHG | SYSTOLIC BLOOD PRESSURE: 126 MMHG | HEIGHT: 65 IN | BODY MASS INDEX: 35.99 KG/M2 | WEIGHT: 216 LBS

## 2024-11-05 DIAGNOSIS — Z12.31 ENCOUNTER FOR SCREENING MAMMOGRAM FOR BREAST CANCER: ICD-10-CM

## 2024-11-05 LAB
HDLC SERPL-MCNC: 42 MG/DL
POC CHOLESTEROL, LDL (DOCK): 131 MG/DL
POC CHOLESTEROL, TOTAL: 192 MG/DL
POC GLUCOSE, FASTING: 97 MG/DL (ref 60–110)
TRIGL SERPL-MCNC: 107 MG/DL

## 2024-11-05 PROCEDURE — 77063 BREAST TOMOSYNTHESIS BI: CPT | Mod: TC

## 2024-11-05 NOTE — TELEPHONE ENCOUNTER
----- Message from Gonzales sent at 11/5/2024 11:25 AM CST -----  Regarding: self  Type: Patient Call Back    Who called:self    What is the request in detail:calling in regards of the benralizumab (FASENRA PEN) 30 mg/mL AtIn, calling to get the status of it     Can the clinic reply by [facility]?no    Would the patient rather a call back or a response via [facility]? callback    Best call back number: [phone]      Additional Information:      [facility] [id number] [address] [place] [address]  [id number] [address] [place] [id number]  Phone: [phone] Fax: [phone]

## 2024-11-12 ENCOUNTER — TELEPHONE (OUTPATIENT)
Dept: PULMONOLOGY | Facility: CLINIC | Age: 64
End: 2024-11-12
Payer: COMMERCIAL

## 2024-11-12 NOTE — TELEPHONE ENCOUNTER
Spoke with [facility] told them RX was sent to [facility].                ----- Message from Med Assistant Rodríguez sent at 11/12/2024 11:08 AM CST -----  Who called:  Brock with [facility]   What is the request in detail:  Whom prescribed pt the benralizumab (FASENRA PEN) 30 mg/mL AtIn  Can the clinic reply by [facility]? No  No   Would the patient rather a call back or a response via [facility]? Call back  Callback   Best call back number:  [phone] direct line   Additional Information:    Thank you.

## 2024-11-22 ENCOUNTER — TELEPHONE (OUTPATIENT)
Dept: SLEEP MEDICINE | Facility: HOSPITAL | Age: 64
End: 2024-11-22
Payer: COMMERCIAL

## 2024-12-04 ENCOUNTER — PATIENT MESSAGE (OUTPATIENT)
Dept: ELECTROPHYSIOLOGY | Facility: CLINIC | Age: 64
End: 2024-12-04
Payer: COMMERCIAL

## 2024-12-05 RX ORDER — SOTALOL HYDROCHLORIDE 80 MG/1
80 TABLET ORAL 2 TIMES DAILY
Qty: 180 TABLET | Refills: 3 | Status: SHIPPED | OUTPATIENT
Start: 2024-12-05 | End: 2025-12-05

## 2024-12-18 ENCOUNTER — OFFICE VISIT (OUTPATIENT)
Dept: FAMILY MEDICINE | Facility: CLINIC | Age: 64
End: 2024-12-18
Payer: COMMERCIAL

## 2024-12-18 VITALS
BODY MASS INDEX: 37.52 KG/M2 | WEIGHT: 219.81 LBS | OXYGEN SATURATION: 98 % | HEART RATE: 84 BPM | DIASTOLIC BLOOD PRESSURE: 70 MMHG | SYSTOLIC BLOOD PRESSURE: 122 MMHG | RESPIRATION RATE: 16 BRPM | HEIGHT: 64 IN | TEMPERATURE: 98 F

## 2024-12-18 DIAGNOSIS — J45.51 SEVERE PERSISTENT ASTHMA WITH EXACERBATION: ICD-10-CM

## 2024-12-18 DIAGNOSIS — R09.81 SINUS CONGESTION: Primary | ICD-10-CM

## 2024-12-18 DIAGNOSIS — R52 ACHES: ICD-10-CM

## 2024-12-18 DIAGNOSIS — M79.645 THUMB PAIN, LEFT: ICD-10-CM

## 2024-12-18 DIAGNOSIS — I10 ESSENTIAL HYPERTENSION: ICD-10-CM

## 2024-12-18 DIAGNOSIS — K21.9 GASTROESOPHAGEAL REFLUX DISEASE, UNSPECIFIED WHETHER ESOPHAGITIS PRESENT: ICD-10-CM

## 2024-12-18 DIAGNOSIS — R05.9 COUGH, UNSPECIFIED TYPE: ICD-10-CM

## 2024-12-18 PROCEDURE — 3078F DIAST BP <80 MM HG: CPT | Mod: CPTII,S$GLB,,

## 2024-12-18 PROCEDURE — 3008F BODY MASS INDEX DOCD: CPT | Mod: CPTII,S$GLB,,

## 2024-12-18 PROCEDURE — 99214 OFFICE O/P EST MOD 30 MIN: CPT | Mod: 25,S$GLB,,

## 2024-12-18 PROCEDURE — 96372 THER/PROPH/DIAG INJ SC/IM: CPT | Mod: S$GLB,,,

## 2024-12-18 PROCEDURE — 1159F MED LIST DOCD IN RCRD: CPT | Mod: CPTII,S$GLB,,

## 2024-12-18 PROCEDURE — 3074F SYST BP LT 130 MM HG: CPT | Mod: CPTII,S$GLB,,

## 2024-12-18 PROCEDURE — 99999 PR PBB SHADOW E&M-EST. PATIENT-LVL III: CPT | Mod: PBBFAC,,,

## 2024-12-18 PROCEDURE — 1160F RVW MEDS BY RX/DR IN RCRD: CPT | Mod: CPTII,S$GLB,,

## 2024-12-18 RX ORDER — AZELASTINE 1 MG/ML
1 SPRAY, METERED NASAL 2 TIMES DAILY
Qty: 90 ML | Refills: 3 | Status: SHIPPED | OUTPATIENT
Start: 2024-12-18 | End: 2025-12-18

## 2024-12-18 RX ORDER — HYDROCHLOROTHIAZIDE 12.5 MG/1
12.5 TABLET ORAL DAILY
Qty: 90 TABLET | Refills: 1 | Status: SHIPPED | OUTPATIENT
Start: 2024-12-18

## 2024-12-18 RX ORDER — OMEPRAZOLE 40 MG/1
40 CAPSULE, DELAYED RELEASE ORAL
Qty: 180 CAPSULE | Refills: 3 | Status: SHIPPED | OUTPATIENT
Start: 2024-12-18

## 2024-12-18 RX ORDER — ACETAMINOPHEN AND CHLORPHENIRAMINE MALEATE 325; 2 MG/1; MG/1
1 TABLET, FILM COATED ORAL 4 TIMES DAILY PRN
Qty: 30 TABLET | Refills: 1 | Status: SHIPPED | OUTPATIENT
Start: 2024-12-18

## 2024-12-18 RX ORDER — IBUPROFEN 600 MG/1
600 TABLET ORAL 3 TIMES DAILY
Qty: 30 TABLET | Refills: 0 | Status: SHIPPED | OUTPATIENT
Start: 2024-12-18

## 2024-12-18 RX ORDER — METHYLPREDNISOLONE SOD SUCC 125 MG
125 VIAL (EA) INJECTION
Status: COMPLETED | OUTPATIENT
Start: 2024-12-18 | End: 2024-12-18

## 2024-12-18 RX ORDER — CETIRIZINE HYDROCHLORIDE 10 MG/1
10 TABLET ORAL DAILY
Qty: 30 TABLET | Refills: 2 | Status: SHIPPED | OUTPATIENT
Start: 2024-12-18

## 2024-12-18 RX ORDER — BENZONATATE 100 MG/1
100 CAPSULE ORAL 3 TIMES DAILY PRN
Qty: 30 CAPSULE | Refills: 0 | Status: SHIPPED | OUTPATIENT
Start: 2024-12-18 | End: 2024-12-28

## 2024-12-18 RX ADMIN — Medication 125 MG: at 10:12

## 2024-12-18 NOTE — PROGRESS NOTES
HPI     Chief Complaint:  Chief Complaint   Patient presents with    Sinus Problem    Ear Problem       Francoise Dykes is a 64 y.o. female with multiple medical diagnoses as listed in the medical history and problem list that presents for Sinus issue and ear pain    HPI    History of Present Illness    CHIEF COMPLAINT:  Francoise presents today with sinus and ear issues.    ENT SYMPTOMS:  She reports chronic sinus problems that started around Sunday with post nasal drip, throat irritation, ear pain, and roof of mouth pain. She experiences itching in inner ears and a kristopher voice. She reports coughing at night but not during the day, with sinus drip tickling her throat. She denies fever or chills. She uses Flonase but reports it causes nosebleeds. She has been taking Hui-[place]nerdell cold plus and nighttime for symptom management.    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 Despite being left-handed, pain is localized to the right thumb joint and is exacerbated with flexion.    CURRENT MEDICATIONS:  She takes Betapace (Sotolol) and Eliquis for atrial fibrillation, hydrochlorothiazide, Prilosec (omeprazole), and uses a rescue inhaler for asthma and allergies. She reports being out of acetyl. She needs refills for hydrochlorothiazide and Prilosec.      ROS:  General: -fever, -chills, -fatigue, -weight gain, -weight loss  Eyes: -vision changes, -redness, -discharge  ENT: +ear pain, +nasal congestion, -sore throat, +post nasal drip  Cardiovascular: -chest pain, -palpitations, -lower extremity edema  Respiratory: -cough, -shortness of breath  Gastrointestinal: -abdominal pain, -nausea, -vomiting, -diarrhea, -constipation, -blood in stool  Genitourinary: -dysuria, -hematuria, -frequency  Musculoskeletal: +joint pain, -muscle pain  Skin: -rash, -lesion  Neurological: -headache,  -dizziness, -numbness, -tingling  Psychiatric: -anxiety, -depression, -sleep difficulty             Assessment & Plan     Assessment & Plan    Assessed symptoms as likely viral sinus issue based on presentation and duration  Recommend steroid injection for symptomatic relief of sinus congestion and ear discomfort  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  - Follow up to receive pneumonia and tetanus vaccines.         Problem List Items Addressed This Visit       Essential hypertension    Relevant Medications    hydroCHLOROthiazide (HYDRODIURIL) 12.5 MG Tab  HYPERTENSION:  - Refilled hydrochlorothiazide at current dose.      GERD (gastroesophageal reflux disease)    Overview     Controlled with prilosec         Relevant Medications    omeprazole (PRILOSEC) 40 MG capsule    GASTROESOPHAGEAL REFLUX DISEASE:  - Refilled omeprazole.      Severe persistent asthma with exacerbation    Overview     No longer on ics since she was started on Fasenra         Relevant Medications    azelastine (ASTELIN) 137 mcg (0.1 %) nasal spray    Sinus congestion - Primary    Relevant Medications    cetirizine (ZYRTEC) 10 MG tablet    chlorpheniramine-acetaminophen (CORICIDIN HBP COLD AND FLU) 2-325 mg Tab     Other Visit Diagnoses       Cough, unspecified type        Relevant Medications    benzonatate (TESSALON) 100 MG capsule    chlorpheniramine-acetaminophen (CORICIDIN HBP COLD AND FLU) 2-325 mg Tab    ACUTE SINUSITIS:  - Explained that symptoms are likely due to viral sinus issue producing excess mucus.  - Started Zyrtec to help dry out mucus.  - Started Tessalon capsules for cough.  - Started pseudoephedrine for nasal congestion.  - Refilled acetyl (assumed to be referring to Azelastine nasal spray).  - Contact office if sinus symptoms worsen.     Aches        Relevant Medications    methylPREDNISolone sodium succinate injection 125 mg (Completed)    Other Relevant Orders    Prior authorization Order    Thumb pain, left        Relevant Medications    ibuprofen (ADVIL,MOTRIN) 600 MG tablet     LEFT THUMB:  - Discussed overuse injury of the thumb joint and conservative management strategies.  - Advised on proper technique for using ice packs to avoid skin damage.  - Francoise to rest and ice the affected thumb joint.  - Francoise to use right hand more often to give left thumb joint time to heal.  - Started ibuprofen for thumb joint pain.  - Administered Cytomedrol (steroid) injection in office.  - Follow up if thumb pain persists or worsens for potential orthopedic referral and imaging.        OTALGIA  OTALGIA (EAR PAIN):  - Informed about availability of OTC earwax removal products.  - Francoise to use OTC earwax removal product in shower.         --------------------------------------------      Health Maintenance:  Health Maintenance         Date Due Completion Date    Pneumococcal Vaccines (Age 0-64) (1 of 2 - PCV) Never done ---    TETANUS VACCINE Never done ---    RSV Vaccine (Age 60+ and Pregnant patients) (1 - Risk 60-74 years 1-dose series) Never done ---    COVID-19 Vaccine (8 - 2024-25 season) 09/01/2024 1/4/2024    Lipid Panel 11/04/2025 11/4/2024    Override on 10/20/2014: Done    Mammogram 11/05/2025 11/5/2024    Hemoglobin A1c (Diabetic Prevention Screening) 07/12/2027 7/12/2024    Colorectal Cancer Screening 03/04/2031 3/4/2024    Override on 11/24/2014: Done            Health maintenance reviewed    Follow Up:  Follow up if symptoms worsen or fail to improve.    Exam     Review of Systems:  (as noted above)  Review of Systems    Physical Exam:   Physical Exam  Vitals:    12/18/24 0943   BP: 122/70   BP Location: Right arm   Patient Position: Sitting   Pulse: 84   Resp: 16   Temp: 98 °F (36.7 °C)   TempSrc: Oral   SpO2: 98%   Weight: 99.7 kg (219 lb  "12.8 oz)   Height: 5' 4" (1.626 m)      Body mass index is 37.73 kg/m².    Physical Exam    Vitals: Blood pressure: 122/70.  General: No acute distress. Well-developed. Well-nourished.  Eyes: EOMI. Sclerae anicteric.  HENT: Normocephalic. Atraumatic. Nares patent. Moist oral mucosa.  Ears: Bilateral TMs clear.  Ear wax in both ears  Cardiovascular: Regular rate. Regular rhythm. No murmurs. No rubs. No gallops. Normal S1, S2.  Respiratory: Normal respiratory effort. Clear to auscultation bilaterally. No rales. No rhonchi. No wheezing.  Abdomen: Soft. Non-tender. Non-distended. Normoactive bowel sounds.  Musculoskeletal: No  obvious deformity.  Extremities: No lower extremity edema.  Neurological: Alert & oriented x3. No slurred speech. Normal gait.  Psychiatric: Normal mood. Normal affect. Good insight. Good judgment.  Skin: Warm. Dry. No rash.           History     Past Medical History:  Past Medical History:   Diagnosis Date    Acid reflux     Allergy     seasonal    Asthma with acute exacerbation     Atrial fibrillation     Essential hypertension 11/20/2017    Pt states that she does not have HTN.        Past Surgical History:  Past Surgical History:   Procedure Laterality Date    24 HOUR IMPEDANCE PH MONITORING OF ESOPHAGUS IN PATIENT TAKING ACID REDUCING MEDICATIONS N/A 09/20/2021    Procedure: IMPEDANCE PH STUDY, ESOPHAGEAL, 24 HOUR, IN PATIENT TAKING ACID REDUCING MEDICATION;  Surgeon: Franky Gomez MD;  Location: [facility] ([address] [place] [address]);  Service: Endoscopy;  Laterality: N/A;  Patient to do EGD with with esophageal Bravo pH probe on omeprazole 40 mg once daily she needs to take it with a sip of water the day of the case  pt completed COVID vaccine- see Immunization record in     ABLATION OF ARRHYTHMOGENIC FOCUS FOR ATRIAL FIBRILLATION N/A 02/03/2023    Procedure: ABLATION, ARRHYTHMOGENIC FOCUS, FOR ATRIAL FIBRILLATION;  Surgeon: Lee Meyers MD;  Location: [facility] EP LAB;  Service: Cardiology;  Laterality: " N/A;  AF, SAMMIE( Cx if SR), Redo PVI, RFA, CARTO, GEN, GP, 3 PREP    ABLATION, ATRIAL FLUTTER, TYPICAL  2023    Procedure: Ablation, Atrial Flutter, Typical;  Surgeon: Lee Meyers MD;  Location: [facility] EP LAB;  Service: Cardiology;;    ABLATION, ATRIAL TACHYCARDIA  2023    Procedure: Ablation, Atrial Tachycardia;  Surgeon: Lee Meyers MD;  Location: [facility] EP LAB;  Service: Cardiology;;    CARDIAC ELECTROPHYSIOLOGY STUDY AND ABLATION      CARDIAC ELECTROPHYSIOLOGY STUDY AND ABLATION       SECTION      COLONOSCOPY N/A 2018    Procedure: COLONOSCOPY;  Surgeon: Brodie Lara MD;  Location: [facility] ENDO;  Service: Endoscopy;  Laterality: N/A;  confirmed appt-ss    COLONOSCOPY N/A 3/4/2024    Procedure: COLONOSCOPY;  Surgeon: Frances Lovell MD;  Location: [facility];  Service: Endoscopy;  Laterality: N/A;  polyps noted on procedure report 2018 ref by Juice So MD, PEG, instr. to portal, Eliquis approval hold pending-[facility] to hold Eliquis 2 days per Dr Meyers-  - pc complete. [facility]    ESOPHAGEAL MANOMETRY WITH MEASUREMENT OF IMPEDANCE N/A 2021    Procedure: MANOMETRY, ESOPHAGUS, WITH IMPEDANCE MEASUREMENT;  Surgeon: Franky Gomez MD;  Location: [facility] (4TH FLR);  Service: Endoscopy;  Laterality: N/A;  Covid test  [facility]   pt confirmed appt-KPvt    ESOPHAGOGASTRODUODENOSCOPY N/A 2021    Procedure: EGD (ESOPHAGOGASTRODUODENOSCOPY);  Surgeon: Alin Camargo MD;  Location: [facility] (2ND FLR);  Service: Endoscopy;  Laterality: N/A;  Fully vaccinated 21, ok to hold Eliquis x 2 days per Dr. ANGELLA Meyers, instr portal -  12/10/21 LVM to see if patient can come in earlier -ml    HYSTERECTOMY      MIGUEL    RADIOFREQUENCY ABLATION Left 2019    Procedure: RADIOFREQUENCY ABLATION, LEFT L2,3,4,5;  Surgeon: Mariusz Jang MD;  Location: [facility] PAIN MGT;  Service: Pain Management;  Laterality: Left;  Left RFA L2,3,4,5  1 of 2  *Ok to hold  elgin [address]    RADIOFREQUENCY ABLATION Right 03/13/2019    Procedure: RADIOFREQUENCY ABLATION,  Right L2,3,4,5;  Surgeon: Mariusz Jang MD;  Location: [facility];  Service: Pain Management;  Laterality: Right;  Right RFA @ L2,3,4,5  2 of 2       Social History:  Social History     Socioeconomic History    Marital status:    Occupational History    Occupation: Teacher     Employer: [employer]    Tobacco Use    Smoking status: Never    Smokeless tobacco: Never   Substance and Sexual Activity    Alcohol use: Yes     Comment: rarely, 1 drink/week    Drug use: No    Sexual activity: Yes     Partners: Male   Other Topics Concern    Are you pregnant or think you may be? No    Breast-feeding No   Social History Narrative    Recently moved back to [facility] to help take care of mom.    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     Social Drivers of Health     Financial Resource Strain: Medium Risk (9/27/2024)    Overall Financial Resource Strain (CARDIA)     Difficulty of Paying Living Expenses: Somewhat hard   Food Insecurity: Food Insecurity Present (9/27/2024)    Hunger Vital Sign     Worried About Running Out of Food in the Last Year: Sometimes true     Ran Out of Food in the Last Year: Sometimes true   Transportation Needs: Patient Declined (4/8/2024)    PRAPARE - Transportation     Lack of Transportation (Medical): Patient declined     Lack of Transportation (Non-Medical): Patient declined   Physical Activity: Unknown (9/27/2024)    Exercise Vital Sign     Days of Exercise per Week: 3 days   Stress: No Stress Concern Present (9/27/2024)    [employer] [place] [employer] - Occupational Stress Questionnaire     Feeling of Stress : Not at all   Housing Stability: Unknown (9/27/2024)    Housing Stability Vital Sign     Unable to Pay for Housing in the Last Year: No       Family History:  Family History   Problem Relation Name Age of Onset    Glaucoma Mother      Hypertension Mother       Diabetes Mother      Allergies Mother      Asbestos Father      Obesity Father      Glaucoma Paternal Aunt      Heart disease Maternal Grandmother          chf    Diabetes Maternal Grandfather      Cancer Paternal Grandmother          lung, 2/2 second hand smoke    Glaucoma Paternal Grandfather      Blindness Paternal Grandfather      Eczema Son      Hypertension Son      Melanoma Neg Hx      Psoriasis Neg Hx      Lupus Neg Hx      Acne Neg Hx      Breast cancer Neg Hx      Colon cancer Neg Hx      Ovarian cancer Neg Hx      Esophageal cancer Neg Hx      Cirrhosis Neg Hx      Celiac disease Neg Hx      Colon polyps Neg Hx      Crohn's disease Neg Hx      Liver cancer Neg Hx      Liver disease Neg Hx      Rectal cancer Neg Hx      Stomach cancer Neg Hx      Ulcerative colitis Neg Hx      Pancreatic cancer Neg Hx      Kidney cancer Neg Hx      Bladder Cancer Neg Hx      Uterine cancer Neg Hx      Amblyopia Neg Hx      Macular degeneration Neg Hx      Retinal detachment Neg Hx      Strabismus Neg Hx         Allergies and Medications: (updated and reviewed)  Review of patient's allergies indicates:   Allergen Reactions    Iodinated contrast media Anaphylaxis, Hives, Shortness Of Breath and Itching    Adhesive tape-silicones Itching     Manifested in 12/2015 following AF ablation.     Current Outpatient Medications   Medication Sig Dispense Refill    albuterol (PROVENTIL) 2.5 mg /3 mL (0.083 %) nebulizer solution Take 3 mLs (2.5 mg total) by nebulization every 6 (six) hours as needed for Wheezing. 3 mL 5    apixaban (ELIQUIS) 5 mg Tab Take 1 tablet (5 mg total) by mouth 2 (two) times daily. 180 tablet 3    benralizumab (FASENRA PEN) 30 mg/mL AtIn Inject 30 mg into the skin every 8 weeks 1 mL 1    clobetasol 0.05% (TEMOVATE) 0.05 % Oint Apply topically 2 (two) times daily. 60 g 2    clobetasol 0.05% (TEMOVATE) 0.05 % Oint Apply twice daily Monday-Friday, weekends off 60 g 2    diclofenac sodium (VOLTAREN) 1 % Gel Apply 2  g topically 4 (four) times daily as needed (pain). 200 g 0    hydroquinone 4 % Crea Apply pea-sized amount nightly for 3 months. Take 1 month off and restart as needed. 30 g 1    pimecrolimus (ELIDEL) 1 % cream Apply topically 2 (two) times daily. 60 g 6    sotaloL (BETAPACE) 80 MG tablet Take 1 tablet (80 mg total) by mouth 2 (two) times daily. 180 tablet 3    azelastine (ASTELIN) 137 mcg (0.1 %) nasal spray 1 spray (137 mcg total) by Nasal route 2 (two) times daily. 90 mL 3    benzonatate (TESSALON) 100 MG capsule Take 1 capsule (100 mg total) by mouth 3 (three) times daily as needed for Cough. 30 capsule 0    cetirizine (ZYRTEC) 10 MG tablet Take 1 tablet (10 mg total) by mouth once daily. 30 tablet 2    chlorpheniramine-acetaminophen (CORICIDIN HBP COLD AND FLU) 2-325 mg Tab Take 1 tablet by mouth 4 (four) times daily as needed (runny nose, sneezing, body ache, fever). 30 tablet 1    hydroCHLOROthiazide (HYDRODIURIL) 12.5 MG Tab Take 1 tablet (12.5 mg total) by mouth once daily. 90 tablet 1    ibuprofen (ADVIL,MOTRIN) 600 MG tablet Take 1 tablet (600 mg total) by mouth 3 (three) times daily. 30 tablet 0    mupirocin (BACTROBAN) 2 % ointment Apply topically 3 (three) times daily. (Patient not taking: Reported on 12/18/2024) 30 g 2    omeprazole (PRILOSEC) 40 MG capsule Take 1 capsule (40 mg total) by mouth 2 (two) times daily before meals. 180 capsule 3    triamcinolone acetonide 0.1% (KENALOG) 0.1 % cream Apply topically 2 (two) times daily. (Patient not taking: Reported on 12/18/2024) 453.6 g 2     No current facility-administered medications for this visit.       No care team member to display         - The patient is given an After Visit Summary that lists all medications with directions, allergies, education, orders placed during this encounter and follow-up instructions.      - I have reviewed the patient's medical information including past medical, family, and social history sections including the  medications and allergies.      - We discussed the patient's current medications.     This note was created by combination of typed  and MModal dictation.  Transcription errors may be present.  If there are any questions, please contact me.     This note was generated with the assistance of ambient listening technology. Verbal consent was obtained by the patient and accompanying visitor(s) for the recording of patient appointment to facilitate this note. I attest to having reviewed and edited the generated note for accuracy, though some syntax or spelling errors may persist. Please contact the author of this note for any clarification.          Lilli Ovalle PA-C

## 2024-12-18 NOTE — PROGRESS NOTES
Health Maintenance Due   Topic     Pneumococcal Vaccines (Age 0-64) (1 of 2 - PCV)     TETANUS VACCINE      RSV Vaccine (Age 60+ and Pregnant patients) (1 - Risk 60-74 years 1-dose series) Not given at this facility       Influenza Vaccine (1) Done at [facility]    COVID-19 Vaccine (8 - 2024-25 season) Pt will call to schedule when ready

## 2024-12-26 ENCOUNTER — E-VISIT (OUTPATIENT)
Dept: URGENT CARE | Facility: CLINIC | Age: 64
End: 2024-12-26
Payer: COMMERCIAL

## 2024-12-26 DIAGNOSIS — J01.00 SUBACUTE MAXILLARY SINUSITIS: Primary | ICD-10-CM

## 2024-12-26 DIAGNOSIS — H92.09 OTALGIA, UNSPECIFIED LATERALITY: ICD-10-CM

## 2024-12-26 RX ORDER — METHYLPREDNISOLONE 4 MG/1
TABLET ORAL
Qty: 21 TABLET | Refills: 0 | Status: SHIPPED | OUTPATIENT
Start: 2024-12-26

## 2024-12-26 RX ORDER — AMOXICILLIN AND CLAVULANATE POTASSIUM 875; 125 MG/1; MG/1
1 TABLET, FILM COATED ORAL 2 TIMES DAILY
Qty: 14 TABLET | Refills: 0 | Status: SHIPPED | OUTPATIENT
Start: 2024-12-26 | End: 2025-01-02

## 2024-12-26 RX ORDER — IPRATROPIUM BROMIDE 21 UG/1
2 SPRAY, METERED NASAL 3 TIMES DAILY
Qty: 30 ML | Refills: 0 | Status: SHIPPED | OUTPATIENT
Start: 2024-12-26 | End: 2025-01-01

## 2024-12-26 NOTE — PROGRESS NOTES
Patient ID: Francoise Dykes is a 64 y.o. female.    Chief Complaint: General Illness (Entered automatically based on patient selection in [employer].)          274}  The patient initiated a request through [employer] on 12/26/2024 for evaluation and management with a chief complaint of General Illness (Entered automatically based on patient selection in [employer].)     I evaluated the questionnaire submission on 12/26/2024 .    Total Time (in minutes): 14     [facility] Peq Evisit Supergroup-Cough And Cold    12/26/2024 11:36 AM CST - Filed by Patient   What do you need help with? Sinus Infection   Do you agree to participate in an E-Visit? Yes   If you have any of the following symptoms, go to your local emergency room or call 911: I acknowledge   What is the main issue you would like addressed today? Went in for an office vist last week or so and was given a steroid shot. It helped a lot but now i have green mucus that will not stop. I have been blowing and spitting ip hunks of green mucus. I feel like i need and antibiotic.   Do you think you might have COVID or the Flu? No   Have you tested positive for COVID or Flu? No   What symptoms do you currently have?  Nasal Congestion;  Runny nose;  Ear pain   Have you ever smoked? I have never smoked   Have you had a fever? No   When did your symptoms first appear? 12/17/2024   In the last two weeks, have you been in close contact with someone who has COVID-19 or the Flu? No   List what you have done or taken to help your symptoms. Zyrtec, Sam cold, plus,benzonatate,   How severe are your symptoms? Moderate   Have your symptoms gotten better or worse since they started?  Worse   Do you have transportation to get testing if it is needed and ordered for you at an [facility] location? Yes   Provide any additional information you feel is important. my throat is not sore. The steroid shot help with that. I just need relief from this mucus running. Im just constantly blowing my  nose and spitting up honks of green.   Please attach any relevant images or files    Are you able to take your vital signs? No          Active Problem List with Overview Notes    Diagnosis Date Noted    Sinus congestion 12/18/2024    KYLEE (obstructive sleep apnea) 10/25/2024    Severe persistent asthma with exacerbation 07/12/2023     No longer on ics since she was started on Fasenra      COVID 01/12/2023    BMI 33.0-33.9,adult 12/12/2022    GERD (gastroesophageal reflux disease) 09/20/2021     Controlled with prilosec      Laryngopharyngeal reflux 08/28/2020    Chronic rhinitis 05/01/2020    Osteoarthritis of lumbar spine 02/27/2019    Spondylosis without myelopathy 02/17/2019    Atrial flutter 12/26/2017    Atypical atrial flutter 11/20/2017    Essential hypertension 11/20/2017    Chronic pain 07/21/2017    Thyroid nodule 06/29/2017    Current use of long term anticoagulation 04/12/2017    S/P ablation of atrial fibrillation 01/30/2017    Severe obesity (BMI 35.0-35.9 with comorbidity) 01/12/2017     Started on ozempic but does not have coverage      Long term current use of antiarrhythmic drug 07/14/2015    Degeneration of lumbar or lumbosacral intervertebral disc 01/09/2015    Sacroiliac joint pain 01/09/2015    Paroxysmal atrial fibrillation 01/09/2014      Recent Labs Obtained:  Lab Results   Component Value Date    WBC 6.00 07/12/2023    HGB 13.8 07/12/2023    HCT 39.8 07/12/2023    MCV 82 07/12/2023     07/12/2023     01/30/2023    K 4.0 01/30/2023    GLU 87 01/30/2023    CREATININE 0.9 01/30/2023    EGFRNORACEVR >60.0 01/30/2023    HGBA1C 4.5 06/20/2018    TSH 0.523 10/31/2022      Review of patient's allergies indicates:   Allergen Reactions    Iodinated contrast media Anaphylaxis, Hives, Shortness Of Breath and Itching    Adhesive tape-silicones Itching     Manifested in 12/2015 following AF ablation.       Encounter Diagnoses   Name Primary?    Subacute maxillary sinusitis Yes    Otalgia,  unspecified laterality         No orders of the defined types were placed in this encounter.     Medications Ordered This Encounter   Medications    amoxicillin-clavulanate 875-125mg (AUGMENTIN) 875-125 mg per tablet     Sig: Take 1 tablet by mouth 2 (two) times daily. for 7 days     Dispense:  14 tablet     Refill:  0    ipratropium (ATROVENT) 21 mcg (0.03 %) nasal spray     Si sprays by Each Nostril route 3 (three) times daily. for 6 days     Dispense:  30 mL     Refill:  0    methylPREDNISolone (MEDROL DOSEPACK) 4 mg tablet     Sig: follow package directions     Dispense:  21 tablet     Refill:  0        E-Visit Time Tracking:    Day 1 Time (in minutes): 14    Total Time (in minutes): 14      274}

## 2025-02-18 ENCOUNTER — CLINICAL SUPPORT (OUTPATIENT)
Dept: FAMILY MEDICINE | Facility: CLINIC | Age: 65
End: 2025-02-18
Payer: COMMERCIAL

## 2025-02-18 ENCOUNTER — TELEPHONE (OUTPATIENT)
Dept: FAMILY MEDICINE | Facility: CLINIC | Age: 65
End: 2025-02-18

## 2025-02-18 DIAGNOSIS — Z23 NEED FOR COVID-19 VACCINE: ICD-10-CM

## 2025-02-18 DIAGNOSIS — Z23 NEED FOR PNEUMOCOCCAL 20-VALENT CONJUGATE VACCINATION: Primary | ICD-10-CM

## 2025-02-18 PROCEDURE — 90677 PCV20 VACCINE IM: CPT | Mod: S$GLB,,,

## 2025-02-18 NOTE — PROGRESS NOTES
Administered Pneumococcal 20 vaccine IM to left deltoid.  Patient tolerated injection well, no adverse reactions noted.   Administered COVID vaccine IM to left deltod.  Patient tolerated injection well, no adverse reactions noted.

## 2025-02-18 NOTE — TELEPHONE ENCOUNTER
----- Message from The Invisible Armorsean sent at 2/18/2025  9:47 AM CST -----  Who called:Frederick is the request in detail:would like to be scheduled for her vaccines Can the clinic reply by [facility]?no Would the patient rather a call back or a response via [facility] call back number:..[phone] Information:

## 2025-02-19 ENCOUNTER — TELEPHONE (OUTPATIENT)
Dept: ELECTROPHYSIOLOGY | Facility: CLINIC | Age: 65
End: 2025-02-19
Payer: COMMERCIAL

## 2025-02-19 NOTE — TELEPHONE ENCOUNTER
Spoke to patient. Due to recent insurance change; the patient [facility] so they are able to refill the prescription. Patient appreciated the call.     ----- Message from KATIUSKA Jones sent at 2/19/2025  1:14 PM CST -----  Regarding: FW: Returning call    ----- Message -----  From: Susana Berry  Sent: 2/19/2025  12:40 PM CST  To: Meagan Tompkins RN  Subject: Returning call                                   Liseth,Pt [phone] returning your missed call.Thanks

## 2025-02-19 NOTE — TELEPHONE ENCOUNTER
----- Message from KATIUSKA Rhodes sent at 2/19/2025  8:44 AM CST -----  Regarding: FW: Rx    ----- Message -----  From: Quyen Dickinson MA  Sent: 2/19/2025   8:41 AM CST  To: Meagan Tompkins RN  Subject: FW: Rx                                           I am not sure how to go about faxing in a refill, do you know?  ----- Message -----  From: Angeles Garibay MA  Sent: 2/18/2025   4:35 PM CST  To: Quyen Dickinson MA  Subject: RE: Rx                                             ----- Message -----  From: Susana Berry  Sent: 2/18/2025  10:18 AM CST  To: Mira Howard Staff  Subject: Rx                                               Kaushik representative with the pt insurance pharmacy [facility] Pharmacy Benefit management would like her Eliquis 5 mg refill faxed [phone] to their office so they can forward it to her local pharmacy.Thanks

## 2025-05-23 ENCOUNTER — PATIENT MESSAGE (OUTPATIENT)
Dept: ADMINISTRATIVE | Facility: OTHER | Age: 65
End: 2025-05-23
Payer: COMMERCIAL

## 2025-05-23 ENCOUNTER — TELEPHONE (OUTPATIENT)
Dept: ADMINISTRATIVE | Facility: OTHER | Age: 65
End: 2025-05-23
Payer: COMMERCIAL

## 2025-05-23 NOTE — TELEPHONE ENCOUNTER
Telephone call to patient, following up recent VPN appointment. No answer, left voicemail offering assistance in scheduling with a PCP to establish care. If interested, please call [facility] at [phone].

## 2025-06-15 DIAGNOSIS — I10 ESSENTIAL HYPERTENSION: ICD-10-CM

## 2025-06-16 RX ORDER — HYDROCHLOROTHIAZIDE 12.5 MG/1
12.5 TABLET ORAL DAILY
Qty: 90 TABLET | Refills: 1 | Status: SHIPPED | OUTPATIENT
Start: 2025-06-16

## 2025-06-27 DIAGNOSIS — I48.0 PAROXYSMAL ATRIAL FIBRILLATION: Primary | ICD-10-CM

## 2025-07-09 ENCOUNTER — OFFICE VISIT (OUTPATIENT)
Dept: OPTOMETRY | Facility: CLINIC | Age: 65
End: 2025-07-09
Payer: COMMERCIAL

## 2025-07-09 DIAGNOSIS — H52.03 HYPEROPIA, BILATERAL: Primary | ICD-10-CM

## 2025-07-09 DIAGNOSIS — Z83.511 FAMILY HISTORY OF GLAUCOMA: ICD-10-CM

## 2025-07-09 DIAGNOSIS — H47.093 OPTIC NERVE ASYMMETRY, BILATERAL: ICD-10-CM

## 2025-07-09 DIAGNOSIS — H43.393 VITREOUS FLOATERS, BILATERAL: ICD-10-CM

## 2025-07-09 DIAGNOSIS — H52.4 PRESBYOPIA: ICD-10-CM

## 2025-07-09 PROCEDURE — 92015 DETERMINE REFRACTIVE STATE: CPT | Mod: S$GLB,,, | Performed by: OPTOMETRIST

## 2025-07-09 PROCEDURE — 92014 COMPRE OPH EXAM EST PT 1/>: CPT | Mod: S$GLB,,, | Performed by: OPTOMETRIST

## 2025-07-09 PROCEDURE — 99999 PR PBB SHADOW E&M-EST. PATIENT-LVL III: CPT | Mod: PBBFAC,,, | Performed by: OPTOMETRIST

## 2025-07-09 NOTE — PROGRESS NOTES
HPI    Pt is here today for a Routine exam.  She states she has not noticed any vision changes since her last exam.   This past weekend she saw a single black dot in her left eye. She denies   any bright flashes of light or sudden vision changes.    No Current Eye Meds.  Last edited by Walt Murrell on 7/9/2025  2:08 PM.            Assessment /Plan     For exam results, see Encounter Report.    Hyperopia, bilateral  Presbyopia   Rx specs    Vitreous floaters OS since July 4th, 2025    Ocular hypertension, unspecified laterality  Optic nerve asymmetry, bilateral   OS>OD              Cupping OS  Family history of glaucoma: mother, aunt, GF                            2024 OCT optic nerve : OD WNL, OS thin IT              HVF low reliability: OD WNL , OS overall depression/ paracentral defects              PACHY 580/577              IOP 24/22, 22/19, 18 OU               Gonio: narrow OU              Consult Diasy  for narrow angle eval

## 2025-08-04 ENCOUNTER — TELEPHONE (OUTPATIENT)
Dept: ELECTROPHYSIOLOGY | Facility: CLINIC | Age: 65
End: 2025-08-04
Payer: COMMERCIAL

## 2025-08-04 ENCOUNTER — HOSPITAL ENCOUNTER (OUTPATIENT)
Dept: CARDIOLOGY | Facility: CLINIC | Age: 65
Discharge: HOME | End: 2025-08-04
Payer: COMMERCIAL

## 2025-08-04 DIAGNOSIS — I48.0 PAROXYSMAL ATRIAL FIBRILLATION: ICD-10-CM

## 2025-08-04 NOTE — TELEPHONE ENCOUNTER
Pt checked in an hour early for EKG and left to drop off her grandson for school, asked  to let me know to give her a call to reschedule. Called pt to reschedule, no answer, LVM with desk & dept # to call back.

## 2025-08-19 DIAGNOSIS — J82.83 EOSINOPHILIC ASTHMA: ICD-10-CM

## 2025-08-19 RX ORDER — BENRALIZUMAB 30 MG/ML
30 INJECTION, SOLUTION SUBCUTANEOUS SEE ADMIN INSTRUCTIONS
Qty: 1 ML | Refills: 1 | Status: SHIPPED | OUTPATIENT
Start: 2025-08-19

## 2025-09-02 ENCOUNTER — CLINICAL SUPPORT (OUTPATIENT)
Dept: OPHTHALMOLOGY | Facility: CLINIC | Age: 65
End: 2025-09-02
Payer: COMMERCIAL

## 2025-09-02 ENCOUNTER — OFFICE VISIT (OUTPATIENT)
Dept: OPHTHALMOLOGY | Facility: CLINIC | Age: 65
End: 2025-09-02
Payer: COMMERCIAL

## 2025-09-02 DIAGNOSIS — H40.021 OAG (OPEN ANGLE GLAUCOMA) SUSPECT, HIGH RISK, RIGHT: Primary | ICD-10-CM

## 2025-09-02 DIAGNOSIS — H25.811 COMBINED FORM OF AGE-RELATED CATARACT, RIGHT EYE: ICD-10-CM

## 2025-09-02 DIAGNOSIS — H40.1122 PRIMARY OPEN ANGLE GLAUCOMA (POAG) OF LEFT EYE, MODERATE STAGE: ICD-10-CM

## 2025-09-02 DIAGNOSIS — H25.812 COMBINED FORM OF AGE-RELATED CATARACT, LEFT EYE: ICD-10-CM

## 2025-09-02 PROCEDURE — 92133 CPTRZD OPH DX IMG PST SGM ON: CPT | Mod: S$GLB,,, | Performed by: STUDENT IN AN ORGANIZED HEALTH CARE EDUCATION/TRAINING PROGRAM

## 2025-09-02 PROCEDURE — 99204 OFFICE O/P NEW MOD 45 MIN: CPT | Mod: S$GLB,,, | Performed by: STUDENT IN AN ORGANIZED HEALTH CARE EDUCATION/TRAINING PROGRAM

## 2025-09-02 PROCEDURE — 1159F MED LIST DOCD IN RCRD: CPT | Mod: CPTII,S$GLB,, | Performed by: STUDENT IN AN ORGANIZED HEALTH CARE EDUCATION/TRAINING PROGRAM

## 2025-09-02 PROCEDURE — 92083 EXTENDED VISUAL FIELD XM: CPT | Mod: S$GLB,,, | Performed by: STUDENT IN AN ORGANIZED HEALTH CARE EDUCATION/TRAINING PROGRAM

## 2025-09-02 PROCEDURE — 99999 PR PBB SHADOW E&M-EST. PATIENT-LVL III: CPT | Mod: PBBFAC,,, | Performed by: STUDENT IN AN ORGANIZED HEALTH CARE EDUCATION/TRAINING PROGRAM

## 2025-09-02 PROCEDURE — 92020 GONIOSCOPY: CPT | Mod: S$GLB,,, | Performed by: STUDENT IN AN ORGANIZED HEALTH CARE EDUCATION/TRAINING PROGRAM

## 2025-09-02 PROCEDURE — G2211 COMPLEX E/M VISIT ADD ON: HCPCS | Mod: S$GLB,,, | Performed by: STUDENT IN AN ORGANIZED HEALTH CARE EDUCATION/TRAINING PROGRAM

## 2025-09-02 RX ORDER — LATANOPROST 50 UG/ML
1 SOLUTION/ DROPS OPHTHALMIC NIGHTLY
COMMUNITY
End: 2025-09-02 | Stop reason: SDUPTHER

## 2025-09-02 RX ORDER — LATANOPROST 50 UG/ML
1 SOLUTION/ DROPS OPHTHALMIC NIGHTLY
Qty: 2.5 ML | Refills: 6 | Status: SHIPPED | OUTPATIENT
Start: 2025-09-02

## (undated) DEVICE — BANDAGE ADHESIVE

## (undated) DEVICE — ELECTRODE REM PLYHSV RETURN 9

## (undated) DEVICE — SET SMARTABLATE IRR TUBE

## (undated) DEVICE — R CATH ACUSON ACUNAV 8FR

## (undated) DEVICE — DRESSING LEUKOPLAST FLEX 1X3IN

## (undated) DEVICE — INTRO 8.5FR 63CM SRO

## (undated) DEVICE — INTRO FAST-CATH SL1 8.5FR 63CM

## (undated) DEVICE — NDL TRNSSPTL BRK-1 18GA 71CM

## (undated) DEVICE — R CATH TRICSPD HALO XP 7FRX110

## (undated) DEVICE — CATH THERMOCOOL SMTCH SF D F

## (undated) DEVICE — COVER TABLE 44X90 STERILE

## (undated) DEVICE — PAD DEFIB CADENCE ADULT R2

## (undated) DEVICE — PAD RADI FEMORAL

## (undated) DEVICE — R CATH BIDIRECTIONL DF CRV 7FR

## (undated) DEVICE — CATH PENTARY F 2-6-2MM 115CM

## (undated) DEVICE — SHEATH HEMOSTASIS 8.5FR

## (undated) DEVICE — INTRODUCER HEMOSTASIS 7.5F

## (undated) DEVICE — SHEATH INTRODUCER 9FR 11CM

## (undated) DEVICE — KIT PROBE COVER WITH GEL

## (undated) DEVICE — TRAY SUTURE REM WIRE LITTAUER

## (undated) DEVICE — INTRO AGILIS MED CRL 8.5F 71CM

## (undated) DEVICE — PATCH CARTO REFERENCE

## (undated) DEVICE — PACK EP DRAPE OMC

## (undated) DEVICE — BOWL FLUID - BACK STOP

## (undated) DEVICE — TRAY SUT REM SCISSOR FORCEP

## (undated) DEVICE — NDL TRNSSPTL BRK-1 18GA 98CM